# Patient Record
Sex: MALE | Race: BLACK OR AFRICAN AMERICAN | NOT HISPANIC OR LATINO | Employment: OTHER | URBAN - METROPOLITAN AREA
[De-identification: names, ages, dates, MRNs, and addresses within clinical notes are randomized per-mention and may not be internally consistent; named-entity substitution may affect disease eponyms.]

---

## 2017-01-11 ENCOUNTER — OFFICE VISIT (OUTPATIENT)
Dept: FAMILY MEDICINE | Facility: CLINIC | Age: 82
End: 2017-01-11
Payer: MEDICARE

## 2017-01-11 VITALS
WEIGHT: 224.88 LBS | TEMPERATURE: 97 F | DIASTOLIC BLOOD PRESSURE: 62 MMHG | HEART RATE: 59 BPM | SYSTOLIC BLOOD PRESSURE: 126 MMHG | HEIGHT: 71 IN | BODY MASS INDEX: 31.48 KG/M2

## 2017-01-11 DIAGNOSIS — E78.00 PURE HYPERCHOLESTEROLEMIA: ICD-10-CM

## 2017-01-11 DIAGNOSIS — F01.53 VASCULAR DEMENTIA WITH DEPRESSION: ICD-10-CM

## 2017-01-11 DIAGNOSIS — F32.9 REACTIVE DEPRESSION: ICD-10-CM

## 2017-01-11 DIAGNOSIS — D50.1 IRON DEFICIENCY ANEMIA DUE TO SIDEROPENIC DYSPHAGIA: ICD-10-CM

## 2017-01-11 DIAGNOSIS — N18.30 CKD (CHRONIC KIDNEY DISEASE) STAGE 3, GFR 30-59 ML/MIN: Primary | ICD-10-CM

## 2017-01-11 DIAGNOSIS — G40.909 SEIZURE DISORDER: ICD-10-CM

## 2017-01-11 DIAGNOSIS — E66.9 OBESITY, UNSPECIFIED OBESITY SEVERITY, UNSPECIFIED OBESITY TYPE: ICD-10-CM

## 2017-01-11 PROCEDURE — 99214 OFFICE O/P EST MOD 30 MIN: CPT | Mod: 25,S$GLB,, | Performed by: FAMILY MEDICINE

## 2017-01-11 PROCEDURE — 90662 IIV NO PRSV INCREASED AG IM: CPT | Mod: S$GLB,,, | Performed by: FAMILY MEDICINE

## 2017-01-11 PROCEDURE — 1126F AMNT PAIN NOTED NONE PRSNT: CPT | Mod: S$GLB,,, | Performed by: FAMILY MEDICINE

## 2017-01-11 PROCEDURE — 99999 PR PBB SHADOW E&M-EST. PATIENT-LVL III: CPT | Mod: PBBFAC,,, | Performed by: FAMILY MEDICINE

## 2017-01-11 PROCEDURE — 3078F DIAST BP <80 MM HG: CPT | Mod: S$GLB,,, | Performed by: FAMILY MEDICINE

## 2017-01-11 PROCEDURE — 1157F ADVNC CARE PLAN IN RCRD: CPT | Mod: S$GLB,,, | Performed by: FAMILY MEDICINE

## 2017-01-11 PROCEDURE — G0008 ADMIN INFLUENZA VIRUS VAC: HCPCS | Mod: S$GLB,,, | Performed by: FAMILY MEDICINE

## 2017-01-11 PROCEDURE — 3074F SYST BP LT 130 MM HG: CPT | Mod: S$GLB,,, | Performed by: FAMILY MEDICINE

## 2017-01-11 PROCEDURE — 1160F RVW MEDS BY RX/DR IN RCRD: CPT | Mod: S$GLB,,, | Performed by: FAMILY MEDICINE

## 2017-01-11 PROCEDURE — 1159F MED LIST DOCD IN RCRD: CPT | Mod: S$GLB,,, | Performed by: FAMILY MEDICINE

## 2017-01-11 NOTE — PROGRESS NOTES
Subjective:       Patient ID: Lavon Brandon is a 84 y.o. male.    Chief Complaint: Hypertension; High BMI; and Fatigue    Hypertension   This is a chronic problem. The problem is controlled. Associated symptoms include neck pain. Pertinent negatives include no anxiety, blurred vision, chest pain, headaches or malaise/fatigue. (Recent fall, 2 weeks ago. Neck sprain and rt shoulder sprain. Declined PT. Denies LOC.)   Fatigue   Associated symptoms include fatigue and neck pain. Pertinent negatives include no chest pain or headaches.     Review of Systems   Constitutional: Positive for fatigue. Negative for malaise/fatigue.   Eyes: Negative for blurred vision.   Cardiovascular: Negative for chest pain.   Musculoskeletal: Positive for neck pain.   Neurological: Negative for headaches.       Objective:      Physical Exam   Constitutional: He is oriented to person, place, and time. He appears well-developed and well-nourished. No distress.   HENT:   Head: Normocephalic and atraumatic.   Right Ear: External ear normal.   Left Ear: External ear normal.   Nose: Nose normal.   Mouth/Throat: Oropharynx is clear and moist. No oropharyngeal exudate.   Eyes: Conjunctivae and EOM are normal. Pupils are equal, round, and reactive to light. Right eye exhibits no discharge. Left eye exhibits no discharge. No scleral icterus.   Neck: Normal range of motion. Neck supple. No JVD present. No tracheal deviation present. No thyromegaly present.   Cardiovascular: Normal rate, normal heart sounds and intact distal pulses.    No murmur heard.  Pulmonary/Chest: Effort normal and breath sounds normal. No respiratory distress. He has no wheezes. He has no rales.   Abdominal: Soft. Bowel sounds are normal. He exhibits no distension and no mass. There is no tenderness. There is no rebound and no guarding.   Musculoskeletal: He exhibits no edema.        Right shoulder: He exhibits decreased range of motion and tenderness. He exhibits no pain, no  spasm, normal pulse and normal strength.        Cervical back: He exhibits decreased range of motion and tenderness. He exhibits no swelling and no edema.        Lumbar back: He exhibits decreased range of motion, tenderness and bony tenderness. He exhibits no swelling, no edema, no pain and no spasm.          Lymphadenopathy:     He has no cervical adenopathy.   Neurological: He is alert and oriented to person, place, and time. He has normal strength and normal reflexes. He displays no tremor. No cranial nerve deficit or sensory deficit. He displays a negative Romberg sign. Coordination normal. GCS eye subscore is 4. GCS verbal subscore is 5. GCS motor subscore is 6.   Skin: Skin is warm and dry. No rash noted. He is not diaphoretic. No erythema. No pallor.   Psychiatric: He has a normal mood and affect. His behavior is normal. Judgment and thought content normal.   Vitals reviewed.      Assessment:       1. CKD (chronic kidney disease) stage 3, GFR 47 ml/min    2. Pure hypercholesterolemia    3. Obesity, unspecified obesity severity, unspecified obesity type    4. Seizure disorder, post CVA 2011    5. Reactive depression    6. Iron deficiency anemia due to sideropenic dysphagia        Plan:       CKD (chronic kidney disease) stage 3, GFR 47 ml/min  -     Basic metabolic panel; Future; Expected date: 1/11/17  -     PHOSPHORUS; Future; Expected date: 1/11/17  -     PTH, intact; Future; Expected date: 1/11/17  -     CBC auto differential; Future; Expected date: 1/11/17  -     FERRITIN; Future; Expected date: 1/11/17    Pure hypercholesterolemia    Obesity, unspecified obesity severity, unspecified obesity type    Seizure disorder, post CVA 2011  -     Levetiracetam level; Future; Expected date: 1/11/17    Reactive depression    Iron deficiency anemia due to sideropenic dysphagia  -     FERRITIN; Future; Expected date: 1/11/17    Other orders  -     Influenza - High Dose (65+) (PF) (IM)      Patient readiness:  acceptance and barriers:readiness    During the course of the visit the patient was educated and counseled about the following:     Hypertension:   Dietary sodium restriction.  Regular aerobic exercise.  Obesity:   General weight loss/lifestyle modification strategies discussed (elicit support from others; identify saboteurs; non-food rewards, etc).    Goals: Hypertension: Reduce Blood Pressure and Obesity: Reduce calorie intake and BMI    Did patient meet goals/outcomes: Yes    The following self management tools provided: blood pressure log  excercise log    Patient Instructions (the written plan) was given to the patient/family.     Time spent with patient: 45 minutes

## 2017-01-11 NOTE — PATIENT INSTRUCTIONS
4 Steps for Eating Healthier  Changing the way you eat can improve your health. It can lower your cholesterol and blood pressure, and help you stay at a healthy weight. Your diet doesnt have to be bland and boring to be healthy. Just watch your calories and follow these steps:    1. Eat fewer unhealthy fats  · Choose more fish and lean meats instead of fatty cuts of meat.  · Skip butter and lard, and use less margarine.  · Pass on foods that have palm, coconut, or hydrogenated oils.  · Eat fewer high-fat dairy foods like cheese, ice cream, and whole milk.  · Get a heart-healthy cookbook and try some low-fat recipes.  2. Go light on salt  · Keep the saltshaker off the table.  · Limit high-salt ingredients, such as soy sauce, bouillon, and garlic salt.  · Instead of adding salt when cooking, season your food with herbs and flavorings. Try lemon, garlic, and onion.  · Limit convenience foods, such as boxed or canned foods and restaurant food.  · Read food labels and choose lower-sodium options.  3. Limit sugar  · Pause before you add sugars to pancakes, cereal, coffee, or tea. This includes white and brown table sugar, syrup, honey, and molasses. Cut your usual amount by half.  · Use non-sugar sweeteners. Stevia, aspartame, and sucralose can satisfy a sweet tooth without adding calories.  · Swap out sugar-filled soda and other drinks. Buy sugar-free or low-calorie beverages. Remember water is always the best choice.  · Read labels and choose foods with less added sugar. Keep in mind that dairy foods and foods with fruit will have some natural sugar.  · Cut the sugar in recipes by 1/3 to 1/2. Boost the flavor with extracts like almond, vanilla, or orange. Or add spices such as cinnamon or nutmeg.  4. Eat more fiber  · Eat fresh fruits and vegetables every day.  · Boost your diet with whole grains. Go for oats, whole-grain rice, and bran.  · Add beans and lentils to your meals.  · Drink more water to match your fiber  increase. This is to help prevent constipation.  © 8914-5395 [employer]. [address] [place] [address], [place] [address]. All rights reserved. This information is not intended as a substitute for professional medical care. Always follow your healthcare professional's instructions.        Chronic Kidney Disease (CKD)    The role of the kidneys is to remove waste products and excess water from the blood.  When the kidneys do not function normally and waste products begin to build up in the blood, this is called chronic kidney disease (CKD). CKD is defined as the presence of kidney damage or a decrease in kidney function lasting 3 months or more. CKD allows excess water, waste, and toxic substances to build up in the body. This can eventually become life-threatening, requiring dialysis or a kidney transplant to stay alive. This most severe form is called end stage renal disease or ESRD.  Diabetes is the leading causes of chronic renal failure. Other causes include high blood pressure, hardening of the arteries (atherosclerosis), lupus, inflammation of the blood vessels (vasculitis), prior viral and bacterial infections, and others. Certain over-the-counter pain medicines can cause renal failure when taken often over a long period of time. These include aspirin, ibuprofen, and related anti-inflammatory medicines called NSAIDs (nonsteroidal anti-inflammatory drugs).  Home care  The following guidelines will help you care for yourself at home:  1. If you have diabetes, talk to your doctor about the quality of your blood sugar control and any adjustments needed to your diet, lifestyle, or medicines.  2. If you have high blood pressure:  ¨ Take prescribed medicine to lower your blood pressure to the recommended goal of less than 130/80.  ¨ Take up a regular exercise program that you enjoy. Check with your doctor to be sure your planned exercise program is right for you.  ¨ Reduce your salt (sodium) intake. Your  doctor can tell you how much salt per day is safe for you.  3. If you are overweight, talk to your doctor about a weight loss plan.  4. If you smoke, you must quit. Smoking worsens kidney disease. Talk to your doctor about ways to help you quit.  For more information, visit the following links:  ¨ www.Cred[employer].gov/sites/default/files/pdf/clearing-the-air-accessible.pdf  ¨ www.smokefree.gov  ¨ www.cancer.org/healthy/stayawayfromtobacco/guidetoquittingsmoking/  5. Most patients with chronic renal failure need to follow a special diet.  Be sure you understand yours. In general, you will need to restrict protein, salt, potassium, and phosphorus. You also need to limit fluid intake. A calcium supplement may be prescribed to protect your bones from osteoporosis.  6. CKD is a risk factor for heart disease. Talk to your doctor about any other risk factors you might have and what you can do to lessen them.  7. Talk to your doctor about any medicines you are taking to determine if they need to be reduced or stopped.  8. Avoid the following over-the-counter medicines, or consult your doctor before using:  ¨ Aspirin and nonsteroidal anti-inflammatory drugs (NSAIDs) such as ibuprofen or naproxen (short-term use of acetaminophen for fever or pain is okay)  ¨ Laxatives and antacids containing magnesium or aluminum  ¨ Fleet or phospho soda enemas containing phosphorus  ¨ Certain stomach acid-blocking medicine such as cimetidine or ranitidine   ¨ Decongestants containing pseudoephedrine   ¨ Herbal supplements  Follow-up care  Follow up with your doctor or as advised by our staff. Contact one of the following for more information:  · American Association of Kidney Patients (754) 261-6694 www.aakp.org  · National Kidney Foundation (251) 436-1430 www.kidney.org  · American Kidney Fund (187) 747-4921 www.kidneyfund.org  · National Kidney Disease Education Program (676) 4WVDNXO www.nkdep.nih.gov  [NOTE: If an X-ray, EKG (cardiogram), or  other diagnostic test was taken, another specialist will review it. You will be notified of any new findings that may affect your care.]  When to seek medical care  Get prompt medical attention or contact your doctor if any of the following occur:  · Nausea or vomiting  · Fever greater than 100.4°F (38°C)  · Severe weakness, dizziness, fainting, drowsiness, or confusion  · Chest pain or shortness of breath  · Unexpected weight gain or swelling in the legs, ankles, or around the eyes  · Heart beating fast, slow, or irregularly  · Decrease or absent urine output  © 7341-0640 [employer]. [address] [place] [address], [place] [address]. All rights reserved. This information is not intended as a substitute for professional medical care. Always follow your healthcare professional's instructions.

## 2017-01-11 NOTE — MR AVS SNAPSHOT
[facility]  [address]  [place] [address]  Phone: [phone]  Fax: [phone]                  Lavon Brandon   2017 10:30 AM   Office Visit    Description:  Male : 1932   Provider:  Aristides Haynes MD   Department:  [facility]           Reason for Visit     Hypertension     High BMI     Fatigue           Diagnoses this Visit        Comments    CKD (chronic kidney disease) stage 3, GFR 30-59 ml/min    -  Primary     Pure hypercholesterolemia         Obesity, unspecified obesity severity, unspecified obesity type         Seizure disorder         Reactive depression         Iron deficiency anemia due to sideropenic dysphagia         Vascular dementia with depression                To Do List           Future Appointments        Provider Department Dept Phone    2017 10:00 AM [facility] [phone]    2017 1:30 PM Aristides Haynes MD [facility] [phone]      Goals (5 Years of Data)     None      Follow-Up and Disposition     Return in about 6 months (around 2017).      [facility]     [facility] Nurse Care Line - 24/7 Assistance  Registered nurses in the [facility] Center provide clinical advisement, health education, appointment booking, and other advisory services.  Call for this free service at [phone].             Medications           Message regarding Medications     Verify the changes and/or additions to your medication regime listed below are the same as discussed with your clinician today.  If any of these changes or additions are incorrect, please notify your healthcare provider.             Verify that the below list of medications is an accurate representation of the medications you are currently taking.  If none reported, the list may be blank. If incorrect, please contact your healthcare provider. Carry this list with you in case of emergency.           Current  "Medications     amlodipine (NORVASC) 5 MG tablet TAKE ONE TABLET BY MOUTH ONCE DAILY    benazepril (LOTENSIN) 40 MG tablet TAKE ONE TABLET BY MOUTH ONCE DAILY    citalopram (CELEXA) 20 MG tablet Take 1 tablet (20 mg total) by mouth once daily.    furosemide (LASIX) 40 MG tablet TAKE ONE TABLET BY MOUTH TWICE DAILY    levetiracetam (KEPPRA) 500 MG Tab TAKE ONE TABLET BY MOUTH TWICE DAILY           Clinical Reference Information           Vital Signs - Last Recorded  Most recent update: 1/11/2017 10:46 AM by Dee Pratt MA    BP Pulse Temp Ht Wt BMI    126/62 (BP Location: Right arm, Patient Position: Sitting, BP Method: Automatic) (!) 59 97.2 °F (36.2 °C) (Oral) 5' 11" (1.803 m) 102 kg (224 lb 13.9 oz) 31.36 kg/m2      Blood Pressure          Most Recent Value    BP  126/62      Allergies as of 1/11/2017     Ciprofloxacin (Bulk)      Immunizations Administered on Date of Encounter - 1/11/2017     Name Date Dose VIS Date Route    Influenza - High Dose 1/11/2017 0.5 mL 8/7/2015 Intramuscular      Orders Placed During Today's Visit      Normal Orders This Visit    Influenza - High Dose (65+) (PF) (IM)     Future Labs/Procedures Expected by Expires    Basic metabolic panel  1/11/2017 3/12/2018    CBC auto differential  1/11/2017 3/12/2018    FERRITIN  1/11/2017 3/12/2018    Levetiracetam level  1/11/2017 3/12/2018    PHOSPHORUS  1/11/2017 3/12/2018    PTH, intact  1/11/2017 3/12/2018      Instructions      4 Steps for Eating Healthier  Changing the way you eat can improve your health. It can lower your cholesterol and blood pressure, and help you stay at a healthy weight. Your diet doesnt have to be bland and boring to be healthy. Just watch your calories and follow these steps:    1. Eat fewer unhealthy fats  · Choose more fish and lean meats instead of fatty cuts of meat.  · Skip butter and lard, and use less margarine.  · Pass on foods that have palm, coconut, or hydrogenated oils.  · Eat fewer high-fat dairy " foods like cheese, ice cream, and whole milk.  · Get a heart-healthy cookbook and try some low-fat recipes.  2. Go light on salt  · Keep the saltshaker off the table.  · Limit high-salt ingredients, such as soy sauce, bouillon, and garlic salt.  · Instead of adding salt when cooking, season your food with herbs and flavorings. Try lemon, garlic, and onion.  · Limit convenience foods, such as boxed or canned foods and restaurant food.  · Read food labels and choose lower-sodium options.  3. Limit sugar  · Pause before you add sugars to pancakes, cereal, coffee, or tea. This includes white and brown table sugar, syrup, honey, and molasses. Cut your usual amount by half.  · Use non-sugar sweeteners. Stevia, aspartame, and sucralose can satisfy a sweet tooth without adding calories.  · Swap out sugar-filled soda and other drinks. Buy sugar-free or low-calorie beverages. Remember water is always the best choice.  · Read labels and choose foods with less added sugar. Keep in mind that dairy foods and foods with fruit will have some natural sugar.  · Cut the sugar in recipes by 1/3 to 1/2. Boost the flavor with extracts like almond, vanilla, or orange. Or add spices such as cinnamon or nutmeg.  4. Eat more fiber  · Eat fresh fruits and vegetables every day.  · Boost your diet with whole grains. Go for oats, whole-grain rice, and bran.  · Add beans and lentils to your meals.  · Drink more water to match your fiber increase. This is to help prevent constipation.  © 9780-8915 [employer]. [address] [place] [address], [place] [address]. All rights reserved. This information is not intended as a substitute for professional medical care. Always follow your healthcare professional's instructions.        Chronic Kidney Disease (CKD)    The role of the kidneys is to remove waste products and excess water from the blood.  When the kidneys do not function normally and waste products begin to build up in the blood, this is  called chronic kidney disease (CKD). CKD is defined as the presence of kidney damage or a decrease in kidney function lasting 3 months or more. CKD allows excess water, waste, and toxic substances to build up in the body. This can eventually become life-threatening, requiring dialysis or a kidney transplant to stay alive. This most severe form is called end stage renal disease or ESRD.  Diabetes is the leading causes of chronic renal failure. Other causes include high blood pressure, hardening of the arteries (atherosclerosis), lupus, inflammation of the blood vessels (vasculitis), prior viral and bacterial infections, and others. Certain over-the-counter pain medicines can cause renal failure when taken often over a long period of time. These include aspirin, ibuprofen, and related anti-inflammatory medicines called NSAIDs (nonsteroidal anti-inflammatory drugs).  Home care  The following guidelines will help you care for yourself at home:  1. If you have diabetes, talk to your doctor about the quality of your blood sugar control and any adjustments needed to your diet, lifestyle, or medicines.  2. If you have high blood pressure:  ¨ Take prescribed medicine to lower your blood pressure to the recommended goal of less than 130/80.  ¨ Take up a regular exercise program that you enjoy. Check with your doctor to be sure your planned exercise program is right for you.  ¨ Reduce your salt (sodium) intake. Your doctor can tell you how much salt per day is safe for you.  3. If you are overweight, talk to your doctor about a weight loss plan.  4. If you smoke, you must quit. Smoking worsens kidney disease. Talk to your doctor about ways to help you quit.  For more information, visit the following links:  ¨ www.smokefree.gov/sites/default/files/pdf/clearing-the-air-accessible.pdf  ¨ www.smokefree.gov  ¨ www.cancer.org/healthy/stayawayfromtobacco/guidetoquittingsmoking/  5. Most patients with chronic renal failure need to  follow a special diet.  Be sure you understand yours. In general, you will need to restrict protein, salt, potassium, and phosphorus. You also need to limit fluid intake. A calcium supplement may be prescribed to protect your bones from osteoporosis.  6. CKD is a risk factor for heart disease. Talk to your doctor about any other risk factors you might have and what you can do to lessen them.  7. Talk to your doctor about any medicines you are taking to determine if they need to be reduced or stopped.  8. Avoid the following over-the-counter medicines, or consult your doctor before using:  ¨ Aspirin and nonsteroidal anti-inflammatory drugs (NSAIDs) such as ibuprofen or naproxen (short-term use of acetaminophen for fever or pain is okay)  ¨ Laxatives and antacids containing magnesium or aluminum  ¨ Fleet or phospho soda enemas containing phosphorus  ¨ Certain stomach acid-blocking medicine such as cimetidine or ranitidine   ¨ Decongestants containing pseudoephedrine   ¨ Herbal supplements  Follow-up care  Follow up with your doctor or as advised by our staff. Contact one of the following for more information:  · American Association of Kidney Patients (242) 960-5670 www.aakp.org  · National Kidney Foundation (124) 217-9124 www.kidney.org  · American Kidney Fund (685) 203-3318 www.kidneyfund.org  · National Kidney Disease Education Program (589) 4KIDN www.nkdep.nih.gov  [NOTE: If an X-ray, EKG (cardiogram), or other diagnostic test was taken, another specialist will review it. You will be notified of any new findings that may affect your care.]  When to seek medical care  Get prompt medical attention or contact your doctor if any of the following occur:  · Nausea or vomiting  · Fever greater than 100.4°F (38°C)  · Severe weakness, dizziness, fainting, drowsiness, or confusion  · Chest pain or shortness of breath  · Unexpected weight gain or swelling in the legs, ankles, or around the eyes  · Heart beating fast, slow,  or irregularly  · Decrease or absent urine output  © 7457-3930 [employer]. [address] [place] [address], [place] [address]. All rights reserved. This information is not intended as a substitute for professional medical care. Always follow your healthcare professional's instructions.

## 2017-02-21 RX ORDER — FUROSEMIDE 40 MG/1
TABLET ORAL
Qty: 60 TABLET | Refills: 0 | Status: SHIPPED | OUTPATIENT
Start: 2017-02-21 | End: 2017-03-29 | Stop reason: SDUPTHER

## 2017-03-28 ENCOUNTER — DOCUMENTATION ONLY (OUTPATIENT)
Dept: FAMILY MEDICINE | Facility: CLINIC | Age: 82
End: 2017-03-28

## 2017-03-28 NOTE — PATIENT INSTRUCTIONS
Established High Blood Pressure    High blood pressure (hypertension) is a chronic disease. Often health care providers dont know what causes it. But it can be caused by certain health conditions and medicines.  If you have high blood pressure, you may not have any symptoms. If you do have symptoms, they may include headache, dizziness, changes in your vision, chest pain, and shortness of breath. But even without symptoms, high blood pressure thats not treated raises your risk for heart attack and stroke. High blood pressure is a serious health risk and shouldnt be ignored.  A blood pressure reading is made up of two numbers: a higher number over a lower number. The top number is the systolic pressure. The bottom number is the diastolic pressure. A normal blood pressure is less than 120 over less than 80.  High blood pressure is when either the top number is 140 or higher, or the bottom number is 90 or higher. This must be the result when taking your blood pressure a number of times. The blood pressures between normal and high are called prehypertension.  Home care  If you have high blood pressure, you should do what is listed below to lower your blood pressure. If you are taking medicines for high blood pressure, these methods may reduce or end your need for medicines in the future.  · Begin a weight-loss program if you are overweight.  · Cut back on how much salt you get in your diet. Heres how to do this:  ¨ Dont eat foods that have a lot of salt. These include olives, pickles, smoked meats, and salted potato chips.  ¨ Dont add salt to your food at the table.  ¨ Use only small amounts of salt when cooking.  · Begin an exercise program. Talk with your health care provider about the type of exercise program that would be best for you. It doesn't have to be hard. Even brisk walking for 20 minutes 3 times a week is a good form of exercise.  · Dont take medicines that have heart stimulants. This includes many  cold and sinus decongestant pills and sprays, as well as diet pills. Check the warnings about hypertension on the label. Stimulants such as amphetamine or cocaine could be lethal for someone with high blood pressure. Never take these.  · Limit how much caffeine you get in your diet. Switch to caffeine-free products.  · Stop smoking. If you are a long-time smoker, this can be hard. Enroll in a stop-smoking program to make it more likely that you will quit for good.  · Learn how to handle stress. This is an important part of any program to lower blood pressure. Learn about relaxation methods like meditation, yoga, or biofeedback.  · If your provider prescribed medicines, take them exactly as directed. Missing doses may cause your blood pressure get out of control.  · Consider buying an automatic blood pressure machine. You can get one of these at most pharmacies. Use this to watch your blood pressure at home. Give the results to your provider.  Follow-up care  You will need to make regular visits to your health care provider. This is to check your blood pressure and to make changes to your medicines. Make a follow-up appointment as directed.  When to seek medical advice  Call your health care provider right away if any of these occur:  · Chest pain or shortness of breath  · Severe headache  · Throbbing or rushing sound in the ears  · Nosebleed  · Sudden severe pain in your belly (abdomen)  · Extreme drowsiness, confusion, or fainting  · Dizziness or dizziness with a spinning sensation (vertigo)  · Weakness of an arm or leg or one side of the face  · You have problems speaking or seeing   Date Last Reviewed: 11/25/2014  © 9131-5155 [employer]. [address] [place] [address], [place] [address]. All rights reserved. This information is not intended as a substitute for professional medical care. Always follow your healthcare professional's instructions.          Walking for Fitness  Fitness walking has something  for everyone, even people who are already fit. Walking is one of the safest ways to condition your body aerobically. It can boost energy, help you lose weight, and reduce stress.    Physical benefits  · Walking strengthens your heart and lungs, and tones your muscles.  · When walking, your feet land with less impact than in other sports. This reduces chances of muscle, bone, and joint injury.  · Regular walking improves your cholesterol levels and lowers your risk of heart disease. And it helps you control your blood sugar if you have diabetes.  · Walking is a weight-bearing activity, which helps maintain bone density. This can help prevent osteoporosis.  Personal rewards  · Taking walks can help you relax and manage stress. And fitness walking may make you feel better about yourself.  · Walking can help you sleep better at night and make you less likely to be depressed.  · Regular walking may help maintain your memory as you get older.  · Walking is a great way to spend extra time with friends and family members. Be sure to invite your dog along!  Q&A about fitness walking  Q: Will walking keep me fit?  A: Yes. Regular walking at the right pace gives you all the benefits of other aerobic activities, such as jogging and swimming.  Q: Will walking help me lose weight and keep it off?  A: Yes. Per mile, walking can burn as many calories as jogging. Your health care provider can help work walking into your weight-loss plan.  Q: Is walking safe for my health?  A: Yes. Walking is safe if you have high blood pressure, diabetes, heart disease, or other conditions. Talk to your health care provider before you start.  Date Last Reviewed: 5/9/2015 © 2000-2016 [employer]. [address] [place] [id number]. All rights reserved. This information is not intended as a substitute for professional medical care. Always follow your healthcare professional's instructions.            Walking for Fitness  Fitness  walking has something for everyone, even people who are already fit. Walking is one of the safest ways to condition your body aerobically. It can boost energy, help you lose weight, and reduce stress.    Physical benefits  · Walking strengthens your heart and lungs, and tones your muscles.  · When walking, your feet land with less impact than in other sports. This reduces chances of muscle, bone, and joint injury.  · Regular walking improves your cholesterol levels and lowers your risk of heart disease. And it helps you control your blood sugar if you have diabetes.  · Walking is a weight-bearing activity, which helps maintain bone density. This can help prevent osteoporosis.  Personal rewards  · Taking walks can help you relax and manage stress. And fitness walking may make you feel better about yourself.  · Walking can help you sleep better at night and make you less likely to be depressed.  · Regular walking may help maintain your memory as you get older.  · Walking is a great way to spend extra time with friends and family members. Be sure to invite your dog along!  Q&A about fitness walking  Q: Will walking keep me fit?  A: Yes. Regular walking at the right pace gives you all the benefits of other aerobic activities, such as jogging and swimming.  Q: Will walking help me lose weight and keep it off?  A: Yes. Per mile, walking can burn as many calories as jogging. Your health care provider can help work walking into your weight-loss plan.  Q: Is walking safe for my health?  A: Yes. Walking is safe if you have high blood pressure, diabetes, heart disease, or other conditions. Talk to your health care provider before you start.  Date Last Reviewed: 5/9/2015  © 1068-4784 [employer]. [address] [place] [address]. All rights reserved. This information is not intended as a substitute for professional medical care. Always follow your healthcare professional's instructions.        Weight  Management: Getting Started  Healthy bodies come in all shapes and sizes. Not all bodies are made to be thin. For some people, a healthy weight is higher than the average weight listed on weight charts. Your healthcare provider can help you decide on a healthy weight for you.    Reasons to lose weight  Losing weight can help with some health problems, such as high blood pressure, heart disease, diabetes, sleep apnea, and arthritis. You may also feel more energy.  Set your long-term goal  Your goal doesn't even have to be a specific weight. You may decide on a fitness goal (such as being able to walk 10 miles a week), or a health goal (such as lowering your blood pressure). Choose a goal that is measurable and reasonable, so you know when you've reached it. A goal of reaching a BMI of less than 25 is not always reasonable (or possible).   Make an action plan  Habits dont change overnight. Setting your goals too high can leave you feeling discouraged if you cant reach them. Be realistic. Choose one or two small changes you can make now. Set an action plan for how you are going to make these changes. When you can stick to this plan, keep making a few more small changes. Taking small steps will help you stay on the path to success.  Track your progress  Write down your goals. Then, keep a daily record of your progress. Write down what you eat and how active you are. This record lets you look back on how much youve done. It may also help when youre feeling frustrated. Reward yourself for success. Even if you dont reach every goal, give yourself credit for what you do get done.  Get support  Encouragement from others can help make losing weight easier. Ask your family members and friends for support. They may even want to join you. Also look to your healthcare provider, registered dietitian, and  for help. Your local hospital can give you more information about nutrition, exercise, and weight  loss.  Date Last Reviewed: 1/31/2016  © 5675-1751 [employer]. [address] [place] [address], [place] [address]. All rights reserved. This information is not intended as a substitute for professional medical care. Always follow your healthcare professional's instructions.

## 2017-03-29 ENCOUNTER — HOSPITAL ENCOUNTER (OUTPATIENT)
Dept: RADIOLOGY | Facility: CLINIC | Age: 82
Discharge: HOME | End: 2017-03-29
Attending: FAMILY MEDICINE
Payer: MEDICARE

## 2017-03-29 ENCOUNTER — OFFICE VISIT (OUTPATIENT)
Dept: FAMILY MEDICINE | Facility: CLINIC | Age: 82
End: 2017-03-29
Payer: MEDICARE

## 2017-03-29 VITALS
WEIGHT: 226.44 LBS | HEART RATE: 59 BPM | OXYGEN SATURATION: 95 % | DIASTOLIC BLOOD PRESSURE: 62 MMHG | BODY MASS INDEX: 31.7 KG/M2 | RESPIRATION RATE: 14 BRPM | SYSTOLIC BLOOD PRESSURE: 132 MMHG | HEIGHT: 71 IN

## 2017-03-29 DIAGNOSIS — M54.42 BILATERAL LOW BACK PAIN WITH BILATERAL SCIATICA, UNSPECIFIED CHRONICITY: ICD-10-CM

## 2017-03-29 DIAGNOSIS — M54.41 BILATERAL LOW BACK PAIN WITH BILATERAL SCIATICA, UNSPECIFIED CHRONICITY: ICD-10-CM

## 2017-03-29 DIAGNOSIS — M54.42 BILATERAL LOW BACK PAIN WITH BILATERAL SCIATICA, UNSPECIFIED CHRONICITY: Primary | ICD-10-CM

## 2017-03-29 DIAGNOSIS — M54.41 BILATERAL LOW BACK PAIN WITH BILATERAL SCIATICA, UNSPECIFIED CHRONICITY: Primary | ICD-10-CM

## 2017-03-29 PROCEDURE — 72080 X-RAY EXAM THORACOLMB 2/> VW: CPT | Mod: 26,,, | Performed by: RADIOLOGY

## 2017-03-29 PROCEDURE — 1157F ADVNC CARE PLAN IN RCRD: CPT | Mod: S$GLB,,, | Performed by: PHYSICIAN ASSISTANT

## 2017-03-29 PROCEDURE — 1159F MED LIST DOCD IN RCRD: CPT | Mod: S$GLB,,, | Performed by: PHYSICIAN ASSISTANT

## 2017-03-29 PROCEDURE — 99213 OFFICE O/P EST LOW 20 MIN: CPT | Mod: S$GLB,,, | Performed by: PHYSICIAN ASSISTANT

## 2017-03-29 PROCEDURE — 99999 PR PBB SHADOW E&M-EST. PATIENT-LVL IV: CPT | Mod: PBBFAC,,, | Performed by: PHYSICIAN ASSISTANT

## 2017-03-29 PROCEDURE — 72110 X-RAY EXAM L-2 SPINE 4/>VWS: CPT | Mod: TC,PO

## 2017-03-29 PROCEDURE — 72110 X-RAY EXAM L-2 SPINE 4/>VWS: CPT | Mod: 26,,, | Performed by: RADIOLOGY

## 2017-03-29 PROCEDURE — 3078F DIAST BP <80 MM HG: CPT | Mod: S$GLB,,, | Performed by: PHYSICIAN ASSISTANT

## 2017-03-29 PROCEDURE — 3075F SYST BP GE 130 - 139MM HG: CPT | Mod: S$GLB,,, | Performed by: PHYSICIAN ASSISTANT

## 2017-03-29 PROCEDURE — 1125F AMNT PAIN NOTED PAIN PRSNT: CPT | Mod: S$GLB,,, | Performed by: PHYSICIAN ASSISTANT

## 2017-03-29 PROCEDURE — 1160F RVW MEDS BY RX/DR IN RCRD: CPT | Mod: S$GLB,,, | Performed by: PHYSICIAN ASSISTANT

## 2017-03-29 RX ORDER — FUROSEMIDE 40 MG/1
40 TABLET ORAL 2 TIMES DAILY
Qty: 180 TABLET | Refills: 1 | Status: SHIPPED | OUTPATIENT
Start: 2017-03-29 | End: 2018-04-04 | Stop reason: SDUPTHER

## 2017-03-29 RX ORDER — BENAZEPRIL HYDROCHLORIDE 40 MG/1
40 TABLET ORAL DAILY
Qty: 90 TABLET | Refills: 1 | Status: SHIPPED | OUTPATIENT
Start: 2017-03-29 | End: 2017-10-02 | Stop reason: SDUPTHER

## 2017-03-29 RX ORDER — CITALOPRAM 20 MG/1
20 TABLET, FILM COATED ORAL DAILY
Qty: 90 TABLET | Refills: 1 | Status: SHIPPED | OUTPATIENT
Start: 2017-03-29 | End: 2017-10-08 | Stop reason: SDUPTHER

## 2017-03-29 NOTE — MR AVS SNAPSHOT
[facility]  [address]  [place] [address]  Phone: [phone]  Fax: [phone]                  Lavon Brandon   3/29/2017 10:20 AM   Office Visit    Description:  Male : 1932   Provider:  Tania Tripp PA-C   Department:  [facility]           Reason for Visit     Patient fell and hurt back           Diagnoses this Visit        Comments    Bilateral low back pain with bilateral sciatica, unspecified chronicity    -  Primary            To Do List           Future Appointments        Provider Department Dept Phone    2017 10:00 AM RACQUEL BROWN SAT [facility] - Lab [phone]    2017 1:30 PM Aristides Haynes MD [facility] [phone]      Goals (5 Years of Data)     None       These Medications        Disp Refills Start End    citalopram (CELEXA) 20 MG tablet 90 tablet 1 3/29/2017     Take 1 tablet (20 mg total) by mouth once daily. - Oral    Pharmacy: [facility] [address] [place] [address]. Ph #: [phone]       benazepril (LOTENSIN) 40 MG tablet 90 tablet 1 3/29/2017     Take 1 tablet (40 mg total) by mouth once daily. - Oral    Pharmacy: [facility] [address] [place] [address]. Ph #: [phone]       furosemide (LASIX) 40 MG tablet 180 tablet 1 3/29/2017     Take 1 tablet (40 mg total) by mouth 2 (two) times daily. - Oral    Pharmacy: [facility] [address] [place] [address]. Ph #: [phone]         [facility]     [facility] Nurse Care Line -  Assistance  Registered nurses in the [facility] provide clinical advisement, health education, appointment booking, and other advisory services.  Call for this free service at [phone].             Medications           Message regarding Medications     Verify the changes and/or additions to your medication regime listed below are the same as discussed with your clinician today.  If  "any of these changes or additions are incorrect, please notify your healthcare provider.        CHANGE how you are taking these medications     Start Taking Instead of    benazepril (LOTENSIN) 40 MG tablet benazepril (LOTENSIN) 40 MG tablet    Dosage:  Take 1 tablet (40 mg total) by mouth once daily. Dosage:  TAKE ONE TABLET BY MOUTH ONCE DAILY    Reason for Change:  Reorder     furosemide (LASIX) 40 MG tablet furosemide (LASIX) 40 MG tablet    Dosage:  Take 1 tablet (40 mg total) by mouth 2 (two) times daily. Dosage:  TAKE ONE TABLET BY MOUTH TWICE DAILY    Reason for Change:  Reorder            Verify that the below list of medications is an accurate representation of the medications you are currently taking.  If none reported, the list may be blank. If incorrect, please contact your healthcare provider. Carry this list with you in case of emergency.           Current Medications     amlodipine (NORVASC) 5 MG tablet TAKE ONE TABLET BY MOUTH ONCE DAILY    benazepril (LOTENSIN) 40 MG tablet Take 1 tablet (40 mg total) by mouth once daily.    citalopram (CELEXA) 20 MG tablet Take 1 tablet (20 mg total) by mouth once daily.    furosemide (LASIX) 40 MG tablet Take 1 tablet (40 mg total) by mouth 2 (two) times daily.    levetiracetam (KEPPRA) 500 MG Tab TAKE ONE TABLET BY MOUTH TWICE DAILY           Clinical Reference Information           Your Vitals Were     BP Pulse Resp Height Weight SpO2    132/62 (BP Location: Right arm, Patient Position: Sitting, BP Method: Automatic) 59 14 5' 11" (1.803 m) 102.7 kg (226 lb 6.6 oz) 95%    BMI                31.58 kg/m2          Blood Pressure          Most Recent Value    BP  132/62      Allergies as of 3/29/2017     Ciprofloxacin (Bulk)      Immunizations Administered on Date of Encounter - 3/29/2017     None      Orders Placed During Today's Visit     Future Labs/Procedures Expected by Expires    X-Ray Lumbar Spine Complete 5 View  3/29/2017 3/29/2018    X-Ray Thoracolumbar " Spine AP Lateral  3/29/2017 3/29/2018      Instructions      Established High Blood Pressure    High blood pressure (hypertension) is a chronic disease. Often health care providers dont know what causes it. But it can be caused by certain health conditions and medicines.  If you have high blood pressure, you may not have any symptoms. If you do have symptoms, they may include headache, dizziness, changes in your vision, chest pain, and shortness of breath. But even without symptoms, high blood pressure thats not treated raises your risk for heart attack and stroke. High blood pressure is a serious health risk and shouldnt be ignored.  A blood pressure reading is made up of two numbers: a higher number over a lower number. The top number is the systolic pressure. The bottom number is the diastolic pressure. A normal blood pressure is less than 120 over less than 80.  High blood pressure is when either the top number is 140 or higher, or the bottom number is 90 or higher. This must be the result when taking your blood pressure a number of times. The blood pressures between normal and high are called prehypertension.  Home care  If you have high blood pressure, you should do what is listed below to lower your blood pressure. If you are taking medicines for high blood pressure, these methods may reduce or end your need for medicines in the future.  · Begin a weight-loss program if you are overweight.  · Cut back on how much salt you get in your diet. Heres how to do this:  ¨ Dont eat foods that have a lot of salt. These include olives, pickles, smoked meats, and salted potato chips.  ¨ Dont add salt to your food at the table.  ¨ Use only small amounts of salt when cooking.  · Begin an exercise program. Talk with your health care provider about the type of exercise program that would be best for you. It doesn't have to be hard. Even brisk walking for 20 minutes 3 times a week is a good form of exercise.  · Dont  take medicines that have heart stimulants. This includes many cold and sinus decongestant pills and sprays, as well as diet pills. Check the warnings about hypertension on the label. Stimulants such as amphetamine or cocaine could be lethal for someone with high blood pressure. Never take these.  · Limit how much caffeine you get in your diet. Switch to caffeine-free products.  · Stop smoking. If you are a long-time smoker, this can be hard. Enroll in a stop-smoking program to make it more likely that you will quit for good.  · Learn how to handle stress. This is an important part of any program to lower blood pressure. Learn about relaxation methods like meditation, yoga, or biofeedback.  · If your provider prescribed medicines, take them exactly as directed. Missing doses may cause your blood pressure get out of control.  · Consider buying an automatic blood pressure machine. You can get one of these at most pharmacies. Use this to watch your blood pressure at home. Give the results to your provider.  Follow-up care  You will need to make regular visits to your health care provider. This is to check your blood pressure and to make changes to your medicines. Make a follow-up appointment as directed.  When to seek medical advice  Call your health care provider right away if any of these occur:  · Chest pain or shortness of breath  · Severe headache  · Throbbing or rushing sound in the ears  · Nosebleed  · Sudden severe pain in your belly (abdomen)  · Extreme drowsiness, confusion, or fainting  · Dizziness or dizziness with a spinning sensation (vertigo)  · Weakness of an arm or leg or one side of the face  · You have problems speaking or seeing   Date Last Reviewed: 11/25/2014  © 1793-1698 [employer]. [address] [place] [address], [place] [address]. All rights reserved. This information is not intended as a substitute for professional medical care. Always follow your healthcare professional's  instructions.          Walking for Fitness  Fitness walking has something for everyone, even people who are already fit. Walking is one of the safest ways to condition your body aerobically. It can boost energy, help you lose weight, and reduce stress.    Physical benefits  · Walking strengthens your heart and lungs, and tones your muscles.  · When walking, your feet land with less impact than in other sports. This reduces chances of muscle, bone, and joint injury.  · Regular walking improves your cholesterol levels and lowers your risk of heart disease. And it helps you control your blood sugar if you have diabetes.  · Walking is a weight-bearing activity, which helps maintain bone density. This can help prevent osteoporosis.  Personal rewards  · Taking walks can help you relax and manage stress. And fitness walking may make you feel better about yourself.  · Walking can help you sleep better at night and make you less likely to be depressed.  · Regular walking may help maintain your memory as you get older.  · Walking is a great way to spend extra time with friends and family members. Be sure to invite your dog along!  Q&A about fitness walking  Q: Will walking keep me fit?  A: Yes. Regular walking at the right pace gives you all the benefits of other aerobic activities, such as jogging and swimming.  Q: Will walking help me lose weight and keep it off?  A: Yes. Per mile, walking can burn as many calories as jogging. Your health care provider can help work walking into your weight-loss plan.  Q: Is walking safe for my health?  A: Yes. Walking is safe if you have high blood pressure, diabetes, heart disease, or other conditions. Talk to your health care provider before you start.  Date Last Reviewed: 5/9/2015 © 2000-2016 [employer]. [address] [place] [address], [place] [address]. All rights reserved. This information is not intended as a substitute for professional medical care. Always follow your  healthcare professional's instructions.            Walking for Fitness  Fitness walking has something for everyone, even people who are already fit. Walking is one of the safest ways to condition your body aerobically. It can boost energy, help you lose weight, and reduce stress.    Physical benefits  · Walking strengthens your heart and lungs, and tones your muscles.  · When walking, your feet land with less impact than in other sports. This reduces chances of muscle, bone, and joint injury.  · Regular walking improves your cholesterol levels and lowers your risk of heart disease. And it helps you control your blood sugar if you have diabetes.  · Walking is a weight-bearing activity, which helps maintain bone density. This can help prevent osteoporosis.  Personal rewards  · Taking walks can help you relax and manage stress. And fitness walking may make you feel better about yourself.  · Walking can help you sleep better at night and make you less likely to be depressed.  · Regular walking may help maintain your memory as you get older.  · Walking is a great way to spend extra time with friends and family members. Be sure to invite your dog along!  Q&A about fitness walking  Q: Will walking keep me fit?  A: Yes. Regular walking at the right pace gives you all the benefits of other aerobic activities, such as jogging and swimming.  Q: Will walking help me lose weight and keep it off?  A: Yes. Per mile, walking can burn as many calories as jogging. Your health care provider can help work walking into your weight-loss plan.  Q: Is walking safe for my health?  A: Yes. Walking is safe if you have high blood pressure, diabetes, heart disease, or other conditions. Talk to your health care provider before you start.  Date Last Reviewed: 5/9/2015  © 5612-8998 [employer]. [address] [place] [address], [place] [address]. All rights reserved. This information is not intended as a substitute for professional medical  care. Always follow your healthcare professional's instructions.        Weight Management: Getting Started  Healthy bodies come in all shapes and sizes. Not all bodies are made to be thin. For some people, a healthy weight is higher than the average weight listed on weight charts. Your healthcare provider can help you decide on a healthy weight for you.    Reasons to lose weight  Losing weight can help with some health problems, such as high blood pressure, heart disease, diabetes, sleep apnea, and arthritis. You may also feel more energy.  Set your long-term goal  Your goal doesn't even have to be a specific weight. You may decide on a fitness goal (such as being able to walk 10 miles a week), or a health goal (such as lowering your blood pressure). Choose a goal that is measurable and reasonable, so you know when you've reached it. A goal of reaching a BMI of less than 25 is not always reasonable (or possible).   Make an action plan  Habits dont change overnight. Setting your goals too high can leave you feeling discouraged if you cant reach them. Be realistic. Choose one or two small changes you can make now. Set an action plan for how you are going to make these changes. When you can stick to this plan, keep making a few more small changes. Taking small steps will help you stay on the path to success.  Track your progress  Write down your goals. Then, keep a daily record of your progress. Write down what you eat and how active you are. This record lets you look back on how much youve done. It may also help when youre feeling frustrated. Reward yourself for success. Even if you dont reach every goal, give yourself credit for what you do get done.  Get support  Encouragement from others can help make losing weight easier. Ask your family members and friends for support. They may even want to join you. Also look to your healthcare provider, registered dietitian, and  for help. Your local  hospital can give you more information about nutrition, exercise, and weight loss.  Date Last Reviewed: 1/31/2016  © 5097-1502 [employer]. [address] [place] [address], [place] [address]. All rights reserved. This information is not intended as a substitute for professional medical care. Always follow your healthcare professional's instructions.                 Language Assistance Services     ATTENTION: Language assistance services are available, free of charge. Please call [phone].      ATENCIÓN: Si habla ellieañol, tiene a archer disposición servicios gratuitos de asistencia lingüística. Llame al [phone].     HANNAH Ý: N?u b?n nói Ti?ng Vi?t, có các d?ch v? h? tr? ngôn ng? mi?n phí dành cho b?n. G?i s? [phone].         [facility] complies with applicable Federal civil rights laws and does not discriminate on the basis of race, color, national origin, age, disability, or sex.

## 2017-03-29 NOTE — PROGRESS NOTES
Subjective:       Patient ID: Lavon Brandon is a 84 y.o. male.    Chief Complaint: Patient fell and hurt back    Back Pain   This is a new problem. The current episode started more than 1 month ago (chronic). The problem occurs daily. The problem has been gradually worsening since onset. The pain is present in the lumbar spine. The quality of the pain is described as aching. The pain radiates to the left foot, left thigh, right thigh and right foot. The pain is moderate. Exacerbated by: pain most severe after waking up from sleep. Pertinent negatives include no abdominal pain, bladder incontinence, chest pain, dysuria, fever, headaches, paresis, paresthesias, pelvic pain, perianal numbness, weakness or weight loss. He has tried nothing for the symptoms.     Review of Systems   Constitutional: Negative for activity change, appetite change, fever and weight loss.   Eyes: Negative for visual disturbance.   Respiratory: Negative for cough and shortness of breath.    Cardiovascular: Negative for chest pain.   Gastrointestinal: Negative for abdominal distention and abdominal pain.   Genitourinary: Negative for bladder incontinence, difficulty urinating, dysuria and pelvic pain.   Musculoskeletal: Positive for arthralgias, back pain and myalgias.   Neurological: Negative for weakness, headaches and paresthesias.   Hematological: Negative for adenopathy.   Psychiatric/Behavioral: The patient is not nervous/anxious.        Objective:      Physical Exam   Constitutional: He is oriented to person, place, and time.   Cardiovascular: Normal rate and regular rhythm.    Pulmonary/Chest: Effort normal. He has no wheezes.   Abdominal: Soft. Bowel sounds are normal. There is no tenderness.   Musculoskeletal: He exhibits tenderness. He exhibits no edema.   Lumbar spine TTP  Negative SLR bilaterally    Neurological: He is alert and oriented to person, place, and time.   Skin: No erythema.   Psychiatric: His behavior is normal.        Assessment:       1. Bilateral low back pain with bilateral sciatica, unspecified chronicity        Plan:     Lavon was seen today for patient fell and hurt back.    Diagnoses and all orders for this visit:    Bilateral low back pain with bilateral sciatica, unspecified chronicity  -     Cancel: X-Ray Lumbar Spine Complete 5 View; Future  -     X-Ray Thoracolumbar Spine AP Lateral; Future  -     X-Ray Lumbar Spine Complete 5 View; Future  Offered patient PT for back pain, Patient refuses at that time  Other orders  -     citalopram (CELEXA) 20 MG tablet; Take 1 tablet (20 mg total) by mouth once daily.  -     benazepril (LOTENSIN) 40 MG tablet; Take 1 tablet (40 mg total) by mouth once daily.  -     furosemide (LASIX) 40 MG tablet; Take 1 tablet (40 mg total) by mouth 2 (two) times daily.

## 2017-06-01 ENCOUNTER — LAB VISIT (OUTPATIENT)
Dept: LAB | Facility: HOSPITAL | Age: 82
End: 2017-06-01
Attending: INTERNAL MEDICINE
Payer: MEDICARE

## 2017-06-01 DIAGNOSIS — N18.30 CHRONIC KIDNEY DISEASE, STAGE III (MODERATE): ICD-10-CM

## 2017-06-01 DIAGNOSIS — M19.90 OSTEOARTHROSIS: ICD-10-CM

## 2017-06-01 DIAGNOSIS — G40.409: ICD-10-CM

## 2017-06-01 DIAGNOSIS — I10 ESSENTIAL HYPERTENSION, BENIGN: ICD-10-CM

## 2017-06-01 DIAGNOSIS — I10 ESSENTIAL HYPERTENSION, BENIGN: Primary | ICD-10-CM

## 2017-06-01 LAB
ALBUMIN SERPL BCP-MCNC: 3.7 G/DL
ALP SERPL-CCNC: 62 U/L
ALT SERPL W/O P-5'-P-CCNC: 9 U/L
ANION GAP SERPL CALC-SCNC: 8 MMOL/L
AST SERPL-CCNC: 17 U/L
BASOPHILS # BLD AUTO: 0.02 K/UL
BASOPHILS NFR BLD: 0.3 %
BILIRUB SERPL-MCNC: 0.6 MG/DL
BUN SERPL-MCNC: 31 MG/DL
CALCIUM SERPL-MCNC: 9.3 MG/DL
CHLORIDE SERPL-SCNC: 104 MMOL/L
CO2 SERPL-SCNC: 30 MMOL/L
CREAT SERPL-MCNC: 2.5 MG/DL
DIFFERENTIAL METHOD: ABNORMAL
EOSINOPHIL # BLD AUTO: 0.2 K/UL
EOSINOPHIL NFR BLD: 2.3 %
ERYTHROCYTE [DISTWIDTH] IN BLOOD BY AUTOMATED COUNT: 14.8 %
EST. GFR  (AFRICAN AMERICAN): 26.3 ML/MIN/1.73 M^2
EST. GFR  (NON AFRICAN AMERICAN): 22.7 ML/MIN/1.73 M^2
GLUCOSE SERPL-MCNC: 96 MG/DL
HCT VFR BLD AUTO: 42.4 %
HGB BLD-MCNC: 13.7 G/DL
LYMPHOCYTES # BLD AUTO: 2.9 K/UL
LYMPHOCYTES NFR BLD: 44.4 %
MCH RBC QN AUTO: 29.1 PG
MCHC RBC AUTO-ENTMCNC: 32.3 %
MCV RBC AUTO: 90 FL
MONOCYTES # BLD AUTO: 0.5 K/UL
MONOCYTES NFR BLD: 6.9 %
NEUTROPHILS # BLD AUTO: 3 K/UL
NEUTROPHILS NFR BLD: 45.9 %
PLATELET # BLD AUTO: 140 K/UL
PMV BLD AUTO: 11.7 FL
POTASSIUM SERPL-SCNC: 4.7 MMOL/L
PROT SERPL-MCNC: 7.1 G/DL
PTH-INTACT SERPL-MCNC: 217 PG/ML
RBC # BLD AUTO: 4.7 M/UL
SODIUM SERPL-SCNC: 142 MMOL/L
WBC # BLD AUTO: 6.62 K/UL

## 2017-06-01 PROCEDURE — 36415 COLL VENOUS BLD VENIPUNCTURE: CPT | Mod: PO

## 2017-06-01 PROCEDURE — 85025 COMPLETE CBC W/AUTO DIFF WBC: CPT

## 2017-06-01 PROCEDURE — 80053 COMPREHEN METABOLIC PANEL: CPT

## 2017-06-01 PROCEDURE — 83970 ASSAY OF PARATHORMONE: CPT

## 2017-08-03 RX ORDER — LEVETIRACETAM 500 MG/1
TABLET ORAL
Qty: 60 TABLET | Refills: 6 | Status: SHIPPED | OUTPATIENT
Start: 2017-08-03 | End: 2018-03-05 | Stop reason: SDUPTHER

## 2017-08-15 ENCOUNTER — LAB VISIT (OUTPATIENT)
Dept: LAB | Facility: HOSPITAL | Age: 82
End: 2017-08-15
Attending: FAMILY MEDICINE
Payer: MEDICARE

## 2017-08-15 DIAGNOSIS — N18.30 CKD (CHRONIC KIDNEY DISEASE) STAGE 3, GFR 30-59 ML/MIN: ICD-10-CM

## 2017-08-15 DIAGNOSIS — D50.1 IRON DEFICIENCY ANEMIA DUE TO SIDEROPENIC DYSPHAGIA: ICD-10-CM

## 2017-08-15 DIAGNOSIS — G40.909 SEIZURE DISORDER: ICD-10-CM

## 2017-08-15 LAB
ANION GAP SERPL CALC-SCNC: 6 MMOL/L
BASOPHILS # BLD AUTO: 0.01 K/UL
BASOPHILS NFR BLD: 0.2 %
BUN SERPL-MCNC: 32 MG/DL
CALCIUM SERPL-MCNC: 9 MG/DL
CHLORIDE SERPL-SCNC: 108 MMOL/L
CO2 SERPL-SCNC: 27 MMOL/L
CREAT SERPL-MCNC: 2.6 MG/DL
DIFFERENTIAL METHOD: ABNORMAL
EOSINOPHIL # BLD AUTO: 0.1 K/UL
EOSINOPHIL NFR BLD: 2.4 %
ERYTHROCYTE [DISTWIDTH] IN BLOOD BY AUTOMATED COUNT: 14.4 %
EST. GFR  (AFRICAN AMERICAN): 25.1 ML/MIN/1.73 M^2
EST. GFR  (NON AFRICAN AMERICAN): 21.7 ML/MIN/1.73 M^2
GLUCOSE SERPL-MCNC: 87 MG/DL
HCT VFR BLD AUTO: 38.4 %
HGB BLD-MCNC: 13 G/DL
LYMPHOCYTES # BLD AUTO: 1.9 K/UL
LYMPHOCYTES NFR BLD: 34.8 %
MCH RBC QN AUTO: 29.5 PG
MCHC RBC AUTO-ENTMCNC: 33.9 G/DL
MCV RBC AUTO: 87 FL
MONOCYTES # BLD AUTO: 0.4 K/UL
MONOCYTES NFR BLD: 7.6 %
NEUTROPHILS # BLD AUTO: 3 K/UL
NEUTROPHILS NFR BLD: 55 %
PHOSPHATE SERPL-MCNC: 2.8 MG/DL
PLATELET # BLD AUTO: 124 K/UL
PMV BLD AUTO: 11 FL
POTASSIUM SERPL-SCNC: 4.6 MMOL/L
RBC # BLD AUTO: 4.4 M/UL
SODIUM SERPL-SCNC: 141 MMOL/L
WBC # BLD AUTO: 5.43 K/UL

## 2017-08-15 PROCEDURE — 36415 COLL VENOUS BLD VENIPUNCTURE: CPT | Mod: PO

## 2017-08-15 PROCEDURE — 82728 ASSAY OF FERRITIN: CPT

## 2017-08-15 PROCEDURE — 83970 ASSAY OF PARATHORMONE: CPT

## 2017-08-15 PROCEDURE — 80048 BASIC METABOLIC PNL TOTAL CA: CPT

## 2017-08-15 PROCEDURE — 85025 COMPLETE CBC W/AUTO DIFF WBC: CPT

## 2017-08-15 PROCEDURE — 80177 DRUG SCRN QUAN LEVETIRACETAM: CPT

## 2017-08-15 PROCEDURE — 84100 ASSAY OF PHOSPHORUS: CPT

## 2017-08-16 LAB
FERRITIN SERPL-MCNC: 328 NG/ML
PTH-INTACT SERPL-MCNC: 148 PG/ML

## 2017-08-17 LAB — LEVETIRACETAM SERPL-MCNC: 32 UG/ML (ref 3–60)

## 2017-08-18 ENCOUNTER — DOCUMENTATION ONLY (OUTPATIENT)
Dept: FAMILY MEDICINE | Facility: CLINIC | Age: 82
End: 2017-08-18

## 2017-08-18 NOTE — PROGRESS NOTES
Pre-Visit Chart Review  For Appointment Scheduled on 08/21/2017    Health Maintenance Due   Topic Date Due    Zoster Vaccine  12/08/1992    Influenza Vaccine  08/01/2017

## 2017-08-21 ENCOUNTER — OFFICE VISIT (OUTPATIENT)
Dept: FAMILY MEDICINE | Facility: CLINIC | Age: 82
End: 2017-08-21
Payer: MEDICARE

## 2017-08-21 VITALS
BODY MASS INDEX: 30.86 KG/M2 | HEART RATE: 53 BPM | HEIGHT: 71 IN | SYSTOLIC BLOOD PRESSURE: 105 MMHG | WEIGHT: 220.44 LBS | DIASTOLIC BLOOD PRESSURE: 61 MMHG | TEMPERATURE: 98 F

## 2017-08-21 DIAGNOSIS — F32.9 REACTIVE DEPRESSION: ICD-10-CM

## 2017-08-21 DIAGNOSIS — G47.33 OSA (OBSTRUCTIVE SLEEP APNEA): ICD-10-CM

## 2017-08-21 DIAGNOSIS — N18.30 CKD (CHRONIC KIDNEY DISEASE), STAGE III: ICD-10-CM

## 2017-08-21 DIAGNOSIS — S06.9X0D TBI (TRAUMATIC BRAIN INJURY), WITHOUT LOC, SUBSEQUENT ENCOUNTER: Primary | ICD-10-CM

## 2017-08-21 PROCEDURE — 3074F SYST BP LT 130 MM HG: CPT | Mod: S$GLB,,, | Performed by: FAMILY MEDICINE

## 2017-08-21 PROCEDURE — 3078F DIAST BP <80 MM HG: CPT | Mod: S$GLB,,, | Performed by: FAMILY MEDICINE

## 2017-08-21 PROCEDURE — 1125F AMNT PAIN NOTED PAIN PRSNT: CPT | Mod: S$GLB,,, | Performed by: FAMILY MEDICINE

## 2017-08-21 PROCEDURE — 1159F MED LIST DOCD IN RCRD: CPT | Mod: S$GLB,,, | Performed by: FAMILY MEDICINE

## 2017-08-21 PROCEDURE — 99999 PR PBB SHADOW E&M-EST. PATIENT-LVL III: CPT | Mod: PBBFAC,,, | Performed by: FAMILY MEDICINE

## 2017-08-21 PROCEDURE — 3008F BODY MASS INDEX DOCD: CPT | Mod: S$GLB,,, | Performed by: FAMILY MEDICINE

## 2017-08-21 PROCEDURE — 99214 OFFICE O/P EST MOD 30 MIN: CPT | Mod: S$GLB,,, | Performed by: FAMILY MEDICINE

## 2017-08-21 RX ORDER — FLUTICASONE PROPIONATE 50 MCG
1 SPRAY, SUSPENSION (ML) NASAL DAILY
Qty: 1 BOTTLE | Refills: 3 | Status: SHIPPED | OUTPATIENT
Start: 2017-08-21 | End: 2018-06-12

## 2017-08-21 RX ORDER — AMLODIPINE BESYLATE 5 MG/1
5 TABLET ORAL DAILY
Qty: 90 TABLET | Refills: 3 | Status: SHIPPED | OUTPATIENT
Start: 2017-08-21 | End: 2018-09-26 | Stop reason: SDUPTHER

## 2017-08-21 NOTE — PATIENT INSTRUCTIONS
Established High Blood Pressure    High blood pressure (hypertension) is a chronic disease. Often health care providers dont know what causes it. But it can be caused by certain health conditions and medicines.  If you have high blood pressure, you may not have any symptoms. If you do have symptoms, they may include headache, dizziness, changes in your vision, chest pain, and shortness of breath. But even without symptoms, high blood pressure thats not treated raises your risk for heart attack and stroke. High blood pressure is a serious health risk and shouldnt be ignored.  A blood pressure reading is made up of two numbers: a higher number over a lower number. The top number is the systolic pressure. The bottom number is the diastolic pressure. A normal blood pressure is less than 120 over less than 80.  High blood pressure is when either the top number is 140 or higher, or the bottom number is 90 or higher. This must be the result when taking your blood pressure a number of times. The blood pressures between normal and high are called prehypertension.  Home care  If you have high blood pressure, you should do what is listed below to lower your blood pressure. If you are taking medicines for high blood pressure, these methods may reduce or end your need for medicines in the future.  · Begin a weight-loss program if you are overweight.  · Cut back on how much salt you get in your diet. Heres how to do this:  ¨ Dont eat foods that have a lot of salt. These include olives, pickles, smoked meats, and salted potato chips.  ¨ Dont add salt to your food at the table.  ¨ Use only small amounts of salt when cooking.  · Begin an exercise program. Talk with your health care provider about the type of exercise program that would be best for you. It doesn't have to be hard. Even brisk walking for 20 minutes 3 times a week is a good form of exercise.  · Dont take medicines that have heart stimulants. This includes many  cold and sinus decongestant pills and sprays, as well as diet pills. Check the warnings about hypertension on the label. Stimulants such as amphetamine or cocaine could be lethal for someone with high blood pressure. Never take these.  · Limit how much caffeine you get in your diet. Switch to caffeine-free products.  · Stop smoking. If you are a long-time smoker, this can be hard. Enroll in a stop-smoking program to make it more likely that you will quit for good.  · Learn how to handle stress. This is an important part of any program to lower blood pressure. Learn about relaxation methods like meditation, yoga, or biofeedback.  · If your provider prescribed medicines, take them exactly as directed. Missing doses may cause your blood pressure get out of control.  · Consider buying an automatic blood pressure machine. You can get one of these at most pharmacies. Use this to watch your blood pressure at home. Give the results to your provider.  Follow-up care  You will need to make regular visits to your health care provider. This is to check your blood pressure and to make changes to your medicines. Make a follow-up appointment as directed.  When to seek medical advice  Call your health care provider right away if any of these occur:  · Chest pain or shortness of breath  · Severe headache  · Throbbing or rushing sound in the ears  · Nosebleed  · Sudden severe pain in your belly (abdomen)  · Extreme drowsiness, confusion, or fainting  · Dizziness or dizziness with a spinning sensation (vertigo)  · Weakness of an arm or leg or one side of the face  · You have problems speaking or seeing   Date Last Reviewed: 11/25/2014  © 9808-6105 [employer]. [address] [place] [address], [place] [address]. All rights reserved. This information is not intended as a substitute for professional medical care. Always follow your healthcare professional's instructions.        A Sample Walking Program  Experts recommend walking  briskly on most days. Aim for a target of 30 minutes on most days, or 150 or more minutes a week. Walking programs can help you reach this goal by slowly increasing the frequency and the amount of  time you walk. Try this walking program:    First week  · Walk 3 times a week.  · Walk for 5 minutes each time.  Second week  · Walk 3 times a week.  · Walk for 10 minutes each time.  Third week  · Walk 3 times a week.  · Walk for 13 minutes each time.  Fourth week  · Walk 3 times a week.  · Walk for 15 minutes each time.  Fifth week  · Walk 4 times a week.  · Walk for 15 minutes each time.  Sixth week and beyond  Gradually increase your minutes of walking each time, and your number of times each week, until you reach 30 minutes, 5-7 days of the week.  Tips for getting the most from your walking program  · Walk briskly. If you can sing, speed up. If you cant talk easily, slow down.  · Choose good walking shoes with padded soles and good arch support.  · Dont use hand or ankle weights. They can cause injuries.  · Walk indoors if the weather is bad. Use a treadmill or walk inside a shopping mall  Before you start walking, check with your healthcare provider if you are new to exercise, over 40, overweight, or a smoker. Also check with your provider  if you have heart disease, high blood pressure, diabetes, arthritis, asthma, or any other health problem that concerns you. Your provider can help you get started and help you stay safe.   Date Last Reviewed: 8/13/2015  © 8157-5518 [employer]. [address] [place] [address], [place] [address]. All rights reserved. This information is not intended as a substitute for professional medical care. Always follow your healthcare professional's instructions.        Advance Medical Directive    An advance medical directive is a form that lets you plan ahead for the care youd want if you could no longer express your wishes. This statement outlines the medical treatment youd want  or names the person youd wish to make healthcare decisions for you. Be aware that laws vary from state to state, and it may be worthwhile to talk with an .  Writing down your wishes  · An advance directive is important whether youre young or old. Injury or illness can strike at any age.  · Decide what is important to you and the kind of treatment youd want, or not want to have.  · Some states allow only one kind of advance directive. Some let you do both a Durable Power of  for Health Care and a Living Will. Some states put both kinds on the same form.  A Durable Power of  for Health Care  · This form lets you name someone else to be your agent.  · This person can decide on treatment for you only when you cant speak for yourself.  · You do not need to be at the end of your life. He or she could speak for you if you were in a coma but were likely to recover.  A Living Will  · This form lets you list the care you want at the end of your life.  · A living will applies only if you wont live without medical treatment. It would apply if you had advanced cancer, a massive stroke, or other serious illness from which you will not recover.  · It takes effect only when you can no longer express your wishes yourself.  Date Last Reviewed: 2/13/2016 © 2000-2016 [employer]. [address] [place] [address]. All rights reserved. This information is not intended as a substitute for professional medical care. Always follow your healthcare professional's instructions.        Choosing an Agent    A durable power of  for health care is only as good as the person you name to be your agent. If this person knows your treatment wishes and is willing to carry them out, youll probably be well-represented. Be sure to tell your agent whats important to you.  Who to choose  Here are suggestions for choosing an agent:  · You can name a family member, close friend, , ,  or rabbi.  · You should name one person as your agent. Then name one or two alternates. You need a backup person in case your first choice cant be reached when needed.  · Talk to each person you are thinking of naming as your agent or alternate. Do this before you decide who should carry out your wishes.  Your agent should be...  · A competent adult, 18 years or older.  · Someone you trust and can talk to about the care you want and what is important to you.  · Someone who supports your treatment choices.  In many states, your agent cant be...  · Your healthcare provider.  · An employee of your healthcare provider or of a hospital, nursing home, or hospice program where you receive care.  Some states list other restrictions about who can be named as an agent for an advance directive.  Tip: It's a good idea to write down your wishes and give a copy to your agent.   Date Last Reviewed: 2/14/2016  © 1198-2808 [employer]. [address] [place] [address], [place] [address]. All rights reserved. This information is not intended as a substitute for professional medical care. Always follow your healthcare professional's instructions.        An Agents Role for Durable Power of     Its impossible to know which medical treatment choices you might face in the future. What if you aren't able to make these decisions for yourself? A durable power of  for health care lets you name an agent to carry out your wishes. This happens only if you cant express your wishes yourself.  An agents duty  Your agent respects your wishes in the following ways:   · Your agents duty is to see that your wishes are followed.  · If your wishes arent known, your agent should try to decide what you want.  · Your agents choices come before anyone elses wishes for you.  · A durable power of  for health care does not give your agent control over your money. Your agent also cant be made to pay your bills.  Find out  what your agent can do  Restrictions on what an agent can and cant do vary by state. Check your state laws. In most states your agent can:  · Choose or refuse life-sustaining and other medical treatment on your behalf.  · Consent to and then stop treatment if your condition doesnt improve.  · Access and release your medical records.  · Request an autopsy and donate your organs, unless youve stated otherwise on your advance directive.  Find out whether your state allows your agent to do the following:  · Refuse or withdraw life-enhancing care.  · Refuse or stop tube feeding or other life-sustaining care--even if you havent stated on your advance directive that you dont want these treatments.  · Order sterilization or .  Date Last Reviewed: 2016 [employer]. [address] [place] [address], [place] [address]. All rights reserved. This information is not intended as a substitute for professional medical care. Always follow your healthcare professional's instructions.        Life Support    If you understand how specific treatments may affect your quality of life, you can decide which ones youd choose or refuse. You may want to talk to your healthcare provider about the possible benefits and risks of treatments. The chance of good results from these therapies varies based on each individual clinical situation and can be very difficult to predict. Medical treatment, if your life is in danger, falls into three main categories.  Life supporting  · This care keeps your heart and lungs going when they can no longer work on their own.  · CPR restarts your heart and lungs if they stop working.  · A respirator (or ventilator) keeps you breathing. Air is pumped into your lungs through a tube thats put into your windpipe.  Life sustaining  · This care keeps you alive longer when you have an illness that cant be cured.  · Tube feeding or TPN (total parenteral nutrition) provides food and  fluids through a tube or IV. It is given if you cant chew or swallow on your own.  · Dialysis is a kidney machine that cleans your blood when your kidneys can no longer work on their own.  Life enhancing  · This care controls pain and discomfort, such as nausea or difficulty breathing. This type of care is not designed to prolong your life, but to enhance quality of life. Nothing is done to keep you alive longer.  · Hospice care is comfort care. It might provide food and fluids by mouth or help with bathing. Hospice care is given during the last stages of a terminal illness.  · Strong pain medicine can be given to help keep you comfortable.  Do Not Resuscitate  Would you want CPR if your heart stops while youre a patient in a hospital or nursing home? If not, talk to your healthcare provider about issuing a DNR (Do-Not-Resuscitate) order.  Be aware  DNRs and advance directives may not apply during anesthesia, in emergency rooms, or when emergency medical teams respond to a 911 call. Ask your healthcare provider how you can make sure your wishes will be followed. Also, a DNR will not prevent you from getting other kinds of needed medical care such as treatment for pain, or bleeding.   Date Last Reviewed: 2/26/2016  © 0437-1675 [employer]. [address] [place] [address], [place] [address]. All rights reserved. This information is not intended as a substitute for professional medical care. Always follow your healthcare professional's instructions.        Fall Prevention  Falls often occur due to slipping, tripping or losing your balance. Millions of people fall every year and injure themselves. Here are ways to reduce your risk of falling again.  · Think about your fall, was there anything that caused your fall that can be fixed, removed, or replaced?  · Make your home safe by keeping walkways clear of objects you may trip over.  · Use non-slip pads under rugs. Do not use area rugs or small throw rugs.  · Use  non-slip mats in bathtubs and showers.  · Install handrails and lights on staircases.  · Do not walk in poorly lit areas.  · Do not stand on chairs or wobbly ladders.  · Use caution when reaching overhead or looking upward. This position can cause a loss of balance.  · Be sure your shoes fit properly, have non-slip bottoms and are in good condition.   · Wear shoes both inside and out. Avoid going barefoot or wearing slippers.  · Be cautious when going up and down stairs, curbs, and when walking on uneven sidewalks.  · If your balance is poor, consider using a cane or walker.  · If your fall was related to alcohol use, stop or limit alcohol intake.   · If your fall was related to use of sleeping medicines, talk to your doctor about this. You may need to reduce your dosage at bedtime if you awaken during the night to go to the bathroom.    · To reduce the need for nighttime bathroom trips:  ¨ Avoid drinking fluids for several hours before going to bed  ¨ Empty your bladder before going to bed  ¨ Men can keep a urinal at the bedside  · Stay as active as you can. Balance, flexibility, strength, and endurance all come from exercise. They all play a role in preventing falls. Ask your healthcare provider which types of activity are right for you.  · Get your vision checked on a regular basis.  · If you have pets, know where they are before you stand up or walk so you don't trip over them.  · Use night lights.  Date Last Reviewed: 11/5/2015 © 2000-2016 [employer]. [address] [place] [address], [place] [address]. All rights reserved. This information is not intended as a substitute for professional medical care. Always follow your healthcare professional's instructions.        Preventing Falls: Make Your Health a Priority  Having a health problem can make you more likely to fall. Taking certain kinds of medicines may also increase your risk of falls. So, improving your health can help you avoid a fall. Work with  your healthcare provider to manage health problems and to review your medicines. If you have your health under control, your risk of falling is lessened.    How chronic conditions increase your fall risk  Health problems like diabetes, high or low blood pressure, and arthritis are called chronic health conditions. They can be managed, but they don't go away. Chronic health problems put you at greater risk of a fall. This is because they can affect many parts of your body. They may cause problems with movement, balance, or vision. And certain medicines you take for them may have side effects, such as dizziness or drowsiness. These side effects also increase your risk.  Work with your healthcare provider  Your healthcare provider can work with you to help prevent a fall. See your healthcare provider for a medical exam each year. Go more often if you have symptoms, such as leg numbness or dizziness, that could raise your risk of falling. If you have a history of falls, let your provider know. Bring a list of your medicines to review with your healthcare provider. Discuss your nutrition and exercise routine. And ask whether you need any tests to assess your risk of falling.    Review your medicines  Medicines (even ones you buy over the counter) can cause side effects that lead to a fall. Common medicines that cause these kinds of side effects are blood pressure, heart, or pain medicines, medicines for sleep, and antidepressants. Store pain medicine in a secure area, such as an upper cabinet in your kitchen or bathroom. Don't mix different pills in the same bottle. Always store and travel with medicines in their original containers with clear labels. This will reduce the chance of taking the wrong medicine or too much of a medicine. Taking too much of a medicine or taking the wrong medicine may cause side effects that can increase your risk of falling.  Also, the way your body reacts to medicines can change as you age.  So, certain medicines that were fine in the past may cause side effects now. Your healthcare provider (such as your doctor or pharmacist) can help review your medicines and make changes if needed.  Get your eyes and ears checked  Problems with vision or hearing can lead to falls:  · Get your eyes checked at least once a year. Take time to adjust to new glasses.  · Get your hearing checked at least every other year.  · Have your doctor check your inner ear for problems that may affect your balance.  Get the right nutrition  If you don't get enough to eat or drink, you can become dizzy and fall:  · Your sense of thirst decreases with age. Drink water throughout the day.  · Eat breakfast. Plan regular meals.  · Ask your healthcare provider whether you need supplements. These can help strengthen your bones and muscles to help prevent falls. They can also help prevent fractures if you do fall.  Stay as active as you can  Staying active is one of the best things you can do to prevent falls. Keep in mind that doing too little can be as risky as doing too much. That's because not being active can make you weaker and more likely to fall. Balance, flexibility, strength, and endurance all come from exercise. They all play a role in preventing falls. Ask your healthcare provider which types of activity are right for you.  When to call your healthcare provider  Be sure to call your healthcare provider if you fall. Also call if you have any of these signs or symptoms (someone else may need to point them out to you):  · Feeling lightheaded or dizzy more than once a day  · Losing your balance often or feeling unsteady on your feet  · Feeling numbness in your legs or feet, or noticing a change in the way you walk  · Having a steady decline in your memory or mental sharpness   Date Last Reviewed: 6/13/2015  © 3751-3600 [employer]. [address] [place] [address], [place] [address]. All rights reserved. This information is not  intended as a substitute for professional medical care. Always follow your healthcare professional's instructions.        Exercise: Adding Intensity  You have been exercising for 30 minutes most days of the week. Now you can move on to the next stage: increasing the intensity. This means doing your activity in one or more of these ways:  · Longer. Exercise for 30 minutes or more without a break.  · Faster. Hike, run, or skate fast enough to raise your heart rate moderately--as if you had walked fast to catch a bus.  · More often. Do your activity 4 to 6 times a week instead of 1 to 3 times.    Not just gym class  Be creative. You can reach your health and fitness goals in many ways. Try some of these activities:  · Team sports, like basketball or soccer  · Social or recreational activities, like hiking or dancing  · Individual exercise, like cycling, swimming, or skating  · Group fitness classes, like aerobic classes or weight training  Safety first  Whatever activity you choose, think about safety:  · Wear the right safety gear and shoes for your activity.  · Drink plenty of water during and after workouts.  · Wear light-colored clothing if youre out when its dark.  · Make time to warm up before you exercise and cool down after.  · Carry ID (identification) with you if youre out alone. And be sure someone knows where youre going.  · If youre on foot, travel against traffic (except on blind corners). If youre on a bike, go with traffic. Obey the rules of the road.   Tips for sticking with it  · Find a workout partner or sports club. If you know someone is expecting you, youll be less likely to skip your workout.  · Pack a workout bag with everything you need. Then its ready when you are.  · Choose a few different activities so youll stay interested. Make it fun!  What will help you to stick with it?  1.   2.   3.   Date Last Reviewed: 8/13/2015  © 5603-5972 [employer]. [address] [facility],  [employer] [place] [address]. All rights reserved. This information is not intended as a substitute for professional medical care. Always follow your healthcare professional's instructions.        Exercise for a Healthier Heart  You may wonder how you can improve the health of your heart. If youre thinking about exercise, youre on the right track. You dont need to become an athlete, but you do need a certain amount of brisk exercise to help strengthen your heart. If you have been diagnosed with a heart condition, your doctor may recommend exercise to help stabilize your condition. To help make exercise a habit, choose safe, fun activities.     Exercise with a friend. When activity is fun, you're more likely to stick with it.     Be sure to check with your healthcare provider before starting an exercise program.   Why exercise?  Exercising regularly offers many healthy rewards. It can help you do all of the following:  · Improve your blood cholesterol level to help prevent further heart trouble  · Lower your blood pressure to help prevent a stroke or heart attack  · Control diabetes, or reduce your risk of getting this disease  · Improve your heart and lung function  · Reach and maintain a healthy weight  · Make your muscles stronger and more limber so you can stay active  · Prevent falls and fractures by slowing the loss of bone mass (osteoporosis)  · Manage stress better  · Reduce your blood pressure  · Improve your sense of self and your body image  Exercise tips  Ease into your routine. Set small goals. Then build on them.  Exercise on most days. Aim for a total of 150 or more minutes of moderate to  vigorous intensity activity each week. Consider 40 minutes, 3 to 4 times a week. For best results, activity should last for 40 minutes on average. It is OK to work up to the 40 minute period over time. Examples of moderate-intensity activity is walking 1 mile in 15 minutes or 30 to 45 minutes of yard work.  Step up your  daily activity level. Along with your exercise program, try being more active throughout the day. Walk instead of drive. Do more household tasks or yard work.  Choose one or more activities you enjoy. Walking is one of the easiest things you can do. You can also try swimming, riding a bike, dancing, or taking an exercise class.  Stop exercising and call your doctor if you:  · Have chest pain or feel dizzy or lightheaded  · Feel burning, tightness, pressure, or heaviness in your chest, neck, shoulders, back, or arms  · Have unusual shortness of breath  · Have increased joint or muscle pain  · Have palpitations or an irregular heartbeat   Date Last Reviewed: 5/1/2016  © 2383-1422 [employer]. [address] [place] [address]. All rights reserved. This information is not intended as a substitute for professional medical care. Always follow your healthcare professional's instructions.        Exercises to Increase Agility: Side Steps  The following exercise helps to increase your ease and quickness of movement. It copies complex movements.  Note: Always warm up before and cool down after exercising. Stop any exercise that causes pain.  · Start with knees slightly bent and feet together.  · Step to the side with your left foot.  · Step with your right foot to meet your left foot.  · Step 3 times. Keep your steps short and comfortable.  · Repeat the sequence in the opposite direction.  · Continue for 2 to 3 minutes.     Date Last Reviewed: 8/16/2015  © 2072-4697 [employer]. [address] [place] [address]. All rights reserved. This information is not intended as a substitute for professional medical care. Always follow your healthcare professional's instructions.        Back Exercises: Pelvic Tilt    To start, lie on your back with your knees bent and feet flat on the floor. Dont press your neck or lower back to the floor. Breathe deeply. You should feel comfortable and  relaxed in this position:  · Tighten your stomach and buttocks, and press your lower back toward the floor. This should be a small, subtle movement. This should not increase your pain.  · Hold for 5 to 15 seconds. Release.  · Repeat 2 to 5 times.  Date Last Reviewed: 10/11/2015  © 4469-3058 [employer]. [address] [place] [address]. All rights reserved. This information is not intended as a substitute for professional medical care. Always follow your healthcare professional's instructions.        Back Exercises: Lower Back Stretch                        To start, sit in a chair with your feet flat on the floor. Shift your weight slightly forward. Relax, and keep your ears, shoulders, and hips aligned.  · Sit with your feet well apart.  · Bend forward and touch the floor with the backs of your hands. Relax and let your body drop.  · Hold for 20 seconds. Return to starting position.  · Repeat 2 times.   Date Last Reviewed: 8/16/2015  © 9991-5513 [employer]. [address] [place] [address]. All rights reserved. This information is not intended as a substitute for professional medical care. Always follow your healthcare professional's instructions.        Back Exercises: Partial Curl-Ups        To start, lie on your back with your knees bent and feet flat on the floor. Dont press your neck or lower back to the floor. Breathe deeply. You should feel comfortable and relaxed in this position:  · Cross your arms loosely.  · Tighten your abdomen and curl correction up, keeping your head in line with your shoulders.  · Hold for 5 seconds. Uncurl to lie down.  · Repeat 2 sets of 10.   Date Last Reviewed: 8/16/2015  © 9844-6676 [employer]. [address] [place] [address]. All rights reserved. This information is not intended as a substitute for professional medical care. Always follow your healthcare professional's instructions.        Back Exercises:  Pelvic Tilt    To start, lie on your back with your knees bent and feet flat on the floor. Dont press your neck or lower back to the floor. Breathe deeply. You should feel comfortable and relaxed in this position:  · Tighten your stomach and buttocks, and press your lower back toward the floor. This should be a small, subtle movement. This should not increase your pain.  · Hold for 5 to 15 seconds. Release.  · Repeat 2 to 5 times.  Date Last Reviewed: 10/11/2015  © 7537-1860 [employer]. [address] [place] [address]. All rights reserved. This information is not intended as a substitute for professional medical care. Always follow your healthcare professional's instructions.        Back Exercises: Seated Rotation  To start, sit in a chair with your feet flat on the floor. Shift your weight slightly forward to avoid rounding your back. Relax, and keep your ears, shoulders, and hips aligned:  · Fold your arms and elbows just below shoulder height.  · Turn from the waist with hips forward. Turn your head last. Do not push through the pain.  · Hold for a count of 10 to 30. Return to starting position.  · Repeat 3 to 5 times on one side. Then switch sides.    Date Last Reviewed: 10/11/2015  © 0127-2477 [employer]. [address] [place] [address]. All rights reserved. This information is not intended as a substitute for professional medical care. Always follow your healthcare professional's instructions.        Back Exercises: Side Stretch      To start, sit in a chair with your feet flat on the floor. Shift your weight slightly forward to avoid rounding your back. Relax. Keep your ears, shoulders, and hips aligned:  · Stretch your right arm overhead.  · Slowly bend to the left. Dont twist your torso. Stay within your pain limits.  · Hold for 20 seconds. Return to starting position.  · Repeat 2 to 5 times. Then, switch to the other side.  Date Last Reviewed:  10/13/2015  © 4435-7994 [employer]. [address] [place] [address], [place] [address]. All rights reserved. This information is not intended as a substitute for professional medical care. Always follow your healthcare professional's instructions.

## 2017-08-28 NOTE — PROGRESS NOTES
Subjective:       Patient ID: Lavon Brandon is a 84 y.o. male.    Chief Complaint: Hypertension; Back Pain; and Cough    Hypertension   This is a chronic problem. The current episode started more than 1 year ago. The problem is controlled. Associated symptoms include anxiety, malaise/fatigue and neck pain. Pertinent negatives include no orthopnea, palpitations, PND or shortness of breath. Risk factors for coronary artery disease include obesity, male gender and dyslipidemia.   Back Pain   Pertinent negatives include no fever.   Cough   Associated symptoms include postnasal drip, rhinorrhea and a sore throat. Pertinent negatives include no chills, ear pain, fever, shortness of breath or wheezing.     Review of Systems   Constitutional: Positive for fatigue and malaise/fatigue. Negative for chills and fever.   HENT: Positive for postnasal drip, rhinorrhea, sneezing and sore throat. Negative for ear discharge, ear pain and sinus pressure.    Respiratory: Positive for cough. Negative for shortness of breath and wheezing.    Cardiovascular: Negative for palpitations, orthopnea and PND.   Musculoskeletal: Positive for back pain and neck pain.   Neurological: Positive for dizziness.       Patient Active Problem List   Diagnosis    Hypertension    Seizure disorder, post CVA 2011    Depression    Hyperlipidemia    Bradyarrhythmia, on amlodipine    Dementia    Constipation - functional    Personal history of colonic polyps    TIA (transient ischemic attack), 2007, multiple    TBI (traumatic brain injury), [place] war    CVA (cerebral infarction), 2011    Obesity, BMI 32.5    Dizziness, off balance, since TIA, 2007    CKD (chronic kidney disease) stage 3, GFR 47 ml/min    MARLIN (obstructive sleep apnea), 2008, not on Rx    Abnormal EKG    OA (osteoarthritis)    Abdominal obesity    CKD (chronic kidney disease), stage III       Objective:      Physical Exam   Constitutional: He appears well-developed and  well-nourished.   HENT:   Right Ear: Tympanic membrane and ear canal normal.   Left Ear: Tympanic membrane and ear canal normal.   Nose: Mucosal edema present. Right sinus exhibits maxillary sinus tenderness. Right sinus exhibits no frontal sinus tenderness. Left sinus exhibits maxillary sinus tenderness. Left sinus exhibits no frontal sinus tenderness.   Mouth/Throat: Mucous membranes are normal. Posterior oropharyngeal erythema present. No oropharyngeal exudate, posterior oropharyngeal edema or tonsillar abscesses.   Cardiovascular: Normal rate, regular rhythm and normal heart sounds.    Pulmonary/Chest: Effort normal and breath sounds normal.   Musculoskeletal:        Lumbar back: He exhibits decreased range of motion, tenderness, bony tenderness, deformity and pain.   Skin: Skin is warm.   Psychiatric: He has a normal mood and affect.   Vitals reviewed.      Lab Results   Component Value Date    WBC 5.43 08/15/2017    HGB 13.0 (L) 08/15/2017    HCT 38.4 (L) 08/15/2017     (L) 08/15/2017    CHOL 160 09/07/2016    TRIG 63 09/07/2016    HDL 44 09/07/2016    ALT 9 (L) 06/01/2017    AST 17 06/01/2017     08/15/2017    K 4.6 08/15/2017     08/15/2017    CREATININE 2.6 (H) 08/15/2017    BUN 32 (H) 08/15/2017    CO2 27 08/15/2017    TSH 1.28 02/17/2011    PSA 1.5 01/07/2010    INR 0.9 01/10/2005    HGBA1C 5.8 12/06/2016     The ASCVD Risk score ([place] MADY Jr., et al., 2013) failed to calculate for the following reasons:    The 2013 ASCVD risk score is only valid for ages 40 to 79    Assessment:       1. TBI (traumatic brain injury), without LOC, subsequent encounter    2. CKD (chronic kidney disease), stage III    3. Reactive depression    4. MARLIN (obstructive sleep apnea), 2008, not on Rx        Plan:       TBI (traumatic brain injury), without LOC, subsequent encounter    CKD (chronic kidney disease), stage III  -     Comprehensive metabolic panel; Future; Expected date: 08/21/2017  -     [facility] auto  differential; Future; Expected date: 08/21/2017    Reactive depression    MARLIN (obstructive sleep apnea), 2008, not on Rx    Other orders  -     fluticasone (FLONASE) 50 mcg/actuation nasal spray; 1 spray by Each Nare route once daily.  Dispense: 1 Bottle; Refill: 3  -     amlodipine (NORVASC) 5 MG tablet; Take 1 tablet (5 mg total) by mouth once daily.  Dispense: 90 tablet; Refill: 3      Patient readiness: acceptance and barriers:readiness and poor sleep habits    During the course of the visit the patient was educated and counseled about the following:     Hypertension:   Dietary sodium restriction.  Obesity:   General weight loss/lifestyle modification strategies discussed (elicit support from others; identify saboteurs; non-food rewards, etc).    Goals: Hypertension: Reduce Blood Pressure and Obesity: Reduce calorie intake and BMI    Did patient meet goals/outcomes: Yes    The following self management tools provided: blood pressure log  excercise log    Patient Instructions (the written plan) was given to the patient/family.     Time spent with patient: 30 minutes          30-minute visit. 20 minutes spent counseling patient on diet, exercise, and weight loss.  This has been fully explained to the patient, who indicates understanding.

## 2017-10-02 RX ORDER — BENAZEPRIL HYDROCHLORIDE 40 MG/1
TABLET ORAL
Qty: 90 TABLET | Refills: 1 | Status: SHIPPED | OUTPATIENT
Start: 2017-10-02 | End: 2018-04-01 | Stop reason: SDUPTHER

## 2017-10-09 RX ORDER — CITALOPRAM 20 MG/1
TABLET, FILM COATED ORAL
Qty: 90 TABLET | Refills: 1 | Status: SHIPPED | OUTPATIENT
Start: 2017-10-09 | End: 2018-04-03 | Stop reason: SDUPTHER

## 2018-01-09 ENCOUNTER — OFFICE VISIT (OUTPATIENT)
Dept: FAMILY MEDICINE | Facility: CLINIC | Age: 83
End: 2018-01-09
Payer: MEDICARE

## 2018-01-09 VITALS
DIASTOLIC BLOOD PRESSURE: 67 MMHG | HEART RATE: 55 BPM | WEIGHT: 218.25 LBS | SYSTOLIC BLOOD PRESSURE: 140 MMHG | HEIGHT: 71 IN | BODY MASS INDEX: 30.56 KG/M2 | TEMPERATURE: 98 F

## 2018-01-09 DIAGNOSIS — N18.30 CKD (CHRONIC KIDNEY DISEASE), STAGE III: ICD-10-CM

## 2018-01-09 DIAGNOSIS — I10 ESSENTIAL HYPERTENSION: ICD-10-CM

## 2018-01-09 DIAGNOSIS — N18.30 CKD (CHRONIC KIDNEY DISEASE) STAGE 3, GFR 30-59 ML/MIN: Primary | ICD-10-CM

## 2018-01-09 DIAGNOSIS — N18.30 CKD (CHRONIC KIDNEY DISEASE) STAGE 3, GFR 30-59 ML/MIN: ICD-10-CM

## 2018-01-09 PROCEDURE — 99999 PR PBB SHADOW E&M-EST. PATIENT-LVL III: CPT | Mod: PBBFAC,,, | Performed by: FAMILY MEDICINE

## 2018-01-09 PROCEDURE — G0008 ADMIN INFLUENZA VIRUS VAC: HCPCS | Mod: S$GLB,,, | Performed by: FAMILY MEDICINE

## 2018-01-09 PROCEDURE — 90662 IIV NO PRSV INCREASED AG IM: CPT | Mod: S$GLB,,, | Performed by: FAMILY MEDICINE

## 2018-01-09 PROCEDURE — 99214 OFFICE O/P EST MOD 30 MIN: CPT | Mod: S$GLB,,, | Performed by: FAMILY MEDICINE

## 2018-01-09 NOTE — PATIENT INSTRUCTIONS
Diet for Chronic Kidney Disease  Following a special diet when you have kidney disease can help you stay as healthy as possible. Your healthcare provider or dietitian should make a special diet plan just for you.    Eating right  Here are some good eating rules to follow:  · Protein. Eating protein is important for your body. But too much protein can put a strain on your kidneys. Eating less protein may slow the progression of chronic kidney disease. Foods high in protein include meat, fish, eggs, cheese, and other dairy products. A registered dietitian can help you plan a diet that has the right amount of protein for you.  · Sodium. Having too much salt in your diet can make your body hold onto (retain) water. Ask your provider or dietitian how much sodium per day you are allowed. This will help you avoid fluid buildup in your body (fluid retention). It can also help control high blood pressure. Learn to read food labels to know how much sodium is in one serving. Foods high in salt include processed meats, canned and boxed foods, sauces, salted chips and snacks, pickled foods, frozen dinners, and restaurant and fast food.  · Fluids. If you have advanced kidney disease, you will need to limit the water and fluids you drink. If you dont, then too much water will build up in your body. The exact amount of fluid you can drink depends on how well your kidneys are working. Ask your provider how much water you can safely drink each day.  · Potassium. In advanced kidney disease, your potassium level can go dangerously high. This affects your heart. It can cause an irregular heartbeat (arrhythmia). Ask your provider or dietitian if you should limit potassium in your diet. Foods high in potassium include dairy products (milk, yogurt, cheese), dried beans, bananas, oranges, potatoes, tomatoes, spinach, cantaloupe, honeydew melon, dried fruits, and nuts.   · Calcium. Calcium is important to build strong bones. But foods  high in calcium are also high in phosphorus, which can take calcium from your bones. Limiting foods high in phosphorus will help keep calcium in your bones. Ask your provider how much calcium you should get each day.  · Phosphorus. In advanced kidney disease, your phosphorus level can go dangerously high. This affects many systems in the body and can damage your heart. Limit your intake of phosphorus-rich foods. These include dried beans and peas, nuts, peanut butter, cocoa, beer, cola drinks, and dairy products.  Date Last Reviewed: 8/1/2016 © 2000-2017 [employer]. [address] [place] [address], [place] [address]. All rights reserved. This information is not intended as a substitute for professional medical care. Always follow your healthcare professional's instructions.        Aerobic Exercise for a Healthy Heart  Exercise is a lot more than an energy booster and a stress reliever. It also strengthens your heart muscle, lowers your blood pressure and cholesterol, and burns calories. It can also improve your resting muscle tone, and your mood.     Remember, some activity is better than none.    Choose an aerobic activity  Choose an activity that makes your heart and lungs work harder than they do when you rest or walk normally. This aerobic exercise can improve the way your heart and other muscles use oxygen. Make it fun by exercising with a friend and choosing an activity you enjoy. Here are some ideas:  · Walking  · Swimming  · Bicycling  · Stair climbing  · Dancing  · Jogging  · Gardening  Exercise regularly  If you havent been exercising regularly,  get your doctors OK first. Then start slowly.  Here are some tips:  · Begin exercising 3 times a week for 5 to 10 minutes at a time.  · When you feel comfortable, add a few minutes each session.  · Slowly build up to exercising 3 to 4 times each week. Each session should last for 40 minutes, on average, and involve moderate- to vigorous-intensity physical  activity.  · If you have been given nitroglycerin, be sure to carry it when you exercise.  · If you get chest pain (angina) when youre exercising, stop what youre doing, take your nitroglycerin, and call your doctor.  Date Last Reviewed: 6/2/2016  © 1890-7535 [employer]. [address] [place] [address], [place] [address]. All rights reserved. This information is not intended as a substitute for professional medical care. Always follow your healthcare professional's instructions.        Taking Your Blood Pressure  Blood pressure is the force of blood against the artery wall as it moves from the heart through the blood vessels. You can take your own blood pressure reading using a digital monitor. Take your readings the same each time, using the same arm. Take readings as often as your healthcare provider instructs.  About blood pressure monitors  Blood pressure monitors are designed for certain ages and cases. You can find monitors for older adults, for pregnant women, and for children. Make sure the one you choose is the right one for your age and situation.  The American Heart Association recommends an automatic cuff monitor that fits on your upper arm (bicep). The cuff should fit your arm size. A cuff thats too large or too small will not give an accurate reading. Measure around your upper arm to find your size.  Monitors that attach to your finger or wrist are not as accurate as monitors for your upper arm.  Ask your healthcare provider for help in choosing a monitor. Bring your monitor to your next provider visit if you need help in using it the correct way.  The steps below are general instructions for using an automatic digital monitor.  Step 1. Relax    · Take your blood pressure at the same time every day, such as in the morning or evening, or at the time your healthcare provider recommends.  · Wait at least a half-hour after smoking, eating, or exercising. Don't drink coffee, tea, soda, or other  caffeinated beverages before checking your blood pressure.  · Sit comfortably at a table with both feet on the floor. Do not cross your legs or feet. Place the monitor near you.  · Rest for a few minutes before you begin.  Step 2. Wrap the cuff    · Place your arm on the table, palm up. Your arm should be at the level of your heart. Wrap the cuff around your upper arm, just above your elbow. Its best done on bare skin, not over clothing. Most cuffs will indicate where the brachial artery (the blood vessel in the middle of the arm at the inner side of the elbow) should line up with the cuff. Look in your monitor's instruction booklet for an illustration. You can also bring your cuff to your healthcare provider and have them show you how to correctly place the cuff.  Step 3. Inflate the cuff    · Push the button that starts the pump.  · The cuff will tighten, then loosen.  · The numbers will change. When they stop changing, your blood pressure reading will appear.  · Take 2 or 3 readings one minute apart.  Step 4. Write down the results of each reading    · Write down your blood pressure numbers for each reading. Note the date and time. Keep your results in one place, such as a notebook. Even if your monitor has a built-in memory, keep a hard copy of the readings.  · Remove the cuff from your arm. Turn off the machine.  · Bring your blood pressure records with your healthcare providers at each visit.  · If you start a new blood pressure medicine, note the day you started the new medicine. Also note the day if you change the dose of your medicine. This information goes on your blood pressure recording sheet. This will help your healthcare provider monitor how well the medicine changes are working.  · Ask your healthcare provider what numbers should prompt you to call him or her. Also ask what numbers should prompt you to get help right away.  Date Last Reviewed: 11/1/2016  © 1125-5752 [employer]. [address]  [place] [address]. All rights reserved. This information is not intended as a substitute for professional medical care. Always follow your healthcare professional's instructions.        Exercise for a Healthier Heart  You may wonder how you can improve the health of your heart. If youre thinking about exercise, youre on the right track. You dont need to become an athlete, but you do need a certain amount of brisk exercise to help strengthen your heart. If you have been diagnosed with a heart condition, your doctor may recommend exercise to help stabilize your condition. To help make exercise a habit, choose safe, fun activities.     Exercise with a friend. When activity is fun, you're more likely to stick with it.     Be sure to check with your healthcare provider before starting an exercise program.   Why exercise?  Exercising regularly offers many healthy rewards. It can help you do all of the following:  · Improve your blood cholesterol level to help prevent further heart trouble  · Lower your blood pressure to help prevent a stroke or heart attack  · Control diabetes, or reduce your risk of getting this disease  · Improve your heart and lung function  · Reach and maintain a healthy weight  · Make your muscles stronger and more limber so you can stay active  · Prevent falls and fractures by slowing the loss of bone mass (osteoporosis)  · Manage stress better  · Reduce your blood pressure  · Improve your sense of self and your body image  Exercise tips  Ease into your routine. Set small goals. Then build on them.  Exercise on most days. Aim for a total of 150 or more minutes of moderate to  vigorous intensity activity each week. Consider 40 minutes, 3 to 4 times a week. For best results, activity should last for 40 minutes on average. It is OK to work up to the 40 minute period over time. Examples of moderate-intensity activity is walking 1 mile in 15 minutes or 30 to 45 minutes of yard  work.  Step up your daily activity level. Along with your exercise program, try being more active throughout the day. Walk instead of drive. Do more household tasks or yard work.  Choose one or more activities you enjoy. Walking is one of the easiest things you can do. You can also try swimming, riding a bike, dancing, or taking an exercise class.  Stop exercising and call your doctor if you:  · Have chest pain or feel dizzy or lightheaded  · Feel burning, tightness, pressure, or heaviness in your chest, neck, shoulders, back, or arms  · Have unusual shortness of breath  · Have increased joint or muscle pain  · Have palpitations or an irregular heartbeat   Date Last Reviewed: 5/1/2016 © 2000-2017 [employer]. [address] [place] [address]. All rights reserved. This information is not intended as a substitute for professional medical care. Always follow your healthcare professional's instructions.        Exercise for a Healthier Heart  You may wonder how you can improve the health of your heart. If youre thinking about exercise, youre on the right track. You dont need to become an athlete, but you do need a certain amount of brisk exercise to help strengthen your heart. If you have been diagnosed with a heart condition, your doctor may recommend exercise to help stabilize your condition. To help make exercise a habit, choose safe, fun activities.     Exercise with a friend. When activity is fun, you're more likely to stick with it.     Be sure to check with your healthcare provider before starting an exercise program.   Why exercise?  Exercising regularly offers many healthy rewards. It can help you do all of the following:  · Improve your blood cholesterol level to help prevent further heart trouble  · Lower your blood pressure to help prevent a stroke or heart attack  · Control diabetes, or reduce your risk of getting this disease  · Improve your heart and lung function  · Reach and  maintain a healthy weight  · Make your muscles stronger and more limber so you can stay active  · Prevent falls and fractures by slowing the loss of bone mass (osteoporosis)  · Manage stress better  · Reduce your blood pressure  · Improve your sense of self and your body image  Exercise tips  Ease into your routine. Set small goals. Then build on them.  Exercise on most days. Aim for a total of 150 or more minutes of moderate to  vigorous intensity activity each week. Consider 40 minutes, 3 to 4 times a week. For best results, activity should last for 40 minutes on average. It is OK to work up to the 40 minute period over time. Examples of moderate-intensity activity is walking 1 mile in 15 minutes or 30 to 45 minutes of yard work.  Step up your daily activity level. Along with your exercise program, try being more active throughout the day. Walk instead of drive. Do more household tasks or yard work.  Choose one or more activities you enjoy. Walking is one of the easiest things you can do. You can also try swimming, riding a bike, dancing, or taking an exercise class.  Stop exercising and call your doctor if you:  · Have chest pain or feel dizzy or lightheaded  · Feel burning, tightness, pressure, or heaviness in your chest, neck, shoulders, back, or arms  · Have unusual shortness of breath  · Have increased joint or muscle pain  · Have palpitations or an irregular heartbeat   Date Last Reviewed: 5/1/2016  © 5009-1399 [employer]. [address] [place] [address], [place] [address]. All rights reserved. This information is not intended as a substitute for professional medical care. Always follow your healthcare professional's instructions.

## 2018-01-10 ENCOUNTER — LAB VISIT (OUTPATIENT)
Dept: LAB | Facility: HOSPITAL | Age: 83
End: 2018-01-10
Attending: FAMILY MEDICINE
Payer: MEDICARE

## 2018-01-10 DIAGNOSIS — N18.30 CKD (CHRONIC KIDNEY DISEASE) STAGE 3, GFR 30-59 ML/MIN: ICD-10-CM

## 2018-01-10 LAB
ALBUMIN SERPL BCP-MCNC: 3.4 G/DL
ALP SERPL-CCNC: 65 U/L
ALT SERPL W/O P-5'-P-CCNC: 8 U/L
ANION GAP SERPL CALC-SCNC: 9 MMOL/L
AST SERPL-CCNC: 36 U/L
BASOPHILS # BLD AUTO: 0.03 K/UL
BASOPHILS NFR BLD: 0.4 %
BILIRUB SERPL-MCNC: 0.6 MG/DL
BUN SERPL-MCNC: 30 MG/DL
CALCIUM SERPL-MCNC: 9.1 MG/DL
CHLORIDE SERPL-SCNC: 106 MMOL/L
CO2 SERPL-SCNC: 26 MMOL/L
CREAT SERPL-MCNC: 2.5 MG/DL
DIFFERENTIAL METHOD: ABNORMAL
EOSINOPHIL # BLD AUTO: 0.1 K/UL
EOSINOPHIL NFR BLD: 1.5 %
ERYTHROCYTE [DISTWIDTH] IN BLOOD BY AUTOMATED COUNT: 14.4 %
EST. GFR  (AFRICAN AMERICAN): 26.1 ML/MIN/1.73 M^2
EST. GFR  (NON AFRICAN AMERICAN): 22.6 ML/MIN/1.73 M^2
GLUCOSE SERPL-MCNC: 88 MG/DL
HCT VFR BLD AUTO: 39.3 %
HGB BLD-MCNC: 12.8 G/DL
IMM GRANULOCYTES # BLD AUTO: 0.02 K/UL
IMM GRANULOCYTES NFR BLD AUTO: 0.3 %
LYMPHOCYTES # BLD AUTO: 1.6 K/UL
LYMPHOCYTES NFR BLD: 21.7 %
MCH RBC QN AUTO: 29.2 PG
MCHC RBC AUTO-ENTMCNC: 32.6 G/DL
MCV RBC AUTO: 90 FL
MONOCYTES # BLD AUTO: 0.7 K/UL
MONOCYTES NFR BLD: 9.5 %
NEUTROPHILS # BLD AUTO: 5 K/UL
NEUTROPHILS NFR BLD: 66.6 %
NRBC BLD-RTO: 0 /100 WBC
PLATELET # BLD AUTO: 157 K/UL
PMV BLD AUTO: 12.1 FL
POTASSIUM SERPL-SCNC: 4.2 MMOL/L
PROT SERPL-MCNC: 7.1 G/DL
PTH-INTACT SERPL-MCNC: 143 PG/ML
RBC # BLD AUTO: 4.38 M/UL
SODIUM SERPL-SCNC: 141 MMOL/L
WBC # BLD AUTO: 7.51 K/UL

## 2018-01-10 PROCEDURE — 80053 COMPREHEN METABOLIC PANEL: CPT

## 2018-01-10 PROCEDURE — 36415 COLL VENOUS BLD VENIPUNCTURE: CPT | Mod: PO

## 2018-01-10 PROCEDURE — 85025 COMPLETE CBC W/AUTO DIFF WBC: CPT

## 2018-01-10 PROCEDURE — 83970 ASSAY OF PARATHORMONE: CPT

## 2018-01-10 NOTE — PROGRESS NOTES
Subjective:       Patient ID: Lavon Brandon is a 85 y.o. male.    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      Hypertension   This is a chronic problem. The current episode started more than 1 year ago. The problem is controlled. Pertinent negatives include no anxiety, malaise/fatigue, neck pain, orthopnea, palpitations, PND or shortness of breath. Risk factors for coronary artery disease include obesity, male gender and dyslipidemia.   Back Pain   Pertinent negatives include no fever.   Cough   Associated symptoms include postnasal drip, rhinorrhea and a sore throat. Pertinent negatives include no chills, ear pain, fever, shortness of breath or wheezing.     Review of Systems   Constitutional: Positive for fatigue. Negative for chills, fever and malaise/fatigue.   HENT: Positive for postnasal drip, rhinorrhea, sneezing and sore throat. Negative for ear discharge, ear pain and sinus pressure.    Respiratory: Positive for cough. Negative for shortness of breath and wheezing.    Cardiovascular: Negative for palpitations, orthopnea and PND.   Musculoskeletal: Positive for back pain. Negative for neck pain.   Neurological: Positive for dizziness.       Patient Active Problem List   Diagnosis    Hypertension    Seizure disorder, post CVA 2011    Depression    Hyperlipidemia    Bradyarrhythmia, on amlodipine    Dementia    Constipation - functional    Personal history of colonic polyps    TIA (transient ischemic attack), 2007, multiple    TBI (traumatic brain injury), [place]    CVA (cerebral infarction), 2011    Obesity, BMI 32.5    Dizziness, off balance, since TIA, 2007    CKD (chronic kidney disease) stage 3, GFR 47 ml/min    MARLIN (obstructive sleep apnea), 2008, not on Rx    Abnormal EKG    OA (osteoarthritis)    Abdominal obesity    CKD (chronic kidney disease), stage III       Objective:       Physical Exam   Constitutional: He appears well-developed.   HENT:   Right Ear: Tympanic membrane and ear canal normal.   Left Ear: Tympanic membrane and ear canal normal.   Nose: Mucosal edema present. Right sinus exhibits maxillary sinus tenderness. Right sinus exhibits no frontal sinus tenderness. Left sinus exhibits maxillary sinus tenderness. Left sinus exhibits no frontal sinus tenderness.   Mouth/Throat: Mucous membranes are normal. Posterior oropharyngeal erythema present. No oropharyngeal exudate, posterior oropharyngeal edema or tonsillar abscesses.   Cardiovascular: Normal rate, regular rhythm and normal heart sounds.    Pulmonary/Chest: Effort normal and breath sounds normal.   Musculoskeletal:        Lumbar back: He exhibits decreased range of motion, tenderness, bony tenderness, deformity and pain.   Skin: Skin is warm.   Psychiatric: He has a normal mood and affect.   Vitals reviewed.      Lab Results   Component Value Date    WBC 5.43 08/15/2017    HGB 13.0 (L) 08/15/2017    HCT 38.4 (L) 08/15/2017     (L) 08/15/2017    CHOL 160 09/07/2016    TRIG 63 09/07/2016    HDL 44 09/07/2016    ALT 9 (L) 06/01/2017    AST 17 06/01/2017     08/15/2017    K 4.6 08/15/2017     08/15/2017    CREATININE 2.6 (H) 08/15/2017    BUN 32 (H) 08/15/2017    CO2 27 08/15/2017    TSH 1.28 02/17/2011    PSA 1.5 01/07/2010    INR 0.9 01/10/2005    HGBA1C 5.8 12/06/2016     The ASCVD Risk score (Matheus MADY Jr., et al., 2013) failed to calculate for the following reasons:    The 2013 ASCVD risk score is only valid for ages 40 to 79    Assessment:       1. CKD (chronic kidney disease) stage 3, GFR 30-59 ml/min    2. CKD (chronic kidney disease) stage 3, GFR 47 ml/min    3. Essential hypertension    4. CKD (chronic kidney disease), stage III        Plan:       CKD (chronic kidney disease) stage 3, GFR 30-59 ml/min  -     PTH, intact; Future; Expected date: 01/09/2018  -     Comprehensive metabolic panel;  Future; Expected date: 01/09/2018  -     CBC auto differential; Future; Expected date: 01/09/2018    CKD (chronic kidney disease) stage 3, GFR 47 ml/min  -     PTH, intact; Future; Expected date: 01/09/2018  -     Comprehensive metabolic panel; Future; Expected date: 01/09/2018  -     CBC auto differential; Future; Expected date: 01/09/2018    Essential hypertension    CKD (chronic kidney disease), stage III    Other orders  -     Influenza - High Dose (65+) (PF) (IM)      Patient readiness: acceptance and barriers:readiness and poor sleep habits    During the course of the visit the patient was educated and counseled about the following:     Hypertension:   Dietary sodium restriction.  Obesity:   General weight loss/lifestyle modification strategies discussed (elicit support from others; identify saboteurs; non-food rewards, etc).    Goals: Hypertension: Reduce Blood Pressure and Obesity: Reduce calorie intake and BMI    Did patient meet goals/outcomes: Yes    The following self management tools provided: blood pressure log  excercise log    Patient Instructions (the written plan) was given to the patient/family.     Time spent with patient: 30 minutes          30-minute visit. 20 minutes spent counseling patient on diet, exercise, and weight loss.  This has been fully explained to the patient, who indicates understanding.

## 2018-03-06 RX ORDER — LEVETIRACETAM 500 MG/1
TABLET ORAL
Qty: 60 TABLET | Refills: 6 | Status: SHIPPED | OUTPATIENT
Start: 2018-03-06 | End: 2018-10-02 | Stop reason: SDUPTHER

## 2018-04-02 RX ORDER — BENAZEPRIL HYDROCHLORIDE 40 MG/1
TABLET ORAL
Qty: 90 TABLET | Refills: 1 | Status: SHIPPED | OUTPATIENT
Start: 2018-04-02 | End: 2018-06-12 | Stop reason: SDUPTHER

## 2018-04-03 RX ORDER — CITALOPRAM 20 MG/1
TABLET, FILM COATED ORAL
Qty: 90 TABLET | Refills: 1 | Status: SHIPPED | OUTPATIENT
Start: 2018-04-03 | End: 2018-10-02 | Stop reason: SDUPTHER

## 2018-04-05 RX ORDER — FUROSEMIDE 40 MG/1
TABLET ORAL
Qty: 180 TABLET | Refills: 1 | Status: SHIPPED | OUTPATIENT
Start: 2018-04-05 | End: 2019-03-24 | Stop reason: SDUPTHER

## 2018-05-31 ENCOUNTER — LAB VISIT (OUTPATIENT)
Dept: LAB | Facility: HOSPITAL | Age: 83
End: 2018-05-31
Attending: INTERNAL MEDICINE
Payer: MEDICARE

## 2018-05-31 DIAGNOSIS — N18.30 CHRONIC KIDNEY DISEASE, STAGE III (MODERATE): ICD-10-CM

## 2018-05-31 DIAGNOSIS — N25.81 SECONDARY HYPERPARATHYROIDISM OF RENAL ORIGIN: ICD-10-CM

## 2018-05-31 DIAGNOSIS — G40.409 CENTRENCEPHALIC EPILEPSY: ICD-10-CM

## 2018-05-31 DIAGNOSIS — N18.9 ANEMIA OF CHRONIC RENAL FAILURE: Primary | ICD-10-CM

## 2018-05-31 DIAGNOSIS — I10 ESSENTIAL HYPERTENSION, MALIGNANT: ICD-10-CM

## 2018-05-31 DIAGNOSIS — N18.4 CHRONIC KIDNEY DISEASE, STAGE IV (SEVERE): ICD-10-CM

## 2018-05-31 DIAGNOSIS — N18.9 ANEMIA OF CHRONIC RENAL FAILURE: ICD-10-CM

## 2018-05-31 DIAGNOSIS — M19.90 SENILE ARTHRITIS: ICD-10-CM

## 2018-05-31 DIAGNOSIS — D63.1 ANEMIA OF CHRONIC RENAL FAILURE: ICD-10-CM

## 2018-05-31 DIAGNOSIS — D63.1 ANEMIA OF CHRONIC RENAL FAILURE: Primary | ICD-10-CM

## 2018-05-31 LAB
25(OH)D3+25(OH)D2 SERPL-MCNC: 14 NG/ML
ALBUMIN SERPL BCP-MCNC: 3.7 G/DL
ANION GAP SERPL CALC-SCNC: 11 MMOL/L
BASOPHILS # BLD AUTO: 0.03 K/UL
BASOPHILS NFR BLD: 0.5 %
BUN SERPL-MCNC: 31 MG/DL
CALCIUM SERPL-MCNC: 9.4 MG/DL
CHLORIDE SERPL-SCNC: 105 MMOL/L
CO2 SERPL-SCNC: 25 MMOL/L
CREAT SERPL-MCNC: 2.7 MG/DL
DIFFERENTIAL METHOD: ABNORMAL
EOSINOPHIL # BLD AUTO: 0.2 K/UL
EOSINOPHIL NFR BLD: 2.7 %
ERYTHROCYTE [DISTWIDTH] IN BLOOD BY AUTOMATED COUNT: 14.6 %
EST. GFR  (AFRICAN AMERICAN): 23.8 ML/MIN/1.73 M^2
EST. GFR  (NON AFRICAN AMERICAN): 20.6 ML/MIN/1.73 M^2
GLUCOSE SERPL-MCNC: 106 MG/DL
HCT VFR BLD AUTO: 40.5 %
HGB BLD-MCNC: 12.9 G/DL
IMM GRANULOCYTES # BLD AUTO: 0.01 K/UL
IMM GRANULOCYTES NFR BLD AUTO: 0.2 %
LYMPHOCYTES # BLD AUTO: 2.2 K/UL
LYMPHOCYTES NFR BLD: 37.2 %
MCH RBC QN AUTO: 28.9 PG
MCHC RBC AUTO-ENTMCNC: 31.9 G/DL
MCV RBC AUTO: 91 FL
MONOCYTES # BLD AUTO: 0.5 K/UL
MONOCYTES NFR BLD: 8.6 %
NEUTROPHILS # BLD AUTO: 3.1 K/UL
NEUTROPHILS NFR BLD: 50.8 %
NRBC BLD-RTO: 0 /100 WBC
PHOSPHATE SERPL-MCNC: 3 MG/DL
PLATELET # BLD AUTO: 139 K/UL
PMV BLD AUTO: 11.6 FL
POTASSIUM SERPL-SCNC: 4.9 MMOL/L
PTH-INTACT SERPL-MCNC: 228 PG/ML
RBC # BLD AUTO: 4.47 M/UL
SODIUM SERPL-SCNC: 141 MMOL/L
WBC # BLD AUTO: 6.02 K/UL

## 2018-05-31 PROCEDURE — 36415 COLL VENOUS BLD VENIPUNCTURE: CPT | Mod: PO

## 2018-05-31 PROCEDURE — 85025 COMPLETE CBC W/AUTO DIFF WBC: CPT

## 2018-05-31 PROCEDURE — 83970 ASSAY OF PARATHORMONE: CPT

## 2018-05-31 PROCEDURE — 80069 RENAL FUNCTION PANEL: CPT

## 2018-05-31 PROCEDURE — 82306 VITAMIN D 25 HYDROXY: CPT

## 2018-06-12 ENCOUNTER — OFFICE VISIT (OUTPATIENT)
Dept: FAMILY MEDICINE | Facility: CLINIC | Age: 83
End: 2018-06-12
Payer: MEDICARE

## 2018-06-12 VITALS
BODY MASS INDEX: 30.56 KG/M2 | TEMPERATURE: 98 F | HEART RATE: 52 BPM | HEIGHT: 71 IN | SYSTOLIC BLOOD PRESSURE: 117 MMHG | DIASTOLIC BLOOD PRESSURE: 59 MMHG | WEIGHT: 218.25 LBS

## 2018-06-12 DIAGNOSIS — E66.01 CLASS 2 SEVERE OBESITY DUE TO EXCESS CALORIES WITH SERIOUS COMORBIDITY AND BODY MASS INDEX (BMI) OF 35.0 TO 35.9 IN ADULT: ICD-10-CM

## 2018-06-12 DIAGNOSIS — N18.30 CKD (CHRONIC KIDNEY DISEASE), STAGE III: ICD-10-CM

## 2018-06-12 DIAGNOSIS — Z23 NEED FOR ZOSTER VACCINE: ICD-10-CM

## 2018-06-12 DIAGNOSIS — I10 ESSENTIAL HYPERTENSION: Primary | ICD-10-CM

## 2018-06-12 DIAGNOSIS — N18.30 CKD (CHRONIC KIDNEY DISEASE) STAGE 3, GFR 30-59 ML/MIN: ICD-10-CM

## 2018-06-12 PROCEDURE — 3078F DIAST BP <80 MM HG: CPT | Mod: CPTII,S$GLB,, | Performed by: FAMILY MEDICINE

## 2018-06-12 PROCEDURE — 99214 OFFICE O/P EST MOD 30 MIN: CPT | Mod: S$GLB,,, | Performed by: FAMILY MEDICINE

## 2018-06-12 PROCEDURE — 99999 PR PBB SHADOW E&M-EST. PATIENT-LVL III: CPT | Mod: PBBFAC,,, | Performed by: FAMILY MEDICINE

## 2018-06-12 PROCEDURE — 3074F SYST BP LT 130 MM HG: CPT | Mod: CPTII,S$GLB,, | Performed by: FAMILY MEDICINE

## 2018-06-12 RX ORDER — ACETAMINOPHEN 500 MG
1 TABLET ORAL DAILY
COMMUNITY
End: 2023-06-23 | Stop reason: ALTCHOICE

## 2018-06-12 RX ORDER — BENAZEPRIL HYDROCHLORIDE 40 MG/1
20 TABLET ORAL 2 TIMES DAILY
Qty: 90 TABLET | Refills: 1
Start: 2018-06-12 | End: 2018-09-28

## 2018-06-12 NOTE — PROGRESS NOTES
Subjective:       Patient ID: Lavon Brandon is a 85 y.o. male.    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      Hypertension   This is a chronic problem. The current episode started more than 1 year ago. The problem is controlled. Pertinent negatives include no anxiety, headaches, malaise/fatigue, neck pain, orthopnea, palpitations, PND or shortness of breath. Risk factors for coronary artery disease include obesity, male gender and dyslipidemia. Past treatments include diuretics and ACE inhibitors. The current treatment provides moderate improvement. Compliance problems include diet.  Identifiable causes of hypertension include chronic renal disease.   Cough   Pertinent negatives include no chills, ear pain, fever, headaches, postnasal drip, rhinorrhea, sore throat, shortness of breath or wheezing.   Edema   This is a chronic problem. The problem occurs intermittently. The problem has been waxing and waning. Associated symptoms include coughing and fatigue. Pertinent negatives include no abdominal pain, anorexia, arthralgias, change in bowel habit, chills, fever, headaches, joint swelling, neck pain, sore throat, urinary symptoms, vertigo or visual change. The symptoms are aggravated by standing. The treatment provided mild relief.   Fatigue   This is a chronic problem. Associated symptoms include coughing and fatigue. Pertinent negatives include no abdominal pain, anorexia, arthralgias, change in bowel habit, chills, fever, headaches, joint swelling, neck pain, sore throat, urinary symptoms, vertigo or visual change.     Review of Systems   Constitutional: Positive for fatigue. Negative for chills, fever and malaise/fatigue.   HENT: Negative for ear discharge, ear pain, postnasal drip, rhinorrhea, sinus pressure, sneezing and sore throat.    Respiratory: Positive for cough. Negative for shortness of  breath and wheezing.    Cardiovascular: Negative for palpitations, orthopnea and PND.   Gastrointestinal: Negative for abdominal pain, anorexia and change in bowel habit.   Musculoskeletal: Positive for back pain. Negative for arthralgias, joint swelling and neck pain.   Neurological: Positive for dizziness. Negative for vertigo and headaches.   Psychiatric/Behavioral: Positive for confusion and decreased concentration.       Patient Active Problem List   Diagnosis    Hypertension    Seizure disorder, post CVA 2011    Depression    Hyperlipidemia    Bradyarrhythmia, on amlodipine    Dementia    Constipation - functional    Personal history of colonic polyps    TIA (transient ischemic attack), 2007, multiple    TBI (traumatic brain injury), [place] war    CVA (cerebral infarction), 2011    Obesity, BMI 32.5    Dizziness, off balance, since TIA, 2007    CKD (chronic kidney disease) stage 3, GFR 47 ml/min    MARLIN (obstructive sleep apnea), 2008, not on Rx    Abnormal EKG    OA (osteoarthritis)    Abdominal obesity    CKD (chronic kidney disease), stage III       Objective:      Physical Exam   Constitutional: He appears well-developed.   HENT:   Right Ear: Tympanic membrane and ear canal normal.   Left Ear: Tympanic membrane and ear canal normal.   Nose: Mucosal edema present. Right sinus exhibits maxillary sinus tenderness. Right sinus exhibits no frontal sinus tenderness. Left sinus exhibits maxillary sinus tenderness. Left sinus exhibits no frontal sinus tenderness.   Mouth/Throat: Mucous membranes are normal. Posterior oropharyngeal erythema present. No oropharyngeal exudate, posterior oropharyngeal edema or tonsillar abscesses.   Cardiovascular: Normal rate, regular rhythm and normal heart sounds.    Pulmonary/Chest: Effort normal and breath sounds normal.   Musculoskeletal:        Lumbar back: He exhibits decreased range of motion, tenderness, bony tenderness, deformity and pain.   Skin: Skin is  warm.   Psychiatric: He has a normal mood and affect.   Vitals reviewed.      Lab Results   Component Value Date    WBC 6.02 05/31/2018    HGB 12.9 (L) 05/31/2018    HCT 40.5 05/31/2018     (L) 05/31/2018    CHOL 160 09/07/2016    TRIG 63 09/07/2016    HDL 44 09/07/2016    ALT 8 (L) 01/10/2018    AST 36 01/10/2018     05/31/2018    K 4.9 05/31/2018     05/31/2018    CREATININE 2.7 (H) 05/31/2018    BUN 31 (H) 05/31/2018    CO2 25 05/31/2018    TSH 1.28 02/17/2011    PSA 1.5 01/07/2010    INR 0.9 01/10/2005    HGBA1C 5.8 12/06/2016     The ASCVD Risk score (Matheus EARL Jr., et al., 2013) failed to calculate for the following reasons:    The 2013 ASCVD risk score is only valid for ages 40 to 79    Assessment:       1. Essential hypertension    2. CKD (chronic kidney disease), stage III    3. CKD (chronic kidney disease) stage 3, GFR 47 ml/min    4. Class 2 severe obesity due to excess calories with serious comorbidity and body mass index (BMI) of 35.0 to 35.9 in adult    5. Need for zoster vaccine        Plan:       Essential hypertension    CKD (chronic kidney disease), stage III    CKD (chronic kidney disease) stage 3, GFR 47 ml/min    Class 2 severe obesity due to excess calories with serious comorbidity and body mass index (BMI) of 35.0 to 35.9 in adult    Need for zoster vaccine  -     varicella-zoster [id number], PF, (SHINGRIX, PF,) 50 mcg/0.5 mL injection; Inject 0.5 mLs into the muscle once.  Dispense: 0.5 mL; Refill: 1    Other orders  -     benazepril (LOTENSIN) 40 MG tablet; Take 0.5 tablets (20 mg total) by mouth 2 (two) times daily.  Dispense: 90 tablet; Refill: 1      Patient readiness: acceptance and barriers:readiness and poor sleep habits    During the course of the visit the patient was educated and counseled about the following:     Hypertension:   Dietary sodium restriction.  Obesity:   General weight loss/lifestyle modification strategies discussed (elicit support from others;  identify saboteurs; non-food rewards, etc).    Goals: Hypertension: Reduce Blood Pressure and Obesity: Reduce calorie intake and BMI    Did patient meet goals/outcomes: Yes    The following self management tools provided: blood pressure log  excercise log    Patient Instructions (the written plan) was given to the patient/family.     Time spent with patient: 30 minutes          30-minute visit. 20 minutes spent counseling patient on diet, exercise, and weight loss.  This has been fully explained to the patient, who indicates understanding.

## 2018-07-02 ENCOUNTER — TELEPHONE (OUTPATIENT)
Dept: FAMILY MEDICINE | Facility: CLINIC | Age: 83
End: 2018-07-02

## 2018-07-02 NOTE — TELEPHONE ENCOUNTER
----- Message from Johanna Cortez RT sent at 7/2/2018  2:57 PM CDT -----  Contact: Caryn,wife, [phone]  Caryn,wife, [phone], seeking medical advise for the pt, got stung by a wasp and would like an authentic antihistamine called in to pharmacy, thanks

## 2018-07-03 NOTE — TELEPHONE ENCOUNTER
Please see attached message from Dr. Haynes. Attempt x's 2 to contact patient/patient's wife (Caryn). Line busy on both attempts.

## 2018-09-26 RX ORDER — AMLODIPINE BESYLATE 5 MG/1
TABLET ORAL
Qty: 90 TABLET | Refills: 3 | Status: SHIPPED | OUTPATIENT
Start: 2018-09-26 | End: 2019-09-20 | Stop reason: SDUPTHER

## 2018-09-28 RX ORDER — BENAZEPRIL HYDROCHLORIDE 40 MG/1
TABLET ORAL
Qty: 90 TABLET | Refills: 1 | OUTPATIENT
Start: 2018-09-28

## 2018-09-28 RX ORDER — BENAZEPRIL HYDROCHLORIDE 10 MG/1
10 TABLET ORAL 2 TIMES DAILY
Qty: 90 TABLET | Refills: 1 | Status: SHIPPED | OUTPATIENT
Start: 2018-09-28 | End: 2019-02-04 | Stop reason: SDUPTHER

## 2018-10-02 RX ORDER — LEVETIRACETAM 500 MG/1
500 TABLET ORAL 2 TIMES DAILY
Qty: 60 TABLET | Refills: 6 | Status: SHIPPED | OUTPATIENT
Start: 2018-10-02 | End: 2018-11-12 | Stop reason: SDUPTHER

## 2018-10-02 RX ORDER — LEVETIRACETAM 500 MG/1
TABLET ORAL
Qty: 60 TABLET | Refills: 6 | OUTPATIENT
Start: 2018-10-02

## 2018-10-02 RX ORDER — CITALOPRAM 20 MG/1
TABLET, FILM COATED ORAL
Qty: 90 TABLET | Refills: 1 | Status: SHIPPED | OUTPATIENT
Start: 2018-10-02 | End: 2019-03-29

## 2018-10-02 RX ORDER — LEVETIRACETAM 500 MG/1
500 TABLET ORAL 2 TIMES DAILY
Qty: 60 TABLET | Refills: 6 | Status: SHIPPED | OUTPATIENT
Start: 2018-10-02 | End: 2020-09-23 | Stop reason: SDUPTHER

## 2018-10-02 RX ORDER — CITALOPRAM 20 MG/1
20 TABLET, FILM COATED ORAL DAILY
Qty: 90 TABLET | Refills: 1 | Status: SHIPPED | OUTPATIENT
Start: 2018-10-02 | End: 2018-10-02 | Stop reason: SDUPTHER

## 2018-10-02 NOTE — TELEPHONE ENCOUNTER
Patient requesting a refill of the following medications;  Citalopram--LR--4-3-18  Levetiracetam--LR--  LOV--6-12-18  FOV--None Noted

## 2018-10-02 NOTE — TELEPHONE ENCOUNTER
----- Message from Anuja Saravia sent at 10/2/2018  4:31 PM CDT -----   Pt wife georgina  Is calling  To  Speak to the nurse about  Pt med's citalopram 20  Mg   One  Daily // levetiracetam  500 mg  Twice  Melgoza // please call   Pt is out  Of  med's [phone]

## 2018-10-02 NOTE — TELEPHONE ENCOUNTER
----- Message from Clemencia Tavera sent at 10/2/2018  1:12 PM CDT -----  Contact: Patient's wife, Caryn  Type:  RX Refill Request    Who Called:  Patient's wife  Refill or New Rx:  Refill  RX Name and Strength:    levETIRAcetam (KEPPRA) 500 MG Tab  How is the patient currently taking it? (ex. 1XDay):    Is this a 30 day or 90 day RX:    Preferred Pharmacy with phone number:    [facility] [place], [place]  [address] [place] [address]  Phone: [phone] Fax: [phone]  Local or Mail Order:  Local  Ordering Provider:  Dallas Roberts Call Back Number:  [phone]  Additional Information:

## 2018-11-12 ENCOUNTER — TELEPHONE (OUTPATIENT)
Dept: FAMILY MEDICINE | Facility: CLINIC | Age: 83
End: 2018-11-12

## 2018-11-12 ENCOUNTER — OFFICE VISIT (OUTPATIENT)
Dept: FAMILY MEDICINE | Facility: CLINIC | Age: 83
End: 2018-11-12
Payer: MEDICARE

## 2018-11-12 VITALS
SYSTOLIC BLOOD PRESSURE: 129 MMHG | TEMPERATURE: 98 F | HEIGHT: 71 IN | DIASTOLIC BLOOD PRESSURE: 57 MMHG | HEART RATE: 51 BPM | WEIGHT: 219.38 LBS | BODY MASS INDEX: 30.71 KG/M2

## 2018-11-12 DIAGNOSIS — N25.81 SECONDARY HYPERPARATHYROIDISM OF RENAL ORIGIN: ICD-10-CM

## 2018-11-12 DIAGNOSIS — N18.4 CKD (CHRONIC KIDNEY DISEASE) STAGE 4, GFR 15-29 ML/MIN: ICD-10-CM

## 2018-11-12 DIAGNOSIS — I10 ESSENTIAL HYPERTENSION: Primary | ICD-10-CM

## 2018-11-12 DIAGNOSIS — S06.9X0D TRAUMATIC BRAIN INJURY, WITHOUT LOSS OF CONSCIOUSNESS, SUBSEQUENT ENCOUNTER: ICD-10-CM

## 2018-11-12 DIAGNOSIS — D69.6 THROMBOCYTOPENIA: ICD-10-CM

## 2018-11-12 DIAGNOSIS — E66.9 OBESITY (BMI 30-39.9): ICD-10-CM

## 2018-11-12 DIAGNOSIS — G40.909 SEIZURE DISORDER: ICD-10-CM

## 2018-11-12 DIAGNOSIS — M54.50 ACUTE MIDLINE LOW BACK PAIN WITHOUT SCIATICA: ICD-10-CM

## 2018-11-12 DIAGNOSIS — Z86.73 HISTORY OF CVA IN ADULTHOOD: ICD-10-CM

## 2018-11-12 PROCEDURE — 99214 OFFICE O/P EST MOD 30 MIN: CPT | Mod: 25,S$GLB,, | Performed by: NURSE PRACTITIONER

## 2018-11-12 PROCEDURE — G0008 ADMIN INFLUENZA VIRUS VAC: HCPCS | Mod: S$GLB,,, | Performed by: NURSE PRACTITIONER

## 2018-11-12 PROCEDURE — 90662 IIV NO PRSV INCREASED AG IM: CPT | Mod: S$GLB,,, | Performed by: NURSE PRACTITIONER

## 2018-11-12 PROCEDURE — 99999 PR PBB SHADOW E&M-EST. PATIENT-LVL IV: CPT | Mod: PBBFAC,,, | Performed by: NURSE PRACTITIONER

## 2018-11-12 PROCEDURE — 3074F SYST BP LT 130 MM HG: CPT | Mod: CPTII,S$GLB,, | Performed by: NURSE PRACTITIONER

## 2018-11-12 PROCEDURE — 1101F PT FALLS ASSESS-DOCD LE1/YR: CPT | Mod: CPTII,S$GLB,, | Performed by: NURSE PRACTITIONER

## 2018-11-12 PROCEDURE — 3078F DIAST BP <80 MM HG: CPT | Mod: CPTII,S$GLB,, | Performed by: NURSE PRACTITIONER

## 2018-11-12 NOTE — PROGRESS NOTES
Subjective:       Patient ID: Lavon Brandon is a 85 y.o. male.    Chief Complaint: Hypertension    Mr. Brandon presents to the clinic today for hypertension follow up.  His blood pressure is well controlled.  He does complain of midline low back pain for the past few days which occurs upon waking and resolves throughout the day.  He has not had loss of bowel or bladder function, no new leg weakness.  He does report left leg pain but this has been since he was injured in the [place] war.  He has CKD stage 4 and he is seeing Dr. Pereyra, Nephrology. His wife who is present at the visit states he gets lab done every 6 mos with Nephrology.  He is due for flu shot.      Review of Systems   Constitutional: Negative for chills and fever.   HENT: Negative for congestion, ear pain and sinus pressure.    Respiratory: Negative for cough, shortness of breath and wheezing.    Cardiovascular: Negative for chest pain, palpitations and leg swelling.   Gastrointestinal: Negative for abdominal pain, constipation and diarrhea.   Musculoskeletal: Positive for back pain. Negative for joint swelling.   Skin: Negative for rash and wound.   Neurological: Negative for weakness and numbness.       Objective:      Physical Exam   Constitutional: He is oriented to person, place, and time. He appears well-developed and well-nourished. No distress.   HENT:   Head: Normocephalic and atraumatic.   Right Ear: External ear normal.   Left Ear: External ear normal.   Mouth/Throat: Oropharynx is clear and moist. No oropharyngeal exudate.   Eyes: Pupils are equal, round, and reactive to light. Right eye exhibits no discharge. Left eye exhibits no discharge.   Neck: Neck supple. No thyromegaly present.   Cardiovascular: Normal rate and regular rhythm. Exam reveals no gallop and no friction rub.   No murmur heard.  Pulmonary/Chest: Effort normal and breath sounds normal. No respiratory distress. He has no wheezes. He has no rales.   Abdominal: Soft.  He exhibits no distension. There is no tenderness.   Musculoskeletal:        Lumbar back: He exhibits normal range of motion, no tenderness, no bony tenderness, no swelling and no pain.   Lymphadenopathy:     He has no cervical adenopathy.   Neurological: He is alert and oriented to person, place, and time. Coordination normal.   Reflex Scores:       Patellar reflexes are 2+ on the right side and 2+ on the left side.  Negative SLR bilaterally   Skin: Skin is warm and dry.   Psychiatric: He has a normal mood and affect. His behavior is normal. Thought content normal.   Vitals reviewed.          Current Outpatient Medications:     amLODIPine (NORVASC) 5 MG tablet, TAKE ONE TABLET BY MOUTH ONCE DAILY, Disp: 90 tablet, Rfl: 3    benazepril (LOTENSIN) 10 MG tablet, Take 1 tablet (10 mg total) by mouth 2 (two) times daily., Disp: 90 tablet, Rfl: 1    cholecalciferol, vitamin D3, (VITAMIN D3) 2,000 unit Cap, Take 1 capsule by mouth once daily., Disp: , Rfl:     citalopram (CELEXA) 20 MG tablet, TAKE 1 TABLET BY MOUTH ONCE DAILY, Disp: 90 tablet, Rfl: 1    furosemide (LASIX) 40 MG tablet, TAKE ONE TABLET BY MOUTH TWICE DAILY, Disp: 180 tablet, Rfl: 1    levETIRAcetam (KEPPRA) 500 MG Tab, Take 1 tablet (500 mg total) by mouth 2 (two) times daily., Disp: 60 tablet, Rfl: 6  Assessment:       1. Essential hypertension    2. Secondary hyperparathyroidism of renal origin    3. CKD (chronic kidney disease) stage 4, GFR 15-29 ml/min    4. Thrombocytopenia    5. Traumatic brain injury, without loss of consciousness, subsequent encounter    6. History of CVA in adulthood    7. Seizure disorder, post CVA 2011    8. Acute midline low back pain without sciatica    9. Obesity (BMI 30-39.9)        Plan:       Essential hypertension  Stable, continue current medication.    Secondary hyperparathyroidism of renal origin  Stable.     CKD (chronic kidney disease) stage 4, GFR 15-29 ml/min  Patient's wife will send labs from  nephrology.    Thrombocytopenia  Labs with Nephrology--wife will send over.    Traumatic brain injury, without loss of consciousness, subsequent encounter  Stable.    History of CVA in adulthood    Seizure disorder, post CVA 2011  Stable, continue current medication.    Acute midline low back pain without sciatica  Likely due to DDD.  Stretches, lumbar support, posture.   Tylenol OTC for pain.    Obesity   See below.    Other orders  -     Influenza - High Dose (65+) (PF) (IM)    Patient readiness: acceptance and barriers:none    During the course of the visit the patient was educated and counseled about the following:     Hypertension:   Medication: no change.    Goals: Hypertension: Reduce Blood Pressure and Obesity: Reduce calorie intake and BMI    Did patient meet goals/outcomes: Yes    The following self management tools provided: declined    Patient Instructions (the written plan) was given to the patient/family.     Time spent with patient: 30 minutes    Barriers to medications present (no )    Adverse reactions to current medications (no)    Over the counter medications reviewed (Yes)      RTC 6 mos with PCP.

## 2018-11-12 NOTE — PATIENT INSTRUCTIONS
Lumbar Stretch (Flexibility)    1. Lie on your back on the floor, with your knees bent and your feet flat on the floor. Dont press your neck or lower back to the floor.  2. Pull one knee up toward your chest. Clasp your hands under your thigh to help pull.  3. Hold for 30 to 60 seconds. Lower your leg back down to the floor.  4. Repeat 2 times, or as instructed.  5. Switch legs and repeat.  Date Last Reviewed: 3/10/2016  © 5252-8603 [employer]. [address] [place] [address]. All rights reserved. This information is not intended as a substitute for professional medical care. Always follow your healthcare professional's instructions.        Back Exercises: Side Stretch      To start, sit in a chair with your feet flat on the floor. Shift your weight slightly forward to avoid rounding your back. Relax. Keep your ears, shoulders, and hips aligned:  · Stretch your right arm overhead.  · Slowly bend to the left. Dont twist your torso. Stay within your pain limits.  · Hold for 20 seconds. Return to starting position.  · Repeat 2 to 5 times. Then, switch to the other side.  Date Last Reviewed: 10/13/2015  © 4031-9511 [employer]. [address] [place] [address]. All rights reserved. This information is not intended as a substitute for professional medical care. Always follow your healthcare professional's instructions.        Back Exercises: Lower Back Stretch    To start, sit in a chair with your feet flat on the floor. Shift your weight slightly forward. Relax, and keep your ears, shoulders, and hips aligned.  · Sit with your feet well apart.  · Bend forward and touch the floor with the backs of your hands. Relax and let your body drop.  · Hold for 20 seconds. Return to starting position.  · Repeat 2 times.   Date Last Reviewed: 8/16/2015  © 1687-1930 [employer]. [address] [place] [address]. All rights reserved. This information is not  intended as a substitute for professional medical care. Always follow your healthcare professional's instructions.        Back Care Tips    Caring for your back  These are things you can do to prevent a recurrence of acute back pain and to reduce symptoms from chronic back pain:  · Maintain a healthy weight. If you are overweight, losing weight will help most types of back pain.  · Exercise is an important part of recovery from most types of back pain. The muscles behind and in front of the spine support the back. This means strengthening both the back muscles and the abdominal muscles will provide better support for your spine.   · Swimming and brisk walking are good overall exercises to improve your fitness level.  · Practice safe lifting methods (below).  · Practice good posture when sitting, standing and walking. Avoid prolonged sitting. This puts more stress on the lower back than standing or walking.  · Wear quality shoes with sufficient arch support. Foot and ankle alignment can affect back symptoms. Women should avoid wearing high heels.  · Therapeutic massage can help relax the back muscles without stretching them.  · During the first 24 to 72 hours after an acute injury or flare-up of chronic back pain, apply an ice pack to the painful area for 20 minutes and then remove it for 20 minutes, over a period of 60 to 90 minutes, or several times a day. As a safety precaution, do not use a heating pad at bedtime. Sleeping on a heating pad can lead to skin burns or tissue damage.  · You can alternate ice and heat therapies.  Medications  Talk to your healthcare provider before using medicines, especially if you have other medical problems or are taking other medicines.  · You may use acetaminophen or ibuprofen to control pain, unless your healthcare provider prescribed other pain medicine. If you have chronic conditions like diabetes, liver or kidney disease, stomach ulcers, or gastrointestinal bleeding, or are  taking blood thinners, talk with your healthcare provider before taking any medicines.  · Be careful if you are given prescription pain medicines, narcotics, or medicine for muscle spasm. They can cause drowsiness, affect your coordination, reflexes, and judgment. Do not drive or operate heavy machinery while taking these types of medicines. Take prescription pain medicine only as prescribed by your healthcare provider.  Lumbar stretch  Here is a simple stretching exercise that will help relax muscle spasm and keep your back more limber. If exercise makes your back pain worse, dont do it.  · Lie on your back with your knees bent and both feet on the ground.  · Slowly raise your left knee to your chest as you flatten your lower back against the floor. Hold for 5 seconds.  · Relax and repeat the exercise with your right knee.  · Do 10 of these exercises for each leg.  Safe lifting method  · Dont bend over at the waist to lift an object off the floor.  Instead, bend your knees and hips in a squat.   · Keep your back and head upright  · Hold the object close to your body, directly in front of you.  · Straighten your legs to lift the object.   · Lower the object to the floor in the reverse fashion.  · If you must slide something across the floor, push it.  Posture tips  Sitting  Sit in chairs with straight backs or low-back support. Keep your knees lower than your hips, with your feet flat on the floor.  When driving, sit up straight. Adjust the seat forward so you are not leaning toward the steering wheel.  A small pillow or rolled towel behind your lower back may help if you are driving long distances.   Standing  When standing for long periods, shift most of your weight to one leg at a time. Alternate legs every few minutes.   Sleeping  The best way to sleep is on your side with your knees bent. Put a low pillow under your head to support your neck in a neutral spine position. Avoid thick pillows that bend your neck  to one side. Put a pillow between your legs to further relax your lower back. If you sleep on your back, put pillows under your knees to support your legs in a slightly flexed position. Use a firm mattress. If your mattress sags, replace it, or use a 1/2-inch plywood board under the mattress to add support.  Follow-up care  Follow up with your healthcare provider, or as advised.  If X-rays, a CT scan or an MRI scan were taken, they will be reviewed by a radiologist. You will be notified of any new findings that may affect your care.  Call 911  Seek emergency medical care if any of the following occur:  · Trouble breathing  · Confusion  · Very drowsy  · Fainting or loss of consciousness  · Rapid or very slow heart rate  · Loss of  bowel or bladder control  When to seek medical care  Call your healthcare provider if any of the following occur:  · Pain becomes worse or spreads to your arms or legs  · Weakness or numbness in one or both arms or legs  · Numbness in the groin area  Date Last Reviewed: 6/1/2016  © 1376-7835 [employer]. [address] [place] [id number]. All rights reserved. This information is not intended as a substitute for professional medical care. Always follow your healthcare professional's instructions.

## 2018-11-12 NOTE — LETTER
November 12, 2018                 [facility]  [address]  [facility] [address]  Phone: [phone]  Fax: [phone] November 12, 2018     Patient: Lavon Brandon    YOB: 1932   Date of Visit: 11/12/2018       To Whom It May Concern:    We are seeing Lavon Brandon in the clinic today at [facility].  Aristides Haynes MD is their PCP.  She/He has an outstanding lab/procedure at this time when reviewing their chart.  To help with our Health Maintenance records will you please supply the following:      []  Mammogram                                                []  Colonoscopy   []  Pap Smear                                                   [x]  Lab Results   []  Dexa scans                                                   []  Eye Exam   []  Foot Exam                                                     [] Other___________   []  Outside Immunizations                                               Please Fax to [facility] at [phone]    Thank you for your help, Nancy MÉNDEZ MA       Sincerely      Nancy Guerrero MA  [facility]  [address] [facility] [address]  Phone [phone]  Fax [phone]

## 2018-11-29 ENCOUNTER — TELEPHONE (OUTPATIENT)
Dept: FAMILY MEDICINE | Facility: CLINIC | Age: 83
End: 2018-11-29

## 2018-11-29 NOTE — TELEPHONE ENCOUNTER
Reviewed labs from Nephrology.  However these are from six months ago so he is due to repeat them.  Does he want me to order the labs or does he have an upcoming follow up with Nephrology?

## 2018-11-30 NOTE — TELEPHONE ENCOUNTER
Patient wife called back states patient had labs done at [facility] on yesterday order by Dr Chase

## 2018-12-04 NOTE — TELEPHONE ENCOUNTER
The labs that were received via fax were from May 2018.  Please see if we can get the labs done more recently.

## 2018-12-05 NOTE — TELEPHONE ENCOUNTER
Spoke to jailyn at Dr Chase office states patient was seen on yesterday and labs completed on 11/29 jailyn is faxing labs

## 2019-02-04 RX ORDER — BENAZEPRIL HYDROCHLORIDE 10 MG/1
TABLET ORAL
Qty: 180 TABLET | Refills: 0 | Status: SHIPPED | OUTPATIENT
Start: 2019-02-04 | End: 2019-05-10 | Stop reason: SDUPTHER

## 2019-03-25 RX ORDER — FUROSEMIDE 40 MG/1
TABLET ORAL
Qty: 180 TABLET | Refills: 1 | Status: SHIPPED | OUTPATIENT
Start: 2019-03-25 | End: 2020-03-18

## 2019-03-29 RX ORDER — CITALOPRAM 20 MG/1
TABLET, FILM COATED ORAL
Qty: 90 TABLET | Refills: 1 | Status: SHIPPED | OUTPATIENT
Start: 2019-03-29 | End: 2019-09-20 | Stop reason: SDUPTHER

## 2019-04-22 ENCOUNTER — PATIENT OUTREACH (OUTPATIENT)
Dept: ADMINISTRATIVE | Facility: HOSPITAL | Age: 84
End: 2019-04-22

## 2019-05-06 ENCOUNTER — OFFICE VISIT (OUTPATIENT)
Dept: FAMILY MEDICINE | Facility: CLINIC | Age: 84
End: 2019-05-06
Payer: MEDICARE

## 2019-05-06 VITALS
TEMPERATURE: 98 F | SYSTOLIC BLOOD PRESSURE: 123 MMHG | HEIGHT: 71 IN | BODY MASS INDEX: 30.86 KG/M2 | WEIGHT: 220.44 LBS | DIASTOLIC BLOOD PRESSURE: 63 MMHG | HEART RATE: 47 BPM

## 2019-05-06 DIAGNOSIS — S06.9X9S TRAUMATIC BRAIN INJURY WITH LOSS OF CONSCIOUSNESS, SEQUELA: ICD-10-CM

## 2019-05-06 DIAGNOSIS — N18.4 ANEMIA OF CHRONIC RENAL FAILURE, STAGE 4 (SEVERE): Chronic | ICD-10-CM

## 2019-05-06 DIAGNOSIS — G40.409 CENTRENCEPHALIC EPILEPSY: Chronic | ICD-10-CM

## 2019-05-06 DIAGNOSIS — E66.01 CLASS 2 SEVERE OBESITY DUE TO EXCESS CALORIES WITH SERIOUS COMORBIDITY AND BODY MASS INDEX (BMI) OF 35.0 TO 35.9 IN ADULT: ICD-10-CM

## 2019-05-06 DIAGNOSIS — N18.4 CKD (CHRONIC KIDNEY DISEASE) STAGE 4, GFR 15-29 ML/MIN: ICD-10-CM

## 2019-05-06 DIAGNOSIS — D69.6 THROMBOCYTOPENIA: ICD-10-CM

## 2019-05-06 DIAGNOSIS — G40.909 SEIZURE DISORDER: ICD-10-CM

## 2019-05-06 DIAGNOSIS — D63.1 ANEMIA OF CHRONIC RENAL FAILURE, STAGE 4 (SEVERE): Chronic | ICD-10-CM

## 2019-05-06 DIAGNOSIS — M19.90 CHRONIC ARTHRITIS: Chronic | ICD-10-CM

## 2019-05-06 DIAGNOSIS — N25.81 SECONDARY HYPERPARATHYROIDISM OF RENAL ORIGIN: ICD-10-CM

## 2019-05-06 PROCEDURE — 99214 OFFICE O/P EST MOD 30 MIN: CPT | Mod: S$GLB,,, | Performed by: FAMILY MEDICINE

## 2019-05-06 PROCEDURE — 1101F PT FALLS ASSESS-DOCD LE1/YR: CPT | Mod: CPTII,S$GLB,, | Performed by: FAMILY MEDICINE

## 2019-05-06 PROCEDURE — 99999 PR PBB SHADOW E&M-EST. PATIENT-LVL III: CPT | Mod: PBBFAC,,, | Performed by: FAMILY MEDICINE

## 2019-05-06 PROCEDURE — 1101F PR PT FALLS ASSESS DOC 0-1 FALLS W/OUT INJ PAST YR: ICD-10-PCS | Mod: CPTII,S$GLB,, | Performed by: FAMILY MEDICINE

## 2019-05-06 PROCEDURE — 99999 PR PBB SHADOW E&M-EST. PATIENT-LVL III: ICD-10-PCS | Mod: PBBFAC,,, | Performed by: FAMILY MEDICINE

## 2019-05-06 PROCEDURE — 99214 PR OFFICE/OUTPT VISIT, EST, LEVL IV, 30-39 MIN: ICD-10-PCS | Mod: S$GLB,,, | Performed by: FAMILY MEDICINE

## 2019-05-06 NOTE — PATIENT INSTRUCTIONS
Low-Salt Diet  This diet removes foods that are high in salt. It also limits the amount of salt you use when cooking. It is most often used for people with high blood pressure, edema (fluid retention), and kidney, liver, or heart disease.  Table salt contains the mineral sodium. Your body needs sodium to work normally. But too much sodium can make your health problems worse. Your healthcare provider is recommending a low-salt (also called low-sodium) diet for you. Your total daily allowance of salt is 1,500 to 2,300 milligrams (mg). It is less than 1 teaspoon of table salt. This means you can have only about 500 to 700 mg of sodium at each meal. People with certain health problems should limit salt intake to the lower end of the recommended range.    When you cook, dont add much salt. If you can cook without using salt, even better. Dont add salt to your food at the table.  When shopping, read food labels. Salt is often called sodium on the label. Choose foods that are salt-free, low salt, or very low salt. Note that foods with reduced salt may not lower your salt intake enough.    Beans, potatoes, and pasta  Ok: Dry beans, split peas, lentils, potatoes, rice, macaroni, pasta, spaghetti without added salt  Avoid: Potato chips, tortilla chips, and similar products  Breads and cereals  Ok: Low-sodium breads, rolls, cereals, and cakes; low-salt crackers, matzo crackers  Avoid: Salted crackers, pretzels, popcorn, Maori toast, pancakes, muffins  Dairy  Ok: Milk, chocolate milk, hot chocolate mix, low-salt cheeses, and yogurt  Avoid: Processed cheese and cheese spreads; Roquefort, Camembert, and cottage cheese; buttermilk, instant breakfast drink  Desserts  Ok: Ice cream, frozen yogurt, juice bars, gelatin, cookies and pies, sugar, honey, jelly, hard candy  Avoid: Most pies, cakes and cookies prepared or processed with salt; instant pudding  Drinks  Ok: Tea, coffee, fizzy (carbonated) drinks, juices  Avoid: Flavored  coffees, electrolyte replacement drinks, sports drinks  Meats  Ok: All fresh meat, fish, poultry, low-salt tuna, eggs, egg substitute  Avoid: Smoked, pickled, brine-cured, or salted meats and fish. This includes [place], chipped beef, corned beef, hot dogs, deli meats, ham, kosher meats, salt pork, sausage, canned tuna, salted codfish, smoked salmon, herring, sardines, or anchovies.  Seasonings and spices  Ok: Most seasonings are okay. Good substitutes for salt include: fresh herb blends, hot sauce, lemon, garlic, harvey, vinegar, dry mustard, parsley, cilantro, horseradish, tomato paste, regular margarine, mayonnaise, unsalted butter, cream cheese, vegetable oil, cream, low-salt salad dressing and gravy.  Avoid: Regular ketchup, relishes, pickles, soy sauce, teriyaki sauce, Worcestershire sauce, BBQ sauce, tartar sauce, meat tenderizer, chili sauce, regular gravy, regular salad dressing, salted butter  Soups  Ok: Low-salt soups and broths made with allowed foods  Avoid: Bouillon cubes, soups with smoked or salted meats, regular soup and broth  Vegetables  Ok: Most vegetables are okay; also low-salt tomato and vegetable juices  Avoid: Sauerkraut and other brine-soaked vegetables; pickles and other pickled vegetables; tomato juice, olives  Date Last Reviewed: 8/1/2016 © 2000-2017 [employer]. [address] [place] [id number]. All rights reserved. This information is not intended as a substitute for professional medical care. Always follow your healthcare professional's instructions.        4 Steps for Eating Healthier  Changing the way you eat can improve your health. It can lower your cholesterol and blood pressure, and help you stay at a healthy weight. Your diet doesnt have to be bland and boring to be healthy. Just watch your calories and follow these steps:    1. Eat fewer unhealthy fats  · Choose more fish and lean meats instead of fatty cuts of meat.  · Skip butter and lard, and use less  margarine.  · Pass on foods that have palm, coconut, or hydrogenated oils.  · Eat fewer high-fat dairy foods like cheese, ice cream, and whole milk.  · Get a heart-healthy cookbook and try some low-fat recipes.  2. Go light on salt  · Keep the saltshaker off the table.  · Limit high-salt ingredients, such as soy sauce, bouillon, and garlic salt.  · Instead of adding salt when cooking, season your food with herbs and flavorings. Try lemon, garlic, and onion.  · Limit convenience foods, such as boxed or canned foods and restaurant food.  · Read food labels and choose lower-sodium options.  3. Limit sugar  · Pause before you add sugars to pancakes, cereal, coffee, or tea. This includes white and brown table sugar, syrup, honey, and molasses. Cut your usual amount by half.  · Use non-sugar sweeteners. Stevia, aspartame, and sucralose can satisfy a sweet tooth without adding calories.  · Swap out sugar-filled soda and other drinks. Buy sugar-free or low-calorie beverages. Remember water is always the best choice.  · Read labels and choose foods with less added sugar. Keep in mind that dairy foods and foods with fruit will have some natural sugar.  · Cut the sugar in recipes by 1/3 to 1/2. Boost the flavor with extracts like almond, vanilla, or orange. Or add spices such as cinnamon or nutmeg.  4. Eat more fiber  · Eat fresh fruits and vegetables every day.  · Boost your diet with whole grains. Go for oats, whole-grain rice, and bran.  · Add beans and lentils to your meals.  · Drink more water to match your fiber increase. This is to help prevent constipation.  Date Last Reviewed: 5/11/2015  © 7290-3795 [employer]. [address] [place] [address], [place] [address]. All rights reserved. This information is not intended as a substitute for professional medical care. Always follow your healthcare professional's instructions.        General Neck and Back Pain    Both neck and back pain are usually caused by injury to  the muscles or ligaments of the spine. Sometimes the disks that separate each bone of the spine may cause pain by pressing on a nearby nerve. Back and neck pain may appear after a sudden twisting or bending force (such as in a car accident), or sometimes after a simple awkward movement. In either case, muscle spasm is often present and adds to the pain.  Acute neck and back pain usually gets better in 1 to 2 weeks. Pain related to disk disease, arthritis in the spinal joints or spinal stenosis (narrowing of the spinal canal) can become chronic and last for months or years.  Back and neck pain are common problems. Most people feel better in 1 or 2 weeks, and most of the rest in 1 to 2 months. Most people can remain active.  People experience and describe pain differently.  · Pain can be sharp, stabbing, shooting, aching, cramping, or burning  · Movement, standing, bending, lifting, sitting, or walking may worsen the pain  · Pain can be localized to one spot or area, or it can be more generalized  · Pain can spread or radiate upwards, downwards, to the front, or go down your arms  · Muscle spasm may occur.  Most of the time mechanical problems with the muscles or spine cause the pain. it is usually caused by an injury, whether known or not, to the muscles or ligaments. While illnesses can cause back pain, it is usually not caused by a serious illness. Pain is usually related to physical activity, whether sports, exercise, work, or normal activity. Sometimes it can occur without an identifiable cause. This can happen simply by stretching or moving wrong, without noting pain at the time. Other causes include:  · Overexertion, lifting, pushing, pulling incorrectly or too aggressively.  · Sudden twisting, bending or stretching from an accident (car or fall), or accidental movement.  · Poor posture  · Poor conditioning, lack of regular exercise  · Spinal disc disease or arthritis  · Stress  · Pregnancy, or illness like  appendicitis, bladder or kidney infection, pelvic infections   Home care  · For neck pain: Use a comfortable pillow that supports the head and keeps the spine in a neutral position. The position of the head should not be tilted forward or backward.  · When in bed, try to find a position of comfort. A firm mattress is best. Try lying flat on your back with pillows under your knees. You can also try lying on your side with your knees bent up towards your chest and a pillow between your knees.  · At first, do not try to stretch out the sore spots. If there is a strain, it is not like the good soreness you get after exercising without an injury. In this case, stretching may make it worse.  · Avoid prolonged sitting, long car rides or travel. This puts more stress on the lower back than standing or walking.  · During the first 24 to 72 hours after an injury, apply an ice pack to the painful area for 20 minutes and then remove it for 20 minutes over a period of 60 to 90 minutes or several times a day.   · You can alternate ice and heat therapies. Talk with your healthcare provider about the best treatment for your back or neck pain. As a safety precaution, do not use a heating pad at bedtime. Sleeping with a heating pad can lead to skin burns or tissue damage.  · Therapeutic massage can help relax the back and neck muscles without stretching them.  · Be aware of safe lifting methods and do not lift anything over 15 pounds until all the pain is gone.  Medications  Talk to your healthcare provider before using medicine, especially if you have other medical problems or are taking other medicines.  · You may use over-the-counter medicine to control pain, unless another pain medicine was prescribed. If you have chronic conditions like diabetes, liver or kidney disease, stomach ulcers,  gastrointestinal bleeding, or are taking blood thinner medicines.  · Be careful if you are given pain medicines, narcotics, or medicine for  muscle spasm. They can cause drowsiness, and can affect your coordination, reflexes, and judgment. Do not drive or operate heavy machinery.  Follow-up care  Follow up with your healthcare provider, or as advised. Physical therapy or further tests may be needed.  If X-rays were taken, you will be notified of any new findings that may affect your care.  Call 911  Seek emergency medical care if any of the following occur:  · Trouble breathing  · Confusion  · Very drowsy or trouble awakening  · Fainting or loss of consciousness  · Rapid or very slow heart rate  · Loss of bowel or bladder control  When to seek medical advice  Call your healthcare provider right away if any of these occur:  · Pain becomes worse or spreads into your arms or legs  · Weakness, numbness or pain in one or both arms or legs  · Numbness in the groin area  · Difficulty walking  · Fever of 100.4ºF (38ºC) or higher, or as directed by your healthcare provider  Date Last Reviewed: 7/1/2016  © 8548-0966 [employer]. [address] [place] [address]. All rights reserved. This information is not intended as a substitute for professional medical care. Always follow your healthcare professional's instructions.        Exercises to Strengthen Your Lower Back  Strong lower back and abdominal muscles work together to support your spine. The exercises below will help strengthen the lower back. It is important that you begin exercising slowly and increase levels gradually.  Always begin any exercise program with stretching. If you feel pain while doing any of these exercises, stop and talk to your doctor about a more specific exercise program that better suits your condition.   Low back stretch  The point of stretching is to make you more flexible and increase your range of motion. Stretch only as much as you are able. Stretch slowly. Do not push your stretch to the limit. If at any point you feel pain while stretching, this is your  (temporary) limit.  · Lie on your back with your knees bent and both feet on the ground.  · Slowly raise your left knee to your chest as you flatten your lower back against the floor. Hold for 5 seconds.  · Relax and repeat the exercise with your right knee.  · Do 10 of these exercises for each leg.  · Repeat hugging both knees to your chest at the same time.  Building lower back strength  Start your exercise routine with 10 to 30 minutes a day, 1 to 3 times a day.  Initial exercises  Lying on your back:  1. Ankle pumps: Move your foot up and down, towards your head, and then away. Repeat 10 times with each foot.  2. Heel slides: Slowly bend your knee, drawing the heel of your foot towards you. Then slide your heel/foot from you, straightening your knee. Do not lift your foot off the floor (this is not a leg lift).  3. Abdominal contraction: Bend your knees and put your hands on your stomach. Tighten your stomach muscles. Hold for 5 seconds, then relax. Repeat 10 times.  4. Straight leg raise: Bend one leg at the knee and keep the other leg straight. Tighten your stomach muscles. Slowly lift your straight leg 6 to 12 inches off the floor and hold for up to 5 seconds. Repeat 10 times on each side.  Standin. Wall squats: Stand with your back against the wall. Move your feet about 12 inches away from the wall. Tighten your stomach muscles, and slowly bend your knees until they are at about a 45 degree angle. Do not go down too far. Hold about 5 seconds. Then slowly return to your starting position. Repeat 10 times.  2. Heel raises: Stand facing the wall. Slowly raise the heels of your feet up and down, while keeping your toes on the floor. If you have trouble balancing, you can touch the wall with your hands. Repeat 10 times.  More advanced exercises  When you feel comfortable enough, try these exercises.  1. Kneeling lumbar extension: Begin on your hands and knees. At the same time, raise and straighten your  right arm and left leg until they are parallel to the ground. Hold for 2 seconds and come back slowly to a starting position. Repeat with left arm and right leg, alternating 10 times.  2. Prone lumbar extension: Lie face down, arms extended overhead, palms on the floor. At the same time, raise your right arm and left leg as high as comfortably possible. Hold for 10 seconds and slowly return to start. Repeat with left arm and right leg, alternating 10 times. Gradually build up to 20 times. (Advanced: Repeat this exercise raising both arms and both legs a few inches off the floor at the same time. Hold for 5 seconds and release.)  3. Pelvic tilt: Lie on the floor on your back with your knees bent at 90 degrees. Your feet should be flat on the floor. Inhale, exhale, then slowly contract your abdominal muscles bringing your navel toward your spine. Let your pelvis rock back until your lower back is flat on the floor. Hold for 10 seconds while breathing smoothly.  4. Abdominal crunch: Perform a pelvic tilt (above) flattening your lower back against the floor. Holding the tension in your abdominal muscles, take another breath and raise your shoulder blades off the ground (this is not a full sit-up). Keep your head in line with your body (dont bend your neck forward). Hold for 2 seconds, then slowly lower.  Date Last Reviewed: 6/1/2016 © 2000-2017 [email or web address]. [address] [place] [address]. All rights reserved. This information is not intended as a substitute for professional medical care. Always follow your healthcare professional's instructions.        Back Exercises: Hip Rotator Stretch    To start, lie on your back with your knees bent and feet flat on the floor. Dont press your neck or lower back to the floor. Breathe deeply. You should feel comfortable and relaxed in this position.  · Rest your right ankle on your left knee.  · Place a towel behind your left thigh, and use it to pull the  knee toward your chest. Feel the stretch in your buttocks.  · Hold for 30 to 60 seconds. Release.  · Repeat 2 times.  · Switch legs.   · If there is any pain other than stretch in the knee or buttock, stop and contact your healthcare provider.  For your safety, check with your healthcare provider before starting an exercise program.   Date Last Reviewed: 8/16/2015  © 5979-6044 [employer]. [address] [place] [address]. All rights reserved. This information is not intended as a substitute for professional medical care. Always follow your healthcare professional's instructions.        Back Exercises: Knee Lift         To start, lie on your back with your knees bent and feet flat on the floor. Dont press your neck or lower back to the floor. Breathe deeply. You should feel comfortable and relaxed in this position:  · Start by tightening your abdominal muscles.  · Lift one bent knee off the floor 2 to 4 inches.  · Hold for 10 seconds. Return to start position.  · Repeat 3 times.  · Switch legs.  Date Last Reviewed: 8/16/2015 © 2000-2017 [employer]. [address] [place] [address]. All rights reserved. This information is not intended as a substitute for professional medical care. Always follow your healthcare professional's instructions.        Back Exercises: Seated Rotation    To start, sit in a chair with your feet flat on the floor. Shift your weight slightly forward to avoid rounding your back. Relax, and keep your ears, shoulders, and hips aligned:  · Fold your arms and elbows just below shoulder height.  · Turn from the waist with hips forward. Turn your head last. Do not push through the pain.  · Hold for a count of 10 to 30. Return to starting position.  · Repeat 3 to 5 times on one side. Then switch sides.  Date Last Reviewed: 10/11/2015  © 6459-0977 [employer]. [address] [place] [address]. All rights reserved. This information  is not intended as a substitute for professional medical care. Always follow your healthcare professional's instructions.        Back Exercises: Pelvic Tilt    To start, lie on your back with your knees bent and feet flat on the floor. Dont press your neck or lower back to the floor. Breathe deeply. You should feel comfortable and relaxed in this position:  · Tighten your stomach and buttocks, and press your lower back toward the floor. This should be a small, subtle movement. This should not increase your pain.  · Hold for 5 to 15 seconds. Release.  · Repeat 2 to 5 times.  Date Last Reviewed: 10/11/2015  © 0232-7637 [employer]. [address] [place] [address]. All rights reserved. This information is not intended as a substitute for professional medical care. Always follow your healthcare professional's instructions.        Back Exercises: Side Stretch      To start, sit in a chair with your feet flat on the floor. Shift your weight slightly forward to avoid rounding your back. Relax. Keep your ears, shoulders, and hips aligned:  · Stretch your right arm overhead.  · Slowly bend to the left. Dont twist your torso. Stay within your pain limits.  · Hold for 20 seconds. Return to starting position.  · Repeat 2 to 5 times. Then, switch to the other side.  Date Last Reviewed: 10/13/2015  © 8047-0682 [employer]. [address] [place] [address]. All rights reserved. This information is not intended as a substitute for professional medical care. Always follow your healthcare professional's instructions.        Lower Body Exercises: Quad Stretch    This exercise stretches  your lower body to help your back. As you work out, dont greenberg or strain. Stand arms length from a wall. Place one hand on it.  · With your other hand, grasp your ankle on the same side. Pull the heel toward your buttocks. Dont arch your back.  · Hold for 30 to 60 seconds. Repeat 2 times. Switch  legs.  For your safety, check with your healthcare provider before starting an exercise program.   Date Last Reviewed: 8/16/2015  © 7579-6394 [employer]. [address] [place] [address], [place] [address]. All rights reserved. This information is not intended as a substitute for professional medical care. Always follow your healthcare professional's instructions.

## 2019-05-07 NOTE — PROGRESS NOTES
Subjective:       Patient ID: Lavon Brandon is a 86 y.o. male.    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    Hypertension   This is a chronic problem. The current episode started more than 1 year ago. The problem has been waxing and waning since onset. The problem is controlled. Pertinent negatives include no anxiety, malaise/fatigue, neck pain, orthopnea, palpitations, PND or shortness of breath. There are no associated agents to hypertension. Risk factors for coronary artery disease include obesity, male gender and dyslipidemia. Past treatments include ACE inhibitors, calcium channel blockers and diuretics. The current treatment provides mild improvement. Compliance problems include diet and exercise.  Hypertensive end-organ damage includes CAD/MI. Identifiable causes of hypertension include chronic renal disease and sleep apnea.   Back Pain   Pertinent negatives include no fever.   Cough   Associated symptoms include postnasal drip and rhinorrhea. Pertinent negatives include no chills, ear pain, fever, sore throat, shortness of breath or wheezing.     Review of Systems   Constitutional: Negative for chills, fatigue, fever and malaise/fatigue.   HENT: Positive for postnasal drip, rhinorrhea and sneezing. Negative for ear discharge, ear pain, sinus pressure and sore throat.    Respiratory: Negative for cough, shortness of breath and wheezing.    Cardiovascular: Negative for palpitations, orthopnea and PND.   Musculoskeletal: Positive for arthralgias. Negative for back pain and neck pain.   Neurological: Positive for dizziness.       Patient Active Problem List   Diagnosis    Hypertension    Seizure disorder, post CVA 2011    Depression    Hyperlipidemia    Bradyarrhythmia, on amlodipine    Dementia    Constipation - functional    Personal history of colonic polyps    TIA (transient  ischemic attack), 2007, multiple    TBI (traumatic brain injury), [place]    History of CVA in adulthood    Class 2 severe obesity due to excess calories with serious comorbidity and body mass index (BMI) of 35.0 to 35.9 in adult    Dizziness, off balance, since TIA, 2007    CKD (chronic kidney disease) stage 3, GFR 47 ml/min    MARLIN (obstructive sleep apnea), 2008, not on Rx    Abnormal EKG    OA (osteoarthritis)    Abdominal obesity    CKD (chronic kidney disease), stage III    Secondary hyperparathyroidism of renal origin    Acute midline low back pain without sciatica    Thrombocytopenia    Chronic renal disease, stage IV    Anemia of chronic renal failure, stage 4 (severe)    Centrencephalic epilepsy    Chronic arthritis       Objective:      Physical Exam   Constitutional: He appears well-developed.   HENT:   Right Ear: Tympanic membrane and ear canal normal.   Left Ear: Tympanic membrane and ear canal normal.   Nose: Mucosal edema present. Right sinus exhibits maxillary sinus tenderness. Right sinus exhibits no frontal sinus tenderness. Left sinus exhibits maxillary sinus tenderness. Left sinus exhibits no frontal sinus tenderness.   Mouth/Throat: Mucous membranes are normal. Posterior oropharyngeal erythema present. No oropharyngeal exudate, posterior oropharyngeal edema or tonsillar abscesses.   Cardiovascular: Normal rate, regular rhythm and normal heart sounds.   Pulses:       Dorsalis pedis pulses are 3+ on the right side, and 3+ on the left side.        Posterior tibial pulses are 3+ on the right side, and 3+ on the left side.   Pulmonary/Chest: Effort normal and breath sounds normal.   Musculoskeletal:        Lumbar back: He exhibits decreased range of motion, tenderness, bony tenderness, deformity and pain.        Right foot: There is normal range of motion and no deformity.   Feet:   Right Foot:   Protective Sensation: 6 sites tested. 6 sites sensed.   Skin Integrity: Negative for  ulcer.   Left Foot:   Protective Sensation: 6 sites tested. 6 sites sensed.   Skin Integrity: Negative for ulcer or blister.   Neurological: He displays abnormal reflex.   Skin: Skin is warm.   Psychiatric: Judgment and thought content normal. His speech is rapid and/or pressured. Cognition and memory are impaired. He exhibits a depressed mood.   Vitals reviewed.      Lab Results   Component Value Date    WBC 6.02 05/31/2018    HGB 12.9 (L) 05/31/2018    HCT 40.5 05/31/2018     (L) 05/31/2018    CHOL 160 09/07/2016    TRIG 63 09/07/2016    HDL 44 09/07/2016    ALT 8 (L) 01/10/2018    AST 36 01/10/2018     05/31/2018    K 4.9 05/31/2018     05/31/2018    CREATININE 2.7 (H) 05/31/2018    BUN 31 (H) 05/31/2018    CO2 25 05/31/2018    TSH 1.28 02/17/2011    PSA 1.5 01/07/2010    INR 0.9 01/10/2005    HGBA1C 5.8 12/06/2016     The ASCVD Risk score ([place] MADY Jr., et al., 2013) failed to calculate for the following reasons:    The 2013 ASCVD risk score is only valid for ages 40 to 79    Assessment:       1. Class 2 severe obesity due to excess calories with serious comorbidity and body mass index (BMI) of 35.0 to 35.9 in adult    2. Secondary hyperparathyroidism of renal origin    3. Thrombocytopenia    4. Seizure disorder    5. CKD (chronic kidney disease) stage 4, GFR 15-29 ml/min    6. Anemia of chronic renal failure, stage 4 (severe)    7. Centrencephalic epilepsy    8. Chronic arthritis    9. Traumatic brain injury with loss of consciousness, sequela        Plan:       Class 2 severe obesity due to excess calories with serious comorbidity and body mass index (BMI) of 35.0 to 35.9 in adult    Secondary hyperparathyroidism of renal origin  -     Lipid panel; Future; Expected date: 05/06/2019  -     PHOSPHORUS; Future; Expected date: 05/06/2019  -     PTH, intact; Future; Expected date: 05/06/2019  -     Comprehensive metabolic panel; Future; Expected date: 05/06/2019  -     CBC auto differential;  Future; Expected date: 05/06/2019  -     Vitamin D; Future; Expected date: 05/06/2019  -     Magnesium; Future; Expected date: 05/06/2019    Thrombocytopenia  -     Lipid panel; Future; Expected date: 05/06/2019  -     PHOSPHORUS; Future; Expected date: 05/06/2019  -     PTH, intact; Future; Expected date: 05/06/2019  -     Comprehensive metabolic panel; Future; Expected date: 05/06/2019  -     CBC auto differential; Future; Expected date: 05/06/2019  -     Vitamin D; Future; Expected date: 05/06/2019  -     Magnesium; Future; Expected date: 05/06/2019    Seizure disorder    CKD (chronic kidney disease) stage 4, GFR 15-29 ml/min  -     Lipid panel; Future; Expected date: 05/06/2019  -     PHOSPHORUS; Future; Expected date: 05/06/2019  -     PTH, intact; Future; Expected date: 05/06/2019  -     Comprehensive metabolic panel; Future; Expected date: 05/06/2019  -     CBC auto differential; Future; Expected date: 05/06/2019  -     Vitamin D; Future; Expected date: 05/06/2019  -     Magnesium; Future; Expected date: 05/06/2019    Anemia of chronic renal failure, stage 4 (severe)  -     Lipid panel; Future; Expected date: 05/06/2019  -     PHOSPHORUS; Future; Expected date: 05/06/2019  -     PTH, intact; Future; Expected date: 05/06/2019  -     Comprehensive metabolic panel; Future; Expected date: 05/06/2019  -     CBC auto differential; Future; Expected date: 05/06/2019  -     Vitamin D; Future; Expected date: 05/06/2019  -     Magnesium; Future; Expected date: 05/06/2019    Centrencephalic epilepsy    Chronic arthritis    Traumatic brain injury with loss of consciousness, sequela      Patient readiness: acceptance and barriers:readiness and poor sleep habits    During the course of the visit the patient was educated and counseled about the following:     Hypertension:   Dietary sodium restriction.  Obesity:   General weight loss/lifestyle modification strategies discussed (elicit support from others; identify saboteurs;  non-food rewards, etc).    Goals: Hypertension: Reduce Blood Pressure and Obesity: Reduce calorie intake and BMI    Did patient meet goals/outcomes: Yes    The following self management tools provided: blood pressure log  excercise log    Patient Instructions (the written plan) was given to the patient/family.     Time spent with patient: 30 minutes          30-minute visit. 20 minutes spent counseling patient on diet, exercise, and weight loss.  This has been fully explained to the patient, who indicates understanding.

## 2019-05-10 RX ORDER — BENAZEPRIL HYDROCHLORIDE 10 MG/1
TABLET ORAL
Qty: 180 TABLET | Refills: 4 | Status: SHIPPED | OUTPATIENT
Start: 2019-05-10 | End: 2020-08-24

## 2019-09-05 LAB
25(OH)D3 SERPL-MCNC: 18 NG/ML (ref 30–100)
ALBUMIN SERPL-MCNC: 3.8 G/DL (ref 3.6–5.1)
ALBUMIN/GLOB SERPL: 1.5 (CALC) (ref 1–2.5)
ALP SERPL-CCNC: 54 U/L (ref 40–115)
ALT SERPL-CCNC: 7 U/L (ref 9–46)
AST SERPL-CCNC: 16 U/L (ref 10–35)
BASOPHILS # BLD AUTO: 29 CELLS/UL (ref 0–200)
BASOPHILS NFR BLD AUTO: 0.5 %
BILIRUB SERPL-MCNC: 0.5 MG/DL (ref 0.2–1.2)
BUN SERPL-MCNC: 30 MG/DL (ref 7–25)
BUN/CREAT SERPL: 12 (CALC) (ref 6–22)
CALCIUM SERPL-MCNC: 9.2 MG/DL (ref 8.6–10.3)
CHLORIDE SERPL-SCNC: 104 MMOL/L (ref 98–110)
CHOLEST SERPL-MCNC: 176 MG/DL
CHOLEST/HDLC SERPL: 3.1 (CALC)
CO2 SERPL-SCNC: 31 MMOL/L (ref 20–32)
CREAT SERPL-MCNC: 2.6 MG/DL (ref 0.7–1.11)
EOSINOPHIL # BLD AUTO: 171 CELLS/UL (ref 15–500)
EOSINOPHIL NFR BLD AUTO: 3 %
ERYTHROCYTE [DISTWIDTH] IN BLOOD BY AUTOMATED COUNT: 14.4 % (ref 11–15)
GFRSERPLBLD MDRD-ARVRAT: 21 ML/MIN/1.73M2
GLOBULIN SER CALC-MCNC: 2.5 G/DL (CALC) (ref 1.9–3.7)
GLUCOSE SERPL-MCNC: 90 MG/DL (ref 65–99)
HCT VFR BLD AUTO: 37.5 % (ref 38.5–50)
HDLC SERPL-MCNC: 56 MG/DL
HGB BLD-MCNC: 12.5 G/DL (ref 13.2–17.1)
LDLC SERPL CALC-MCNC: 107 MG/DL (CALC)
LYMPHOCYTES # BLD AUTO: 2314 CELLS/UL (ref 850–3900)
LYMPHOCYTES NFR BLD AUTO: 40.6 %
MAGNESIUM SERPL-MCNC: 2.4 MG/DL (ref 1.5–2.5)
MCH RBC QN AUTO: 29.1 PG (ref 27–33)
MCHC RBC AUTO-ENTMCNC: 33.3 G/DL (ref 32–36)
MCV RBC AUTO: 87.4 FL (ref 80–100)
MONOCYTES # BLD AUTO: 502 CELLS/UL (ref 200–950)
MONOCYTES NFR BLD AUTO: 8.8 %
NEUTROPHILS # BLD AUTO: 2685 CELLS/UL (ref 1500–7800)
NEUTROPHILS NFR BLD AUTO: 47.1 %
NONHDLC SERPL-MCNC: 120 MG/DL (CALC)
PHOSPHATE SERPL-MCNC: 3.3 MG/DL (ref 2.1–4.3)
PLATELET # BLD AUTO: 139 THOUSAND/UL (ref 140–400)
PMV BLD REES-ECKER: 11.1 FL (ref 7.5–12.5)
POTASSIUM SERPL-SCNC: 4.6 MMOL/L (ref 3.5–5.3)
PROT SERPL-MCNC: 6.3 G/DL (ref 6.1–8.1)
PTH-INTACT SERPL-MCNC: 163 PG/ML (ref 14–64)
RBC # BLD AUTO: 4.29 MILLION/UL (ref 4.2–5.8)
SODIUM SERPL-SCNC: 142 MMOL/L (ref 135–146)
TRIGL SERPL-MCNC: 51 MG/DL
WBC # BLD AUTO: 5.7 THOUSAND/UL (ref 3.8–10.8)

## 2019-09-06 ENCOUNTER — OFFICE VISIT (OUTPATIENT)
Dept: FAMILY MEDICINE | Facility: CLINIC | Age: 84
End: 2019-09-06
Payer: MEDICARE

## 2019-09-06 VITALS
OXYGEN SATURATION: 95 % | BODY MASS INDEX: 30.53 KG/M2 | TEMPERATURE: 98 F | RESPIRATION RATE: 16 BRPM | WEIGHT: 218.06 LBS | DIASTOLIC BLOOD PRESSURE: 68 MMHG | SYSTOLIC BLOOD PRESSURE: 132 MMHG | HEART RATE: 60 BPM | HEIGHT: 71 IN

## 2019-09-06 DIAGNOSIS — E66.9 OBESITY (BMI 30.0-34.9): ICD-10-CM

## 2019-09-06 DIAGNOSIS — G40.909 SEIZURE DISORDER: ICD-10-CM

## 2019-09-06 DIAGNOSIS — I10 ESSENTIAL HYPERTENSION: Primary | ICD-10-CM

## 2019-09-06 DIAGNOSIS — N18.4 CKD (CHRONIC KIDNEY DISEASE) STAGE 4, GFR 15-29 ML/MIN: ICD-10-CM

## 2019-09-06 PROCEDURE — 99999 PR PBB SHADOW E&M-EST. PATIENT-LVL IV: CPT | Mod: PBBFAC,,, | Performed by: NURSE PRACTITIONER

## 2019-09-06 PROCEDURE — 99999 PR PBB SHADOW E&M-EST. PATIENT-LVL IV: ICD-10-PCS | Mod: PBBFAC,,, | Performed by: NURSE PRACTITIONER

## 2019-09-06 PROCEDURE — 99214 OFFICE O/P EST MOD 30 MIN: CPT | Mod: S$GLB,,, | Performed by: NURSE PRACTITIONER

## 2019-09-06 PROCEDURE — 99214 PR OFFICE/OUTPT VISIT, EST, LEVL IV, 30-39 MIN: ICD-10-PCS | Mod: S$GLB,,, | Performed by: NURSE PRACTITIONER

## 2019-09-06 PROCEDURE — 1101F PT FALLS ASSESS-DOCD LE1/YR: CPT | Mod: CPTII,S$GLB,, | Performed by: NURSE PRACTITIONER

## 2019-09-06 PROCEDURE — 1101F PR PT FALLS ASSESS DOC 0-1 FALLS W/OUT INJ PAST YR: ICD-10-PCS | Mod: CPTII,S$GLB,, | Performed by: NURSE PRACTITIONER

## 2019-09-06 NOTE — PROGRESS NOTES
Subjective:       Patient ID: Lavon Brandon is a 86 y.o. male.    Chief Complaint: Follow-up (4 month HTN)    Hypertension   This is a chronic problem. The current episode started more than 1 year ago. The problem is controlled. Associated symptoms include peripheral edema. Pertinent negatives include no blurred vision, chest pain, headaches, palpitations or shortness of breath. There are no associated agents to hypertension. Risk factors for coronary artery disease include male gender and obesity.       Past Medical History:   Diagnosis Date    Bradyarrhythmia     CVA (cerebral infarction) 2006    Dementia     Depression     Hyperlipidemia     Hypertension     renal htn    Pulmonary embolism 2002    Seizure disorder        Review of patient's allergies indicates:   Allergen Reactions    Ciprofloxacin (bulk) Swelling         Current Outpatient Medications:     amLODIPine (NORVASC) 5 MG tablet, TAKE ONE TABLET BY MOUTH ONCE DAILY, Disp: 90 tablet, Rfl: 3    benazepril (LOTENSIN) 10 MG tablet, TAKE 1 TABLET BY MOUTH TWICE DAILY, Disp: 180 tablet, Rfl: 4    cholecalciferol, vitamin D3, (VITAMIN D3) 2,000 unit Cap, Take 1 capsule by mouth once daily., Disp: , Rfl:     citalopram (CELEXA) 20 MG tablet, TAKE 1 TABLET BY MOUTH ONCE DAILY, Disp: 90 tablet, Rfl: 1    furosemide (LASIX) 40 MG tablet, TAKE 1 TABLET BY MOUTH TWICE DAILY, Disp: 180 tablet, Rfl: 1    levETIRAcetam (KEPPRA) 500 MG Tab, Take 1 tablet (500 mg total) by mouth 2 (two) times daily., Disp: 60 tablet, Rfl: 6    Review of Systems   Constitutional: Negative for unexpected weight change.   HENT: Negative for trouble swallowing.    Eyes: Negative for blurred vision and visual disturbance.   Respiratory: Negative for shortness of breath.    Cardiovascular: Negative for chest pain, palpitations and leg swelling.   Gastrointestinal: Negative for blood in stool.   Genitourinary: Negative for hematuria.   Skin: Negative for rash.  "  Allergic/Immunologic: Negative for immunocompromised state.   Neurological: Negative for headaches.   Hematological: Does not bruise/bleed easily.   Psychiatric/Behavioral: Negative for agitation. The patient is not nervous/anxious.        Objective:      /68 (BP Location: Right arm, Patient Position: Sitting, BP Method: Small (Manual))   Pulse 60   Temp 98.3 °F (36.8 °C) (Oral)   Resp 16   Ht 5' 11" (1.803 m)   Wt 98.9 kg (218 lb 0.6 oz)   SpO2 95%   BMI 30.41 kg/m²   Physical Exam   Constitutional: He is oriented to person, place, and time. He appears well-developed and well-nourished.   Eyes: Pupils are equal, round, and reactive to light. Conjunctivae and EOM are normal.   Neck: Normal range of motion. Neck supple.   Cardiovascular: Normal rate, regular rhythm, normal heart sounds and intact distal pulses.   +3 edema in feet   Pulmonary/Chest: Effort normal and breath sounds normal.   Abdominal: Soft. Bowel sounds are normal.   Musculoskeletal: Normal range of motion.   Neurological: He is alert and oriented to person, place, and time.   Skin: Skin is warm and dry.   Psychiatric: He has a normal mood and affect. His behavior is normal. Judgment and thought content normal.       Assessment:       1. Essential hypertension    2. CKD (chronic kidney disease) stage 4, GFR 15-29 ml/min    3. Seizure disorder    4. Obesity (BMI 30.0-34.9)        Plan:       Essential hypertension  Controlled, continue medication  Low sodium diet  BP Readings from Last 3 Encounters:   09/06/19 132/68   05/06/19 123/63   11/12/18 (!) 129/57     CKD (chronic kidney disease) stage 4, GFR 15-29 ml/min  Stable, followed by Nephrology-    Seizure disorder  Stable, continue medication    Obesity (BMI 30.0-34.9)  Counseled patient on his ideal body weight, health consequences of being obese and current recommendations including weekly exercise and a heart healthy diet.  Current BMI is:Estimated body mass index is 30.41 " "kg/m² as calculated from the following:    Height as of this encounter: 5' 11" (1.803 m).    Weight as of this encounter: 98.9 kg (218 lb 0.6 oz)..  Patient is aware that ideal BMI < 25 or Weight in (lb) to have BMI = 25: 178.9.            Patient readiness: acceptance and barriers:none    During the course of the visit the patient was educated and counseled about the following:     Hypertension:   Dietary sodium restriction.  Regular aerobic exercise.  Obesity:   General weight loss/lifestyle modification strategies discussed (elicit support from others; identify saboteurs; non-food rewards, etc).  Regular aerobic exercise program discussed.    Goals: Hypertension: Reduce Blood Pressure and Obesity: Reduce calorie intake and BMI    Did patient meet goals/outcomes: No    The following self management tools provided: declined    Patient Instructions (the written plan) was given to the patient/family.     Time spent with patient: 30 minutes    Barriers to medications present (no )    Adverse reactions to current medications (no)    Over the counter medications reviewed (Yes)        "

## 2019-09-06 NOTE — PATIENT INSTRUCTIONS
Controlling High Blood Pressure  High blood pressure (hypertension) is often called the silent killer. This is because many people who have it dont know it. High blood pressure is defined as 140/90 mm Hg or higher. Know your blood pressure and remember to check it regularly. Doing so can save your life. Here are some things you can do to help control your blood pressure.    Choose heart-healthy foods  · Select low-salt, low-fat foods. Limit sodium intake to 2,400 mg per day or the amount suggested by your healthcare provider.  · Limit canned, dried, cured, packaged, and fast foods. These can contain a lot of salt.  · Eat 8 to 10 servings of fruits and vegetables every day.  · Choose lean meats, fish, or chicken.  · Eat whole-grain pasta, brown rice, and beans.  · Eat 2 to 3 servings of low-fat or fat-free dairy products.  · Ask your doctor about the DASH eating plan. This plan helps reduce blood pressure.  · When you go to a restaurant, ask that your meal be prepared with no added salt.  Maintain a healthy weight  · Ask your healthcare provider how many calories to eat a day. Then stick to that number.  · Ask your healthcare provider what weight range is healthiest for you. If you are overweight, a weight loss of only 3% to 5% of your body weight can help lower blood pressure. Generally, a good weight loss goal is to lose 10% of your body weight in a year.  · Limit snacks and sweets.  · Get regular exercise.  Get up and get active  · Choose activities you enjoy. Find ones you can do with friends or family. This includes bicycling, dancing, walking, and jogging.  · Park farther away from building entrances.  · Use stairs instead of the elevator.  · When you can, walk or bike instead of driving.  · [place] leaves, garden, or do household repairs.  · Be active at a moderate to vigorous level of physical activity for at least 40 minutes for a minimum of 3 to 4 days a week.   Manage stress  · Make time to relax and enjoy  life. Find time to laugh.  · Communicate your concerns with your loved ones and your healthcare provider.  · Visit with family and friends, and keep up with hobbies.  Limit alcohol and quit smoking  · Men should have no more than 2 drinks per day.  · Women should have no more than 1 drink per day.  · Talk with your healthcare provider about quitting smoking. Smoking significantly increases your risk for heart disease and stroke. Ask your healthcare provider about community smoking cessation programs and other options.  Medicines  If lifestyle changes arent enough, your healthcare provider may prescribe high blood pressure medicine. Take all medicines as prescribed. If you have any questions about your medicines, ask your healthcare provider before stopping or changing them.   Date Last Reviewed: 4/27/2016 © 2000-2017 [employer]. [address] [place] [address], [place] [address]. All rights reserved. This information is not intended as a substitute for professional medical care. Always follow your healthcare professional's instructions.        What is High Blood Pressure?  High blood pressure (also called hypertension) is known as the silent killer. This is because most of the time it doesnt cause symptoms. In fact, many people dont know they have it until other problems develop. In most cases, high blood pressure cant be cured. Its a disease that often requires lifelong treatment. The good news is that it can be managed.  Understanding blood pressure  The circulatory system is made up of the heart and blood vessels that carry blood through the body. Your heart is the pump for this system. With each heartbeat (contraction), the heart sends blood out through large blood vessels called arteries. Blood pressure is a measure of how hard the moving blood pushes against the walls of the arteries.  High blood pressure can harm your health  In a healthy blood vessel, the blood moves smoothly through the vessel  "and puts normal pressure on the vessel walls.       High blood pressure occurs when blood pushes too hard against artery walls. This causes damage to the artery walls and then the formation of scar tissue as it heals. This makes the arteries stiff and weak. Plaque sticks to the scarred tissue narrowing and hardening the arteries. High blood pressure also causes your heart to work harder to get blood out to the body. High blood pressure raises your risk of heart attack, also known as acute myocardial infarction, or AMI, and stroke. It can also lead to kidney disease, and blindness.  Measuring blood pressure  An example of a blood pressure measurement is 120/70 (120 over 70). The top number is the pressure of blood against the artery walls during a heartbeat (systolic). The bottom number is the pressure of blood against artery walls between heartbeats (diastolic). Talk with your healthcare provider to find out what your blood pressure goals should be.   Controlling blood pressure  If your blood pressure is too high, work with your doctor on a plan for lowering it. Below are steps you can take that will help lower your blood pressure.  · Choose heart-healthy foods. Eating healthier meals helps you control your blood pressure. Ask your doctor about the DASH eating plan. This plan helps reduce blood pressure by limiting the amount of sodium (salt) you have in your diet. DASH also encourages eating plenty of fruits and vegetables, low-fat or non-fat dairy, whole-grains, and foods high in fiber, and low in fat.  · Reduce sodium. Reducing sodium in your diet reduces fluid retention. Fluid retention caused by too much salt increases blood volume and blood pressure. The American Heart Associations (AHA) "ideal" sodium intake recommendation is 1,500 milligrams per day.  However, since American's eat so much salt, the AHA says a positive change can occur by cutting back to even 2,400 milligrams of sodium a day.  · Maintain a " healthy weight. Being overweight makes you more likely to have high blood pressure. Losing excess weight helps lower blood pressure.  · Exercise regularly. Daily exercise helps your heart and blood vessels work better and stay healthier. It can help lower your blood pressure.  · Stop smoking. Smoking increases blood pressure and damages blood vessels.  · Limit alcohol. Drinking too much alcohol can raise blood pressure. Men should have no more than 2 drinks a day. Women should have no more than 1. (A drink is equal to 1 beer, or a small glass of wine, or a shot of liquor.)  · Control stress. Stress makes your heart work harder and beat faster. Controlling stress helps you control your blood pressure.  Facts about high blood pressure  · Feeling OK does not mean that blood pressure is under control. Likewise, feeling bad doesnt mean its out of control. The only way to know for sure is to check your pressure regularly.  · Medicine is only one part of controlling high blood pressure. You also need to manage your weight, get regular exercise, and adjust your eating habits.  · High blood pressure is usually a lifelong problem. But it can be controlled with healthy lifestyle changes and medicine.  · Hypertension is not the same as stress. Although stress may be a factor in high blood pressure, its only one part of the story.  · Blood pressure medicines need to be taken every day. Stopping suddenly may cause a dangerous increase in pressure.   Date Last Reviewed: 4/27/2016  © 8443-0021 [employer]. [address] [place] [address], [place] [address]. All rights reserved. This information is not intended as a substitute for professional medical care. Always follow your healthcare professional's instructions.

## 2019-09-10 NOTE — PROGRESS NOTES
I note some minor lab abnormalities that have been stable over time, these are of doubtful clinical significance. The current medical regimen is effective;  continue present plan and medications.

## 2019-09-20 RX ORDER — AMLODIPINE BESYLATE 5 MG/1
TABLET ORAL
Qty: 90 TABLET | Refills: 3 | Status: SHIPPED | OUTPATIENT
Start: 2019-09-20 | End: 2020-03-19

## 2019-09-20 RX ORDER — CITALOPRAM 20 MG/1
TABLET, FILM COATED ORAL
Qty: 90 TABLET | Refills: 1 | Status: SHIPPED | OUTPATIENT
Start: 2019-09-20 | End: 2020-03-24 | Stop reason: SDUPTHER

## 2019-10-25 RX ORDER — LEVETIRACETAM 500 MG/1
TABLET ORAL
Qty: 60 TABLET | Refills: 6 | Status: SHIPPED | OUTPATIENT
Start: 2019-10-25 | End: 2020-05-23

## 2020-03-18 RX ORDER — FUROSEMIDE 40 MG/1
TABLET ORAL
Refills: 0 | OUTPATIENT
Start: 2020-03-18

## 2020-03-18 RX ORDER — FUROSEMIDE 40 MG/1
TABLET ORAL
Qty: 30 TABLET | Refills: 3 | Status: SHIPPED | OUTPATIENT
Start: 2020-03-18 | End: 2020-05-07

## 2020-03-19 RX ORDER — AMLODIPINE BESYLATE 5 MG/1
TABLET ORAL
Qty: 90 TABLET | Refills: 1 | Status: SHIPPED | OUTPATIENT
Start: 2020-03-19 | End: 2020-09-06

## 2020-03-24 RX ORDER — CITALOPRAM 20 MG/1
20 TABLET, FILM COATED ORAL DAILY
Qty: 90 TABLET | Refills: 3 | Status: SHIPPED | OUTPATIENT
Start: 2020-03-24 | End: 2021-01-03 | Stop reason: SDUPTHER

## 2020-05-07 ENCOUNTER — OFFICE VISIT (OUTPATIENT)
Dept: FAMILY MEDICINE | Facility: CLINIC | Age: 85
End: 2020-05-07
Payer: MEDICARE

## 2020-05-07 VITALS
SYSTOLIC BLOOD PRESSURE: 110 MMHG | HEIGHT: 69 IN | DIASTOLIC BLOOD PRESSURE: 60 MMHG | OXYGEN SATURATION: 97 % | WEIGHT: 213.88 LBS | HEART RATE: 63 BPM | BODY MASS INDEX: 31.68 KG/M2 | TEMPERATURE: 99 F

## 2020-05-07 DIAGNOSIS — S06.9X9S TRAUMATIC BRAIN INJURY WITH LOSS OF CONSCIOUSNESS, SEQUELA: ICD-10-CM

## 2020-05-07 DIAGNOSIS — E66.01 CLASS 2 SEVERE OBESITY DUE TO EXCESS CALORIES WITH SERIOUS COMORBIDITY AND BODY MASS INDEX (BMI) OF 35.0 TO 35.9 IN ADULT: ICD-10-CM

## 2020-05-07 DIAGNOSIS — M54.50 ACUTE MIDLINE LOW BACK PAIN WITHOUT SCIATICA: Primary | ICD-10-CM

## 2020-05-07 DIAGNOSIS — Z51.89 ENCOUNTER FOR PATIENT COMPLIANCE MONITORING IN DRUG TREATMENT PROGRAM: ICD-10-CM

## 2020-05-07 DIAGNOSIS — D69.6 THROMBOCYTOPENIA: ICD-10-CM

## 2020-05-07 DIAGNOSIS — G40.909 SEIZURE DISORDER: ICD-10-CM

## 2020-05-07 DIAGNOSIS — N18.4 CKD (CHRONIC KIDNEY DISEASE) STAGE 4, GFR 15-29 ML/MIN: ICD-10-CM

## 2020-05-07 DIAGNOSIS — F03.91 DEMENTIA WITH BEHAVIORAL DISTURBANCE, UNSPECIFIED DEMENTIA TYPE: ICD-10-CM

## 2020-05-07 DIAGNOSIS — N25.81 SECONDARY HYPERPARATHYROIDISM OF RENAL ORIGIN: ICD-10-CM

## 2020-05-07 PROCEDURE — 1126F PR PAIN SEVERITY QUANTIFIED, NO PAIN PRESENT: ICD-10-PCS | Mod: S$GLB,,, | Performed by: FAMILY MEDICINE

## 2020-05-07 PROCEDURE — 1126F AMNT PAIN NOTED NONE PRSNT: CPT | Mod: S$GLB,,, | Performed by: FAMILY MEDICINE

## 2020-05-07 PROCEDURE — 99999 PR PBB SHADOW E&M-EST. PATIENT-LVL III: ICD-10-PCS | Mod: PBBFAC,,, | Performed by: FAMILY MEDICINE

## 2020-05-07 PROCEDURE — 99214 OFFICE O/P EST MOD 30 MIN: CPT | Mod: S$GLB,,, | Performed by: FAMILY MEDICINE

## 2020-05-07 PROCEDURE — 1159F PR MEDICATION LIST DOCUMENTED IN MEDICAL RECORD: ICD-10-PCS | Mod: S$GLB,,, | Performed by: FAMILY MEDICINE

## 2020-05-07 PROCEDURE — 1101F PT FALLS ASSESS-DOCD LE1/YR: CPT | Mod: CPTII,S$GLB,, | Performed by: FAMILY MEDICINE

## 2020-05-07 PROCEDURE — 99999 PR PBB SHADOW E&M-EST. PATIENT-LVL III: CPT | Mod: PBBFAC,,, | Performed by: FAMILY MEDICINE

## 2020-05-07 PROCEDURE — 1159F MED LIST DOCD IN RCRD: CPT | Mod: S$GLB,,, | Performed by: FAMILY MEDICINE

## 2020-05-07 PROCEDURE — 99214 PR OFFICE/OUTPT VISIT, EST, LEVL IV, 30-39 MIN: ICD-10-PCS | Mod: S$GLB,,, | Performed by: FAMILY MEDICINE

## 2020-05-07 PROCEDURE — 1101F PR PT FALLS ASSESS DOC 0-1 FALLS W/OUT INJ PAST YR: ICD-10-PCS | Mod: CPTII,S$GLB,, | Performed by: FAMILY MEDICINE

## 2020-05-07 RX ORDER — FUROSEMIDE 40 MG/1
40 TABLET ORAL DAILY
Qty: 30 TABLET | Refills: 3 | Status: SHIPPED | OUTPATIENT
Start: 2020-05-07 | End: 2020-08-12 | Stop reason: SDUPTHER

## 2020-05-07 NOTE — PATIENT INSTRUCTIONS

## 2020-05-07 NOTE — PROGRESS NOTES
Subjective:       Patient ID: Lavon Brandon is a 87 y.o. male.    Chief Complaint: Hypertension    Hypertension   This is a chronic problem. The current episode started more than 1 year ago. The problem has been resolved since onset. The problem is controlled. Associated symptoms include anxiety, malaise/fatigue, neck pain, peripheral edema and shortness of breath. Pertinent negatives include no blurred vision, chest pain, headaches or palpitations. There are no associated agents to hypertension. Risk factors for coronary artery disease include sedentary lifestyle, male gender, obesity, stress and dyslipidemia. Past treatments include ACE inhibitors and diuretics. The current treatment provides significant improvement. Compliance problems include exercise and diet.  Hypertensive end-organ damage includes CAD/MI. Identifiable causes of hypertension include chronic renal disease and sleep apnea.     Review of Systems   Constitutional: Positive for malaise/fatigue. Negative for fatigue and unexpected weight change.   Eyes: Negative for blurred vision.   Respiratory: Positive for shortness of breath. Negative for chest tightness.    Cardiovascular: Negative for chest pain, palpitations and leg swelling.   Gastrointestinal: Negative for abdominal pain.   Musculoskeletal: Positive for neck pain. Negative for arthralgias.   Neurological: Negative for dizziness, syncope, light-headedness and headaches.       Patient Active Problem List   Diagnosis    Hypertension    Seizure disorder, post CVA 2011    Depression    Hyperlipidemia    Bradyarrhythmia, on amlodipine    Dementia    Constipation - functional    Personal history of colonic polyps    TBI (traumatic brain injury), [place] war    History of CVA in adulthood    Class 2 severe obesity due to excess calories with serious comorbidity and body mass index (BMI) of 35.0 to 35.9 in adult    Dizziness, off balance, since TIA, 2007    CKD (chronic kidney  disease) stage 3, GFR 47 ml/min    MARLIN (obstructive sleep apnea), 2008, not on Rx    Abnormal EKG    OA (osteoarthritis)    Abdominal obesity    CKD (chronic kidney disease), stage III    Secondary hyperparathyroidism of renal origin    Acute midline low back pain without sciatica    Thrombocytopenia    Chronic renal disease, stage IV    Anemia of chronic renal failure, stage 4 (severe)    Centrencephalic epilepsy    Chronic arthritis       Objective:      Physical Exam    Lab Results   Component Value Date    WBC 5.7 09/04/2019    HGB 12.5 (L) 09/04/2019    HCT 37.5 (L) 09/04/2019     (L) 09/04/2019    CHOL 176 09/04/2019    TRIG 51 09/04/2019    HDL 56 09/04/2019    ALT 7 (L) 09/04/2019    AST 16 09/04/2019     09/04/2019    K 4.6 09/04/2019     09/04/2019    CREATININE 2.60 (H) 09/04/2019    BUN 30 (H) 09/04/2019    CO2 31 09/04/2019    TSH 1.28 02/17/2011    PSA 1.5 01/07/2010    INR 0.9 01/10/2005    HGBA1C 5.8 12/06/2016     The ASCVD Risk score ([place] MADY Jr., et al., 2013) failed to calculate for the following reasons:    The 2013 ASCVD risk score is only valid for ages 40 to 79    Assessment:       1. Acute midline low back pain without sciatica    2. Class 2 severe obesity due to excess calories with serious comorbidity and body mass index (BMI) of 35.0 to 35.9 in adult    3. Secondary hyperparathyroidism of renal origin    4. Thrombocytopenia    5. Seizure disorder    6. CKD (chronic kidney disease) stage 4, GFR 15-29 ml/min    7. Traumatic brain injury with loss of consciousness, sequela    8. Encounter for patient compliance monitoring in drug treatment program    9. Dementia with behavioral disturbance, unspecified dementia type         Plan:       Acute midline low back pain without sciatica    Class 2 severe obesity due to excess calories with serious comorbidity and body mass index (BMI) of 35.0 to 35.9 in adult    Secondary hyperparathyroidism of renal  origin    Thrombocytopenia    Seizure disorder    CKD (chronic kidney disease) stage 4, GFR 15-29 ml/min  -     Cancel: Magnesium; Future; Expected date: 05/07/2020  -     Cancel: Iron and TIBC; Future; Expected date: 05/07/2020  -     Cancel: Calcium, Ionized; Future; Expected date: 05/07/2020  -     Cancel: Folate; Future; Expected date: 05/07/2020  -     Cancel: CBC auto differential; Future; Expected date: 05/07/2020  -     CBC auto differential; Future; Expected date: 05/07/2020  -     Folate; Future; Expected date: 05/07/2020  -     Calcium, Ionized; Future; Expected date: 05/07/2020  -     Iron and TIBC; Future; Expected date: 05/07/2020  -     Magnesium; Future; Expected date: 05/07/2020    Traumatic brain injury with loss of consciousness, sequela    Encounter for patient compliance monitoring in drug treatment program  -     Cancel: Levetiracetam level; Future; Expected date: 05/07/2020  -     Levetiracetam level; Future; Expected date: 05/07/2020    Dementia with behavioral disturbance, unspecified dementia type   -     Cancel: Folate; Future; Expected date: 05/07/2020  -     Folate; Future; Expected date: 05/07/2020    Other orders  -     furosemide (LASIX) 40 MG tablet; Take 1 tablet (40 mg total) by mouth once daily.  Dispense: 30 tablet; Refill: 3    The patient is asked to make an attempt to improve diet and exercise patterns to aid in medical management of this problem.  The current medical regimen is effective;  continue present plan and medications.  Stable and controlled. Continue current treatment plan as previously prescribed with your PCP.       Patient readiness: acceptance and barriers:readiness, social stressors, occupational issues and poor sleep habits    During the course of the visit the patient was educated and counseled about the following:     Discussed health maintenance guidelines appropriate for age.

## 2020-05-23 RX ORDER — LEVETIRACETAM 500 MG/1
TABLET ORAL
Qty: 60 TABLET | Refills: 3 | Status: SHIPPED | OUTPATIENT
Start: 2020-05-23 | End: 2020-09-23

## 2020-08-12 NOTE — TELEPHONE ENCOUNTER
Received fax from [facility] requesting refill of Furosemide.  Please advise.     LR--5-7-2020  LOV--5-7-2020   FOV-- 11-9-2020

## 2020-08-13 RX ORDER — FUROSEMIDE 40 MG/1
40 TABLET ORAL DAILY
Qty: 30 TABLET | Refills: 11 | Status: SHIPPED | OUTPATIENT
Start: 2020-08-13 | End: 2020-09-10 | Stop reason: SDUPTHER

## 2020-08-24 RX ORDER — BENAZEPRIL HYDROCHLORIDE 10 MG/1
TABLET ORAL
Qty: 180 TABLET | Refills: 1 | Status: SHIPPED | OUTPATIENT
Start: 2020-08-24 | End: 2021-10-03 | Stop reason: SDUPTHER

## 2020-09-10 ENCOUNTER — TELEPHONE (OUTPATIENT)
Dept: FAMILY MEDICINE | Facility: CLINIC | Age: 85
End: 2020-09-10

## 2020-09-10 RX ORDER — FUROSEMIDE 40 MG/1
40 TABLET ORAL DAILY
Qty: 30 TABLET | Refills: 11 | Status: SHIPPED | OUTPATIENT
Start: 2020-09-10 | End: 2021-06-01 | Stop reason: SDUPTHER

## 2020-09-11 NOTE — TELEPHONE ENCOUNTER
----- Message from Flakito Herr sent at 9/10/2020  1:05 PM CDT -----  Type:  RX Refill Request    Who Called:  Patient  Refill or New Rx:  Refill  RX Name and Strength:  furosemide (LASIX) 40 MG tablet  How is the patient currently taking it? (ex. 1XDay):  As ordered  Is this a 30 day or 90 day RX:  90  Preferred Pharmacy with phone number:    [id number] - [place], [place] - [address] [address]  Phone: [phone] Fax: [phone]  Local or Mail Order:  Local  Ordering Provider:  Dr. Dallas Roberts Call Back Number:  [phone]  Additional Information:  NA

## 2020-09-23 DIAGNOSIS — G40.909 SEIZURE DISORDER: Primary | ICD-10-CM

## 2020-09-23 RX ORDER — LEVETIRACETAM 500 MG/1
500 TABLET ORAL 2 TIMES DAILY
Qty: 60 TABLET | Refills: 6 | Status: SHIPPED | OUTPATIENT
Start: 2020-09-23 | End: 2021-04-05 | Stop reason: SDUPTHER

## 2020-09-23 NOTE — TELEPHONE ENCOUNTER
----- Message from Ricarda Carrizales sent at 9/23/2020  1:28 PM CDT -----  Type: Needs Medical Advice  Who Called: Patient's wife  [facility]  Best Call Back Number: [phone] (home)   Additional Information: The wife is calling for the Rx      levETIRAcetam (KEPPRA) 500 MG Tab to be sent to the pharmacy today please

## 2020-09-24 LAB
BASOPHILS # BLD AUTO: 30 CELLS/UL (ref 0–200)
BASOPHILS NFR BLD AUTO: 0.5 %
CA-I SERPL-MCNC: 4.7 MG/DL (ref 4.8–5.6)
EOSINOPHIL # BLD AUTO: 168 CELLS/UL (ref 15–500)
EOSINOPHIL NFR BLD AUTO: 2.8 %
ERYTHROCYTE [DISTWIDTH] IN BLOOD BY AUTOMATED COUNT: 13.7 % (ref 11–15)
FOLATE SERPL-MCNC: 11.8 NG/ML
HCT VFR BLD AUTO: 36.3 % (ref 38.5–50)
HGB BLD-MCNC: 12.3 G/DL (ref 13.2–17.1)
IRON SATN MFR SERPL: 46 % (CALC) (ref 20–48)
IRON SERPL-MCNC: 102 MCG/DL (ref 50–180)
LEVETIRACETAM SERPL-MCNC: 28.7 MCG/ML
LYMPHOCYTES # BLD AUTO: 2178 CELLS/UL (ref 850–3900)
LYMPHOCYTES NFR BLD AUTO: 36.3 %
MAGNESIUM SERPL-MCNC: 2.3 MG/DL (ref 1.5–2.5)
MCH RBC QN AUTO: 29.1 PG (ref 27–33)
MCHC RBC AUTO-ENTMCNC: 33.9 G/DL (ref 32–36)
MCV RBC AUTO: 86 FL (ref 80–100)
MONOCYTES # BLD AUTO: 510 CELLS/UL (ref 200–950)
MONOCYTES NFR BLD AUTO: 8.5 %
NEUTROPHILS # BLD AUTO: 3114 CELLS/UL (ref 1500–7800)
NEUTROPHILS NFR BLD AUTO: 51.9 %
PLATELET # BLD AUTO: 145 THOUSAND/UL (ref 140–400)
PMV BLD REES-ECKER: 11.2 FL (ref 7.5–12.5)
RBC # BLD AUTO: 4.22 MILLION/UL (ref 4.2–5.8)
TIBC SERPL-MCNC: 220 MCG/DL (CALC) (ref 250–425)
WBC # BLD AUTO: 6 THOUSAND/UL (ref 3.8–10.8)

## 2020-09-28 ENCOUNTER — TELEPHONE (OUTPATIENT)
Dept: FAMILY MEDICINE | Facility: CLINIC | Age: 85
End: 2020-09-28

## 2020-09-28 NOTE — TELEPHONE ENCOUNTER
Spoke to patient spouse regarding Lasix. Rx was filled @ [facility] & not [facility]. Patient wants medication @[facility] & will  there. No further questions nor concerns

## 2020-09-28 NOTE — TELEPHONE ENCOUNTER
----- Message from Radha Melton sent at 9/28/2020 12:26 PM CDT -----  Contact: wife, Caryn Brandon  Type: Needs Medical Advice  Who Called:  wifeCaryn  Symptoms (please be specific):    How long has patient had these symptoms:    Pharmacy name and phone #:    [id number] - [place], [place] - [address] [address]  Phone: [phone] Fax: [phone]  Best Call Back Number: [phone] (home)   Additional Information: Patient's wife states the prescription was canceled at [facility], which is where he fills his prescription. Caryn is asking if the doctor wanted patient to stop taking the medication. Please call patient. Thanks!

## 2021-03-01 RX ORDER — AMLODIPINE BESYLATE 5 MG/1
TABLET ORAL
Qty: 90 TABLET | Refills: 3 | Status: SHIPPED | OUTPATIENT
Start: 2021-03-01 | End: 2021-06-01 | Stop reason: SDUPTHER

## 2021-04-05 DIAGNOSIS — G40.909 SEIZURE DISORDER: ICD-10-CM

## 2021-04-05 RX ORDER — LEVETIRACETAM 500 MG/1
500 TABLET ORAL 2 TIMES DAILY
Qty: 60 TABLET | Refills: 3 | Status: SHIPPED | OUTPATIENT
Start: 2021-04-05 | End: 2021-06-01 | Stop reason: SDUPTHER

## 2021-06-01 DIAGNOSIS — G40.909 SEIZURE DISORDER: ICD-10-CM

## 2021-06-01 RX ORDER — FUROSEMIDE 40 MG/1
40 TABLET ORAL DAILY
Qty: 90 TABLET | Refills: 0 | Status: SHIPPED | OUTPATIENT
Start: 2021-06-01 | End: 2021-07-12 | Stop reason: SDUPTHER

## 2021-06-01 RX ORDER — CITALOPRAM 20 MG/1
20 TABLET, FILM COATED ORAL DAILY
Qty: 90 TABLET | Refills: 0 | Status: SHIPPED | OUTPATIENT
Start: 2021-06-01 | End: 2021-09-28 | Stop reason: SDUPTHER

## 2021-06-01 RX ORDER — AMLODIPINE BESYLATE 5 MG/1
5 TABLET ORAL DAILY
Qty: 90 TABLET | Refills: 0 | Status: SHIPPED | OUTPATIENT
Start: 2021-06-01 | End: 2021-10-01

## 2021-06-01 RX ORDER — LEVETIRACETAM 500 MG/1
500 TABLET ORAL 2 TIMES DAILY
Qty: 180 TABLET | Refills: 0 | Status: SHIPPED | OUTPATIENT
Start: 2021-06-01 | End: 2021-07-19 | Stop reason: SDUPTHER

## 2021-07-12 RX ORDER — FUROSEMIDE 40 MG/1
40 TABLET ORAL DAILY
Qty: 90 TABLET | Refills: 0 | Status: SHIPPED | OUTPATIENT
Start: 2021-07-12 | End: 2021-07-14 | Stop reason: SDUPTHER

## 2021-07-16 RX ORDER — FUROSEMIDE 40 MG/1
40 TABLET ORAL DAILY
Qty: 90 TABLET | Refills: 0 | Status: SHIPPED | OUTPATIENT
Start: 2021-07-16 | End: 2021-10-27 | Stop reason: SDUPTHER

## 2021-07-19 DIAGNOSIS — G40.909 SEIZURE DISORDER: ICD-10-CM

## 2021-07-20 RX ORDER — LEVETIRACETAM 500 MG/1
500 TABLET ORAL 2 TIMES DAILY
Qty: 180 TABLET | Refills: 0 | Status: SHIPPED | OUTPATIENT
Start: 2021-07-20 | End: 2021-10-29 | Stop reason: SDUPTHER

## 2021-09-28 RX ORDER — CITALOPRAM 20 MG/1
20 TABLET, FILM COATED ORAL DAILY
Qty: 90 TABLET | Refills: 0 | OUTPATIENT
Start: 2021-09-28

## 2021-09-29 RX ORDER — CITALOPRAM 20 MG/1
20 TABLET, FILM COATED ORAL DAILY
Qty: 90 TABLET | Refills: 0 | Status: SHIPPED | OUTPATIENT
Start: 2021-09-29 | End: 2022-04-01

## 2021-10-03 RX ORDER — BENAZEPRIL HYDROCHLORIDE 10 MG/1
TABLET ORAL
Qty: 180 TABLET | Refills: 0 | Status: SHIPPED | OUTPATIENT
Start: 2021-10-03 | End: 2021-10-08 | Stop reason: SDUPTHER

## 2021-10-06 RX ORDER — AMLODIPINE BESYLATE 5 MG/1
5 TABLET ORAL DAILY
Qty: 7 TABLET | Refills: 0 | Status: SHIPPED | OUTPATIENT
Start: 2021-10-06 | End: 2021-10-29 | Stop reason: SDUPTHER

## 2021-10-08 RX ORDER — BENAZEPRIL HYDROCHLORIDE 10 MG/1
10 TABLET ORAL 2 TIMES DAILY
Qty: 180 TABLET | Refills: 0 | Status: SHIPPED | OUTPATIENT
Start: 2021-10-08 | End: 2022-08-02 | Stop reason: SDUPTHER

## 2021-10-29 ENCOUNTER — OFFICE VISIT (OUTPATIENT)
Dept: FAMILY MEDICINE | Facility: CLINIC | Age: 86
End: 2021-10-29
Payer: MEDICARE

## 2021-10-29 VITALS
HEIGHT: 69 IN | WEIGHT: 196.44 LBS | SYSTOLIC BLOOD PRESSURE: 130 MMHG | DIASTOLIC BLOOD PRESSURE: 58 MMHG | BODY MASS INDEX: 29.09 KG/M2 | OXYGEN SATURATION: 95 % | TEMPERATURE: 98 F | HEART RATE: 57 BPM

## 2021-10-29 DIAGNOSIS — S06.9X9S TRAUMATIC BRAIN INJURY WITH LOSS OF CONSCIOUSNESS, SEQUELA: ICD-10-CM

## 2021-10-29 DIAGNOSIS — R63.4 WEIGHT LOSS: ICD-10-CM

## 2021-10-29 DIAGNOSIS — F03.91 DEMENTIA WITH BEHAVIORAL DISTURBANCE, UNSPECIFIED DEMENTIA TYPE: ICD-10-CM

## 2021-10-29 DIAGNOSIS — N18.4 CKD (CHRONIC KIDNEY DISEASE) STAGE 4, GFR 15-29 ML/MIN: ICD-10-CM

## 2021-10-29 DIAGNOSIS — R00.1 BRADYCARDIA: ICD-10-CM

## 2021-10-29 DIAGNOSIS — I10 HYPERTENSION, UNSPECIFIED TYPE: Primary | ICD-10-CM

## 2021-10-29 DIAGNOSIS — G40.909 SEIZURE DISORDER: ICD-10-CM

## 2021-10-29 DIAGNOSIS — D69.6 THROMBOCYTOPENIA: ICD-10-CM

## 2021-10-29 PROBLEM — M54.50 ACUTE MIDLINE LOW BACK PAIN WITHOUT SCIATICA: Status: RESOLVED | Noted: 2018-11-12 | Resolved: 2021-10-29

## 2021-10-29 PROCEDURE — 1126F PR PAIN SEVERITY QUANTIFIED, NO PAIN PRESENT: ICD-10-PCS | Mod: CPTII,S$GLB,, | Performed by: PHYSICIAN ASSISTANT

## 2021-10-29 PROCEDURE — 1101F PT FALLS ASSESS-DOCD LE1/YR: CPT | Mod: CPTII,S$GLB,, | Performed by: PHYSICIAN ASSISTANT

## 2021-10-29 PROCEDURE — 3288F FALL RISK ASSESSMENT DOCD: CPT | Mod: CPTII,S$GLB,, | Performed by: PHYSICIAN ASSISTANT

## 2021-10-29 PROCEDURE — G0008 FLU VACCINE - QUADRIVALENT - HIGH DOSE (65+) PRESERVATIVE FREE IM: ICD-10-PCS | Mod: S$GLB,,, | Performed by: PHYSICIAN ASSISTANT

## 2021-10-29 PROCEDURE — 90662 IIV NO PRSV INCREASED AG IM: CPT | Mod: S$GLB,,, | Performed by: PHYSICIAN ASSISTANT

## 2021-10-29 PROCEDURE — 99999 PR PBB SHADOW E&M-EST. PATIENT-LVL III: ICD-10-PCS | Mod: PBBFAC,,, | Performed by: PHYSICIAN ASSISTANT

## 2021-10-29 PROCEDURE — 3288F PR FALLS RISK ASSESSMENT DOCUMENTED: ICD-10-PCS | Mod: CPTII,S$GLB,, | Performed by: PHYSICIAN ASSISTANT

## 2021-10-29 PROCEDURE — 1159F MED LIST DOCD IN RCRD: CPT | Mod: CPTII,S$GLB,, | Performed by: PHYSICIAN ASSISTANT

## 2021-10-29 PROCEDURE — 99999 PR PBB SHADOW E&M-EST. PATIENT-LVL III: CPT | Mod: PBBFAC,,, | Performed by: PHYSICIAN ASSISTANT

## 2021-10-29 PROCEDURE — 1159F PR MEDICATION LIST DOCUMENTED IN MEDICAL RECORD: ICD-10-PCS | Mod: CPTII,S$GLB,, | Performed by: PHYSICIAN ASSISTANT

## 2021-10-29 PROCEDURE — 99214 PR OFFICE/OUTPT VISIT, EST, LEVL IV, 30-39 MIN: ICD-10-PCS | Mod: 25,S$GLB,, | Performed by: PHYSICIAN ASSISTANT

## 2021-10-29 PROCEDURE — 90662 FLU VACCINE - QUADRIVALENT - HIGH DOSE (65+) PRESERVATIVE FREE IM: ICD-10-PCS | Mod: S$GLB,,, | Performed by: PHYSICIAN ASSISTANT

## 2021-10-29 PROCEDURE — 1126F AMNT PAIN NOTED NONE PRSNT: CPT | Mod: CPTII,S$GLB,, | Performed by: PHYSICIAN ASSISTANT

## 2021-10-29 PROCEDURE — 1101F PR PT FALLS ASSESS DOC 0-1 FALLS W/OUT INJ PAST YR: ICD-10-PCS | Mod: CPTII,S$GLB,, | Performed by: PHYSICIAN ASSISTANT

## 2021-10-29 PROCEDURE — 99214 OFFICE O/P EST MOD 30 MIN: CPT | Mod: 25,S$GLB,, | Performed by: PHYSICIAN ASSISTANT

## 2021-10-29 PROCEDURE — G0008 ADMIN INFLUENZA VIRUS VAC: HCPCS | Mod: S$GLB,,, | Performed by: PHYSICIAN ASSISTANT

## 2021-10-29 RX ORDER — LEVETIRACETAM 500 MG/1
500 TABLET ORAL 2 TIMES DAILY
Qty: 180 TABLET | Refills: 3 | Status: SHIPPED | OUTPATIENT
Start: 2021-10-29 | End: 2022-02-22 | Stop reason: SDUPTHER

## 2021-10-29 RX ORDER — AMLODIPINE BESYLATE 5 MG/1
5 TABLET ORAL DAILY
Qty: 90 TABLET | Refills: 3 | Status: ON HOLD | OUTPATIENT
Start: 2021-10-29 | End: 2023-04-13 | Stop reason: SDUPTHER

## 2021-11-02 ENCOUNTER — LAB VISIT (OUTPATIENT)
Dept: LAB | Facility: HOSPITAL | Age: 86
End: 2021-11-02
Attending: PHYSICIAN ASSISTANT
Payer: MEDICARE

## 2021-11-02 DIAGNOSIS — I10 HYPERTENSION, UNSPECIFIED TYPE: ICD-10-CM

## 2021-11-02 DIAGNOSIS — R63.4 WEIGHT LOSS: ICD-10-CM

## 2021-11-02 LAB
ALBUMIN SERPL BCP-MCNC: 3.3 G/DL (ref 3.5–5.2)
ALP SERPL-CCNC: 44 U/L (ref 55–135)
ALT SERPL W/O P-5'-P-CCNC: 9 U/L (ref 10–44)
ANION GAP SERPL CALC-SCNC: 11 MMOL/L (ref 8–16)
AST SERPL-CCNC: 16 U/L (ref 10–40)
BASOPHILS # BLD AUTO: 0.03 K/UL (ref 0–0.2)
BASOPHILS NFR BLD: 0.5 % (ref 0–1.9)
BILIRUB SERPL-MCNC: 0.5 MG/DL (ref 0.1–1)
BUN SERPL-MCNC: 35 MG/DL (ref 8–23)
CALCIUM SERPL-MCNC: 9.1 MG/DL (ref 8.7–10.5)
CHLORIDE SERPL-SCNC: 104 MMOL/L (ref 95–110)
CHOLEST SERPL-MCNC: 185 MG/DL (ref 120–199)
CHOLEST/HDLC SERPL: 3.5 {RATIO} (ref 2–5)
CO2 SERPL-SCNC: 26 MMOL/L (ref 23–29)
CREAT SERPL-MCNC: 2.8 MG/DL (ref 0.5–1.4)
DIFFERENTIAL METHOD: ABNORMAL
EOSINOPHIL # BLD AUTO: 0.2 K/UL (ref 0–0.5)
EOSINOPHIL NFR BLD: 3.6 % (ref 0–8)
ERYTHROCYTE [DISTWIDTH] IN BLOOD BY AUTOMATED COUNT: 14.6 % (ref 11.5–14.5)
EST. GFR  (AFRICAN AMERICAN): 22.3 ML/MIN/1.73 M^2
EST. GFR  (NON AFRICAN AMERICAN): 19.3 ML/MIN/1.73 M^2
GLUCOSE SERPL-MCNC: 90 MG/DL (ref 70–110)
HCT VFR BLD AUTO: 37.2 % (ref 40–54)
HDLC SERPL-MCNC: 53 MG/DL (ref 40–75)
HDLC SERPL: 28.6 % (ref 20–50)
HGB BLD-MCNC: 11.7 G/DL (ref 14–18)
IMM GRANULOCYTES # BLD AUTO: 0.01 K/UL (ref 0–0.04)
IMM GRANULOCYTES NFR BLD AUTO: 0.2 % (ref 0–0.5)
LDLC SERPL CALC-MCNC: 119.4 MG/DL (ref 63–159)
LYMPHOCYTES # BLD AUTO: 2.6 K/UL (ref 1–4.8)
LYMPHOCYTES NFR BLD: 46.1 % (ref 18–48)
MCH RBC QN AUTO: 28.5 PG (ref 27–31)
MCHC RBC AUTO-ENTMCNC: 31.5 G/DL (ref 32–36)
MCV RBC AUTO: 91 FL (ref 82–98)
MONOCYTES # BLD AUTO: 0.5 K/UL (ref 0.3–1)
MONOCYTES NFR BLD: 9.3 % (ref 4–15)
NEUTROPHILS # BLD AUTO: 2.3 K/UL (ref 1.8–7.7)
NEUTROPHILS NFR BLD: 40.3 % (ref 38–73)
NONHDLC SERPL-MCNC: 132 MG/DL
NRBC BLD-RTO: 0 /100 WBC
PLATELET # BLD AUTO: 139 K/UL (ref 150–450)
PMV BLD AUTO: 11.4 FL (ref 9.2–12.9)
POTASSIUM SERPL-SCNC: 3.9 MMOL/L (ref 3.5–5.1)
PROT SERPL-MCNC: 6.5 G/DL (ref 6–8.4)
RBC # BLD AUTO: 4.1 M/UL (ref 4.6–6.2)
SODIUM SERPL-SCNC: 141 MMOL/L (ref 136–145)
T4 FREE SERPL-MCNC: 1.11 NG/DL (ref 0.71–1.51)
TRIGL SERPL-MCNC: 63 MG/DL (ref 30–150)
TSH SERPL DL<=0.005 MIU/L-ACNC: 1.68 UIU/ML (ref 0.4–4)
WBC # BLD AUTO: 5.62 K/UL (ref 3.9–12.7)

## 2021-11-02 PROCEDURE — 84439 ASSAY OF FREE THYROXINE: CPT | Performed by: PHYSICIAN ASSISTANT

## 2021-11-02 PROCEDURE — 36415 COLL VENOUS BLD VENIPUNCTURE: CPT | Mod: PO | Performed by: PHYSICIAN ASSISTANT

## 2021-11-02 PROCEDURE — 85025 COMPLETE CBC W/AUTO DIFF WBC: CPT | Performed by: PHYSICIAN ASSISTANT

## 2021-11-02 PROCEDURE — 80061 LIPID PANEL: CPT | Performed by: PHYSICIAN ASSISTANT

## 2021-11-02 PROCEDURE — 80053 COMPREHEN METABOLIC PANEL: CPT | Performed by: PHYSICIAN ASSISTANT

## 2021-11-02 PROCEDURE — 84443 ASSAY THYROID STIM HORMONE: CPT | Performed by: PHYSICIAN ASSISTANT

## 2021-11-09 ENCOUNTER — CLINICAL SUPPORT (OUTPATIENT)
Dept: CARDIOLOGY | Facility: HOSPITAL | Age: 86
End: 2021-11-09
Attending: PHYSICIAN ASSISTANT
Payer: MEDICARE

## 2021-11-09 DIAGNOSIS — R00.1 BRADYCARDIA: ICD-10-CM

## 2021-11-09 PROCEDURE — 93227 HOLTER MONITOR - 24 HOUR (CUPID ONLY): ICD-10-PCS | Mod: ,,, | Performed by: INTERNAL MEDICINE

## 2021-11-09 PROCEDURE — 93227 XTRNL ECG REC<48 HR R&I: CPT | Mod: ,,, | Performed by: INTERNAL MEDICINE

## 2021-11-09 PROCEDURE — 93225 XTRNL ECG REC<48 HRS REC: CPT

## 2021-11-10 DIAGNOSIS — R00.1 BRADYCARDIA: Primary | ICD-10-CM

## 2021-11-10 LAB
OHS CV EVENT MONITOR DAY: 0
OHS CV HOLTER LENGTH DECIMAL HOURS: 22.5
OHS CV HOLTER LENGTH HOURS: 22
OHS CV HOLTER LENGTH MINUTES: 30
OHS CV HOLTER SINUS AVERAGE HR: 60
OHS CV HOLTER SINUS MAX HR: 93
OHS CV HOLTER SINUS MIN HR: 42

## 2021-11-11 ENCOUNTER — TELEPHONE (OUTPATIENT)
Dept: FAMILY MEDICINE | Facility: CLINIC | Age: 86
End: 2021-11-11
Payer: MEDICARE

## 2021-12-14 ENCOUNTER — OFFICE VISIT (OUTPATIENT)
Dept: CARDIOLOGY | Facility: CLINIC | Age: 86
End: 2021-12-14
Payer: MEDICARE

## 2021-12-14 VITALS
RESPIRATION RATE: 16 BRPM | OXYGEN SATURATION: 90 % | BODY MASS INDEX: 29.33 KG/M2 | HEART RATE: 63 BPM | SYSTOLIC BLOOD PRESSURE: 118 MMHG | DIASTOLIC BLOOD PRESSURE: 78 MMHG | HEIGHT: 69 IN | WEIGHT: 198 LBS

## 2021-12-14 DIAGNOSIS — R94.31 ABNORMAL EKG: ICD-10-CM

## 2021-12-14 DIAGNOSIS — R00.1 BRADYCARDIA: ICD-10-CM

## 2021-12-14 DIAGNOSIS — S06.9X0D TRAUMATIC BRAIN INJURY, WITHOUT LOSS OF CONSCIOUSNESS, SUBSEQUENT ENCOUNTER: ICD-10-CM

## 2021-12-14 DIAGNOSIS — N18.32 STAGE 3B CHRONIC KIDNEY DISEASE: ICD-10-CM

## 2021-12-14 DIAGNOSIS — I26.99 PULMONARY EMBOLISM, UNSPECIFIED CHRONICITY, UNSPECIFIED PULMONARY EMBOLISM TYPE, UNSPECIFIED WHETHER ACUTE COR PULMONALE PRESENT: ICD-10-CM

## 2021-12-14 DIAGNOSIS — E78.00 PURE HYPERCHOLESTEROLEMIA: ICD-10-CM

## 2021-12-14 DIAGNOSIS — G40.909 SEIZURE DISORDER: ICD-10-CM

## 2021-12-14 DIAGNOSIS — D69.6 THROMBOCYTOPENIA: ICD-10-CM

## 2021-12-14 DIAGNOSIS — G47.33 OSA (OBSTRUCTIVE SLEEP APNEA): Primary | ICD-10-CM

## 2021-12-14 DIAGNOSIS — I10 HYPERTENSION, UNSPECIFIED TYPE: ICD-10-CM

## 2021-12-14 DIAGNOSIS — Z86.73 HISTORY OF CVA IN ADULTHOOD: ICD-10-CM

## 2021-12-14 PROCEDURE — 99204 OFFICE O/P NEW MOD 45 MIN: CPT | Mod: S$GLB,,, | Performed by: GENERAL PRACTICE

## 2021-12-14 PROCEDURE — 93000 EKG 12-LEAD: ICD-10-PCS | Mod: S$GLB,,, | Performed by: GENERAL PRACTICE

## 2021-12-14 PROCEDURE — 99204 PR OFFICE/OUTPT VISIT, NEW, LEVL IV, 45-59 MIN: ICD-10-PCS | Mod: S$GLB,,, | Performed by: GENERAL PRACTICE

## 2021-12-14 PROCEDURE — 93000 ELECTROCARDIOGRAM COMPLETE: CPT | Mod: S$GLB,,, | Performed by: GENERAL PRACTICE

## 2022-01-06 ENCOUNTER — CLINICAL SUPPORT (OUTPATIENT)
Dept: CARDIOLOGY | Facility: HOSPITAL | Age: 87
End: 2022-01-06
Attending: GENERAL PRACTICE
Payer: MEDICARE

## 2022-01-06 VITALS — WEIGHT: 198 LBS | HEIGHT: 69 IN | BODY MASS INDEX: 29.33 KG/M2

## 2022-01-06 DIAGNOSIS — R94.31 ABNORMAL EKG: ICD-10-CM

## 2022-01-06 DIAGNOSIS — R00.1 BRADYCARDIA: ICD-10-CM

## 2022-01-06 DIAGNOSIS — I26.99 PULMONARY EMBOLISM, UNSPECIFIED CHRONICITY, UNSPECIFIED PULMONARY EMBOLISM TYPE, UNSPECIFIED WHETHER ACUTE COR PULMONALE PRESENT: ICD-10-CM

## 2022-01-06 LAB
AORTIC ROOT ANNULUS: 3.51 CM
AORTIC VALVE CUSP SEPERATION: 2.23 CM
AV INDEX (PROSTH): 0.78
AV MEAN GRADIENT: 2 MMHG
AV PEAK GRADIENT: 4 MMHG
AV VALVE AREA: 4.14 CM2
AV VELOCITY RATIO: 78.94
BSA FOR ECHO PROCEDURE: 2.09 M2
CV ECHO LV RWT: 0.44 CM
DOP CALC AO PEAK VEL: 0.96 M/S
DOP CALC AO VTI: 18.95 CM
DOP CALC LVOT AREA: 5.3 CM2
DOP CALC LVOT DIAMETER: 2.6 CM
DOP CALC LVOT PEAK VEL: 75.78 M/S
DOP CALC LVOT STROKE VOLUME: 78.54 CM3
DOP CALCLVOT PEAK VEL VTI: 14.8 CM
E WAVE DECELERATION TIME: 401.96 MSEC
E/A RATIO: 0.63
E/E' RATIO: 6.25 M/S
ECHO LV POSTERIOR WALL: 1.13 CM (ref 0.6–1.1)
EJECTION FRACTION: 65 %
FRACTIONAL SHORTENING: 30 % (ref 28–44)
INTERVENTRICULAR SEPTUM: 1.14 CM (ref 0.6–1.1)
IVRT: 91.17 MSEC
LEFT ATRIUM SIZE: 3.85 CM
LEFT INTERNAL DIMENSION IN SYSTOLE: 3.59 CM (ref 2.1–4)
LEFT VENTRICLE MASS INDEX: 109 G/M2
LEFT VENTRICULAR INTERNAL DIMENSION IN DIASTOLE: 5.12 CM (ref 3.5–6)
LEFT VENTRICULAR MASS: 224.71 G
LV LATERAL E/E' RATIO: 5 M/S
LV SEPTAL E/E' RATIO: 8.33 M/S
MV PEAK A VEL: 0.79 M/S
MV PEAK E VEL: 0.5 M/S
PISA TR MAX VEL: 2.64 M/S
PV PEAK VELOCITY: 107.56 CM/S
RA PRESSURE: 3 MMHG
RIGHT VENTRICULAR END-DIASTOLIC DIMENSION: 238 CM
TDI LATERAL: 0.1 M/S
TDI SEPTAL: 0.06 M/S
TDI: 0.08 M/S
TR MAX PG: 28 MMHG
TV REST PULMONARY ARTERY PRESSURE: 31 MMHG

## 2022-01-06 PROCEDURE — 93306 ECHO (CUPID ONLY): ICD-10-PCS | Mod: 26,,, | Performed by: INTERNAL MEDICINE

## 2022-01-06 PROCEDURE — 93306 TTE W/DOPPLER COMPLETE: CPT

## 2022-01-06 PROCEDURE — 93306 TTE W/DOPPLER COMPLETE: CPT | Mod: 26,,, | Performed by: INTERNAL MEDICINE

## 2022-01-19 ENCOUNTER — OFFICE VISIT (OUTPATIENT)
Dept: CARDIOLOGY | Facility: CLINIC | Age: 87
End: 2022-01-19
Payer: MEDICARE

## 2022-01-19 VITALS
OXYGEN SATURATION: 95 % | WEIGHT: 198 LBS | BODY MASS INDEX: 29.33 KG/M2 | HEART RATE: 66 BPM | SYSTOLIC BLOOD PRESSURE: 140 MMHG | RESPIRATION RATE: 16 BRPM | HEIGHT: 69 IN | DIASTOLIC BLOOD PRESSURE: 70 MMHG

## 2022-01-19 DIAGNOSIS — N25.81 SECONDARY HYPERPARATHYROIDISM OF RENAL ORIGIN: ICD-10-CM

## 2022-01-19 DIAGNOSIS — D69.6 THROMBOCYTOPENIA: ICD-10-CM

## 2022-01-19 DIAGNOSIS — S06.9X0D TRAUMATIC BRAIN INJURY, WITHOUT LOSS OF CONSCIOUSNESS, SUBSEQUENT ENCOUNTER: ICD-10-CM

## 2022-01-19 DIAGNOSIS — I10 PRIMARY HYPERTENSION: Primary | ICD-10-CM

## 2022-01-19 DIAGNOSIS — I26.99 PULMONARY EMBOLISM, UNSPECIFIED CHRONICITY, UNSPECIFIED PULMONARY EMBOLISM TYPE, UNSPECIFIED WHETHER ACUTE COR PULMONALE PRESENT: ICD-10-CM

## 2022-01-19 DIAGNOSIS — R00.1 BRADYCARDIA: ICD-10-CM

## 2022-01-19 DIAGNOSIS — N18.4 CHRONIC KIDNEY DISEASE, STAGE 4, SEVERELY DECREASED GFR: ICD-10-CM

## 2022-01-19 DIAGNOSIS — R94.31 ABNORMAL EKG: ICD-10-CM

## 2022-01-19 DIAGNOSIS — G47.33 OSA (OBSTRUCTIVE SLEEP APNEA): ICD-10-CM

## 2022-01-19 DIAGNOSIS — G40.909 SEIZURE DISORDER: ICD-10-CM

## 2022-01-19 DIAGNOSIS — Z86.73 HISTORY OF CVA IN ADULTHOOD: ICD-10-CM

## 2022-01-19 PROCEDURE — 1159F PR MEDICATION LIST DOCUMENTED IN MEDICAL RECORD: ICD-10-PCS | Mod: CPTII,S$GLB,, | Performed by: GENERAL PRACTICE

## 2022-01-19 PROCEDURE — 1126F PR PAIN SEVERITY QUANTIFIED, NO PAIN PRESENT: ICD-10-PCS | Mod: CPTII,S$GLB,, | Performed by: GENERAL PRACTICE

## 2022-01-19 PROCEDURE — 1126F AMNT PAIN NOTED NONE PRSNT: CPT | Mod: CPTII,S$GLB,, | Performed by: GENERAL PRACTICE

## 2022-01-19 PROCEDURE — 99215 PR OFFICE/OUTPT VISIT, EST, LEVL V, 40-54 MIN: ICD-10-PCS | Mod: S$GLB,,, | Performed by: GENERAL PRACTICE

## 2022-01-19 PROCEDURE — 3288F PR FALLS RISK ASSESSMENT DOCUMENTED: ICD-10-PCS | Mod: CPTII,S$GLB,, | Performed by: GENERAL PRACTICE

## 2022-01-19 PROCEDURE — 1159F MED LIST DOCD IN RCRD: CPT | Mod: CPTII,S$GLB,, | Performed by: GENERAL PRACTICE

## 2022-01-19 PROCEDURE — 1101F PR PT FALLS ASSESS DOC 0-1 FALLS W/OUT INJ PAST YR: ICD-10-PCS | Mod: CPTII,S$GLB,, | Performed by: GENERAL PRACTICE

## 2022-01-19 PROCEDURE — 99215 OFFICE O/P EST HI 40 MIN: CPT | Mod: S$GLB,,, | Performed by: GENERAL PRACTICE

## 2022-01-19 PROCEDURE — 1101F PT FALLS ASSESS-DOCD LE1/YR: CPT | Mod: CPTII,S$GLB,, | Performed by: GENERAL PRACTICE

## 2022-01-19 PROCEDURE — 3288F FALL RISK ASSESSMENT DOCD: CPT | Mod: CPTII,S$GLB,, | Performed by: GENERAL PRACTICE

## 2022-01-19 RX ORDER — NAPROXEN SODIUM 220 MG/1
81 TABLET, FILM COATED ORAL DAILY
Status: DISCONTINUED | OUTPATIENT
Start: 2022-01-19 | End: 2022-01-19

## 2022-01-19 RX ORDER — NAPROXEN SODIUM 220 MG/1
81 TABLET, FILM COATED ORAL DAILY
Qty: 100 TABLET | Refills: 1 | Status: ON HOLD | OUTPATIENT
Start: 2022-01-19 | End: 2023-04-13

## 2022-01-19 RX ORDER — ROSUVASTATIN CALCIUM 10 MG/1
10 TABLET, COATED ORAL DAILY
Qty: 90 TABLET | Refills: 3 | Status: SHIPPED | OUTPATIENT
Start: 2022-01-19 | End: 2023-01-24

## 2022-01-19 NOTE — PROGRESS NOTES
Subjective:    Patient ID:  Lavon Brandon is a 89 y.o. male who presents for follow-up of No chief complaint on file.      HPI:  12/14/21      Lavon Brandon is a 89 y.o. male who presents for evaluation of       Chief Complaint   Patient presents with    Hypertension    Hyperlipidemia    Bradycardia         HPI:  The patient is referred for of bradycardia he was noticed to have a baseline bradycardia and Holter monitor was placed which is recorded below.  Patient does note that he goes to bed about 10:00 a.m. at night and gets up [id number]- 12:00 p.m. the next morning.  He does not have any shortness of breath, chest pain, dizziness, falling. He Does walk with a walker because of a war injury to his toes on the left foot, and presumed nerve damage.  He has been very active until recently cutting grass working outside.           Accession #: [id number]     Lavon Brandon  Holter monitor - 24 hour  Order# [id number]  Reading physician: Clint Ruiz MD Ordering physician: [facility]ine Study date: 11/9/21         Reason for Exam  Priority: Routine  Dx: Bradycardia [R00.1 (ICD-10-CM)]      Conclusion           · Predominant Rhythm Sinus rhythm with heart rates varying between 42 and 93 BPM with an average of 60 BPM. Maximum heart rate recorded at: 12:32 CST. Minimum heart rate recorded at 08:44 CST.  · Ventricular Arrhythmias There were occasional PVCs totalling 359 and averaging 15.96 per hour. There were 7 couplets. There were 1 triplets.  · Supraventricular Arrhythmias There were very rare PACs totalling 44 and averaging 1.96 per hour.  · No diary events reported.  · The diary was properly completed. There were rare hookup related artifacts. Overall, the study was of good quality. The tape was adequate (0 days , 22 hours, 30 minutes).           On questioning the patient on the day of the Holter monitor he would of been sleeping during the episode of heart rate of 42.     1/19/22  Lavon has a  history of CVA, dizziness bradycardia in the past..  He has not seen a cardiologist for some time he was referred for hypertension bradycardia and hyperlipidemia.  He echo report is below shows normal function with mild concentric hypertrophy , mild-to-moderate mitral regurgitation, no significant change since 2011.  11/02/21 1203 HDL 53 -- Final result   11/02/21 1203 CHOL 185 -- Final result   11/02/21 1203 TRIG 63 -- Final result   11/02/21 1203 LDLCALC 119.4 -- Final result   11/02/21 1203 CHOLHDL 28.6 -- Final result   11/02/21 1203 NONHDLCHOL 132 -- Final result   11/02/21 1203 TOTALCHOLEST 3.5 -- Final result     His cholesterol is above goal.  IS NOT TAKING A STATIN  His blood pressure is sometimes as high as 170/100.  His wife does self check blood pressure at home and gave the list to Dr. Haynes.  It does go up and down.  His GFR is 22. He has an appointment with nephrologist.      Review of patient's allergies indicates:   Allergen Reactions    Ciprofloxacin (bulk) Swelling       Past Medical History:   Diagnosis Date    Bradyarrhythmia     CVA (cerebral infarction) 2006    Dementia     Depression     Hyperlipidemia     Hypertension     renal htn    Pulmonary embolism 2002    Seizure disorder      Past Surgical History:   Procedure Laterality Date    left foot  with crushed injry       Social History     Tobacco Use    Smoking status: Never Smoker    Smokeless tobacco: Never Used   Substance Use Topics    Alcohol use: No    Drug use: No     Family History   Problem Relation Age of Onset    Diabetes Mother     Cancer Neg Hx         Review of Systems:   Constitution: Negative for diaphoresis and fever.   HEENT: Negative for nosebleeds.    Cardiovascular: Negative for chest pain       No dyspnea on exertion       No leg swelling        No palpitations  Respiratory: Negative for shortness of breath and wheezing.    Hematologic/Lymphatic: Negative for bleeding problem. Does not bruise/bleed  easily.   Skin: Negative for color change and rash.   Musculoskeletal: Negative for falls and myalgias.   Gastrointestinal: Negative for hematemesis and hematochezia.   Genitourinary: Negative for hematuria.   Neurological: Negative for dizziness and light-headedness.   Psychiatric/Behavioral: Negative for altered mental status and memory loss.          Objective:        There were no vitals filed for this visit.    Lab Results   Component Value Date    WBC 5.62 11/02/2021    HGB 11.7 (L) 11/02/2021    HCT 37.2 (L) 11/02/2021     (L) 11/02/2021    CHOL 185 11/02/2021    TRIG 63 11/02/2021    HDL 53 11/02/2021    ALT 9 (L) 11/02/2021    AST 16 11/02/2021     11/02/2021    K 3.9 11/02/2021     11/02/2021    CREATININE 2.8 (H) 11/02/2021    BUN 35 (H) 11/02/2021    CO2 26 11/02/2021    TSH 1.675 11/02/2021    PSA 1.5 01/07/2010    INR 0.9 01/10/2005    HGBA1C 5.8 12/06/2016        ECHOCARDIOGRAM RESULTS  Results for orders placed in visit on 01/06/22    Echo Saline Bubble? Yes    Interpretation Summary  · There is no evidence of intracardiac shunting.  · The left ventricle is normal in size with mild concentric hypertrophy and normal systolic function.  · The estimated ejection fraction is 65%.  · Normal left ventricular diastolic function.  · Mild-to-moderate mitral regurgitation.  · Normal central venous pressure (3 mmHg).  · The estimated PA systolic pressure is 31 mmHg.        CURRENT/PREVIOUS VISIT EKG  Results for orders placed or performed in visit on 12/14/21   IN OFFICE EKG 12-LEAD (to [place])    Collection Time: 12/14/21  2:06 PM    Narrative    Test Reason : G47.33,R94.31,I26.99,R00.1,    Vent. Rate : 051 BPM     Atrial Rate : 051 BPM     P-R Int : 202 ms          QRS Dur : 118 ms      QT Int : 454 ms       P-R-T Axes : 102 -29 019 degrees     QTc Int : 418 ms    Sinus bradycardia  Nonspecific intraventricular conduction delay  Borderline Abnormal ECG  When compared with ECG of 15-SEP-2015  10:59,  No significant change was found  Confirmed by Daisy OWEN, Augusto FIGUEROA ([id number]) on 1/11/2022 8:41:17 PM    Referred By: LUKAS SHEN           Confirmed By:Augusto Villa MD     No valid procedures specified.   No results found for this or any previous visit.      Physical Exam:  CONSTITUTIONAL: No fever, no chills  HEENT: Normocephalic, atraumatic,pupils reactive to light                 NECK:  No JVD no carotid bruit  CVS: S1S2+, RRR, no murmurs,   LUNGS: Clear  ABDOMEN: Soft, NT, BS+  EXTREMITIES: No cyanosis, edema  : No santo catheter  NEURO: AAO X 3  PSY: Normal affect      Medication List with Changes/Refills   Current Medications    AMLODIPINE (NORVASC) 5 MG TABLET    Take 1 tablet (5 mg total) by mouth once daily.    BENAZEPRIL (LOTENSIN) 10 MG TABLET    Take 1 tablet (10 mg total) by mouth 2 (two) times daily.    CHOLECALCIFEROL, VITAMIN D3, (VITAMIN D3) 50 MCG (2,000 UNIT) CAP    Take 1 capsule by mouth once daily.    CITALOPRAM (CELEXA) 20 MG TABLET    Take 1 tablet (20 mg total) by mouth once daily.    FUROSEMIDE (LASIX) 40 MG TABLET    TAKE 1 TABLET EVERY DAY    LEVETIRACETAM (KEPPRA) 500 MG TAB    Take 1 tablet (500 mg total) by mouth 2 (two) times daily.             Assessment:       1. Primary hypertension    2. Abnormal EKG    3. Pulmonary embolism, unspecified chronicity, unspecified pulmonary embolism type, unspecified whether acute cor pulmonale present    4. Chronic kidney disease, stage 4, severely decreased GFR    5. Bradycardia    6. History of CVA in adulthood    7. MARLIN (obstructive sleep apnea), 2008, not on Rx    8. Thrombocytopenia    9. Seizure disorder, post CVA 2011    10. Traumatic brain injury, without loss of consciousness, subsequent encounter    11. Secondary hyperparathyroidism of renal origin         Plan:     Will add Crestor for his elevated lipids and history of stroke.  This also has some renal protection.  He is to follow-up with Nephrology.  Recommend keeping a  home blood pressure diary and will not adjust his blood pressure medicines at the current time of last visit his pressure was 118/70.  Will add a baby aspirin.  If he tolerates it because of history of bruising.  His platelets are acceptable but slightly decreased at 139.    No follow-ups on file.    The patients questions were answered, they verbalized understanding, and agreed with the treatment plan.     HAWA CARUSO MD  [facility]

## 2022-02-22 DIAGNOSIS — G40.909 SEIZURE DISORDER: ICD-10-CM

## 2022-02-22 RX ORDER — LEVETIRACETAM 500 MG/1
500 TABLET ORAL 2 TIMES DAILY
Qty: 180 TABLET | Refills: 3 | Status: ON HOLD | OUTPATIENT
Start: 2022-02-22 | End: 2023-04-13 | Stop reason: SDUPTHER

## 2022-02-22 NOTE — TELEPHONE ENCOUNTER
Pt req refill  Was advised to follow up at last appt in Oct 21 in 2-3 months which would be January. Scheduled follow up with Jm CHIN 2/28/22.

## 2022-02-22 NOTE — TELEPHONE ENCOUNTER
----- Message from Kenyon Gardner sent at 2/22/2022 10:57 AM CST -----  Type:  RX Refill Request    Who Called: Pt's Wife Caryn  Refill or New Rx:Refill  RX Name and Strength:levETIRAcetam (KEPPRA) 500 MG Tab  How is the patient currently taking it? (ex. 1XDay):2 x day  Is this a 30 day or 90 day RX: 90 day  Preferred Pharmacy with phone number:[facility] [phone]  Local or Mail Order:Local  Ordering Provider:[place]  Would the patient rather a call back or a response via [facility]? Call back  Best Call Back Number:[phone]  Additional Information: Pt stated he has 2 tabs left

## 2022-02-28 ENCOUNTER — OFFICE VISIT (OUTPATIENT)
Dept: FAMILY MEDICINE | Facility: CLINIC | Age: 87
End: 2022-02-28
Payer: MEDICARE

## 2022-02-28 VITALS
SYSTOLIC BLOOD PRESSURE: 118 MMHG | RESPIRATION RATE: 18 BRPM | DIASTOLIC BLOOD PRESSURE: 58 MMHG | HEIGHT: 69 IN | TEMPERATURE: 98 F | OXYGEN SATURATION: 98 % | WEIGHT: 198.19 LBS | HEART RATE: 67 BPM | BODY MASS INDEX: 29.36 KG/M2

## 2022-02-28 DIAGNOSIS — I10 PRIMARY HYPERTENSION: Primary | ICD-10-CM

## 2022-02-28 DIAGNOSIS — E78.00 PURE HYPERCHOLESTEROLEMIA: ICD-10-CM

## 2022-02-28 PROCEDURE — 1159F PR MEDICATION LIST DOCUMENTED IN MEDICAL RECORD: ICD-10-PCS | Mod: CPTII,S$GLB,, | Performed by: STUDENT IN AN ORGANIZED HEALTH CARE EDUCATION/TRAINING PROGRAM

## 2022-02-28 PROCEDURE — 99213 OFFICE O/P EST LOW 20 MIN: CPT | Mod: S$GLB,,, | Performed by: STUDENT IN AN ORGANIZED HEALTH CARE EDUCATION/TRAINING PROGRAM

## 2022-02-28 PROCEDURE — 1101F PR PT FALLS ASSESS DOC 0-1 FALLS W/OUT INJ PAST YR: ICD-10-PCS | Mod: CPTII,S$GLB,, | Performed by: STUDENT IN AN ORGANIZED HEALTH CARE EDUCATION/TRAINING PROGRAM

## 2022-02-28 PROCEDURE — 3288F PR FALLS RISK ASSESSMENT DOCUMENTED: ICD-10-PCS | Mod: CPTII,S$GLB,, | Performed by: STUDENT IN AN ORGANIZED HEALTH CARE EDUCATION/TRAINING PROGRAM

## 2022-02-28 PROCEDURE — 3288F FALL RISK ASSESSMENT DOCD: CPT | Mod: CPTII,S$GLB,, | Performed by: STUDENT IN AN ORGANIZED HEALTH CARE EDUCATION/TRAINING PROGRAM

## 2022-02-28 PROCEDURE — 99999 PR PBB SHADOW E&M-EST. PATIENT-LVL IV: ICD-10-PCS | Mod: PBBFAC,,, | Performed by: STUDENT IN AN ORGANIZED HEALTH CARE EDUCATION/TRAINING PROGRAM

## 2022-02-28 PROCEDURE — 1101F PT FALLS ASSESS-DOCD LE1/YR: CPT | Mod: CPTII,S$GLB,, | Performed by: STUDENT IN AN ORGANIZED HEALTH CARE EDUCATION/TRAINING PROGRAM

## 2022-02-28 PROCEDURE — 99213 PR OFFICE/OUTPT VISIT, EST, LEVL III, 20-29 MIN: ICD-10-PCS | Mod: S$GLB,,, | Performed by: STUDENT IN AN ORGANIZED HEALTH CARE EDUCATION/TRAINING PROGRAM

## 2022-02-28 PROCEDURE — 99999 PR PBB SHADOW E&M-EST. PATIENT-LVL IV: CPT | Mod: PBBFAC,,, | Performed by: STUDENT IN AN ORGANIZED HEALTH CARE EDUCATION/TRAINING PROGRAM

## 2022-02-28 PROCEDURE — 1159F MED LIST DOCD IN RCRD: CPT | Mod: CPTII,S$GLB,, | Performed by: STUDENT IN AN ORGANIZED HEALTH CARE EDUCATION/TRAINING PROGRAM

## 2022-02-28 PROCEDURE — 1160F PR REVIEW ALL MEDS BY PRESCRIBER/CLIN PHARMACIST DOCUMENTED: ICD-10-PCS | Mod: CPTII,S$GLB,, | Performed by: STUDENT IN AN ORGANIZED HEALTH CARE EDUCATION/TRAINING PROGRAM

## 2022-02-28 PROCEDURE — 1160F RVW MEDS BY RX/DR IN RCRD: CPT | Mod: CPTII,S$GLB,, | Performed by: STUDENT IN AN ORGANIZED HEALTH CARE EDUCATION/TRAINING PROGRAM

## 2022-02-28 PROCEDURE — 1126F PR PAIN SEVERITY QUANTIFIED, NO PAIN PRESENT: ICD-10-PCS | Mod: CPTII,S$GLB,, | Performed by: STUDENT IN AN ORGANIZED HEALTH CARE EDUCATION/TRAINING PROGRAM

## 2022-02-28 PROCEDURE — 1126F AMNT PAIN NOTED NONE PRSNT: CPT | Mod: CPTII,S$GLB,, | Performed by: STUDENT IN AN ORGANIZED HEALTH CARE EDUCATION/TRAINING PROGRAM

## 2022-02-28 RX ORDER — CALCITRIOL 0.25 UG/1
0.25 CAPSULE ORAL DAILY
COMMUNITY
Start: 2021-10-02 | End: 2023-06-23 | Stop reason: SDUPTHER

## 2022-02-28 NOTE — PROGRESS NOTES
Subjective:       Patient ID: Lavon Brandon is a 89 y.o. male.    Chief Complaint: Follow-up    HPI:  89-YO M 3 mo f/u for hypertension and hyperlipidemia. He saw Dr. Villa (Cardiologist) on 1-19-22 and he started the patient on Crestor. Patient states he is taking his meds as prescribed and is not having any complications. He had an echocardiogram done 1-6-22 and wanted to go over his results.     He is keeping a BP log and notes that blood pressures have been well managed with meds.   He notes swelling in his L lower leg from time to time which started before he was on amlodipine. He relates the swelling due to his injury when he was serving in the [place]. Denies any pain or erythema.     Past Medical History:   Diagnosis Date    Bradyarrhythmia     CVA (cerebral infarction) 2006    Dementia     Depression     Hyperlipidemia     Hypertension     renal htn    Pulmonary embolism 2002    Seizure disorder      Past Surgical History:   Procedure Laterality Date    left foot  with crushed injry       Review of patient's allergies indicates:   Allergen Reactions    Ciprofloxacin (bulk) Swelling       He reports that he has never smoked. He has never used smokeless tobacco. He reports that he does not drink alcohol and does not use drugs.    His family history includes Diabetes in his mother.  Medications listed in plan.    Review of Systems   Constitutional: Negative for activity change, appetite change and fever.   HENT: Negative for ear discharge, ear pain, rhinorrhea, sinus pressure and sinus pain.    Respiratory: Negative for cough and shortness of breath.    Cardiovascular: Negative for chest pain and palpitations.   Gastrointestinal: Negative for abdominal pain, diarrhea, nausea and vomiting.   Skin: Negative for rash.   Neurological: Negative for dizziness, light-headedness and headaches.         Objective:      BP (!) 118/58 (BP Location: Right arm, Patient Position: Sitting, BP Method: Large  "(Manual))   Pulse 67   Temp 98 °F (36.7 °C) (Oral)   Resp 18   Ht 5' 9" (1.753 m)   Wt 89.9 kg (198 lb 3.1 oz)   SpO2 98%   BMI 29.27 kg/m²     Wt Readings from Last 3 Encounters:   02/28/22 89.9 kg (198 lb 3.1 oz)   01/19/22 89.8 kg (198 lb)   01/06/22 89.8 kg (197 lb 15.6 oz)     BP Readings from Last 3 Encounters:   02/28/22 (!) 118/58   01/19/22 (!) 140/70   12/14/21 118/78     Physical Exam  Vitals and nursing note reviewed.   Constitutional:       General: He is not in acute distress.  HENT:      Head: Normocephalic.      Right Ear: External ear normal.      Left Ear: External ear normal.   Eyes:      Extraocular Movements: Extraocular movements intact.      Conjunctiva/sclera: Conjunctivae normal.   Cardiovascular:      Rate and Rhythm: Normal rate and regular rhythm.      Heart sounds: Normal heart sounds, S1 normal and S2 normal. No murmur heard.    No friction rub. No gallop.   Pulmonary:      Effort: Pulmonary effort is normal.      Breath sounds: Normal breath sounds.   Abdominal:      General: Abdomen is flat.      Palpations: Abdomen is soft.      Tenderness: There is no abdominal tenderness. There is no guarding.   Musculoskeletal:      Right lower leg: No edema.      Left lower leg: No edema.   Neurological:      General: No focal deficit present.      Mental Status: He is alert and oriented to person, place, and time.   Psychiatric:         Attention and Perception: Attention normal.         Mood and Affect: Mood normal.         Behavior: Behavior normal.           Assessment:     1. Primary hypertension    2. Pure hypercholesterolemia          Plan:     1. Primary hypertension  - He is stable. Will continue to monitor condition and current medications.    2. Pure hypercholesterolemia  - He is stable. Will continue to monitor condition and current medications.    Discussed echocardiogram results with patient and he voiced understanding.   Appt with Dr. Haynes 6-9-22.    Medication List with " Changes/Refills   Current Medications    AMLODIPINE (NORVASC) 5 MG TABLET    Take 1 tablet (5 mg total) by mouth once daily.    ASPIRIN 81 MG CHEW    Take 1 tablet (81 mg total) by mouth once daily.    BENAZEPRIL (LOTENSIN) 10 MG TABLET    Take 1 tablet (10 mg total) by mouth 2 (two) times daily.    CALCITRIOL (ROCALTROL) 0.25 MCG CAP    Take 0.25 mcg by mouth once daily.    CHOLECALCIFEROL, VITAMIN D3, (VITAMIN D3) 50 MCG (2,000 UNIT) CAP    Take 1 capsule by mouth once daily.    CITALOPRAM (CELEXA) 20 MG TABLET    Take 1 tablet (20 mg total) by mouth once daily.    FUROSEMIDE (LASIX) 40 MG TABLET    TAKE 1 TABLET EVERY DAY    LEVETIRACETAM (KEPPRA) 500 MG TAB    Take 1 tablet (500 mg total) by mouth 2 (two) times daily.    ROSUVASTATIN (CRESTOR) 10 MG TABLET    Take 1 tablet (10 mg total) by mouth once daily.     Modified Medications    No medications on file     Medication Monitoring    I discussed imaging findings, diagnosis, possibilities, treatment options, medications, risks, and benefits. He had many questions regarding the options and long-term effects. All questions were answered. He expressed understanding after counseling regarding the diagnosis and recommendations. He was capable and demonstrated competence with understanding of these options. Shared decision making was performed resulting in him choosing the current treatment plan. Patient handout was given with instructions and recommendations. Advised the patient that if they become pregnant to alert us immediately to assess for medication changes.     I also discussed the importance of close follow up to discuss labs, change or modify his medications if needed, monitor side effects, and further evaluation of medical problems.       Skylar Oneal PA-C  Family Medicine Physician Assistant       02/28/2022     If you are due for any health screening(s) below please notify me so we can arrange them to be ordered and scheduled to maintain your health.      Health Maintenance Due   Topic Date Due    COVID-19 Vaccine (1) Never done    Shingles Vaccine (1 of 2) Never done    TETANUS VACCINE  01/14/2018       I spent a total of 25 minutes on the day of the visit.This includes face to face time and non-face to face time preparing to see the patient (eg, review of tests), obtaining and/or reviewing separately obtained history, documenting clinical information in the electronic or other health record, independently interpreting results and communicating results to the patient/family/caregiver, or care coordinator.    We have addressed [3] Low: 2 or more self-limited or minor problems / 1 stable chronic illness / 1 acute, uncomplicated illness or injury  The complexity of the data reviewed and analyzed for this visit was [3] Limited (Reviewed prior external note, ordered unique testing or reviewed the results of each unique test)   The risk of complications and/or morbidity or mortality are [3] Low risk   The level of Medical Decision Making for this visit is [3] Low

## 2022-03-28 NOTE — TELEPHONE ENCOUNTER
No new care gaps identified.  Powered by [employer] by [employer]. Reference number: [id number].   3/28/2022 10:53:06 AM CDT

## 2022-03-30 NOTE — TELEPHONE ENCOUNTER
This Rx Request does not qualify for assessment with the OR   Please review protocol details and the Care Due Message for extra clinical information    Reasons Rx Request may be deferred:  Pt due for OV with PCP    Note composed:10:10 AM 03/30/2022

## 2022-04-01 RX ORDER — CITALOPRAM 20 MG/1
TABLET, FILM COATED ORAL
Qty: 90 TABLET | Refills: 11 | Status: SHIPPED | OUTPATIENT
Start: 2022-04-01 | End: 2023-05-29 | Stop reason: SDUPTHER

## 2022-04-07 ENCOUNTER — TELEPHONE (OUTPATIENT)
Dept: FAMILY MEDICINE | Facility: CLINIC | Age: 87
End: 2022-04-07
Payer: MEDICARE

## 2022-04-07 DIAGNOSIS — N18.4 CKD (CHRONIC KIDNEY DISEASE) STAGE 4, GFR 15-29 ML/MIN: Primary | ICD-10-CM

## 2022-04-07 DIAGNOSIS — R60.0 EDEMA OF EXTREMITIES: ICD-10-CM

## 2022-04-07 NOTE — TELEPHONE ENCOUNTER
----- Message from Komal Wheat sent at 4/7/2022  1:22 PM CDT -----  Contact: Caryn [phone]  Type:  RX Refill Request    Who Called: Caryn  Refill or New Rx: Refill  RX Name and Strength: furosemide (LASIX) 40 MG tablet  How is the patient currently taking it? (ex. 1XDay): TAKE 1 TABLET EVERY DAY  Is this a 30 day or 90 day RX:   Preferred Pharmacy with phone number: [facility] [address], [phone]  Local or Mail Order: Local  Ordering Provider: Dr. aHynes  Would the patient rather a call back or a response via [facility]? Phone call  Best Call Back Number: [phone]    Additional Information:

## 2022-04-08 RX ORDER — POTASSIUM CHLORIDE 20 MEQ/1
TABLET, EXTENDED RELEASE ORAL
Qty: 60 TABLET | Refills: 11 | Status: SHIPPED | OUTPATIENT
Start: 2022-04-08 | End: 2022-04-19

## 2022-04-08 RX ORDER — FUROSEMIDE 40 MG/1
TABLET ORAL
Qty: 90 TABLET | Refills: 3 | Status: SHIPPED | OUTPATIENT
Start: 2022-04-08 | End: 2022-04-19

## 2022-04-08 NOTE — TELEPHONE ENCOUNTER
Patient was taken off the Lasix due to kidney disease.  if he has edema; use 40 mg twice a day x3 days, then 20 mg daily.  The Lasix was to be taking with potassium.  Return to clinic in a week for a blood test, an appointment to evaluate.  If no edema do not use Lasix, needed potassium

## 2022-04-19 ENCOUNTER — LAB VISIT (OUTPATIENT)
Dept: LAB | Facility: HOSPITAL | Age: 87
End: 2022-04-19
Attending: FAMILY MEDICINE
Payer: MEDICARE

## 2022-04-19 ENCOUNTER — OFFICE VISIT (OUTPATIENT)
Dept: FAMILY MEDICINE | Facility: CLINIC | Age: 87
End: 2022-04-19
Payer: MEDICARE

## 2022-04-19 VITALS
DIASTOLIC BLOOD PRESSURE: 60 MMHG | OXYGEN SATURATION: 96 % | TEMPERATURE: 98 F | BODY MASS INDEX: 31.28 KG/M2 | HEIGHT: 69 IN | WEIGHT: 211.19 LBS | RESPIRATION RATE: 17 BRPM | SYSTOLIC BLOOD PRESSURE: 136 MMHG | HEART RATE: 53 BPM

## 2022-04-19 DIAGNOSIS — G40.909 SEIZURE DISORDER: ICD-10-CM

## 2022-04-19 DIAGNOSIS — E78.2 MIXED HYPERLIPIDEMIA: Primary | ICD-10-CM

## 2022-04-19 DIAGNOSIS — I10 PRIMARY HYPERTENSION: ICD-10-CM

## 2022-04-19 DIAGNOSIS — N18.4 CKD (CHRONIC KIDNEY DISEASE) STAGE 4, GFR 15-29 ML/MIN: ICD-10-CM

## 2022-04-19 DIAGNOSIS — Z00.00 PERIODIC HEALTH ASSESSMENT, GENERAL SCREENING, ADULT: ICD-10-CM

## 2022-04-19 DIAGNOSIS — Z28.39 IMMUNIZATION DEFICIENCY: ICD-10-CM

## 2022-04-19 DIAGNOSIS — N18.4 ANEMIA OF CHRONIC RENAL FAILURE, STAGE 4 (SEVERE): Chronic | ICD-10-CM

## 2022-04-19 DIAGNOSIS — N18.4 CHRONIC RENAL DISEASE, STAGE IV: ICD-10-CM

## 2022-04-19 DIAGNOSIS — D63.1 ANEMIA OF CHRONIC RENAL FAILURE, STAGE 4 (SEVERE): Chronic | ICD-10-CM

## 2022-04-19 DIAGNOSIS — Z86.73 HISTORY OF CVA IN ADULTHOOD: ICD-10-CM

## 2022-04-19 LAB
ANION GAP SERPL CALC-SCNC: 7 MMOL/L (ref 8–16)
BNP SERPL-MCNC: 218 PG/ML (ref 0–99)
BUN SERPL-MCNC: 29 MG/DL (ref 8–23)
CALCIUM SERPL-MCNC: 9.1 MG/DL (ref 8.7–10.5)
CHLORIDE SERPL-SCNC: 110 MMOL/L (ref 95–110)
CO2 SERPL-SCNC: 24 MMOL/L (ref 23–29)
CREAT SERPL-MCNC: 2.4 MG/DL (ref 0.5–1.4)
EST. GFR  (AFRICAN AMERICAN): 26.6 ML/MIN/1.73 M^2
EST. GFR  (NON AFRICAN AMERICAN): 23.1 ML/MIN/1.73 M^2
GLUCOSE SERPL-MCNC: 75 MG/DL (ref 70–110)
MAGNESIUM SERPL-MCNC: 2.2 MG/DL (ref 1.6–2.6)
POTASSIUM SERPL-SCNC: 4.7 MMOL/L (ref 3.5–5.1)
SODIUM SERPL-SCNC: 141 MMOL/L (ref 136–145)

## 2022-04-19 PROCEDURE — 3288F FALL RISK ASSESSMENT DOCD: CPT | Mod: CPTII,S$GLB,, | Performed by: PHYSICIAN ASSISTANT

## 2022-04-19 PROCEDURE — 99214 PR OFFICE/OUTPT VISIT, EST, LEVL IV, 30-39 MIN: ICD-10-PCS | Mod: S$GLB,,, | Performed by: PHYSICIAN ASSISTANT

## 2022-04-19 PROCEDURE — 99999 PR PBB SHADOW E&M-EST. PATIENT-LVL IV: CPT | Mod: PBBFAC,,, | Performed by: PHYSICIAN ASSISTANT

## 2022-04-19 PROCEDURE — 99999 PR PBB SHADOW E&M-EST. PATIENT-LVL IV: ICD-10-PCS | Mod: PBBFAC,,, | Performed by: PHYSICIAN ASSISTANT

## 2022-04-19 PROCEDURE — 99214 OFFICE O/P EST MOD 30 MIN: CPT | Mod: S$GLB,,, | Performed by: PHYSICIAN ASSISTANT

## 2022-04-19 PROCEDURE — G0009 PNEUMOCOCCAL POLYSACCHARIDE VACCINE 23-VALENT =>2YO SQ IM: ICD-10-PCS | Mod: S$GLB,,, | Performed by: PHYSICIAN ASSISTANT

## 2022-04-19 PROCEDURE — 1101F PR PT FALLS ASSESS DOC 0-1 FALLS W/OUT INJ PAST YR: ICD-10-PCS | Mod: CPTII,S$GLB,, | Performed by: PHYSICIAN ASSISTANT

## 2022-04-19 PROCEDURE — 36415 COLL VENOUS BLD VENIPUNCTURE: CPT | Mod: PO | Performed by: FAMILY MEDICINE

## 2022-04-19 PROCEDURE — 1159F MED LIST DOCD IN RCRD: CPT | Mod: CPTII,S$GLB,, | Performed by: PHYSICIAN ASSISTANT

## 2022-04-19 PROCEDURE — 80048 BASIC METABOLIC PNL TOTAL CA: CPT | Performed by: FAMILY MEDICINE

## 2022-04-19 PROCEDURE — 90732 PNEUMOCOCCAL POLYSACCHARIDE VACCINE 23-VALENT =>2YO SQ IM: ICD-10-PCS | Mod: S$GLB,,, | Performed by: PHYSICIAN ASSISTANT

## 2022-04-19 PROCEDURE — 1126F AMNT PAIN NOTED NONE PRSNT: CPT | Mod: CPTII,S$GLB,, | Performed by: PHYSICIAN ASSISTANT

## 2022-04-19 PROCEDURE — 1159F PR MEDICATION LIST DOCUMENTED IN MEDICAL RECORD: ICD-10-PCS | Mod: CPTII,S$GLB,, | Performed by: PHYSICIAN ASSISTANT

## 2022-04-19 PROCEDURE — 83880 ASSAY OF NATRIURETIC PEPTIDE: CPT | Performed by: FAMILY MEDICINE

## 2022-04-19 PROCEDURE — 1126F PR PAIN SEVERITY QUANTIFIED, NO PAIN PRESENT: ICD-10-PCS | Mod: CPTII,S$GLB,, | Performed by: PHYSICIAN ASSISTANT

## 2022-04-19 PROCEDURE — 1101F PT FALLS ASSESS-DOCD LE1/YR: CPT | Mod: CPTII,S$GLB,, | Performed by: PHYSICIAN ASSISTANT

## 2022-04-19 PROCEDURE — 1160F PR REVIEW ALL MEDS BY PRESCRIBER/CLIN PHARMACIST DOCUMENTED: ICD-10-PCS | Mod: CPTII,S$GLB,, | Performed by: PHYSICIAN ASSISTANT

## 2022-04-19 PROCEDURE — G0009 ADMIN PNEUMOCOCCAL VACCINE: HCPCS | Mod: S$GLB,,, | Performed by: PHYSICIAN ASSISTANT

## 2022-04-19 PROCEDURE — 90732 PPSV23 VACC 2 YRS+ SUBQ/IM: CPT | Mod: S$GLB,,, | Performed by: PHYSICIAN ASSISTANT

## 2022-04-19 PROCEDURE — 1160F RVW MEDS BY RX/DR IN RCRD: CPT | Mod: CPTII,S$GLB,, | Performed by: PHYSICIAN ASSISTANT

## 2022-04-19 PROCEDURE — 83735 ASSAY OF MAGNESIUM: CPT | Performed by: FAMILY MEDICINE

## 2022-04-19 PROCEDURE — 3288F PR FALLS RISK ASSESSMENT DOCUMENTED: ICD-10-PCS | Mod: CPTII,S$GLB,, | Performed by: PHYSICIAN ASSISTANT

## 2022-04-19 NOTE — PROGRESS NOTES
Subjective:       Patient ID: Lavon Brandon is a 89 y.o. male.    Chief Complaint: Follow-up    HPI   Feels well  Pt. Sees Nephrology  Review of Systems   Constitutional: Negative.  Negative for activity change, appetite change, chills, diaphoresis, fatigue, fever and unexpected weight change.   HENT: Negative.    Eyes: Negative.    Respiratory: Negative.  Negative for cough and shortness of breath.    Cardiovascular: Negative.  Negative for chest pain and leg swelling.   Gastrointestinal: Negative.    Endocrine: Negative.    Genitourinary: Negative.    Musculoskeletal: Negative.    Integumentary:  Negative for rash. Negative.   Neurological: Negative.          Objective:      Physical Exam  Vitals reviewed.   Constitutional:       General: He is not in acute distress.     Appearance: Normal appearance. He is obese. He is not ill-appearing, toxic-appearing or diaphoretic.   HENT:      Head: Normocephalic and atraumatic.      Right Ear: Tympanic membrane, ear canal and external ear normal. There is no impacted cerumen.      Left Ear: Tympanic membrane, ear canal and external ear normal. There is no impacted cerumen.      Nose: Nose normal.      Mouth/Throat:      Mouth: Mucous membranes are moist.      Pharynx: Oropharynx is clear. No oropharyngeal exudate or posterior oropharyngeal erythema.   Eyes:      General: No scleral icterus.     Conjunctiva/sclera: Conjunctivae normal.   Neck:      Vascular: No carotid bruit.   Cardiovascular:      Rate and Rhythm: Normal rate and regular rhythm.      Pulses: Normal pulses.      Heart sounds: Normal heart sounds. No murmur heard.    No friction rub. No gallop.   Pulmonary:      Effort: Pulmonary effort is normal. No respiratory distress.      Breath sounds: Normal breath sounds. No stridor. No wheezing, rhonchi or rales.   Abdominal:      General: Abdomen is flat. Bowel sounds are normal. There is no distension.      Palpations: Abdomen is soft. There is no mass.       Tenderness: There is no abdominal tenderness. There is no guarding or rebound.      Hernia: No hernia is present.   Musculoskeletal:         General: No swelling.      Cervical back: Normal range of motion and neck supple. No rigidity or tenderness.      Right lower leg: No edema.      Left lower leg: No edema.   Lymphadenopathy:      Cervical: No cervical adenopathy.   Skin:     General: Skin is warm and dry.      Findings: No rash.   Neurological:      General: No focal deficit present.      Mental Status: He is alert and oriented to person, place, and time.         Assessment:       Problem List Items Addressed This Visit     Anemia of chronic renal failure, stage 4 (severe) (Chronic)    Hypertension    Seizure disorder, post CVA 2011    Hyperlipidemia - Primary    History of CVA in adulthood    Chronic renal disease, stage IV      Other Visit Diagnoses     Periodic health assessment, general screening, adult        Immunization deficiency        Relevant Orders    (In Office Administered) Pneumococcal Polysaccharide Vaccine (23 Valent) (SQ/IM) (Completed)          Plan:       Lavon was seen today for follow-up.    Diagnoses and all orders for this visit:    Mixed hyperlipidemia    Primary hypertension    Seizure disorder, post CVA 2011    History of CVA in adulthood    Chronic renal disease, stage IV    Anemia of chronic renal failure, stage 4 (severe)    Periodic health assessment, general screening, adult    Immunization deficiency  -     (In Office Administered) Pneumococcal Polysaccharide Vaccine (23 Valent) (SQ/IM)    discussed immunization needs

## 2022-07-26 ENCOUNTER — TELEPHONE (OUTPATIENT)
Dept: FAMILY MEDICINE | Facility: CLINIC | Age: 87
End: 2022-07-26
Payer: MEDICARE

## 2022-07-26 NOTE — TELEPHONE ENCOUNTER
----- Message from Teja Bill sent at 7/25/2022  5:20 PM CDT -----  Regarding: Sooner Appt  Contact: [phone]  Sooner Apoointment Request    Caller is requesting a sooner appointment.  Caller declined first available appointment listed below.      Name of Caller: Caryn(Wife)    When is the first available appointment? 12/15/2022    Symptoms: Fatigue     Would the patient rather a call back or a response via [facility]? Call Back    Best Call Back Number:[phone]

## 2022-08-02 ENCOUNTER — OFFICE VISIT (OUTPATIENT)
Dept: FAMILY MEDICINE | Facility: CLINIC | Age: 87
End: 2022-08-02
Payer: MEDICARE

## 2022-08-02 VITALS
HEIGHT: 69 IN | DIASTOLIC BLOOD PRESSURE: 58 MMHG | WEIGHT: 194.69 LBS | BODY MASS INDEX: 28.84 KG/M2 | HEART RATE: 57 BPM | SYSTOLIC BLOOD PRESSURE: 110 MMHG | TEMPERATURE: 99 F | OXYGEN SATURATION: 95 %

## 2022-08-02 DIAGNOSIS — G40.909 SEIZURE DISORDER: ICD-10-CM

## 2022-08-02 DIAGNOSIS — I10 PRIMARY HYPERTENSION: Primary | ICD-10-CM

## 2022-08-02 DIAGNOSIS — Z86.73 HISTORY OF CVA IN ADULTHOOD: ICD-10-CM

## 2022-08-02 DIAGNOSIS — E78.2 MIXED HYPERLIPIDEMIA: ICD-10-CM

## 2022-08-02 DIAGNOSIS — N18.4 CHRONIC RENAL DISEASE, STAGE IV: ICD-10-CM

## 2022-08-02 PROCEDURE — 1159F MED LIST DOCD IN RCRD: CPT | Mod: CPTII,S$GLB,, | Performed by: NURSE PRACTITIONER

## 2022-08-02 PROCEDURE — 99999 PR PBB SHADOW E&M-EST. PATIENT-LVL IV: ICD-10-PCS | Mod: PBBFAC,,, | Performed by: NURSE PRACTITIONER

## 2022-08-02 PROCEDURE — 1160F PR REVIEW ALL MEDS BY PRESCRIBER/CLIN PHARMACIST DOCUMENTED: ICD-10-PCS | Mod: CPTII,S$GLB,, | Performed by: NURSE PRACTITIONER

## 2022-08-02 PROCEDURE — 1159F PR MEDICATION LIST DOCUMENTED IN MEDICAL RECORD: ICD-10-PCS | Mod: CPTII,S$GLB,, | Performed by: NURSE PRACTITIONER

## 2022-08-02 PROCEDURE — 99999 PR PBB SHADOW E&M-EST. PATIENT-LVL IV: CPT | Mod: PBBFAC,,, | Performed by: NURSE PRACTITIONER

## 2022-08-02 PROCEDURE — 99213 OFFICE O/P EST LOW 20 MIN: CPT | Mod: S$GLB,,, | Performed by: NURSE PRACTITIONER

## 2022-08-02 PROCEDURE — 1126F AMNT PAIN NOTED NONE PRSNT: CPT | Mod: CPTII,S$GLB,, | Performed by: NURSE PRACTITIONER

## 2022-08-02 PROCEDURE — 99213 PR OFFICE/OUTPT VISIT, EST, LEVL III, 20-29 MIN: ICD-10-PCS | Mod: S$GLB,,, | Performed by: NURSE PRACTITIONER

## 2022-08-02 PROCEDURE — 1126F PR PAIN SEVERITY QUANTIFIED, NO PAIN PRESENT: ICD-10-PCS | Mod: CPTII,S$GLB,, | Performed by: NURSE PRACTITIONER

## 2022-08-02 PROCEDURE — 1100F PTFALLS ASSESS-DOCD GE2>/YR: CPT | Mod: CPTII,S$GLB,, | Performed by: NURSE PRACTITIONER

## 2022-08-02 PROCEDURE — 1100F PR PT FALLS ASSESS DOC 2+ FALLS/FALL W/INJURY/YR: ICD-10-PCS | Mod: CPTII,S$GLB,, | Performed by: NURSE PRACTITIONER

## 2022-08-02 PROCEDURE — 1160F RVW MEDS BY RX/DR IN RCRD: CPT | Mod: CPTII,S$GLB,, | Performed by: NURSE PRACTITIONER

## 2022-08-02 PROCEDURE — 3288F FALL RISK ASSESSMENT DOCD: CPT | Mod: CPTII,S$GLB,, | Performed by: NURSE PRACTITIONER

## 2022-08-02 PROCEDURE — 3288F PR FALLS RISK ASSESSMENT DOCUMENTED: ICD-10-PCS | Mod: CPTII,S$GLB,, | Performed by: NURSE PRACTITIONER

## 2022-08-02 RX ORDER — BENAZEPRIL HYDROCHLORIDE 10 MG/1
10 TABLET ORAL 2 TIMES DAILY
Qty: 180 TABLET | Refills: 1 | Status: ON HOLD | OUTPATIENT
Start: 2022-08-02 | End: 2022-10-17 | Stop reason: SDUPTHER

## 2022-08-02 NOTE — PROGRESS NOTES
Subjective:       Patient ID: Lavon Brandon is a 89 y.o. male.    Chief Complaint: Follow-up    HPI   88 y/o male patient with medical problems listed below presents for follow up. Patient last seen in the clinic was in 4/2022. Patient was accompanied by wife. Patient walks with walker. States he has been adherent with medication regimen. States he is doing good. Mrs. Brandon states patient fell 2 months ago while he went out to get the mail without walker. Did not hurt himself so did not seek emergent care.   Patient states takes vitamin b 12 over the counter  Patient follows cardio and nephro    Patient Active Problem List   Diagnosis    Hypertension    Seizure disorder, post CVA 2011    Depression    Hyperlipidemia    Bradyarrhythmia, on amlodipine    Dementia with behavioral disturbance, unspecified dementia type    Constipation - functional    Personal history of colonic polyps    TBI (traumatic brain injury), [place] war    History of CVA in adulthood    Dizziness, off balance, since TIA, 2007    MARLIN (obstructive sleep apnea), 2008, not on Rx    Abnormal EKG    OA (osteoarthritis)    CKD (chronic kidney disease), stage III    Secondary hyperparathyroidism of renal origin    Thrombocytopenia    Chronic renal disease, stage IV    Anemia of chronic renal failure, stage 4 (severe)    Centrencephalic epilepsy    Chronic arthritis      Review of patient's allergies indicates:   Allergen Reactions    Ciprofloxacin (bulk) Swelling     Past Surgical History:   Procedure Laterality Date    left foot  with crushed injry          Current Outpatient Medications:     amLODIPine (NORVASC) 5 MG tablet, Take 1 tablet (5 mg total) by mouth once daily., Disp: 90 tablet, Rfl: 3    aspirin 81 MG Chew, Take 1 tablet (81 mg total) by mouth once daily., Disp: 100 tablet, Rfl: 1    citalopram (CELEXA) 20 MG tablet, Take 1 tablet by mouth once daily, Disp: 90 tablet, Rfl: 11    levETIRAcetam (KEPPRA) 500  "MG Tab, Take 1 tablet (500 mg total) by mouth 2 (two) times daily., Disp: 180 tablet, Rfl: 3    benazepriL (LOTENSIN) 10 MG tablet, Take 1 tablet (10 mg total) by mouth 2 (two) times daily., Disp: 180 tablet, Rfl: 1    calcitRIOL (ROCALTROL) 0.25 MCG Cap, Take 0.25 mcg by mouth once daily., Disp: , Rfl:     cholecalciferol, vitamin D3, (VITAMIN D3) 50 mcg (2,000 unit) Cap, Take 1 capsule by mouth once daily., Disp: , Rfl:     rosuvastatin (CRESTOR) 10 MG tablet, Take 1 tablet (10 mg total) by mouth once daily. (Patient not taking: Reported on 8/2/2022), Disp: 90 tablet, Rfl: 3    Lab Results   Component Value Date    WBC 5.62 11/02/2021    HGB 11.7 (L) 11/02/2021    HCT 37.2 (L) 11/02/2021     (L) 11/02/2021    CHOL 185 11/02/2021    TRIG 63 11/02/2021    HDL 53 11/02/2021    ALT 9 (L) 11/02/2021    AST 16 11/02/2021     04/19/2022    K 4.7 04/19/2022     04/19/2022    CREATININE 2.4 (H) 04/19/2022    BUN 29 (H) 04/19/2022    CO2 24 04/19/2022    TSH 1.675 11/02/2021    PSA 1.5 01/07/2010    INR 0.9 01/10/2005    HGBA1C 5.8 12/06/2016     Above labs reviewed    Review of Systems   Constitutional: Negative for chills and fever.   Respiratory: Negative for chest tightness and shortness of breath.    Cardiovascular: Negative for chest pain and palpitations.   Gastrointestinal: Negative for abdominal pain.   Neurological: Negative for dizziness and headaches.       Objective:   BP (!) 110/58 (BP Location: Right arm, Patient Position: Sitting, BP Method: Medium (Manual))   Pulse (!) 57   Temp 98.5 °F (36.9 °C) (Oral)   Ht 5' 9" (1.753 m)   Wt 88.3 kg (194 lb 10.7 oz)   SpO2 95%   BMI 28.75 kg/m²       Physical Exam  Vitals reviewed.   Constitutional:       General: He is not in acute distress.     Appearance: Normal appearance.   HENT:      Mouth/Throat:      Mouth: Mucous membranes are moist.   Cardiovascular:      Rate and Rhythm: Normal rate and regular rhythm.      Pulses: Normal pulses. "      Heart sounds: Normal heart sounds.   Pulmonary:      Effort: Pulmonary effort is normal.      Breath sounds: Normal breath sounds.   Chest:      Chest wall: No deformity, swelling or edema.   Abdominal:      General: Abdomen is flat. Bowel sounds are normal.      Palpations: Abdomen is soft.   Musculoskeletal:      Cervical back: Normal range of motion.   Skin:     General: Skin is warm and dry.   Neurological:      General: No focal deficit present.      Mental Status: He is alert.   Psychiatric:         Mood and Affect: Mood normal.         Behavior: Behavior normal.         Assessment:       1. Primary hypertension    2. Mixed hyperlipidemia    3. Chronic renal disease, stage IV    4. History of CVA in adulthood    5. Seizure disorder, post CVA 2011        Plan:       1. Primary hypertension  - Controlled and continue with current regimen  - benazepriL (LOTENSIN) 10 MG tablet; Take 1 tablet (10 mg total) by mouth 2 (two) times daily.  Dispense: 180 tablet; Refill: 1  - Comprehensive Metabolic Panel; Future  - CBC Auto Differential; Future    2. Mixed hyperlipidemia  - Lipid Panel; Future    3. Chronic renal disease, stage IV  - Continue to avoid nephrotoxic agent including NSAIDs  - Continue to follow with nephro    4. History of CVA in adulthood  - Continue with current bp medication    5. Seizure disorder, post CVA 2011  - continue with keppra    Fall precautions discussed    Patient with be reevaluated in 6 months or sooner brian Yusuf NP

## 2022-10-12 ENCOUNTER — TELEPHONE (OUTPATIENT)
Dept: FAMILY MEDICINE | Facility: CLINIC | Age: 87
End: 2022-10-12
Payer: MEDICARE

## 2022-10-12 NOTE — TELEPHONE ENCOUNTER
----- Message from Tammy Nova sent at 10/12/2022  2:11 PM CDT -----  Contact: pt  Type:  Sooner Appointment Request  Caller is requesting a sooner appointment.  Caller declined first available appointment listed below.  Caller will not accept being placed on the waitlist and is requesting a message be sent to doctor.  Name of Caller:  pt  When is the first available appointment?  N/a  Symptoms:  falling  Best Call Back Number:  [phone] or [phone]  Additional Information: pt is having constant falls, please call pt to advise.

## 2022-10-13 ENCOUNTER — TELEPHONE (OUTPATIENT)
Dept: FAMILY MEDICINE | Facility: CLINIC | Age: 87
End: 2022-10-13
Payer: MEDICARE

## 2022-10-13 NOTE — TELEPHONE ENCOUNTER
Spoke to patient daughter via phone. States she is scheduling appointment on other line for tomorrow with np.

## 2022-10-13 NOTE — TELEPHONE ENCOUNTER
----- Message from Corrine Cade sent at 10/13/2022  4:12 PM CDT -----  Contact: self  Type: Sooner Appointment Request        Caller is requesting a sooner appointment. Caller declined first available appointment listed below. Caller will not accept being placed on the waitlist and is requesting a message be sent to doctor.        Name of Caller: patient   When is the first available appointment? 10/14/2022  Symptoms: Pt is falling and losing balance a lot/ follow up/ pt is not eating or wants to get out of bed/ and not using his walker  Best Call Back Number: [phone]  Additional Information: Pt is falling and losing balance a lot/ follow up/ pt is not eating or wants to get out of bed/ and not using his walker/ medications needd to be checked/ has had seizures in the past. Pt states he really needs to be seen

## 2022-10-14 ENCOUNTER — HOSPITAL ENCOUNTER (OUTPATIENT)
Facility: HOSPITAL | Age: 87
Discharge: HOME HEALTH | End: 2022-10-17
Attending: EMERGENCY MEDICINE | Admitting: STUDENT IN AN ORGANIZED HEALTH CARE EDUCATION/TRAINING PROGRAM
Payer: MEDICARE

## 2022-10-14 DIAGNOSIS — R29.6 FREQUENT FALLS: Primary | ICD-10-CM

## 2022-10-14 DIAGNOSIS — I10 PRIMARY HYPERTENSION: ICD-10-CM

## 2022-10-14 DIAGNOSIS — R74.01 ELEVATED AST (SGOT): ICD-10-CM

## 2022-10-14 DIAGNOSIS — R07.9 CHEST PAIN: ICD-10-CM

## 2022-10-14 DIAGNOSIS — R53.1 GENERALIZED WEAKNESS: ICD-10-CM

## 2022-10-14 DIAGNOSIS — R53.81 DEBILITY: ICD-10-CM

## 2022-10-14 DIAGNOSIS — N18.4 STAGE 4 CHRONIC KIDNEY DISEASE: ICD-10-CM

## 2022-10-14 DIAGNOSIS — F43.21 GRIEF REACTION: ICD-10-CM

## 2022-10-14 DIAGNOSIS — U07.1 COVID-19: ICD-10-CM

## 2022-10-14 PROBLEM — D69.6 THROMBOCYTOPENIA: Chronic | Status: ACTIVE | Noted: 2019-05-06

## 2022-10-14 PROBLEM — N18.30 CKD (CHRONIC KIDNEY DISEASE), STAGE III: Status: RESOLVED | Noted: 2017-08-21 | Resolved: 2022-10-14

## 2022-10-14 LAB
ALBUMIN SERPL BCP-MCNC: 2.9 G/DL (ref 3.5–5.2)
ALP SERPL-CCNC: 39 U/L (ref 55–135)
ALT SERPL W/O P-5'-P-CCNC: 29 U/L (ref 10–44)
ANION GAP SERPL CALC-SCNC: 8 MMOL/L (ref 8–16)
AST SERPL-CCNC: 100 U/L (ref 10–40)
BACTERIA #/AREA URNS HPF: ABNORMAL /HPF
BASOPHILS # BLD AUTO: 0.02 K/UL (ref 0–0.2)
BASOPHILS NFR BLD: 0.5 % (ref 0–1.9)
BILIRUB SERPL-MCNC: 0.4 MG/DL (ref 0.1–1)
BILIRUB UR QL STRIP: NEGATIVE
BUN SERPL-MCNC: 28 MG/DL (ref 8–23)
CALCIUM SERPL-MCNC: 8.6 MG/DL (ref 8.7–10.5)
CHLORIDE SERPL-SCNC: 106 MMOL/L (ref 95–110)
CLARITY UR: CLEAR
CO2 SERPL-SCNC: 23 MMOL/L (ref 23–29)
COLOR UR: YELLOW
CREAT SERPL-MCNC: 2.4 MG/DL (ref 0.5–1.4)
DIFFERENTIAL METHOD: ABNORMAL
EOSINOPHIL # BLD AUTO: 0 K/UL (ref 0–0.5)
EOSINOPHIL NFR BLD: 0.2 % (ref 0–8)
ERYTHROCYTE [DISTWIDTH] IN BLOOD BY AUTOMATED COUNT: 15.1 % (ref 11.5–14.5)
EST. GFR  (NO RACE VARIABLE): 25 ML/MIN/1.73 M^2
ETHANOL SERPL-MCNC: <10 MG/DL
GLUCOSE SERPL-MCNC: 80 MG/DL (ref 70–110)
GLUCOSE UR QL STRIP: NEGATIVE
GRAN CASTS #/AREA URNS LPF: 4 /LPF
HCT VFR BLD AUTO: 36.6 % (ref 40–54)
HGB BLD-MCNC: 11.8 G/DL (ref 14–18)
HGB UR QL STRIP: ABNORMAL
HYALINE CASTS #/AREA URNS LPF: 0 /LPF
IMM GRANULOCYTES # BLD AUTO: 0.01 K/UL (ref 0–0.04)
IMM GRANULOCYTES NFR BLD AUTO: 0.2 % (ref 0–0.5)
KETONES UR QL STRIP: ABNORMAL
LACTATE SERPL-SCNC: 1.4 MMOL/L (ref 0.5–2.2)
LEUKOCYTE ESTERASE UR QL STRIP: NEGATIVE
LYMPHOCYTES # BLD AUTO: 1.4 K/UL (ref 1–4.8)
LYMPHOCYTES NFR BLD: 33.4 % (ref 18–48)
MCH RBC QN AUTO: 28.9 PG (ref 27–31)
MCHC RBC AUTO-ENTMCNC: 32.2 G/DL (ref 32–36)
MCV RBC AUTO: 90 FL (ref 82–98)
MICROSCOPIC COMMENT: ABNORMAL
MONOCYTES # BLD AUTO: 0.7 K/UL (ref 0.3–1)
MONOCYTES NFR BLD: 15.9 % (ref 4–15)
NEUTROPHILS # BLD AUTO: 2 K/UL (ref 1.8–7.7)
NEUTROPHILS NFR BLD: 49.8 % (ref 38–73)
NITRITE UR QL STRIP: NEGATIVE
NRBC BLD-RTO: 0 /100 WBC
PH UR STRIP: 6 [PH] (ref 5–8)
PLATELET # BLD AUTO: 84 K/UL (ref 150–450)
PMV BLD AUTO: 12.3 FL (ref 9.2–12.9)
POTASSIUM SERPL-SCNC: 4.6 MMOL/L (ref 3.5–5.1)
PROT SERPL-MCNC: 5.4 G/DL (ref 6–8.4)
PROT UR QL STRIP: ABNORMAL
RBC # BLD AUTO: 4.09 M/UL (ref 4.6–6.2)
RBC #/AREA URNS HPF: 3 /HPF (ref 0–4)
SARS-COV-2 RDRP RESP QL NAA+PROBE: POSITIVE
SODIUM SERPL-SCNC: 137 MMOL/L (ref 136–145)
SP GR UR STRIP: 1.02 (ref 1–1.03)
TSH SERPL DL<=0.005 MIU/L-ACNC: 1.75 UIU/ML (ref 0.4–4)
URN SPEC COLLECT METH UR: ABNORMAL
UROBILINOGEN UR STRIP-ACNC: 1 EU/DL
VIT B12 SERPL-MCNC: 539 PG/ML (ref 210–950)
WBC # BLD AUTO: 4.1 K/UL (ref 3.9–12.7)
WBC #/AREA URNS HPF: 1 /HPF (ref 0–5)

## 2022-10-14 PROCEDURE — 96361 HYDRATE IV INFUSION ADD-ON: CPT

## 2022-10-14 PROCEDURE — G0378 HOSPITAL OBSERVATION PER HR: HCPCS

## 2022-10-14 PROCEDURE — 82607 VITAMIN B-12: CPT | Performed by: STUDENT IN AN ORGANIZED HEALTH CARE EDUCATION/TRAINING PROGRAM

## 2022-10-14 PROCEDURE — 63600175 PHARM REV CODE 636 W HCPCS: Performed by: STUDENT IN AN ORGANIZED HEALTH CARE EDUCATION/TRAINING PROGRAM

## 2022-10-14 PROCEDURE — 96360 HYDRATION IV INFUSION INIT: CPT

## 2022-10-14 PROCEDURE — 93010 EKG 12-LEAD: ICD-10-PCS | Mod: ,,, | Performed by: SPECIALIST

## 2022-10-14 PROCEDURE — 83605 ASSAY OF LACTIC ACID: CPT | Performed by: PHYSICIAN ASSISTANT

## 2022-10-14 PROCEDURE — 25000003 PHARM REV CODE 250: Performed by: STUDENT IN AN ORGANIZED HEALTH CARE EDUCATION/TRAINING PROGRAM

## 2022-10-14 PROCEDURE — 81000 URINALYSIS NONAUTO W/SCOPE: CPT | Performed by: PHYSICIAN ASSISTANT

## 2022-10-14 PROCEDURE — 36415 COLL VENOUS BLD VENIPUNCTURE: CPT | Performed by: PHYSICIAN ASSISTANT

## 2022-10-14 PROCEDURE — 99285 EMERGENCY DEPT VISIT HI MDM: CPT | Mod: 25

## 2022-10-14 PROCEDURE — 93005 ELECTROCARDIOGRAM TRACING: CPT

## 2022-10-14 PROCEDURE — 84425 ASSAY OF VITAMIN B-1: CPT | Performed by: STUDENT IN AN ORGANIZED HEALTH CARE EDUCATION/TRAINING PROGRAM

## 2022-10-14 PROCEDURE — 84207 ASSAY OF VITAMIN B-6: CPT | Performed by: STUDENT IN AN ORGANIZED HEALTH CARE EDUCATION/TRAINING PROGRAM

## 2022-10-14 PROCEDURE — 84443 ASSAY THYROID STIM HORMONE: CPT | Performed by: STUDENT IN AN ORGANIZED HEALTH CARE EDUCATION/TRAINING PROGRAM

## 2022-10-14 PROCEDURE — 80177 DRUG SCRN QUAN LEVETIRACETAM: CPT | Performed by: PHYSICIAN ASSISTANT

## 2022-10-14 PROCEDURE — 93010 ELECTROCARDIOGRAM REPORT: CPT | Mod: ,,, | Performed by: SPECIALIST

## 2022-10-14 PROCEDURE — 85025 COMPLETE CBC W/AUTO DIFF WBC: CPT | Performed by: PHYSICIAN ASSISTANT

## 2022-10-14 PROCEDURE — 36415 COLL VENOUS BLD VENIPUNCTURE: CPT | Performed by: STUDENT IN AN ORGANIZED HEALTH CARE EDUCATION/TRAINING PROGRAM

## 2022-10-14 PROCEDURE — 25000003 PHARM REV CODE 250: Performed by: PHYSICIAN ASSISTANT

## 2022-10-14 PROCEDURE — 80053 COMPREHEN METABOLIC PANEL: CPT | Performed by: PHYSICIAN ASSISTANT

## 2022-10-14 PROCEDURE — 82077 ASSAY SPEC XCP UR&BREATH IA: CPT | Performed by: PHYSICIAN ASSISTANT

## 2022-10-14 PROCEDURE — U0002 COVID-19 LAB TEST NON-CDC: HCPCS | Performed by: STUDENT IN AN ORGANIZED HEALTH CARE EDUCATION/TRAINING PROGRAM

## 2022-10-14 RX ORDER — CHOLECALCIFEROL (VITAMIN D3) 25 MCG
2000 TABLET ORAL DAILY
Status: DISCONTINUED | OUTPATIENT
Start: 2022-10-15 | End: 2022-10-17 | Stop reason: HOSPADM

## 2022-10-14 RX ORDER — SODIUM,POTASSIUM PHOSPHATES 280-250MG
2 POWDER IN PACKET (EA) ORAL
Status: DISCONTINUED | OUTPATIENT
Start: 2022-10-14 | End: 2022-10-17 | Stop reason: HOSPADM

## 2022-10-14 RX ORDER — NALOXONE HCL 0.4 MG/ML
0.02 VIAL (ML) INJECTION
Status: DISCONTINUED | OUTPATIENT
Start: 2022-10-14 | End: 2022-10-17 | Stop reason: HOSPADM

## 2022-10-14 RX ORDER — SODIUM CHLORIDE, SODIUM LACTATE, POTASSIUM CHLORIDE, CALCIUM CHLORIDE 600; 310; 30; 20 MG/100ML; MG/100ML; MG/100ML; MG/100ML
INJECTION, SOLUTION INTRAVENOUS CONTINUOUS
Status: DISCONTINUED | OUTPATIENT
Start: 2022-10-14 | End: 2022-10-17 | Stop reason: HOSPADM

## 2022-10-14 RX ORDER — CITALOPRAM 10 MG/1
20 TABLET ORAL DAILY
Status: DISCONTINUED | OUTPATIENT
Start: 2022-10-15 | End: 2022-10-17 | Stop reason: HOSPADM

## 2022-10-14 RX ORDER — HYDROCODONE BITARTRATE AND ACETAMINOPHEN 10; 325 MG/1; MG/1
1 TABLET ORAL EVERY 6 HOURS PRN
Status: DISCONTINUED | OUTPATIENT
Start: 2022-10-14 | End: 2022-10-17 | Stop reason: HOSPADM

## 2022-10-14 RX ORDER — LEVETIRACETAM 500 MG/1
500 TABLET ORAL 2 TIMES DAILY
Status: DISCONTINUED | OUTPATIENT
Start: 2022-10-14 | End: 2022-10-17 | Stop reason: HOSPADM

## 2022-10-14 RX ORDER — SODIUM CHLORIDE 0.9 % (FLUSH) 0.9 %
10 SYRINGE (ML) INJECTION EVERY 12 HOURS PRN
Status: DISCONTINUED | OUTPATIENT
Start: 2022-10-14 | End: 2022-10-17 | Stop reason: HOSPADM

## 2022-10-14 RX ORDER — ATORVASTATIN CALCIUM 40 MG/1
40 TABLET, FILM COATED ORAL NIGHTLY
Status: DISCONTINUED | OUTPATIENT
Start: 2022-10-14 | End: 2022-10-17 | Stop reason: HOSPADM

## 2022-10-14 RX ORDER — IBUPROFEN 200 MG
16 TABLET ORAL
Status: DISCONTINUED | OUTPATIENT
Start: 2022-10-14 | End: 2022-10-17 | Stop reason: HOSPADM

## 2022-10-14 RX ORDER — AMLODIPINE BESYLATE 5 MG/1
5 TABLET ORAL
Status: COMPLETED | OUTPATIENT
Start: 2022-10-14 | End: 2022-10-14

## 2022-10-14 RX ORDER — ACETAMINOPHEN 325 MG/1
650 TABLET ORAL EVERY 6 HOURS PRN
Status: DISCONTINUED | OUTPATIENT
Start: 2022-10-14 | End: 2022-10-17 | Stop reason: HOSPADM

## 2022-10-14 RX ORDER — GLUCAGON 1 MG
1 KIT INJECTION
Status: DISCONTINUED | OUTPATIENT
Start: 2022-10-14 | End: 2022-10-17 | Stop reason: HOSPADM

## 2022-10-14 RX ORDER — LANOLIN ALCOHOL/MO/W.PET/CERES
800 CREAM (GRAM) TOPICAL
Status: DISCONTINUED | OUTPATIENT
Start: 2022-10-14 | End: 2022-10-17 | Stop reason: HOSPADM

## 2022-10-14 RX ORDER — TALC
6 POWDER (GRAM) TOPICAL NIGHTLY PRN
Status: DISCONTINUED | OUTPATIENT
Start: 2022-10-14 | End: 2022-10-17 | Stop reason: HOSPADM

## 2022-10-14 RX ORDER — CALCITRIOL 0.25 UG/1
0.25 CAPSULE ORAL DAILY
Status: DISCONTINUED | OUTPATIENT
Start: 2022-10-15 | End: 2022-10-17 | Stop reason: HOSPADM

## 2022-10-14 RX ORDER — MAG HYDROX/ALUMINUM HYD/SIMETH 200-200-20
30 SUSPENSION, ORAL (FINAL DOSE FORM) ORAL 4 TIMES DAILY PRN
Status: DISCONTINUED | OUTPATIENT
Start: 2022-10-14 | End: 2022-10-17 | Stop reason: HOSPADM

## 2022-10-14 RX ORDER — ONDANSETRON 8 MG/1
8 TABLET, ORALLY DISINTEGRATING ORAL EVERY 8 HOURS PRN
Status: DISCONTINUED | OUTPATIENT
Start: 2022-10-14 | End: 2022-10-17 | Stop reason: HOSPADM

## 2022-10-14 RX ORDER — IBUPROFEN 200 MG
24 TABLET ORAL
Status: DISCONTINUED | OUTPATIENT
Start: 2022-10-14 | End: 2022-10-17 | Stop reason: HOSPADM

## 2022-10-14 RX ORDER — LISINOPRIL 10 MG/1
10 TABLET ORAL DAILY
Status: DISCONTINUED | OUTPATIENT
Start: 2022-10-15 | End: 2022-10-17

## 2022-10-14 RX ORDER — AMLODIPINE BESYLATE 5 MG/1
5 TABLET ORAL DAILY
Status: DISCONTINUED | OUTPATIENT
Start: 2022-10-15 | End: 2022-10-17 | Stop reason: HOSPADM

## 2022-10-14 RX ORDER — HYDROCODONE BITARTRATE AND ACETAMINOPHEN 5; 325 MG/1; MG/1
1 TABLET ORAL EVERY 6 HOURS PRN
Status: DISCONTINUED | OUTPATIENT
Start: 2022-10-14 | End: 2022-10-17 | Stop reason: HOSPADM

## 2022-10-14 RX ADMIN — ATORVASTATIN CALCIUM 40 MG: 40 TABLET, FILM COATED ORAL at 09:10

## 2022-10-14 RX ADMIN — LEVETIRACETAM 500 MG: 500 TABLET, FILM COATED ORAL at 09:10

## 2022-10-14 RX ADMIN — SODIUM CHLORIDE, SODIUM LACTATE, POTASSIUM CHLORIDE, AND CALCIUM CHLORIDE: .6; .31; .03; .02 INJECTION, SOLUTION INTRAVENOUS at 04:10

## 2022-10-14 RX ADMIN — LEVETIRACETAM 500 MG: 500 TABLET, FILM COATED ORAL at 11:10

## 2022-10-14 RX ADMIN — SODIUM CHLORIDE 1000 ML: 0.9 INJECTION, SOLUTION INTRAVENOUS at 10:10

## 2022-10-14 RX ADMIN — AMLODIPINE BESYLATE 5 MG: 5 TABLET ORAL at 01:10

## 2022-10-14 NOTE — ASSESSMENT & PLAN NOTE
Found on screening  Unclear if partially responsible for his acute decline but likely so  Precautions  No respiratory symptoms or disease on CXR  Avoiding lovenox due to thrombocytopenia  Continue to monitor

## 2022-10-14 NOTE — ASSESSMENT & PLAN NOTE
Chronic, stable  Seizure precautions  F/u keppra level  Continue keppra   R2 GYN Progress Note    Patient seen and examined at bedside, no acute overnight events. No acute complaints, pain well controlled.  Patient is ambulating, passing flatus, voiding spontaneously, and tolerating clear diet. Denies CP, SOB, N/V, fevers, and chills.    Vital Signs Last 24 Hours  T(C): 36.8 (07-13-17 @ 05:30), Max: 37 (07-12-17 @ 21:55)  HR: 67 (07-13-17 @ 05:30) (62 - 74)  BP: 140/82 (07-13-17 @ 05:30) (137/87 - 141/82)  RR: 18 (07-13-17 @ 05:30) (18 - 18)  SpO2: 100% (07-13-17 @ 05:30) (99% - 100%)    I&O's Summary    12 Jul 2017 07:01  -  13 Jul 2017 07:00  --------------------------------------------------------  IN: 560 mL / OUT: 1900 mL / NET: -1340 mL        Physical Exam:  General: NAD  CV: NR, RR, S1, S2, no M/R/G  Lungs: CTA-B  Abdomen: Soft, non-tender, non-distended, +BS  Incision: R puncture site, C/D/I  Ext: No pain or swelling, pedal pulses 2+ bilaterally    Labs:      MEDICATIONS  (STANDING):  HYDROmorphone PCA (1 mG/mL) 30 milliLiter(s) PCA Continuous PCA Continuous  sodium chloride 0.9%. 1000 milliLiter(s) (40 mL/Hr) IV Continuous <Continuous>  ciprofloxacin   IVPB 400 milliGRAM(s) IV Intermittent every 12 hours    MEDICATIONS  (PRN):  HYDROmorphone PCA (1 mG/mL) Rescue Clinician Bolus 1 milliGRAM(s) IV Push every 15 minutes PRN for Pain Scale GREATER THAN 6  naloxone Injectable 0.1 milliGRAM(s) IV Push every 3 minutes PRN For ANY of the following changes in patient status:  A. RR LESS THAN 10 breaths per minute, B. Oxygen saturation LESS THAN 90%, C. Sedation score of 6  ondansetron Injectable 4 milliGRAM(s) IV Push every 6 hours PRN Nausea  diphenhydrAMINE   Injectable 25 milliGRAM(s) IV Push every 4 hours PRN Pruritus  metoclopramide Injectable 10 milliGRAM(s) IV Push every 4 hours PRN Nausea and/or Vomiting

## 2022-10-14 NOTE — ASSESSMENT & PLAN NOTE
Acute on chronic  Multifactorial with recent death of spouse and new covid diagnosis with underlying dementia and physical deconditioning  Consult to PT/OT/CM

## 2022-10-14 NOTE — SUBJECTIVE & OBJECTIVE
Past Medical History:   Diagnosis Date    Bradyarrhythmia     CVA (cerebral infarction) 2006    Dementia     Depression     Hyperlipidemia     Hypertension     renal htn    Pulmonary embolism 2002    Seizure disorder        Past Surgical History:   Procedure Laterality Date    left foot  with crushed injry         Review of patient's allergies indicates:   Allergen Reactions    Ciprofloxacin (bulk) Swelling       No current facility-administered medications on file prior to encounter.     Current Outpatient Medications on File Prior to Encounter   Medication Sig    amLODIPine (NORVASC) 5 MG tablet Take 1 tablet (5 mg total) by mouth once daily.    aspirin 81 MG Chew Take 1 tablet (81 mg total) by mouth once daily.    benazepriL (LOTENSIN) 10 MG tablet Take 1 tablet (10 mg total) by mouth 2 (two) times daily.    calcitRIOL (ROCALTROL) 0.25 MCG Cap Take 0.25 mcg by mouth once daily.    cholecalciferol, vitamin D3, (VITAMIN D3) 50 mcg (2,000 unit) Cap Take 1 capsule by mouth once daily.    citalopram (CELEXA) 20 MG tablet Take 1 tablet by mouth once daily    levETIRAcetam (KEPPRA) 500 MG Tab Take 1 tablet (500 mg total) by mouth 2 (two) times daily.    rosuvastatin (CRESTOR) 10 MG tablet Take 1 tablet (10 mg total) by mouth once daily. (Patient not taking: Reported on 8/2/2022)     Family History       Problem Relation (Age of Onset)    Diabetes Mother          Tobacco Use    Smoking status: Never    Smokeless tobacco: Never   Substance and Sexual Activity    Alcohol use: No    Drug use: No    Sexual activity: Not on file     Review of Systems   Unable to perform ROS: Dementia   Respiratory:  Negative for shortness of breath.    Cardiovascular:  Negative for chest pain.   Gastrointestinal:  Negative for abdominal pain.   Musculoskeletal:  Positive for back pain.   Objective:     Vital Signs (Most Recent):  Temp: 98.4 °F (36.9 °C) (10/14/22 0907)  Pulse: (!) 52 (10/14/22 1330)  Resp: 18 (10/14/22 1332)  BP: (!) 175/116  (10/14/22 1332)  SpO2: 98 % (10/14/22 1332)   Vital Signs (24h Range):  Temp:  [98.4 °F (36.9 °C)] 98.4 °F (36.9 °C)  Pulse:  [52-57] 52  Resp:  [17-18] 18  SpO2:  [97 %-98 %] 98 %  BP: (162-187)/() 175/116     Weight: 88.5 kg (195 lb)  Body mass index is 28.8 kg/m².    Physical Exam  Vitals reviewed.   Constitutional:       General: He is not in acute distress.     Appearance: Normal appearance. He is not ill-appearing.   HENT:      Head: Normocephalic and atraumatic.      Right Ear: External ear normal.      Left Ear: External ear normal.      Nose: Nose normal.      Mouth/Throat:      Mouth: Mucous membranes are moist.      Pharynx: Oropharynx is clear.   Eyes:      General:         Right eye: No discharge.         Left eye: No discharge.      Conjunctiva/sclera: Conjunctivae normal.   Cardiovascular:      Rate and Rhythm: Normal rate and regular rhythm.      Pulses: Normal pulses.      Heart sounds: Normal heart sounds. No murmur heard.  Pulmonary:      Effort: Pulmonary effort is normal. No respiratory distress.      Breath sounds: Normal breath sounds. No wheezing, rhonchi or rales.   Abdominal:      General: Abdomen is flat. Bowel sounds are normal. There is no distension.      Palpations: Abdomen is soft.      Tenderness: There is no abdominal tenderness.   Musculoskeletal:         General: No swelling or signs of injury.      Cervical back: Neck supple. No rigidity.   Skin:     General: Skin is warm and dry.      Findings: No rash.   Neurological:      Mental Status: He is alert. He is disoriented.      Cranial Nerves: No cranial nerve deficit.      Comments: AAOx2 - name and location  Daughter states near baseline but more drowsy   Psychiatric:         Mood and Affect: Affect normal.         Behavior: Behavior is slowed. Behavior is cooperative.         Cognition and Memory: Cognition is impaired.           Significant Labs: All pertinent labs within the past 24 hours have been  reviewed.    Significant Imaging: I have reviewed all pertinent imaging results/findings within the past 24 hours.

## 2022-10-14 NOTE — HPI
89M with PMH dementia, HTN, HLD, bradycardia, CKD4, epilepsy, depression, and thrombocytopenia presents to ER due to 1 week of decreased PO intake, increased confusion, and increased generalized weakness. He has had increased frequents falls as well. Complaining of pain to lower back that is mild to moderate without radiation. No other associated symptoms. No treatments tried. History somewhat limited as he keeps referring to when he was in the . Daughter Gabriela at bedside helps with history. Daughter states patient barely eating now and last 2 days he has not gotten out of bed to walk. He usually is supposed to ambulate with walker but doesn't always use it. Daughter attributes this decline to the recent sudden death of the patient's spouse 1 month ago. Incidentally found to be COVID+ on screen. No significant trauma related to falls on ER evaluation and imaging.

## 2022-10-14 NOTE — ED PROVIDER NOTES
Encounter Date: 10/14/2022    SCRIBE #1 NOTE: I,  Yan, am scribing for, and in the presence of,  Arin Nelson PA-C. the EKG reading.     History     Chief Complaint   Patient presents with    Generalized Weakness     Worsening over the past week with falls -- per family pt has had decreased oral intake since recent death of wife     Is an 89-year-old male who presents for multiple complaints; PMH dementia, depression, HLD, HTN, seizure disorder, history CVA, bradyarrhythmia.  Family reports that over the past week, patient has had a significant decline in performing ADLs.  He has had frequent falls, is not eating or drinking much, is not taking his medicine.  Wife passed last month, and they feel this is likely related.  Mildly increased confusion compared to baseline.  Patient denies any pain at this time.  Last fell yesterday.   No fevers. VS and BG stable with EMS.  The patients available PMH, PSH, Social History, medications, allergies, and triage vital signs were reviewed just prior to their medical evaluation.  A ten point review of systems was completed and is negative except as documented above.  Patient denies any other acute medical complaint.          Review of patient's allergies indicates:   Allergen Reactions    Ciprofloxacin (bulk) Swelling     Past Medical History:   Diagnosis Date    Bradyarrhythmia     CVA (cerebral infarction) 2006    Dementia     Depression     Hyperlipidemia     Hypertension     renal htn    Pulmonary embolism 2002    Seizure disorder      Past Surgical History:   Procedure Laterality Date    left foot  with crushed injry       Family History   Problem Relation Age of Onset    Diabetes Mother     Cancer Neg Hx      Social History     Tobacco Use    Smoking status: Never    Smokeless tobacco: Never   Substance Use Topics    Alcohol use: No    Drug use: No     Review of Systems   Unable to perform ROS: Dementia   Constitutional:  Negative for chills and fever.    Respiratory:  Negative for shortness of breath.    Cardiovascular:  Negative for chest pain.   Gastrointestinal:  Negative for abdominal pain.     Physical Exam     Initial Vitals [10/14/22 0907]   BP Pulse Resp Temp SpO2   (!) 162/67 (!) 54 17 98.4 °F (36.9 °C) 98 %      MAP       --         Physical Exam    Nursing note and vitals reviewed.  Constitutional: He appears well-developed and well-nourished. He is not diaphoretic. No distress.   Nontoxic-appearing   HENT:   Head: Normocephalic.   Right Ear: External ear normal.   Left Ear: External ear normal.   Mouth/Throat: Oropharynx is clear and moist.   Bruising to nasal bridge, nontender, no swelling   Eyes: Conjunctivae and EOM are normal. Pupils are equal, round, and reactive to light. No scleral icterus.   Neck:   Spinous processes non tender throughout   Cardiovascular:  Regular rhythm and intact distal pulses.           Mildly bradycardic   Pulmonary/Chest: Breath sounds normal. No respiratory distress. He has no wheezes. He has no rhonchi. He has no rales. He exhibits no tenderness.   Abdominal: Abdomen is soft. Bowel sounds are normal. There is no abdominal tenderness. There is no rebound and no guarding.   Musculoskeletal:         General: No edema. Normal range of motion.     Neurological: He is alert. He has normal strength. No cranial nerve deficit or sensory deficit.   Oriented to person, place, not time or situation  No focal strength deficits   Skin: Skin is warm and dry. Capillary refill takes less than 2 seconds.   Bruises in different stages of healing, bilateral lower extremities  Abrasions left hand   Psychiatric: He has a normal mood and affect.       ED Course   Procedures  Labs Reviewed   CBC W/ AUTO DIFFERENTIAL - Abnormal; Notable for the following components:       Result Value    RBC 4.09 (*)     Hemoglobin 11.8 (*)     Hematocrit 36.6 (*)     RDW 15.1 (*)     Platelets 84 (*)     Mono % 15.9 (*)     All other components within normal  limits   COMPREHENSIVE METABOLIC PANEL - Abnormal; Notable for the following components:    BUN 28 (*)     Creatinine 2.4 (*)     Calcium 8.6 (*)     Total Protein 5.4 (*)     Albumin 2.9 (*)     Alkaline Phosphatase 39 (*)      (*)     eGFR 25 (*)     All other components within normal limits   URINALYSIS, REFLEX TO URINE CULTURE - Abnormal; Notable for the following components:    Protein, UA 1+ (*)     Ketones, UA 1+ (*)     Occult Blood UA 3+ (*)     All other components within normal limits    Narrative:     Specimen Source->Urine   URINALYSIS MICROSCOPIC - Abnormal; Notable for the following components:    Granular Casts, UA 4 (*)     All other components within normal limits    Narrative:     Specimen Source->Urine   LACTIC ACID, PLASMA   ALCOHOL,MEDICAL (ETHANOL)   LEVETIRACETAM  (KEPPRA) LEVEL   SARS-COV-2 RDRP GENE     EKG Readings: (Independently Interpreted)   Initial Reading: No STEMI.   Sinus bradycardia at a rate of 53 bpm with left axis deviation and HI interval of 180 ms.   ECG Results              EKG 12-lead (In process)  Result time 10/14/22 11:42:16      In process by Interface, Lab In [facility] (10/14/22 11:42:16)                   Narrative:    Test Reason : R53.1,    Vent. Rate : 053 BPM     Atrial Rate : 053 BPM     P-R Int : 180 ms          QRS Dur : 108 ms      QT Int : 464 ms       P-R-T Axes : 053 -39 036 degrees     QTc Int : 435 ms    Sinus bradycardia  Left axis deviation  Abnormal ECG  When compared with ECG of 14-DEC-2021 14:06,  No significant change was found    Referred By: [email or web address]   SELF           Confirmed By:                                   Imaging Results              X-Ray Chest AP Portable (Final result)  Result time 10/14/22 10:37:15      Final result by Ashley Casey MD (10/14/22 10:37:15)                   Impression:      No acute cardiopulmonary disease      Electronically signed by: Ashley Casey MD  Date:    10/14/2022  Time:    10:37                Narrative:    EXAMINATION:  XR CHEST AP PORTABLE    CLINICAL HISTORY:  Weakness    TECHNIQUE:  Single frontal view of the chest was performed.    COMPARISON:  12/06/2016    FINDINGS:  Stable cardiomediastinal silhouette.  Lungs are clear of infiltrate.  There is no pleural effusion.                                       CT Cervical Spine Without Contrast (Final result)  Result time 10/14/22 11:43:40      Final result by Con Mccarthy MD (10/14/22 11:43:40)                   Impression:      No acute cervical spine injury.  Advanced multilevel degenerative change.    Enlarged, multinodular thyroid gland.  Consider thyroid ultrasound on outpatient basis.      Electronically signed by: Con Mccarthy MD  Date:    10/14/2022  Time:    11:43               Narrative:    EXAMINATION:  CT CERVICAL SPINE WITHOUT CONTRAST    CLINICAL HISTORY:  Neck trauma (Age >= 65y);    TECHNIQUE:  Low dose axial images, sagittal and coronal reformations were performed though the cervical spine.  Contrast was not administered.    COMPARISON:  None    FINDINGS:  There is no fracture or traumatic malalignment.  Advanced multilevel facet arthropathy is present there is moderate loss of disc height at C5-6 and C6-7 accompanied by marginal vertebral spurring.  There is no prevertebral soft tissue swelling.  The thyroid gland appears enlarged and multinodular.                                       CT Maxillofacial Without Contrast (Final result)  Result time 10/14/22 10:35:46      Final result by Ashley Casey MD (10/14/22 10:35:46)                   Impression:      Dental caries, absent teeth, periodontal resorption.  Small calcific flecks seen anterior to lower midline teeth and the maxilla age indeterminate and suggest small fracture fragments of teeth and example..  Indistinctness of the anterior margin the maxilla which could be due to small fracture fragments or osseous destruction..  Opacification of much of the paranasal  sinuses on the right.  Very slight opacification the inferior most portions of left mastoids.      Electronically signed by: Ashley Casey MD  Date:    10/14/2022  Time:    10:35               Narrative:    EXAMINATION:  CT MAXILLOFACIAL WITHOUT CONTRAST    CLINICAL HISTORY:  Facial trauma, blunt;    TECHNIQUE:  Low dose axial images, sagittal and coronal reformations were obtained through the face.  Contrast was not administered.    COMPARISON:  None    FINDINGS:  The mastoids appear well pneumatized on the right due.  Very slight opacification the inferior most portion of the left mastoids.  Complete opacification of the right side of the frontal sinus.  Opacification of the majority of the right side of the ethmoid sinus and right maxillary sinus.  Dental caries and absent teeth.  Large amount of periodontal resorption around upper right posterior molar.  Small fragment anterior to the lower midline teeth.  Indistinctness of the anterior cortex of the maxilla midline and this could represent small fracture fragments of teeth and maxilla or the indistinctness of the maxilla could be due periodontal disease.    No other fracture suggested.                                       CT Head Without Contrast (Final result)  Result time 10/14/22 10:27:08      Final result by Ashley Casey MD (10/14/22 10:27:08)                   Impression:      Mild involutional changes and findings suggesting microvascular ischemic change and old lacunar infarcts with no acute intracranial findings    Opacification of paranasal sinuses as described.      Electronically signed by: Ashley Casey MD  Date:    10/14/2022  Time:    10:27               Narrative:    EXAMINATION:  CT HEAD WITHOUT CONTRAST    CLINICAL HISTORY:  Head trauma, minor (Age >= 65y);    TECHNIQUE:  Low dose axial images were obtained through the head.  Coronal and sagittal reformations were also performed. Contrast was not  administered.    COMPARISON:  10/23/2014    FINDINGS:  Old lacunar infarct in the cedric.  Old left basal ganglia lacunar infarcts and old lacunar infarct in the head of the caudate nucleus on the left.  Decreased density in white matter suggesting microvascular ischemic change.  Mild dilatation of ventricles, sulci, fissures not appearing out of proportion for the patient's age.  No acute intracranial hemorrhage.  No intracranial mass effect.  No acute major vascular territory infarct.  Note is made that MRI is typically more sensitive than CT particular for detection of early or small nonhemorrhagic infarcts.  The calvarium appears intact.  Mastoids appear well pneumatized.  Near complete opacification the right maxillary sinus and right ethmoid sinus and complete opacification of right frontal sinus.  Periodontal resorption around upper right molar.    resorption around upper right molar.  Opacification the visualized paranasal sinuses is new compared to the prior exam.  Region the periodontal resorption was not included on the prior exam.  The lacunar infarct in the cedric is more apparent today consistent with evolution of an infarct.  The lacunar infarct in the head of the caudate nucleus is not definitely seen on the prior exam but appears old today.                                       Medications   sodium chloride 0.9% bolus 1,000 mL (1,000 mLs Intravenous New Bag 10/14/22 1045)   levETIRAcetam tablet 500 mg (500 mg Oral Given 10/14/22 1121)   amLODIPine tablet 5 mg (has no administration in time range)     Medical Decision Making:   History:   I obtained history from: someone other than patient.  Old Medical Records: I decided to obtain old medical records.  Old Records Summarized: records from clinic visits and records from previous admission(s).  Initial Assessment:   Acute decline ADLs, gen weakness. Wife passed last month. +frequent falls.  See above for trauma exam. LTAB, VSS, afebrile  Differential  "Diagnosis:   Dehydration, MATA, grief reaction, MDE, UTI, ICH, +/- seizures  Physical exam and history taking lower clinical suspicion for acute abdomen, acute chest, PRES, urosepsis  Independently Interpreted Test(s):   I have ordered and independently interpreted X-rays - see prior notes.  I have ordered and independently interpreted EKG Reading(s) - see prior notes  Clinical Tests:   Lab Tests: Ordered and Reviewed  Radiological Study: Ordered and Reviewed  Medical Tests: Ordered and Reviewed        Scribe Attestation:   Scribe #1: I performed the above scribed service and the documentation accurately describes the services I performed. I attest to the accuracy of the note.      ED Course as of 10/14/22 1231   Fri Oct 14, 2022   0947 Pulse(!): 54  Baseline [MF]   1011 Platelets(!): 84  New, previously mild thrombocytopenia 139k [MF]   1011 Hemoglobin(!): 11.8  baseline [MF]   1022 Ketones, UA(!): 1+ [MF]   1023 Occult Blood UA(!): 3+  Noninfectious UA [MF]   1058 Lactate, Jamie: 1.4 [MF]   1059 Creatinine(!): 2.4  Baseline  [MF]   1059 AST(!): 100  new [MF]   1100 CT Head Without Contrast  No acute findings- "microvascular ischemic change and old lacunar infarcts' of the cedric [MF]   1100 CT Maxillofacial Without Contrast  "Indistinctness of the anterior margin the maxilla which could be due to small fracture fragments or osseous destruction"  No ttp to maxilla [MF]   1100 X-Ray Chest AP Portable  No acute findings  [MF]   1201 CT Cervical Spine Without Contrast  No acute findings "Enlarged, multinodular thyroid gland.  Consider thyroid ultrasound on outpatient basis" []   1215 Discussed with family- given increase fall risk/gen weakness inability to ambulate independently, recommended observation for PT/OT. Should be able to return home with family and HH/PT [MF]   1221 Discussed with HM, in agreement. Admitted to their service []      ED Course User Index  [MF] Arin Nelson PA-C          Family agreed to " plan of care and voiced understanding.    Arin Nelson PA-C  I discussed the following case, diagnosis and plan of care with attending physician.         Clinical Impression:   Final diagnoses:  [R53.1] Generalized weakness  [F43.21] Grief reaction  [N18.4] Stage 4 chronic kidney disease  [R74.01] Elevated AST (SGOT)  [R29.6] Frequent falls (Primary)  [R53.81] Debility        ED Disposition Condition    Observation Stable                Arin Nelson PA-C  10/14/22 1233

## 2022-10-14 NOTE — ED NOTES
Pt alert, hx of dementia. No respiratory distress noted, vitals stable, IVF infusing. Pt family at the bedside updated with care.

## 2022-10-14 NOTE — H&P
[facility]  History & Physical    Patient Name: Lavon Brandon  MRN: [id number]  Patient Class: OP- Observation  Admission Date: 10/14/2022  Attending Physician: Ezequiel Sales MD  Primary Care Provider: Aristides Haynes MD         Patient information was obtained from patient, relative(s), past medical records and ER records.     Subjective:     Principal Problem:Debility    Chief Complaint:   Chief Complaint   Patient presents with    Generalized Weakness     Worsening over the past week with falls -- per family pt has had decreased oral intake since recent death of wife        HPI: 89M with PMH dementia, HTN, HLD, bradycardia, CKD4, epilepsy, depression, and thrombocytopenia presents to ER due to 1 week of decreased PO intake, increased confusion, and increased generalized weakness. He has had increased frequents falls as well. Complaining of pain to lower back that is mild to moderate without radiation. No other associated symptoms. No treatments tried. History somewhat limited as he keeps referring to when he was in the . Daughter Gabriela at bedside helps with history. Daughter states patient barely eating now and last 2 days he has not gotten out of bed to walk. He usually is supposed to ambulate with walker but doesn't always use it. Daughter attributes this decline to the recent sudden death of the patient's spouse 1 month ago. Incidentally found to be COVID+ on screen. No significant trauma related to falls on ER evaluation and imaging.       Past Medical History:   Diagnosis Date    Bradyarrhythmia     CVA (cerebral infarction) 2006    Dementia     Depression     Hyperlipidemia     Hypertension     renal htn    Pulmonary embolism 2002    Seizure disorder        Past Surgical History:   Procedure Laterality Date    left foot  with crushed injry         Review of patient's allergies indicates:   Allergen Reactions    Ciprofloxacin (bulk) Swelling       No current  facility-administered medications on file prior to encounter.     Current Outpatient Medications on File Prior to Encounter   Medication Sig    amLODIPine (NORVASC) 5 MG tablet Take 1 tablet (5 mg total) by mouth once daily.    aspirin 81 MG Chew Take 1 tablet (81 mg total) by mouth once daily.    benazepriL (LOTENSIN) 10 MG tablet Take 1 tablet (10 mg total) by mouth 2 (two) times daily.    calcitRIOL (ROCALTROL) 0.25 MCG Cap Take 0.25 mcg by mouth once daily.    cholecalciferol, vitamin D3, (VITAMIN D3) 50 mcg (2,000 unit) Cap Take 1 capsule by mouth once daily.    citalopram (CELEXA) 20 MG tablet Take 1 tablet by mouth once daily    levETIRAcetam (KEPPRA) 500 MG Tab Take 1 tablet (500 mg total) by mouth 2 (two) times daily.    rosuvastatin (CRESTOR) 10 MG tablet Take 1 tablet (10 mg total) by mouth once daily. (Patient not taking: Reported on 8/2/2022)     Family History       Problem Relation (Age of Onset)    Diabetes Mother          Tobacco Use    Smoking status: Never    Smokeless tobacco: Never   Substance and Sexual Activity    Alcohol use: No    Drug use: No    Sexual activity: Not on file     Review of Systems   Unable to perform ROS: Dementia   Respiratory:  Negative for shortness of breath.    Cardiovascular:  Negative for chest pain.   Gastrointestinal:  Negative for abdominal pain.   Musculoskeletal:  Positive for back pain.   Objective:     Vital Signs (Most Recent):  Temp: 98.4 °F (36.9 °C) (10/14/22 0907)  Pulse: (!) 52 (10/14/22 1330)  Resp: 18 (10/14/22 1332)  BP: (!) 175/116 (10/14/22 1332)  SpO2: 98 % (10/14/22 1332)   Vital Signs (24h Range):  Temp:  [98.4 °F (36.9 °C)] 98.4 °F (36.9 °C)  Pulse:  [52-57] 52  Resp:  [17-18] 18  SpO2:  [97 %-98 %] 98 %  BP: (162-187)/() 175/116     Weight: 88.5 kg (195 lb)  Body mass index is 28.8 kg/m².    Physical Exam  Vitals reviewed.   Constitutional:       General: He is not in acute distress.     Appearance: Normal appearance. He is not  ill-appearing.   HENT:      Head: Normocephalic and atraumatic.      Right Ear: External ear normal.      Left Ear: External ear normal.      Nose: Nose normal.      Mouth/Throat:      Mouth: Mucous membranes are moist.      Pharynx: Oropharynx is clear.   Eyes:      General:         Right eye: No discharge.         Left eye: No discharge.      Conjunctiva/sclera: Conjunctivae normal.   Cardiovascular:      Rate and Rhythm: Normal rate and regular rhythm.      Pulses: Normal pulses.      Heart sounds: Normal heart sounds. No murmur heard.  Pulmonary:      Effort: Pulmonary effort is normal. No respiratory distress.      Breath sounds: Normal breath sounds. No wheezing, rhonchi or rales.   Abdominal:      General: Abdomen is flat. Bowel sounds are normal. There is no distension.      Palpations: Abdomen is soft.      Tenderness: There is no abdominal tenderness.   Musculoskeletal:         General: No swelling or signs of injury.      Cervical back: Neck supple. No rigidity.   Skin:     General: Skin is warm and dry.      Findings: No rash.   Neurological:      Mental Status: He is alert. He is disoriented.      Cranial Nerves: No cranial nerve deficit.      Comments: AAOx2 - name and location  Daughter states near baseline but more drowsy   Psychiatric:         Mood and Affect: Affect normal.         Behavior: Behavior is slowed. Behavior is cooperative.         Cognition and Memory: Cognition is impaired.           Significant Labs: All pertinent labs within the past 24 hours have been reviewed.    Significant Imaging: I have reviewed all pertinent imaging results/findings within the past 24 hours.    Assessment/Plan:     * Debility  Acute on chronic  Multifactorial with recent death of spouse and new covid diagnosis with underlying dementia and physical deconditioning  Consult to PT/OT/CM    Frequent falls  Fall precaution  See above    COVID-19  Found on screening  Unclear if partially responsible for his acute  decline but likely so  Precautions  No respiratory symptoms or disease on CXR  Avoiding lovenox due to thrombocytopenia  Continue to monitor    Anemia of chronic renal failure, stage 4 (severe)  Chronic, stable at baseline  Monitor on am labs  Transfuse if needed    Chronic renal disease, stage IV  Chronic, stable  Renally dose meds and avoid nephrotoxins  Monitor on am labs    Thrombocytopenia  Slightly below baseline  No signs of bleeding  Hold asa81  Monitor on am labs    Dementia with behavioral disturbance  Worse than baseline per daughter  Delirium precautions    Bradyarrhythmia, on amlodipine  Chronic, stable  Continue to monitor on vitals    Hyperlipidemia  Chronic  Continue statin    Depression  Chronic, stable  No clear depressive symptoms but grief reaction can present like this  Continue home citalopram    Seizure disorder, post CVA 2011  Chronic, stable  Seizure precautions  F/u keppra level  Continue keppra    Hypertension  Chronic, uncontrolled  Continue home amlodipine and acei  Adjust regimen as necessary      VTE Risk Mitigation (From admission, onward)    None      SCDs to be placed       Ezequiel Sales MD  Department of Hospital Medicine   [facility]

## 2022-10-14 NOTE — ASSESSMENT & PLAN NOTE
Chronic, stable  No clear depressive symptoms but grief reaction can present like this  Continue home citalopram

## 2022-10-15 LAB
ALBUMIN SERPL BCP-MCNC: 2.6 G/DL (ref 3.5–5.2)
ALP SERPL-CCNC: 36 U/L (ref 55–135)
ALT SERPL W/O P-5'-P-CCNC: 27 U/L (ref 10–44)
ANION GAP SERPL CALC-SCNC: 8 MMOL/L (ref 8–16)
AST SERPL-CCNC: 90 U/L (ref 10–40)
BASOPHILS # BLD AUTO: 0.01 K/UL (ref 0–0.2)
BASOPHILS NFR BLD: 0.3 % (ref 0–1.9)
BILIRUB SERPL-MCNC: 0.3 MG/DL (ref 0.1–1)
BUN SERPL-MCNC: 25 MG/DL (ref 8–23)
CALCIUM SERPL-MCNC: 8.2 MG/DL (ref 8.7–10.5)
CHLORIDE SERPL-SCNC: 108 MMOL/L (ref 95–110)
CO2 SERPL-SCNC: 22 MMOL/L (ref 23–29)
CREAT SERPL-MCNC: 2.1 MG/DL (ref 0.5–1.4)
DIFFERENTIAL METHOD: ABNORMAL
EOSINOPHIL # BLD AUTO: 0 K/UL (ref 0–0.5)
EOSINOPHIL NFR BLD: 0.6 % (ref 0–8)
ERYTHROCYTE [DISTWIDTH] IN BLOOD BY AUTOMATED COUNT: 14.9 % (ref 11.5–14.5)
EST. GFR  (NO RACE VARIABLE): 30 ML/MIN/1.73 M^2
GLUCOSE SERPL-MCNC: 80 MG/DL (ref 70–110)
HCT VFR BLD AUTO: 35.7 % (ref 40–54)
HGB BLD-MCNC: 12.1 G/DL (ref 14–18)
IMM GRANULOCYTES # BLD AUTO: 0.01 K/UL (ref 0–0.04)
IMM GRANULOCYTES NFR BLD AUTO: 0.3 % (ref 0–0.5)
LYMPHOCYTES # BLD AUTO: 1.4 K/UL (ref 1–4.8)
LYMPHOCYTES NFR BLD: 45.4 % (ref 18–48)
MCH RBC QN AUTO: 29.4 PG (ref 27–31)
MCHC RBC AUTO-ENTMCNC: 33.9 G/DL (ref 32–36)
MCV RBC AUTO: 87 FL (ref 82–98)
MONOCYTES # BLD AUTO: 0.4 K/UL (ref 0.3–1)
MONOCYTES NFR BLD: 12.1 % (ref 4–15)
NEUTROPHILS # BLD AUTO: 1.3 K/UL (ref 1.8–7.7)
NEUTROPHILS NFR BLD: 41.3 % (ref 38–73)
NRBC BLD-RTO: 0 /100 WBC
PLATELET # BLD AUTO: 47 K/UL (ref 150–450)
PLATELET BLD QL SMEAR: ABNORMAL
PMV BLD AUTO: 11.2 FL (ref 9.2–12.9)
POTASSIUM SERPL-SCNC: 4.8 MMOL/L (ref 3.5–5.1)
PROT SERPL-MCNC: 5.3 G/DL (ref 6–8.4)
RBC # BLD AUTO: 4.11 M/UL (ref 4.6–6.2)
SODIUM SERPL-SCNC: 138 MMOL/L (ref 136–145)
WBC # BLD AUTO: 3.13 K/UL (ref 3.9–12.7)

## 2022-10-15 PROCEDURE — 97110 THERAPEUTIC EXERCISES: CPT

## 2022-10-15 PROCEDURE — 97116 GAIT TRAINING THERAPY: CPT

## 2022-10-15 PROCEDURE — 85025 COMPLETE CBC W/AUTO DIFF WBC: CPT | Performed by: STUDENT IN AN ORGANIZED HEALTH CARE EDUCATION/TRAINING PROGRAM

## 2022-10-15 PROCEDURE — 97535 SELF CARE MNGMENT TRAINING: CPT

## 2022-10-15 PROCEDURE — 96361 HYDRATE IV INFUSION ADD-ON: CPT

## 2022-10-15 PROCEDURE — 36415 COLL VENOUS BLD VENIPUNCTURE: CPT | Performed by: STUDENT IN AN ORGANIZED HEALTH CARE EDUCATION/TRAINING PROGRAM

## 2022-10-15 PROCEDURE — 63600175 PHARM REV CODE 636 W HCPCS: Performed by: STUDENT IN AN ORGANIZED HEALTH CARE EDUCATION/TRAINING PROGRAM

## 2022-10-15 PROCEDURE — G0378 HOSPITAL OBSERVATION PER HR: HCPCS

## 2022-10-15 PROCEDURE — 80053 COMPREHEN METABOLIC PANEL: CPT | Performed by: STUDENT IN AN ORGANIZED HEALTH CARE EDUCATION/TRAINING PROGRAM

## 2022-10-15 PROCEDURE — 97161 PT EVAL LOW COMPLEX 20 MIN: CPT

## 2022-10-15 PROCEDURE — 25000003 PHARM REV CODE 250: Performed by: HOSPITALIST

## 2022-10-15 PROCEDURE — 97166 OT EVAL MOD COMPLEX 45 MIN: CPT

## 2022-10-15 PROCEDURE — 25000003 PHARM REV CODE 250: Performed by: STUDENT IN AN ORGANIZED HEALTH CARE EDUCATION/TRAINING PROGRAM

## 2022-10-15 RX ORDER — GUAIFENESIN 600 MG/1
600 TABLET, EXTENDED RELEASE ORAL 2 TIMES DAILY
Status: DISCONTINUED | OUTPATIENT
Start: 2022-10-15 | End: 2022-10-17 | Stop reason: HOSPADM

## 2022-10-15 RX ADMIN — Medication 2000 UNITS: at 08:10

## 2022-10-15 RX ADMIN — LISINOPRIL 10 MG: 10 TABLET ORAL at 08:10

## 2022-10-15 RX ADMIN — GUAIFENESIN 600 MG: 600 TABLET, EXTENDED RELEASE ORAL at 11:10

## 2022-10-15 RX ADMIN — CITALOPRAM HYDROBROMIDE 20 MG: 10 TABLET ORAL at 08:10

## 2022-10-15 RX ADMIN — GUAIFENESIN 600 MG: 600 TABLET, EXTENDED RELEASE ORAL at 08:10

## 2022-10-15 RX ADMIN — CALCITRIOL CAPSULES 0.25 MCG 0.25 MCG: 0.25 CAPSULE ORAL at 08:10

## 2022-10-15 RX ADMIN — AMLODIPINE BESYLATE 5 MG: 5 TABLET ORAL at 08:10

## 2022-10-15 RX ADMIN — SODIUM CHLORIDE, SODIUM LACTATE, POTASSIUM CHLORIDE, AND CALCIUM CHLORIDE: .6; .31; .03; .02 INJECTION, SOLUTION INTRAVENOUS at 08:10

## 2022-10-15 RX ADMIN — LEVETIRACETAM 500 MG: 500 TABLET, FILM COATED ORAL at 08:10

## 2022-10-15 RX ADMIN — ATORVASTATIN CALCIUM 40 MG: 40 TABLET, FILM COATED ORAL at 08:10

## 2022-10-15 RX ADMIN — Medication 6 MG: at 08:10

## 2022-10-15 RX ADMIN — SODIUM CHLORIDE, SODIUM LACTATE, POTASSIUM CHLORIDE, AND CALCIUM CHLORIDE: .6; .31; .03; .02 INJECTION, SOLUTION INTRAVENOUS at 05:10

## 2022-10-15 NOTE — PLAN OF CARE
Goals to be met by: 10/29/22     Patient will increase functional independence with mobility by performin. Supine to sit with MInimal Assistance  2. Sit to stand transfer with Minimal Assistance  3. Bed to chair transfer with Minimal Assistance using Rolling Walker  4. Gait  x 40 feet with Minimal Assistance using Rolling Walker.   5. Lower extremity exercise program x 15 reps, with assistance as needed

## 2022-10-15 NOTE — PT/OT/SLP EVAL
Occupational Therapy   Evaluation    Name: Lavon Brandon  MRN: [id number]  Admitting Diagnosis:  Debility  Recent Surgery: * No surgery found *      Recommendations:     Discharge Recommendations: home health OT, home with home health  Discharge Equipment Recommendations:  3-in-1 commode  Barriers to discharge:  None    Assessment:     Lavon Brandon is a 89 y.o. male with a medical diagnosis of Debility.  He presents with decreased activity endurance especially with standing and ambulating several steps in room requiring Min A using RW. Patient demonstrated good static sitting balance. Performance deficits affecting function: weakness, impaired endurance, impaired self care skills, impaired functional mobility, gait instability, impaired balance, decreased lower extremity function, impaired cognition, decreased safety awareness.      Rehab Prognosis: Fair; patient would benefit from acute skilled OT services to address these deficits and reach maximum level of function.       Plan:     Patient to be seen 3 x/week to address the above listed problems via self-care/home management, therapeutic activities, therapeutic exercises  Plan of Care Expires: 11/05/22  Plan of Care Reviewed with: patient    Subjective     Chief Complaint: none  Patient/Family Comments/goals: none    Occupational Profile:  Living Environment: Patient lives alone (recently ) in a [facility].   Previous level of function: Patient was Mod I with ADLs. Patient ambulated mostly without AD.   Equipment Used at Home:  walker, rolling, rollator  Assistance upon Discharge: Patient will be living with daughter post discharge.     Pain/Comfort:  Pain Rating 1: 0/10  Pain Rating Post-Intervention 1: 0/10    Patients cultural, spiritual, Buddhist conflicts given the current situation:      Objective:     Communicated with: nurse Guidry prior to session.  Patient found HOB elevated with bed alarm, peripheral IV, telemetry (avasys in room) upon OT entry  to room.    General Precautions: Standard, fall, airborne, droplet, contact   Orthopedic Precautions:N/A   Braces: N/A  Respiratory Status: Room air    Occupational Performance:    Bed Mobility:    Patient completed Scooting/Bridging with minimum assistance  Patient completed Supine to Sit with minimum assistance  Patient completed Sit to Supine with minimum assistance    Functional Mobility/Transfers:  Patient completed Sit <> Stand Transfer with Min to Mod A  with  rolling walker   Functional Mobility: Noted mild unsteadiness when ambulated ~6 steps from bed requiring Min A and RW for balance.     Activities of Daily Living:  Grooming: stand by assistance with all grooming tasks while seated EOB  Upper Body Dressing: minimum assistance   Lower Body Dressing: minimum assistance to don/doff socks while seated EOB   Toileting: moderate assistance     Cognitive/Visual Perceptual:  Cognitive/Psychosocial Skills:     -       Oriented to: Person and Place   -       Follows Commands/attention:Follows two-step commands  -       Communication: clear/fluent; slightly Iroquois  -       Safety awareness/insight to disability: impaired   -       Mood/Affect/Coping skills/emotional control: Appropriate to situation and Cooperative  Visual/Perceptual:      -Intact     Physical Exam:  Postural examination/scapula alignment:    -       Rounded shoulders  -       Forward head  Upper Extremity Range of Motion:     -       Right Upper Extremity: WFL  -       Left Upper Extremity: WFL  Upper Extremity Strength:    -       Right Upper Extremity: WFL  -       Left Upper Extremity: WFL   Strength:    -       Right Upper Extremity: WFL  -       Left Upper Extremity: WFL  Fine Motor Coordination:    -       Intact  Gross motor coordination:   WFL in BUE    AMPAC 6 Click ADL:  AMPAC Total Score: 15    Treatment & Education:  OT ed pt on OT role & POC as well as discharge recommendations.  OT ed patient on safety with walker use for functional  mobility with cues for hand placement & sequencing.       Patient left HOB elevated with all lines intact, call button in reach, bed alarm on, and nurse present    GOALS:   Multidisciplinary Problems       Occupational Therapy Goals          Problem: Occupational Therapy    Goal Priority Disciplines Outcome Interventions   Occupational Therapy Goal     OT, PT/OT Ongoing, Progressing    Description: Goals to be met by: 11/5/2022     Patient will increase functional independence with ADLs by performing:    LE Dressing with Supervision.  Grooming while seated at sink with Supervision.  Toileting from toilet with Supervision for hygiene and clothing management.   Supine to sit with Supervision.  Toilet transfer to toilet with Supervision.                         History:     Past Medical History:   Diagnosis Date    Bradyarrhythmia     CVA (cerebral infarction) 2006    Dementia     Depression     Hyperlipidemia     Hypertension     renal htn    Pulmonary embolism 2002    Seizure disorder          Past Surgical History:   Procedure Laterality Date    left foot  with crushed injry         Time Tracking:     OT Date of Treatment: 10/15/22  OT Start Time: 1128  OT Stop Time: 1148  OT Total Time (min): 20 min    Billable Minutes:Evaluation 10  Self Care/Home Management 10    10/15/2022

## 2022-10-15 NOTE — PT/OT/SLP EVAL
Physical Therapy Evaluation    Patient Name:  Lavon Brandon   MRN:  [id number]    Recommendations:     Discharge Recommendations:  home health PT, home with home health   Discharge Equipment Recommendations: none   Barriers to discharge: None    Assessment:     Lavon Brandon is a 89 y.o. male admitted with a medical diagnosis of Debility.  He presents with the following impairments/functional limitations:  weakness, impaired endurance, impaired self care skills, impaired functional mobility, gait instability, impaired balance, impaired cognition, decreased lower extremity function, decreased safety awareness. Patient agreeable to strength/mobility training.    Rehab Prognosis: Fair; patient would benefit from acute skilled PT services to address these deficits and reach maximum level of function.    Recent Surgery: * No surgery found *      Plan:     During this hospitalization, patient to be seen 6 x/week to address the identified rehab impairments via gait training, therapeutic activities, therapeutic exercises and progress toward the following goals:    Plan of Care Expires:  10/29/22    Subjective     Chief Complaint: generalized weakness  Patient/Family Comments/goals: improve overall strength/mobility  Pain/Comfort:  Pain Rating 1: 0/10    Patients cultural, spiritual, Evangelical conflicts given the current situation:      Living Environment:  Lives with daughter in 1-story home (no steps).  Prior to admission, patients level of function was assist needed.  Equipment used at home: walker, rolling.  DME owned (not currently used): rolling walker.  Upon discharge, patient will have assistance from family.    Objective:     Communicated with nurse prior to session.  Patient found supine with bed alarm, peripheral IV, telemetry  upon PT entry to room.    General Precautions: Standard, fall, airborne   Orthopedic Precautions:N/A   Braces: N/A  Respiratory Status: Room air    Exams:  RLE ROM: WFL  RLE Strength:  Deficits: grossly 3+/5  LLE ROM: WFL  LLE Strength: Deficits: grossly 3+/5    Functional Mobility:  Bed Mobility:     Supine to Sit: moderate assistance  Sit to Supine: moderate assistance  Transfers:     Sit to Stand:  moderate assistance with rolling walker  Gait: 20 feet with rolling-walker and minimal assist x 2    Therapeutic Activities and Exercises:   X 10 each seated bilateral lower extremity therapeutic exercise = heel slides, hip abduction/ adduction, ankle pumps, marching    AM-PAC 6 CLICK MOBILITY  Total Score:11     Patient left supine with all lines intact, call button in reach, and family present.    GOALS:   Multidisciplinary Problems       Physical Therapy Goals          Problem: Physical Therapy    Goal Priority Disciplines Outcome Goal Variances Interventions   Physical Therapy Goal     PT, PT/OT      Description: Goals to be met by: 10/29/22     Patient will increase functional independence with mobility by performin. Supine to sit with MInimal Assistance  2. Sit to stand transfer with Minimal Assistance  3. Bed to chair transfer with Minimal Assistance using Rolling Walker  4. Gait  x 40 feet with Minimal Assistance using Rolling Walker.   5. Lower extremity exercise program x 15 reps, with assistance as needed                         History:     Past Medical History:   Diagnosis Date    Bradyarrhythmia     CVA (cerebral infarction) 2006    Dementia     Depression     Hyperlipidemia     Hypertension     renal htn    Pulmonary embolism 2002    Seizure disorder        Past Surgical History:   Procedure Laterality Date    left foot  with crushed injry         Time Tracking:     PT Received On: 10/15/22  PT Start Time: 35     PT Stop Time: 1015  PT Total Time (min): 40 min     Billable Minutes: Evaluation 15, Gait Training 15, and Therapeutic Exercise 10      10/15/2022

## 2022-10-15 NOTE — NURSING
Nurses Note -- 4 Eyes      10/14/2022   7:19 PM      Skin assessed during: Admit      [] No Pressure Injuries Present    []Prevention Measures Documented      [x] Yes- Altered Skin Integrity Present or Discovered   [x] LDA Added if Not in Epic (Describe Wound)   [x] New Altered Skin Integrity was Present on Admit and Documented in LDA   [] Wound Image Taken    Wound Care Consulted? No    Attending Nurse:  Chao Javier RN     Second RN/Staff Member:  VICTORIANO García LPN

## 2022-10-15 NOTE — PLAN OF CARE
Problem: Occupational Therapy  Goal: Occupational Therapy Goal  Description: Goals to be met by: 11/5/2022     Patient will increase functional independence with ADLs by performing:    LE Dressing with Supervision.  Grooming while seated at sink with Supervision.  Toileting from toilet with Supervision for hygiene and clothing management.   Supine to sit with Supervision.  Toilet transfer to toilet with Supervision.    Outcome: Ongoing, Progressing

## 2022-10-15 NOTE — PLAN OF CARE
POC/Meds reviewed, pt verbalized understanding. Vitals stable.  Afebrile.   Tele In place-0237.  ivf infusing. Repositions self. Hourly/Q2hr rounding performed, safety maintained. Bed in lowest position, wheels locked, SR up x2, call light in easy reach. No  complaints at this time. Will continue to monitor.

## 2022-10-15 NOTE — PLAN OF CARE
[facility]  Initial Discharge Assessment       Primary Care Provider: Aristides Haynes MD    Admission Diagnosis: Debility [R53.81]  Grief reaction [F43.21]  Generalized weakness [R53.1]  Frequent falls [R29.6]  Elevated AST (SGOT) [R74.01]  Stage 4 chronic kidney disease [N18.4]    Admission Date: 10/14/2022  Expected Discharge Date:     Discharge Barriers Identified: None    Payor: HU[employer] MANAGED MEDICARE / Plan: HU[employer]NA MEDICARE PPO / Product Type: Medicare Advantage /     Extended Emergency Contact Information  Primary Emergency Contact: LeyvaHeidi Seguraa  Work Phone: [phone]  Mobile Phone: [phone]  Relation: Daughter  Preferred language: English   needed? No  Secondary Emergency Contact: RomaNilda  Mobile Phone: [phone]  Relation: Daughter  Preferred language: English   needed? No    Discharge Plan A: Home Health  Discharge Plan B: Home with family      [facility] [address] [place] [address] & [address] [address]  Phone: [phone] Fax: [phone]    [facility]  for [address] [address]  Phone: [phone] Fax: [phone]    [facility] - [facility] [address]  [address] [facility] [address] [address]  Phone: [phone] Fax: [phone]    Patient in isolation room with diagnosis of COVID 19 positive.  SW  spoke with patient's daughter Gabriela Hammond [phone] via telephone to complete discharge planning assessment.  Daughter verified all demographic information on facesheet is correct.  Daughter verified PCP is Dr. Haynes.  Daughter verified primary health insurance is [employer].  Daughter states patient is a Militar .  SW gave daughter name and phone numbers to [facility] system to contact for patient's benefits and eligibility.  Patient with NO  home health but has listed DME.  Daughter request wheelchair and bedside commode.  Patient with POA (daughter Gabriela) and Living Will.  Patient not on dialysis or medication coumadin.  Patient with no 30 day admission.  Patient with no financial issues at this time.  Patient family will provide transportation upon discharge from facility.  Patient independent with ADLs, live with daughter Gabriela, daughter drives.      Initial Assessment (most recent)       Adult Discharge Assessment - 10/15/22 8120          Discharge Assessment    Assessment Type Discharge Planning Assessment     Confirmed/corrected address, phone number and insurance Yes     Confirmed Demographics Correct on Facesheet     Source of Information family     Does patient/caregiver understand observation status Yes     Communicated JOANNE with patient/caregiver Yes     Lives With child(idalmis), adult     Facility Arrived From: home     Do you expect to return to your current living situation? Yes     Do you have help at home or someone to help you manage your care at home? Yes     Who are your caregiver(s) and their phone number(s)? daughter     Prior to hospitilization cognitive status: Unable to Assess     Current cognitive status: Unable to Assess     Walking or Climbing Stairs Difficulty ambulation difficulty, requires equipment;stair climbing difficulty, requires equipment     Dressing/Bathing Difficulty bathing difficulty, requires equipment     Equipment Currently Used at Home walker, rolling     Readmission within 30 days? No     Patient currently being followed by outpatient case management? No     Do you currently have service(s) that help you manage your care at home? No     Do you take prescription medications? Yes     Do you have prescription coverage? Yes     Do you have any problems affording any of your prescribed medications? No     Is the patient taking medications as prescribed? yes     Who is going to help you get home at discharge? daughter      How do you get to doctors appointments? family or friend will provide     Are you on dialysis? No     Do you take coumadin? No     Discharge Plan A Home Health     Discharge Plan B Home with family     DME Needed Upon Discharge  wheelchair;bedside commode     Discharge Plan discussed with: Adult children     Discharge Barriers Identified None        Physical Activity    On average, how many days per week do you engage in moderate to strenuous exercise (like a brisk walk)? Patient refused     On average, how many minutes do you engage in exercise at this level? Patient refused        Financial Resource Strain    How hard is it for you to pay for the very basics like food, housing, medical care, and heating? Patient refused        Housing Stability    In the last 12 months, was there a time when you were not able to pay the mortgage or rent on time? Patient refused     In the last 12 months, was there a time when you did not have a steady place to sleep or slept in a shelter (including now)? Patient refused        Transportation Needs    In the past 12 months, has lack of transportation kept you from medical appointments or from getting medications? Patient refused     In the past 12 months, has lack of transportation kept you from meetings, work, or from getting things needed for daily living? Patient refused        Food Insecurity    Within the past 12 months, you worried that your food would run out before you got the money to buy more. Patient refused     Within the past 12 months, the food you bought just didn't last and you didn't have money to get more. Patient refused        Stress    Do you feel stress - tense, restless, nervous, or anxious, or unable to sleep at night because your mind is troubled all the time - these days? Patient refused        Social Connections    In a typical week, how many times do you talk on the phone with family, friends, or neighbors? Patient refused     How often do you get  together with friends or relatives? Patient refused     How often do you attend Orthodoxy or Episcopalian services? Patient refused     Do you belong to any clubs or organizations such as Orthodoxy groups, unions, fraternal or athletic groups, or school groups? Patient refused     How often do you attend meetings of the clubs or organizations you belong to? Patient refused     Are you , , , , never , or living with a partner? Patient refused        Alcohol Use    Q1: How often do you have a drink containing alcohol? Patient refused     Q2: How many drinks containing alcohol do you have on a typical day when you are drinking? Patient refused     Q3: How often do you have six or more drinks on one occasion? Patient refused

## 2022-10-15 NOTE — PLAN OF CARE
10/15/22 1530   BULLOCK Message   Medicare Outpatient and Observation Notification regarding financial responsibility Explained to patient/caregiver;Other (comments)  (des Leyva verbal consent [phone])   Date BULLOCK was signed 10/15/22   Time BULLOCK was signed 1562

## 2022-10-16 LAB
ALBUMIN SERPL BCP-MCNC: 2.8 G/DL (ref 3.5–5.2)
ALP SERPL-CCNC: 39 U/L (ref 55–135)
ALT SERPL W/O P-5'-P-CCNC: 27 U/L (ref 10–44)
ANION GAP SERPL CALC-SCNC: 9 MMOL/L (ref 8–16)
AST SERPL-CCNC: 74 U/L (ref 10–40)
BASOPHILS # BLD AUTO: 0.01 K/UL (ref 0–0.2)
BASOPHILS NFR BLD: 0.2 % (ref 0–1.9)
BILIRUB SERPL-MCNC: 0.4 MG/DL (ref 0.1–1)
BUN SERPL-MCNC: 28 MG/DL (ref 8–23)
CALCIUM SERPL-MCNC: 8.5 MG/DL (ref 8.7–10.5)
CHLORIDE SERPL-SCNC: 109 MMOL/L (ref 95–110)
CO2 SERPL-SCNC: 20 MMOL/L (ref 23–29)
CREAT SERPL-MCNC: 2.1 MG/DL (ref 0.5–1.4)
DIFFERENTIAL METHOD: ABNORMAL
EOSINOPHIL # BLD AUTO: 0 K/UL (ref 0–0.5)
EOSINOPHIL NFR BLD: 0.6 % (ref 0–8)
ERYTHROCYTE [DISTWIDTH] IN BLOOD BY AUTOMATED COUNT: 14.7 % (ref 11.5–14.5)
EST. GFR  (NO RACE VARIABLE): 30 ML/MIN/1.73 M^2
GLUCOSE SERPL-MCNC: 91 MG/DL (ref 70–110)
HCT VFR BLD AUTO: 39.1 % (ref 40–54)
HGB BLD-MCNC: 12.4 G/DL (ref 14–18)
IMM GRANULOCYTES # BLD AUTO: 0.01 K/UL (ref 0–0.04)
IMM GRANULOCYTES NFR BLD AUTO: 0.2 % (ref 0–0.5)
LYMPHOCYTES # BLD AUTO: 2.9 K/UL (ref 1–4.8)
LYMPHOCYTES NFR BLD: 57.6 % (ref 18–48)
MCH RBC QN AUTO: 28.8 PG (ref 27–31)
MCHC RBC AUTO-ENTMCNC: 31.7 G/DL (ref 32–36)
MCV RBC AUTO: 91 FL (ref 82–98)
MONOCYTES # BLD AUTO: 0.6 K/UL (ref 0.3–1)
MONOCYTES NFR BLD: 11.5 % (ref 4–15)
NEUTROPHILS # BLD AUTO: 1.5 K/UL (ref 1.8–7.7)
NEUTROPHILS NFR BLD: 29.9 % (ref 38–73)
NRBC BLD-RTO: 0 /100 WBC
PLATELET # BLD AUTO: 90 K/UL (ref 150–450)
PLATELET BLD QL SMEAR: ABNORMAL
PMV BLD AUTO: 12.4 FL (ref 9.2–12.9)
POTASSIUM SERPL-SCNC: 4.8 MMOL/L (ref 3.5–5.1)
PROT SERPL-MCNC: 5.2 G/DL (ref 6–8.4)
RBC # BLD AUTO: 4.31 M/UL (ref 4.6–6.2)
SODIUM SERPL-SCNC: 138 MMOL/L (ref 136–145)
WBC # BLD AUTO: 5.05 K/UL (ref 3.9–12.7)

## 2022-10-16 PROCEDURE — 25000003 PHARM REV CODE 250: Performed by: HOSPITALIST

## 2022-10-16 PROCEDURE — 96361 HYDRATE IV INFUSION ADD-ON: CPT

## 2022-10-16 PROCEDURE — 25000003 PHARM REV CODE 250: Performed by: STUDENT IN AN ORGANIZED HEALTH CARE EDUCATION/TRAINING PROGRAM

## 2022-10-16 PROCEDURE — 80053 COMPREHEN METABOLIC PANEL: CPT | Performed by: STUDENT IN AN ORGANIZED HEALTH CARE EDUCATION/TRAINING PROGRAM

## 2022-10-16 PROCEDURE — G0378 HOSPITAL OBSERVATION PER HR: HCPCS

## 2022-10-16 PROCEDURE — 85025 COMPLETE CBC W/AUTO DIFF WBC: CPT | Performed by: STUDENT IN AN ORGANIZED HEALTH CARE EDUCATION/TRAINING PROGRAM

## 2022-10-16 PROCEDURE — 36415 COLL VENOUS BLD VENIPUNCTURE: CPT | Performed by: STUDENT IN AN ORGANIZED HEALTH CARE EDUCATION/TRAINING PROGRAM

## 2022-10-16 PROCEDURE — 63600175 PHARM REV CODE 636 W HCPCS: Performed by: STUDENT IN AN ORGANIZED HEALTH CARE EDUCATION/TRAINING PROGRAM

## 2022-10-16 RX ORDER — LISINOPRIL 10 MG/1
20 TABLET ORAL ONCE
Status: COMPLETED | OUTPATIENT
Start: 2022-10-16 | End: 2022-10-16

## 2022-10-16 RX ADMIN — Medication 2000 UNITS: at 10:10

## 2022-10-16 RX ADMIN — Medication 6 MG: at 08:10

## 2022-10-16 RX ADMIN — SODIUM CHLORIDE, SODIUM LACTATE, POTASSIUM CHLORIDE, AND CALCIUM CHLORIDE: .6; .31; .03; .02 INJECTION, SOLUTION INTRAVENOUS at 09:10

## 2022-10-16 RX ADMIN — LISINOPRIL 10 MG: 10 TABLET ORAL at 10:10

## 2022-10-16 RX ADMIN — LISINOPRIL 20 MG: 10 TABLET ORAL at 01:10

## 2022-10-16 RX ADMIN — SODIUM CHLORIDE, SODIUM LACTATE, POTASSIUM CHLORIDE, AND CALCIUM CHLORIDE: .6; .31; .03; .02 INJECTION, SOLUTION INTRAVENOUS at 10:10

## 2022-10-16 RX ADMIN — GUAIFENESIN 600 MG: 600 TABLET, EXTENDED RELEASE ORAL at 08:10

## 2022-10-16 RX ADMIN — GUAIFENESIN 600 MG: 600 TABLET, EXTENDED RELEASE ORAL at 10:10

## 2022-10-16 RX ADMIN — AMLODIPINE BESYLATE 5 MG: 5 TABLET ORAL at 10:10

## 2022-10-16 RX ADMIN — CALCITRIOL CAPSULES 0.25 MCG 0.25 MCG: 0.25 CAPSULE ORAL at 10:10

## 2022-10-16 RX ADMIN — ATORVASTATIN CALCIUM 40 MG: 40 TABLET, FILM COATED ORAL at 08:10

## 2022-10-16 RX ADMIN — LEVETIRACETAM 500 MG: 500 TABLET, FILM COATED ORAL at 08:10

## 2022-10-16 RX ADMIN — LEVETIRACETAM 500 MG: 500 TABLET, FILM COATED ORAL at 10:10

## 2022-10-16 RX ADMIN — CITALOPRAM HYDROBROMIDE 20 MG: 10 TABLET ORAL at 10:10

## 2022-10-16 NOTE — ASSESSMENT & PLAN NOTE
Acute on chronic  Multifactorial with recent death of spouse and new covid diagnosis with underlying dementia and physical deconditioning  Consulting PT/OT.   to assist with placing patient in appropriate facility.  Will check vitamin levels.

## 2022-10-16 NOTE — ASSESSMENT & PLAN NOTE
Chronic, stable  Renally dose meds and avoid nephrotoxins  Monitor on am labs.    Lab Results   Component Value Date    CREATININE 2.1 (H) 10/15/2022

## 2022-10-16 NOTE — ASSESSMENT & PLAN NOTE
Chronic, uncontrolled  Continue home amlodipine and ACE inhibitor.  Monitor pressures.  Adjust regimen as necessary

## 2022-10-16 NOTE — ASSESSMENT & PLAN NOTE
Worse than baseline per daughter  Delirium precautions.  Avoid anti-cholinergics and anti-psychotics.

## 2022-10-16 NOTE — SUBJECTIVE & OBJECTIVE
Interval History:  no new issues.  Participated with PT and OT.    Review of Systems   Unable to perform ROS: Dementia   Objective:     Vital Signs (Most Recent):  Temp: 97.9 °F (36.6 °C) (10/15/22 1629)  Pulse: (!) 46 (10/15/22 1629)  Resp: 17 (10/15/22 1629)  BP: (!) 177/77 (10/15/22 1629)  SpO2: 98 % (10/15/22 1629)   Vital Signs (24h Range):  Temp:  [96.9 °F (36.1 °C)-98.8 °F (37.1 °C)] 97.9 °F (36.6 °C)  Pulse:  [46-57] 46  Resp:  [16-20] 17  SpO2:  [96 %-98 %] 98 %  BP: (161-178)/(68-77) 177/77     Weight: 84.8 kg (186 lb 15.2 oz)  Body mass index is 27.61 kg/m².    Intake/Output Summary (Last 24 hours) at 10/15/2022 1946  Last data filed at 10/15/2022 1844  Gross per 24 hour   Intake 2534.9 ml   Output --   Net 2534.9 ml      Physical Exam  Vitals reviewed.   Constitutional:       General: He is not in acute distress.     Appearance: He is not diaphoretic.   HENT:      Mouth/Throat:      Mouth: Mucous membranes are moist.   Eyes:      General: No scleral icterus.        Right eye: No discharge.         Left eye: No discharge.   Neck:      Vascular: No JVD.   Cardiovascular:      Rate and Rhythm: Normal rate and regular rhythm.   Pulmonary:      Effort: Pulmonary effort is normal.      Breath sounds: Normal breath sounds.   Abdominal:      General: Bowel sounds are normal. There is no distension.      Palpations: Abdomen is soft.      Tenderness: There is no abdominal tenderness.   Skin:     General: Skin is warm.      Findings: No rash.   Neurological:      Mental Status: He is alert. He is disoriented and confused.       Significant Labs: All pertinent labs within the past 24 hours have been reviewed.    Significant Imaging: I have reviewed all pertinent imaging results/findings within the past 24 hours.

## 2022-10-16 NOTE — PLAN OF CARE
Patient progressing towards discharge.    Patient had no acute events noted. Patient remains confused to place and time but is able to be reoriented.  Patient incontinent of bladder and wearing a brief.  Patient able to reposition self.  Bed alarm and Avasys camera in place.  Fall precautions in place.  Discussed POC with patient and family with verbalization of understanding.    Will continue to monitor.

## 2022-10-16 NOTE — ASSESSMENT & PLAN NOTE
Acute on chronic  Multifactorial with recent death of spouse and new covid diagnosis with underlying dementia and physical deconditioning  Consulting PT/OT. - HH - will stay at the daughter's house   Will check vitamin levels.

## 2022-10-16 NOTE — ASSESSMENT & PLAN NOTE
Chronic, uncontrolled  Continue home amlodipine and ACE inhibitor - increase lisinopril   Monitor pressures.  Adjust regimen as necessary

## 2022-10-16 NOTE — PLAN OF CARE
PCP follow up scheduled and added to AVS       10/16/22 8609   Post-Acute Status   Hospital Resources/Appts/Education Provided Appointments scheduled by Navigator/Coordinator

## 2022-10-16 NOTE — ASSESSMENT & PLAN NOTE
Chronic, stable  Renally dose meds and avoid nephrotoxins  Monitor on am labs.    Lab Results   Component Value Date    CREATININE 2.1 (H) 10/16/2022

## 2022-10-16 NOTE — PROGRESS NOTES
[facility] Medicine  Progress Note    Patient Name: Lavon Brandon  MRN: [id number]  Patient Class: OP- Observation   Admission Date: 10/14/2022  Length of Stay: 0 days  Attending Physician: Sara Turner MD  Primary Care Provider: Aristides Haynes MD        Subjective:     Principal Problem:Debility        HPI:  89M with PMH dementia, HTN, HLD, bradycardia, CKD4, epilepsy, depression, and thrombocytopenia presents to ER due to 1 week of decreased PO intake, increased confusion, and increased generalized weakness. He has had increased frequents falls as well. Complaining of pain to lower back that is mild to moderate without radiation. No other associated symptoms. No treatments tried. History somewhat limited as he keeps referring to when he was in the . Daughter Gabriela at bedside helps with history. Daughter states patient barely eating now and last 2 days he has not gotten out of bed to walk. He usually is supposed to ambulate with walker but doesn't always use it. Daughter attributes this decline to the recent sudden death of the patient's spouse 1 month ago. Incidentally found to be COVID+ on screen. No significant trauma related to falls on ER evaluation and imaging.       Overview/Hospital Course:  Pt was admitted with debility.  PT and OT evaluated him and recommend HH services.  He was put in isolation for the COVID-19.  There was no need for anti-viral treatment or steroid. Mental status improved per daughter at the bedside. He has a slight cough without fever and not requiring O2. CXR showed no acute findings. He did not receive covid vaccinations. Blood pressure elevated and increased lisinopril with PRN dosing of hydralazine PRN.       Interval History: daughter at bedside feeding him pieces of melon, he has no new complaints     Review of Systems   Constitutional:  Positive for activity change and appetite change.   HENT: Negative.     Respiratory: Negative.      Cardiovascular: Negative.    Gastrointestinal: Negative.    Genitourinary: Negative.    Musculoskeletal:  Positive for gait problem.   Neurological:  Positive for weakness.   Psychiatric/Behavioral:  Positive for dysphoric mood.    Objective:     Vital Signs (Most Recent):  Temp: 96.9 °F (36.1 °C) (10/16/22 1642)  Pulse: (!) 48 (10/16/22 1642)  Resp: 18 (10/16/22 1642)  BP: (!) 180/77 (10/16/22 1642)  SpO2: 96 % (10/16/22 1642)   Vital Signs (24h Range):  Temp:  [92.9 °F (33.8 °C)-98.3 °F (36.8 °C)] 96.9 °F (36.1 °C)  Pulse:  [42-49] 48  Resp:  [16-20] 18  SpO2:  [92 %-99 %] 96 %  BP: (162-193)/(71-86) 180/77     Weight: 84.8 kg (186 lb 15.2 oz)  Body mass index is 27.61 kg/m².    Intake/Output Summary (Last 24 hours) at 10/16/2022 1738  Last data filed at 10/16/2022 1729  Gross per 24 hour   Intake 3970.54 ml   Output --   Net 3970.54 ml      Physical Exam  Constitutional:       General: He is not in acute distress.  HENT:      Head: Normocephalic and atraumatic.   Eyes:      Extraocular Movements: Extraocular movements intact.   Cardiovascular:      Rate and Rhythm: Regular rhythm. Bradycardia present.      Pulses: Normal pulses.   Pulmonary:      Breath sounds: No wheezing or rhonchi.   Abdominal:      General: Abdomen is flat.      Palpations: Abdomen is soft.      Tenderness: There is no abdominal tenderness.   Musculoskeletal:         General: No deformity. Normal range of motion.      Cervical back: Normal range of motion and neck supple.   Skin:     General: Skin is warm and dry.      Capillary Refill: Capillary refill takes 2 to 3 seconds.      Findings: Bruising present.   Neurological:      Mental Status: He is alert and oriented to person, place, and time. Mental status is at baseline.   Psychiatric:      Comments: Flat affect       Significant Labs: All pertinent labs within the past 24 hours have been reviewed.  A1C: No results for input(s): HGBA1C in the last 4320 hours.  CBC:   Recent Labs   Lab  10/15/22  0545 10/16/22  0503   WBC 3.13* 5.05   HGB 12.1* 12.4*   HCT 35.7* 39.1*   PLT 47* 90*     CMP:   Recent Labs   Lab 10/15/22  0545 10/16/22  0503    138   K 4.8 4.8    109   CO2 22* 20*   GLU 80 91   BUN 25* 28*   CREATININE 2.1* 2.1*   CALCIUM 8.2* 8.5*   PROT 5.3* 5.2*   ALBUMIN 2.6* 2.8*   BILITOT 0.3 0.4   ALKPHOS 36* 39*   AST 90* 74*   ALT 27 27   ANIONGAP 8 9     Cardiac Markers: No results for input(s): CKMB, MYOGLOBIN, BNP, TROPISTAT in the last 48 hours.  Lipid Panel: No results for input(s): CHOL, HDL, LDLCALC, TRIG, CHOLHDL in the last 48 hours.  Magnesium: No results for input(s): MG in the last 48 hours.  POCT Glucose: No results for input(s): POCTGLUCOSE in the last 48 hours.  Troponin: No results for input(s): TROPONINI, TROPONINIHS in the last 48 hours.  TSH:   Recent Labs   Lab 10/14/22  0955   TSH 1.752     Urine Studies: No results for input(s): COLORU, APPEARANCEUA, PHUR, SPECGRAV, PROTEINUA, GLUCUA, KETONESU, BILIRUBINUA, OCCULTUA, NITRITE, UROBILINOGEN, LEUKOCYTESUR, RBCUA, WBCUA, BACTERIA, SQUAMEPITHEL, HYALINECASTS in the last 48 hours.    Invalid input(s): MARILOU    Significant Imaging: I have reviewed all pertinent imaging results/findings within the past 24 hours.      Assessment/Plan:      * Debility  Acute on chronic  Multifactorial with recent death of spouse and new covid diagnosis with underlying dementia and physical deconditioning  Consulting PT/OT. - HH - will stay at the daughter's house   Will check vitamin levels.    COVID-19  Found on screening  Unclear if partially responsible for his acute decline but likely so  Keep in isolation.  No respiratory symptoms or disease on CXR  Avoiding lovenox due to thrombocytopenia  Continue to monitor    Frequent falls  Fall precaution  See above.  Consulting with PT and OT. - recommend      Anemia of chronic renal failure, stage 4 (severe)  Chronic, stable at baseline  Monitor on am labs  Transfuse if  needed    Lab Results   Component Value Date    HGB 12.1 (L) 10/15/2022         Chronic renal disease, stage IV  Chronic, stable  Renally dose meds and avoid nephrotoxins  Monitor on am labs.    Lab Results   Component Value Date    CREATININE 2.1 (H) 10/16/2022         Thrombocytopenia  Slightly worse today.  Will keep monitoring.  Avoid heparin products.    Lab Results   Component Value Date    PLT 47 (L) 10/15/2022         Dementia with behavioral disturbance  Worse than baseline per daughter  Delirium precautions.  Avoid anti-cholinergics and anti-psychotics.    Hyperlipidemia  Chronic  Continue statin    Depression  Chronic, stable  No clear depressive symptoms but grief reaction can present like this  Continue home citalopram    Seizure disorder, post CVA 2011  Chronic, stable  Seizure precautions  F/u keppra level  Continue keppra    Hypertension  Chronic, uncontrolled  Continue home amlodipine and ACE inhibitor - increase lisinopril   Monitor pressures.  Adjust regimen as necessary      VTE Risk Mitigation (From admission, onward)         Ordered     IP VTE HIGH RISK PATIENT  Once         10/14/22 1604     Place sequential compression device  Until discontinued         10/14/22 1604                Discharge Planning   JOANNE:      Code Status: Full Code   Is the patient medically ready for discharge?:     Reason for patient still in hospital (select all that apply): Patient trending condition  Discharge Plan A: Home Health                  Sara Turner MD  Department of Hospital Medicine   [facility]

## 2022-10-16 NOTE — ASSESSMENT & PLAN NOTE
Found on screening  Unclear if partially responsible for his acute decline but likely so  Keep in isolation.  No respiratory symptoms or disease on CXR  Avoiding lovenox due to thrombocytopenia  Continue to monitor

## 2022-10-16 NOTE — HOSPITAL COURSE
Pt was admitted with debility.  PT and OT evaluated him and recommend HH services.  He was put in isolation for the COVID-19.  There was no need for anti-viral treatment or steroid. Mental status improved per daughter at the bedside. He has a slight cough without fever and not requiring O2. CXR showed no acute findings. He did not receive covid vaccinations. Blood pressure elevated and increased lisinopril with PRN dosing of hydralazine PRN.     For discharge: PT/OT recommends HH services which will be set up at his daughter's house.  He seemed to eating more at mealtime so hopefully he may be turning the corner with covid infection.  Recommend family use standard precautions x 10 days. Daughter still has concerns that patient is not at his baseline. Whether he is just in a grieving condition from loss of spouse last month or compounded with covid-19, he is well for dc home. I did encourage that she f/u with his PCP and express on-going concerns of depression. Medication may need to be adjusted for 6 months and re-assess need for continuation.

## 2022-10-16 NOTE — ASSESSMENT & PLAN NOTE
Chronic, stable at baseline  Monitor on am labs  Transfuse if needed    Lab Results   Component Value Date    HGB 12.1 (L) 10/15/2022

## 2022-10-16 NOTE — ASSESSMENT & PLAN NOTE
Slightly worse today.  Will keep monitoring.  Avoid heparin products.    Lab Results   Component Value Date    PLT 47 (L) 10/15/2022

## 2022-10-16 NOTE — PROGRESS NOTES
[facility] Medicine  Progress Note    Patient Name: Lavon Brandon  MRN: [id number]  Patient Class: OP- Observation   Admission Date: 10/14/2022  Length of Stay: 0 days  Attending Physician: Blaise Nova MD  Primary Care Provider: Aristides Haynes MD        Subjective:     Principal Problem:Debility        HPI:  89M with PMH dementia, HTN, HLD, bradycardia, CKD4, epilepsy, depression, and thrombocytopenia presents to ER due to 1 week of decreased PO intake, increased confusion, and increased generalized weakness. He has had increased frequents falls as well. Complaining of pain to lower back that is mild to moderate without radiation. No other associated symptoms. No treatments tried. History somewhat limited as he keeps referring to when he was in the . Daughter Gabriela at bedside helps with history. Daughter states patient barely eating now and last 2 days he has not gotten out of bed to walk. He usually is supposed to ambulate with walker but doesn't always use it. Daughter attributes this decline to the recent sudden death of the patient's spouse 1 month ago. Incidentally found to be COVID+ on screen. No significant trauma related to falls on ER evaluation and imaging.       Overview/Hospital Course:  Pt was admitted with debility.  PT and OT evaluated him and treated him.  He was put in isolation for the COVID-19.  There was no need for anti-viral treatment or steroid.      Interval History:  no new issues.  Participated with PT and OT.    Review of Systems   Unable to perform ROS: Dementia   Objective:     Vital Signs (Most Recent):  Temp: 97.9 °F (36.6 °C) (10/15/22 1629)  Pulse: (!) 46 (10/15/22 1629)  Resp: 17 (10/15/22 1629)  BP: (!) 177/77 (10/15/22 1629)  SpO2: 98 % (10/15/22 1629)   Vital Signs (24h Range):  Temp:  [96.9 °F (36.1 °C)-98.8 °F (37.1 °C)] 97.9 °F (36.6 °C)  Pulse:  [46-57] 46  Resp:  [16-20] 17  SpO2:  [96 %-98 %] 98 %  BP: (161-178)/(68-77) 177/77      Weight: 84.8 kg (186 lb 15.2 oz)  Body mass index is 27.61 kg/m².    Intake/Output Summary (Last 24 hours) at 10/15/2022 1946  Last data filed at 10/15/2022 1844  Gross per 24 hour   Intake 2534.9 ml   Output --   Net 2534.9 ml      Physical Exam  Vitals reviewed.   Constitutional:       General: He is not in acute distress.     Appearance: He is not diaphoretic.   HENT:      Mouth/Throat:      Mouth: Mucous membranes are moist.   Eyes:      General: No scleral icterus.        Right eye: No discharge.         Left eye: No discharge.   Neck:      Vascular: No JVD.   Cardiovascular:      Rate and Rhythm: Normal rate and regular rhythm.   Pulmonary:      Effort: Pulmonary effort is normal.      Breath sounds: Normal breath sounds.   Abdominal:      General: Bowel sounds are normal. There is no distension.      Palpations: Abdomen is soft.      Tenderness: There is no abdominal tenderness.   Skin:     General: Skin is warm.      Findings: No rash.   Neurological:      Mental Status: He is alert. He is disoriented and confused.       Significant Labs: All pertinent labs within the past 24 hours have been reviewed.    Significant Imaging: I have reviewed all pertinent imaging results/findings within the past 24 hours.      Assessment/Plan:      * Debility  Acute on chronic  Multifactorial with recent death of spouse and new covid diagnosis with underlying dementia and physical deconditioning  Consulting PT/OT.   to assist with placing patient in appropriate facility.  Will check vitamin levels.    COVID-19  Found on screening  Unclear if partially responsible for his acute decline but likely so  Keep in isolation.  No respiratory symptoms or disease on CXR  Avoiding lovenox due to thrombocytopenia  Continue to monitor    Frequent falls  Fall precaution  See above.  Consulting with PT and OT.    Anemia of chronic renal failure, stage 4 (severe)  Chronic, stable at baseline  Monitor on am labs  Transfuse if  needed    Lab Results   Component Value Date    HGB 12.1 (L) 10/15/2022         Chronic renal disease, stage IV  Chronic, stable  Renally dose meds and avoid nephrotoxins  Monitor on am labs.    Lab Results   Component Value Date    CREATININE 2.1 (H) 10/15/2022         Thrombocytopenia  Slightly worse today.  Will keep monitoring.  Avoid heparin products.    Lab Results   Component Value Date    PLT 47 (L) 10/15/2022         Dementia with behavioral disturbance  Worse than baseline per daughter  Delirium precautions.  Avoid anti-cholinergics and anti-psychotics.    Hyperlipidemia  Chronic  Continue statin    Depression  Chronic, stable  No clear depressive symptoms but grief reaction can present like this  Continue home citalopram    Seizure disorder, post CVA 2011  Chronic, stable  Seizure precautions  F/u keppra level  Continue keppra    Hypertension  Chronic, uncontrolled  Continue home amlodipine and ACE inhibitor.  Monitor pressures.  Adjust regimen as necessary      VTE Risk Mitigation (From admission, onward)         Ordered     IP VTE HIGH RISK PATIENT  Once         10/14/22 1604     Place sequential compression device  Until discontinued         10/14/22 1604                Discharge Planning   JOANNE:      Code Status: Full Code   Is the patient medically ready for discharge?:     Reason for patient still in hospital (select all that apply): Patient trending condition, Laboratory test, PT / OT recommendations and Pending disposition  Discharge Plan A: Home Health                  Blaise Nova MD  Department of Hospital Medicine   [facility]

## 2022-10-16 NOTE — SUBJECTIVE & OBJECTIVE
Interval History: daughter at bedside feeding him pieces of melon, he has no new complaints     Review of Systems   Constitutional:  Positive for activity change and appetite change.   HENT: Negative.     Respiratory: Negative.     Cardiovascular: Negative.    Gastrointestinal: Negative.    Genitourinary: Negative.    Musculoskeletal:  Positive for gait problem.   Neurological:  Positive for weakness.   Psychiatric/Behavioral:  Positive for dysphoric mood.    Objective:     Vital Signs (Most Recent):  Temp: 96.9 °F (36.1 °C) (10/16/22 1642)  Pulse: (!) 48 (10/16/22 1642)  Resp: 18 (10/16/22 1642)  BP: (!) 180/77 (10/16/22 1642)  SpO2: 96 % (10/16/22 1642)   Vital Signs (24h Range):  Temp:  [92.9 °F (33.8 °C)-98.3 °F (36.8 °C)] 96.9 °F (36.1 °C)  Pulse:  [42-49] 48  Resp:  [16-20] 18  SpO2:  [92 %-99 %] 96 %  BP: (162-193)/(71-86) 180/77     Weight: 84.8 kg (186 lb 15.2 oz)  Body mass index is 27.61 kg/m².    Intake/Output Summary (Last 24 hours) at 10/16/2022 1738  Last data filed at 10/16/2022 1729  Gross per 24 hour   Intake 3970.54 ml   Output --   Net 3970.54 ml      Physical Exam  Constitutional:       General: He is not in acute distress.  HENT:      Head: Normocephalic and atraumatic.   Eyes:      Extraocular Movements: Extraocular movements intact.   Cardiovascular:      Rate and Rhythm: Regular rhythm. Bradycardia present.      Pulses: Normal pulses.   Pulmonary:      Breath sounds: No wheezing or rhonchi.   Abdominal:      General: Abdomen is flat.      Palpations: Abdomen is soft.      Tenderness: There is no abdominal tenderness.   Musculoskeletal:         General: No deformity. Normal range of motion.      Cervical back: Normal range of motion and neck supple.   Skin:     General: Skin is warm and dry.      Capillary Refill: Capillary refill takes 2 to 3 seconds.      Findings: Bruising present.   Neurological:      Mental Status: He is alert and oriented to person, place, and time. Mental status is at  baseline.   Psychiatric:      Comments: Flat affect       Significant Labs: All pertinent labs within the past 24 hours have been reviewed.  A1C: No results for input(s): HGBA1C in the last 4320 hours.  CBC:   Recent Labs   Lab 10/15/22  0545 10/16/22  0503   WBC 3.13* 5.05   HGB 12.1* 12.4*   HCT 35.7* 39.1*   PLT 47* 90*     CMP:   Recent Labs   Lab 10/15/22  0545 10/16/22  0503    138   K 4.8 4.8    109   CO2 22* 20*   GLU 80 91   BUN 25* 28*   CREATININE 2.1* 2.1*   CALCIUM 8.2* 8.5*   PROT 5.3* 5.2*   ALBUMIN 2.6* 2.8*   BILITOT 0.3 0.4   ALKPHOS 36* 39*   AST 90* 74*   ALT 27 27   ANIONGAP 8 9     Cardiac Markers: No results for input(s): CKMB, MYOGLOBIN, BNP, TROPISTAT in the last 48 hours.  Lipid Panel: No results for input(s): CHOL, HDL, LDLCALC, TRIG, CHOLHDL in the last 48 hours.  Magnesium: No results for input(s): MG in the last 48 hours.  POCT Glucose: No results for input(s): POCTGLUCOSE in the last 48 hours.  Troponin: No results for input(s): TROPONINI, TROPONINIHS in the last 48 hours.  TSH:   Recent Labs   Lab 10/14/22  0955   TSH 1.752     Urine Studies: No results for input(s): COLORU, APPEARANCEUA, PHUR, SPECGRAV, PROTEINUA, GLUCUA, KETONESU, BILIRUBINUA, OCCULTUA, NITRITE, UROBILINOGEN, LEUKOCYTESUR, RBCUA, WBCUA, BACTERIA, SQUAMEPITHEL, HYALINECASTS in the last 48 hours.    Invalid input(s): WRIGHTSUR    Significant Imaging: I have reviewed all pertinent imaging results/findings within the past 24 hours.

## 2022-10-17 VITALS
HEIGHT: 69 IN | BODY MASS INDEX: 27.69 KG/M2 | WEIGHT: 186.94 LBS | OXYGEN SATURATION: 98 % | DIASTOLIC BLOOD PRESSURE: 75 MMHG | HEART RATE: 53 BPM | RESPIRATION RATE: 18 BRPM | SYSTOLIC BLOOD PRESSURE: 165 MMHG | TEMPERATURE: 97 F

## 2022-10-17 DIAGNOSIS — U07.1 COVID-19 VIRUS DETECTED: ICD-10-CM

## 2022-10-17 PROBLEM — E44.0 MODERATE MALNUTRITION: Status: ACTIVE | Noted: 2022-10-17

## 2022-10-17 LAB
ALBUMIN SERPL BCP-MCNC: 2.8 G/DL (ref 3.5–5.2)
ALP SERPL-CCNC: 43 U/L (ref 55–135)
ALT SERPL W/O P-5'-P-CCNC: 24 U/L (ref 10–44)
ANION GAP SERPL CALC-SCNC: 6 MMOL/L (ref 8–16)
AST SERPL-CCNC: 53 U/L (ref 10–40)
BASOPHILS # BLD AUTO: 0.01 K/UL (ref 0–0.2)
BASOPHILS NFR BLD: 0.2 % (ref 0–1.9)
BILIRUB SERPL-MCNC: 0.5 MG/DL (ref 0.1–1)
BUN SERPL-MCNC: 22 MG/DL (ref 8–23)
CALCIUM SERPL-MCNC: 8.9 MG/DL (ref 8.7–10.5)
CHLORIDE SERPL-SCNC: 108 MMOL/L (ref 95–110)
CO2 SERPL-SCNC: 25 MMOL/L (ref 23–29)
CREAT SERPL-MCNC: 1.9 MG/DL (ref 0.5–1.4)
DIFFERENTIAL METHOD: ABNORMAL
EOSINOPHIL # BLD AUTO: 0 K/UL (ref 0–0.5)
EOSINOPHIL NFR BLD: 0.4 % (ref 0–8)
ERYTHROCYTE [DISTWIDTH] IN BLOOD BY AUTOMATED COUNT: 14.6 % (ref 11.5–14.5)
EST. GFR  (NO RACE VARIABLE): 33 ML/MIN/1.73 M^2
GLUCOSE SERPL-MCNC: 98 MG/DL (ref 70–110)
HCT VFR BLD AUTO: 37.4 % (ref 40–54)
HGB BLD-MCNC: 12.4 G/DL (ref 14–18)
IMM GRANULOCYTES # BLD AUTO: 0.01 K/UL (ref 0–0.04)
IMM GRANULOCYTES NFR BLD AUTO: 0.2 % (ref 0–0.5)
LYMPHOCYTES # BLD AUTO: 2.2 K/UL (ref 1–4.8)
LYMPHOCYTES NFR BLD: 44.8 % (ref 18–48)
MCH RBC QN AUTO: 29.2 PG (ref 27–31)
MCHC RBC AUTO-ENTMCNC: 33.2 G/DL (ref 32–36)
MCV RBC AUTO: 88 FL (ref 82–98)
MONOCYTES # BLD AUTO: 0.4 K/UL (ref 0.3–1)
MONOCYTES NFR BLD: 9 % (ref 4–15)
NEUTROPHILS # BLD AUTO: 2.2 K/UL (ref 1.8–7.7)
NEUTROPHILS NFR BLD: 45.4 % (ref 38–73)
NRBC BLD-RTO: 0 /100 WBC
PLATELET # BLD AUTO: 85 K/UL (ref 150–450)
PMV BLD AUTO: 12.2 FL (ref 9.2–12.9)
POTASSIUM SERPL-SCNC: 4.6 MMOL/L (ref 3.5–5.1)
PROT SERPL-MCNC: 5.4 G/DL (ref 6–8.4)
RBC # BLD AUTO: 4.25 M/UL (ref 4.6–6.2)
SODIUM SERPL-SCNC: 139 MMOL/L (ref 136–145)
WBC # BLD AUTO: 4.8 K/UL (ref 3.9–12.7)

## 2022-10-17 PROCEDURE — 97116 GAIT TRAINING THERAPY: CPT | Mod: CQ

## 2022-10-17 PROCEDURE — 96361 HYDRATE IV INFUSION ADD-ON: CPT

## 2022-10-17 PROCEDURE — 25000003 PHARM REV CODE 250: Performed by: HOSPITALIST

## 2022-10-17 PROCEDURE — 36415 COLL VENOUS BLD VENIPUNCTURE: CPT | Performed by: STUDENT IN AN ORGANIZED HEALTH CARE EDUCATION/TRAINING PROGRAM

## 2022-10-17 PROCEDURE — 85025 COMPLETE CBC W/AUTO DIFF WBC: CPT | Performed by: STUDENT IN AN ORGANIZED HEALTH CARE EDUCATION/TRAINING PROGRAM

## 2022-10-17 PROCEDURE — G0378 HOSPITAL OBSERVATION PER HR: HCPCS

## 2022-10-17 PROCEDURE — 25000003 PHARM REV CODE 250: Performed by: STUDENT IN AN ORGANIZED HEALTH CARE EDUCATION/TRAINING PROGRAM

## 2022-10-17 PROCEDURE — 80053 COMPREHEN METABOLIC PANEL: CPT | Performed by: STUDENT IN AN ORGANIZED HEALTH CARE EDUCATION/TRAINING PROGRAM

## 2022-10-17 RX ORDER — GUAIFENESIN 600 MG/1
600 TABLET, EXTENDED RELEASE ORAL 2 TIMES DAILY
Qty: 20 TABLET | Refills: 0 | Status: SHIPPED | OUTPATIENT
Start: 2022-10-17 | End: 2022-10-27

## 2022-10-17 RX ORDER — LISINOPRIL 40 MG/1
40 TABLET ORAL DAILY
Status: DISCONTINUED | OUTPATIENT
Start: 2022-10-17 | End: 2022-10-17 | Stop reason: HOSPADM

## 2022-10-17 RX ORDER — BENAZEPRIL HYDROCHLORIDE 20 MG/1
20 TABLET ORAL 2 TIMES DAILY
Qty: 60 TABLET | Refills: 0 | Status: ON HOLD | OUTPATIENT
Start: 2022-10-17 | End: 2023-04-13 | Stop reason: HOSPADM

## 2022-10-17 RX ADMIN — Medication 2000 UNITS: at 08:10

## 2022-10-17 RX ADMIN — LISINOPRIL 40 MG: 40 TABLET ORAL at 08:10

## 2022-10-17 RX ADMIN — GUAIFENESIN 600 MG: 600 TABLET, EXTENDED RELEASE ORAL at 08:10

## 2022-10-17 RX ADMIN — CALCITRIOL CAPSULES 0.25 MCG 0.25 MCG: 0.25 CAPSULE ORAL at 08:10

## 2022-10-17 RX ADMIN — LEVETIRACETAM 500 MG: 500 TABLET, FILM COATED ORAL at 08:10

## 2022-10-17 RX ADMIN — AMLODIPINE BESYLATE 5 MG: 5 TABLET ORAL at 08:10

## 2022-10-17 RX ADMIN — CITALOPRAM HYDROBROMIDE 20 MG: 10 TABLET ORAL at 08:10

## 2022-10-17 NOTE — PLAN OF CARE
Recommendations   1) Continue cardiac diet   2) weigh weekly   3) Add Suplena daily   4) weigh weekly   5) Assist with feeding if needed    Goals: 1) PO intakes > 50% of meals and supplements at f/u  Nutrition Goal Status: new  Communication of RD Recs: reviewed with RN (POC, sticky note)

## 2022-10-17 NOTE — NURSING
Telemetry monitor removed and returned to monitor tech.  Discharge instructions reviewed with patient/daughter including discharge medications, follow-up appointments, diet, and activity restrictions.  Daughter requests to speak with provider, Dr. Turner notified. BSC and wheelchair delivered to bedside for home use. No further needs expressed at this time.

## 2022-10-17 NOTE — PLAN OF CARE
Problem: Physical Therapy  Goal: Physical Therapy Goal  Description: Goals to be met by: 10/29/22     Patient will increase functional independence with mobility by performin. Supine to sit with MInimal Assistance  2. Sit to stand transfer with Minimal Assistance  3. Bed to chair transfer with Minimal Assistance using Rolling Walker  4. Gait  x 40 feet with Minimal Assistance using Rolling Walker.   5. Lower extremity exercise program x 15 reps, with assistance as needed    Outcome: Ongoing, Progressing   Therapeutic activity to improve functional mobility : bed mobility , transfers, gait with rw and assistance for safety.

## 2022-10-17 NOTE — PT/OT/SLP PROGRESS
Physical Therapy Treatment    Patient Name:  Lavon Brandon   MRN:  [id number]    Recommendations:     Discharge Recommendations:  home health PT, home with home health   Discharge Equipment Recommendations: none   Barriers to discharge: None    Assessment:     Lavon Brandon is a 89 y.o. male admitted with a medical diagnosis of Debility.  He presents with the following impairments/functional limitations:  weakness, impaired endurance, impaired self care skills, impaired functional mobility, gait instability, impaired balance, decreased lower extremity function, decreased ROM . Awake,alert, supine in bed just finished lunch. Agreed to participate in therapy but reports feeling very weak. Required A x 2 to stand from seated EOB with rw. Extra time to gain standing balance , leans posterior. Ambulated 20' with rw and Min A , returned to sit EOB, sit > supine with Min A. Mobility limited by fatigue , requires A for safety with mobility .     Rehab Prognosis: Fair; patient would benefit from acute skilled PT services to address these deficits and reach maximum level of function.    Recent Surgery: * No surgery found *      Plan:     During this hospitalization, patient to be seen 6 x/week to address the identified rehab impairments via gait training, therapeutic activities and progress toward the following goals:    Plan of Care Expires:  10/29/22    Subjective     Chief Complaint: weakness  Patient/Family Comments/goals: home with step daughter  Pain/Comfort:  Pain Rating 1: 0/10      Objective:     Communicated with nurse Luz prior to session.  Patient found supine with bed alarm, peripheral IV, telemetry ([employer] at bedside) upon PT entry to room.     General Precautions: Standard, airborne, contact, droplet, fall   Orthopedic Precautions:N/A   Braces: N/A  Respiratory Status: Room air     Functional Mobility:  Bed Mobility:     Rolling Left:  contact guard assistance  Rolling Right: contact guard  assistance  Supine to Sit: minimum assistance  Sit to Supine: minimum assistance  Transfers:     Sit to Stand:  maximal assistance and of 2 persons with rolling walker  Gait: 20' with rw and Min A ( once balanced in standing )      AM-PAC 6 CLICK MOBILITY          Therapeutic Activities and Exercises:   Transferred EOB with Min A.   Sit > stand with A x 2.    Ambulated 20' with rw and Min A.   Sit > supine with Min A.     Patient left supine with all lines intact, call button in reach, bed alarm on, nurse Natty notified, and [employer] present..    GOALS:   Multidisciplinary Problems       Physical Therapy Goals          Problem: Physical Therapy    Goal Priority Disciplines Outcome Goal Variances Interventions   Physical Therapy Goal     PT, PT/OT      Description: Goals to be met by: 10/29/22     Patient will increase functional independence with mobility by performin. Supine to sit with MInimal Assistance  2. Sit to stand transfer with Minimal Assistance  3. Bed to chair transfer with Minimal Assistance using Rolling Walker  4. Gait  x 40 feet with Minimal Assistance using Rolling Walker.   5. Lower extremity exercise program x 15 reps, with assistance as needed                         Time Tracking:     PT Received On: 10/17/22  PT Start Time: 1335     PT Stop Time: 1350  PT Total Time (min): 15 min     Billable Minutes: Gait Training 15min    Treatment Type: Treatment  PT/PTA: PTA     PTA Visit Number: 1     10/17/2022

## 2022-10-17 NOTE — PLAN OF CARE
Pt accepted with [facility]        10/17/22 1031   Post-Acute Status   Post-Acute Authorization Home Health   Home Health Status Set-up Complete/Auth obtained

## 2022-10-17 NOTE — PLAN OF CARE
Referral sent to [facility] for  services         10/17/22 7401   Post-Acute Status   Post-Acute Authorization Home Health   Home Health Status Referrals Sent

## 2022-10-17 NOTE — CONSULTS
[facility]  Adult Nutrition  Consult Note    SUMMARY     Recommendations   1) Continue cardiac diet   2) weigh weekly   3) Add Suplena daily   4) weigh weekly   5) Assist with feeding if needed    Goals: 1) PO intakes > 50% of meals and supplements at f/u  Nutrition Goal Status: new  Communication of RD Recs: reviewed with RN (POC, sticky note)    Assessment and Plan    Moderate malnutrition  Contributing Nutrition Diagnosis  Moderate acute illness related malnutrition    Related to (etiology):   Decreased appetite    Signs and Symptoms (as evidenced by):   1) PO intakes < 75% of needs x 7 days ( < 50% x 3-4 )  2) mild wasting as charted below    Interventions:  Fat/sodium modified diet and nutrition supplement therapy    Nutrition Diagnosis Status:   new         Malnutrition Assessment     Skin (Micronutrient):  (Reji = 16, knee abraisions)   Micronutrient Evaluation: no deficiencies   Energy Intake (Malnutrition): less than or equal to 75% for greater than or equal to 7 days  Orbital Region (Subcutaneous Fat Loss): mild depletion  Upper Arm Region (Subcutaneous Fat Loss): mild depletion  Thoracic and Lumbar Region: mild depletion   Muslim Region (Muscle Loss): mild depletion  Clavicle Bone Region (Muscle Loss): mild depletion  Clavicle and Acromion Bone Region (Muscle Loss): mild depletion  Dorsal Hand (Muscle Loss): mild depletion  Edema (Fluid Accumulation): 0-->no edema present   Subcutaneous Fat Loss (Final Summary): mild protein-calorie malnutrition  Muscle Loss Evaluation (Final Summary): mild protein-calorie malnutrition         Reason for Assessment    Reason For Assessment: consult  Diagnosis:  (debility)  Relevant Medical History: dementia, HTN, HLD, CKD4, epilepsy, CVA  Interdisciplinary Rounds: did not attend    General Information Comments: 89 y/o male admitted with debility and COVID-19. Pt with poor appetite PTA x 1 week, especially 2 days PTA. This is improving and he ate  "% of meals x 2 days, may need feeding assistance per notes, but he denies need @ visit. Plan for d/c home today. NFPE 10/17/22 mild wasting seen. Insignificant wt loss per chart review.    Nutrition Discharge Planning: To be determined- cardiac diet, texture per SLP    Nutrition Risk Screen    Nutrition Risk Screen: no indicators present    Nutrition/Diet History    Patient Reported Diet/Restrictions/Preferences: heart healthy  Spiritual, Cultural Beliefs, Holiness Practices, Values that Affect Care: no  Food Allergies: NKFA  Factors Affecting Nutritional Intake: decreased appetite    Anthropometrics    Temp: 96.3 °F (35.7 °C)  Height Method: Measured (10/29/21)  Height: 5' 9"  Height (inches): 69 in  Weight Method: Bed Scale  Weight: 84.8 kg (186 lb 15.2 oz)  Weight (lb): 186.95 lb  Ideal Body Weight (IBW), Male: 160 lb  % Ideal Body Weight, Male (lb): 116.84 %  BMI (Calculated): 27.6  BMI Grade: 25 - 29.9 - overweight  Weight Loss: unintentional  Usual Body Weight (UBW), k.1 kg (10/26/21)  % Usual Body Weight: 95.37  % Weight Change From Usual Weight: -4.83 %       Lab/Procedures/Meds    Pertinent Labs Reviewed: reviewed  BMP  Lab Results   Component Value Date     10/17/2022    K 4.6 10/17/2022     10/17/2022    CO2 25 10/17/2022    BUN 22 10/17/2022    CREATININE 1.9 (H) 10/17/2022    CALCIUM 8.9 10/17/2022    ANIONGAP 6 (L) 10/17/2022    ESTGFRAFRICA 26.6 (A) 2022    EGFRNONAA 23.1 (A) 2022     Lab Results   Component Value Date    ALBUMIN 2.8 (L) 10/17/2022       Pertinent Medications Reviewed: reviewed  Pertinent Medications Comments: vitamin D, statin, zofran, KNaPhos, lactated ringers @ 75 ml/hr    Estimated/Assessed Needs    Weight Used For Calorie Calculations: 84.8 kg (186 lb 15.2 oz)  Energy Calorie Requirements (kcal): 23-25 kcal/kg ( CKD) = 1399-7416 kcal  Energy Need Method: Kcal/kg  Protein Requirements: 0.8- 1 g protein/kg ( CKD) = 67-84 g  Weight Used For " Protein Calculations: 84.8 kg (186 lb 15.2 oz)  Fluid Requirements (mL): 7145-5117 ml or per MD  Estimated Fluid Requirement Method: RDA Method  CHO Requirement: N/A      Nutrition Prescription Ordered    Current Diet Order: cardiac    Evaluation of Received Nutrient/Fluid Intake    Energy Calories Required: not meeting needs  Protein Required: not meeting needs  Fluid Required: meeting needs  Total Fluid Intake (mL/kg): IVF @ 75 ml/hr  Tolerance: tolerating     Intake/Output Summary (Last 24 hours) at 10/17/2022 1123  Last data filed at 10/17/2022 0454  Gross per 24 hour   Intake 1909.22 ml   Output 500 ml   Net 1409.22 ml       % Intake of Estimated Energy Needs: %  % Meal Intake: %    Nutrition Risk    Level of Risk/Frequency of Follow-up: moderate 2 x weekly      Monitor and Evaluation    Food and Nutrient Intake: energy intake, food and beverage intake  Food and Nutrient Adminstration: diet order  Physical Activity and Function: nutrition-related ADLs and IADLs  Anthropometric Measurements: weight  Biochemical Data, Medical Tests and Procedures: electrolyte and renal panel, glucose/endocrine profile  Nutrition-Focused Physical Findings: overall appearance       Nutrition Follow-Up    RD Follow-up?: Yes

## 2022-10-17 NOTE — PLAN OF CARE
Ok to pull wheelchair and bedside commode per Gallo Flowers.       10/17/22 1112   Post-Acute Status   Post-Acute Authorization HME   Home Health Status Set-up Complete/Auth obtained

## 2022-10-17 NOTE — PLAN OF CARE
Wheelchair and bedside commode delivered to pt's bedside.   Pt's daughter, Gabriela Hammond, updated on plan to DC home with HH. She verbalized understanding.     Pt referred to outpatient CM.    Pt clear for DC from CM standpoint       10/17/22 2936   Final Note   Assessment Type Final Discharge Note   Anticipated Discharge Disposition Home-Health   Post-Acute Status   Post-Acute Authorization [facility]

## 2022-10-17 NOTE — ASSESSMENT & PLAN NOTE
Contributing Nutrition Diagnosis  Moderate acute illness related malnutrition    Related to (etiology):   Decreased appetite    Signs and Symptoms (as evidenced by):   1) PO intakes < 75% of needs x 7 days ( < 50% x 3-4 )  2) mild wasting as charted below    Interventions:  Fat/sodium modified diet and nutrition supplement therapy    Nutrition Diagnosis Status:   new

## 2022-10-18 ENCOUNTER — TELEPHONE (OUTPATIENT)
Dept: MEDSURG UNIT | Facility: HOSPITAL | Age: 87
End: 2022-10-18
Payer: MEDICARE

## 2022-10-18 NOTE — DISCHARGE SUMMARY
[facility] Medicine  Discharge Summary      Patient Name: Lavon Brandon  MRN: [id number]  Patient Class: OP- Observation  Admission Date: 10/14/2022  Hospital Length of Stay: 0 days  Discharge Date and Time: 10/17/2022   Attending Physician: No att. providers found   Discharging Provider: Sara Turner MD  Primary Care Provider: Aristides Haynes MD      HPI:   89M with PMH dementia, HTN, HLD, bradycardia, CKD4, epilepsy, depression, and thrombocytopenia presents to ER due to 1 week of decreased PO intake, increased confusion, and increased generalized weakness. He has had increased frequents falls as well. Complaining of pain to lower back that is mild to moderate without radiation. No other associated symptoms. No treatments tried. History somewhat limited as he keeps referring to when he was in the . Daughter Gabriela at bedside helps with history. Daughter states patient barely eating now and last 2 days he has not gotten out of bed to walk. He usually is supposed to ambulate with walker but doesn't always use it. Daughter attributes this decline to the recent sudden death of the patient's spouse 1 month ago. Incidentally found to be COVID+ on screen. No significant trauma related to falls on ER evaluation and imaging.       * No surgery found *      Hospital Course:   Pt was admitted with debility.  PT and OT evaluated him and recommend HH services.  He was put in isolation for the COVID-19.  There was no need for anti-viral treatment or steroid. Mental status improved per daughter at the bedside. He has a slight cough without fever and not requiring O2. CXR showed no acute findings. He did not receive covid vaccinations. Blood pressure elevated and increased lisinopril with PRN dosing of hydralazine PRN.     For discharge: PT/OT recommends HH services which will be set up at his daughter's house.  He seemed to eating more at mealtime so hopefully he may be turning the corner  with covid infection.  Recommend family use standard precautions x 10 days. Daughter still has concerns that patient is not at his baseline. Whether he is just in a grieving condition from loss of spouse last month or compounded with covid-19, he is well for dc home. I did encourage that she f/u with his PCP and express on-going concerns of depression. Medication may need to be adjusted for 6 months and re-assess need for continuation.        Goals of Care Treatment Preferences:  Code Status: Full Code      Consults:   Consults (From admission, onward)        Status Ordering Provider     Inpatient consult to Registered Dietitian/Nutritionist  Once        Provider:  (Not yet assigned)    Completed JASMIN SUNSHINE     Inpatient consult to Social Work/Case Management  Once        Provider:  (Not yet assigned)    Completed CITLALY DUARTE          Thrombocytopenia    Will keep monitoring.  Avoid heparin products.    Lab Results   Component Value Date    PLT 85 (L) 10/17/2022     Platelets stable and will be monitored by PCP while on aspirin     Dementia with behavioral disturbance  Worse than baseline per daughter  Delirium precautions.  Avoid anti-cholinergics and anti-psychotics.    Depression  Chronic, stable  No clear depressive symptoms but grief reaction can present like this  Continue home citalopram    Hypertension  Chronic, uncontrolled  Continue home amlodipine and ACE inhibitor - increase lisinopril   Monitor pressures.  Adjust regimen as necessary      Final Active Diagnoses:    Diagnosis Date Noted POA    PRINCIPAL PROBLEM:  Debility [R53.81] 10/14/2022 Yes    Moderate malnutrition [E44.0] 10/17/2022 Yes    Frequent falls [R29.6] 10/14/2022 Not Applicable    COVID-19 [U07.1] 10/14/2022 Yes    Thrombocytopenia [D69.6] 05/06/2019 Yes     Chronic    Chronic renal disease, stage IV [N18.4] 05/06/2019 Yes     Chronic    Anemia of chronic renal failure, stage 4 (severe) [N18.4, D63.1] 05/06/2019 Yes      "Chronic    Hypertension [I10]  Yes     Chronic    Seizure disorder, post CVA 2011 [G40.909]  Yes     Chronic    Depression [F32.A]  Yes     Chronic    Hyperlipidemia [E78.5]  Yes     Chronic    Dementia with behavioral disturbance [F03.918]  Yes     Chronic      Problems Resolved During this Admission:       Discharged Condition: good    Disposition: Home-Health Care [facility]    Follow Up:   Follow-up Information     Aristides Haynes MD Follow up.    Specialty: Family Medicine  Why: 10/24 @ 10:20 am  will see NP this visit  Contact information:  [address] [place] [address]  [phone]             [facility] Follow up.    Specialties: Home Health Services, Home Therapy Services, Home Living Aide Services  Contact information:  [address], [address] [address] [phone]                     Patient Instructions:      WHEELCHAIR FOR HOME USE     Order Specific Question Answer Comments   Hours in W/C per day: 10    Type of Wheelchair: Standard    Size(Width): 18"(STD adult)    Leg Support: STD footrests    Lap Belt: Velcro    Accessories: None    Cushion: Basic    Height: 5' 9" (1.753 m)    Weight: 84.8 kg (186 lb 15.2 oz)    Does patient have medical equipment at home? walker, rolling    Length of need (1-99 months): 99    Please check all that apply: Caregiver is capable and willing to operate wheelchair safely.      COMMODE FOR HOME USE     Order Specific Question Answer Comments   Type: Standard    Height: 5' 9" (1.753 m)    Weight: 84.8 kg (186 lb 15.2 oz)    Does patient have medical equipment at home? walker, rolling    Length of need (1-99 months): 99      Ambulatory referral/consult to Home Health   Referral Priority: Routine Referral Type: Home Health   Referral Reason: Specialty Services Required   Requested Specialty: Home Health Services   Number of Visits Requested: 1     Ambulatory referral/consult to Outpatient Case Management   Referral Priority: " Routine Referral Type: Consultation   Referral Reason: Specialty Services Required   Number of Visits Requested: 1     Diet renal     Diet Cardiac     Notify your health care provider if you experience any of the following:  temperature >100.4     Notify your health care provider if you experience any of the following:  difficulty breathing or increased cough     Notify your health care provider if you experience any of the following:  increased confusion or weakness     Activity as tolerated       Significant Diagnostic Studies: Labs:   CMP   Recent Labs   Lab 10/17/22  0554      K 4.6      CO2 25   GLU 98   BUN 22   CREATININE 1.9*   CALCIUM 8.9   PROT 5.4*   ALBUMIN 2.8*   BILITOT 0.5   ALKPHOS 43*   AST 53*   ALT 24   ANIONGAP 6*    and CBC   Recent Labs   Lab 10/17/22  0554   WBC 4.80   HGB 12.4*   HCT 37.4*   PLT 85*       Pending Diagnostic Studies:     Procedure Component Value Units Date/Time    Levetiracetam Level [[id number]] Collected: 10/14/22 1055    Order Status: Sent Lab Status: In process Updated: 10/14/22 1059    Specimen: Blood     Vitamin B1 [[id number]] Collected: 10/14/22 1706    Order Status: Sent Lab Status: In process Updated: 10/14/22 1717    Specimen: Blood     Vitamin B6 [[id number]] Collected: 10/14/22 1706    Order Status: Sent Lab Status: In process Updated: 10/14/22 1717    Specimen: Blood          Medications:  Reconciled Home Medications:      Medication List      START taking these medications    guaiFENesin 600 mg 12 hr tablet  Commonly known as: MUCINEX  Take 1 tablet (600 mg total) by mouth 2 (two) times daily. for 10 days        CHANGE how you take these medications    benazepriL 20 MG tablet  Commonly known as: LOTENSIN  Take 1 tablet (20 mg total) by mouth 2 (two) times daily.  What changed:   · medication strength  · how much to take        CONTINUE taking these medications    amLODIPine 5 MG tablet  Commonly known as: NORVASC  Take 1 tablet (5 mg total) by mouth  once daily.     aspirin 81 MG Chew  Take 1 tablet (81 mg total) by mouth once daily.     calcitRIOL 0.25 MCG Cap  Commonly known as: ROCALTROL  Take 0.25 mcg by mouth once daily.     cholecalciferol (vitamin D3) 50 mcg (2,000 unit) Cap capsule  Commonly known as: VITAMIN D3  Take 1 capsule by mouth once daily.     citalopram 20 MG tablet  Commonly known as: CeleXA  Take 1 tablet by mouth once daily     levETIRAcetam 500 MG Tab  Commonly known as: KEPPRA  Take 1 tablet (500 mg total) by mouth 2 (two) times daily.     rosuvastatin 10 MG tablet  Commonly known as: CRESTOR  Take 1 tablet (10 mg total) by mouth once daily.            Indwelling Lines/Drains at time of discharge:   Lines/Drains/Airways     None                 Time spent on the discharge of patient: 35 minutes         Sara Turner MD  Department of Hospital Medicine  [facility]

## 2022-10-18 NOTE — ASSESSMENT & PLAN NOTE
Will keep monitoring.  Avoid heparin products.    Lab Results   Component Value Date    PLT 85 (L) 10/17/2022     Platelets stable and will be monitored by PCP while on aspirin

## 2022-10-19 LAB — LEVETIRACETAM SERPL-MCNC: <1 UG/ML (ref 3–60)

## 2022-10-20 LAB
PYRIDOXAL SERPL-MCNC: 5 UG/L (ref 5–50)
VIT B1 BLD-MCNC: 53 UG/L (ref 38–122)

## 2022-10-24 ENCOUNTER — OFFICE VISIT (OUTPATIENT)
Dept: FAMILY MEDICINE | Facility: CLINIC | Age: 87
End: 2022-10-24
Payer: MEDICARE

## 2022-10-24 VITALS
HEIGHT: 69 IN | WEIGHT: 169.75 LBS | DIASTOLIC BLOOD PRESSURE: 58 MMHG | HEART RATE: 50 BPM | OXYGEN SATURATION: 98 % | SYSTOLIC BLOOD PRESSURE: 124 MMHG | TEMPERATURE: 98 F | BODY MASS INDEX: 25.14 KG/M2

## 2022-10-24 DIAGNOSIS — N18.4 CHRONIC RENAL DISEASE, STAGE IV: Chronic | ICD-10-CM

## 2022-10-24 DIAGNOSIS — Z86.73 HISTORY OF CVA IN ADULTHOOD: ICD-10-CM

## 2022-10-24 DIAGNOSIS — I10 PRIMARY HYPERTENSION: Primary | ICD-10-CM

## 2022-10-24 DIAGNOSIS — F03.918 DEMENTIA WITH BEHAVIORAL DISTURBANCE: Chronic | ICD-10-CM

## 2022-10-24 DIAGNOSIS — R05.9 COUGH, UNSPECIFIED TYPE: ICD-10-CM

## 2022-10-24 PROCEDURE — 99214 PR OFFICE/OUTPT VISIT, EST, LEVL IV, 30-39 MIN: ICD-10-PCS | Mod: S$GLB,,, | Performed by: NURSE PRACTITIONER

## 2022-10-24 PROCEDURE — 1157F ADVNC CARE PLAN IN RCRD: CPT | Mod: CPTII,S$GLB,, | Performed by: NURSE PRACTITIONER

## 2022-10-24 PROCEDURE — 1157F PR ADVANCE CARE PLAN OR EQUIV PRESENT IN MEDICAL RECORD: ICD-10-PCS | Mod: CPTII,S$GLB,, | Performed by: NURSE PRACTITIONER

## 2022-10-24 PROCEDURE — 99999 PR PBB SHADOW E&M-EST. PATIENT-LVL III: CPT | Mod: PBBFAC,,, | Performed by: NURSE PRACTITIONER

## 2022-10-24 PROCEDURE — 1159F MED LIST DOCD IN RCRD: CPT | Mod: CPTII,S$GLB,, | Performed by: NURSE PRACTITIONER

## 2022-10-24 PROCEDURE — 99214 OFFICE O/P EST MOD 30 MIN: CPT | Mod: S$GLB,,, | Performed by: NURSE PRACTITIONER

## 2022-10-24 PROCEDURE — 1159F PR MEDICATION LIST DOCUMENTED IN MEDICAL RECORD: ICD-10-PCS | Mod: CPTII,S$GLB,, | Performed by: NURSE PRACTITIONER

## 2022-10-24 PROCEDURE — 1126F PR PAIN SEVERITY QUANTIFIED, NO PAIN PRESENT: ICD-10-PCS | Mod: CPTII,S$GLB,, | Performed by: NURSE PRACTITIONER

## 2022-10-24 PROCEDURE — 1126F AMNT PAIN NOTED NONE PRSNT: CPT | Mod: CPTII,S$GLB,, | Performed by: NURSE PRACTITIONER

## 2022-10-24 PROCEDURE — 99999 PR PBB SHADOW E&M-EST. PATIENT-LVL III: ICD-10-PCS | Mod: PBBFAC,,, | Performed by: NURSE PRACTITIONER

## 2022-10-24 NOTE — PROGRESS NOTES
This dictation has been generated using Modal Fluency Dictation some phonetic errors may occur. Please contact author for clarification if needed.     Problem List Items Addressed This Visit       Hypertension - Primary (Chronic)    Overview     renal htn         Relevant Orders    Comprehensive Metabolic Panel    Dementia with behavioral disturbance (Chronic)    Chronic renal disease, stage IV (Chronic)    Relevant Orders    Comprehensive Metabolic Panel    History of CVA in adulthood     Other Visit Diagnoses       Cough, unspecified type                Orders Placed This Encounter    Comprehensive Metabolic Panel     Hypertension stable and controlled continue current therapy.  Update CMP.  Daughter Gabriela states they have an outside lab and will provide that result for us.    Dementia.  May have some sundowning.  Has had some restless sleep.  Discuss risk of sleep mid use an elderly and dementia patients.    CKD 3 need update of renal function may provide us without side lab work drawn today.    History of stroke  Cough recent positive COVID.  No shortness of breath.    No follow-ups on file.    ________________________________________________________________  ________________________________________________________________      Chief Complaint   Patient presents with    Hospital Follow Up     History of present illness  This 89 y.o. presents today for complaint of hospital follow-up.  No episode of care open.  Patient daughter with him today her name is Gabriela.  She is giving part of the history due to his dementia.  He has a little bit of cough which seems to be worse at night.  She has given him some Mucinex.  He is ambulating with his walker.  His appetite is so-so per report but better than when he was admitted.  He does have home health with [facility].  Chronic kidney disease follows with Dr. Chase.   He is being reassessed for home health.  He had home health prior to admit and his needs had changed.   Discussed we are glad to sign any orders that they need for hospital bed or such equipment.  Continue to use walker and home.      Past Medical History:   Diagnosis Date    Bradyarrhythmia     CVA (cerebral infarction) 2006    Dementia     Depression     Hyperlipidemia     Hypertension     renal htn    Pulmonary embolism 2002    Seizure disorder        Past Surgical History:   Procedure Laterality Date    left foot  with crushed injry         Family History   Problem Relation Age of Onset    Diabetes Mother     Cancer Neg Hx        Social History     Socioeconomic History    Marital status:    Tobacco Use    Smoking status: Never    Smokeless tobacco: Never   Substance and Sexual Activity    Alcohol use: No    Drug use: No     Social Determinants of Health     Financial Resource Strain: Unknown    Difficulty of Paying Living Expenses: Patient refused   Food Insecurity: Unknown    Worried About Running Out of Food in the Last Year: Patient refused    Ran Out of Food in the Last Year: Patient refused   Transportation Needs: Unknown    Lack of Transportation (Medical): Patient refused    Lack of Transportation (Non-Medical): Patient refused   Physical Activity: Unknown    Days of Exercise per Week: Patient refused    Minutes of Exercise per Session: Patient refused   Stress: Unknown    Feeling of Stress : Patient refused   Social Connections: Unknown    Frequency of Communication with Friends and Family: Patient refused    Frequency of Social Gatherings with Friends and Family: Patient refused    Attends Sabianist Services: Patient refused    Active Member of Clubs or Organizations: Patient refused    Attends Club or Organization Meetings: Patient refused    Marital Status: Patient refused   Housing Stability: Unknown    Unable to Pay for Housing in the Last Year: Patient refused    Unstable Housing in the Last Year: Patient refused       Current Outpatient Medications   Medication Sig Dispense Refill     amLODIPine (NORVASC) 5 MG tablet Take 1 tablet (5 mg total) by mouth once daily. 90 tablet 3    aspirin 81 MG Chew Take 1 tablet (81 mg total) by mouth once daily. 100 tablet 1    benazepriL (LOTENSIN) 20 MG tablet Take 1 tablet (20 mg total) by mouth 2 (two) times daily. 60 tablet 0    calcitRIOL (ROCALTROL) 0.25 MCG Cap Take 0.25 mcg by mouth once daily.      cholecalciferol, vitamin D3, (VITAMIN D3) 50 mcg (2,000 unit) Cap Take 1 capsule by mouth once daily.      citalopram (CELEXA) 20 MG tablet Take 1 tablet by mouth once daily 90 tablet 11    guaiFENesin (MUCINEX) 600 mg 12 hr tablet Take 1 tablet (600 mg total) by mouth 2 (two) times daily. for 10 days 20 tablet 0    levETIRAcetam (KEPPRA) 500 MG Tab Take 1 tablet (500 mg total) by mouth 2 (two) times daily. 180 tablet 3    rosuvastatin (CRESTOR) 10 MG tablet Take 1 tablet (10 mg total) by mouth once daily. 90 tablet 3     No current facility-administered medications for this visit.       Review of patient's allergies indicates:   Allergen Reactions    Ciprofloxacin (bulk) Swelling       Physical examination  Vitals Reviewed\  Vitals:    10/24/22 1017   BP: (!) 124/58   Pulse: (!) 50   Temp: 97.7 °F (36.5 °C)     Body mass index is 25.07 kg/m².     Weight: 77 kg (169 lb 12.1 oz)    Gen. Well-dressed well-nourished   Skin warm dry and intact.  No rashes noted.  Neck is supple without adenopathy  Chest.  Respirations are even unlabored.  Lungs are clear to auscultation.  Cardiac regular rate and rhythm.  No chest wall adenopathy noted.  Neuro. Awake alert oriented x4.  Normal judgment and cognition noted.  Extremities no clubbing cyanosis or edema noted.     Call or return to clinic prn if these symptoms worsen or fail to improve as anticipated.

## 2022-10-25 DIAGNOSIS — I69.319 CVA, OLD, COGNITIVE DEFICITS: Primary | ICD-10-CM

## 2022-10-26 ENCOUNTER — EXTERNAL HOME HEALTH (OUTPATIENT)
Dept: HOME HEALTH SERVICES | Facility: HOSPITAL | Age: 87
End: 2022-10-26
Payer: MEDICARE

## 2022-11-15 ENCOUNTER — TELEPHONE (OUTPATIENT)
Dept: FAMILY MEDICINE | Facility: CLINIC | Age: 87
End: 2022-11-15
Payer: MEDICARE

## 2022-11-15 NOTE — TELEPHONE ENCOUNTER
----- Message from Autumn Sharp sent at 11/15/2022  1:56 PM CST -----  Contact: Grisel  Type:  Patient Call          Who Called: [facility] -  Grisel         Does the patient know what this is regarding?: requesting a call back for extension on OT home services ; please advise           Would the patient rather a call back or a response via [facility]? Call           Best Call Back Number: [phone]            Additional Information:

## 2022-11-17 ENCOUNTER — TELEPHONE (OUTPATIENT)
Dept: FAMILY MEDICINE | Facility: CLINIC | Age: 87
End: 2022-11-17
Payer: MEDICARE

## 2022-11-17 NOTE — TELEPHONE ENCOUNTER
----- Message from Corrine Cade sent at 11/17/2022  4:05 PM CST -----  Contact: self  Type: Needs Medical Advice  Who Called: Ada Roberts Call Back Number: [phone] (ada)  Additional Information: PT Office  states that the pt heart rate is 48 and the pt has been sitting most of the day the PT states  she will start exercising with him most of the day and will continue to check it. Thanks

## 2022-11-22 ENCOUNTER — TELEPHONE (OUTPATIENT)
Dept: FAMILY MEDICINE | Facility: CLINIC | Age: 87
End: 2022-11-22
Payer: MEDICARE

## 2022-11-22 NOTE — TELEPHONE ENCOUNTER
----- Message from Eden Cooper sent at 11/22/2022  3:02 PM CST -----  Regarding: pt call back  Contact: pt  Name of Who is Calling: MANSOOR SEGAL [[phone]] Ada (Homehealth Nurse)      What is the request in detail: Home-health nurse called to inform staff pt's heart rate is at 48. Please advise!        Can the clinic reply by [facility]:        What Number to [facility] Back [facility]: [phone] Ada (Homehealth Nurse)

## 2022-11-22 NOTE — TELEPHONE ENCOUNTER
Spoke to home health nurse via phone. Provider advised of pulse. Patient denies dizziness or lightheadedness. Home health nurse states it is usually in the 50s. Currently it is 47

## 2022-11-23 ENCOUNTER — DOCUMENT SCAN (OUTPATIENT)
Dept: HOME HEALTH SERVICES | Facility: HOSPITAL | Age: 87
End: 2022-11-23
Payer: MEDICARE

## 2023-02-01 ENCOUNTER — TELEPHONE (OUTPATIENT)
Dept: FAMILY MEDICINE | Facility: CLINIC | Age: 88
End: 2023-02-01
Payer: OTHER GOVERNMENT

## 2023-02-01 DIAGNOSIS — E78.2 MIXED HYPERLIPIDEMIA: ICD-10-CM

## 2023-02-01 DIAGNOSIS — I10 PRIMARY HYPERTENSION: Primary | ICD-10-CM

## 2023-02-01 DIAGNOSIS — N18.4 CHRONIC RENAL DISEASE, STAGE IV: ICD-10-CM

## 2023-02-01 NOTE — TELEPHONE ENCOUNTER
Patient has appt scheduled on 2/14/2023 @ 8:20. Wants to know if he needs labs before the appt or should he fast the morning of.  Please advise.

## 2023-02-14 ENCOUNTER — OFFICE VISIT (OUTPATIENT)
Dept: FAMILY MEDICINE | Facility: CLINIC | Age: 88
End: 2023-02-14
Payer: MEDICARE

## 2023-02-14 ENCOUNTER — LAB VISIT (OUTPATIENT)
Dept: LAB | Facility: HOSPITAL | Age: 88
End: 2023-02-14
Attending: NURSE PRACTITIONER
Payer: MEDICARE

## 2023-02-14 VITALS
DIASTOLIC BLOOD PRESSURE: 60 MMHG | OXYGEN SATURATION: 97 % | HEIGHT: 69 IN | TEMPERATURE: 98 F | BODY MASS INDEX: 26.29 KG/M2 | HEART RATE: 52 BPM | SYSTOLIC BLOOD PRESSURE: 136 MMHG | WEIGHT: 177.5 LBS

## 2023-02-14 DIAGNOSIS — I10 PRIMARY HYPERTENSION: Primary | Chronic | ICD-10-CM

## 2023-02-14 DIAGNOSIS — N18.4 CHRONIC RENAL DISEASE, STAGE IV: Chronic | ICD-10-CM

## 2023-02-14 DIAGNOSIS — R53.81 DEBILITY: ICD-10-CM

## 2023-02-14 DIAGNOSIS — K59.04 FUNCTIONAL CONSTIPATION: ICD-10-CM

## 2023-02-14 DIAGNOSIS — D69.6 THROMBOCYTOPENIA: ICD-10-CM

## 2023-02-14 DIAGNOSIS — I69.319 CVA, OLD, COGNITIVE DEFICITS: ICD-10-CM

## 2023-02-14 DIAGNOSIS — I10 PRIMARY HYPERTENSION: Chronic | ICD-10-CM

## 2023-02-14 LAB
ALBUMIN SERPL BCP-MCNC: 3.4 G/DL (ref 3.5–5.2)
ALP SERPL-CCNC: 53 U/L (ref 55–135)
ALT SERPL W/O P-5'-P-CCNC: 5 U/L (ref 10–44)
ANION GAP SERPL CALC-SCNC: 9 MMOL/L (ref 8–16)
AST SERPL-CCNC: 14 U/L (ref 10–40)
BASOPHILS # BLD AUTO: 0.04 K/UL (ref 0–0.2)
BASOPHILS NFR BLD: 0.6 % (ref 0–1.9)
BILIRUB SERPL-MCNC: 0.4 MG/DL (ref 0.1–1)
BUN SERPL-MCNC: 31 MG/DL (ref 8–23)
CALCIUM SERPL-MCNC: 9.1 MG/DL (ref 8.7–10.5)
CHLORIDE SERPL-SCNC: 108 MMOL/L (ref 95–110)
CO2 SERPL-SCNC: 22 MMOL/L (ref 23–29)
CREAT SERPL-MCNC: 2.3 MG/DL (ref 0.5–1.4)
DIFFERENTIAL METHOD: ABNORMAL
EOSINOPHIL # BLD AUTO: 0.2 K/UL (ref 0–0.5)
EOSINOPHIL NFR BLD: 2.8 % (ref 0–8)
ERYTHROCYTE [DISTWIDTH] IN BLOOD BY AUTOMATED COUNT: 14.5 % (ref 11.5–14.5)
EST. GFR  (NO RACE VARIABLE): 26.3 ML/MIN/1.73 M^2
GLUCOSE SERPL-MCNC: 87 MG/DL (ref 70–110)
HCT VFR BLD AUTO: 37.4 % (ref 40–54)
HGB BLD-MCNC: 11.5 G/DL (ref 14–18)
IMM GRANULOCYTES # BLD AUTO: 0.01 K/UL (ref 0–0.04)
IMM GRANULOCYTES NFR BLD AUTO: 0.2 % (ref 0–0.5)
LYMPHOCYTES # BLD AUTO: 2.8 K/UL (ref 1–4.8)
LYMPHOCYTES NFR BLD: 43.3 % (ref 18–48)
MCH RBC QN AUTO: 28.8 PG (ref 27–31)
MCHC RBC AUTO-ENTMCNC: 30.7 G/DL (ref 32–36)
MCV RBC AUTO: 94 FL (ref 82–98)
MONOCYTES # BLD AUTO: 0.7 K/UL (ref 0.3–1)
MONOCYTES NFR BLD: 10 % (ref 4–15)
NEUTROPHILS # BLD AUTO: 2.8 K/UL (ref 1.8–7.7)
NEUTROPHILS NFR BLD: 43.1 % (ref 38–73)
NRBC BLD-RTO: 0 /100 WBC
PLATELET # BLD AUTO: 124 K/UL (ref 150–450)
PMV BLD AUTO: 11.9 FL (ref 9.2–12.9)
POTASSIUM SERPL-SCNC: 4.8 MMOL/L (ref 3.5–5.1)
PROT SERPL-MCNC: 6.6 G/DL (ref 6–8.4)
RBC # BLD AUTO: 4 M/UL (ref 4.6–6.2)
SODIUM SERPL-SCNC: 139 MMOL/L (ref 136–145)
WBC # BLD AUTO: 6.47 K/UL (ref 3.9–12.7)

## 2023-02-14 PROCEDURE — 1157F PR ADVANCE CARE PLAN OR EQUIV PRESENT IN MEDICAL RECORD: ICD-10-PCS | Mod: CPTII,S$GLB,, | Performed by: NURSE PRACTITIONER

## 2023-02-14 PROCEDURE — 99214 PR OFFICE/OUTPT VISIT, EST, LEVL IV, 30-39 MIN: ICD-10-PCS | Mod: S$GLB,,, | Performed by: NURSE PRACTITIONER

## 2023-02-14 PROCEDURE — 1159F MED LIST DOCD IN RCRD: CPT | Mod: CPTII,S$GLB,, | Performed by: NURSE PRACTITIONER

## 2023-02-14 PROCEDURE — 3288F FALL RISK ASSESSMENT DOCD: CPT | Mod: CPTII,S$GLB,, | Performed by: NURSE PRACTITIONER

## 2023-02-14 PROCEDURE — 1126F PR PAIN SEVERITY QUANTIFIED, NO PAIN PRESENT: ICD-10-PCS | Mod: CPTII,S$GLB,, | Performed by: NURSE PRACTITIONER

## 2023-02-14 PROCEDURE — 3288F PR FALLS RISK ASSESSMENT DOCUMENTED: ICD-10-PCS | Mod: CPTII,S$GLB,, | Performed by: NURSE PRACTITIONER

## 2023-02-14 PROCEDURE — 99214 OFFICE O/P EST MOD 30 MIN: CPT | Mod: S$GLB,,, | Performed by: NURSE PRACTITIONER

## 2023-02-14 PROCEDURE — 1157F ADVNC CARE PLAN IN RCRD: CPT | Mod: CPTII,S$GLB,, | Performed by: NURSE PRACTITIONER

## 2023-02-14 PROCEDURE — 80053 COMPREHEN METABOLIC PANEL: CPT | Performed by: NURSE PRACTITIONER

## 2023-02-14 PROCEDURE — 1101F PR PT FALLS ASSESS DOC 0-1 FALLS W/OUT INJ PAST YR: ICD-10-PCS | Mod: CPTII,S$GLB,, | Performed by: NURSE PRACTITIONER

## 2023-02-14 PROCEDURE — 1101F PT FALLS ASSESS-DOCD LE1/YR: CPT | Mod: CPTII,S$GLB,, | Performed by: NURSE PRACTITIONER

## 2023-02-14 PROCEDURE — 99999 PR PBB SHADOW E&M-EST. PATIENT-LVL III: ICD-10-PCS | Mod: PBBFAC,,, | Performed by: NURSE PRACTITIONER

## 2023-02-14 PROCEDURE — 99999 PR PBB SHADOW E&M-EST. PATIENT-LVL III: CPT | Mod: PBBFAC,,, | Performed by: NURSE PRACTITIONER

## 2023-02-14 PROCEDURE — 1159F PR MEDICATION LIST DOCUMENTED IN MEDICAL RECORD: ICD-10-PCS | Mod: CPTII,S$GLB,, | Performed by: NURSE PRACTITIONER

## 2023-02-14 PROCEDURE — 85025 COMPLETE CBC W/AUTO DIFF WBC: CPT | Performed by: NURSE PRACTITIONER

## 2023-02-14 PROCEDURE — 36415 COLL VENOUS BLD VENIPUNCTURE: CPT | Mod: PO | Performed by: NURSE PRACTITIONER

## 2023-02-14 PROCEDURE — 1126F AMNT PAIN NOTED NONE PRSNT: CPT | Mod: CPTII,S$GLB,, | Performed by: NURSE PRACTITIONER

## 2023-02-14 RX ORDER — ROSUVASTATIN CALCIUM 10 MG/1
10 TABLET, COATED ORAL DAILY
Qty: 90 TABLET | Refills: 1 | Status: SHIPPED | OUTPATIENT
Start: 2023-02-14 | End: 2023-05-01 | Stop reason: SDUPTHER

## 2023-02-14 RX ORDER — DOCUSATE SODIUM 100 MG/1
100 CAPSULE, LIQUID FILLED ORAL DAILY
Qty: 90 CAPSULE | Refills: 3 | Status: SHIPPED | OUTPATIENT
Start: 2023-02-14

## 2023-02-14 NOTE — PATIENT INSTRUCTIONS
Jeffery Doll,     If you are due for any health screening(s) below please notify me so we can arrange them to be ordered and scheduled to maintain your health. Most healthy patients complete it. Don't lose out on improving your health.     All of your core healthy metrics are met.

## 2023-02-14 NOTE — PROGRESS NOTES
This dictation has been generated using Modal Fluency Dictation some phonetic errors may occur. Please contact author for clarification if needed.     Problem List Items Addressed This Visit       Hypertension - Primary (Chronic)    Overview     renal htn         Relevant Orders    Comprehensive Metabolic Panel    Thrombocytopenia (Chronic)    Relevant Orders    CBC Auto Differential    Chronic renal disease, stage IV (Chronic)    Relevant Orders    Comprehensive Metabolic Panel    Constipation - functional    Overview     Dx updated per 2019 IMO Load            Orders Placed This Encounter    CBC Auto Differential    Comprehensive Metabolic Panel    docusate sodium (COLACE) 100 MG capsule    rosuvastatin (CRESTOR) 10 MG tablet     Hypertension stable and controlled continue current therapy.      Dementia.  May have some sundowning.  Has had some restless sleep.  Discuss risk of sleep med use and the elderly and dementia patients.    CKD 3     History of stroke      Follow up in about 6 months (around 8/14/2023).    ________________________________________________________________  ________________________________________________________________      Chief Complaint   Patient presents with    Follow-up     History of present illness  This 89 y.o. presents today for complaint of hospital follow-up.  No episode of care open.  Patient daughter with him today her name is Gabriela.  She is giving part of the history due to his dementia.  He has a little bit of cough which seems to be worse at night.  She has given him some Mucinex.  He is ambulating with his walker.  His appetite is so-so per report but better than when he was admitted.  He does have home health with [facility].  Chronic kidney disease follows with Dr. Chase.   He is being reassessed for home health.  He had home health prior to admit and his needs had changed.  Discussed we are glad to sign any orders that they need for hospital bed or such equipment.   Continue to use walker and home.  LOV> Update CMP.  Daughter Gabriela states they have an outside lab and will provide that result for us.  renal function may provide us without side lab work drawn today.    Past Medical History:   Diagnosis Date    Bradyarrhythmia     CVA (cerebral infarction) 2006    Dementia     Depression     Hyperlipidemia     Hypertension     renal htn    Pulmonary embolism 2002    Seizure disorder        Past Surgical History:   Procedure Laterality Date    left foot  with crushed injry         Family History   Problem Relation Age of Onset    Diabetes Mother     Cancer Neg Hx        Social History     Socioeconomic History    Marital status:    Tobacco Use    Smoking status: Never    Smokeless tobacco: Never   Substance and Sexual Activity    Alcohol use: No    Drug use: No     Social Determinants of Health     Financial Resource Strain: Unknown    Difficulty of Paying Living Expenses: Patient refused   Food Insecurity: Unknown    Worried About Running Out of Food in the Last Year: Patient refused    Ran Out of Food in the Last Year: Patient refused   Transportation Needs: Unknown    Lack of Transportation (Medical): Patient refused    Lack of Transportation (Non-Medical): Patient refused   Physical Activity: Unknown    Days of Exercise per Week: Patient refused    Minutes of Exercise per Session: Patient refused   Stress: Unknown    Feeling of Stress : Patient refused   Social Connections: Unknown    Frequency of Communication with Friends and Family: Patient refused    Frequency of Social Gatherings with Friends and Family: Patient refused    Attends [employer] Services: Patient refused    Active Member of Clubs or Organizations: Patient refused    Attends Club or Organization Meetings: Patient refused    Marital Status: Patient refused   Housing Stability: Unknown    Unable to Pay for Housing in the Last Year: Patient refused    Unstable Housing in the Last Year: Patient refused        Current Outpatient Medications   Medication Sig Dispense Refill    amLODIPine (NORVASC) 5 MG tablet Take 1 tablet (5 mg total) by mouth once daily. 90 tablet 3    benazepriL (LOTENSIN) 20 MG tablet Take 1 tablet (20 mg total) by mouth 2 (two) times daily. 60 tablet 0    calcitRIOL (ROCALTROL) 0.25 MCG Cap Take 0.25 mcg by mouth once daily.      cholecalciferol, vitamin D3, (VITAMIN D3) 50 mcg (2,000 unit) Cap Take 1 capsule by mouth once daily.      levETIRAcetam (KEPPRA) 500 MG Tab Take 1 tablet (500 mg total) by mouth 2 (two) times daily. 180 tablet 3    aspirin 81 MG Chew Take 1 tablet (81 mg total) by mouth once daily. 100 tablet 1    citalopram (CELEXA) 20 MG tablet Take 1 tablet by mouth once daily 90 tablet 11    docusate sodium (COLACE) 100 MG capsule Take 1 capsule (100 mg total) by mouth once daily. 90 capsule 3    rosuvastatin (CRESTOR) 10 MG tablet Take 1 tablet (10 mg total) by mouth once daily. 90 tablet 1     No current facility-administered medications for this visit.       Review of patient's allergies indicates:   Allergen Reactions    Ciprofloxacin (bulk) Swelling       Physical examination  Vitals Reviewed\  Vitals:    02/14/23 0829   BP: 136/60   Pulse: (!) 52   Temp: 97.8 °F (36.6 °C)     Body mass index is 26.21 kg/m².     Weight: 80.5 kg (177 lb 7.5 oz)    Gen. Well-dressed well-nourished   Skin warm dry and intact.  No rashes noted.  Neck is supple without adenopathy  Chest.  Respirations are even unlabored.  Lungs are clear to auscultation.  Cardiac regular rate and rhythm.  No chest wall adenopathy noted.  Neuro. Awake alert oriented x4.  Normal judgment and cognition noted.  Extremities no clubbing cyanosis or edema noted.     Call or return to clinic prn if these symptoms worsen or fail to improve as anticipated.

## 2023-02-22 PROCEDURE — G0180 MD CERTIFICATION HHA PATIENT: HCPCS | Mod: ,,, | Performed by: NURSE PRACTITIONER

## 2023-02-22 PROCEDURE — G0180 PR HOME HEALTH MD CERTIFICATION: ICD-10-PCS | Mod: ,,, | Performed by: NURSE PRACTITIONER

## 2023-03-20 ENCOUNTER — EXTERNAL HOME HEALTH (OUTPATIENT)
Dept: HOME HEALTH SERVICES | Facility: HOSPITAL | Age: 88
End: 2023-03-20
Payer: MEDICARE

## 2023-04-11 ENCOUNTER — HOSPITAL ENCOUNTER (OUTPATIENT)
Facility: HOSPITAL | Age: 88
Discharge: HOME HEALTH | End: 2023-04-13
Attending: EMERGENCY MEDICINE | Admitting: INTERNAL MEDICINE
Payer: MEDICARE

## 2023-04-11 DIAGNOSIS — R56.9 SEIZURE: ICD-10-CM

## 2023-04-11 DIAGNOSIS — F05 POSTICTAL CONFUSION: Primary | ICD-10-CM

## 2023-04-11 DIAGNOSIS — Z86.73 HISTORY OF CVA IN ADULTHOOD: ICD-10-CM

## 2023-04-11 DIAGNOSIS — I10 HYPERTENSION, UNSPECIFIED TYPE: ICD-10-CM

## 2023-04-11 DIAGNOSIS — G40.909 SEIZURE DISORDER: ICD-10-CM

## 2023-04-11 DIAGNOSIS — R00.1 BRADYCARDIA: ICD-10-CM

## 2023-04-11 DIAGNOSIS — F03.918 DEMENTIA WITH BEHAVIORAL DISTURBANCE: Chronic | ICD-10-CM

## 2023-04-11 DIAGNOSIS — I10 PRIMARY HYPERTENSION: Chronic | ICD-10-CM

## 2023-04-11 LAB
ALBUMIN SERPL BCP-MCNC: 3.4 G/DL (ref 3.5–5.2)
ALP SERPL-CCNC: 39 U/L (ref 55–135)
ALT SERPL W/O P-5'-P-CCNC: 8 U/L (ref 10–44)
AMPHET+METHAMPHET UR QL: NEGATIVE
ANION GAP SERPL CALC-SCNC: 8 MMOL/L (ref 8–16)
APTT PPP: 22.6 SEC (ref 21–32)
AST SERPL-CCNC: 21 U/L (ref 10–40)
BACTERIA #/AREA URNS HPF: NEGATIVE /HPF
BARBITURATES UR QL SCN>200 NG/ML: NEGATIVE
BASOPHILS # BLD AUTO: 0.03 K/UL (ref 0–0.2)
BASOPHILS NFR BLD: 0.4 % (ref 0–1.9)
BENZODIAZ UR QL SCN>200 NG/ML: NEGATIVE
BILIRUB SERPL-MCNC: 0.6 MG/DL (ref 0.1–1)
BILIRUB UR QL STRIP: NEGATIVE
BUN SERPL-MCNC: 32 MG/DL (ref 8–23)
BZE UR QL SCN: NEGATIVE
CALCIUM SERPL-MCNC: 9.1 MG/DL (ref 8.7–10.5)
CANNABINOIDS UR QL SCN: NEGATIVE
CHLORIDE SERPL-SCNC: 109 MMOL/L (ref 95–110)
CLARITY UR: CLEAR
CO2 SERPL-SCNC: 22 MMOL/L (ref 23–29)
COLOR UR: YELLOW
CREAT SERPL-MCNC: 2.1 MG/DL (ref 0.5–1.4)
CREAT UR-MCNC: 70 MG/DL (ref 23–375)
DIFFERENTIAL METHOD: ABNORMAL
EOSINOPHIL # BLD AUTO: 0 K/UL (ref 0–0.5)
EOSINOPHIL NFR BLD: 0.3 % (ref 0–8)
ERYTHROCYTE [DISTWIDTH] IN BLOOD BY AUTOMATED COUNT: 14.6 % (ref 11.5–14.5)
EST. GFR  (NO RACE VARIABLE): 29.4 ML/MIN/1.73 M^2
GLUCOSE SERPL-MCNC: 144 MG/DL (ref 70–110)
GLUCOSE UR QL STRIP: NEGATIVE
HCT VFR BLD AUTO: 33.7 % (ref 40–54)
HGB BLD-MCNC: 10.8 G/DL (ref 14–18)
HGB UR QL STRIP: ABNORMAL
HYALINE CASTS #/AREA URNS LPF: 1 /LPF
IMM GRANULOCYTES # BLD AUTO: 0.03 K/UL (ref 0–0.04)
IMM GRANULOCYTES NFR BLD AUTO: 0.4 % (ref 0–0.5)
INR PPP: 1 (ref 0.8–1.2)
KETONES UR QL STRIP: ABNORMAL
LEUKOCYTE ESTERASE UR QL STRIP: NEGATIVE
LYMPHOCYTES # BLD AUTO: 1.1 K/UL (ref 1–4.8)
LYMPHOCYTES NFR BLD: 14.3 % (ref 18–48)
MAGNESIUM SERPL-MCNC: 1.9 MG/DL (ref 1.6–2.6)
MCH RBC QN AUTO: 29.3 PG (ref 27–31)
MCHC RBC AUTO-ENTMCNC: 32 G/DL (ref 32–36)
MCV RBC AUTO: 91 FL (ref 82–98)
MICROSCOPIC COMMENT: NORMAL
MONOCYTES # BLD AUTO: 0.5 K/UL (ref 0.3–1)
MONOCYTES NFR BLD: 6.1 % (ref 4–15)
NEUTROPHILS # BLD AUTO: 6 K/UL (ref 1.8–7.7)
NEUTROPHILS NFR BLD: 78.5 % (ref 38–73)
NITRITE UR QL STRIP: NEGATIVE
NRBC BLD-RTO: 0 /100 WBC
OPIATES UR QL SCN: NEGATIVE
PCP UR QL SCN>25 NG/ML: NEGATIVE
PH UR STRIP: 7 [PH] (ref 5–8)
PLATELET # BLD AUTO: 113 K/UL (ref 150–450)
PMV BLD AUTO: 11.2 FL (ref 9.2–12.9)
POTASSIUM SERPL-SCNC: 4.6 MMOL/L (ref 3.5–5.1)
PROT SERPL-MCNC: 6.2 G/DL (ref 6–8.4)
PROT UR QL STRIP: ABNORMAL
PROTHROMBIN TIME: 10.6 SEC (ref 9–12.5)
RBC # BLD AUTO: 3.69 M/UL (ref 4.6–6.2)
RBC #/AREA URNS HPF: 1 /HPF (ref 0–4)
SODIUM SERPL-SCNC: 139 MMOL/L (ref 136–145)
SP GR UR STRIP: 1.01 (ref 1–1.03)
SQUAMOUS #/AREA URNS HPF: 0 /HPF
TOXICOLOGY INFORMATION: NORMAL
TROPONIN I SERPL HS-MCNC: 31.2 PG/ML (ref 0–14.9)
TSH SERPL DL<=0.005 MIU/L-ACNC: 1.47 UIU/ML (ref 0.34–5.6)
URN SPEC COLLECT METH UR: ABNORMAL
UROBILINOGEN UR STRIP-ACNC: NEGATIVE EU/DL
WBC # BLD AUTO: 7.6 K/UL (ref 3.9–12.7)
WBC #/AREA URNS HPF: 0 /HPF (ref 0–5)

## 2023-04-11 PROCEDURE — 81001 URINALYSIS AUTO W/SCOPE: CPT | Performed by: EMERGENCY MEDICINE

## 2023-04-11 PROCEDURE — 96365 THER/PROPH/DIAG IV INF INIT: CPT

## 2023-04-11 PROCEDURE — 96367 TX/PROPH/DG ADDL SEQ IV INF: CPT

## 2023-04-11 PROCEDURE — 80053 COMPREHEN METABOLIC PANEL: CPT | Performed by: EMERGENCY MEDICINE

## 2023-04-11 PROCEDURE — 96366 THER/PROPH/DIAG IV INF ADDON: CPT

## 2023-04-11 PROCEDURE — 96372 THER/PROPH/DIAG INJ SC/IM: CPT | Mod: 59 | Performed by: INTERNAL MEDICINE

## 2023-04-11 PROCEDURE — 84443 ASSAY THYROID STIM HORMONE: CPT | Performed by: EMERGENCY MEDICINE

## 2023-04-11 PROCEDURE — 94761 N-INVAS EAR/PLS OXIMETRY MLT: CPT

## 2023-04-11 PROCEDURE — 85610 PROTHROMBIN TIME: CPT | Performed by: EMERGENCY MEDICINE

## 2023-04-11 PROCEDURE — 80177 DRUG SCRN QUAN LEVETIRACETAM: CPT | Performed by: EMERGENCY MEDICINE

## 2023-04-11 PROCEDURE — 84484 ASSAY OF TROPONIN QUANT: CPT | Performed by: EMERGENCY MEDICINE

## 2023-04-11 PROCEDURE — 63600175 PHARM REV CODE 636 W HCPCS: Performed by: EMERGENCY MEDICINE

## 2023-04-11 PROCEDURE — 93005 ELECTROCARDIOGRAM TRACING: CPT | Performed by: INTERNAL MEDICINE

## 2023-04-11 PROCEDURE — 99285 EMERGENCY DEPT VISIT HI MDM: CPT | Mod: 25

## 2023-04-11 PROCEDURE — G0378 HOSPITAL OBSERVATION PER HR: HCPCS

## 2023-04-11 PROCEDURE — 94640 AIRWAY INHALATION TREATMENT: CPT

## 2023-04-11 PROCEDURE — 93010 ELECTROCARDIOGRAM REPORT: CPT | Mod: ,,, | Performed by: INTERNAL MEDICINE

## 2023-04-11 PROCEDURE — 25000242 PHARM REV CODE 250 ALT 637 W/ HCPCS: Performed by: INTERNAL MEDICINE

## 2023-04-11 PROCEDURE — 85730 THROMBOPLASTIN TIME PARTIAL: CPT | Performed by: EMERGENCY MEDICINE

## 2023-04-11 PROCEDURE — 63600175 PHARM REV CODE 636 W HCPCS: Performed by: INTERNAL MEDICINE

## 2023-04-11 PROCEDURE — 36415 COLL VENOUS BLD VENIPUNCTURE: CPT | Performed by: EMERGENCY MEDICINE

## 2023-04-11 PROCEDURE — 83735 ASSAY OF MAGNESIUM: CPT | Performed by: EMERGENCY MEDICINE

## 2023-04-11 PROCEDURE — 80307 DRUG TEST PRSMV CHEM ANLYZR: CPT | Performed by: EMERGENCY MEDICINE

## 2023-04-11 PROCEDURE — 85025 COMPLETE CBC W/AUTO DIFF WBC: CPT | Performed by: EMERGENCY MEDICINE

## 2023-04-11 PROCEDURE — 93010 EKG 12-LEAD: ICD-10-PCS | Mod: ,,, | Performed by: INTERNAL MEDICINE

## 2023-04-11 RX ORDER — ASPIRIN 81 MG/1
81 TABLET ORAL DAILY
Status: DISCONTINUED | OUTPATIENT
Start: 2023-04-12 | End: 2023-04-11

## 2023-04-11 RX ORDER — LEVETIRACETAM 10 MG/ML
1000 INJECTION INTRAVASCULAR
Status: ACTIVE | OUTPATIENT
Start: 2023-04-11 | End: 2023-04-12

## 2023-04-11 RX ORDER — NAPROXEN SODIUM 220 MG/1
81 TABLET, FILM COATED ORAL DAILY
Status: DISCONTINUED | OUTPATIENT
Start: 2023-04-12 | End: 2023-04-13 | Stop reason: HOSPADM

## 2023-04-11 RX ORDER — SODIUM CHLORIDE 0.9 % (FLUSH) 0.9 %
10 SYRINGE (ML) INJECTION
Status: DISCONTINUED | OUTPATIENT
Start: 2023-04-11 | End: 2023-04-13 | Stop reason: HOSPADM

## 2023-04-11 RX ORDER — ATORVASTATIN CALCIUM 40 MG/1
40 TABLET, FILM COATED ORAL DAILY
Status: DISCONTINUED | OUTPATIENT
Start: 2023-04-12 | End: 2023-04-11

## 2023-04-11 RX ORDER — CALCITRIOL 0.25 UG/1
0.25 CAPSULE ORAL DAILY
Status: DISCONTINUED | OUTPATIENT
Start: 2023-04-12 | End: 2023-04-13 | Stop reason: HOSPADM

## 2023-04-11 RX ORDER — ALBUTEROL SULFATE 0.83 MG/ML
2.5 SOLUTION RESPIRATORY (INHALATION) EVERY 8 HOURS
Status: DISCONTINUED | OUTPATIENT
Start: 2023-04-12 | End: 2023-04-12

## 2023-04-11 RX ORDER — LEVETIRACETAM 10 MG/ML
1000 INJECTION INTRAVASCULAR
Status: COMPLETED | OUTPATIENT
Start: 2023-04-11 | End: 2023-04-11

## 2023-04-11 RX ORDER — DOCUSATE SODIUM 100 MG/1
100 CAPSULE, LIQUID FILLED ORAL DAILY
Status: DISCONTINUED | OUTPATIENT
Start: 2023-04-12 | End: 2023-04-13 | Stop reason: HOSPADM

## 2023-04-11 RX ORDER — ALBUTEROL SULFATE 90 UG/1
2 AEROSOL, METERED RESPIRATORY (INHALATION) EVERY 8 HOURS
Status: DISCONTINUED | OUTPATIENT
Start: 2023-04-12 | End: 2023-04-11

## 2023-04-11 RX ORDER — ENOXAPARIN SODIUM 100 MG/ML
30 INJECTION SUBCUTANEOUS EVERY 24 HOURS
Status: DISCONTINUED | OUTPATIENT
Start: 2023-04-11 | End: 2023-04-13 | Stop reason: HOSPADM

## 2023-04-11 RX ORDER — ATORVASTATIN CALCIUM 40 MG/1
40 TABLET, FILM COATED ORAL DAILY
Status: DISCONTINUED | OUTPATIENT
Start: 2023-04-12 | End: 2023-04-13 | Stop reason: HOSPADM

## 2023-04-11 RX ORDER — ACETAMINOPHEN 500 MG
2000 TABLET ORAL DAILY
Status: DISCONTINUED | OUTPATIENT
Start: 2023-04-12 | End: 2023-04-13 | Stop reason: HOSPADM

## 2023-04-11 RX ORDER — CITALOPRAM 20 MG/1
20 TABLET, FILM COATED ORAL DAILY
Status: DISCONTINUED | OUTPATIENT
Start: 2023-04-12 | End: 2023-04-13 | Stop reason: HOSPADM

## 2023-04-11 RX ADMIN — PIPERACILLIN SODIUM AND TAZOBACTAM SODIUM 3.38 G: 3; .375 INJECTION, POWDER, LYOPHILIZED, FOR SOLUTION INTRAVENOUS at 08:04

## 2023-04-11 RX ADMIN — ALBUTEROL SULFATE 2.5 MG: 2.5 SOLUTION RESPIRATORY (INHALATION) at 11:04

## 2023-04-11 RX ADMIN — LEVETIRACETAM 1000 MG: 10 INJECTION INTRAVENOUS at 02:04

## 2023-04-11 RX ADMIN — ENOXAPARIN SODIUM 30 MG: 30 INJECTION SUBCUTANEOUS at 06:04

## 2023-04-11 NOTE — NURSING
Updated Dr. Govea on elevated BP.    Continue to allow for permissive HTN and per order notify MD for SBP >220 or < 100.

## 2023-04-11 NOTE — H&P
[facility] - Emergency Dept  Hospital Medicine  History & Physical    Patient Name: Lavon Brandon  MRN: [id number]  Patient Class: OP- Observation  Admission Date: 4/11/2023  Attending Physician: Kev Govea MD  Primary Care Provider: Aristides Haynes MD         Patient information was obtained from patient, relative(s), past medical records and ER records.     Subjective:     Principal Problem:Seizure    Chief Complaint:   Chief Complaint   Patient presents with    Seizures     Found postictal by family member in bed. Family states that pt is currently taking seizure medication twice a day. Possibly bit tongue.        HPI: ED Note  Patient with a remote seizure history.  No known seizure for at least 10 years.  Patient lives with his daughter due to dementia.  Patient has been having trouble sleeping last 2 nights.  Patient went to bed late last night.  Daughter went to go check on him this morning and he was using the bathroom and seemed okay.  She went back and checked on him again and he was unresponsive in bed.  Positive blood from mouth.  Patient was nonverbal.  Patient transported here with paramedics.  There was no observed seizure-like activity.    4/11/2023  Mr Brandon is unable to provide any information. In 2011 per his daughter, the patient had seizures due to a series of mini strokes      Past Medical History:   Diagnosis Date    Bradyarrhythmia     CVA (cerebral infarction) 2006    Dementia     Depression     Hyperlipidemia     Hypertension     renal htn    Pulmonary embolism 2002    Seizure disorder        Past Surgical History:   Procedure Laterality Date    left foot  with crushed injry         Review of patient's allergies indicates:   Allergen Reactions    Ciprofloxacin (bulk) Swelling       No current facility-administered medications on file prior to encounter.     Current Outpatient Medications on File Prior to Encounter   Medication Sig    amLODIPine (NORVASC) 5  MG tablet Take 1 tablet (5 mg total) by mouth once daily.    calcitRIOL (ROCALTROL) 0.25 MCG Cap Take 0.25 mcg by mouth once daily.    cholecalciferol, vitamin D3, (VITAMIN D3) 50 mcg (2,000 unit) Cap Take 1 capsule by mouth once daily.    citalopram (CELEXA) 20 MG tablet Take 1 tablet by mouth once daily (Patient taking differently: Take 20 mg by mouth once daily.)    levETIRAcetam (KEPPRA) 500 MG Tab Take 1 tablet (500 mg total) by mouth 2 (two) times daily.    rosuvastatin (CRESTOR) 10 MG tablet Take 1 tablet (10 mg total) by mouth once daily.    aspirin 81 MG Chew Take 1 tablet (81 mg total) by mouth once daily.    benazepriL (LOTENSIN) 20 MG tablet Take 1 tablet (20 mg total) by mouth 2 (two) times daily.    docusate sodium (COLACE) 100 MG capsule Take 1 capsule (100 mg total) by mouth once daily.     Family History       Problem Relation (Age of Onset)    Diabetes Mother          Tobacco Use    Smoking status: Never    Smokeless tobacco: Never   Substance and Sexual Activity    Alcohol use: No    Drug use: No    Sexual activity: Not on file     Review of Systems   Unable to perform ROS: Mental status change   Objective:     Vital Signs (Most Recent):  Temp: 98 °F (36.7 °C) (04/11/23 1309)  Pulse: 71 (04/11/23 1531)  Resp: 19 (04/11/23 1510)  BP: (!) 201/88 (04/11/23 1531)  SpO2: 95 % (04/11/23 1519)   Vital Signs (24h Range):  Temp:  [98 °F (36.7 °C)] 98 °F (36.7 °C)  Pulse:  [71-89] 71  Resp:  [16-19] 19  SpO2:  [95 %-96 %] 95 %  BP: (179-211)/(80-90) 201/88     Weight: 81.6 kg (180 lb)  Body mass index is 26.58 kg/m².    Physical Exam  Vitals and nursing note reviewed.   Constitutional:       General: He is not in acute distress.     Appearance: He is ill-appearing. He is not diaphoretic.   HENT:      Head: Normocephalic and atraumatic.      Nose: Nose normal.      Mouth/Throat:      Mouth: Mucous membranes are moist.   Eyes:      Extraocular Movements: Extraocular movements intact.      Pupils:  Pupils are equal, round, and reactive to light.   Cardiovascular:      Rate and Rhythm: Normal rate and regular rhythm.   Pulmonary:      Effort: Pulmonary effort is normal.      Breath sounds: Normal breath sounds.   Abdominal:      General: There is distension.      Tenderness: There is no abdominal tenderness. There is no guarding or rebound.   Musculoskeletal:      Cervical back: Normal range of motion and neck supple.      Comments: Unable to cooperate with the exam   Skin:     General: Skin is warm.   Neurological:      Comments: Unable to cooperate with the exam   Psychiatric:      Comments: Unable to cooperate with the exam         CRANIAL NERVES     CN III, IV, VI   Pupils are equal, round, and reactive to light.     Significant Labs: All pertinent labs within the past 24 hours have been reviewed.  Recent Lab Results         04/11/23  1428   04/11/23  1341        Benzodiazepines Negative         Phencyclidine Negative         Albumin   3.4       Alkaline Phosphatase   39       ALT   8       Amphetamine Screen, Ur Negative         Anion Gap   8       Appearance, UA Clear         aPTT   22.6  Comment: aPTT therapeutic range = 39-69 seconds       AST   21       Bacteria, UA Negative         Barbiturate Screen, Ur Negative         Baso #   0.03       Basophil %   0.4       Bilirubin (UA) Negative         BILIRUBIN TOTAL   0.6  Comment: For infants and newborns, interpretation of results should be based  on gestational age, weight and in agreement with clinical  observations.    Premature Infant recommended reference ranges:  Up to 24 hours.............<8.0 mg/dL  Up to 48 hours............<12.0 mg/dL  3-5 days..................<15.0 mg/dL  6-29 days.................<15.0 mg/dL         BUN   32       Calcium   9.1       Chloride   109       CO2   22       Cocaine (Metab.) Negative         Color, UA Yellow         Creatinine   2.1       Creatinine, Urine 70.0  Comment: The random urine reference ranges provided  were established   for 24 hour urine collections.  No reference ranges exist for  random urine specimens.  Correlate clinically.           Differential Method   Automated       eGFR   29.4       Eos #   0.0       Eosinophil %   0.3       Glucose   144       Glucose, UA Negative         Gran # (ANC)   6.0       Gran %   78.5       Hematocrit   33.7       Hemoglobin   10.8       Hyaline Casts, UA 1         Immature Grans (Abs)   0.03  Comment: Mild elevation in immature granulocytes is non specific and   can be seen in a variety of conditions including stress response,   acute inflammation, trauma and pregnancy. Correlation with other   laboratory and clinical findings is essential.         Immature Granulocytes   0.4       INR   1.0  Comment: Coumadin Therapy:  2.0 - 3.0 for INR for all indicators except mechanical heart valves  and antiphospholipid syndromes which should use 2.5 - 3.5.         Ketones, UA 1+         Leukocytes, UA Negative         Lymph #   1.1       Lymph %   14.3       Magnesium   1.9       MCH   29.3       MCHC   32.0       MCV   91       Microscopic Comment SEE COMMENT  Comment: Other formed elements not mentioned in the report are not   present in the microscopic examination.            Mono #   0.5       Mono %   6.1       MPV   11.2       NITRITE UA Negative         nRBC   0       Occult Blood UA 1+         Opiate Scrn, Ur Negative         pH, UA 7.0         Platelets   113       Potassium   4.6       PROTEIN TOTAL   6.2       Protein, UA 1+  Comment: Recommend a 24 hour urine protein or a urine   protein/creatinine ratio if globulin induced proteinuria is  clinically suspected.           Protime   10.6       RBC   3.69       RBC, UA 1         RDW   14.6       Sodium   139       Specific [place], UA 1.015         Specimen UA Urine, Clean Catch         Squam Epithel, UA 0         Marijuana (THC) Metabolite Negative         Toxicology Information SEE COMMENT  Comment: This screen includes the  following classes of drugs at the   listed cut-off:    Benzodiazepines                  200 ng/ml  Cocaine metabolite               300 ng/ml  Opiates                          300 ng/ml  Barbiturates                     200 ng/ml  Amphetamines                    1000 ng/ml  Marijuana metabs (THC)            50 ng/ml  Phencyclidine (PCP)               25 ng/ml    High concentrations of Methylenedioxymethamphetamine (MDMA aka  Ectasy) and other structurally similar compounds may cross-   react with the Amphetamine/Methamphetamine screening   immunoassay giving a false positive result.    Note: This exception list includes only more common   interferants in toxicology screen testing.  Because of many   cross-reactantspositive results on toxicology drug screens   should be confirmed whenever results do not correlate with   clinical presentation.    This report is intended for use in clinical monitoring and  management of patients. It is not intended for use in   employment related drug testing.    Because of any cross-reactants, positive results on toxicology  drug screens should be confirmed whenever results do not  correlate with clinical presentation.    Presumptive positive results are unconfirmed and may be used   only for medical purposes.           Troponin I High Sensitivity   31.2  Comment: Troponin results differ between methods. Do not use   results between Troponin methods interchangeably.    Alkaline Phospatase levels above 400 U/L may   cause false positive results.    Access hsTnI should not be used for patients taking   Asfotase ebony (Strensiq).         TSH   1.470       UROBILINOGEN UA Negative         WBC, UA 0         WBC   7.60               Significant Imaging: I have reviewed all pertinent imaging results/findings within the past 24 hours.    Assessment/Plan:     * Seizure  Apparent prolonged post ictal state in a pt with dementia  Per his daughter the patient prior to today dressed himself vand  "communicated  Seizures previously associated with "small strokes"  Observe ICU  Neurology consulted    MARLIN (obstructive sleep apnea), 2008, not on Rx  CPAP as indicated        VTE Risk Mitigation (From admission, onward)    None             On 04/11/2023, patient should be placed in hospital observation services under my care.        Kev Govea MD  Department of Hospital Medicine  [facility] - Emergency Dept  "

## 2023-04-11 NOTE — ASSESSMENT & PLAN NOTE
"Apparent prolonged post ictal state in a pt with dementia  Per his daughter the patient prior to today dressed himself vand communicated  Seizures previously associated with "small strokes"  Observe ICU  Neurology consulted  "

## 2023-04-11 NOTE — ED NOTES
"MD Govea at bedside and elevated BP was seen. MD stated "we are going to do permissive hypertension so if it goes over 220 let me know".  "

## 2023-04-11 NOTE — SUBJECTIVE & OBJECTIVE
Past Medical History:   Diagnosis Date    Bradyarrhythmia     CVA (cerebral infarction) 2006    Dementia     Depression     Hyperlipidemia     Hypertension     renal htn    Pulmonary embolism 2002    Seizure disorder        Past Surgical History:   Procedure Laterality Date    left foot  with crushed injry         Review of patient's allergies indicates:   Allergen Reactions    Ciprofloxacin (bulk) Swelling       No current facility-administered medications on file prior to encounter.     Current Outpatient Medications on File Prior to Encounter   Medication Sig    amLODIPine (NORVASC) 5 MG tablet Take 1 tablet (5 mg total) by mouth once daily.    calcitRIOL (ROCALTROL) 0.25 MCG Cap Take 0.25 mcg by mouth once daily.    cholecalciferol, vitamin D3, (VITAMIN D3) 50 mcg (2,000 unit) Cap Take 1 capsule by mouth once daily.    citalopram (CELEXA) 20 MG tablet Take 1 tablet by mouth once daily (Patient taking differently: Take 20 mg by mouth once daily.)    levETIRAcetam (KEPPRA) 500 MG Tab Take 1 tablet (500 mg total) by mouth 2 (two) times daily.    rosuvastatin (CRESTOR) 10 MG tablet Take 1 tablet (10 mg total) by mouth once daily.    aspirin 81 MG Chew Take 1 tablet (81 mg total) by mouth once daily.    benazepriL (LOTENSIN) 20 MG tablet Take 1 tablet (20 mg total) by mouth 2 (two) times daily.    docusate sodium (COLACE) 100 MG capsule Take 1 capsule (100 mg total) by mouth once daily.     Family History       Problem Relation (Age of Onset)    Diabetes Mother          Tobacco Use    Smoking status: Never    Smokeless tobacco: Never   Substance and Sexual Activity    Alcohol use: No    Drug use: No    Sexual activity: Not on file     Review of Systems   Unable to perform ROS: Mental status change   Objective:     Vital Signs (Most Recent):  Temp: 98 °F (36.7 °C) (04/11/23 1309)  Pulse: 71 (04/11/23 1531)  Resp: 19 (04/11/23 1510)  BP: (!) 201/88 (04/11/23 1531)  SpO2: 95 % (04/11/23 1519)   Vital Signs (24h  Range):  Temp:  [98 °F (36.7 °C)] 98 °F (36.7 °C)  Pulse:  [71-89] 71  Resp:  [16-19] 19  SpO2:  [95 %-96 %] 95 %  BP: (179-211)/(80-90) 201/88     Weight: 81.6 kg (180 lb)  Body mass index is 26.58 kg/m².    Physical Exam  Vitals and nursing note reviewed.   Constitutional:       General: He is not in acute distress.     Appearance: He is ill-appearing. He is not diaphoretic.   HENT:      Head: Normocephalic and atraumatic.      Nose: Nose normal.      Mouth/Throat:      Mouth: Mucous membranes are moist.   Eyes:      Extraocular Movements: Extraocular movements intact.      Pupils: Pupils are equal, round, and reactive to light.   Cardiovascular:      Rate and Rhythm: Normal rate and regular rhythm.   Pulmonary:      Effort: Pulmonary effort is normal.      Breath sounds: Normal breath sounds.   Abdominal:      General: There is distension.      Tenderness: There is no abdominal tenderness. There is no guarding or rebound.   Musculoskeletal:      Cervical back: Normal range of motion and neck supple.      Comments: Unable to cooperate with the exam   Skin:     General: Skin is warm.   Neurological:      Comments: Unable to cooperate with the exam   Psychiatric:      Comments: Unable to cooperate with the exam         CRANIAL NERVES     CN III, IV, VI   Pupils are equal, round, and reactive to light.     Significant Labs: All pertinent labs within the past 24 hours have been reviewed.  Recent Lab Results         04/11/23  1428   04/11/23  1341        Benzodiazepines Negative         Phencyclidine Negative         Albumin   3.4       Alkaline Phosphatase   39       ALT   8       Amphetamine Screen, Ur Negative         Anion Gap   8       Appearance, UA Clear         aPTT   22.6  Comment: aPTT therapeutic range = 39-69 seconds       AST   21       Bacteria, UA Negative         Barbiturate Screen, Ur Negative         Baso #   0.03       Basophil %   0.4       Bilirubin (UA) Negative         BILIRUBIN TOTAL    0.6  Comment: For infants and newborns, interpretation of results should be based  on gestational age, weight and in agreement with clinical  observations.    Premature Infant recommended reference ranges:  Up to 24 hours.............<8.0 mg/dL  Up to 48 hours............<12.0 mg/dL  3-5 days..................<15.0 mg/dL  6-29 days.................<15.0 mg/dL         BUN   32       Calcium   9.1       Chloride   109       CO2   22       Cocaine (Metab.) Negative         Color, UA Yellow         Creatinine   2.1       Creatinine, Urine 70.0  Comment: The random urine reference ranges provided were established   for 24 hour urine collections.  No reference ranges exist for  random urine specimens.  Correlate clinically.           Differential Method   Automated       eGFR   29.4       Eos #   0.0       Eosinophil %   0.3       Glucose   144       Glucose, UA Negative         Gran # (ANC)   6.0       Gran %   78.5       Hematocrit   33.7       Hemoglobin   10.8       Hyaline Casts, UA 1         Immature Grans (Abs)   0.03  Comment: Mild elevation in immature granulocytes is non specific and   can be seen in a variety of conditions including stress response,   acute inflammation, trauma and pregnancy. Correlation with other   laboratory and clinical findings is essential.         Immature Granulocytes   0.4       INR   1.0  Comment: Coumadin Therapy:  2.0 - 3.0 for INR for all indicators except mechanical heart valves  and antiphospholipid syndromes which should use 2.5 - 3.5.         Ketones, UA 1+         Leukocytes, UA Negative         Lymph #   1.1       Lymph %   14.3       Magnesium   1.9       MCH   29.3       MCHC   32.0       MCV   91       Microscopic Comment SEE COMMENT  Comment: Other formed elements not mentioned in the report are not   present in the microscopic examination.            Mono #   0.5       Mono %   6.1       MPV   11.2       NITRITE UA Negative         nRBC   0       Occult Blood UA 1+          Opiate Scrn, Ur Negative         pH, UA 7.0         Platelets   113       Potassium   4.6       PROTEIN TOTAL   6.2       Protein, UA 1+  Comment: Recommend a 24 hour urine protein or a urine   protein/creatinine ratio if globulin induced proteinuria is  clinically suspected.           Protime   10.6       RBC   3.69       RBC, UA 1         RDW   14.6       Sodium   139       Specific [place], UA 1.015         Specimen UA Urine, Clean Catch         Squam Epithel, UA 0         Marijuana (THC) Metabolite Negative         Toxicology Information SEE COMMENT  Comment: This screen includes the following classes of drugs at the   listed cut-off:    Benzodiazepines                  200 ng/ml  Cocaine metabolite               300 ng/ml  Opiates                          300 ng/ml  Barbiturates                     200 ng/ml  Amphetamines                    1000 ng/ml  Marijuana metabs (THC)            50 ng/ml  Phencyclidine (PCP)               25 ng/ml    High concentrations of Methylenedioxymethamphetamine (MDMA aka  Ectasy) and other structurally similar compounds may cross-   react with the Amphetamine/Methamphetamine screening   immunoassay giving a false positive result.    Note: This exception list includes only more common   interferants in toxicology screen testing.  Because of many   cross-reactantspositive results on toxicology drug screens   should be confirmed whenever results do not correlate with   clinical presentation.    This report is intended for use in clinical monitoring and  management of patients. It is not intended for use in   employment related drug testing.    Because of any cross-reactants, positive results on toxicology  drug screens should be confirmed whenever results do not  correlate with clinical presentation.    Presumptive positive results are unconfirmed and may be used   only for medical purposes.           Troponin I High Sensitivity   31.2  Comment: Troponin results differ between  methods. Do not use   results between Troponin methods interchangeably.    Alkaline Phospatase levels above 400 U/L may   cause false positive results.    Access hsTnI should not be used for patients taking   Asfotase ebony (Strensiq).         TSH   1.470       UROBILINOGEN UA Negative         WBC, UA 0         WBC   7.60               Significant Imaging: I have reviewed all pertinent imaging results/findings within the past 24 hours.

## 2023-04-11 NOTE — HPI
ED Note  Patient with a remote seizure history.  No known seizure for at least 10 years.  Patient lives with his daughter due to dementia.  Patient has been having trouble sleeping last 2 nights.  Patient went to bed late last night.  Daughter went to go check on him this morning and he was using the bathroom and seemed okay.  She went back and checked on him again and he was unresponsive in bed.  Positive blood from mouth.  Patient was nonverbal.  Patient transported here with paramedics.  There was no observed seizure-like activity.    4/11/2023  Mr Brandon is unable to provide any information. In 2011 per his daughter, the patient had seizures due to a series of mini strokes

## 2023-04-11 NOTE — ED PROVIDER NOTES
Encounter Date: 4/11/2023       History     Chief Complaint   Patient presents with    Seizures     Found postictal by family member in bed. Family states that pt is currently taking seizure medication twice a day. Possibly bit tongue.     Patient with a remote seizure history.  No known seizure for at least 10 years.  Patient lives with his daughter due to dementia.  Patient has been having trouble sleeping last 2 nights.  Patient went to bed late last night.  Daughter went to go check on him this morning and he was using the bathroom and seemed okay.  She went back and checked on him again and he was unresponsive in bed.  Positive blood from mouth.  Patient was nonverbal.  Patient transported here with paramedics.  There was no observed seizure-like activity.    Review of patient's allergies indicates:   Allergen Reactions    Ciprofloxacin (bulk) Swelling     Past Medical History:   Diagnosis Date    Bradyarrhythmia     CVA (cerebral infarction) 2006    Dementia     Depression     Hyperlipidemia     Hypertension     renal htn    Pulmonary embolism 2002    Seizure disorder      Past Surgical History:   Procedure Laterality Date    left foot  with crushed injry       Family History   Problem Relation Age of Onset    Diabetes Mother     Cancer Neg Hx      Social History     Tobacco Use    Smoking status: Never    Smokeless tobacco: Never   Substance Use Topics    Alcohol use: No    Drug use: No     Review of Systems   Unable to perform ROS: Mental status change     Physical Exam     Initial Vitals [04/11/23 1309]   BP Pulse Resp Temp SpO2   (!) 179/80 89 18 98 °F (36.7 °C) 95 %      MAP       --         Physical Exam    Nursing note and vitals reviewed.  Constitutional: He is not diaphoretic. No distress.   HENT:   No scalp hematoma or evidence of trauma.  There is some blood to left-sided mouth.  Difficult to perform complete oral exam.  There seems to be abrasion to left-sided tongue.  No active bleeding.   Eyes:  Conjunctivae and EOM are normal.   Neck:   No step-off or deformity   Cardiovascular:  Regular rhythm.           Pulmonary/Chest: Breath sounds normal.   Abdominal: Abdomen is soft. There is no abdominal tenderness.   Musculoskeletal:         General: Normal range of motion.     Neurological: He is alert.   Patient is nonverbal.  Patient is moving his extremities equally.  Does not follow commands.  Face is symmetric   Skin: No rash noted.   Psychiatric:   Patient is nonverbal       ED Course   Procedures  Labs Reviewed   TROPONIN I HIGH SENSITIVITY - Abnormal; Notable for the following components:       Result Value    Troponin I High Sensitivity 31.2 (*)     All other components within normal limits   URINALYSIS, REFLEX TO URINE CULTURE - Abnormal; Notable for the following components:    Protein, UA 1+ (*)     Ketones, UA 1+ (*)     Occult Blood UA 1+ (*)     All other components within normal limits    Narrative:     Specimen Source->Urine   COMPREHENSIVE METABOLIC PANEL - Abnormal; Notable for the following components:    CO2 22 (*)     Glucose 144 (*)     BUN 32 (*)     Creatinine 2.1 (*)     Albumin 3.4 (*)     Alkaline Phosphatase 39 (*)     ALT 8 (*)     eGFR 29.4 (*)     All other components within normal limits   CBC W/ AUTO DIFFERENTIAL - Abnormal; Notable for the following components:    RBC 3.69 (*)     Hemoglobin 10.8 (*)     Hematocrit 33.7 (*)     RDW 14.6 (*)     Platelets 113 (*)     Gran % 78.5 (*)     Lymph % 14.3 (*)     All other components within normal limits   PROTIME-INR   APTT   TSH   MAGNESIUM   URINALYSIS MICROSCOPIC    Narrative:     Specimen Source->Urine   LEVETIRACETAM  (KEPPRA) LEVEL   DRUG SCREEN PANEL, URINE EMERGENCY   LEVETIRACETAM  (KEPPRA) LEVEL   POCT GLUCOSE MONITORING CONTINUOUS        ECG Results              EKG 12-lead (In process)  Result time 04/11/23 14:03:41      In process by Interface, Lab In [facility] (04/11/23 14:03:41)                   Narrative:    Test Reason  : R56.9,    Vent. Rate : 082 BPM     Atrial Rate : 082 BPM     P-R Int : 236 ms          QRS Dur : 100 ms      QT Int : 388 ms       P-R-T Axes : 068 023 079 degrees     QTc Int : 453 ms    Sinus rhythm with 1st degree A-V block  Otherwise normal ECG  When compared with ECG of 14-OCT-2022 09:24,  DE interval has increased  Vent. rate has increased BY  29 BPM  Nonspecific T wave abnormality now evident in Lateral leads    Referred By: [email or web address]   SELF           Confirmed By:                                   Imaging Results              CT Cervical Spine Without Contrast (Final result)  Result time 04/11/23 14:32:13      Final result by Az Sethi MD (04/11/23 14:32:13)                   Narrative:    CT CERVICAL SPINE    CMS MANDATED QUALITY DATA - CT RADIATION - 436    HISTORY: Trauma.    FINDINGS: Thin axial imaging was performed without contrast, with sagittal and coronal reformatted images reviewed. All CT exams at this facility use dose modulation, iterative reconstruction, and or weight based dosing when appropriate to reduce radiation dose to as low as reasonably achievable.    Comparison is made to a prior study from October 2022.    There is no evidence of acute cervical fracture. There is mild C5 retrolisthesis, chronic and unchanged. Alignment at all of the levels is normal. Prevertebral soft tissues are normal. The odontoid is intact.    Changes of multilevel cervical degenerative disc and facet disease are noted. Uncinate process and facet hypertrophy combine to result in multilevel foraminal stenosis, most pronounced on the left at C4-5 where severe foraminal narrowing is noted.    IMPRESSION:      1. Chronic findings as above. No evidence of acute cervical fracture.    Electronically signed by:  Az Sethi MD  4/11/2023 2:32 PM CDT Workstation: [id number]                                     CT Head Without Contrast (Final result)  Result time 04/11/23 14:24:25      Final result by  Az Sethi MD (04/11/23 14:24:25)                   Narrative:    CT HEAD WITHOUT CONTRAST    CMS MANDATED QUALITY DATA - CT RADIATION  436    All CT scans at this facility utilize dose modulation, iterative reconstruction, and/or weight based dosing when appropriate to reduce radiation dose to as low as reasonably achievable    Clinical data: Seizure. Comparison to October 2022.    FINDINGS: Noninfusion images were obtained from the skull base to the vertex. There is no intracranial mass, hemorrhage, or midline shift. Ventricles and sulci are mildly prominent. There are no pathologic extra-axial fluid collections.    There is no evidence of cortical ischemic change. Changes of mild chronic microvascular white matter disease are noted. Small chronic lacunar infarct is noted at the caudate nucleus on the left, with chronic lacunar infarct in the cedric centrally. Cerebellum is normal in appearance. The calvarium is intact. There is chronic opacification of the right frontal sinus compatible with chronic sinusitis. There is near complete opacification of the right maxillary sinus with air-fluid level noted. This is also similar in appearance to the prior study. Right-sided anterior ethmoid opacification is also unchanged.    IMPRESSION:    1. Atrophy and chronic small vessel ischemic changes.  2. Right frontal, maxillary, and ethmoid sinusitis.    Electronically signed by:  Az Sethi MD  4/11/2023 2:24 PM CDT Workstation: [id number]                                     X-Ray Chest AP Portable (Final result)  Result time 04/11/23 13:47:46      Final result by Can Callahan MD (04/11/23 13:47:46)                   Narrative:    Reason: Seizure Seizure    FINDINGS:  Portable chest at 1323 compared with 10/14/2022 shows normal cardiomediastinal silhouette.    Calcified right midlung granuloma unchanged. Lungs are otherwise clear. Lung volumes remain low. Pulmonary vasculature is normal. No acute osseous  abnormality.    IMPRESSION:  Unremarkable underinflated chest.    Electronically signed by:  Can Callahan MD  4/11/2023 1:47 PM CDT Workstation: [id number]                                     Medications   levETIRAcetam in NaCl (iso-os) IVPB 1,000 mg (1,000 mg Intravenous New Bag 4/11/23 1414)     Medical Decision Making:   History:   I obtained history from: someone other than patient.       <> Summary of History: Patient's daughter Gabriela gives most of history.  Old Medical Records: I decided to obtain old medical records.  Old Records Summarized: records from clinic visits and records from previous admission(s).       <> Summary of Records: Patient is on Keppra.  Differential Diagnosis:   Seizure, CVA, infectious etiology, metabolic encephalopathy  Independently Interpreted Test(s):   I have ordered and independently interpreted X-rays - see summary below.       <> Summary of X-Ray Reading(s): Normal sinus rhythm  I have ordered and independently interpreted EKG Reading(s) - see summary below       <> Summary of EKG Reading(s): No acute cardiopulmonary process  Clinical Tests:   Lab Tests: Reviewed  The following lab test(s) were unremarkable: CBC, CMP, Urinalysis and Troponin  Radiological Study: Reviewed  Medical Tests: Reviewed  ED Management:  Patient history and physical exam most consistent with status post seizure.  Difficult to obtain complete neurologic exam.  There does not appear to be a focal weakness now.  Patient is responding after several hours of observation in the emergency department.  Patient can say his name.  Reexamined and remains neurologically intact.  Given prolonged postictal state hospitalist consulted for admission.  Dr. Govea agrees for admission.  Keppra IV given upon arrival.  Keppra level pending.  Neurology consulted for further recommendations into arrange EEG.                        Clinical Impression:   Final diagnoses:  [R56.9] Seizure  [F05] Postictal confusion (Primary)         ED Disposition Condition    Admit Stable                Edis Moreau MD  04/11/23 [id number]

## 2023-04-12 PROBLEM — R00.1 BRADYCARDIA: Status: ACTIVE | Noted: 2023-04-12

## 2023-04-12 LAB
ALBUMIN SERPL BCP-MCNC: 3 G/DL (ref 3.5–5.2)
ALP SERPL-CCNC: 37 U/L (ref 55–135)
ALT SERPL W/O P-5'-P-CCNC: 11 U/L (ref 10–44)
ANION GAP SERPL CALC-SCNC: 6 MMOL/L (ref 8–16)
APTT PPP: 27.9 SEC (ref 21–32)
AST SERPL-CCNC: 18 U/L (ref 10–40)
BASOPHILS # BLD AUTO: 0.02 K/UL (ref 0–0.2)
BASOPHILS NFR BLD: 0.3 % (ref 0–1.9)
BILIRUB SERPL-MCNC: 1 MG/DL (ref 0.1–1)
BUN SERPL-MCNC: 31 MG/DL (ref 8–23)
CALCIUM SERPL-MCNC: 8.8 MG/DL (ref 8.7–10.5)
CHLORIDE SERPL-SCNC: 108 MMOL/L (ref 95–110)
CHOLEST SERPL-MCNC: 95 MG/DL (ref 120–199)
CHOLEST/HDLC SERPL: 2 {RATIO} (ref 2–5)
CK MB SERPL-MCNC: 2.5 NG/ML (ref 0.1–6.5)
CO2 SERPL-SCNC: 26 MMOL/L (ref 23–29)
CREAT SERPL-MCNC: 2.2 MG/DL (ref 0.5–1.4)
DIFFERENTIAL METHOD: ABNORMAL
EOSINOPHIL # BLD AUTO: 0.1 K/UL (ref 0–0.5)
EOSINOPHIL NFR BLD: 0.7 % (ref 0–8)
ERYTHROCYTE [DISTWIDTH] IN BLOOD BY AUTOMATED COUNT: 14.5 % (ref 11.5–14.5)
EST. GFR  (NO RACE VARIABLE): 27.8 ML/MIN/1.73 M^2
ESTIMATED AVG GLUCOSE: 111 MG/DL (ref 68–131)
GLUCOSE SERPL-MCNC: 87 MG/DL (ref 70–110)
HBA1C MFR BLD: 5.5 % (ref 4.5–6.2)
HCT VFR BLD AUTO: 32.5 % (ref 40–54)
HDLC SERPL-MCNC: 48 MG/DL (ref 40–75)
HDLC SERPL: 50.5 % (ref 20–50)
HGB BLD-MCNC: 10.4 G/DL (ref 14–18)
IMM GRANULOCYTES # BLD AUTO: 0.02 K/UL (ref 0–0.04)
IMM GRANULOCYTES NFR BLD AUTO: 0.3 % (ref 0–0.5)
INR PPP: 1 (ref 0.8–1.2)
LDLC SERPL CALC-MCNC: 40.2 MG/DL (ref 63–159)
LYMPHOCYTES # BLD AUTO: 2.2 K/UL (ref 1–4.8)
LYMPHOCYTES NFR BLD: 30.9 % (ref 18–48)
MAGNESIUM SERPL-MCNC: 2 MG/DL (ref 1.6–2.6)
MCH RBC QN AUTO: 28.9 PG (ref 27–31)
MCHC RBC AUTO-ENTMCNC: 32 G/DL (ref 32–36)
MCV RBC AUTO: 90 FL (ref 82–98)
MONOCYTES # BLD AUTO: 0.6 K/UL (ref 0.3–1)
MONOCYTES NFR BLD: 9.1 % (ref 4–15)
NEUTROPHILS # BLD AUTO: 4.1 K/UL (ref 1.8–7.7)
NEUTROPHILS NFR BLD: 58.7 % (ref 38–73)
NONHDLC SERPL-MCNC: 47 MG/DL
NRBC BLD-RTO: 0 /100 WBC
PHOSPHATE SERPL-MCNC: 3 MG/DL (ref 2.7–4.5)
PLATELET # BLD AUTO: 105 K/UL (ref 150–450)
PMV BLD AUTO: 11.4 FL (ref 9.2–12.9)
POTASSIUM SERPL-SCNC: 4.9 MMOL/L (ref 3.5–5.1)
PROT SERPL-MCNC: 5.6 G/DL (ref 6–8.4)
PROTHROMBIN TIME: 10.9 SEC (ref 9–12.5)
RBC # BLD AUTO: 3.6 M/UL (ref 4.6–6.2)
SODIUM SERPL-SCNC: 140 MMOL/L (ref 136–145)
TRIGL SERPL-MCNC: 34 MG/DL (ref 30–150)
TROPONIN I SERPL HS-MCNC: 56.2 PG/ML (ref 0–14.9)
TROPONIN I SERPL HS-MCNC: 72.5 PG/ML (ref 0–14.9)
WBC # BLD AUTO: 7.03 K/UL (ref 3.9–12.7)

## 2023-04-12 PROCEDURE — 92610 EVALUATE SWALLOWING FUNCTION: CPT

## 2023-04-12 PROCEDURE — 97535 SELF CARE MNGMENT TRAINING: CPT

## 2023-04-12 PROCEDURE — 85610 PROTHROMBIN TIME: CPT | Performed by: INTERNAL MEDICINE

## 2023-04-12 PROCEDURE — 80053 COMPREHEN METABOLIC PANEL: CPT | Performed by: INTERNAL MEDICINE

## 2023-04-12 PROCEDURE — 99223 PR INITIAL HOSPITAL CARE,LEVL III: ICD-10-PCS | Mod: ,,, | Performed by: GENERAL PRACTICE

## 2023-04-12 PROCEDURE — 25000003 PHARM REV CODE 250: Performed by: STUDENT IN AN ORGANIZED HEALTH CARE EDUCATION/TRAINING PROGRAM

## 2023-04-12 PROCEDURE — 93010 EKG 12-LEAD: ICD-10-PCS | Mod: ,,, | Performed by: INTERNAL MEDICINE

## 2023-04-12 PROCEDURE — 97116 GAIT TRAINING THERAPY: CPT

## 2023-04-12 PROCEDURE — 93005 ELECTROCARDIOGRAM TRACING: CPT | Performed by: INTERNAL MEDICINE

## 2023-04-12 PROCEDURE — 97165 OT EVAL LOW COMPLEX 30 MIN: CPT

## 2023-04-12 PROCEDURE — 84484 ASSAY OF TROPONIN QUANT: CPT | Performed by: INTERNAL MEDICINE

## 2023-04-12 PROCEDURE — 85730 THROMBOPLASTIN TIME PARTIAL: CPT | Performed by: INTERNAL MEDICINE

## 2023-04-12 PROCEDURE — 95819 EEG AWAKE AND ASLEEP: CPT

## 2023-04-12 PROCEDURE — 85025 COMPLETE CBC W/AUTO DIFF WBC: CPT | Performed by: INTERNAL MEDICINE

## 2023-04-12 PROCEDURE — 25000003 PHARM REV CODE 250: Performed by: NURSE PRACTITIONER

## 2023-04-12 PROCEDURE — G0378 HOSPITAL OBSERVATION PER HR: HCPCS

## 2023-04-12 PROCEDURE — 63600175 PHARM REV CODE 636 W HCPCS: Performed by: INTERNAL MEDICINE

## 2023-04-12 PROCEDURE — 25000003 PHARM REV CODE 250: Performed by: INTERNAL MEDICINE

## 2023-04-12 PROCEDURE — 94761 N-INVAS EAR/PLS OXIMETRY MLT: CPT

## 2023-04-12 PROCEDURE — 25000003 PHARM REV CODE 250

## 2023-04-12 PROCEDURE — 36415 COLL VENOUS BLD VENIPUNCTURE: CPT | Performed by: INTERNAL MEDICINE

## 2023-04-12 PROCEDURE — 96372 THER/PROPH/DIAG INJ SC/IM: CPT | Performed by: INTERNAL MEDICINE

## 2023-04-12 PROCEDURE — 96376 TX/PRO/DX INJ SAME DRUG ADON: CPT

## 2023-04-12 PROCEDURE — 84100 ASSAY OF PHOSPHORUS: CPT | Performed by: INTERNAL MEDICINE

## 2023-04-12 PROCEDURE — 82553 CREATINE MB FRACTION: CPT | Performed by: INTERNAL MEDICINE

## 2023-04-12 PROCEDURE — 83036 HEMOGLOBIN GLYCOSYLATED A1C: CPT | Performed by: INTERNAL MEDICINE

## 2023-04-12 PROCEDURE — 93010 ELECTROCARDIOGRAM REPORT: CPT | Mod: ,,, | Performed by: INTERNAL MEDICINE

## 2023-04-12 PROCEDURE — 84484 ASSAY OF TROPONIN QUANT: CPT | Mod: 91 | Performed by: INTERNAL MEDICINE

## 2023-04-12 PROCEDURE — 96366 THER/PROPH/DIAG IV INF ADDON: CPT

## 2023-04-12 PROCEDURE — 83735 ASSAY OF MAGNESIUM: CPT | Performed by: INTERNAL MEDICINE

## 2023-04-12 PROCEDURE — 63600175 PHARM REV CODE 636 W HCPCS: Performed by: STUDENT IN AN ORGANIZED HEALTH CARE EDUCATION/TRAINING PROGRAM

## 2023-04-12 PROCEDURE — 97162 PT EVAL MOD COMPLEX 30 MIN: CPT

## 2023-04-12 PROCEDURE — 80061 LIPID PANEL: CPT | Performed by: INTERNAL MEDICINE

## 2023-04-12 PROCEDURE — 99223 1ST HOSP IP/OBS HIGH 75: CPT | Mod: ,,, | Performed by: GENERAL PRACTICE

## 2023-04-12 RX ORDER — HYDRALAZINE HYDROCHLORIDE 25 MG/1
25 TABLET, FILM COATED ORAL EVERY 8 HOURS
Status: DISCONTINUED | OUTPATIENT
Start: 2023-04-12 | End: 2023-04-13

## 2023-04-12 RX ORDER — MUPIROCIN 20 MG/G
OINTMENT TOPICAL 2 TIMES DAILY
Status: DISCONTINUED | OUTPATIENT
Start: 2023-04-12 | End: 2023-04-13 | Stop reason: HOSPADM

## 2023-04-12 RX ORDER — CHLORHEXIDINE GLUCONATE ORAL RINSE 1.2 MG/ML
15 SOLUTION DENTAL 2 TIMES DAILY
Status: DISCONTINUED | OUTPATIENT
Start: 2023-04-12 | End: 2023-04-13 | Stop reason: HOSPADM

## 2023-04-12 RX ORDER — HYDRALAZINE HYDROCHLORIDE 10 MG/1
10 TABLET, FILM COATED ORAL EVERY 8 HOURS
Status: DISCONTINUED | OUTPATIENT
Start: 2023-04-12 | End: 2023-04-12

## 2023-04-12 RX ADMIN — LEVETIRACETAM 750 MG: 250 TABLET, FILM COATED ORAL at 12:04

## 2023-04-12 RX ADMIN — MUPIROCIN 1 G: 20 OINTMENT TOPICAL at 09:04

## 2023-04-12 RX ADMIN — ASPIRIN 81 MG 81 MG: 81 TABLET ORAL at 09:04

## 2023-04-12 RX ADMIN — ATORVASTATIN CALCIUM 40 MG: 40 TABLET, FILM COATED ORAL at 09:04

## 2023-04-12 RX ADMIN — CALCITRIOL CAPSULES 0.25 MCG 0.25 MCG: 0.25 CAPSULE ORAL at 09:04

## 2023-04-12 RX ADMIN — Medication 2000 UNITS: at 09:04

## 2023-04-12 RX ADMIN — HYDRALAZINE HYDROCHLORIDE 25 MG: 25 TABLET, FILM COATED ORAL at 09:04

## 2023-04-12 RX ADMIN — PIPERACILLIN SODIUM AND TAZOBACTAM SODIUM 3.38 G: 3; .375 INJECTION, POWDER, LYOPHILIZED, FOR SOLUTION INTRAVENOUS at 12:04

## 2023-04-12 RX ADMIN — PIPERACILLIN SODIUM AND TAZOBACTAM SODIUM 3.38 G: 3; .375 INJECTION, POWDER, LYOPHILIZED, FOR SOLUTION INTRAVENOUS at 08:04

## 2023-04-12 RX ADMIN — DOCUSATE SODIUM 100 MG: 100 CAPSULE, LIQUID FILLED ORAL at 09:04

## 2023-04-12 RX ADMIN — HYDRALAZINE HYDROCHLORIDE 10 MG: 10 TABLET, FILM COATED ORAL at 02:04

## 2023-04-12 RX ADMIN — CHLORHEXIDINE GLUCONATE 15 ML: 1.2 RINSE ORAL at 09:04

## 2023-04-12 RX ADMIN — SODIUM CHLORIDE 500 ML: 0.9 INJECTION, SOLUTION INTRAVENOUS at 02:04

## 2023-04-12 RX ADMIN — PIPERACILLIN SODIUM AND TAZOBACTAM SODIUM 3.38 G: 3; .375 INJECTION, POWDER, LYOPHILIZED, FOR SOLUTION INTRAVENOUS at 04:04

## 2023-04-12 RX ADMIN — LEVETIRACETAM 750 MG: 250 TABLET, FILM COATED ORAL at 09:04

## 2023-04-12 RX ADMIN — CITALOPRAM HYDROBROMIDE 20 MG: 20 TABLET ORAL at 09:04

## 2023-04-12 RX ADMIN — ENOXAPARIN SODIUM 30 MG: 30 INJECTION SUBCUTANEOUS at 06:04

## 2023-04-12 NOTE — PLAN OF CARE
Drowsy, easily arouses and follows commands with all exts, equal strength. Oriented to person and BD only. BP elevated, permissive HTN. Respirations unlabored. Abdomen soft. Incontinent of urine. Condom cath placed. Safety maintained.

## 2023-04-12 NOTE — PROGRESS NOTES
"[facility]  Adult Nutrition   Progress Note (Initial Assessment)     SUMMARY     Recommendations  1.) Continue textured modified diet per SLP recommendations.   2.) Will add Suplena with meals to encourage intake.   3.) Suggest MVI.   4.) Continue calcitriol and cholecalciferol.   5.)  to aid in food preferences.    Goals:   1.) Pt to consume/tolerate >75% of meals and ONS.   2.) Renal function to stabilize.  Nutrition Goal Status: new    Dietitian Rounds Brief  Pt presented to the ED vis EMS after he was found unresponsive in bed with blood in his mouth and was non-verbal, so she called 911 and patient was brought in for evaluation. S/p abnormal EEG today. Pt laying in bed with no family to bedside. SLP with swallow eval today; IDDSI level 6 approved. Pt has not consumed any meals at this time. Pt reports good appetite prior to admit. No GI distress. LBM 4/11. Skin: no sacral wound noted. Wt reviewed with stable wt >2 years. UBW 84.8kg. NFPE completed with no s/s of wasting. No malnutriton identified at this time.    Diet order:   Current Diet Order: dyshagia soft, level 6     Evaluation of Received Nutrient/Fluid Intake  Energy Calories Required: not meeting needs  Protein Required: not meeting needs  Fluid Required: meeting needs  Tolerance: tolerating     % Intake of Estimated Energy Needs: 0 - 25 %  % Meal Intake: 0 - 25 %      Intake/Output Summary (Last 24 hours) at 4/12/2023 1723  Last data filed at 4/12/2023 0510  Gross per 24 hour   Intake 150 ml   Output 700 ml   Net -550 ml        Anthropometrics  Temp: 98.4 °F (36.9 °C)  Height Method: Stated  Height: 5' 9" (175.3 cm)  Height (inches): 69 in  Weight Method: Bed Scale  Weight: 82.6 kg (182 lb 1.6 oz)  Weight (lb): 182.1 lb  Ideal Body Weight (IBW), Male: 160 lb  % Ideal Body Weight, Male (lb): 113.81 %  BMI (Calculated): 26.9  BMI Grade: 25 - 29.9 - overweight       Estimated/Assessed Needs  Weight Used For Calorie Calculations: " 82.6 kg (182 lb 1.6 oz)  Energy Calorie Requirements (kcal): 2065-2478  Energy Need Method: Kcal/kg (25-30)  Protein Requirements: 83-99 (1.0-1.2)  Weight Used For Protein Calculations: 82.6 kg (182 lb 1.6 oz)  Fluid Requirements (mL): 2065-2478     RDA Method (mL): 2065       Reason for Assessment  Reason For Assessment: identified at risk by screening criteria, other (see comments) (ICU admit + wound)  Diagnosis: seizures  Relevant Medical History: CVA, Dementia, HTN, Seizure disorder, PE, Dyslipidemia, and Bradyrhythmia    Nutrition/Diet History  Food Allergies: NKFA  Factors Affecting Nutritional Intake: other (see comments) (lethargy)    Nutrition Risk Screen  Nutrition Risk Screen: no indicators present       Weight History:  Wt Readings from Last 5 Encounters:   04/11/23 82.6 kg (182 lb 1.6 oz)   02/14/23 80.5 kg (177 lb 7.5 oz)   10/24/22 77 kg (169 lb 12.1 oz)   10/17/22 84.8 kg (186 lb 15.2 oz)   08/02/22 88.3 kg (194 lb 10.7 oz)        Lab/Procedures/Meds: Pertinent Labs/Meds Reviewed    Medications:Pertinent Medications Reviewed  Scheduled Meds:   aspirin  81 mg Oral Daily    atorvastatin  40 mg Oral Daily    calcitRIOL  0.25 mcg Oral Daily    chlorhexidine  15 mL Mouth/Throat BID    cholecalciferol (vitamin D3)  2,000 Units Oral Daily    citalopram  20 mg Oral Daily    docusate sodium  100 mg Oral Daily    enoxaparin  30 mg Subcutaneous Daily    hydrALAZINE  25 mg Oral Q8H    levETIRAcetam  750 mg Oral BID    mupirocin   Nasal BID    piperacillin-tazobactam (ZOSYN) IVPB  3.375 g Intravenous Q8H     Continuous Infusions:  PRN Meds:.sodium chloride 0.9%    Labs: Pertinent Labs Reviewed  Clinical Chemistry:  Recent Labs   Lab 04/12/23  0347      K 4.9      CO2 26   GLU 87   BUN 31*   CREATININE 2.2*   CALCIUM 8.8   PROT 5.6*   ALBUMIN 3.0*   BILITOT 1.0   ALKPHOS 37*   AST 18   ALT 11   ANIONGAP 6*   MG 2.0   PHOS 3.0     CBC:   Recent Labs   Lab 04/12/23  0347   WBC 7.03   RBC 3.60*   HGB  10.4*   HCT 32.5*   *   MCV 90   MCH 28.9   MCHC 32.0     Lipid Panel:  Recent Labs   Lab 04/12/23  0347   CHOL 95*   HDL 48   LDLCALC 40.2*   TRIG 34   CHOLHDL 50.5*     Cardiac Profile:  Recent Labs   Lab 04/12/23  0347   CPKMB 2.5     Diabetes:  Recent Labs   Lab 04/12/23  0347   HGBA1C 5.5     Thyroid & Parathyroid:  Recent Labs   Lab 04/11/23  1341   TSH 1.470       Monitor and Evaluation  Food and Nutrient Intake: energy intake, food and beverage intake  Food and Nutrient Adminstration: diet order  Knowledge/Beliefs/Attitudes: food and nutrition knowledge/skill  Physical Activity and Function: nutrition-related ADLs and IADLs  Anthropometric Measurements: weight, weight change  Biochemical Data, Medical Tests and Procedures: electrolyte and renal panel, gastrointestinal profile, glucose/endocrine profile, other (specify) (renal profile)  Nutrition-Focused Physical Findings: overall appearance     Nutrition Risk  Level of Risk/Frequency of Follow-up: moderate     Nutrition Follow-Up  RD Follow-up?: Yes      Aline Barrow RD 04/12/2023 5:23 PM

## 2023-04-12 NOTE — PLAN OF CARE
Problem: Skin Injury Risk Increased  Goal: Skin Health and Integrity  Intervention: Promote and Optimize Oral Intake  Flowsheets (Taken 4/12/2023 1724)  Oral Nutrition Promotion:   calorie-dense foods provided   calorie-dense liquids provided   other (see comments)     Problem: Oral Intake Inadequate  Goal: Improved Oral Intake  Outcome: Ongoing, Progressing  Intervention: Promote and Optimize Oral Intake  Flowsheets (Taken 4/12/2023 1724)  Oral Nutrition Promotion:   calorie-dense foods provided   calorie-dense liquids provided   other (see comments)     Recommendations  1.) Continue textured modified diet per SLP recommendations.   2.) Will add Suplena with meals to encourage intake.   3.) Suggest MVI.   4.) Continue calcitriol and cholecalciferol.   5.)  to aid in food preferences.     Goals:   1.) Pt to consume/tolerate >75% of meals and ONS.   2.) Renal function to stabilize.  Nutrition Goal Status: new

## 2023-04-12 NOTE — CONSULTS
Former[facility], [facility]  Neurology Consult    Patient Name: Lavon Brandon  MRN: [id number]  : 1932  TODAY'S DATE: 2023  ADMIT DATE: 2023 12:56 PM                                          CONSULTED PROVIDER: Tenisha Tineo MD, Neurologist. On-call Phone: [phone]  CONSULT REQUESTED BY: Sandy Sosa MD     Chief Complaint   Patient presents with    Seizures     Found postictal by family member in bed. Family states that pt is currently taking seizure medication twice a day. Possibly bit tongue.       HPI per EMR:  Patient with a remote seizure history.  No known seizure for at least 10 years.  Patient lives with his daughter due to dementia.  Patient has been having trouble sleeping last 2 nights.  Patient went to bed late last night.  Daughter went to go check on him this morning and he was using the bathroom and seemed okay.  She went back and checked on him again and he was unresponsive in bed.  Positive blood from mouth.  Patient was nonverbal.  Patient transported here with paramedics.  There was no observed seizure-like activity.    Neurology Consult:  Patient was seen and examined by me today. He is a very pleasant 89 yo man with history of seizures, bradyarrhythmia, stroke and dementia who presented to the ED vis EMS after he was found unresponsive in bed with blood in his mouth and was non-verbal, so she called 911 and patient was brought in for evaluation. He was still post-ictal upon his arrival to the ED, so he was admitted for monitoring and treatment. Of note, he had been taking Keppra 500 mg BID for his seizures. Patient today is back to his baseline, and has no complaints. He denies any focal weakness, numbness, dizziness, headache or SOB. Does report some back pain.    Review of Systems:    A comprehensive ROS was performed and all systems were negative except as noted above in HPI.    Past Medical History:  has a past medical history of Bradyarrhythmia, CVA (cerebral  infarction) (2006), Dementia, Depression, Hyperlipidemia, Hypertension, Pulmonary embolism (2002), and Seizure disorder.    Past Surgical History:  has a past surgical history that includes left foot  with crushed injry.    Family Medical History: family history includes Diabetes in his mother.    Social History:  reports that he has never smoked. He has never used smokeless tobacco. He reports that he does not drink alcohol and does not use drugs.    PCP: Aristides Haynes MD    Allergies:   Review of patient's allergies indicates:   Allergen Reactions    Ciprofloxacin (bulk) Swelling       Medications:   No current facility-administered medications on file prior to encounter.     Current Outpatient Medications on File Prior to Encounter   Medication Sig Dispense Refill    amLODIPine (NORVASC) 5 MG tablet Take 1 tablet (5 mg total) by mouth once daily. 90 tablet 3    calcitRIOL (ROCALTROL) 0.25 MCG Cap Take 0.25 mcg by mouth once daily.      cholecalciferol, vitamin D3, (VITAMIN D3) 50 mcg (2,000 unit) Cap Take 1 capsule by mouth once daily.      citalopram (CELEXA) 20 MG tablet Take 1 tablet by mouth once daily (Patient taking differently: Take 20 mg by mouth once daily.) 90 tablet 11    levETIRAcetam (KEPPRA) 500 MG Tab Take 1 tablet (500 mg total) by mouth 2 (two) times daily. 180 tablet 3    rosuvastatin (CRESTOR) 10 MG tablet Take 1 tablet (10 mg total) by mouth once daily. 90 tablet 1    aspirin 81 MG Chew Take 1 tablet (81 mg total) by mouth once daily. 100 tablet 1    benazepriL (LOTENSIN) 20 MG tablet Take 1 tablet (20 mg total) by mouth 2 (two) times daily. 60 tablet 0    docusate sodium (COLACE) 100 MG capsule Take 1 capsule (100 mg total) by mouth once daily. 90 capsule 3     Scheduled Meds:   aspirin  81 mg Oral Daily    atorvastatin  40 mg Oral Daily    calcitRIOL  0.25 mcg Oral Daily    chlorhexidine  15 mL Mouth/Throat BID    cholecalciferol (vitamin D3)  2,000 Units Oral Daily    citalopram  20 mg Oral  Daily    docusate sodium  100 mg Oral Daily    enoxaparin  30 mg Subcutaneous Daily    hydrALAZINE  10 mg Oral Q8H    levETIRAcetam  750 mg Oral BID    mupirocin   Nasal BID    piperacillin-tazobactam (ZOSYN) IVPB  3.375 g Intravenous Q8H    sodium chloride 0.9%  500 mL Intravenous Once     Continuous Infusions:  PRN Meds:.sodium chloride 0.9%      Physical Exam  Current Vitals:  Vitals:    04/12/23 0807   BP:    Pulse: (!) 40   Resp: 16   Temp:        Physical Exam:  General: AO x2  HEENT: PERRL, EOMI  CV: bradycardic  Lungs: no respiratory distress  Abdomen: soft, non tender    Neurological Exam    MENTAL STATUS EXAM:  Patient is awake, alert, oriented to self and place, not time or situation. His speech is fluent with intact naming, comprehension and repetition.    CRANIAL NERVE EXAM:  II/III: fundoscopic exam deferred, PERRL; visual fields full to threat  III/IV/VI: EOMI   V: no deficits appreciated to light touch  VII: no facial asymmetry noted  VIII: hard of hearing at baseline  IX/X: palate @ ML and raises symmetrically  XI: shoulder shrug 5/5 bilaterally  XII: tongue to midline w/out asymmetry    MOTOR EXAM:  Bulk and Tone: normal throughout  Strength is 5/5 proximally and distally in upper and lower extremities without deficits.  No pronator drift in bilateral UE. No tremor either at rest or with intention.    REFLEXES:  2+ in bilateral upper and lower extremities, no Corbin's, no clonus. Downgoing plantars.    SENSORY EXAM  Light touch intact throughout in upper and lower extremities without deficits.    COORDINATION/CEREBELLAR EXAM:  FTN: no dysmetria or other signs of appendicular ataxia    GAIT:  Deferred for safety.    Laboratory Data & Studies    Recent Labs   Lab 04/11/23  1341 04/12/23  0347   WBC 7.60 7.03   HGB 10.8* 10.4*   * 105*   MCV 91 90       Recent Labs   Lab 04/11/23  1341 04/12/23  0347    140   K 4.6 4.9    108   CO2 22* 26   BUN 32* 31*   * 87   CALCIUM 9.1  8.8   MG 1.9 2.0   PHOS  --  3.0       Recent Labs   Lab 04/11/23  1341 04/12/23  0347   PROT 6.2 5.6*   ALBUMIN 3.4* 3.0*   BILITOT 0.6 1.0   AST 21 18   ALT 8* 11   ALKPHOS 39* 37*       Recent Labs   Lab 04/11/23  1341 04/12/23  0347   INR 1.0 1.0   APTT 22.6 27.9       Recent Labs   Lab 04/11/23  1341 04/12/23  0347   HGBA1C  --  5.5   CHOL  --  95*   TRIG  --  34   LDLCALC  --  40.2*   HDL  --  48   TSH 1.470  --          Microbiology:  Microbiology Results (last 7 days)       ** No results found for the last 168 hours. **              Imaging:  CT Head Without Contrast    Result Date: 4/11/2023  CT HEAD WITHOUT CONTRAST CMS MANDATED QUALITY DATA - CT RADIATION  436 All CT scans at this facility utilize dose modulation, iterative reconstruction, and/or weight based dosing when appropriate to reduce radiation dose to as low as reasonably achievable Clinical data: Seizure. Comparison to October 2022. FINDINGS: Noninfusion images were obtained from the skull base to the vertex. There is no intracranial mass, hemorrhage, or midline shift. Ventricles and sulci are mildly prominent. There are no pathologic extra-axial fluid collections. There is no evidence of cortical ischemic change. Changes of mild chronic microvascular white matter disease are noted. Small chronic lacunar infarct is noted at the caudate nucleus on the left, with chronic lacunar infarct in the cedric centrally. Cerebellum is normal in appearance. The calvarium is intact. There is chronic opacification of the right frontal sinus compatible with chronic sinusitis. There is near complete opacification of the right maxillary sinus with air-fluid level noted. This is also similar in appearance to the prior study. Right-sided anterior ethmoid opacification is also unchanged. IMPRESSION: 1. Atrophy and chronic small vessel ischemic changes. 2. Right frontal, maxillary, and ethmoid sinusitis. Electronically signed by:  Az Sethi MD  4/11/2023 2:24  PM CDT Workstation: [id number]    CT Cervical Spine Without Contrast    Result Date: 4/11/2023  CT CERVICAL SPINE CMS MANDATED QUALITY DATA - CT RADIATION - 436 HISTORY: Trauma. FINDINGS: Thin axial imaging was performed without contrast, with sagittal and coronal reformatted images reviewed. All CT exams at this facility use dose modulation, iterative reconstruction, and or weight based dosing when appropriate to reduce radiation dose to as low as reasonably achievable. Comparison is made to a prior study from October 2022. There is no evidence of acute cervical fracture. There is mild C5 retrolisthesis, chronic and unchanged. Alignment at all of the levels is normal. Prevertebral soft tissues are normal. The odontoid is intact. Changes of multilevel cervical degenerative disc and facet disease are noted. Uncinate process and facet hypertrophy combine to result in multilevel foraminal stenosis, most pronounced on the left at C4-5 where severe foraminal narrowing is noted. IMPRESSION: 1. Chronic findings as above. No evidence of acute cervical fracture. Electronically signed by:  Az Sethi MD  4/11/2023 2:32 PM CDT Workstation: [id number]    X-Ray Chest AP Portable    Result Date: 4/11/2023  Reason: Seizure Seizure FINDINGS: Portable chest at 1323 compared with 10/14/2022 shows normal cardiomediastinal silhouette. Calcified right midlung granuloma unchanged. Lungs are otherwise clear. Lung volumes remain low. Pulmonary vasculature is normal. No acute osseous abnormality. IMPRESSION: Unremarkable underinflated chest. Electronically signed by:  Can Callahan MD  4/11/2023 1:47 PM CDT Workstation: [id number]        Assessment and Plan:    Breakthrough seizure vs syncopal event  Bradycardic  Ms. Brandon is a very pleasant 89 yo man with history of seizures, bradyarrhythmia, stroke and dementia who presented to the ED vis EMS after he was found by daughter unresponsive in bed with blood in his mouth and was  non-verbal, so she called 911 and patient was brought in for evaluation. He was still post-ictal upon his arrival to the ED, so he was admitted for monitoring and treatment. His neurological examination is non focal and he is back to his baseline.  - Admitted to hospital medicine with q4 hour neuro checks, on telemetry, continuous pulse oximetry  - Seizure precautions while inpatient  - Increase Keppra to 750 mg BID for prevention of seizures  - Ordered keppra levels in serum   - Diet after eval per SLP  - Ordered EEG routine which showed no epileptiform abnormalities  - Ordered MRI brain without contrast to assess for intracranial abnormalities  - Secondary stroke prevention with ASA 81 mg daily, atorvastatin 40 mg daily  - Avoid seizure threshold lowering medications such as:  Bupropion, diphenhydramine, tramadol, certain antibiotics  - patient will need follow-up as outpatient with Neurology for long-term EEG monitoring to better characterize epileptiform abnormalities  - Maintain Euthermia with Tylenol prn temp > 37.2 degrees C.  - Assessment for rehab with PT/OT/SLP evaluation and treatment.  - workup and treatment of metabolic and infectious abnormalities as per primary team  - Recommend cardiology consultation for bradycardia and syncope  - Will follow along     Seizure education:  No driving for now, no swimming alone, no climbing high areas, no operation of heavy machinery or working with high risk electricity equipment.  Continue to take AEDs as prescribed. If any major side effects from neurological medications go to the ED including mood changes and severe depression.  Patient and/or next of kin informed.  Medication side effects discussed with the patient and/or caregiver.       DVT prophylaxis with chemo/SCD prophylaxis  Extensively discussed lifestyle modifications as prophylactic measures for stroke prevention including smoking cessation, adequate blood pressure management, healthy diet and regular  exercise.    [phone] within 3 days from discharge.     Stroke education was provided including stroke risk factors modification and any acute neurological changes including weakness, confusion, visual changes to come straight to the ER.     All questions were answered.                              Thank you kindly for including us in the care of this patient. Please do not hesitate to contact us with any questions.       Critical Care:  62 minutes of critical care time has been spent evaluating with the patient. Time includes chart review not limited to diagnostic imaging, labs, and vitals, patient assessment, discussion with family and nursing, current order evaluations, and new order entries.      Tenisha Tineo MD  Neurology

## 2023-04-12 NOTE — PROGRESS NOTES
[facility] Medicine    Progress Note    Patient Name: Lavon Brandon  MRN: [id number]  Patient Class: OP- Observation   Admission Date: 4/11/2023 12:56 PM  Length of Stay: 0  Attending Physician: Sandy Sosa MD  Primary Care Provider: Aristides Haynes MD  Face-to-Face encounter date: 04/12/2023  Code status:  Chief Complaint: Seizures (Found postictal by family member in bed. Family states that pt is currently taking seizure medication twice a day. Possibly bit tongue.)        Subjective:    HPI:Patient with a remote seizure history.  No known seizure for at least 10 years.  Patient lives with his daughter due to dementia.  Patient has been having trouble sleeping last 2 nights.  Patient went to bed late last night.  Daughter went to go check on him this morning and he was using the bathroom and seemed okay.  She went back and checked on him again and he was unresponsive in bed.  Positive blood from mouth.  Patient was nonverbal.  Patient transported here with paramedics.  There was no observed seizure-like activity.     4/11/2023  Mr Brandon is unable to provide any information. In 2011 per his daughter, the patient had seizures due to a series of mini strokes    Interval History:   4/12: Patient is doing well and is AO x3.  Overnight he had multiple episodes of bradycardia and patient states that he has been told in the past that he is had bradycardia and has passed out due to with before.  Cardiology consulted.  EEG and neurology consult also placed No concerns/issues overnight reported by the patient or the nursing staff.    Review of Systems All other Review of Systems were found to be negative expect for that mentioned already in HPI.     Objective:     Vitals:    04/12/23 1145 04/12/23 1200 04/12/23 1215 04/12/23 1230   BP:  (!) 168/72     Pulse: (!) 38 (!) 39 (!) 37 (!) 43   Resp: 17 17 15 (!) 21   Temp:       TempSrc:       SpO2: 100% 100% 100% 100%   Weight:       Height:             Vitals reviewed.  Constitutional: No distress.   HENT: NC  Head: Atraumatic.   Cardiovascular: Normal rate, regular rhythm and normal heart sounds.   Pulmonary/Chest: Effort normal. No wheezes.   Abdominal: Soft. Bowel sounds are normal. No distension and no mass. No tenderness  Neurological: Alert.   Skin: Skin is warm and dry.   Psych: Appropriate mood and affect    Following labs were Reviewed   CBC:  Recent Labs   Lab 04/12/23  0347   WBC 7.03   HGB 10.4*   HCT 32.5*   *     CMP:  Recent Labs   Lab 04/12/23  0347   CALCIUM 8.8   ALBUMIN 3.0*   PROT 5.6*      K 4.9   CO2 26      BUN 31*   CREATININE 2.2*   ALKPHOS 37*   ALT 11   AST 18   BILITOT 1.0       Micro Results  Microbiology Results (last 7 days)       ** No results found for the last 168 hours. **             Radiology Reports  CT Head Without Contrast    Result Date: 4/11/2023  CT HEAD WITHOUT CONTRAST CMS MANDATED QUALITY DATA - CT RADIATION  436 All CT scans at this facility utilize dose modulation, iterative reconstruction, and/or weight based dosing when appropriate to reduce radiation dose to as low as reasonably achievable Clinical data: Seizure. Comparison to October 2022. FINDINGS: Noninfusion images were obtained from the skull base to the vertex. There is no intracranial mass, hemorrhage, or midline shift. Ventricles and sulci are mildly prominent. There are no pathologic extra-axial fluid collections. There is no evidence of cortical ischemic change. Changes of mild chronic microvascular white matter disease are noted. Small chronic lacunar infarct is noted at the caudate nucleus on the left, with chronic lacunar infarct in the cedric centrally. Cerebellum is normal in appearance. The calvarium is intact. There is chronic opacification of the right frontal sinus compatible with chronic sinusitis. There is near complete opacification of the right maxillary sinus with air-fluid level noted. This is also similar in  appearance to the prior study. Right-sided anterior ethmoid opacification is also unchanged. IMPRESSION: 1. Atrophy and chronic small vessel ischemic changes. 2. Right frontal, maxillary, and ethmoid sinusitis. Electronically signed by:  Az Sethi MD  4/11/2023 2:24 PM CDT Workstation: [id number]    CT Cervical Spine Without Contrast    Result Date: 4/11/2023  CT CERVICAL SPINE CMS MANDATED QUALITY DATA - CT RADIATION - 436 HISTORY: Trauma. FINDINGS: Thin axial imaging was performed without contrast, with sagittal and coronal reformatted images reviewed. All CT exams at this facility use dose modulation, iterative reconstruction, and or weight based dosing when appropriate to reduce radiation dose to as low as reasonably achievable. Comparison is made to a prior study from October 2022. There is no evidence of acute cervical fracture. There is mild C5 retrolisthesis, chronic and unchanged. Alignment at all of the levels is normal. Prevertebral soft tissues are normal. The odontoid is intact. Changes of multilevel cervical degenerative disc and facet disease are noted. Uncinate process and facet hypertrophy combine to result in multilevel foraminal stenosis, most pronounced on the left at C4-5 where severe foraminal narrowing is noted. IMPRESSION: 1. Chronic findings as above. No evidence of acute cervical fracture. Electronically signed by:  Az Sethi MD  4/11/2023 2:32 PM CDT Workstation: [id number]    X-Ray Chest AP Portable    Result Date: 4/11/2023  Reason: Seizure Seizure FINDINGS: Portable chest at 1323 compared with 10/14/2022 shows normal cardiomediastinal silhouette. Calcified right midlung granuloma unchanged. Lungs are otherwise clear. Lung volumes remain low. Pulmonary vasculature is normal. No acute osseous abnormality. IMPRESSION: Unremarkable underinflated chest. Electronically signed by:  Can Callahan MD  4/11/2023 1:47 PM CDT Workstation: [id number]       Meds  Scheduled Meds:    aspirin  81 mg Oral Daily    atorvastatin  40 mg Oral Daily    calcitRIOL  0.25 mcg Oral Daily    chlorhexidine  15 mL Mouth/Throat BID    cholecalciferol (vitamin D3)  2,000 Units Oral Daily    citalopram  20 mg Oral Daily    docusate sodium  100 mg Oral Daily    enoxaparin  30 mg Subcutaneous Daily    hydrALAZINE  10 mg Oral Q8H    levETIRAcetam  750 mg Oral BID    mupirocin   Nasal BID    piperacillin-tazobactam (ZOSYN) IVPB  3.375 g Intravenous Q8H    sodium chloride 0.9%  500 mL Intravenous Once     Continuous Infusions:  PRN Meds:.sodium chloride 0.9%.    Active PT: Yes  Active OT: Yes  Active SLP: Yes  Assessment & Plan:     Active Hospital Problems    Diagnosis    *Seizure  EEG ordered  Continue Keppra  Neurology consulted  PT/OT/SLP      Bradycardia  Cardiology consulted  Hold all AV krystyna medications      Hypertension  P.r.n. hydralazine           Discharge Planning:   Is the patient medically ready for discharge?: no    Reason for patient still in hospital (select all that apply): Patient trending condition and Treatment    Above encounter included review of the medical records, interviewing and examining the patient face-to-face, discussion with family and other health care providers, ordering and interpreting lab/test results and formulating a plan of care.     Medical Decision Making:      [_] Low Complexity  [_] Moderate Complexity  [x] High Complexity      Sandy Sosa MD  Department of Hospital Medicine   [facility]

## 2023-04-12 NOTE — NURSING
0807  Spoke to Dr. Villa.  Informed of new consult for bradycardia/elevated troponin  States to let Raven Rhoades NP know about consult.   [id number] Notified ANITA Rhoades NP of consult.

## 2023-04-12 NOTE — PLAN OF CARE
More alert today, watching TV. Oriented to person, BD, and sometimes place. Respirations unlabored. Passed swallow study. Diet ordered. Condom cath in use.   EEG and MRI done. Safety maintained.

## 2023-04-12 NOTE — PT/OT/SLP EVAL
OUTPATIENT CARDIOLOGY FOLLOW-UP    HPI:    Name: Robert Abbasi    Age: 76 y.o. Primary Care Physician: Elvis Amaro MD    Date of Service: 7/7/2020    Chief Complaint:   Chief Complaint   Patient presents with    Hypertension     over due OV,former RV pt    Hyperlipidemia        History of present illness : 70-year-old obese male who comes today for one-year follow-up visit. He was seen by Dr. Octavio Alexandre on 5/31/2019. He has history of hypertension, hyperlipidemia, obstructive sleep apnea intolerant to CPAP, degenerative joint disease, hiatal hernia, BPH, normal coronaries by cardiac catheterization in  2012 and panic attacks/anxiety. Patient has not been active due to COVID-19 pandemic. However he still able to mow his lawn. He feels dyspnea on exertion and fatigue. He denies chest discomfort. He feels occasional dizziness but no loss of consciousness. He denies orthopnea, or lower extremity edema. EKG done today revealed sinus bradycardia at 55 bpm with first-degree AV block with VA interval 264 ms and cannot exclude IMI of unknown age. There was probable LVH. EKG done today revealed. Review of Systems:   Review of Systems   Constitutional: Positive for fatigue. Negative for chills and fever. HENT: Negative for nosebleeds. Respiratory: Positive for shortness of breath. Negative for cough and wheezing. Gastrointestinal: Negative for abdominal pain, blood in stool, constipation, diarrhea, nausea and vomiting. GERD. Genitourinary: Negative for dysuria and hematuria. Musculoskeletal: Negative for back pain, joint swelling and myalgias. Aching. Neurological: Negative for syncope and light-headedness. Psychiatric/Behavioral: The patient is nervous/anxious.            Past Medical History:  Past Medical History:   Diagnosis Date    Abnormal stress test     Arthritis     Blood transfusion     BPH (benign prostatic hypertrophy)     Family history of Speech Language Pathology Evaluation  Bedside Swallow    Patient Name:  Lavon Brandon   MRN:  [id number]  Admitting Diagnosis: Seizure    Recommendations:                 General Recommendations:  Dysphagia therapy and Cognitive-linguistic evaluation  Diet recommendations:  Soft & Bite Sized Diet - IDDSI Level 6, Thin liquids - IDDSI Level 0   Aspiration Precautions:  Cue to swallow as needed, 1 bite/sip at a time, Alternating bites/sips, Assistance with meals, Feed only when awake/alert, and HOB to 90 degrees   General Precautions: Standard, aspiration  Communication strategies:  none    History:     Past Medical History:   Diagnosis Date    Bradyarrhythmia     CVA (cerebral infarction) 2006    Dementia     Depression     Hyperlipidemia     Hypertension     renal htn    Pulmonary embolism 2002    Seizure disorder        Past Surgical History:   Procedure Laterality Date    left foot  with crushed injry         Social History: Patient lives with his daughter at home.    Prior Intubation HX:  none this admit    Modified Barium Swallow: none found in epic or reported by pt      Chest X-Rays:   Imaging Results              CT Cervical Spine Without Contrast (Final result)  Result time 04/11/23 14:32:13      Final result by Az Sethi MD (04/11/23 14:32:13)                   Narrative:    CT CERVICAL SPINE    CMS MANDATED QUALITY DATA - CT RADIATION - 436    HISTORY: Trauma.    FINDINGS: Thin axial imaging was performed without contrast, with sagittal and coronal reformatted images reviewed. All CT exams at this facility use dose modulation, iterative reconstruction, and or weight based dosing when appropriate to reduce radiation dose to as low as reasonably achievable.    Comparison is made to a prior study from October 2022.    There is no evidence of acute cervical fracture. There is mild C5 retrolisthesis, chronic and unchanged. Alignment at all of the levels is normal. Prevertebral soft tissues are normal.  The odontoid is intact.    Changes of multilevel cervical degenerative disc and facet disease are noted. Uncinate process and facet hypertrophy combine to result in multilevel foraminal stenosis, most pronounced on the left at C4-5 where severe foraminal narrowing is noted.    IMPRESSION:      1. Chronic findings as above. No evidence of acute cervical fracture.    Electronically signed by:  Az Sethi MD  4/11/2023 2:32 PM CDT Workstation: [id number]                                     CT Head Without Contrast (Final result)  Result time 04/11/23 14:24:25      Final result by Az Sethi MD (04/11/23 14:24:25)                   Narrative:    CT HEAD WITHOUT CONTRAST    CMS MANDATED QUALITY DATA - CT RADIATION  436    All CT scans at this facility utilize dose modulation, iterative reconstruction, and/or weight based dosing when appropriate to reduce radiation dose to as low as reasonably achievable    Clinical data: Seizure. Comparison to October 2022.    FINDINGS: Noninfusion images were obtained from the skull base to the vertex. There is no intracranial mass, hemorrhage, or midline shift. Ventricles and sulci are mildly prominent. There are no pathologic extra-axial fluid collections.    There is no evidence of cortical ischemic change. Changes of mild chronic microvascular white matter disease are noted. Small chronic lacunar infarct is noted at the caudate nucleus on the left, with chronic lacunar infarct in the cedric centrally. Cerebellum is normal in appearance. The calvarium is intact. There is chronic opacification of the right frontal sinus compatible with chronic sinusitis. There is near complete opacification of the right maxillary sinus with air-fluid level noted. This is also similar in appearance to the prior study. Right-sided anterior ethmoid opacification is also unchanged.    IMPRESSION:    1. Atrophy and chronic small vessel ischemic changes.  2. Right frontal, maxillary, and  ethmoid sinusitis.    Electronically signed by:  Az Sethi MD  4/11/2023 2:24 PM CDT Workstation: [id number]                                     X-Ray Chest AP Portable (Final result)  Result time 04/11/23 13:47:46      Final result by Can Callahan MD (04/11/23 13:47:46)                   Narrative:    Reason: Seizure Seizure    FINDINGS:  Portable chest at 1323 compared with 10/14/2022 shows normal cardiomediastinal silhouette.    Calcified right midlung granuloma unchanged. Lungs are otherwise clear. Lung volumes remain low. Pulmonary vasculature is normal. No acute osseous abnormality.    IMPRESSION:  Unremarkable underinflated chest.    Electronically signed by:  Can Callahan MD  4/11/2023 1:47 PM CDT Workstation: [id number]                                    Prior diet: regular, thin at home; NPO since admit.    Occupation/hobbies/homemaking: none stated.    Subjective     Pt asleep in bed upon entering; nursing at bedside. Pt awake easily and was agreeable to ST eval.  Patient goals: none stated     Pain/Comfort:       Respiratory Status: Room air    Objective:   Pt oriented to person and place only. He appropriately responded to questions re living situation and diet at home. He was able to follow multi-step directives w/ intermittent min cuing. Pt able to express wants/needs and understanding of recs from SLP.    Oral Musculature Evaluation  Oral Musculature: WFL  Dentition: present and adequate  Secretion Management: adequate  Mucosal Quality: adequate, other (see comments) (sore on tongue from seizure)  Mandibular Strength and Mobility: WFL  Oral Labial Strength and Mobility: impaired pursing, impaired coordination, functional seal, functional retraction  Lingual Strength and Mobility: functional protrusion, functional anterior elevation, functional strength, functional lateral movement  Velar Elevation: WFL  Buccal Strength and Mobility: impaired  Volitional Cough: adequate  Volitional Swallow:  organization: Not on file     Attends meetings of clubs or organizations: Not on file     Relationship status: Not on file    Intimate partner violence     Fear of current or ex partner: Not on file     Emotionally abused: Not on file     Physically abused: Not on file     Forced sexual activity: Not on file   Other Topics Concern    Not on file   Social History Narrative    Not on file       Allergies: Allergies   Allergen Reactions    Morphine Anxiety       Current Medications:  Current Outpatient Medications   Medication Sig Dispense Refill    methocarbamol (ROBAXIN) 750 MG tablet TAKE 1 TABLET BY MOUTH 3 TIMES A DAY AS NEEDED FOR MUSCLE SPASMS  0    atorvastatin (LIPITOR) 40 MG tablet Take 40 mg by mouth daily      aspirin 81 MG tablet Take 81 mg by mouth daily      DULoxetine (CYMBALTA) 60 MG capsule Take 90 mg by mouth daily       multivitamin (THERAGRAN) per tablet Take 1 tablet by mouth daily.  Docusate Calcium (STOOL SOFTENER PO) Take by mouth as needed       doxepin (SINEQUAN) 10 MG capsule Take 10 mg by mouth daily.  gabapentin (NEURONTIN) 300 MG capsule Take 300 mg by mouth 2 times daily.  lisinopril (PRINIVIL;ZESTRIL) 10 MG tablet Take 10 mg by mouth daily.  clonazePAM (KLONOPIN) 1 MG tablet Take 1 mg by mouth 2 times daily as needed.  NIFEdipine (ADALAT CC) 30 MG CR tablet Take 30 mg by mouth daily.  acetaminophen (TYLENOL) 500 MG tablet Take 500 mg by mouth every 6 hours as needed. No current facility-administered medications for this visit. Physical Exam:  Ht 6' (1.829 m)   BMI 32.98 kg/m²   Wt Readings from Last 3 Encounters:   05/31/19 243 lb 3.2 oz (110.3 kg)   05/24/18 247 lb (112 kg)   04/21/17 254 lb (115.2 kg)       Physical Exam  Constitutional:       General: He is not in acute distress. Appearance: He is well-developed. He is obese. HENT:      Head: Normocephalic and atraumatic. Neck:      Musculoskeletal: Neck supple. Vascular: No carotid bruit or JVD. Cardiovascular:      Rate and Rhythm: Regular rhythm. Bradycardia present. Heart sounds: No murmur. No friction rub. No gallop. Pulmonary:      Breath sounds: Normal breath sounds. No wheezing or rales. Chest:      Chest wall: No tenderness. Abdominal:      General: Bowel sounds are normal. There is no distension. Palpations: Abdomen is soft. There is no mass. Tenderness: There is no abdominal tenderness. Comments: No abdominal bruit. Musculoskeletal:      Right lower leg: No edema. Left lower leg: No edema. Skin:     General: Skin is warm and dry. Neurological:      Mental Status: He is alert and oriented to person, place, and time. Laboratory Tests:  Lab Results   Component Value Date    CREATININE 0.9 07/23/2014    BUN 9 07/23/2014     07/23/2014    K 4.0 07/23/2014     07/23/2014    CO2 25 07/23/2014     No results found for: MG  Lab Results   Component Value Date    WBC 10.5 07/23/2014    HGB 13.6 07/23/2014    HCT 41.0 07/23/2014    MCV 98.8 07/23/2014     07/23/2014       Lab Results   Component Value Date    INR 1.0 07/18/2014    INR 1.1 08/02/2012    PROTIME 10.3 07/18/2014    PROTIME 10.9 08/02/2012         ASSESSMENT / PLAN:  -Sinus bradycardia with first-degree AV block:  Currently asymptomatic. It could be due to underlying CAD, conduction system abnormality or obstructive sleep apnea. -Dyspnea on exertion: Probably multifactorial including pulmonary hypertension due to sleep apnea, probable diastolic dysfunction due to hypertension, obesity/deconditioning and cannot exclude underlying CAD. -Hypertension: Controlled. -Hyperlipidemia: On Lipitor.  -Obstructive sleep apnea. -Hiatal hernia.  -Arthritis. -Obesity.  -Anxiety. Will arrange for the patient to have an echocardiogram to assess for valvular heart disease, assess ejection fraction and RV systolic pressure.   Will arrange for the palpated; adequate laryngeal elevation/excursion  Voice Prior to PO Intake: clear    Bedside Swallow Eval:   Consistencies Assessed:  Thin liquids water via tsp, cup edge, and straw  Puree tsp bites pudding  Mixed consistencies tsp bites pudding mixed w/ cracker   Solids dry cracker bites      Oral Phase:   Oral holding w/ liquids and cracker; verbally cued to swallow and liquid wash   Prolonged mastication  Oral residue    Pharyngeal Phase:   no overt clinical signs/symptoms of aspiration  no overt clinical signs/symptoms of pharyngeal dysphagia    Compensatory Strategies  Liquid wash    Treatment: Pt educated re purpose of BSE, role of SLP, results of BSE, diet recs, and swallowing precautions. Pt expressed understanding of recs.      Assessment:     Lavon Brandon is a 89 y.o. male with an SLP diagnosis of mild oral Dysphagia.  He presents with prolonged mastication, inadequate oral clearance w/ complex textures, and intermittent oral holding. Rec IDDSI 6- soft and bite sized diet w/ thin liquids and swallowing precautions listed above. ST to f/u re goals below.    Goals:   Multidisciplinary Problems       SLP Goals          Problem: SLP    Goal Priority Disciplines Outcome   SLP Goal     SLP    Description: 1. Pt will tolerate LRD (IDDSI 7) w/ adequate oral clearance and w/o overt s/s aspiration during >95% of PO intake  2. On-going cognitive evaluation                       Plan:     Patient to be seen:  2 x/week   Plan of Care expires:     Plan of Care reviewed with:  patient, other (see comments) (nursing, MD)   SLP Follow-Up:  Yes       Discharge recommendations:      Barriers to Discharge:  None    Time Tracking:     SLP Treatment Date:   04/12/23  Speech Start Time:  1215  Speech Stop Time:  1233     Speech Total Time (min):  18 min    Billable Minutes: Eval Swallow and Oral Function 18 min    04/12/2023          patient to have a Lexiscan to rule out significant myocardial ischemia. We will arrange for the patient to have an abdominal aortic ultrasound to rule out abdominal aortic aneurysm due to positive family history and hypertension. Patient was advised to lose weight. We will follow-up at the office in 6 months. The patient's current medication list, allergies, problem list and results of all previously ordered testing were reviewed at today's visit. {  Eleanor Reddy MD , [address] [facility].   [facility]) Cardiology

## 2023-04-12 NOTE — PROGRESS NOTES
Automatic Inhaler to Nebulizer Interchange    albuterol (Ventolin, ProAir, or Proventil)  mcg given multiple times per day changed to albuterol 2.5 mg q 8h  per [facility] Automatic Therapeutic Substitutions Protocol.    Please contact pharmacy at extension [phone] with any questions.     Thank you,   TOBI LACEY

## 2023-04-12 NOTE — PT/OT/SLP EVAL
Occupational Therapy   Evaluation    Name: Lavon Brandon  MRN: [id number]  Admitting Diagnosis: Seizure  Recent Surgery: * No surgery found *      Recommendations:     Discharge Recommendations: home health OT  Discharge Equipment Recommendations:  none  Barriers to discharge:  None    Assessment:     Lavon Brandon is a 89 y.o. male with a medical diagnosis of Seizure.  Pt agreeable to OT evaluation this AM. Performance deficits affecting function: weakness, impaired endurance, impaired self care skills, impaired functional mobility, gait instability, impaired balance, decreased safety awareness, impaired cardiopulmonary response to activity.  Pt's HR 39 bpm upon entry and markie to mid 50s with EOB activity.    Rehab Prognosis: Fair; patient would benefit from acute skilled OT services to address these deficits and reach maximum level of function.       Plan:     Patient to be seen 3 x/week to address the above listed problems via self-care/home management, therapeutic exercises, therapeutic activities  Plan of Care Expires: 05/12/23  Plan of Care Reviewed with: patient    Subjective     Chief Complaint: none stated  Patient/Family Comments/goals: none stated    Occupational Profile:  Living Environment: Pt lives with daughter in a 1 story home with 1 step to enter. Pt has a walk-in shower with a shower chair.  Previous level of function: Mod I with ADLs and mobility; Daughter completes IADLs  Roles and Routines: father  Equipment Used at Home: bedside commode, walker, rolling, wheelchair, shower chair, hospital bed  Assistance upon Discharge: yes, from daughter    Pain/Comfort:  Pain Rating 1: 0/10    Patients cultural, spiritual, Amish conflicts given the current situation:      Objective:     Communicated with: nursing prior to session.  Patient found HOB elevated with bed alarm, peripheral IV, telemetry, pulse ox (continuous), blood pressure cuff upon OT entry to room.    General Precautions: Standard,  fall  Orthopedic Precautions: N/A  Braces: N/A  Respiratory Status: Room air    Occupational Performance:    Bed Mobility:    Patient completed Supine to Sit with stand by assistance, contact guard assistance, and with side rail  Patient completed Sit to Supine with stand by assistance    Activities of Daily Living:  Grooming: stand by assistance seated EOB to brush teeth and to wash face  Lower Body Dressing: stand by assistance seated EOB to don/doff socks  Toileting: hansa      Cognitive/Visual Perceptual:  Cognitive/Psychosocial Skills:     -       Follows Commands/attention:Follows one-step commands  -       Communication: clear/fluent  -       Safety awareness/insight to disability: impaired   -       Mood/Affect/Coping skills/emotional control: Appropriate to situation, Cooperative, and Pleasant    Physical Exam:  Balance:    -       SBA seated balance  Upper Extremity Range of Motion:     -       Right Upper Extremity: WFL  -       Left Upper Extremity: WFL  Upper Extremity Strength:    -       Right Upper Extremity: WFL  -       Left Upper Extremity: WFL   Strength:    -       Right Upper Extremity: fair   -       Left Upper Extremity: fair   Fine Motor Coordination:    -       Intact  Gross motor coordination:   WFL    AMPAC 6 Click ADL:  AMPAC Total Score: 19    Treatment & Education:  Pt educated on role of OT/POC, importance of OOB/EOB activity, use of call bell, and safety during ADLs, transfers, and functional mobility.    Patient left HOB elevated with all lines intact, call button in reach, and bed alarm on    GOALS:   Multidisciplinary Problems       Occupational Therapy Goals          Problem: Occupational Therapy    Goal Priority Disciplines Outcome Interventions   Occupational Therapy Goal     OT, PT/OT     Description: Goals to be met by: 5/12/23     Patient will increase functional independence with ADLs by performing:    UE Dressing with Supervision.  LE Dressing with  Supervision.  Grooming while standing at sink with Supervision.  Toileting from toilet with Supervision for hygiene and clothing management.   Toilet transfer to toilet with Supervision.                         History:     Past Medical History:   Diagnosis Date    Bradyarrhythmia     CVA (cerebral infarction) 2006    Dementia     Depression     Hyperlipidemia     Hypertension     renal htn    Pulmonary embolism 2002    Seizure disorder          Past Surgical History:   Procedure Laterality Date    left foot  with crushed injry         Time Tracking:     OT Date of Treatment: 04/12/23  OT Start Time: 0840  OT Stop Time: 0853  OT Total Time (min): 13 min    Billable Minutes:Evaluation 5  Self Care/Home Management 8    4/12/2023

## 2023-04-12 NOTE — PROCEDURES
EEG REPORT    NAME: Lavon Brandon  : 1932  MRN: [id number]    DATE of EE2023    CLINICAL INDICATION: This is a 89 y.o. male with history of seizure being evaluated for breakthrough seizure vs syncope.    MEDICATIONS:  Scheduled Meds:   aspirin  81 mg Oral Daily    atorvastatin  40 mg Oral Daily    calcitRIOL  0.25 mcg Oral Daily    chlorhexidine  15 mL Mouth/Throat BID    cholecalciferol (vitamin D3)  2,000 Units Oral Daily    citalopram  20 mg Oral Daily    docusate sodium  100 mg Oral Daily    enoxaparin  30 mg Subcutaneous Daily    hydrALAZINE  10 mg Oral Q8H    levETIRAcetam  750 mg Oral BID    mupirocin   Nasal BID    piperacillin-tazobactam (ZOSYN) IVPB  3.375 g Intravenous Q8H    sodium chloride 0.9%  500 mL Intravenous Once     Continuous Infusions:  PRN Meds:.sodium chloride 0.9%      EEG DESCRIPTION:  A 16-channel EEG was performed with electrode placement in 10/20 International System. Longitudinal bipolar and referential montages were utilized in analysis. Total duration of this study was 25 minutes.    This is a technically difficult study with moderate muscle and motion artifacts.    The background consists of a mixture of theta and delta rhythms, with no posterior dominant rhythm seen. Stage II sleep structures are not seen.    Photic stimulation is performed with no abnormal reactivity noted. Hyperventilation is not performed.     No epileptiform discharges or seizures are captured.    Impression:  This is an abnormal EEG due to diffuse slowing of the background activity consistent with a moderate encephalopathy. No epileptiform discharges or seizures are captured.  Findings of this study indicate a generalized encephalopathic process that is nonspecific in type. Toxic, metabolic, or structural abnormalities may be considered.  Further clinical correlation is advised.      Tenisha Tineo MD  Neurology

## 2023-04-12 NOTE — PT/OT/SLP EVAL
Physical Therapy Evaluation    Patient Name:  Lavon Brandon   MRN:  [id number]    Recommendations:     Discharge Recommendations: home health PT   Discharge Equipment Recommendations: none   Barriers to discharge: None    Assessment:     Lavon Brandon is a 89 y.o. male admitted with a medical diagnosis of Seizure.  He presents with the following impairments/functional limitations: weakness, impaired endurance, impaired functional mobility, gait instability, impaired balance, impaired cognition, impaired cardiopulmonary response to activity .    Pt presented in supine in NAD. He readily consented to PT and t/f'ed supine to sit with Min A and sit to stand with CGA and ambulated in the room 20 ft RW and CGA. Pt returned to bed  with CGA.    Rehab Prognosis: Good; patient would benefit from acute skilled PT services to address these deficits and reach maximum level of function.    Recent Surgery: * No surgery found *      Plan:     During this hospitalization, patient to be seen 5 x/week to address the identified rehab impairments via gait training, therapeutic activities, therapeutic exercises and progress toward the following goals:    Plan of Care Expires:  05/12/23    Subjective     Chief Complaint: none reported  Patient/Family Comments/goals: Return home with his daughter  Pain/Comfort:  Pain Rating 1: 0/10    Patients cultural, spiritual, Zoroastrian conflicts given the current situation:      Pt lives in a [facility]  w/o .  Prior to admission, patients level of function was ambulatory with RW .   Equipment used at home: bedside commode, hospital bed, wheelchair, walker, rolling.  DME owned (not currently used): none.  Upon discharge, patient will have assistance from his daughter.    Objective:     Communicated with nurse prior to session.  Patient found supine with bed alarm, peripheral IV, telemetry  upon PT entry to room.    General Precautions: Standard,    Orthopedic Precautions:    Braces:    Respiratory  Status: Room air    Exams:  Cognitive Exam:  Patient is oriented to Person, Place, and Situation  RLE ROM: WFL  RLE Strength: WFL  LLE ROM: WFL  LLE Strength: WFL    Functional Mobility:  Bed Mobility:     Supine to Sit: minimum assistance  Sit to Supine: stand by assistance  Transfers:     Sit to Stand:  contact guard assistance with rolling walker  Gait: 20 ft RW and CGA  Balance: unsupported sitting balance, CGA/Thompson for standing balance      AM-PAC 6 CLICK MOBILITY  Total Score:18       Treatment & Education:  Pt was educated on the role of PT  for assessment, treatment with emphasis on safety and increased mobility and D/C  recommendations.     Patient left supine with all lines intact, call button in reach, and bed alarm on.    GOALS:   Multidisciplinary Problems       Physical Therapy Goals          Problem: Physical Therapy    Goal Priority Disciplines Outcome Goal Variances Interventions   Physical Therapy Goal     PT, PT/OT      Description: Goals to be met by: 2023    Patient will increase functional independence with mobility by performin. Supine to sit with Stand-by Assistance  2. Sit to stand transfer with Stand-by Assistance  3. Gait  x 100  feet with Contact Guard Assistance using Rolling Walker.                          History:     Past Medical History:   Diagnosis Date    Bradyarrhythmia     CVA (cerebral infarction) 2006    Dementia     Depression     Hyperlipidemia     Hypertension     renal htn    Pulmonary embolism 2002    Seizure disorder        Past Surgical History:   Procedure Laterality Date    left foot  with crushed injry         Time Tracking:     PT Received On: 23  PT Start Time: 1010     PT Stop Time: 1028  PT Total Time (min): 18 min     Billable Minutes: Evaluation 10 minutes  and Gait Training 8 minutes      2023

## 2023-04-12 NOTE — PLAN OF CARE
[facility]  Initial Discharge Assessment    Assessment completed via phone with patient's daughter Gabriela, [phone], who is patient's POA and primary caregiver.  As listed below, PCP, address, payor source, and DME verified. Preferred pharmacy updated.  Advanced Directives/POA verified as most accurate and up to date.  Per daughter, patient was recently discharged from [facility], but she requests more medical management at home.  [facility] program explained and she requests this service, referral placed.  As listed below, patient is not on dialysis and does not take Coumadin.  As listed below, patient's discharge plan is home with family and there are no further discharge planning needs expressed by daughter or identified at this time.       Primary Care Provider: Aristides Haynes MD    Admission Diagnosis: Postictal confusion [F05]    Admission Date: 4/11/2023  Expected Discharge Date: 4/13/2023    Discharge Barriers Identified: None    Payor: [employer] MANAGED MEDICARE / Plan: [employer] MEDICARE PPO / Product Type: Medicare Advantage /     Extended Emergency Contact Information  Primary Emergency Contact: Gordon HammondGabriela  Work Phone: [phone]  Mobile Phone: [phone]  Relation: Daughter  Preferred language: English   needed? No  Secondary Emergency Contact: Nilda Brandon  Mobile Phone: [phone]  Relation: Daughter  Preferred language: English   needed? No    Discharge Plan A: Home with family  Discharge Plan B: Home with family      [facility] [address] [place] [address] AT [facility]  [address] [place] [id number]  Phone: [phone] Fax: [phone]    [facility] - [place], [place] - [address] [facility]  [address] [facility] [address]  Phone: [phone] Fax: [phone]      Initial Assessment (most recent)       Adult Discharge Assessment - 04/12/23 1045           Discharge Assessment    Assessment Type Discharge Planning Assessment     Confirmed/corrected address, phone number and insurance Yes     Confirmed Demographics Correct on Facesheet     Source of Information family     If unable to respond/provide information was family/caregiver contacted? Yes     Contact Name/Number Gabriela Merino (Daughter)   [phone] (Mobile)     Does patient/caregiver understand observation status Yes     Communicated JOANNE with patient/caregiver Yes     People in Home child(idalmis), adult     Facility Arrived From: Home     Do you expect to return to your current living situation? Yes     Do you have help at home or someone to help you manage your care at home? Yes     Who are your caregiver(s) and their phone number(s)? Gabriela Merino (Daughter)   [phone] (Mobile)     Prior to hospitilization cognitive status: Unable to Assess     Current cognitive status: Unable to Assess     Walking or Climbing Stairs ambulation difficulty, requires equipment     Dressing/Bathing bathing difficulty, requires equipment     Equipment Currently Used at Home bedside commode;hospital bed;walker, rolling;wheelchair;rollator     Readmission within 30 days? No     Patient currently being followed by outpatient case management? No     Do you currently have service(s) that help you manage your care at home? No     Do you take prescription medications? Yes     Do you have prescription coverage? Yes     Coverage [employer]     Do you have any problems affording any of your prescribed medications? No     Is the patient taking medications as prescribed? yes     Who is going to help you get home at discharge? Gabriela Merino (Daughter)   [phone] (Mobile)     How do you get to doctors appointments? family or friend will provide     Are you on dialysis? No     Do you take coumadin? No     Discharge Plan A Home with family     Discharge Plan B Home with family     DME Needed Upon Discharge   none     Discharge Plan discussed with: Caregiver     Name(s) and Number(s) Gordon HammondGabriela (Daughter)   [phone] (Mobile)     Discharge Barriers Identified None

## 2023-04-12 NOTE — CONSULTS
[facility]  Department of Cardiology  Consult Note      PATIENT NAME: Lavon Brandon  MRN: [id number]  TODAY'S DATE: 04/12/2023  ADMIT DATE: 4/11/2023                          CONSULT REQUESTED BY: Sandy Sosa MD    SUBJECTIVE     PRINCIPAL PROBLEM: Seizure      REASON FOR CONSULT:  Bradycardia  Elevated Troponin      HPI:      Mr. Brandon is a 89 year old male patient with a PMH significant for CVA, Dementia, HTN, Seizure disorder, PE, Dyslipidemia, and Bradyrhythmia. He presented to the ER after a seizure and after a post ictal state he has been bradycardic and we have been consulted for the same. He was on no med HR lowering medications at home. His HR during my evaluation is 38 asymptomatic currently while lying in the bed. High sensitivity troponin only 72.5. Patient denies CP or SOB. Currently resting comfortably with no family at bedside.        Review of patient's allergies indicates:   Allergen Reactions    Ciprofloxacin (bulk) Swelling       Past Medical History:   Diagnosis Date    Bradyarrhythmia     CVA (cerebral infarction) 2006    Dementia     Depression     Hyperlipidemia     Hypertension     renal htn    Pulmonary embolism 2002    Seizure disorder      Past Surgical History:   Procedure Laterality Date    left foot  with crushed injry       Social History     Tobacco Use    Smoking status: Never    Smokeless tobacco: Never   Substance Use Topics    Alcohol use: No    Drug use: No        REVIEW OF SYSTEMS    Per HPI  OBJECTIVE     VITAL SIGNS (Most Recent)  Temp: 98.2 °F (36.8 °C) (04/12/23 0715)  Pulse: (!) 40 (04/12/23 0807)  Resp: 16 (04/12/23 0807)  BP: (!) 191/76 (04/12/23 0800)  SpO2: 99 % (04/12/23 0807)    VENTILATION STATUS  Resp: 16 (04/12/23 0807)  SpO2: 99 % (04/12/23 0807)           I & O (Last 24H):  Intake/Output Summary (Last 24 hours) at 4/12/2023 1036  Last data filed at 4/12/2023 0510  Gross per 24 hour   Intake 250 ml   Output 700 ml   Net -450 ml        WEIGHTS  Wt Readings from Last 3 Encounters:   04/11/23 1814 82.6 kg (182 lb 1.6 oz)   04/11/23 1309 81.6 kg (180 lb)   02/14/23 0829 80.5 kg (177 lb 7.5 oz)   10/24/22 1017 77 kg (169 lb 12.1 oz)       PHYSICAL EXAM  GENERAL: Elderly male in no acute distress resting in bed.   NECK: No JVD. No bruit..   THYROID: Thyroid not enlarged. No nodules present..   CARDIAC: Bradycardic, systolic murmur noted, MR Grade 2/6  CHEST ANATOMY: normal.   LUNGS: Clear to auscultation. No wheezing or rhonchi..   ABDOMEN: Soft no masses or organomegaly.  No abdomen pulsations or bruits.  Normal bowel sounds. No pulsations and no masses felt, No guarding or rebound.   URINARY: santo catheter   EXTREMITIES: No cyanosis, clubbing or edema noted at this time., no calf tenderness bilaterally.   PERIPHERAL VASCULAR SYSTEM: Good palpable distal pulses.   CENTRAL NERVOUS SYSTEM: No focal motor or sensory deficits noted.   SKIN: Skin without lesions, moist, well perfused.       HOME MEDICATIONS:  No current facility-administered medications on file prior to encounter.     Current Outpatient Medications on File Prior to Encounter   Medication Sig Dispense Refill    amLODIPine (NORVASC) 5 MG tablet Take 1 tablet (5 mg total) by mouth once daily. 90 tablet 3    calcitRIOL (ROCALTROL) 0.25 MCG Cap Take 0.25 mcg by mouth once daily.      cholecalciferol, vitamin D3, (VITAMIN D3) 50 mcg (2,000 unit) Cap Take 1 capsule by mouth once daily.      citalopram (CELEXA) 20 MG tablet Take 1 tablet by mouth once daily (Patient taking differently: Take 20 mg by mouth once daily.) 90 tablet 11    levETIRAcetam (KEPPRA) 500 MG Tab Take 1 tablet (500 mg total) by mouth 2 (two) times daily. 180 tablet 3    rosuvastatin (CRESTOR) 10 MG tablet Take 1 tablet (10 mg total) by mouth once daily. 90 tablet 1    aspirin 81 MG Chew Take 1 tablet (81 mg total) by mouth once daily. 100 tablet 1    benazepriL (LOTENSIN) 20 MG tablet Take 1 tablet (20 mg total) by  mouth 2 (two) times daily. 60 tablet 0    docusate sodium (COLACE) 100 MG capsule Take 1 capsule (100 mg total) by mouth once daily. 90 capsule 3       SCHEDULED MEDS:   aspirin  81 mg Oral Daily    atorvastatin  40 mg Oral Daily    calcitRIOL  0.25 mcg Oral Daily    chlorhexidine  15 mL Mouth/Throat BID    cholecalciferol (vitamin D3)  2,000 Units Oral Daily    citalopram  20 mg Oral Daily    docusate sodium  100 mg Oral Daily    enoxaparin  30 mg Subcutaneous Daily    mupirocin   Nasal BID    piperacillin-tazobactam (ZOSYN) IVPB  3.375 g Intravenous Q8H       CONTINUOUS INFUSIONS:    PRN MEDS:sodium chloride 0.9%    LABS AND DIAGNOSTICS     CBC LAST 3 DAYS  Recent Labs   Lab 04/11/23  1341 04/12/23  0347   WBC 7.60 7.03   RBC 3.69* 3.60*   HGB 10.8* 10.4*   HCT 33.7* 32.5*   MCV 91 90   MCH 29.3 28.9   MCHC 32.0 32.0   RDW 14.6* 14.5   * 105*   MPV 11.2 11.4   GRAN 78.5*  6.0 58.7  4.1   LYMPH 14.3*  1.1 30.9  2.2   MONO 6.1  0.5 9.1  0.6   BASO 0.03 0.02   NRBC 0 0       COAGULATION LAST 3 DAYS  Recent Labs   Lab 04/11/23  1341 04/12/23  0347   INR 1.0 1.0   APTT 22.6 27.9       CHEMISTRY LAST 3 DAYS  Recent Labs   Lab 04/11/23  1341 04/12/23  0347    140   K 4.6 4.9    108   CO2 22* 26   ANIONGAP 8 6*   BUN 32* 31*   CREATININE 2.1* 2.2*   * 87   CALCIUM 9.1 8.8   MG 1.9 2.0   ALBUMIN 3.4* 3.0*   PROT 6.2 5.6*   ALKPHOS 39* 37*   ALT 8* 11   AST 21 18   BILITOT 0.6 1.0       CARDIAC PROFILE LAST 3 DAYS  Recent Labs   Lab 04/11/23  1341 04/12/23  0347 04/12/23  0722   CPKMB  --  2.5  --    TROPONINIHS 31.2* 72.5* 56.2*       ENDOCRINE LAST 3 DAYS  Recent Labs   Lab 04/11/23  1341   TSH 1.470       LAST ARTERIAL BLOOD GAS  ABG  No results for input(s): PH, PO2, PCO2, HCO3, BE in the last 168 hours.    LAST 7 DAYS MICROBIOLOGY   Microbiology Results (last 7 days)       ** No results found for the last 168 hours. **            MOST RECENT IMAGING  CT Cervical Spine Without  Contrast  CT CERVICAL SPINE    CMS MANDATED QUALITY DATA - CT RADIATION - 436    HISTORY: Trauma.    FINDINGS: Thin axial imaging was performed without contrast, with sagittal and coronal reformatted images reviewed. All CT exams at this facility use dose modulation, iterative reconstruction, and or weight based dosing when appropriate to reduce radiation dose to as low as reasonably achievable.    Comparison is made to a prior study from October 2022.    There is no evidence of acute cervical fracture. There is mild C5 retrolisthesis, chronic and unchanged. Alignment at all of the levels is normal. Prevertebral soft tissues are normal. The odontoid is intact.    Changes of multilevel cervical degenerative disc and facet disease are noted. Uncinate process and facet hypertrophy combine to result in multilevel foraminal stenosis, most pronounced on the left at C4-5 where severe foraminal narrowing is noted.    IMPRESSION:    1. Chronic findings as above. No evidence of acute cervical fracture.    Electronically signed by:  Az Sethi MD  4/11/2023 2:32 PM CDT Workstation: [id number]  CT Head Without Contrast  CT HEAD WITHOUT CONTRAST    CMS MANDATED QUALITY DATA - CT RADIATION  436    All CT scans at this facility utilize dose modulation, iterative reconstruction, and/or weight based dosing when appropriate to reduce radiation dose to as low as reasonably achievable    Clinical data: Seizure. Comparison to October 2022.    FINDINGS: Noninfusion images were obtained from the skull base to the vertex. There is no intracranial mass, hemorrhage, or midline shift. Ventricles and sulci are mildly prominent. There are no pathologic extra-axial fluid collections.    There is no evidence of cortical ischemic change. Changes of mild chronic microvascular white matter disease are noted. Small chronic lacunar infarct is noted at the caudate nucleus on the left, with chronic lacunar infarct in the cedric centrally.  Cerebellum is normal in appearance. The calvarium is intact. There is chronic opacification of the right frontal sinus compatible with chronic sinusitis. There is near complete opacification of the right maxillary sinus with air-fluid level noted. This is also similar in appearance to the prior study. Right-sided anterior ethmoid opacification is also unchanged.    IMPRESSION:    1. Atrophy and chronic small vessel ischemic changes.  2. Right frontal, maxillary, and ethmoid sinusitis.    Electronically signed by:  zA Sethi MD  4/11/2023 2:24 PM CDT Workstation: [id number]  X-Ray Chest AP Portable  Reason: Seizure Seizure    FINDINGS:  Portable chest at 1323 compared with 10/14/2022 shows normal cardiomediastinal silhouette.    Calcified right midlung granuloma unchanged. Lungs are otherwise clear. Lung volumes remain low. Pulmonary vasculature is normal. No acute osseous abnormality.    IMPRESSION:  Unremarkable underinflated chest.    Electronically signed by:  Can Callahan MD  4/11/2023 1:47 PM CDT Workstation: [id number]      ECHOCARDIOGRAM RESULTS (last 5)  Results for orders placed in visit on 01/06/22    Echo Saline Bubble? Yes    Interpretation Summary  · There is no evidence of intracardiac shunting.  · The left ventricle is normal in size with mild concentric hypertrophy and normal systolic function.  · The estimated ejection fraction is 65%.  · Normal left ventricular diastolic function.  · Mild-to-moderate mitral regurgitation.  · Normal central venous pressure (3 mmHg).  · The estimated PA systolic pressure is 31 mmHg.      CURRENT/PREVIOUS VISIT EKG  Results for orders placed or performed during the hospital encounter of 04/11/23   EKG 12-lead    Collection Time: 04/12/23  4:17 AM    Narrative    Test Reason : R00.1,    Vent. Rate : 041 BPM     Atrial Rate : 041 BPM     P-R Int : 216 ms          QRS Dur : 092 ms      QT Int : 492 ms       P-R-T Axes : 033 -25 004 degrees     QTc Int : 405  ms    Marked sinus bradycardia with 1st degree A-V block  Minimal voltage criteria for LVH, may be normal variant  Abnormal ECG  When compared with ECG of 11-APR-2023 13:48,  Vent. rate has decreased BY  41 BPM  The axis Shifted left  Nonspecific T wave abnormality no longer evident in Lateral leads  QT has shortened    Referred By: WILDER   SELF           Confirmed By:            ASSESSMENT/PLAN:     Active Hospital Problems    Diagnosis    *Seizure    Centrencephalic epilepsy    TBI (traumatic brain injury), [place]    MARLIN (obstructive sleep apnea), 2008, not on Rx    Hypertension     renal htn        Dementia with behavioral disturbance       ASSESSMENT & PLAN:     Seizure  H/O TBI  MARLIN  HTN  Dementia  Bradycardia- Sinus Bradycardia with first degree AV Block-currently asymptomatic while lying in bed      RECOMMENDATIONS:      Continue to monitor on telemetry-BP stable  Give Hydralazine for BP  Walk around assess symptoms when ok with neurology  Please do not give any HR lowering drugs  PRN Atropine if needed  Will follow      Aline Rhoades NP  Department of Cardiology  Date of Service: 04/12/2023      HR 40S BP STABLE  BRADYCARDIA COULD BE POST ICTAL.   RECOMMEND LOOP RECORDER/ EVENT MONITER

## 2023-04-12 NOTE — NURSING
Notified Dr. Welch of elevated troponin of 72.5. New orders for cardiology consult for 7 A.M. Will continue to monitor.

## 2023-04-12 NOTE — PLAN OF CARE
ARA discussed via phone with patient's daughter/POA Gabriela [phone] and with her permission, signed on her behalf with witness.        04/12/23 1109   BULLOCK Message   Medicare Outpatient and Observation Notification regarding financial responsibility Given to patient/caregiver;Explained to patient/caregiver;Other (comments)   Date BULLOCK was signed 04/12/23   Time BULLOCK was signed [id number]

## 2023-04-13 ENCOUNTER — TELEPHONE (OUTPATIENT)
Dept: CARDIOLOGY | Facility: HOSPITAL | Age: 88
End: 2023-04-13
Payer: OTHER GOVERNMENT

## 2023-04-13 ENCOUNTER — TELEPHONE (OUTPATIENT)
Dept: CARDIOLOGY | Facility: CLINIC | Age: 88
End: 2023-04-13

## 2023-04-13 ENCOUNTER — HOSPITAL ENCOUNTER (OUTPATIENT)
Facility: HOSPITAL | Age: 88
Discharge: HOME HEALTH | End: 2023-04-14
Attending: EMERGENCY MEDICINE | Admitting: HOSPITALIST
Payer: MEDICARE

## 2023-04-13 VITALS
RESPIRATION RATE: 37 BRPM | WEIGHT: 182.13 LBS | DIASTOLIC BLOOD PRESSURE: 87 MMHG | HEART RATE: 121 BPM | HEIGHT: 69 IN | TEMPERATURE: 99 F | SYSTOLIC BLOOD PRESSURE: 133 MMHG | BODY MASS INDEX: 26.97 KG/M2 | OXYGEN SATURATION: 96 %

## 2023-04-13 DIAGNOSIS — R41.82 ALTERED MENTAL STATUS, UNSPECIFIED ALTERED MENTAL STATUS TYPE: Primary | ICD-10-CM

## 2023-04-13 DIAGNOSIS — D64.9 ANEMIA, UNSPECIFIED TYPE: ICD-10-CM

## 2023-04-13 DIAGNOSIS — N18.9 CHRONIC KIDNEY DISEASE, UNSPECIFIED CKD STAGE: ICD-10-CM

## 2023-04-13 DIAGNOSIS — R00.1 BRADYCARDIA: Primary | ICD-10-CM

## 2023-04-13 DIAGNOSIS — R44.1 VISUAL HALLUCINATIONS: ICD-10-CM

## 2023-04-13 DIAGNOSIS — Z00.8 MEDICAL CLEARANCE FOR PSYCHIATRIC ADMISSION: ICD-10-CM

## 2023-04-13 LAB
ALBUMIN SERPL BCP-MCNC: 3.6 G/DL (ref 3.5–5.2)
ALP SERPL-CCNC: 47 U/L (ref 55–135)
ALT SERPL W/O P-5'-P-CCNC: 10 U/L (ref 10–44)
AMPHET+METHAMPHET UR QL: NEGATIVE
ANION GAP SERPL CALC-SCNC: 13 MMOL/L (ref 8–16)
ANION GAP SERPL CALC-SCNC: 2 MMOL/L (ref 8–16)
ANION GAP SERPL CALC-SCNC: 5 MMOL/L (ref 8–16)
APAP SERPL-MCNC: <10 UG/ML (ref 10–20)
AST SERPL-CCNC: 30 U/L (ref 10–40)
BARBITURATES UR QL SCN>200 NG/ML: NEGATIVE
BASOPHILS # BLD AUTO: 0.03 K/UL (ref 0–0.2)
BASOPHILS NFR BLD: 0.3 % (ref 0–1.9)
BENZODIAZ UR QL SCN>200 NG/ML: NEGATIVE
BILIRUB SERPL-MCNC: 0.7 MG/DL (ref 0.1–1)
BILIRUB UR QL STRIP: NEGATIVE
BUN SERPL-MCNC: 30 MG/DL (ref 8–23)
BUN SERPL-MCNC: 30 MG/DL (ref 8–23)
BUN SERPL-MCNC: 32 MG/DL (ref 8–23)
BZE UR QL SCN: NEGATIVE
CALCIUM SERPL-MCNC: 9 MG/DL (ref 8.7–10.5)
CALCIUM SERPL-MCNC: 9.1 MG/DL (ref 8.7–10.5)
CALCIUM SERPL-MCNC: 9.2 MG/DL (ref 8.7–10.5)
CANNABINOIDS UR QL SCN: NEGATIVE
CHLORIDE SERPL-SCNC: 105 MMOL/L (ref 95–110)
CHLORIDE SERPL-SCNC: 107 MMOL/L (ref 95–110)
CHLORIDE SERPL-SCNC: 108 MMOL/L (ref 95–110)
CLARITY UR: CLEAR
CO2 SERPL-SCNC: 23 MMOL/L (ref 23–29)
CO2 SERPL-SCNC: 26 MMOL/L (ref 23–29)
CO2 SERPL-SCNC: 26 MMOL/L (ref 23–29)
COLOR UR: YELLOW
CREAT SERPL-MCNC: 2.2 MG/DL (ref 0.5–1.4)
CREAT SERPL-MCNC: 2.4 MG/DL (ref 0.5–1.4)
CREAT SERPL-MCNC: 2.4 MG/DL (ref 0.5–1.4)
CREAT UR-MCNC: 54 MG/DL (ref 23–375)
DIFFERENTIAL METHOD: ABNORMAL
EOSINOPHIL # BLD AUTO: 0.1 K/UL (ref 0–0.5)
EOSINOPHIL NFR BLD: 0.5 % (ref 0–8)
ERYTHROCYTE [DISTWIDTH] IN BLOOD BY AUTOMATED COUNT: 14.6 % (ref 11.5–14.5)
ERYTHROCYTE [DISTWIDTH] IN BLOOD BY AUTOMATED COUNT: 14.9 % (ref 11.5–14.5)
EST. GFR  (NO RACE VARIABLE): 25 ML/MIN/1.73 M^2
EST. GFR  (NO RACE VARIABLE): 25 ML/MIN/1.73 M^2
EST. GFR  (NO RACE VARIABLE): 27.8 ML/MIN/1.73 M^2
ETHANOL SERPL-MCNC: <5 MG/DL
GLUCOSE SERPL-MCNC: 120 MG/DL (ref 70–110)
GLUCOSE SERPL-MCNC: 121 MG/DL (ref 70–110)
GLUCOSE SERPL-MCNC: 88 MG/DL (ref 70–110)
GLUCOSE UR QL STRIP: NEGATIVE
HCT VFR BLD AUTO: 34.9 % (ref 40–54)
HCT VFR BLD AUTO: 37.4 % (ref 40–54)
HGB BLD-MCNC: 11.1 G/DL (ref 14–18)
HGB BLD-MCNC: 12.1 G/DL (ref 14–18)
HGB UR QL STRIP: NEGATIVE
IMM GRANULOCYTES # BLD AUTO: 0.02 K/UL (ref 0–0.04)
IMM GRANULOCYTES NFR BLD AUTO: 0.2 % (ref 0–0.5)
KETONES UR QL STRIP: NEGATIVE
LEUKOCYTE ESTERASE UR QL STRIP: NEGATIVE
LYMPHOCYTES # BLD AUTO: 1.9 K/UL (ref 1–4.8)
LYMPHOCYTES NFR BLD: 18.9 % (ref 18–48)
MAGNESIUM SERPL-MCNC: 2 MG/DL (ref 1.6–2.6)
MAGNESIUM SERPL-MCNC: 2.1 MG/DL (ref 1.6–2.6)
MCH RBC QN AUTO: 28.8 PG (ref 27–31)
MCH RBC QN AUTO: 28.9 PG (ref 27–31)
MCHC RBC AUTO-ENTMCNC: 31.8 G/DL (ref 32–36)
MCHC RBC AUTO-ENTMCNC: 32.4 G/DL (ref 32–36)
MCV RBC AUTO: 90 FL (ref 82–98)
MCV RBC AUTO: 90 FL (ref 82–98)
MONOCYTES # BLD AUTO: 0.9 K/UL (ref 0.3–1)
MONOCYTES NFR BLD: 9.2 % (ref 4–15)
NEUTROPHILS # BLD AUTO: 7 K/UL (ref 1.8–7.7)
NEUTROPHILS NFR BLD: 70.9 % (ref 38–73)
NITRITE UR QL STRIP: NEGATIVE
NRBC BLD-RTO: 0 /100 WBC
OPIATES UR QL SCN: NEGATIVE
PCP UR QL SCN>25 NG/ML: NEGATIVE
PH UR STRIP: 6 [PH] (ref 5–8)
PLATELET # BLD AUTO: 105 K/UL (ref 150–450)
PLATELET # BLD AUTO: 131 K/UL (ref 150–450)
PMV BLD AUTO: 11.5 FL (ref 9.2–12.9)
PMV BLD AUTO: 12 FL (ref 9.2–12.9)
POTASSIUM SERPL-SCNC: 4.3 MMOL/L (ref 3.5–5.1)
POTASSIUM SERPL-SCNC: 4.4 MMOL/L (ref 3.5–5.1)
POTASSIUM SERPL-SCNC: 6.1 MMOL/L (ref 3.5–5.1)
PROT SERPL-MCNC: 7.1 G/DL (ref 6–8.4)
PROT UR QL STRIP: NEGATIVE
RBC # BLD AUTO: 3.86 M/UL (ref 4.6–6.2)
RBC # BLD AUTO: 4.18 M/UL (ref 4.6–6.2)
SALICYLATES SERPL-MCNC: <4 MG/DL (ref 15–30)
SODIUM SERPL-SCNC: 135 MMOL/L (ref 136–145)
SODIUM SERPL-SCNC: 139 MMOL/L (ref 136–145)
SODIUM SERPL-SCNC: 141 MMOL/L (ref 136–145)
SP GR UR STRIP: 1.01 (ref 1–1.03)
TOXICOLOGY INFORMATION: NORMAL
TSH SERPL DL<=0.005 MIU/L-ACNC: 2.54 UIU/ML (ref 0.34–5.6)
URN SPEC COLLECT METH UR: NORMAL
UROBILINOGEN UR STRIP-ACNC: NEGATIVE EU/DL
WBC # BLD AUTO: 6.41 K/UL (ref 3.9–12.7)
WBC # BLD AUTO: 9.91 K/UL (ref 3.9–12.7)

## 2023-04-13 PROCEDURE — 25000003 PHARM REV CODE 250: Performed by: NURSE PRACTITIONER

## 2023-04-13 PROCEDURE — G0378 HOSPITAL OBSERVATION PER HR: HCPCS

## 2023-04-13 PROCEDURE — 83735 ASSAY OF MAGNESIUM: CPT | Performed by: STUDENT IN AN ORGANIZED HEALTH CARE EDUCATION/TRAINING PROGRAM

## 2023-04-13 PROCEDURE — 80307 DRUG TEST PRSMV CHEM ANLYZR: CPT | Performed by: EMERGENCY MEDICINE

## 2023-04-13 PROCEDURE — 25000003 PHARM REV CODE 250: Performed by: INTERNAL MEDICINE

## 2023-04-13 PROCEDURE — 83735 ASSAY OF MAGNESIUM: CPT | Mod: 91 | Performed by: EMERGENCY MEDICINE

## 2023-04-13 PROCEDURE — 93010 ELECTROCARDIOGRAM REPORT: CPT | Mod: ,,, | Performed by: INTERNAL MEDICINE

## 2023-04-13 PROCEDURE — 25000003 PHARM REV CODE 250

## 2023-04-13 PROCEDURE — 36415 COLL VENOUS BLD VENIPUNCTURE: CPT | Performed by: STUDENT IN AN ORGANIZED HEALTH CARE EDUCATION/TRAINING PROGRAM

## 2023-04-13 PROCEDURE — 85027 COMPLETE CBC AUTOMATED: CPT | Performed by: STUDENT IN AN ORGANIZED HEALTH CARE EDUCATION/TRAINING PROGRAM

## 2023-04-13 PROCEDURE — 25000003 PHARM REV CODE 250: Performed by: STUDENT IN AN ORGANIZED HEALTH CARE EDUCATION/TRAINING PROGRAM

## 2023-04-13 PROCEDURE — 80048 BASIC METABOLIC PNL TOTAL CA: CPT | Mod: 91 | Performed by: EMERGENCY MEDICINE

## 2023-04-13 PROCEDURE — 80053 COMPREHEN METABOLIC PANEL: CPT | Performed by: EMERGENCY MEDICINE

## 2023-04-13 PROCEDURE — 96366 THER/PROPH/DIAG IV INF ADDON: CPT

## 2023-04-13 PROCEDURE — 80179 DRUG ASSAY SALICYLATE: CPT | Performed by: EMERGENCY MEDICINE

## 2023-04-13 PROCEDURE — 63600175 PHARM REV CODE 636 W HCPCS: Performed by: STUDENT IN AN ORGANIZED HEALTH CARE EDUCATION/TRAINING PROGRAM

## 2023-04-13 PROCEDURE — 99285 EMERGENCY DEPT VISIT HI MDM: CPT | Mod: 25,CS

## 2023-04-13 PROCEDURE — 80048 BASIC METABOLIC PNL TOTAL CA: CPT | Performed by: STUDENT IN AN ORGANIZED HEALTH CARE EDUCATION/TRAINING PROGRAM

## 2023-04-13 PROCEDURE — 81003 URINALYSIS AUTO W/O SCOPE: CPT | Performed by: EMERGENCY MEDICINE

## 2023-04-13 PROCEDURE — 93005 ELECTROCARDIOGRAM TRACING: CPT | Performed by: INTERNAL MEDICINE

## 2023-04-13 PROCEDURE — 80143 DRUG ASSAY ACETAMINOPHEN: CPT | Performed by: EMERGENCY MEDICINE

## 2023-04-13 PROCEDURE — 87040 BLOOD CULTURE FOR BACTERIA: CPT | Mod: 59 | Performed by: EMERGENCY MEDICINE

## 2023-04-13 PROCEDURE — 85025 COMPLETE CBC W/AUTO DIFF WBC: CPT | Performed by: EMERGENCY MEDICINE

## 2023-04-13 PROCEDURE — 83880 ASSAY OF NATRIURETIC PEPTIDE: CPT | Performed by: EMERGENCY MEDICINE

## 2023-04-13 PROCEDURE — 82550 ASSAY OF CK (CPK): CPT | Performed by: EMERGENCY MEDICINE

## 2023-04-13 PROCEDURE — 82077 ASSAY SPEC XCP UR&BREATH IA: CPT | Performed by: EMERGENCY MEDICINE

## 2023-04-13 PROCEDURE — 84443 ASSAY THYROID STIM HORMONE: CPT | Performed by: EMERGENCY MEDICINE

## 2023-04-13 PROCEDURE — 93010 EKG 12-LEAD: ICD-10-PCS | Mod: ,,, | Performed by: INTERNAL MEDICINE

## 2023-04-13 PROCEDURE — 97116 GAIT TRAINING THERAPY: CPT

## 2023-04-13 RX ORDER — FLUTICASONE PROPIONATE 50 MCG
2 SPRAY, SUSPENSION (ML) NASAL DAILY
Qty: 18.2 ML | Refills: 0 | Status: SHIPPED | OUTPATIENT
Start: 2023-04-13

## 2023-04-13 RX ORDER — LEVETIRACETAM 750 MG/1
750 TABLET ORAL 2 TIMES DAILY
Qty: 180 TABLET | Refills: 3 | Status: ON HOLD | OUTPATIENT
Start: 2023-04-13 | End: 2024-02-23

## 2023-04-13 RX ORDER — FLUTICASONE PROPIONATE 50 MCG
2 SPRAY, SUSPENSION (ML) NASAL DAILY
Status: DISCONTINUED | OUTPATIENT
Start: 2023-04-13 | End: 2023-04-13 | Stop reason: HOSPADM

## 2023-04-13 RX ORDER — CETIRIZINE HYDROCHLORIDE 10 MG/1
10 TABLET ORAL DAILY
Status: DISCONTINUED | OUTPATIENT
Start: 2023-04-13 | End: 2023-04-13

## 2023-04-13 RX ORDER — NAPROXEN SODIUM 220 MG/1
81 TABLET, FILM COATED ORAL DAILY
Qty: 90 TABLET | Refills: 0 | Status: SHIPPED | OUTPATIENT
Start: 2023-04-13 | End: 2024-08-01

## 2023-04-13 RX ORDER — AMLODIPINE BESYLATE 5 MG/1
10 TABLET ORAL DAILY
Status: DISCONTINUED | OUTPATIENT
Start: 2023-04-13 | End: 2023-04-13 | Stop reason: HOSPADM

## 2023-04-13 RX ORDER — AMLODIPINE BESYLATE 10 MG/1
5 TABLET ORAL DAILY
Qty: 90 TABLET | Refills: 0 | Status: SHIPPED | OUTPATIENT
Start: 2023-04-13 | End: 2023-06-23

## 2023-04-13 RX ORDER — CETIRIZINE HYDROCHLORIDE 5 MG/1
5 TABLET ORAL DAILY
Qty: 20 TABLET | Refills: 0 | Status: ON HOLD | OUTPATIENT
Start: 2023-04-14 | End: 2023-12-04 | Stop reason: HOSPADM

## 2023-04-13 RX ORDER — CETIRIZINE HYDROCHLORIDE 5 MG/1
5 TABLET ORAL DAILY
Status: DISCONTINUED | OUTPATIENT
Start: 2023-04-13 | End: 2023-04-13 | Stop reason: HOSPADM

## 2023-04-13 RX ORDER — HYDRALAZINE HYDROCHLORIDE 50 MG/1
50 TABLET, FILM COATED ORAL EVERY 8 HOURS
Qty: 270 TABLET | Refills: 0 | Status: SHIPPED | OUTPATIENT
Start: 2023-04-13 | End: 2023-06-23

## 2023-04-13 RX ORDER — HYDRALAZINE HYDROCHLORIDE 25 MG/1
50 TABLET, FILM COATED ORAL EVERY 8 HOURS
Status: DISCONTINUED | OUTPATIENT
Start: 2023-04-13 | End: 2023-04-13 | Stop reason: HOSPADM

## 2023-04-13 RX ORDER — HYDRALAZINE HYDROCHLORIDE 25 MG/1
25 TABLET, FILM COATED ORAL ONCE
Status: COMPLETED | OUTPATIENT
Start: 2023-04-13 | End: 2023-04-13

## 2023-04-13 RX ADMIN — CHLORHEXIDINE GLUCONATE 15 ML: 1.2 RINSE ORAL at 08:04

## 2023-04-13 RX ADMIN — HYDRALAZINE HYDROCHLORIDE 25 MG: 25 TABLET, FILM COATED ORAL at 08:04

## 2023-04-13 RX ADMIN — DOCUSATE SODIUM 100 MG: 100 CAPSULE, LIQUID FILLED ORAL at 08:04

## 2023-04-13 RX ADMIN — CALCITRIOL CAPSULES 0.25 MCG 0.25 MCG: 0.25 CAPSULE ORAL at 08:04

## 2023-04-13 RX ADMIN — ASPIRIN 81 MG 81 MG: 81 TABLET ORAL at 08:04

## 2023-04-13 RX ADMIN — ATORVASTATIN CALCIUM 40 MG: 40 TABLET, FILM COATED ORAL at 08:04

## 2023-04-13 RX ADMIN — HYDRALAZINE HYDROCHLORIDE 50 MG: 25 TABLET, FILM COATED ORAL at 01:04

## 2023-04-13 RX ADMIN — Medication 2000 UNITS: at 08:04

## 2023-04-13 RX ADMIN — AMLODIPINE BESYLATE 10 MG: 5 TABLET ORAL at 08:04

## 2023-04-13 RX ADMIN — PIPERACILLIN SODIUM AND TAZOBACTAM SODIUM 3.38 G: 3; .375 INJECTION, POWDER, LYOPHILIZED, FOR SOLUTION INTRAVENOUS at 03:04

## 2023-04-13 RX ADMIN — LEVETIRACETAM 750 MG: 250 TABLET, FILM COATED ORAL at 08:04

## 2023-04-13 RX ADMIN — HYDRALAZINE HYDROCHLORIDE 25 MG: 25 TABLET, FILM COATED ORAL at 05:04

## 2023-04-13 RX ADMIN — MUPIROCIN 1 G: 20 OINTMENT TOPICAL at 08:04

## 2023-04-13 RX ADMIN — CITALOPRAM HYDROBROMIDE 20 MG: 20 TABLET ORAL at 08:04

## 2023-04-13 RX ADMIN — CETIRIZINE HYDROCHLORIDE 5 MG: 5 TABLET ORAL at 08:04

## 2023-04-13 NOTE — PT/OT/SLP PROGRESS
Physical Therapy Treatment    Patient Name:  Lavon Brandon   MRN:  [id number]    Recommendations:     Discharge Recommendations: home health PT  Discharge Equipment Recommendations: none  Barriers to discharge: None    Assessment:     Lavon Brandon is a 89 y.o. male admitted with a medical diagnosis of Seizure.  He presents with the following impairments/functional limitations: weakness, impaired endurance, impaired self care skills, impaired functional mobility, gait instability, impaired balance, impaired cardiopulmonary response to activity .    Rehab Prognosis: Good; patient would benefit from acute skilled PT services to address these deficits and reach maximum level of function.    Recent Surgery: * No surgery found *      Plan:     During this hospitalization, patient to be seen 5 x/week to address the identified rehab impairments via gait training, therapeutic activities, therapeutic exercises and progress toward the following goals:    Plan of Care Expires:  05/12/23    Subjective     Chief Complaint: gown was too revealing . Additional gown was placed on pt  Patient/Family Comments/goals: Home with his daughter  Pain/Comfort:         Objective:     Communicated with nurse prior to session.  Patient found up in chair with telemetry, blood pressure cuff upon PT entry to room.     General Precautions: Standard,    Orthopedic Precautions:    Braces:    Respiratory Status: Room air     Functional Mobility:  Transfers:     Sit to Stand:  minimum assistance with rolling walker  Gait: 70 ft x2 RW and CGA       AM-PAC 6 CLICK MOBILITY          Treatment & Education:  Educated  on  use of call light,  decreased pace for ambulation in the amado    Patient left up in chair with all lines intact, call button in reach, and sitter present..    GOALS:   Multidisciplinary Problems       Physical Therapy Goals          Problem: Physical Therapy    Goal Priority Disciplines Outcome Goal Variances Interventions   Physical  Therapy Goal     PT, PT/OT Ongoing, Progressing     Description: Goals to be met by: 2023    Patient will increase functional independence with mobility by performin. Supine to sit with Stand-by Assistance  2. Sit to stand transfer with Stand-by Assistance  3. Gait  x 100  feet with Contact Guard Assistance using Rolling Walker.                          Time Tracking:     PT Received On: 23  PT Start Time: 0900     PT Stop Time: 912  PT Total Time (min): 12 min     Billable Minutes: Gait Training 12 minutes    Treatment Type: Treatment  PT/PTA: PT           2023

## 2023-04-13 NOTE — PLAN OF CARE
Problem: Physical Therapy  Goal: Physical Therapy Goal  Description: Goals to be met by: 2023    Patient will increase functional independence with mobility by performin. Supine to sit with Stand-by Assistance  2. Sit to stand transfer with Stand-by Assistance  3. Gait  x 100  feet with Contact Guard Assistance using Rolling Walker.     Outcome: Ongoing, Progressing

## 2023-04-13 NOTE — DISCHARGE INSTRUCTIONS
Your medications have been adjusted as below:  1:  Your home medication of Keppra has been increased to 750 mg twice daily  2:  Due to uncontrolled blood pressures, your amlodipine has been increased to 10 mg daily and hydralazine 50 mg taken 3 times daily has also been added.  3.  Due to your kidney function we have discontinued your benazepril and you are to follow-up with the nephrologist on discharge  4:  The cardiologist has recommended a heart monitor for you and you are to follow up with them in the office to have it placed

## 2023-04-13 NOTE — PT/OT/SLP PROGRESS
Occupational Therapy      Patient Name:  Lavon Brandon   MRN:  [id number]    Patient not seen today secondary to Patient unwilling to participate.    4/13/2023

## 2023-04-13 NOTE — PLAN OF CARE
Per [facility] response, patient is accepted by [facility] and care will be resumed Saturday 4/15/23.        04/13/23 1319   Post-Acute Status   Post-Acute Authorization Home [facility]   Home Health Status Set-up Complete/Auth obtained

## 2023-04-13 NOTE — PLAN OF CARE
Home health arranged with [facility], care to resume 4/15/23.  Referral for [facility] program placed.     Discharge orders and chart reviewed with no further post-acute discharge needs identified at this time.  At this time, patient is cleared for discharge from Case Management standpoint.        04/13/23 1320   Final Note   Assessment Type Final Discharge Note   Anticipated Discharge Disposition Home-Health   Hospital Resources/Appts/Education Provided Post-Acute resouces added to AVS   Post-Acute Status   Home Health Status Set-up Complete/Auth obtained   Discharge Delays None known at this time

## 2023-04-13 NOTE — TELEPHONE ENCOUNTER
Spoke w/Pt's daughter.  Informed her I can't schedule a holter monitor at [facility].  Pt is going to call back to schedule

## 2023-04-13 NOTE — TELEPHONE ENCOUNTER
----- Message from Donald Newton sent at 4/13/2023  4:04 PM CDT -----  Regarding: schedule cupid hookup  Contact: yuri at [phone]  Type:  Sooner Appointment Request    Caller is requesting a sooner appointment.      Name of Caller:  wife // yuri    When is the first available appointment?  Dept book    Symptoms:  Cardiac Monitor - 3-15 Day Adult ([facility] Only)    Best Call Back Number:  [phone]    Additional Information:  have been trying to schedule all day. Initial message was incorrectly sent to Demetrius for scheduling. Please call pt to get scheduled.

## 2023-04-13 NOTE — DISCHARGE SUMMARY
[facility] Medicine  Discharge Summary      Patient Name: Lavon Brandon  MRN: [id number]  MIMI: [id number]  Patient Class: OP- Observation  Admission Date: 4/11/2023  Hospital Length of Stay: 0 days  Discharge Date and Time:  04/13/2023 11:27 AM  Attending Physician: Sandy Sosa MD   Discharging Provider: Sandy Sosa MD  Primary Care Provider: Aristides Haynes MD    Primary Care Team: Networked reference to record PCT     HPI:   ED Note  Patient with a remote seizure history.  No known seizure for at least 10 years.  Patient lives with his daughter due to dementia.  Patient has been having trouble sleeping last 2 nights.  Patient went to bed late last night.  Daughter went to go check on him this morning and he was using the bathroom and seemed okay.  She went back and checked on him again and he was unresponsive in bed.  Positive blood from mouth.  Patient was nonverbal.  Patient transported here with paramedics.  There was no observed seizure-like activity.    4/11/2023  Mr Brandon is unable to provide any information. In 2011 per his daughter, the patient had seizures due to a series of mini strokes      * No surgery found *      Hospital Course:    4/12: Patient is doing well and is AO x3.  Overnight he had multiple episodes of bradycardia and patient states that he has been told in the past that he is had bradycardia and has passed out due to with before.  Cardiology consulted.  EEG and neurology consult also placed No concerns/issues overnight reported by the patient or the nursing staff    4/13:  Patient's Keppra was adjusted to 750 mg b.i.d. and patient was seizure free throughout his hospital stay.  Cardiology evaluated patient and stated that bradycardia may be secondary to postictal phase however recommended hence Holter monitor.  Order was placed in and patient is to follow up with them outpatient for placement.  Blood pressure is uncontrolled, blood pressure  medications were adjusted and patient was subsequently discharged home on home health.    Physical examination on discharge:  Constitutional: No distress.   HENT: NC  Head: Atraumatic.   Cardiovascular: Normal rate, regular rhythm and normal heart sounds.   Pulmonary/Chest: Effort normal. No wheezes.   Abdominal: Soft. Bowel sounds are normal. No distension and no mass. No tenderness  Neurological: Alert.   Skin: Skin is warm and dry.   Psych: Appropriate mood and affect    I have seen the patient on the day of discharge and reviewed the discharge instructions as outlined.      Goals of Care Treatment Preferences:  Code Status: Full Code      Consults:   Consults (From admission, onward)        Status Ordering Provider     Inpatient consult to Cardiology  Once        Provider:  Augusto Villa MD    Acknowledged DEISI PALMER     IP consult to case management/social work  Once        Provider:  (Not yet assigned)    Completed DEISI DAMICO     Inpatient consult to Neurology  Once        Provider:  Tenisha Miner MD    Completed DEISI DAMICO          No new Assessment & Plan notes have been filed under this hospital service since the last note was generated.  Service: Hospital Medicine    Final Active Diagnoses:    Diagnosis Date Noted POA    PRINCIPAL PROBLEM:  Seizure [R56.9]  Yes    Bradycardia [R00.1] 04/12/2023 Yes    Hypertension [I10]  Yes     Chronic      Problems Resolved During this Admission:       Discharged Condition: good    Disposition:     Follow Up:   Follow-up Information     Augusto Villa MD Follow up in 1 week(s).    Specialties: Cardiovascular Disease, Cardiology  Contact information:  [address] [facility] [facility]  [address]  [place] [address]  [phone]             Tenisha Tineo MD Follow up in 2 week(s).    Specialty: Neurology  Contact information:  [address]  [place] [address]  [phone]             Aristides Haynes MD Follow up in 1 week(s).    Specialty: Family  Medicine  Contact information:  [place] [address]  [phone]                       Patient Instructions:      Ambulatory referral/consult to [facility]   Standing Status: Future   Referral Priority: Routine Referral Type: Consultation   Referral Reason: Specialty Services Required   Number of Visits Requested: 1     Ambulatory referral/consult to Home Health   Standing Status: Future   Referral Priority: Routine Referral Type: Home Health   Referral Reason: Specialty Services Required   Requested Specialty: Home Health Services   Number of Visits Requested: 1     Activity as tolerated       Significant Diagnostic Studies: Labs:   BMP:   Recent Labs   Lab 04/11/23  1341 04/12/23  0347 04/13/23  0404   * 87 88    140 139   K 4.6 4.9 4.4    108 108   CO2 22* 26 26   BUN 32* 31* 30*   CREATININE 2.1* 2.2* 2.2*   CALCIUM 9.1 8.8 9.1   MG 1.9 2.0 2.1   , CMP   Recent Labs   Lab 04/11/23  1341 04/12/23  0347 04/13/23  0404    140 139   K 4.6 4.9 4.4    108 108   CO2 22* 26 26   * 87 88   BUN 32* 31* 30*   CREATININE 2.1* 2.2* 2.2*   CALCIUM 9.1 8.8 9.1   PROT 6.2 5.6*  --    ALBUMIN 3.4* 3.0*  --    BILITOT 0.6 1.0  --    ALKPHOS 39* 37*  --    AST 21 18  --    ALT 8* 11  --    ANIONGAP 8 6* 5*   , CBC   Recent Labs   Lab 04/11/23  1341 04/12/23  0347 04/13/23  0405   WBC 7.60 7.03 6.41   HGB 10.8* 10.4* 11.1*   HCT 33.7* 32.5* 34.9*   * 105* 105*   , Troponin No results for input(s): TROPONINI in the last 168 hours. and All labs within the past 24 hours have been reviewed  Microbiology: Blood Culture No results found for: LABBLOO  Radiology: X-Ray: CXR: X-Ray Chest 1 View (CXR): No results found for this visit on 04/11/23. and X-Ray Chest PA and Lateral (CXR): No results found for this visit on 04/11/23.    Pending Diagnostic Studies:     Procedure Component Value Units Date/Time    EKG 12-lead [[id number]] Collected: 04/12/23 0417    Order  Status: Sent Lab Status: In process Updated: 04/12/23 0518    Narrative:      Test Reason : R00.1,    Vent. Rate : 041 BPM     Atrial Rate : 041 BPM     P-R Int : 216 ms          QRS Dur : 092 ms      QT Int : 492 ms       P-R-T Axes : 033 -25 004 degrees     QTc Int : 405 ms    Marked sinus bradycardia with 1st degree A-V block  Minimal voltage criteria for LVH, may be normal variant  Abnormal ECG  When compared with ECG of 11-APR-2023 13:48,  Vent. rate has decreased BY  41 BPM  The axis Shifted left  Nonspecific T wave abnormality no longer evident in Lateral leads  QT has shortened    Referred By: AAAREFLES   SELF           Confirmed By:          Medications:  Reconciled Home Medications:      Medication List      START taking these medications    cetirizine 5 MG tablet  Commonly known as: ZYRTEC  Take 1 tablet (5 mg total) by mouth once daily. for 20 days  Start taking on: April 14, 2023     fluticasone propionate 50 mcg/actuation nasal spray  Commonly known as: FLONASE  2 sprays (100 mcg total) by Each Nostril route once daily.     hydrALAZINE 50 MG tablet  Commonly known as: APRESOLINE  Take 1 tablet (50 mg total) by mouth every 8 (eight) hours.        CHANGE how you take these medications    amLODIPine 10 MG tablet  Commonly known as: NORVASC  Take 0.5 tablets (5 mg total) by mouth once daily.  What changed: medication strength     citalopram 20 MG tablet  Commonly known as: CeleXA  Take 1 tablet by mouth once daily  What changed: when to take this     levETIRAcetam 750 MG Tab  Commonly known as: KEPPRA  Take 1 tablet (750 mg total) by mouth 2 (two) times daily.  What changed:   · medication strength  · how much to take        CONTINUE taking these medications    aspirin 81 MG Chew  Take 1 tablet (81 mg total) by mouth once daily.     calcitRIOL 0.25 MCG Cap  Commonly known as: ROCALTROL  Take 0.25 mcg by mouth once daily.     cholecalciferol (vitamin D3) 50 mcg (2,000 unit) Cap capsule  Commonly known as:  VITAMIN D3  Take 1 capsule by mouth once daily.     docusate sodium 100 MG capsule  Commonly known as: COLACE  Take 1 capsule (100 mg total) by mouth once daily.     rosuvastatin 10 MG tablet  Commonly known as: CRESTOR  Take 1 tablet (10 mg total) by mouth once daily.        STOP taking these medications    benazepriL 20 MG tablet  Commonly known as: LOTENSIN            Indwelling Lines/Drains at time of discharge:   Lines/Drains/Airways     Drain  Duration           Male External Urinary Catheter 04/11/23 1630 Medium 1 day                Time spent on the discharge of patient: 35 minutes         Sandy Sosa MD  Department of Hospital Medicine  [facility]

## 2023-04-13 NOTE — PLAN OF CARE
Therapy recommendation for home health noted and referral with updated orders sent via [facility] to [facility], with whom patient was active with nursing only per sister Gabriela.        04/13/23 0819   Post-Acute Status   Post-Acute Authorization Home Health   Home Health Status Referrals Sent   Discharge Plan   Discharge Plan A Home Health

## 2023-04-13 NOTE — TELEPHONE ENCOUNTER
----- Message from Rey Olivarez MA sent at 4/13/2023  3:10 PM CDT -----  Regarding: RE: Holter monitor  Contact: Patient  The only available appointments are the listed appointments. I have no special slots or openings. The only solution may be, to schedule it at [facility]. I am unable to do that because I am not a . Sorry for the inconvenience.  ----- Message -----  From: Jessy Duran MA  Sent: 4/13/2023   2:34 PM CDT  To: Rey Olivarez MA  Subject: FW: Holter monitor                                     ----- Message -----  From: Doni Patterson  Sent: 4/13/2023   2:11 PM CDT  To: [facility] Clinical Staff  Subject: Holter monitor                                   Type:  Sooner Apoointment Request    Caller is requesting a sooner appointment.  Caller declined first available appointment listed below.  Caller will not accept being placed on the waitlist and is requesting a message be sent to doctor.  Name of Caller:Patient  When is the first available appointment?unable to schedule  Symptoms:need Holter monitor  Would the patient rather a call back or a response via [facility]? call  Best Call Back Number:[phone]  Additional Information: Please call patient to schedule.

## 2023-04-14 VITALS
HEART RATE: 69 BPM | DIASTOLIC BLOOD PRESSURE: 56 MMHG | BODY MASS INDEX: 25.47 KG/M2 | TEMPERATURE: 98 F | RESPIRATION RATE: 16 BRPM | HEIGHT: 70 IN | SYSTOLIC BLOOD PRESSURE: 116 MMHG | WEIGHT: 177.94 LBS | OXYGEN SATURATION: 95 %

## 2023-04-14 PROBLEM — R44.1 VISUAL HALLUCINATIONS: Status: ACTIVE | Noted: 2023-04-14

## 2023-04-14 LAB
ALBUMIN SERPL BCP-MCNC: 3.4 G/DL (ref 3.5–5.2)
ALP SERPL-CCNC: 37 U/L (ref 55–135)
ALT SERPL W/O P-5'-P-CCNC: 11 U/L (ref 10–44)
ANION GAP SERPL CALC-SCNC: 7 MMOL/L (ref 8–16)
AST SERPL-CCNC: 25 U/L (ref 10–40)
BILIRUB SERPL-MCNC: 0.7 MG/DL (ref 0.1–1)
BNP SERPL-MCNC: 88 PG/ML (ref 0–99)
BUN SERPL-MCNC: 28 MG/DL (ref 8–23)
CALCIUM SERPL-MCNC: 9.2 MG/DL (ref 8.7–10.5)
CHLORIDE SERPL-SCNC: 111 MMOL/L (ref 95–110)
CK SERPL-CCNC: 292 U/L (ref 20–200)
CO2 SERPL-SCNC: 24 MMOL/L (ref 23–29)
CREAT SERPL-MCNC: 2.2 MG/DL (ref 0.5–1.4)
ERYTHROCYTE [DISTWIDTH] IN BLOOD BY AUTOMATED COUNT: 14.7 % (ref 11.5–14.5)
EST. GFR  (NO RACE VARIABLE): 27.8 ML/MIN/1.73 M^2
GLUCOSE SERPL-MCNC: 121 MG/DL (ref 70–110)
HCT VFR BLD AUTO: 35.7 % (ref 40–54)
HGB BLD-MCNC: 11.8 G/DL (ref 14–18)
LEVETIRACETAM SERPL-MCNC: 13.4 UG/ML (ref 10–40)
MAGNESIUM SERPL-MCNC: 1.9 MG/DL (ref 1.6–2.6)
MCH RBC QN AUTO: 29.1 PG (ref 27–31)
MCHC RBC AUTO-ENTMCNC: 33.1 G/DL (ref 32–36)
MCV RBC AUTO: 88 FL (ref 82–98)
PLATELET # BLD AUTO: 118 K/UL (ref 150–450)
PMV BLD AUTO: 11.1 FL (ref 9.2–12.9)
POTASSIUM SERPL-SCNC: 4.2 MMOL/L (ref 3.5–5.1)
PROT SERPL-MCNC: 6.3 G/DL (ref 6–8.4)
RBC # BLD AUTO: 4.05 M/UL (ref 4.6–6.2)
SARS-COV-2 RDRP RESP QL NAA+PROBE: NEGATIVE
SODIUM SERPL-SCNC: 142 MMOL/L (ref 136–145)
VIT B12 SERPL-MCNC: 298 PG/ML (ref 210–950)
WBC # BLD AUTO: 7.44 K/UL (ref 3.9–12.7)

## 2023-04-14 PROCEDURE — G0378 HOSPITAL OBSERVATION PER HR: HCPCS

## 2023-04-14 PROCEDURE — 82607 VITAMIN B-12: CPT | Performed by: NURSE PRACTITIONER

## 2023-04-14 PROCEDURE — G0378 HOSPITAL OBSERVATION PER HR: HCPCS | Mod: CS

## 2023-04-14 PROCEDURE — 83735 ASSAY OF MAGNESIUM: CPT | Performed by: NURSE PRACTITIONER

## 2023-04-14 PROCEDURE — 36415 COLL VENOUS BLD VENIPUNCTURE: CPT | Performed by: EMERGENCY MEDICINE

## 2023-04-14 PROCEDURE — 80053 COMPREHEN METABOLIC PANEL: CPT | Performed by: NURSE PRACTITIONER

## 2023-04-14 PROCEDURE — 25000242 PHARM REV CODE 250 ALT 637 W/ HCPCS: Performed by: HOSPITALIST

## 2023-04-14 PROCEDURE — U0002 COVID-19 LAB TEST NON-CDC: HCPCS | Performed by: EMERGENCY MEDICINE

## 2023-04-14 PROCEDURE — 97162 PT EVAL MOD COMPLEX 30 MIN: CPT

## 2023-04-14 PROCEDURE — 25000003 PHARM REV CODE 250: Performed by: HOSPITALIST

## 2023-04-14 PROCEDURE — 36415 COLL VENOUS BLD VENIPUNCTURE: CPT | Performed by: NURSE PRACTITIONER

## 2023-04-14 PROCEDURE — 97530 THERAPEUTIC ACTIVITIES: CPT

## 2023-04-14 PROCEDURE — 85027 COMPLETE CBC AUTOMATED: CPT | Performed by: NURSE PRACTITIONER

## 2023-04-14 RX ORDER — FLUTICASONE PROPIONATE 50 MCG
2 SPRAY, SUSPENSION (ML) NASAL DAILY
Status: DISCONTINUED | OUTPATIENT
Start: 2023-04-14 | End: 2023-04-14 | Stop reason: HOSPADM

## 2023-04-14 RX ORDER — NAPROXEN SODIUM 220 MG/1
81 TABLET, FILM COATED ORAL DAILY
Status: DISCONTINUED | OUTPATIENT
Start: 2023-04-14 | End: 2023-04-14 | Stop reason: HOSPADM

## 2023-04-14 RX ORDER — AMOXICILLIN 250 MG
1 CAPSULE ORAL DAILY PRN
Status: DISCONTINUED | OUTPATIENT
Start: 2023-04-14 | End: 2023-04-14 | Stop reason: HOSPADM

## 2023-04-14 RX ORDER — CALCITRIOL 0.25 UG/1
0.25 CAPSULE ORAL DAILY
Status: DISCONTINUED | OUTPATIENT
Start: 2023-04-14 | End: 2023-04-14 | Stop reason: HOSPADM

## 2023-04-14 RX ORDER — HYDRALAZINE HYDROCHLORIDE 25 MG/1
50 TABLET, FILM COATED ORAL EVERY 8 HOURS
Status: DISCONTINUED | OUTPATIENT
Start: 2023-04-14 | End: 2023-04-14 | Stop reason: HOSPADM

## 2023-04-14 RX ORDER — QUETIAPINE FUMARATE 25 MG/1
25 TABLET, FILM COATED ORAL NIGHTLY
Status: DISCONTINUED | OUTPATIENT
Start: 2023-04-14 | End: 2023-04-14 | Stop reason: HOSPADM

## 2023-04-14 RX ORDER — ONDANSETRON 2 MG/ML
4 INJECTION INTRAMUSCULAR; INTRAVENOUS EVERY 8 HOURS PRN
Status: DISCONTINUED | OUTPATIENT
Start: 2023-04-14 | End: 2023-04-14 | Stop reason: HOSPADM

## 2023-04-14 RX ORDER — CETIRIZINE HYDROCHLORIDE 5 MG/1
5 TABLET ORAL DAILY
Status: DISCONTINUED | OUTPATIENT
Start: 2023-04-14 | End: 2023-04-14 | Stop reason: HOSPADM

## 2023-04-14 RX ORDER — AMLODIPINE BESYLATE 5 MG/1
5 TABLET ORAL DAILY
Status: DISCONTINUED | OUTPATIENT
Start: 2023-04-14 | End: 2023-04-14 | Stop reason: HOSPADM

## 2023-04-14 RX ORDER — ACETAMINOPHEN 325 MG/1
650 TABLET ORAL EVERY 8 HOURS PRN
Status: DISCONTINUED | OUTPATIENT
Start: 2023-04-14 | End: 2023-04-14 | Stop reason: HOSPADM

## 2023-04-14 RX ORDER — ACETAMINOPHEN 500 MG
2000 TABLET ORAL DAILY
Status: DISCONTINUED | OUTPATIENT
Start: 2023-04-14 | End: 2023-04-14 | Stop reason: HOSPADM

## 2023-04-14 RX ORDER — TALC
6 POWDER (GRAM) TOPICAL NIGHTLY PRN
Status: DISCONTINUED | OUTPATIENT
Start: 2023-04-14 | End: 2023-04-14 | Stop reason: HOSPADM

## 2023-04-14 RX ORDER — CITALOPRAM 20 MG/1
20 TABLET, FILM COATED ORAL DAILY
Status: DISCONTINUED | OUTPATIENT
Start: 2023-04-14 | End: 2023-04-14 | Stop reason: HOSPADM

## 2023-04-14 RX ORDER — DOCUSATE SODIUM 100 MG/1
100 CAPSULE, LIQUID FILLED ORAL DAILY
Status: DISCONTINUED | OUTPATIENT
Start: 2023-04-14 | End: 2023-04-14 | Stop reason: HOSPADM

## 2023-04-14 RX ORDER — QUETIAPINE FUMARATE 25 MG/1
25 TABLET, FILM COATED ORAL NIGHTLY
Qty: 30 TABLET | Refills: 1 | Status: SHIPPED | OUTPATIENT
Start: 2023-04-14 | End: 2023-06-23

## 2023-04-14 RX ORDER — ATORVASTATIN CALCIUM 10 MG/1
10 TABLET, FILM COATED ORAL DAILY
Status: DISCONTINUED | OUTPATIENT
Start: 2023-04-14 | End: 2023-04-14 | Stop reason: HOSPADM

## 2023-04-14 RX ORDER — HYDRALAZINE HYDROCHLORIDE 20 MG/ML
10 INJECTION INTRAMUSCULAR; INTRAVENOUS EVERY 6 HOURS PRN
Status: DISCONTINUED | OUTPATIENT
Start: 2023-04-14 | End: 2023-04-14 | Stop reason: HOSPADM

## 2023-04-14 RX ORDER — SODIUM CHLORIDE 0.9 % (FLUSH) 0.9 %
10 SYRINGE (ML) INJECTION
Status: DISCONTINUED | OUTPATIENT
Start: 2023-04-14 | End: 2023-04-14 | Stop reason: HOSPADM

## 2023-04-14 RX ADMIN — ATORVASTATIN CALCIUM 10 MG: 10 TABLET, FILM COATED ORAL at 09:04

## 2023-04-14 RX ADMIN — CETIRIZINE HYDROCHLORIDE 5 MG: 5 TABLET ORAL at 09:04

## 2023-04-14 RX ADMIN — FLUTICASONE PROPIONATE 100 MCG: 50 SPRAY, METERED NASAL at 09:04

## 2023-04-14 RX ADMIN — HYDRALAZINE HYDROCHLORIDE 50 MG: 25 TABLET, FILM COATED ORAL at 09:04

## 2023-04-14 RX ADMIN — CALCITRIOL CAPSULES 0.25 MCG 0.25 MCG: 0.25 CAPSULE ORAL at 09:04

## 2023-04-14 RX ADMIN — CITALOPRAM HYDROBROMIDE 20 MG: 20 TABLET ORAL at 09:04

## 2023-04-14 RX ADMIN — Medication 2000 UNITS: at 09:04

## 2023-04-14 RX ADMIN — ASPIRIN 81 MG 81 MG: 81 TABLET ORAL at 09:04

## 2023-04-14 RX ADMIN — QUETIAPINE 25 MG: 25 TABLET ORAL at 01:04

## 2023-04-14 RX ADMIN — DOCUSATE SODIUM 100 MG: 100 CAPSULE, LIQUID FILLED ORAL at 09:04

## 2023-04-14 RX ADMIN — AMLODIPINE BESYLATE 5 MG: 5 TABLET ORAL at 01:04

## 2023-04-14 RX ADMIN — LEVETIRACETAM 750 MG: 250 TABLET, FILM COATED ORAL at 09:04

## 2023-04-14 RX ADMIN — HYDRALAZINE HYDROCHLORIDE 50 MG: 25 TABLET, FILM COATED ORAL at 01:04

## 2023-04-14 NOTE — DISCHARGE INSTRUCTIONS
Rest and drink adequate fluids. Take medications as prescribed. Follow up as directed. Return to ED for any worsening symptoms or concerns.     Discharge Instructions, [facility] Medicine    Thank you for choosing [facility] for your medical care.     You were admitted to the hospital with Visual hallucinations.     Please note your discharge instructions, including diet/activity restrictions, follow-up appointments, and medication changes.  If you have any questions about your medical issues, prescriptions, or any other questions, please feel free to contact the [facility] Medicine Dept at [phone] and we will help.    If you are previously with Home health, outpatient PT/OT or under a therapy program, you are cleared to return to those programs.    Please direct all long term medication refills and follow up to your primary care provider, Aristides Haynes MD. Thank you again for letting us take care of your health care needs.    Please note the following discharge instructions per your discharging physician-  Aline Cesar NP

## 2023-04-14 NOTE — PLAN OF CARE
04/14/23 1140   BULLOCK Message   Medicare Outpatient and Observation Notification regarding financial responsibility Given to patient/caregiver;Explained to patient/caregiver;Signed/date by patient/caregiver   Date BULLOCK was signed 04/14/23   Time BULLOCK was signed 1140     BULLOCK explained to pt's daughter over the phone. All questions answered and Gabriela verbalized understanding. Consent given for telephone signature

## 2023-04-14 NOTE — PLAN OF CARE
[facility]  Initial Discharge Assessment       Primary Care Provider: Aristides Haynes MD    Admission Diagnosis: Altered mental status, unspecified altered mental status type [R41.82]    Admission Date: 4/13/2023  Expected Discharge Date: 4/14/2023    Discharge Barriers Identified: None    Payor: HU[employer] MANAGED MEDICARE / Plan: HU[employer] MEDICARE PPO / Product Type: Medicare Advantage /     Extended Emergency Contact Information  Primary Emergency Contact: Gabriela Merino  Work Phone: [phone]  Mobile Phone: [phone]  Relation: Daughter  Preferred language: English   needed? No  Secondary Emergency Contact: Nilda Brandon  Mobile Phone: [phone]  Relation: Daughter  Preferred language: English   needed? No    Discharge Plan A: Home Health  Discharge Plan B: Home with family      [id number] [address] AT [facility]  [address] [place] [address]  Phone: [phone] Fax: [phone]    [facility]  for [place], [place] - [address] [place] [address]  Phone: [phone] Fax: [phone]      DC assessment completed over the phone with pt's daughter, Gabriela. Pt not able to verify any information on facesheet. Gabriela verified information on facesheet. Reports being pt's POA. Pt lives at listed address with her. PCP and pharm verified. Pt is active with [facility] and wants to continue upon DC. Verified insurance on file. Reports pt was just discharged from this facility yesterday- came back to ED for new visual hallucinations. DC plan is home with continued HH. High risk referrals placed.     Initial Assessment (most recent)       Adult Discharge Assessment - 04/14/23 1257          Discharge Assessment    Assessment Type Discharge Planning Assessment     Confirmed/corrected address, phone number and insurance Yes     Confirmed Demographics Correct on  Facesheet     Source of Information family     If unable to respond/provide information was family/caregiver contacted? Yes     Contact Name/Number daughterCheko Ochoa [phone]     Does patient/caregiver understand observation status Yes     Communicated JOANNE with patient/caregiver Yes     Reason For Admission hallucinations     People in Home child(idalmis), adult     Facility Arrived From: ER     Do you expect to return to your current living situation? Yes     Do you have help at home or someone to help you manage your care at home? Yes     Prior to hospitilization cognitive status: Alert/Oriented     Current cognitive status: Alert/Oriented     Walking or Climbing Stairs ambulation difficulty, requires equipment     Dressing/Bathing bathing difficulty, requires equipment     Equipment Currently Used at Home bedside commode;hospital bed;walker, rolling;wheelchair;rollator     Readmission within 30 days? No     Patient currently being followed by outpatient case management? No     Do you currently have service(s) that help you manage your care at home? Yes     Name and Contact number of agency [facility]     Is the pt/caregiver preference to resume services with current agency Yes     Do you take prescription medications? Yes     Do you have prescription coverage? Yes     Do you have any problems affording any of your prescribed medications? No     Is the patient taking medications as prescribed? yes     Who is going to help you get home at discharge? Gabriela     How do you get to doctors appointments? family or friend will provide     Are you on dialysis? No     Do you take coumadin? No     Discharge Plan A Home Health     Discharge Plan B Home with family     DME Needed Upon Discharge  none     Discharge Plan discussed with: Adult children;POA     Discharge Barriers Identified None

## 2023-04-14 NOTE — PT/OT/SLP EVAL
Physical Therapy Evaluation    Patient Name:  Lavon Brandon   MRN:  [id number]    Recommendations:     Discharge Recommendations: home health PT   Discharge Equipment Recommendations: none   Barriers to discharge:  Requires 24/7 supervision for optimal care    Assessment:     Lavon Brandon is a 89 y.o. male admitted with a medical diagnosis of Visual hallucinations.     He presents with the following impairments/functional limitations: weakness, impaired endurance, impaired self care skills, impaired functional mobility, gait instability, impaired balance, impaired cognition, decreased safety awareness, impaired cardiopulmonary response to activity .     Pt  presented in supine asleep but was easily aroused. He appeared with confused  facial expression, slow speech and decreased  orientation.. He required Mod A for supine to sit and Min A for balance as he leaned to the R. This was in contrast to PT session on yesterday  when pt was  more engaging in conversation and ambulated in the amado  70 ft x2 RW and Min A .  Pt required Min A x2 for t/f to stand and pt had difficulty with motor planning for taking steps with RW.  CT of head reveals no acute intracranial abnormality but small infarction of left pontine and chronic small vessel ischemic disease. Pt does have demential possibly contributed to pt's altered behavior.      Rehab Prognosis: Good and Fair; patient would benefit from acute skilled PT services to address these deficits and reach maximum level of function.    Recent Surgery: * No surgery found *      Plan:     During this hospitalization, patient to be seen   to address the identified rehab impairments via gait training, therapeutic activities, therapeutic exercises and progress toward the following goals:    Plan of Care Expires:   5/14/2023    Subjective     Chief Complaint: Wanted to sleep  Patient/Family Comments/goals: Home with his daughter   Pain/Comfort:  Pain Rating 1: 0/10    Patients  cultural, spiritual, Sabianist conflicts given the current situation:      Living Environment:  Pt lives at home with his daughter.  Prior to admission, patients level of function was ambulatory with RW.  Equipment used at home: bedside commode, walker, rolling, wheelchair, hospital bed.  DME owned (not currently used): none.  Upon discharge, patient will have assistance from his daughter.    Objective:     Communicated with nursr prior to session.  Patient found supine with telemetry, blood pressure cuff, bed alarm  upon PT entry to room.    General Precautions: Standard, fall  Orthopedic Precautions:    Braces:    Respiratory Status: Room air    Exams:  Cognitive Exam:  Patient is oriented to Person  RLE ROM: WFL  RLE Strength: WFL  LLE ROM: WFL  LLE Strength: WFL    Functional Mobility:  Bed Mobility:     Supine to Sit: moderate assistance  Sit to Supine: stand by assistance  Transfers:     Sit to Stand:  minimum assistance and of 2 persons with rolling walker  Gait: took 5 steps with RW and Min A x2  Balance:  Min A for sitting balance  and standing balance      AM-PAC 6 CLICK MOBILITY  Total Score:        Treatment & Education:  Pt was educated on the role of PT  for assessment, treatment with emphasis on safety and increased mobility and D/C  recommendations.     Patient left supine with all lines intact, call button in reach, bed alarm on, and sitter present.    GOALS:   Multidisciplinary Problems       Physical Therapy Goals          Problem: Physical Therapy    Goal Priority Disciplines Outcome Goal Variances Interventions   Physical Therapy Goal     PT, PT/OT      Description: Goals to be met by: 2023     Patient will increase functional independence with mobility by performin. Supine to sit with Contact Guard Assistance  2. Sit to stand transfer with Contact Guard Assistance  3. Gait  x 50  feet with Minimal Assistance using Rolling Walker.                          History:     Past Medical  History:   Diagnosis Date    Bradyarrhythmia     CVA (cerebral infarction) 2006    Dementia     Depression     Hyperlipidemia     Hypertension     renal htn    Pulmonary embolism 2002    Seizure disorder        Past Surgical History:   Procedure Laterality Date    left foot  with crushed injry         Time Tracking:     PT Received On: 04/14/23  PT Start Time: 0910     PT Stop Time: 0930  PT Total Time (min): 20 min     Billable Minutes: Evaluation 10 minutes  and Therapeutic Activity 10 minutes      04/14/2023

## 2023-04-14 NOTE — SUBJECTIVE & OBJECTIVE
Past Medical History:   Diagnosis Date    Bradyarrhythmia     CVA (cerebral infarction) 2006    Dementia     Depression     Hyperlipidemia     Hypertension     renal htn    Pulmonary embolism 2002    Seizure disorder        Past Surgical History:   Procedure Laterality Date    left foot  with crushed injry         Review of patient's allergies indicates:   Allergen Reactions    Ciprofloxacin (bulk) Swelling       Current Facility-Administered Medications on File Prior to Encounter   Medication    [COMPLETED] hydrALAZINE tablet 25 mg    [DISCONTINUED] amLODIPine tablet 10 mg    [DISCONTINUED] aspirin chewable tablet 81 mg    [DISCONTINUED] atorvastatin tablet 40 mg    [DISCONTINUED] calcitRIOL capsule 0.25 mcg    [DISCONTINUED] cetirizine tablet 10 mg    [DISCONTINUED] cetirizine tablet 5 mg    [DISCONTINUED] chlorhexidine 0.12 % solution 15 mL    [DISCONTINUED] cholecalciferol (vitamin D3) 50 mcg (2,000 unit) capsule 2,000 Units    [DISCONTINUED] citalopram tablet 20 mg    [DISCONTINUED] docusate sodium capsule 100 mg    [DISCONTINUED] enoxaparin injection 30 mg    [DISCONTINUED] fluticasone propionate 50 mcg/actuation nasal spray 100 mcg    [DISCONTINUED] hydrALAZINE tablet 25 mg    [DISCONTINUED] hydrALAZINE tablet 50 mg    [DISCONTINUED] levETIRAcetam tablet 750 mg    [DISCONTINUED] mupirocin 2 % ointment    [DISCONTINUED] piperacillin-tazobactam (ZOSYN) 3.375 g in dextrose 5 % (D5W) 100 mL    [DISCONTINUED] sodium chloride 0.9% flush 10 mL     Current Outpatient Medications on File Prior to Encounter   Medication Sig    levETIRAcetam (KEPPRA) 750 MG Tab Take 1 tablet (750 mg total) by mouth 2 (two) times daily.    methyl salicylate-menthol 15-10% 15-10 % Crea APPLY MODERATE AMOUNT TOPICALLY TWICE A DAY FOR PAIN    amLODIPine (NORVASC) 10 MG tablet Take 0.5 tablets (5 mg total) by mouth once daily.    aspirin 81 MG Chew Take 1 tablet (81 mg total) by mouth once daily.    calcitRIOL (ROCALTROL) 0.25 MCG Cap Take  0.25 mcg by mouth once daily.    cetirizine (ZYRTEC) 5 MG tablet Take 1 tablet (5 mg total) by mouth once daily. for 20 days    cholecalciferol, vitamin D3, (VITAMIN D3) 50 mcg (2,000 unit) Cap Take 1 capsule by mouth once daily.    citalopram (CELEXA) 20 MG tablet Take 1 tablet by mouth once daily (Patient taking differently: Take 20 mg by mouth once daily.)    docusate sodium (COLACE) 100 MG capsule Take 1 capsule (100 mg total) by mouth once daily.    fluticasone propionate (FLONASE) 50 mcg/actuation nasal spray 2 sprays (100 mcg total) by Each Nostril route once daily.    hydrALAZINE (APRESOLINE) 50 MG tablet Take 1 tablet (50 mg total) by mouth every 8 (eight) hours.    rosuvastatin (CRESTOR) 10 MG tablet Take 1 tablet (10 mg total) by mouth once daily.     Family History       Problem Relation (Age of Onset)    Diabetes Mother          Tobacco Use    Smoking status: Never    Smokeless tobacco: Never   Substance and Sexual Activity    Alcohol use: No    Drug use: No    Sexual activity: Not on file     Review of Systems Complete ROS otherwise negative other than stated in HPI.   Objective:     Vital Signs (Most Recent):  Temp: 98.1 °F (36.7 °C) (04/14/23 0124)  Pulse: 82 (04/14/23 0124)  Resp: 17 (04/14/23 0124)  BP: (!) 180/74 (04/14/23 0124)  SpO2: 95 % (04/14/23 0124)   Vital Signs (24h Range):  Temp:  [98 °F (36.7 °C)-99 °F (37.2 °C)] 98.1 °F (36.7 °C)  Pulse:  [] 82  Resp:  [17-37] 17  SpO2:  [95 %-100 %] 95 %  BP: (133-211)/() 180/74     Weight: 80.7 kg (177 lb 14.6 oz)  Body mass index is 25.53 kg/m².    Physical Exam  GENERAL:  Alert, oriented to person, place, situation, not oriented to year.  No distress.  Nontoxic  HEENT:  EOMI. Conjunctivae intact. Posterior pharynx clear  NECK:  Supple   LUNGS:  No respiratory distress. Clear to auscultation bilaterally with good air movement  CARDIAC:  RRR without murmur, rub or gallop  ABDOMEN:  Positive bowel sounds. Nontender and nondistended.    EXTREMITIES:  Peripheral pulses are 2+. Hands and feet are warm. Good capillary refill in fingers (< 2 seconds). No clubbing, cyanosis or edema  SKIN:  Skin color, texture and turgor normal. No rashes, ulcerations or nodules noted  NEUROLOGIC:  Moves all extremities, sensation intact, cranial nerves intact        Significant Labs: All pertinent labs within the past 24 hours have been reviewed.  A1C:   Recent Labs   Lab 04/12/23 0347   HGBA1C 5.5     BMP:   Recent Labs   Lab 04/13/23 2102 04/13/23 2229   * 121*   * 141   K 6.1* 4.3    105   CO2 26 23   BUN 30* 32*   CREATININE 2.4* 2.4*   CALCIUM 9.2 9.0   MG 2.0  --      CBC:   Recent Labs   Lab 04/12/23 0347 04/13/23 0405 04/13/23 2102   WBC 7.03 6.41 9.91   HGB 10.4* 11.1* 12.1*   HCT 32.5* 34.9* 37.4*   * 105* 131*     CMP:   Recent Labs   Lab 04/12/23 0347 04/13/23 0404 04/13/23 2102 04/13/23 2229    139 135* 141   K 4.9 4.4 6.1* 4.3    108 107 105   CO2 26 26 26 23   GLU 87 88 120* 121*   BUN 31* 30* 30* 32*   CREATININE 2.2* 2.2* 2.4* 2.4*   CALCIUM 8.8 9.1 9.2 9.0   PROT 5.6*  --  7.1  --    ALBUMIN 3.0*  --  3.6  --    BILITOT 1.0  --  0.7  --    ALKPHOS 37*  --  47*  --    AST 18  --  30  --    ALT 11  --  10  --    ANIONGAP 6* 5* 2* 13     Coagulation:   Recent Labs   Lab 04/12/23 0347   INR 1.0   APTT 27.9     Lactic Acid: No results for input(s): LACTATE in the last 48 hours.  Prealbumin: No results for input(s): PREALBUMIN in the last 48 hours.  Troponin:   Recent Labs   Lab 04/12/23 0347 04/12/23 0722   TROPONINIHS 72.5* 56.2*     TSH:   Recent Labs   Lab 04/13/23  2102   TSH 2.540       Significant Imaging: I have reviewed all pertinent imaging results/findings within the past 24 hours.  CT: I have reviewed all pertinent results/findings within the past 24 hours and my personal findings are:  CT head with old infarct, chronic changes, no intracranial hemorrhage

## 2023-04-14 NOTE — ED PROVIDER NOTES
Encounter Date: 4/13/2023       History     Chief Complaint   Patient presents with    Altered Mental Status     Confusion, hallucinations   Symptoms began today     Emergent jazmyne a 89 yo male brought in for acute onset of altered mental status, visual hallucinations, and restlessness that began this afternoon after he was discharged home around lunchtime today from this hospital..    He has history of seizures, no seizure in 10 years until 4/11 when he was brought into the ER due to altered mental status was found to be in a postictal state.  He was discharged home today with diagnoses of seizures with the increase in Keppra to 750 mg b.i.d., uncontrolled hypertension with a new medication of hydralazine 50 mg once daily, and change of Norvasc to 5 mg, he was also told to take Flonase and antihistamines due to congestion.  During his stay he was also found to have bradycardia and was scheduled to have a Holter monitor placed but none was available and he is to follow up with Dr. Villa as an outpatient, he also has history of dementia patient also has PE diagnosed in 2002, CVA 2006, hyperlipidemia, and dementia  Patient's 2 daughters accompany him to the ER they report when he was discharged home they brought him home.  He was in his normal state of health now complaints.  They reported that he seemed very mildly off balance when he was using his walker when he would 1st stand but then was able to stabilize himself and ambulate.  They reported after dinner around 5:36 p.m. the patient began to have acute visual hallucinations including multiple things such as believing that there was a puddle of water and there for your and that it was raining in the Foyer in his daughter was splashing in it, he reported being able to see the bones of his daughters left leg, he kept repeatedly feeling like his walker was being moved.  And he was restless repeatedly changing positions in the living room.  He also currently believes  that his daughter is mowing the grass with the Hospital in his other daughter is repeatedly walking around the facility.  They have not reported any localizing weakness speech changes aphasia facial droop patient denied any headache dizziness lightheadedness he complained of no chest pain or shortness of breath.    Review of patient's allergies indicates:   Allergen Reactions    Ciprofloxacin (bulk) Swelling     Past Medical History:   Diagnosis Date    Bradyarrhythmia     CVA (cerebral infarction) 2006    Dementia     Depression     Hyperlipidemia     Hypertension     renal htn    Pulmonary embolism 2002    Seizure disorder      Past Surgical History:   Procedure Laterality Date    left foot  with crushed injry       Family History   Problem Relation Age of Onset    Diabetes Mother     Cancer Neg Hx      Social History     Tobacco Use    Smoking status: Never    Smokeless tobacco: Never   Substance Use Topics    Alcohol use: No    Drug use: No     Review of Systems   Constitutional:  Positive for activity change. Negative for appetite change, chills, diaphoresis, fatigue and fever.   HENT:  Negative for congestion, postnasal drip and rhinorrhea.    Respiratory:  Negative for cough, chest tightness, shortness of breath and wheezing.    Cardiovascular:  Negative for chest pain and palpitations.   Gastrointestinal:  Negative for abdominal pain, constipation, diarrhea, nausea and vomiting.   Genitourinary:  Negative for dysuria, frequency and urgency.   Musculoskeletal:  Negative for myalgias, neck pain and neck stiffness.   Skin:  Negative for wound.   Neurological:  Negative for dizziness, weakness, light-headedness, numbness and headaches.   Psychiatric/Behavioral:  Positive for confusion, hallucinations and sleep disturbance. Negative for agitation. The patient is nervous/anxious and is hyperactive.    All other systems reviewed and are negative.    Physical Exam     Initial Vitals [04/13/23 1930]   BP Pulse Resp  Temp SpO2   (!) 169/75 100 20 98 °F (36.7 °C) 96 %      MAP       --         Physical Exam    Nursing note and vitals reviewed.  Constitutional: He appears well-developed and well-nourished. He is not diaphoretic. No distress.   HENT:   Head: Normocephalic and atraumatic.   Right Ear: External ear normal.   Left Ear: External ear normal.   Nose: Nose normal.   Mouth/Throat: Oropharynx is clear and moist.   Eyes: Conjunctivae and EOM are normal. Pupils are equal, round, and reactive to light.   Neck: Neck supple. No tracheal deviation present.   Normal range of motion.  Cardiovascular:  Normal rate, regular rhythm, normal heart sounds and intact distal pulses.     Exam reveals no gallop and no friction rub.       No murmur heard.  Bp 169/75  Hr 100   Pulmonary/Chest: Breath sounds normal. No stridor. No respiratory distress. He has no wheezes. He has no rhonchi. He has no rales. He exhibits no tenderness.   Abdominal: Abdomen is soft. Bowel sounds are normal. He exhibits no distension and no mass. There is no abdominal tenderness. There is no rebound and no guarding.   Musculoskeletal:         General: No edema. Normal range of motion.      Cervical back: Normal range of motion and neck supple.     Neurological: He is alert. He has normal strength. No cranial nerve deficit or sensory deficit.    GCS: 14- confused,  CN 2-12 grossly intact. No facial asymmetry. Normal speech without slurring.  5/5 strength in bilateral lower extremities including hip flexion,  dorsal and plantar flexion, EHL and FLH, and bilateral upper extremities including biceps, triceps, wrist flexion and extension, and RUM nerves.  Normal sensation to light touch in all 4 extremities. No pronator drift.       Skin: Skin is warm and dry. No rash noted. No erythema. No pallor.   Psychiatric: He has a normal mood and affect. His behavior is normal. Judgment and thought content normal.       ED Course   Procedures  Labs Reviewed   CBC W/ AUTO  DIFFERENTIAL - Abnormal; Notable for the following components:       Result Value    RBC 4.18 (*)     Hemoglobin 12.1 (*)     Hematocrit 37.4 (*)     RDW 14.9 (*)     Platelets 131 (*)     All other components within normal limits   COMPREHENSIVE METABOLIC PANEL - Abnormal; Notable for the following components:    Sodium 135 (*)     Potassium 6.1 (*)     Glucose 120 (*)     BUN 30 (*)     Creatinine 2.4 (*)     Alkaline Phosphatase 47 (*)     Anion Gap 2 (*)     eGFR 25.0 (*)     All other components within normal limits    Narrative:      Potassium critical result(s) repeated. Called and verbal readback   obtained from Royal Johnson Rn/ER by AS2 04/13/2023 22:02   SALICYLATE LEVEL - Abnormal; Notable for the following components:    Salicylate Lvl <4.0 (*)     All other components within normal limits   BASIC METABOLIC PANEL - Abnormal; Notable for the following components:    Glucose 121 (*)     BUN 32 (*)     Creatinine 2.4 (*)     eGFR 25.0 (*)     All other components within normal limits   CULTURE, BLOOD   CULTURE, BLOOD   TSH   URINALYSIS, REFLEX TO URINE CULTURE    Narrative:     Specimen Source->Urine   DRUG SCREEN PANEL, URINE EMERGENCY    Narrative:     Specimen Source->Urine   ALCOHOL,MEDICAL (ETHANOL)   ACETAMINOPHEN LEVEL   MAGNESIUM   B-TYPE NATRIURETIC PEPTIDE   CK   SARS-COV-2 RNA AMPLIFICATION, QUAL     EKG Readings: (Independently Interpreted)   Initial Reading: No STEMI. Rhythm: Normal Sinus Rhythm. Heart Rate: 87. Ectopy: No Ectopy. Conduction: Normal.     Imaging Results              CT Head Without Contrast (Final result)  Result time 04/13/23 22:34:10      Final result by Jennifer Burgess MD (04/13/23 22:34:10)                   Narrative:    EXAM:  CT Head Without Intravenous Contrast    CLINICAL HISTORY:  Mental status change, unknown cause    TECHNIQUE:  Axial computed tomography images of the head/brain without intravenous contrast.  Sagittal and coronal reformatted images were created and  reviewed.  This CT exam was performed using one or more of the following dose reduction techniques:  automated exposure control, adjustment of the mA and/or kV according to patient size, and/or use of iterative reconstruction technique.    COMPARISON:  April 11, 2023. October 14, 2022.    FINDINGS:  Brain:  Chronic small infarction of the left pontine. Patchy white matter low-attenuation compatible with chronic small vessel ischemic disease.  No intracranial hemorrhage, extra-axial fluid collection or edema.  Ventricles:  No hydrocephalus.  Bones/joints:  Intact calvarium.  Soft tissues: Normal.  Sinuses:  Chronic opacification right maxillary sinus, right anterior ethmoid air cells and right frontal sinus.  Mastoid air cells:  Clear.    IMPRESSION:  1.  No acute intracranial abnormality.  2.  Chronic small infarction of the left pontine.  3.  Chronic small vessel ischemic disease.  4.  Chronic opacification right maxillary sinus, right anterior ethmoid air cells and right frontal sinus.    Electronically signed by:  Jennifer Christensen MD  4/13/2023 10:34 PM CDT Workstation: [id number]                                     Medications - No data to display  Medical Decision Making:   History:   I obtained history from: someone other than patient.       <> Summary of History: 2 daughters   Old Medical Records: I decided to obtain old medical records.  Old Records Summarized: records from previous admission(s).       <> Summary of Records: Pt admitted from 4/11-4/13 for seizure, post ictal state confusion, AMS bradycardia  Independently Interpreted Test(s):   I have ordered and independently interpreted EKG Reading(s) - see prior notes  Clinical Tests:   Lab Tests: Ordered and Reviewed  The following lab test(s) were unremarkable: Urinalysis       <> Summary of Lab: Bun 24  Cr 2.4  H/H 12.1/37.4  Plt 131,000  Mag 2  Tox neg   Tsh 2.54  Etoh Tylenol salicylates negative  Radiological Study: Ordered and Reviewed  Medical  Tests: Ordered and Reviewed  ED Management:  Emergent jazmyne capone 89 yo male brought in for acute onset of altered mental status, visual hallucinations, and restlessness that began this afternoon after he was discharged home around lunchtime today from this hospital..    He has history of seizures, no seizure in 10 years until 4/11 when he was brought into the ER due to altered mental status was found to be in a postictal state.  He was discharged home today with diagnoses of seizures with the increase in Keppra to 750 mg b.i.d., uncontrolled hypertension with a new medication of hydralazine 50 mg once daily, and change of Norvasc to 5 mg, he was also told to take Flonase and antihistamines due to congestion.  During his stay he was also found to have bradycardia and was scheduled to have a Holter monitor placed but none was available and he is to follow up with Dr. Villa as an outpatient, he also has history of dementia patient also has PE diagnosed in 2002, CVA 2006, hyperlipidemia, and dementia  Patient's 2 daughters accompany him to the ER they report when he was discharged home they brought him home.  He was in his normal state of health now complaints.  They reported that he seemed very mildly off balance when he was using his walker when he would 1st stand but then was able to stabilize himself and ambulate.  They reported after dinner around 5:36 p.m. the patient began to have acute visual hallucinations including multiple things such as believing that there was a puddle of water and there for your and that it was raining in the Foyer in his daughter was splashing in it, he reported being able to see the bones of his daughters left leg, he kept repeatedly feeling like his walker was being moved.  And he was restless repeatedly changing positions in the living room.  He also currently believes that his daughter is mowing the grass with the Hospital in his other daughter is repeatedly walking around the  facility.  They have not reported any localizing weakness speech changes aphasia facial droop patient denied any headache dizziness lightheadedness he complained of no chest pain or shortness of breath.  On physical exam blood pressure elevated 169/75 pulse 100.  Daughters report that they did not start any new medications on the patient.  Keppra was just an increased dose.  They reported no trauma there is no visible signs of trauma to the patient.  He is confused actively hallucinating with visual hallucinations.  He is redirectable.  Focal neurologic exam is normal normal cardiac and pulmonary exam soft nontender abdomen  MDM    Patient presents for emergent evaluation of acute altered mental status, restlessness, visual hallucinations that poses a possible threat to life and/or bodily function.    Differential diagnosis includes but is not limited to stroke, ACS, electrolyte abnormality, side effects of medication, hypertensive emergency, dysrhythmia.  In the ED patient found to have acute altered mental status with restlessness and visual hallucinations, CKD and chronic anemia.   I ordered labs and personally reviewed them.  Labs significant for see above.      I ordered EKG and personally reviewed it.  EKG significant for see above.    I ordered CT scan and personally reviewed it and reviewed the radiologist interpretation.  CT head without contrast revealed No acute intracranial abnormality.   2.  Chronic small infarction of the left pontine.   3.  Chronic small vessel ischemic disease.   4.  Chronic opacification right maxillary sinus, right anterior ethmoid air cells and right frontal sinus.       Admission MDM  I discussed the patient presentation labs, ekg, X-rays, CT findings with the Hospitalist   Patient was managed in the ED with no medications here  The response to treatment was fair patient is still having hallucinations.    Patient required emergent consultation to hospitalist for  admission.  Josselyn Martínez M.D.     Other:   I have discussed this case with another health care provider.                        Clinical Impression:   Final diagnoses:  [Z00.8] Medical clearance for psychiatric admission  [N18.9] Chronic kidney disease, unspecified CKD stage  [D64.9] Anemia, unspecified type  [R44.1] Visual hallucinations  [R41.82] Altered mental status, unspecified altered mental status type (Primary)        ED Disposition Condition    Admit Stable                Josselyn Martínez MD  04/13/23 [id number]

## 2023-04-14 NOTE — HPI
89-year-old man with dementia, seizures, hypertension, hyperlipidemia, history of CVA, presenting with visual hallucinations.  Patient was discharged about 12 hours ago after he was admitted for seizures and postictal state.  He was treated with Keppra and his Keppra dose was increased by Neurology.  He was also bradycardic during his stay and was scheduled to follow up with Cardiology as an outpatient.  He was back to normal at home until around 5:00 p.m. when he suddenly began to have visual hallucinations.  He was also reportedly more agitated per his daughter.  He was disoriented to the people around him and thought they were doing different activities.  He also fell asleep and was sleep talking the same hallucinations.  He was brought to the ED where CT head and full medical workup was unremarkable.  He has not been bradycardic since presentation and has had no seizure-like activity.  He denies chest pain or palpitations, headache, abdominal pain, nausea, vomiting, decreased appetite, weakness.

## 2023-04-14 NOTE — ASSESSMENT & PLAN NOTE
Workup unremarkable.  Unlikely side effect of Keppra as he had been on this medication for years.  With history of dementia and recent hospitalization maybe delirium.  We will start Seroquel nightly and consult Psychiatry for evaluation, provide sitter at bedside

## 2023-04-14 NOTE — H&P
[facility]  History & Physical    Patient Name: Lavon Brandon  MRN: [id number]  Patient Class: OP- Observation  Admission Date: 4/13/2023  Attending Physician: Diane Rey MD  Primary Care Provider: Aristides Haynes MD       Patient seen at 1:15 a.m. on 04/14/2023  Patient information was obtained from patient, relative(s) and ER records.     Subjective:     Principal Problem:Visual hallucinations    Chief Complaint:   Chief Complaint   Patient presents with    Altered Mental Status     Confusion, hallucinations   Symptoms began today         HPI: 89-year-old man with dementia, seizures, hypertension, hyperlipidemia, history of CVA, presenting with visual hallucinations.  Patient was discharged about 12 hours ago after he was admitted for seizures and postictal state.  He was treated with Keppra and his Keppra dose was increased by Neurology.  He was also bradycardic during his stay and was scheduled to follow up with Cardiology as an outpatient.  He was back to normal at home until around 5:00 p.m. when he suddenly began to have visual hallucinations.  He was also reportedly more agitated per his daughter.  He was disoriented to the people around him and thought they were doing different activities.  He also fell asleep and was sleep talking the same hallucinations.  He was brought to the ED where CT head and full medical workup was unremarkable.  He has not been bradycardic since presentation and has had no seizure-like activity.  He denies chest pain or palpitations, headache, abdominal pain, nausea, vomiting, decreased appetite, weakness.      Past Medical History:   Diagnosis Date    Bradyarrhythmia     CVA (cerebral infarction) 2006    Dementia     Depression     Hyperlipidemia     Hypertension     renal htn    Pulmonary embolism 2002    Seizure disorder        Past Surgical History:   Procedure Laterality Date    left foot  with crushed injry         Review of  patient's allergies indicates:   Allergen Reactions    Ciprofloxacin (bulk) Swelling       Current Facility-Administered Medications on File Prior to Encounter   Medication    [COMPLETED] hydrALAZINE tablet 25 mg    [DISCONTINUED] amLODIPine tablet 10 mg    [DISCONTINUED] aspirin chewable tablet 81 mg    [DISCONTINUED] atorvastatin tablet 40 mg    [DISCONTINUED] calcitRIOL capsule 0.25 mcg    [DISCONTINUED] cetirizine tablet 10 mg    [DISCONTINUED] cetirizine tablet 5 mg    [DISCONTINUED] chlorhexidine 0.12 % solution 15 mL    [DISCONTINUED] cholecalciferol (vitamin D3) 50 mcg (2,000 unit) capsule 2,000 Units    [DISCONTINUED] citalopram tablet 20 mg    [DISCONTINUED] docusate sodium capsule 100 mg    [DISCONTINUED] enoxaparin injection 30 mg    [DISCONTINUED] fluticasone propionate 50 mcg/actuation nasal spray 100 mcg    [DISCONTINUED] hydrALAZINE tablet 25 mg    [DISCONTINUED] hydrALAZINE tablet 50 mg    [DISCONTINUED] levETIRAcetam tablet 750 mg    [DISCONTINUED] mupirocin 2 % ointment    [DISCONTINUED] piperacillin-tazobactam (ZOSYN) 3.375 g in dextrose 5 % (D5W) 100 mL    [DISCONTINUED] sodium chloride 0.9% flush 10 mL     Current Outpatient Medications on File Prior to Encounter   Medication Sig    levETIRAcetam (KEPPRA) 750 MG Tab Take 1 tablet (750 mg total) by mouth 2 (two) times daily.    methyl salicylate-menthol 15-10% 15-10 % Crea APPLY MODERATE AMOUNT TOPICALLY TWICE A DAY FOR PAIN    amLODIPine (NORVASC) 10 MG tablet Take 0.5 tablets (5 mg total) by mouth once daily.    aspirin 81 MG Chew Take 1 tablet (81 mg total) by mouth once daily.    calcitRIOL (ROCALTROL) 0.25 MCG Cap Take 0.25 mcg by mouth once daily.    cetirizine (ZYRTEC) 5 MG tablet Take 1 tablet (5 mg total) by mouth once daily. for 20 days    cholecalciferol, vitamin D3, (VITAMIN D3) 50 mcg (2,000 unit) Cap Take 1 capsule by mouth once daily.    citalopram (CELEXA) 20 MG tablet Take 1 tablet by mouth once daily  (Patient taking differently: Take 20 mg by mouth once daily.)    docusate sodium (COLACE) 100 MG capsule Take 1 capsule (100 mg total) by mouth once daily.    fluticasone propionate (FLONASE) 50 mcg/actuation nasal spray 2 sprays (100 mcg total) by Each Nostril route once daily.    hydrALAZINE (APRESOLINE) 50 MG tablet Take 1 tablet (50 mg total) by mouth every 8 (eight) hours.    rosuvastatin (CRESTOR) 10 MG tablet Take 1 tablet (10 mg total) by mouth once daily.     Family History       Problem Relation (Age of Onset)    Diabetes Mother          Tobacco Use    Smoking status: Never    Smokeless tobacco: Never   Substance and Sexual Activity    Alcohol use: No    Drug use: No    Sexual activity: Not on file     Review of Systems Complete ROS otherwise negative other than stated in HPI.   Objective:     Vital Signs (Most Recent):  Temp: 98.1 °F (36.7 °C) (04/14/23 0124)  Pulse: 82 (04/14/23 0124)  Resp: 17 (04/14/23 0124)  BP: (!) 180/74 (04/14/23 0124)  SpO2: 95 % (04/14/23 0124)   Vital Signs (24h Range):  Temp:  [98 °F (36.7 °C)-99 °F (37.2 °C)] 98.1 °F (36.7 °C)  Pulse:  [] 82  Resp:  [17-37] 17  SpO2:  [95 %-100 %] 95 %  BP: (133-211)/() 180/74     Weight: 80.7 kg (177 lb 14.6 oz)  Body mass index is 25.53 kg/m².    Physical Exam  GENERAL:  Alert, oriented to person, place, situation, not oriented to year.  No distress.  Nontoxic  HEENT:  EOMI. Conjunctivae intact. Posterior pharynx clear  NECK:  Supple   LUNGS:  No respiratory distress. Clear to auscultation bilaterally with good air movement  CARDIAC:  RRR without murmur, rub or gallop  ABDOMEN:  Positive bowel sounds. Nontender and nondistended.   EXTREMITIES:  Peripheral pulses are 2+. Hands and feet are warm. Good capillary refill in fingers (< 2 seconds). No clubbing, cyanosis or edema  SKIN:  Skin color, texture and turgor normal. No rashes, ulcerations or nodules noted  NEUROLOGIC:  Moves all extremities, sensation intact, cranial  nerves intact        Significant Labs: All pertinent labs within the past 24 hours have been reviewed.  A1C:   Recent Labs   Lab 04/12/23 0347   HGBA1C 5.5     BMP:   Recent Labs   Lab 04/13/23 2102 04/13/23 2229   * 121*   * 141   K 6.1* 4.3    105   CO2 26 23   BUN 30* 32*   CREATININE 2.4* 2.4*   CALCIUM 9.2 9.0   MG 2.0  --      CBC:   Recent Labs   Lab 04/12/23 0347 04/13/23 0405 04/13/23 2102   WBC 7.03 6.41 9.91   HGB 10.4* 11.1* 12.1*   HCT 32.5* 34.9* 37.4*   * 105* 131*     CMP:   Recent Labs   Lab 04/12/23 0347 04/13/23 0404 04/13/23 2102 04/13/23 2229    139 135* 141   K 4.9 4.4 6.1* 4.3    108 107 105   CO2 26 26 26 23   GLU 87 88 120* 121*   BUN 31* 30* 30* 32*   CREATININE 2.2* 2.2* 2.4* 2.4*   CALCIUM 8.8 9.1 9.2 9.0   PROT 5.6*  --  7.1  --    ALBUMIN 3.0*  --  3.6  --    BILITOT 1.0  --  0.7  --    ALKPHOS 37*  --  47*  --    AST 18  --  30  --    ALT 11  --  10  --    ANIONGAP 6* 5* 2* 13     Coagulation:   Recent Labs   Lab 04/12/23 0347   INR 1.0   APTT 27.9     Lactic Acid: No results for input(s): LACTATE in the last 48 hours.  Prealbumin: No results for input(s): PREALBUMIN in the last 48 hours.  Troponin:   Recent Labs   Lab 04/12/23 0347 04/12/23 0722   TROPONINIHS 72.5* 56.2*     TSH:   Recent Labs   Lab 04/13/23 2102   TSH 2.540       Significant Imaging: I have reviewed all pertinent imaging results/findings within the past 24 hours.  CT: I have reviewed all pertinent results/findings within the past 24 hours and my personal findings are:  CT head with old infarct, chronic changes, no intracranial hemorrhage    Assessment/Plan:     * Visual hallucinations  Workup unremarkable.  Unlikely side effect of Keppra as he had been on this medication for years.  With history of dementia and recent hospitalization maybe delirium.  We will start Seroquel nightly and consult Psychiatry for evaluation, provide sitter at bedside      Dementia with  behavioral disturbance        Depression  Home SSRI        Seizure  Continue Keppra 750 b.i.d.      Hypertension  Elevated.  Resume home medications and monitor.  IV hydralazine if needed        VTE Risk Mitigation (From admission, onward)         Ordered     IP VTE HIGH RISK PATIENT  Once         04/14/23 0137     Place sequential compression device  Until discontinued         04/14/23 0137                         Diane Rey MD  Department of Hospital Medicine  [facility]

## 2023-04-14 NOTE — HOSPITAL COURSE
Pt was monitored closely during his hospital stay. He remained pleasantly confused and at his baseline. I d/w family that pts delirium is likely secondary to hospitalization. Labs trended and remained stable. UA negative. Pt received dose of seroquel evening of admission and tolerated well. Pt to cont seroquel at home to help with symptom mangament. Pt denied acute complaints and remaine stable. Daughter verbalized understanding of discharge instructions and return precautions.     PE:  AAO x 2 (name and ), pleasantly confused at baseline  Heart- RRR, no edema  Lungs - CTA bilat, resp even unlabored  Abd - soft, nontender, normoactive BS  Ext - moving all equally, no focal deficits

## 2023-04-14 NOTE — PLAN OF CARE
Verified with [facility] that no new orders needed since obs admission. Pt will be seen tomorrow 4/15       04/14/23 1218   Post-Acute Status   Post-Acute Authorization Home Health   Home Health Status Set-up Complete/Auth obtained

## 2023-04-14 NOTE — PLAN OF CARE
Pt clear for DC from case management standpoint. Discharging to home with [facility]        04/14/23 1301   Final Note   Assessment Type Final Discharge Note   Anticipated Discharge Disposition Home-Health   Hospital Resources/Appts/Education Provided Appointments scheduled and added to AVS

## 2023-04-14 NOTE — NURSING
Patient D/C via MD order. Telemetry and PIV removed. Discharge instructions reviewed with patient. Patient transported to vehicle via W/C.

## 2023-04-14 NOTE — PLAN OF CARE
Problem: Adult Inpatient Plan of Care  Goal: Plan of Care Review  Outcome: Ongoing, Progressing  Goal: Patient-Specific Goal (Individualized)  Outcome: Ongoing, Progressing  Goal: Absence of Hospital-Acquired Illness or Injury  Outcome: Ongoing, Progressing  Goal: Optimal Comfort and Wellbeing  Outcome: Ongoing, Progressing  Goal: Readiness for Transition of Care  Outcome: Ongoing, Progressing     Safety sitter at bedside.    Problem: Fall Injury Risk  Goal: Absence of Fall and Fall-Related Injury  Outcome: Ongoing, Progressing     Problem: Skin Injury Risk Increased  Goal: Skin Health and Integrity  Outcome: Ongoing, Progressing

## 2023-04-14 NOTE — DISCHARGE SUMMARY
Critical access hospital Medicine  Discharge Summary      Patient Name: Lavon Brandon  MRN: [id number]  MIMI: [id number]  Patient Class: OP- Observation  Admission Date: 4/13/2023  Hospital Length of Stay: 0 days  Discharge Date and Time: 4/14/2023  2:55 PM  Attending Physician: No att. providers found   Discharging Provider: Aline Cesar NP  Primary Care Provider: Aristides Haynes MD    Primary Care Team: Networked reference to record PCT     HPI:   89-year-old man with dementia, seizures, hypertension, hyperlipidemia, history of CVA, presenting with visual hallucinations.  Patient was discharged about 12 hours ago after he was admitted for seizures and postictal state.  He was treated with Keppra and his Keppra dose was increased by Neurology.  He was also bradycardic during his stay and was scheduled to follow up with Cardiology as an outpatient.  He was back to normal at home until around 5:00 p.m. when he suddenly began to have visual hallucinations.  He was also reportedly more agitated per his daughter.  He was disoriented to the people around him and thought they were doing different activities.  He also fell asleep and was sleep talking the same hallucinations.  He was brought to the ED where CT head and full medical workup was unremarkable.  He has not been bradycardic since presentation and has had no seizure-like activity.  He denies chest pain or palpitations, headache, abdominal pain, nausea, vomiting, decreased appetite, weakness.      * No surgery found *      Hospital Course:   Pt was monitored closely during his hospital stay. He remained pleasantly confused and at his baseline. I d/w family that pts delirium is likely secondary to hospitalization. Labs trended and remained stable. UA negative. Pt received dose of seroquel evening of admission and tolerated well. Pt to cont seroquel at home to help with symptom mangament. Pt denied acute complaints and remaine stable. Daughter  verbalized understanding of discharge instructions and return precautions.     PE:  AAO x 2 (name and ), pleasantly confused at baseline  Heart- RRR, no edema  Lungs - CTA bilat, resp even unlabored  Abd - soft, nontender, normoactive BS  Ext - moving all equally, no focal deficits       Goals of Care Treatment Preferences:  Code Status: Full Code      Consults:   Consults (From admission, onward)        Status Ordering Provider     Inpatient consult to Psychiatry  Once        Provider:  Tita Adams MD    Acknowledged TIBURCIO SALAZAR          No new Assessment & Plan notes have been filed under this hospital service since the last note was generated.  Service: Hospital Medicine    Final Active Diagnoses:    Diagnosis Date Noted POA    PRINCIPAL PROBLEM:  Visual hallucinations [R44.1] 2023 Unknown    Hypertension [I10]  Yes     Chronic    Depression [F32.A]  Yes     Chronic    Dementia with behavioral disturbance [F03.918]  Yes     Chronic    Seizure [R56.9]  Yes      Problems Resolved During this Admission:       Discharged Condition: good    Disposition: Home-Health Care [facility]    Follow Up:   Follow-up Information     Aristides Haynes MD Follow up on 2023.    Specialty: Family Medicine  Why: at 11:30 AM for hospital follow up  Contact information:  [address] [facility] [address]  [phone]             [facility] Follow up.    Specialties: Home Health Services, Home Therapy Services, Home Living Aide Services  Contact information:  [address] [id number] [phone]                     Patient Instructions:      Ambulatory referral/consult to [facility]   Standing Status: Future   Referral Priority: Routine Referral Type: Consultation   Referral Reason: Specialty Services Required   Number of Visits Requested: 1     Ambulatory referral/consult to Outpatient Case Management   Referral Priority: Routine Referral Type:  Consultation   Referral Reason: Specialty Services Required   Number of Visits Requested: 1     Diet Cardiac     Notify your health care provider if you experience any of the following:  temperature >100.4     Notify your health care provider if you experience any of the following:  persistent nausea and vomiting or diarrhea     Notify your health care provider if you experience any of the following:  severe uncontrolled pain     Notify your health care provider if you experience any of the following:  difficulty breathing or increased cough     Notify your health care provider if you experience any of the following:  persistent dizziness, light-headedness, or visual disturbances     Notify your health care provider if you experience any of the following:  increased confusion or weakness     Activity as tolerated       Significant Diagnostic Studies: Labs:   CMP   Recent Labs   Lab 04/13/23 2102 04/13/23 2229 04/14/23  0954   * 141 142   K 6.1* 4.3 4.2    105 111*   CO2 26 23 24   * 121* 121*   BUN 30* 32* 28*   CREATININE 2.4* 2.4* 2.2*   CALCIUM 9.2 9.0 9.2   PROT 7.1  --  6.3   ALBUMIN 3.6  --  3.4*   BILITOT 0.7  --  0.7   ALKPHOS 47*  --  37*   AST 30  --  25   ALT 10  --  11   ANIONGAP 2* 13 7*   , CBC   Recent Labs   Lab 04/13/23  0405 04/13/23 2102 04/14/23  0954   WBC 6.41 9.91 7.44   HGB 11.1* 12.1* 11.8*   HCT 34.9* 37.4* 35.7*   * 131* 118*    and All labs within the past 24 hours have been reviewed    Pending Diagnostic Studies:     None         Medications:  Reconciled Home Medications:      Medication List      START taking these medications    QUEtiapine 25 MG Tab  Commonly known as: SEROQUEL  Take 1 tablet (25 mg total) by mouth every evening.        CHANGE how you take these medications    citalopram 20 MG tablet  Commonly known as: CeleXA  Take 1 tablet by mouth once daily  What changed: when to take this        CONTINUE taking these medications    amLODIPine 10 MG  tablet  Commonly known as: NORVASC  Take 0.5 tablets (5 mg total) by mouth once daily.     aspirin 81 MG Chew  Take 1 tablet (81 mg total) by mouth once daily.     calcitRIOL 0.25 MCG Cap  Commonly known as: ROCALTROL  Take 0.25 mcg by mouth once daily.     cetirizine 5 MG tablet  Commonly known as: ZYRTEC  Take 1 tablet (5 mg total) by mouth once daily. for 20 days     cholecalciferol (vitamin D3) 50 mcg (2,000 unit) Cap capsule  Commonly known as: VITAMIN D3  Take 1 capsule by mouth once daily.     docusate sodium 100 MG capsule  Commonly known as: COLACE  Take 1 capsule (100 mg total) by mouth once daily.     fluticasone propionate 50 mcg/actuation nasal spray  Commonly known as: FLONASE  2 sprays (100 mcg total) by Each Nostril route once daily.     hydrALAZINE 50 MG tablet  Commonly known as: APRESOLINE  Take 1 tablet (50 mg total) by mouth every 8 (eight) hours.     levETIRAcetam 750 MG Tab  Commonly known as: KEPPRA  Take 1 tablet (750 mg total) by mouth 2 (two) times daily.     methyl salicylate-menthol 15-10% 15-10 % Crea  APPLY MODERATE AMOUNT TOPICALLY TWICE A DAY FOR PAIN     rosuvastatin 10 MG tablet  Commonly known as: CRESTOR  Take 1 tablet (10 mg total) by mouth once daily.            Indwelling Lines/Drains at time of discharge:   Lines/Drains/Airways     None                 Time spent on the discharge of patient: 46 minutes         Aline Cesar NP  Department of Hospital Medicine  [facility]

## 2023-04-17 ENCOUNTER — OFFICE VISIT (OUTPATIENT)
Dept: FAMILY MEDICINE | Facility: CLINIC | Age: 88
End: 2023-04-17
Payer: MEDICARE

## 2023-04-17 VITALS
TEMPERATURE: 99 F | WEIGHT: 176.56 LBS | DIASTOLIC BLOOD PRESSURE: 46 MMHG | SYSTOLIC BLOOD PRESSURE: 136 MMHG | HEART RATE: 87 BPM | BODY MASS INDEX: 25.34 KG/M2 | OXYGEN SATURATION: 96 %

## 2023-04-17 DIAGNOSIS — R53.81 DEBILITY: ICD-10-CM

## 2023-04-17 DIAGNOSIS — N25.81 SECONDARY HYPERPARATHYROIDISM OF RENAL ORIGIN: ICD-10-CM

## 2023-04-17 DIAGNOSIS — E44.0 MODERATE MALNUTRITION: ICD-10-CM

## 2023-04-17 DIAGNOSIS — E11.9 TYPE 2 DIABETES MELLITUS WITHOUT COMPLICATION, WITHOUT LONG-TERM CURRENT USE OF INSULIN: ICD-10-CM

## 2023-04-17 DIAGNOSIS — E66.3 OVERWEIGHT (BMI 25.0-29.9): ICD-10-CM

## 2023-04-17 DIAGNOSIS — N18.4 CHRONIC RENAL DISEASE, STAGE IV: ICD-10-CM

## 2023-04-17 DIAGNOSIS — I10 HYPERTENSION, UNSPECIFIED TYPE: ICD-10-CM

## 2023-04-17 DIAGNOSIS — R19.7 DIARRHEA, UNSPECIFIED TYPE: ICD-10-CM

## 2023-04-17 DIAGNOSIS — R00.1 BRADYCARDIA: ICD-10-CM

## 2023-04-17 DIAGNOSIS — G40.909 SEIZURE DISORDER: Primary | ICD-10-CM

## 2023-04-17 DIAGNOSIS — I10 PRIMARY HYPERTENSION: ICD-10-CM

## 2023-04-17 DIAGNOSIS — I69.319 CVA, OLD, COGNITIVE DEFICITS: ICD-10-CM

## 2023-04-17 PROCEDURE — 99999 PR PBB SHADOW E&M-EST. PATIENT-LVL III: CPT | Mod: PBBFAC,,, | Performed by: NURSE PRACTITIONER

## 2023-04-17 PROCEDURE — 99214 PR OFFICE/OUTPT VISIT, EST, LEVL IV, 30-39 MIN: ICD-10-PCS | Mod: S$GLB,,, | Performed by: NURSE PRACTITIONER

## 2023-04-17 PROCEDURE — 1160F PR REVIEW ALL MEDS BY PRESCRIBER/CLIN PHARMACIST DOCUMENTED: ICD-10-PCS | Mod: CPTII,S$GLB,, | Performed by: NURSE PRACTITIONER

## 2023-04-17 PROCEDURE — 1159F MED LIST DOCD IN RCRD: CPT | Mod: CPTII,S$GLB,, | Performed by: NURSE PRACTITIONER

## 2023-04-17 PROCEDURE — 99999 PR PBB SHADOW E&M-EST. PATIENT-LVL III: ICD-10-PCS | Mod: PBBFAC,,, | Performed by: NURSE PRACTITIONER

## 2023-04-17 PROCEDURE — 99214 OFFICE O/P EST MOD 30 MIN: CPT | Mod: S$GLB,,, | Performed by: NURSE PRACTITIONER

## 2023-04-17 PROCEDURE — 1157F PR ADVANCE CARE PLAN OR EQUIV PRESENT IN MEDICAL RECORD: ICD-10-PCS | Mod: CPTII,S$GLB,, | Performed by: NURSE PRACTITIONER

## 2023-04-17 PROCEDURE — 1126F PR PAIN SEVERITY QUANTIFIED, NO PAIN PRESENT: ICD-10-PCS | Mod: CPTII,S$GLB,, | Performed by: NURSE PRACTITIONER

## 2023-04-17 PROCEDURE — 1126F AMNT PAIN NOTED NONE PRSNT: CPT | Mod: CPTII,S$GLB,, | Performed by: NURSE PRACTITIONER

## 2023-04-17 PROCEDURE — 1157F ADVNC CARE PLAN IN RCRD: CPT | Mod: CPTII,S$GLB,, | Performed by: NURSE PRACTITIONER

## 2023-04-17 PROCEDURE — 1159F PR MEDICATION LIST DOCUMENTED IN MEDICAL RECORD: ICD-10-PCS | Mod: CPTII,S$GLB,, | Performed by: NURSE PRACTITIONER

## 2023-04-17 PROCEDURE — 1160F RVW MEDS BY RX/DR IN RCRD: CPT | Mod: CPTII,S$GLB,, | Performed by: NURSE PRACTITIONER

## 2023-04-17 NOTE — PROGRESS NOTES
Subjective:       Patient ID: Lavon Brandon is a 89 y.o. male.    Chief Complaint: Hospital Follow Up    HPI    Admission Date: 4/13/2023  Hospital Length of Stay: 1 days  Discharge Date and Time: 4/14/2023       Patient presents today with daughter Gabriela, who is the primary caregiver, for hospital follow up. He is new to me. Last visit with PCP- 5/7/2020. Last office visit here 2/14/23-follow up. dementia, seizures, hypertension, hyperlipidemia, history of CVA. Patient was admitted for visual hallucinations-CT head and full medical workup was unremarkable. Labs trended and remained stable. UA negative. Pt received dose of seroquel evening of admission and tolerated well. Pt to cont seroquel at home to help with symptom mangament. Labs reviewed 4/14/23.-stable, CKD, potassium returned to normal, stable chronic anemia. Patient is with the [facility]      4/11/23 admitted for seizures and postictal state.  He was treated with Keppra and his Keppra dose was increased by Neurology.  He was also bradycardic during his stay and was scheduled to follow up with Cardiology as an outpatient      Chronic kidney disease follows with Dr. Chase    Diarrhea x 2 times daily since discharge Friday  Past Medical History:   Diagnosis Date    Bradyarrhythmia     CVA (cerebral infarction) 2006    Dementia     Depression     Hyperlipidemia     Hypertension     renal htn    Pulmonary embolism 2002    Seizure disorder        Review of patient's allergies indicates:   Allergen Reactions    Ciprofloxacin (bulk) Swelling         Current Outpatient Medications:     amLODIPine (NORVASC) 10 MG tablet, Take 0.5 tablets (5 mg total) by mouth once daily., Disp: 90 tablet, Rfl: 0    aspirin 81 MG Chew, Take 1 tablet (81 mg total) by mouth once daily., Disp: 90 tablet, Rfl: 0    cetirizine (ZYRTEC) 5 MG tablet, Take 1 tablet (5 mg total) by mouth once daily. for 20 days, Disp: 20 tablet, Rfl: 0    cholecalciferol, vitamin D3,  (VITAMIN D3) 50 mcg (2,000 unit) Cap, Take 1 capsule by mouth once daily., Disp: , Rfl:     citalopram (CELEXA) 20 MG tablet, Take 1 tablet by mouth once daily (Patient taking differently: Take 20 mg by mouth once daily.), Disp: 90 tablet, Rfl: 11    docusate sodium (COLACE) 100 MG capsule, Take 1 capsule (100 mg total) by mouth once daily., Disp: 90 capsule, Rfl: 3    fluticasone propionate (FLONASE) 50 mcg/actuation nasal spray, 2 sprays (100 mcg total) by Each Nostril route once daily., Disp: 18.2 mL, Rfl: 0    hydrALAZINE (APRESOLINE) 50 MG tablet, Take 1 tablet (50 mg total) by mouth every 8 (eight) hours., Disp: 270 tablet, Rfl: 0    levETIRAcetam (KEPPRA) 750 MG Tab, Take 1 tablet (750 mg total) by mouth 2 (two) times daily., Disp: 180 tablet, Rfl: 3    methyl salicylate-menthol 15-10% 15-10 % Crea, APPLY MODERATE AMOUNT TOPICALLY TWICE A DAY FOR PAIN, Disp: , Rfl:     QUEtiapine (SEROQUEL) 25 MG Tab, Take 1 tablet (25 mg total) by mouth every evening., Disp: 30 tablet, Rfl: 1    rosuvastatin (CRESTOR) 10 MG tablet, Take 1 tablet (10 mg total) by mouth once daily., Disp: 90 tablet, Rfl: 1    calcitRIOL (ROCALTROL) 0.25 MCG Cap, Take 0.25 mcg by mouth once daily., Disp: , Rfl:     Review of Systems   Constitutional:  Negative for unexpected weight change.   HENT:  Negative for trouble swallowing.    Eyes:  Negative for visual disturbance.   Respiratory:  Negative for shortness of breath.    Cardiovascular:  Negative for chest pain, palpitations and leg swelling.   Gastrointestinal:  Negative for blood in stool.   Genitourinary:  Negative for hematuria.   Skin:  Negative for rash.   Allergic/Immunologic: Negative for immunocompromised state.   Neurological:  Negative for headaches.   Hematological:  Does not bruise/bleed easily.   Psychiatric/Behavioral:  Negative for agitation. The patient is not nervous/anxious.      Objective:      BP (!) 136/46 (BP Location: Left arm, Patient Position: Sitting, BP Method:  Medium (Manual))   Pulse 87   Temp 98.7 °F (37.1 °C) (Oral)   Wt 80.1 kg (176 lb 9.4 oz)   SpO2 96%   BMI 25.34 kg/m²   Physical Exam  Constitutional:       Appearance: He is well-developed.   Eyes:      Conjunctiva/sclera: Conjunctivae normal.      Pupils: Pupils are equal, round, and reactive to light.   Cardiovascular:      Rate and Rhythm: Normal rate and regular rhythm.      Heart sounds: Normal heart sounds.   Pulmonary:      Effort: Pulmonary effort is normal.   Musculoskeletal:         General: Normal range of motion.   Neurological:      Mental Status: He is alert and oriented to person, place, and time.   Psychiatric:         Behavior: Behavior normal.         Thought Content: Thought content normal.         Judgment: Judgment normal.       Assessment:       1. Seizure disorder    2. Debility    3. CVA, old, cognitive deficits    4. Diarrhea, unspecified type    5. Hypertension, unspecified type    6. Bradycardia    7. Chronic renal disease, stage IV    8. Primary hypertension    9. Type 2 diabetes mellitus without complication, without long-term current use of insulin    10. Moderate malnutrition    11. Secondary hyperparathyroidism of renal origin    12. Overweight (BMI 25.0-29.9)        Plan:       Seizure disorder  -     Levetiracetam level; Future; Expected date: 04/17/2023  Continue keppra dosage  Debility  Stable, has home health  CVA, old, cognitive deficits  Stable, on Asprin  Diarrhea, unspecified type  -     H. pylori antigen, stool; Future; Expected date: 04/17/2023  -     Pancreatic elastase, fecal; Future; Expected date: 04/17/2023  -     Fecal fat, qualitative; Future; Expected date: 04/17/2023  -     Occult blood x 1, stool; Future; Expected date: 04/17/2023  -     WBC, Stool; Future; Expected date: 04/17/2023  -     Adenovirus Molecular Detection, PCR, Non-Blood Stool; Future; Expected date: 04/17/2023  -     Calprotectin, Stool; Future; Expected date: 04/17/2023  -     Giardia /  Cryptosporidum, EIA; Future; Expected date: 04/17/2023  -     Stool Exam-Ova,Cysts,Parasites; Future; Expected date: 04/17/2023  -     Clostridium difficile EIA; Future; Expected date: 04/17/2023  -     Stool culture; Future; Expected date: 04/17/2023    Primary hypertension  -     COMPREHENSIVE METABOLIC PANEL; Future; Expected date: 04/17/2023  -     CBC W/ AUTO DIFFERENTIAL; Future; Expected date: 04/17/2023  -     Lipid Panel; Future; Expected date: 04/17/2023  Stable, continue management  BP Readings from Last 3 Encounters:   04/17/23 (!) 136/46   04/14/23 (!) 116/56   04/13/23 133/87      Bradycardia  -     Cardiac Monitor - 3-15 Day Adult (Cupid Only); Future    Chronic renal disease, stage IV  Stable and chronic.  Will continue to monitor q3-6 months and control chronic conditions as optimally as possible to preserve function.       Type 2 diabetes mellitus without complication, without long-term current use of insulin  Stable, continue management  Follow the ADA diet  Hemoglobin A1C   Date Value Ref Range Status   04/12/2023 5.5 4.5 - 6.2 % Final     Comment:     According to ADA guidelines, hemoglobin A1C <7.0% represents  optimal control in non-pregnant diabetic patients.  Different  metrics may apply to specific populations.   Standards of Medical Care in Diabetes - 2016.    For the purpose of screening for the presence of diabetes:  <5.7%     Consistent with the absence of diabetes  5.7-6.4%  Consistent with increasing risk for diabetes   (prediabetes)  >or=6.5%  Consistent with diabetes    Currently no consensus exists for use of hemoglobin A1C  for diagnosis of diabetes for children.     12/06/2016 5.8 4.5 - 6.2 % Final     Comment:     According to ADA guidelines, hemoglobin A1C <7.0% represents  optimal control in non-pregnant diabetic patients.  Different  metrics may apply to specific populations.   Standards of Medical Care in Diabetes - 2016.  For the purpose of screening for the presence of  "diabetes:  <5.7%     Consistent with the absence of diabetes  5.7-6.4%  Consistent with increasing risk for diabetes   (prediabetes)  >or=6.5%  Consistent with diabetes  Currently no consensus exists for use of hemoglobin A1C  for diagnosis of diabetes for children.     09/07/2016 6.4 (H) 4.5 - 6.2 % Final     Comment:     According to ADA guidelines, hemoglobin A1C <7.0% represents  optimal control in non-pregnant diabetic patients.  Different  metrics may apply to specific populations.   Standards of Medical Care in Diabetes - 2016.  For the purpose of screening for the presence of diabetes:  <5.7%     Consistent with the absence of diabetes  5.7-6.4%  Consistent with increasing risk for diabetes   (prediabetes)  >or=6.5%  Consistent with diabetes  Currently no consensus exists for use of hemoglobin A1C  for diagnosis of diabetes for children.        Secondary hyperparathyroidism of renal origin  Stable, continue management  Overweight (BMI 25.0-29.9)  Counseled patient on his ideal body weight, health consequences of being obese and current recommendations including weekly exercise and a heart healthy diet.  Current BMI is:Estimated body mass index is 25.34 kg/m² as calculated from the following:    Height as of 4/14/23: 5' 10" (1.778 m).    Weight as of this encounter: 80.1 kg (176 lb 9.4 oz)..  Patient is aware that ideal BMI < 25 or  .           Patient readiness: acceptance and barriers:none    During the course of the visit the patient was educated and counseled about the following:     Diabetes:  Discussed general issues about diabetes pathophysiology and management.  Addressed ADA diet.  Suggested low cholesterol diet.  Encouraged aerobic exercise.  Hypertension:   Dietary sodium restriction.  Regular aerobic exercise.    Goals: Diabetes: Maintain Hemoglobin A1C below 7 and Hypertension: Reduce Blood Pressure    Did patient meet goals/outcomes: Yes    The following self management tools provided: " declined    Patient Instructions (the written plan) was given to the patient/family.     Time spent with patient: 15 minutes    Barriers to medications present (no )    Adverse reactions to current medications (no)    Over the counter medications reviewed (Yes)

## 2023-04-18 LAB
BACTERIA BLD CULT: NORMAL
BACTERIA BLD CULT: NORMAL

## 2023-04-20 ENCOUNTER — TELEPHONE (OUTPATIENT)
Dept: FAMILY MEDICINE | Facility: CLINIC | Age: 88
End: 2023-04-20
Payer: OTHER GOVERNMENT

## 2023-04-20 NOTE — TELEPHONE ENCOUNTER
Spoke with Gabriela patient daughter.  Requesting stool specimen container.   Message sent to Ms Patton staff

## 2023-04-20 NOTE — TELEPHONE ENCOUNTER
----- Message from Melissa Griffith sent at 4/20/2023  9:25 AM CDT -----  Name of Who is Calling: Gabriela , daughter        What is the request in detail: stated that the pt was told to have a stool sample sent to lab but she does n ot have anything to place the stool in to send back to lab , please assist with this matter        Can the clinic reply by [facility]: no        What Number to Call Back if not in [facility]:[phone]

## 2023-04-26 ENCOUNTER — LAB VISIT (OUTPATIENT)
Dept: LAB | Facility: HOSPITAL | Age: 88
End: 2023-04-26
Attending: NURSE PRACTITIONER
Payer: OTHER GOVERNMENT

## 2023-04-26 DIAGNOSIS — R19.7 DIARRHEA, UNSPECIFIED TYPE: ICD-10-CM

## 2023-04-26 PROBLEM — E11.9 TYPE 2 DIABETES MELLITUS WITHOUT COMPLICATION, WITHOUT LONG-TERM CURRENT USE OF INSULIN: Status: ACTIVE | Noted: 2023-04-26

## 2023-04-26 PROCEDURE — 89055 LEUKOCYTE ASSESSMENT FECAL: CPT | Performed by: NURSE PRACTITIONER

## 2023-04-26 PROCEDURE — 87798 DETECT AGENT NOS DNA AMP: CPT | Performed by: NURSE PRACTITIONER

## 2023-04-26 PROCEDURE — 87177 OVA AND PARASITES SMEARS: CPT | Performed by: NURSE PRACTITIONER

## 2023-04-26 PROCEDURE — 87209 SMEAR COMPLEX STAIN: CPT | Performed by: NURSE PRACTITIONER

## 2023-04-26 PROCEDURE — 82705 FATS/LIPIDS FECES QUAL: CPT | Performed by: NURSE PRACTITIONER

## 2023-04-26 PROCEDURE — 82653 EL-1 FECAL QUANTITATIVE: CPT | Performed by: NURSE PRACTITIONER

## 2023-04-26 PROCEDURE — 87427 SHIGA-LIKE TOXIN AG IA: CPT | Mod: 59 | Performed by: NURSE PRACTITIONER

## 2023-04-26 PROCEDURE — 87449 NOS EACH ORGANISM AG IA: CPT | Performed by: NURSE PRACTITIONER

## 2023-04-26 PROCEDURE — 82272 OCCULT BLD FECES 1-3 TESTS: CPT | Performed by: NURSE PRACTITIONER

## 2023-04-26 PROCEDURE — 87338 HPYLORI STOOL AG IA: CPT | Performed by: NURSE PRACTITIONER

## 2023-04-26 PROCEDURE — 87046 STOOL CULTR AEROBIC BACT EA: CPT | Performed by: NURSE PRACTITIONER

## 2023-04-26 PROCEDURE — 87329 GIARDIA AG IA: CPT | Performed by: NURSE PRACTITIONER

## 2023-04-26 PROCEDURE — 87449 NOS EACH ORGANISM AG IA: CPT | Mod: 91 | Performed by: NURSE PRACTITIONER

## 2023-04-26 PROCEDURE — 83993 ASSAY FOR CALPROTECTIN FECAL: CPT | Performed by: NURSE PRACTITIONER

## 2023-04-26 PROCEDURE — 87045 FECES CULTURE AEROBIC BACT: CPT | Performed by: NURSE PRACTITIONER

## 2023-04-27 LAB
C DIFF GDH STL QL: NEGATIVE
C DIFF TOX A+B STL QL IA: NEGATIVE
OB PNL STL: NEGATIVE
WBC #/AREA STL HPF: NORMAL /[HPF]

## 2023-04-28 LAB
CRYPTOSP AG STL QL IA: NEGATIVE
E COLI SXT1 STL QL IA: NEGATIVE
E COLI SXT2 STL QL IA: NEGATIVE
FAT STL QL: NORMAL
G LAMBLIA AG STL QL IA: NEGATIVE
HADV DNA SERPL QL NAA+PROBE: NEGATIVE
NEUTRAL FAT STL QL: NORMAL
SPECIMEN SOURCE: NORMAL

## 2023-04-29 LAB — BACTERIA STL CULT: NORMAL

## 2023-05-01 LAB — ELASTASE 1, FECAL: 364 MCG/G

## 2023-05-01 NOTE — TELEPHONE ENCOUNTER
----- Message from Angel Portillo sent at 5/1/2023  7:34 AM CDT -----  Type:  RX Refill Request    Who Called:  Daughter/ Gabriela Brandon  Refill or New Rx:  Refill  RX Name and Strength:  rosuvastatin (CRESTOR) 10 MG tablet      How is the patient currently taking it? (ex. 1XDay):  1XDay  Is this a 30 day or 90 day RX:  90    Preferred Pharmacy with phone number:    [id number] [address] & [address] [address]  Phone: [phone] Fax: [phone]          Local or Mail Order:  Local  Ordering Provider:  Dallas Roberts Call Back Number:  [phone]  Additional Information:

## 2023-05-01 NOTE — TELEPHONE ENCOUNTER
Care Due:                  Date            Visit Type   Department     Provider  --------------------------------------------------------------------------------    Last Visit: None Found      None         None Found                              EP -                              PRIMARY      [facility]  Next Visit: 08-      CARE ([facility])   MEDICINE       Aristides Haynes                                                            Last  Test          Frequency    Reason                     Performed    Due Date  --------------------------------------------------------------------------------    Office Visit  12 months..  citalopram...............  Not Found    Overdue    [employer] Embedded Care Due Messages. Reference number: [id number].   5/01/2023 9:30:33 AM CDT

## 2023-05-01 NOTE — TELEPHONE ENCOUNTER
Refill Routing Note   Medication(s) are not appropriate for processing by [facility] for the following reason(s):      Patient not seen by PCP within 15 months  Patient seen in ED/Hospital since LOV with PCP  No active prescription written by PCP    [facility] action(s):  Defer          Medication reconciliation completed: No     Appointments  past 12m or future 3m with PCP    Date Provider   Last Visit   5/7/2020 Aristides Haynes MD   Next Visit   8/22/2023 Aristides Haynes MD   ED visits in past 90 days: 0        Note composed:3:18 PM 05/01/2023

## 2023-05-02 LAB — CALPROTECTIN STL-MCNT: 255.4 MCG/G

## 2023-05-03 LAB
H PYLORI AG STL QL IA: NOT DETECTED
SPECIMEN SOURCE: NORMAL

## 2023-05-03 RX ORDER — ROSUVASTATIN CALCIUM 10 MG/1
10 TABLET, COATED ORAL DAILY
Qty: 90 TABLET | Refills: 1 | Status: SHIPPED | OUTPATIENT
Start: 2023-05-03 | End: 2023-06-23 | Stop reason: SDUPTHER

## 2023-05-09 ENCOUNTER — TELEPHONE (OUTPATIENT)
Dept: FAMILY MEDICINE | Facility: CLINIC | Age: 88
End: 2023-05-09
Payer: OTHER GOVERNMENT

## 2023-05-09 NOTE — TELEPHONE ENCOUNTER
This is something that needs to be addressed by the patient PCP-who has not seen since 5/7/2020-has has seen different DESHAWN's over the years-please schedule a visit with Patient's PCP. Letter is ready for .

## 2023-05-09 NOTE — LETTER
May 9, 2023      [facility]  [address] [address]  Phone: [phone]  Fax: [phone]       Patient: Lavon Brandon   YOB: 1932  Date of Visit: 04/17/2023    To Whom It May Concern:    Tracy Brandon  was at [facility] on 04/17/2023. The patient has several chronic medical conditions which makes it difficult for him to perform his activities of daily living. Mr.Cecil Brandon can not live alone. Patient needs 24 hours assistance. If you have any questions or concerns, or if I can be of further assistance, please do not hesitate to contact me.    Sincerely,    Jillian Patton NP

## 2023-05-09 NOTE — TELEPHONE ENCOUNTER
----- Message from Nancy Dejesus sent at 5/9/2023  9:54 AM CDT -----  Contact: Elton Gabriela  Type:  Needs Medical Advice    Who Called: Gabriela Hammond, Pt Daughter  Symptoms (please be specific): Pt need a letter from PCP ofc stating he need assistance.       Would the patient rather a call back or a response via [facility]? call  Best Call Back Number: [phone], Gabriela Hammond     Additional Information: Pt daughter Gabriela states that she need a letter stating her father can't live alone so that she can get the assistance he need....    Please call to advise... Thank you...

## 2023-05-10 ENCOUNTER — PATIENT MESSAGE (OUTPATIENT)
Dept: FAMILY MEDICINE | Facility: CLINIC | Age: 88
End: 2023-05-10
Payer: OTHER GOVERNMENT

## 2023-05-10 DIAGNOSIS — G40.909 SEIZURE DISORDER: Primary | ICD-10-CM

## 2023-05-10 DIAGNOSIS — F03.918 DEMENTIA WITH BEHAVIORAL DISTURBANCE: ICD-10-CM

## 2023-05-11 LAB — O+P STL MICRO: NORMAL

## 2023-05-12 ENCOUNTER — TELEPHONE (OUTPATIENT)
Dept: FAMILY MEDICINE | Facility: CLINIC | Age: 88
End: 2023-05-12
Payer: OTHER GOVERNMENT

## 2023-05-12 NOTE — TELEPHONE ENCOUNTER
----- Message from Gretchen Bueno sent at 5/12/2023 12:02 PM CDT -----  Regarding: advice  Contact: Ruby  Type: Needs Medical Advice  Who Called:  Ruby with [facility]   Symptoms (please be specific):    How long has patient had these symptoms:    Pharmacy name and phone #:    Best Call Back Number: [phone]  Additional Information: Ruby stated that she would like to see if the doctor approve of twice a week for two weeks effective 5/15. Please contact to advise. Thanks!

## 2023-05-15 ENCOUNTER — TELEPHONE (OUTPATIENT)
Dept: CARDIOLOGY | Facility: CLINIC | Age: 88
End: 2023-05-15
Payer: OTHER GOVERNMENT

## 2023-05-28 ENCOUNTER — PATIENT MESSAGE (OUTPATIENT)
Dept: FAMILY MEDICINE | Facility: CLINIC | Age: 88
End: 2023-05-28
Payer: OTHER GOVERNMENT

## 2023-05-29 NOTE — TELEPHONE ENCOUNTER
No care due was identified.  [facility] Embedded Care Due Messages. Reference number: [id number].   5/29/2023 8:52:40 AM CDT

## 2023-05-30 RX ORDER — CITALOPRAM 20 MG/1
20 TABLET, FILM COATED ORAL DAILY
Qty: 90 TABLET | Refills: 11 | Status: SHIPPED | OUTPATIENT
Start: 2023-05-30

## 2023-06-16 ENCOUNTER — TELEPHONE (OUTPATIENT)
Dept: NEUROLOGY | Facility: CLINIC | Age: 88
End: 2023-06-16
Payer: OTHER GOVERNMENT

## 2023-06-16 NOTE — TELEPHONE ENCOUNTER
Called patient about appointment with Dr. GARNICA from a referral to neurology for seizures. Patient wanted to cancel appointment because they live on the [facility] and finding transportation here would be too difficult.

## 2023-06-16 NOTE — TELEPHONE ENCOUNTER
----- Message from Nandini Gardner sent at 6/16/2023  3:15 PM CDT -----  Good Afternoon,     The [facility] would like to refer the following patient to the Neurology  department. The patients diagnosis is Other Seizures . I have scanned the patients referral and records into . Patient is scheduled on 8/16. However his daughter called and stated patient just had another seizure she would like to know if he can be seen sooner.    Thank you,    Nandini Real

## 2023-06-23 ENCOUNTER — LAB VISIT (OUTPATIENT)
Dept: LAB | Facility: HOSPITAL | Age: 88
End: 2023-06-23
Attending: FAMILY MEDICINE
Payer: OTHER GOVERNMENT

## 2023-06-23 ENCOUNTER — OFFICE VISIT (OUTPATIENT)
Dept: FAMILY MEDICINE | Facility: CLINIC | Age: 88
End: 2023-06-23
Payer: OTHER GOVERNMENT

## 2023-06-23 VITALS
OXYGEN SATURATION: 96 % | SYSTOLIC BLOOD PRESSURE: 122 MMHG | HEART RATE: 62 BPM | HEIGHT: 70 IN | WEIGHT: 182.13 LBS | TEMPERATURE: 98 F | BODY MASS INDEX: 26.07 KG/M2 | DIASTOLIC BLOOD PRESSURE: 42 MMHG

## 2023-06-23 DIAGNOSIS — E11.49 DM (DIABETES MELLITUS), TYPE 2 WITH NEUROLOGICAL COMPLICATIONS: ICD-10-CM

## 2023-06-23 DIAGNOSIS — N18.4 ANEMIA OF CHRONIC RENAL FAILURE, STAGE 4 (SEVERE): Chronic | ICD-10-CM

## 2023-06-23 DIAGNOSIS — D63.1 ANEMIA OF CHRONIC RENAL FAILURE, STAGE 4 (SEVERE): Chronic | ICD-10-CM

## 2023-06-23 DIAGNOSIS — R56.9 SEIZURE: ICD-10-CM

## 2023-06-23 DIAGNOSIS — E66.3 OVERWEIGHT (BMI 25.0-29.9): ICD-10-CM

## 2023-06-23 DIAGNOSIS — I10 PRIMARY HYPERTENSION: Chronic | ICD-10-CM

## 2023-06-23 DIAGNOSIS — I10 HYPERTENSION, UNSPECIFIED TYPE: ICD-10-CM

## 2023-06-23 DIAGNOSIS — E78.5 DYSLIPIDEMIA: ICD-10-CM

## 2023-06-23 DIAGNOSIS — F03.918 DEMENTIA WITH BEHAVIORAL DISTURBANCE: Chronic | ICD-10-CM

## 2023-06-23 DIAGNOSIS — E11.9 TYPE 2 DIABETES MELLITUS WITHOUT COMPLICATION, WITHOUT LONG-TERM CURRENT USE OF INSULIN: ICD-10-CM

## 2023-06-23 DIAGNOSIS — R05.9 COUGH, UNSPECIFIED TYPE: Primary | ICD-10-CM

## 2023-06-23 LAB
ALBUMIN SERPL BCP-MCNC: 3.5 G/DL (ref 3.5–5.2)
ALP SERPL-CCNC: 53 U/L (ref 55–135)
ALT SERPL W/O P-5'-P-CCNC: 9 U/L (ref 10–44)
ANION GAP SERPL CALC-SCNC: 11 MMOL/L (ref 8–16)
AST SERPL-CCNC: 18 U/L (ref 10–40)
BILIRUB SERPL-MCNC: 0.3 MG/DL (ref 0.1–1)
BUN SERPL-MCNC: 37 MG/DL (ref 8–23)
CA-I BLDV-SCNC: 1.31 MMOL/L (ref 1.06–1.42)
CALCIUM SERPL-MCNC: 9.3 MG/DL (ref 8.7–10.5)
CHLORIDE SERPL-SCNC: 108 MMOL/L (ref 95–110)
CO2 SERPL-SCNC: 22 MMOL/L (ref 23–29)
CREAT SERPL-MCNC: 2.3 MG/DL (ref 0.5–1.4)
EST. GFR  (NO RACE VARIABLE): 26.3 ML/MIN/1.73 M^2
ESTIMATED AVG GLUCOSE: 100 MG/DL (ref 68–131)
FERRITIN SERPL-MCNC: 212 NG/ML (ref 20–300)
GLUCOSE SERPL-MCNC: 117 MG/DL (ref 70–110)
HBA1C MFR BLD: 5.1 % (ref 4–5.6)
IRON SERPL-MCNC: 64 UG/DL (ref 45–160)
PHOSPHATE SERPL-MCNC: 3.5 MG/DL (ref 2.7–4.5)
POTASSIUM SERPL-SCNC: 4.9 MMOL/L (ref 3.5–5.1)
PROT SERPL-MCNC: 6.8 G/DL (ref 6–8.4)
SATURATED IRON: 27 % (ref 20–50)
SODIUM SERPL-SCNC: 141 MMOL/L (ref 136–145)
TOTAL IRON BINDING CAPACITY: 234 UG/DL (ref 250–450)
TRANSFERRIN SERPL-MCNC: 158 MG/DL (ref 200–375)

## 2023-06-23 PROCEDURE — 84466 ASSAY OF TRANSFERRIN: CPT | Performed by: FAMILY MEDICINE

## 2023-06-23 PROCEDURE — 84443 ASSAY THYROID STIM HORMONE: CPT | Performed by: FAMILY MEDICINE

## 2023-06-23 PROCEDURE — 99999 PR PBB SHADOW E&M-EST. PATIENT-LVL V: ICD-10-PCS | Mod: PBBFAC,,, | Performed by: FAMILY MEDICINE

## 2023-06-23 PROCEDURE — 84100 ASSAY OF PHOSPHORUS: CPT | Performed by: FAMILY MEDICINE

## 2023-06-23 PROCEDURE — 80177 DRUG SCRN QUAN LEVETIRACETAM: CPT | Performed by: FAMILY MEDICINE

## 2023-06-23 PROCEDURE — 99214 PR OFFICE/OUTPT VISIT, EST, LEVL IV, 30-39 MIN: ICD-10-PCS | Mod: S$PBB,,, | Performed by: FAMILY MEDICINE

## 2023-06-23 PROCEDURE — 99999 PR PBB SHADOW E&M-EST. PATIENT-LVL V: CPT | Mod: PBBFAC,,, | Performed by: FAMILY MEDICINE

## 2023-06-23 PROCEDURE — 82728 ASSAY OF FERRITIN: CPT | Performed by: FAMILY MEDICINE

## 2023-06-23 PROCEDURE — 82330 ASSAY OF CALCIUM: CPT | Performed by: FAMILY MEDICINE

## 2023-06-23 PROCEDURE — 99214 OFFICE O/P EST MOD 30 MIN: CPT | Mod: S$PBB,,, | Performed by: FAMILY MEDICINE

## 2023-06-23 PROCEDURE — 83970 ASSAY OF PARATHORMONE: CPT | Performed by: FAMILY MEDICINE

## 2023-06-23 PROCEDURE — 82306 VITAMIN D 25 HYDROXY: CPT | Performed by: FAMILY MEDICINE

## 2023-06-23 PROCEDURE — 99215 OFFICE O/P EST HI 40 MIN: CPT | Mod: PBBFAC,PO | Performed by: FAMILY MEDICINE

## 2023-06-23 PROCEDURE — 80053 COMPREHEN METABOLIC PANEL: CPT | Performed by: FAMILY MEDICINE

## 2023-06-23 PROCEDURE — 83036 HEMOGLOBIN GLYCOSYLATED A1C: CPT | Performed by: FAMILY MEDICINE

## 2023-06-23 RX ORDER — HYDRALAZINE HYDROCHLORIDE 50 MG/1
50 TABLET, FILM COATED ORAL EVERY 12 HOURS
Qty: 180 TABLET | Refills: 4 | Status: ON HOLD | OUTPATIENT
Start: 2023-06-23 | End: 2023-12-04 | Stop reason: HOSPADM

## 2023-06-23 RX ORDER — QUETIAPINE FUMARATE 25 MG/1
25 TABLET, FILM COATED ORAL NIGHTLY PRN
Qty: 30 TABLET | Refills: 3 | Status: ON HOLD | OUTPATIENT
Start: 2023-06-23 | End: 2023-12-08 | Stop reason: HOSPADM

## 2023-06-23 RX ORDER — CALCITRIOL 0.25 UG/1
0.25 CAPSULE ORAL DAILY
Qty: 90 CAPSULE | Refills: 4 | Status: SHIPPED | OUTPATIENT
Start: 2023-06-23

## 2023-06-23 RX ORDER — ROSUVASTATIN CALCIUM 10 MG/1
10 TABLET, COATED ORAL DAILY
Qty: 90 TABLET | Refills: 4 | Status: ON HOLD | OUTPATIENT
Start: 2023-06-23 | End: 2023-12-04 | Stop reason: HOSPADM

## 2023-06-23 RX ORDER — AMLODIPINE BESYLATE 5 MG/1
5 TABLET ORAL NIGHTLY
Qty: 90 TABLET | Refills: 3 | Status: ON HOLD | OUTPATIENT
Start: 2023-06-23 | End: 2023-12-04 | Stop reason: HOSPADM

## 2023-06-23 NOTE — PATIENT INSTRUCTIONS
Patient Education       Type 2 Diabetes Discharge Instructions   About this topic   Type 2 diabetes is the most common type of diabetes. If you have this illness, your body does not make enough insulin or does not correctly use the insulin it does make. When you eat, your body breaks down all sugars and starches into glucose. Your body needs insulin to use glucose for energy. The insulin takes the glucose from your blood into your cells. If you do not have enough insulin, or your body does not use the insulin well, the glucose or sugar stays in your blood instead of going into your cells. This causes your blood sugar levels to be too high. Over time, this can damage your heart, nerves, eyes, kidneys, and other organs.     What care is needed at home?   Learn how to take care of your diabetes.  Ask your doctor what you need to do when you go home. Make sure you ask questions if you do not understand what the doctor says. This way you will know what you need to do.  Based on what diabetes drugs you take, you might need to check your blood sugar regularly at home. But not everyone with type 2 diabetes needs to do this. If you need to, your doctor will teach you how to check your blood sugar. Ask what the goal numbers are for your blood sugar. Keep a list of your blood sugar levels. This will help you learn what causes high or low readings and help you manage your diabetes.     Take your diabetes drugs as directed. Ask what to do if you miss a dose of your diabetes drugs.  Learn when, what, and how much to eat.  Be active. Walk, garden, or do something active for 30 minutes or more on most days of the week. This will also help you control your weight. Ask your doctor for proper food and exercise programs to follow.  Check your feet often. Look for blisters or sores. Always wear socks and shoes. Never walk barefoot, especially outdoors. Take special care around the pool and at the beach, as these surfaces may be  extremely hot and burn your feet. Report any problems with your feet to your doctor.  Wear a medical ID.  Limit or avoid beer, wine, and mixed drinks (alcohol).  If you smoke, try to quit. Your doctor or nurse can help.  Talk with your doctor about if you need to check your blood sugar at home.  If you are overweight, ask your doctor if you would be a good candidate for bariatric surgery as this can reverse diabetes in some cases.  What follow-up care is needed?   Your doctor may ask you to make visits to the office to check on your progress. Be sure to keep these visits.  What drugs may be needed?   Diabetes drugs will help control your blood sugar. You may have more than one diabetes drug. Your doctor may order drugs for you to take by mouth or insulin as a shot. You will be trained on how to give insulin shots, if needed. Talk to your doctor about your diabetes drugs and what you need to do when you go home.  Will physical activity be limited?   Being active can lower blood sugar and blood pressure. It can also help control weight. Be sure to check with your doctor before starting any exercise program.  Try to walk, bike, or swim every day. Start with 5 to 10 minutes each day. Work up to about 30 minutes most days.  Drink lots of water during workouts.  What changes to diet are needed?   Eating a healthy diet is important. This means you need to eat regularly throughout the day. You need to include a variety of foods such as fruits and vegetables, whole grains, nonfat dairy products, and lean meats. Do not eat too much food at one time and do not skip meals. Limit foods high in sugar like sweets, desserts, and fruit juices. Ask your doctor about what kind of diet is right for you.  What problems could happen?   Heart, kidney, and nerve problems  Foot problems. Sores and infection may also happen.  Eye problems  Dangerously high blood sugar levels requiring emergency treatment  Severe infections  Talk with your  doctor often. Other drugs or care may be needed to treat or prevent these problems.  When do I need to call the doctor?   You have signs of high blood sugar like you:  Are more thirsty  Are urinating more often  Have new shortness of breath  Have belly pain, an upset stomach, or are throwing up.  Are more tired than normal  Have a very dry mouth or a fruity breath odor  Signs of infection. These include a fever of 100.4°F (38°C) or higher, chills, or a wound that will not heal.  Blurred vision  Chest pain  Swelling in your legs  Change in color and odor of your feet  Blood sugar that remains high and does not respond to treatment  Helpful tips   Talk to your doctor about getting a flu shot and pneumonia shot.  Teach Back: Helping You Understand   The Teach Back Method helps you understand the information we are giving you. After you talk with the staff, tell them in your own words what you learned. This helps to make sure the staff has described each thing clearly. It also helps to explain things that may have been confusing. Before going home, make sure you can do these:  I can tell you about my condition.  I can tell you what I need to do to control my blood sugar.  I can tell you what I will do if I have signs of high blood sugar.  Where can I learn more?   Center for Disease Control and Prevention  https://www.cdc.gov/diabetes/basics/type2.html   International Diabetes Foundation  https://www.idf.org/aboutdiabetes/type-2-diabetes.html   UpToDate  https://www.[employer].com/contents/type-2-diabetes-overview-beyond-the-basics   Last Reviewed Date   2021-05-04  Consumer Information Use and Disclaimer   This information is not specific medical advice and does not replace information you receive from your health care provider. This is only a brief summary of general information. It does NOT include all information about conditions, illnesses, injuries, tests, procedures, treatments, therapies, discharge instructions or  life-style choices that may apply to you. You must talk with your health care provider for complete information about your health and treatment options. This information should not be used to decide whether or not to accept your health care providers advice, instructions or recommendations. Only your health care provider has the knowledge and training to provide advice that is right for you.  Copyright   Copyright © 2021 [employer] and its affiliates and/or licensors. All rights reserved.

## 2023-06-24 LAB
25(OH)D3+25(OH)D2 SERPL-MCNC: 27 NG/ML (ref 30–96)
PTH-INTACT SERPL-MCNC: 118.8 PG/ML (ref 9–77)
TSH SERPL DL<=0.005 MIU/L-ACNC: 1.33 UIU/ML (ref 0.4–4)

## 2023-06-25 NOTE — PROGRESS NOTES
I note some minor lab abnormalities that have been stable over time, these are of stable clinical significance.  The parathyroid (PTH) still present.; continue taking Calcitrol (a form of vitamin-D).  Iron levels are stable, needs to continue taking iron rich vegetables.   Kidneys function are working only 1/4 of the function.  Please continue hydration and try to avoid high starchy diet.  Thyroid stable.  Diabetes fairly controlled.  Normal phosphorus.  Also calcium normal levels.  He will benefit from nephrologist appointment.  And labs should be repeated at least 3 times a year.

## 2023-06-26 LAB — LEVETIRACETAM SERPL-MCNC: 43.5 UG/ML (ref 3–60)

## 2023-06-27 ENCOUNTER — TELEPHONE (OUTPATIENT)
Dept: FAMILY MEDICINE | Facility: CLINIC | Age: 88
End: 2023-06-27
Payer: OTHER GOVERNMENT

## 2023-06-27 NOTE — TELEPHONE ENCOUNTER
----- Message from Aristides Haynes MD sent at 6/25/2023  3:14 PM CDT -----  I note some minor lab abnormalities that have been stable over time, these are of stable clinical significance.  The parathyroid (PTH) still present.; continue taking Calcitrol (a form of vitamin-D).  Iron levels are stable, needs to continue taking iron rich vegetables.   Kidneys function are working only 1/4 of the function.  Please continue hydration and try to avoid high starchy diet.  Thyroid stable.  Diabetes fairly controlled.  Normal phosphorus.  Also calcium normal levels.  He will benefit from nephrologist appointment.  And labs should be repeated at least 3 times a year.

## 2023-07-07 NOTE — PROGRESS NOTES
SUBJECTIVE:   Lavon Brandon is a 89 y.o. male presenting for his annual checkup.  Dementia: He is here for evaluation and treatment of cognitive problems. He is accompanied by patient and . Primary caregiver is  and wife. The family and the patient identify problems with changes in short and long term memory, day/night changes, and getting disoriented outside of familiar environment. Family and patient report problems with delusional thinking. Family and patient are concerned about  inability to stay clean/ incontenent, however, they are not concerned about inability to maintain adequate nutrition. Medication administration: family monitors medication usage, family sets up medications, and caregiver monitors the use of medications    Functional Assessment:   Activities of Daily Living (ADLs):    He is independent in the following: ambulation and feeding  Requires assistance with the following: bathing and hygiene, continence, grooming, toileting, and dressing  No polyuria, polydipsia, blurry vision, chest pain, dyspnea or claudication.  No foot burning, numbness or pain. Taking medication compliantly without noted sided effects. Follows diet fairly well.  No home glucose monitoring  Has seen diabetic educator. Last eye exam is within the year was reportedly negative. Last foot exam negative within the past year.  Last hemoglobin A1c.  Lab Results   Component Value Date    HGBA1C 5.1 06/23/2023           Current Outpatient Medications   Medication Sig Dispense Refill    amLODIPine (NORVASC) 5 MG tablet Take 1 tablet (5 mg total) by mouth every evening. 90 tablet 3    aspirin 81 MG Chew Take 1 tablet (81 mg total) by mouth once daily. 90 tablet 0    calcitRIOL (ROCALTROL) 0.25 MCG Cap Take 1 capsule (0.25 mcg total) by mouth once daily. 90 capsule 4    cetirizine (ZYRTEC) 5 MG tablet Take 1 tablet (5 mg total) by mouth once daily. for 20 days 20 tablet 0    citalopram (CELEXA) 20 MG tablet Take  "1 tablet (20 mg total) by mouth once daily. 90 tablet 11    docusate sodium (COLACE) 100 MG capsule Take 1 capsule (100 mg total) by mouth once daily. 90 capsule 3    fluticasone propionate (FLONASE) 50 mcg/actuation nasal spray 2 sprays (100 mcg total) by Each Nostril route once daily. 18.2 mL 0    hydrALAZINE (APRESOLINE) 50 MG tablet Take 1 tablet (50 mg total) by mouth every 12 (twelve) hours. 180 tablet 4    levETIRAcetam (KEPPRA) 750 MG Tab Take 1 tablet (750 mg total) by mouth 2 (two) times daily. 180 tablet 3    methyl salicylate-menthol 15-10% 15-10 % Crea APPLY MODERATE AMOUNT TOPICALLY TWICE A DAY FOR PAIN      QUEtiapine (SEROQUEL) 25 MG Tab Take 1 tablet (25 mg total) by mouth nightly as needed (restless). 30 tablet 3    rosuvastatin (CRESTOR) 10 MG tablet Take 1 tablet (10 mg total) by mouth once daily. 90 tablet 4     No current facility-administered medications for this visit.     Allergies: Ciprofloxacin (bulk)     ROS:  Feeling well. No dyspnea or chest pain on exertion. No abdominal pain, change in bowel habits, black or bloody stools. No urinary tract or prostatic symptoms. No neurological complaints.    OBJECTIVE:   The patient appears well, alert, oriented x 3, in no distress.   BP (!) 122/42 (BP Location: Left arm, Patient Position: Sitting, BP Method: Medium (Manual))   Pulse 62   Temp 97.6 °F (36.4 °C) (Oral)   Ht 5' 10" (1.778 m)   Wt 82.6 kg (182 lb 1.6 oz)   SpO2 96%   BMI 26.13 kg/m²   ENT normal.  Neck supple. No adenopathy or thyromegaly. MARY. Lungs are clear, good air entry, no wheezes, rhonchi or rales. S1 and S2 normal, no murmurs, regular rate and rhythm. Abdomen is soft without tenderness, guarding, mass or organomegaly.  exam: no penile lesions or discharge, no testicular masses or tenderness, no hernias.  Extremities show no edema, normal peripheral pulses. Neurological is normal without focal findings.    ASSESSMENT:   healthy adult male  Cough, unspecified type  -    "  rosuvastatin (CRESTOR) 10 MG tablet; Take 1 tablet (10 mg total) by mouth once daily.  Dispense: 90 tablet; Refill: 4    Dementia with behavioral disturbance  -     amLODIPine (NORVASC) 5 MG tablet; Take 1 tablet (5 mg total) by mouth every evening.  Dispense: 90 tablet; Refill: 3  -     QUEtiapine (SEROQUEL) 25 MG Tab; Take 1 tablet (25 mg total) by mouth nightly as needed (restless).  Dispense: 30 tablet; Refill: 3    Primary hypertension    Anemia of chronic renal failure, stage 4 (severe)  -     Comprehensive metabolic panel; Future; Expected date: 06/23/2023  -     Lipid panel; Future; Expected date: 06/23/2023  -     Microalbumin/creatinine urine ratio; Future; Expected date: 06/23/2023  -     TSH; Future; Expected date: 06/23/2023  -     Vitamin D; Future; Expected date: 06/23/2023  -     PTH, intact; Future; Expected date: 06/23/2023  -     Phosphorus; Future; Expected date: 06/23/2023  -     Calcium, ionized; Future; Expected date: 06/23/2023  -     Ambulatory referral/consult to Nephrology; Future; Expected date: 06/30/2023  -     Ferritin; Future; Expected date: 06/23/2023  -     Iron and TIBC; Future; Expected date: 06/23/2023    Type 2 diabetes mellitus without complication, without long-term current use of insulin  -     HEMOGLOBIN A1C; Future; Expected date: 06/23/2023    Overweight (BMI 25.0-29.9)    Hypertension, unspecified type  -     amLODIPine (NORVASC) 5 MG tablet; Take 1 tablet (5 mg total) by mouth every evening.  Dispense: 90 tablet; Refill: 3  -     rosuvastatin (CRESTOR) 10 MG tablet; Take 1 tablet (10 mg total) by mouth once daily.  Dispense: 90 tablet; Refill: 4    DM (diabetes mellitus), type 2 with neurological complications    Dyslipidemia    Seizure  -     Levetiracetam level; Future; Expected date: 06/23/2023    Other orders  -     calcitRIOL (ROCALTROL) 0.25 MCG Cap; Take 1 capsule (0.25 mcg total) by mouth once daily.  Dispense: 90 capsule; Refill: 4  -     hydrALAZINE  (APRESOLINE) 50 MG tablet; Take 1 tablet (50 mg total) by mouth every 12 (twelve) hours.  Dispense: 180 tablet; Refill: 4        PLAN:   begin progressive daily aerobic exercise program, follow a low fat, low cholesterol diet, attempt to lose weight, reduce salt in diet and cooking, reduce exposure to stress, continue current medications, continue current healthy lifestyle patterns, and return for routine annual checkups  Discussed health maintenance guidelines appropriate for age.

## 2023-08-01 ENCOUNTER — OFFICE VISIT (OUTPATIENT)
Dept: NEPHROLOGY | Facility: CLINIC | Age: 88
End: 2023-08-01
Payer: OTHER GOVERNMENT

## 2023-08-01 VITALS — SYSTOLIC BLOOD PRESSURE: 138 MMHG | HEIGHT: 70 IN | DIASTOLIC BLOOD PRESSURE: 60 MMHG | BODY MASS INDEX: 26.13 KG/M2

## 2023-08-01 DIAGNOSIS — N18.4 CKD (CHRONIC KIDNEY DISEASE) STAGE 4, GFR 15-29 ML/MIN: Primary | ICD-10-CM

## 2023-08-01 DIAGNOSIS — N18.4 ANEMIA OF CHRONIC RENAL FAILURE, STAGE 4 (SEVERE): Chronic | ICD-10-CM

## 2023-08-01 DIAGNOSIS — D63.1 ANEMIA OF CHRONIC RENAL FAILURE, STAGE 4 (SEVERE): Chronic | ICD-10-CM

## 2023-08-01 PROCEDURE — 99999 PR PBB SHADOW E&M-EST. PATIENT-LVL IV: ICD-10-PCS | Mod: PBBFAC,,, | Performed by: INTERNAL MEDICINE

## 2023-08-01 PROCEDURE — 99999 PR PBB SHADOW E&M-EST. PATIENT-LVL IV: CPT | Mod: PBBFAC,,, | Performed by: INTERNAL MEDICINE

## 2023-08-01 PROCEDURE — 99214 OFFICE O/P EST MOD 30 MIN: CPT | Mod: PBBFAC,PN | Performed by: INTERNAL MEDICINE

## 2023-08-01 PROCEDURE — 99204 OFFICE O/P NEW MOD 45 MIN: CPT | Mod: S$PBB,,, | Performed by: INTERNAL MEDICINE

## 2023-08-01 PROCEDURE — 99204 PR OFFICE/OUTPT VISIT, NEW, LEVL IV, 45-59 MIN: ICD-10-PCS | Mod: S$PBB,,, | Performed by: INTERNAL MEDICINE

## 2023-08-01 NOTE — PROGRESS NOTES
Subjective:       Patient ID: Lavon Brandon is a 89 y.o. Black or  male who presents for new patient evaluation for chronic renal failure.    Lavon Brandon is referred by Aristides Haynes MD to be evaluated for chronic renal failure.  He had a seizure around [facility] but has been in his usual state of health since then.  He had COVID in October of 2022.  He denies any new medications and has some chronic edema which he wears stockings.  He does not take NSAIDs regularly.      Review of Systems   Constitutional:  Negative for fever.   HENT:  Negative for congestion.    Respiratory:  Negative for shortness of breath.    Cardiovascular:  Negative for chest pain.   Gastrointestinal:  Negative for abdominal pain.   Genitourinary:  Positive for frequency (at night).   Musculoskeletal:  Positive for arthralgias and gait problem.   Neurological:  Negative for headaches.   Psychiatric/Behavioral:  Positive for decreased concentration.          The past medical, family and social histories were reviewed for this encounter.     Past Medical History:   Diagnosis Date    Bradyarrhythmia     CVA (cerebral infarction) 2006    Dementia     Depression     Hyperlipidemia     Hypertension     renal htn    Pulmonary embolism 2002    Seizure disorder      Past Surgical History:   Procedure Laterality Date    left foot  with crushed injry       Social History     Socioeconomic History    Marital status:    Tobacco Use    Smoking status: Never    Smokeless tobacco: Never   Substance and Sexual Activity    Alcohol use: No    Drug use: No     Social Determinants of Health     Financial Resource Strain: Unknown (10/15/2022)    Overall Financial Resource Strain (CARDIA)     Difficulty of Paying Living Expenses: Patient refused   Food Insecurity: Unknown (10/15/2022)    Hunger Vital Sign     Worried About Running Out of Food in the Last Year: Patient refused     Ran Out of Food in the Last Year: Patient refused    Transportation Needs: Unknown (10/15/2022)    PRAPARE - Transportation     Lack of Transportation (Medical): Patient refused     Lack of Transportation (Non-Medical): Patient refused   Physical Activity: Unknown (10/15/2022)    Exercise Vital Sign     Days of Exercise per Week: Patient refused     Minutes of Exercise per Session: Patient refused   Stress: Unknown (10/15/2022)    [employer] - Occupational Stress Questionnaire     Feeling of Stress : Patient refused   Social Connections: Unknown (10/15/2022)    Social Connection and Isolation Panel [NHANES]     Frequency of Communication with Friends and Family: Patient refused     Frequency of Social Gatherings with Friends and Family: Patient refused     Attends Anglican Services: Patient refused     Active Member of Clubs or Organizations: Patient refused     Attends Club or Organization Meetings: Patient refused     Marital Status: Patient refused   Housing Stability: Unknown (10/15/2022)    Housing Stability Vital Sign     Unable to Pay for Housing in the Last Year: Patient refused     Unstable Housing in the Last Year: Patient refused     Current Outpatient Medications   Medication Sig    amLODIPine (NORVASC) 5 MG tablet Take 1 tablet (5 mg total) by mouth every evening.    aspirin 81 MG Chew Take 1 tablet (81 mg total) by mouth once daily.    calcitRIOL (ROCALTROL) 0.25 MCG Cap Take 1 capsule (0.25 mcg total) by mouth once daily.    cetirizine (ZYRTEC) 5 MG tablet Take 1 tablet (5 mg total) by mouth once daily. for 20 days    citalopram (CELEXA) 20 MG tablet Take 1 tablet (20 mg total) by mouth once daily.    docusate sodium (COLACE) 100 MG capsule Take 1 capsule (100 mg total) by mouth once daily.    fluticasone propionate (FLONASE) 50 mcg/actuation nasal spray 2 sprays (100 mcg total) by Each Nostril route once daily.    hydrALAZINE (APRESOLINE) 50 MG tablet Take 1 tablet (50 mg total) by mouth every 12 (twelve) hours.     "levETIRAcetam (KEPPRA) 750 MG Tab Take 1 tablet (750 mg total) by mouth 2 (two) times daily.    methyl salicylate-menthol 15-10% 15-10 % Crea APPLY MODERATE AMOUNT TOPICALLY TWICE A DAY FOR PAIN    mv-min/folic/K1/lycopen/lutein (CENTRUM SILVER ULTRA MEN'S ORAL) Take by mouth.    rosuvastatin (CRESTOR) 10 MG tablet Take 1 tablet (10 mg total) by mouth once daily.    QUEtiapine (SEROQUEL) 25 MG Tab Take 1 tablet (25 mg total) by mouth nightly as needed (restless). (Patient not taking: Reported on 8/1/2023)     No current facility-administered medications for this visit.       /60 (BP Location: Left arm, Patient Position: Sitting, BP Method: Medium (Manual))   Ht 5' 10" (1.778 m)   BMI 26.13 kg/m²     Objective:      Physical Exam  Vitals reviewed.   Constitutional:       General: He is not in acute distress.  HENT:      Head: Normocephalic and atraumatic.   Eyes:      General: No scleral icterus.  Cardiovascular:      Rate and Rhythm: Normal rate.      Heart sounds: No murmur heard.  Pulmonary:      Effort: Pulmonary effort is normal. No respiratory distress.   Abdominal:      General: Abdomen is flat.      Palpations: Abdomen is soft.   Musculoskeletal:      Right lower leg: Edema (trace) present.      Left lower leg: Edema (trace) present.   Skin:     General: Skin is warm and dry.   Neurological:      Mental Status: He is alert.         Assessment:       1. CKD (chronic kidney disease) stage 4, GFR 15-29 ml/min    2. Anemia of chronic renal failure, stage 4 (severe)        Plan:   Return to clinic in 4 months.  Labs for next visit include rp pth.  Baseline creatinine is 2.0-2.2 since 2013.  Urine sediment was negative.  Regarding his anemia, his Hgb is greater that 10 thus there is no role for epogen at this time.  Blood pressure is well controlled.  His amlodipine may be contributing to his edema.    " reflux/heartburn

## 2023-08-24 ENCOUNTER — TELEPHONE (OUTPATIENT)
Dept: FAMILY MEDICINE | Facility: CLINIC | Age: 88
End: 2023-08-24
Payer: OTHER GOVERNMENT

## 2023-08-24 NOTE — TELEPHONE ENCOUNTER
----- Message from Marcia Kevin sent at 8/24/2023  9:15 AM CDT -----  Contact: pt daughter  Type:  Needs Medical Advice    Who Called: pt daughter  Symptoms (please be specific): severe back pain   How long has patient had these symptoms:  a long time    Pharmacy name and phone #:    [id number] [address] [address]  Phone: [phone] Fax: [phone]        Would the patient rather a call back or a response via [facility]? call  Best Call Back Number: [phone]  Additional Information: States that she need  callback as soon as possible. States that she wants to know if office can call in some pain Rx for the pt. Please advise thank you

## 2023-09-28 ENCOUNTER — DOCUMENT SCAN (OUTPATIENT)
Dept: HOME HEALTH SERVICES | Facility: HOSPITAL | Age: 88
End: 2023-09-28
Payer: OTHER GOVERNMENT

## 2023-11-01 ENCOUNTER — TELEPHONE (OUTPATIENT)
Dept: FAMILY MEDICINE | Facility: CLINIC | Age: 88
End: 2023-11-01

## 2023-11-01 ENCOUNTER — OFFICE VISIT (OUTPATIENT)
Dept: FAMILY MEDICINE | Facility: CLINIC | Age: 88
End: 2023-11-01
Payer: OTHER GOVERNMENT

## 2023-11-01 VITALS
WEIGHT: 155.88 LBS | OXYGEN SATURATION: 98 % | DIASTOLIC BLOOD PRESSURE: 56 MMHG | RESPIRATION RATE: 18 BRPM | HEIGHT: 70 IN | SYSTOLIC BLOOD PRESSURE: 128 MMHG | BODY MASS INDEX: 22.32 KG/M2 | HEART RATE: 77 BPM | TEMPERATURE: 98 F

## 2023-11-01 DIAGNOSIS — E78.2 MIXED HYPERLIPIDEMIA: Chronic | ICD-10-CM

## 2023-11-01 DIAGNOSIS — E11.9 TYPE 2 DIABETES MELLITUS WITHOUT COMPLICATION, WITHOUT LONG-TERM CURRENT USE OF INSULIN: ICD-10-CM

## 2023-11-01 DIAGNOSIS — N18.4 CHRONIC RENAL DISEASE, STAGE IV: Primary | Chronic | ICD-10-CM

## 2023-11-01 DIAGNOSIS — R53.81 PHYSICAL DECONDITIONING: ICD-10-CM

## 2023-11-01 DIAGNOSIS — I10 PRIMARY HYPERTENSION: Chronic | ICD-10-CM

## 2023-11-01 DIAGNOSIS — F03.918 DEMENTIA WITH BEHAVIORAL DISTURBANCE: Chronic | ICD-10-CM

## 2023-11-01 DIAGNOSIS — N25.81 SECONDARY HYPERPARATHYROIDISM OF RENAL ORIGIN: ICD-10-CM

## 2023-11-01 DIAGNOSIS — Z86.73 HISTORY OF CVA IN ADULTHOOD: ICD-10-CM

## 2023-11-01 DIAGNOSIS — G40.409 CENTRENCEPHALIC EPILEPSY: Chronic | ICD-10-CM

## 2023-11-01 DIAGNOSIS — R63.4 WEIGHT LOSS: ICD-10-CM

## 2023-11-01 PROCEDURE — 99214 OFFICE O/P EST MOD 30 MIN: CPT | Mod: S$PBB,,, | Performed by: NURSE PRACTITIONER

## 2023-11-01 PROCEDURE — 99214 PR OFFICE/OUTPT VISIT, EST, LEVL IV, 30-39 MIN: ICD-10-PCS | Mod: S$PBB,,, | Performed by: NURSE PRACTITIONER

## 2023-11-01 PROCEDURE — 99999 PR PBB SHADOW E&M-EST. PATIENT-LVL V: ICD-10-PCS | Mod: PBBFAC,,, | Performed by: NURSE PRACTITIONER

## 2023-11-01 PROCEDURE — 99999 PR PBB SHADOW E&M-EST. PATIENT-LVL V: CPT | Mod: PBBFAC,,, | Performed by: NURSE PRACTITIONER

## 2023-11-01 PROCEDURE — 99215 OFFICE O/P EST HI 40 MIN: CPT | Mod: PBBFAC,PO | Performed by: NURSE PRACTITIONER

## 2023-11-01 NOTE — PROGRESS NOTES
This dictation has been generated using Modal Fluency Dictation some phonetic errors may occur. Please contact author for clarification if needed.     Problem List Items Addressed This Visit       Hypertension (Chronic)    Overview     renal htn         Relevant Orders    Cardiac Monitor - 3-15 Day Adult (Cupid Only)    Hyperlipidemia (Chronic)    Relevant Orders    Lipid Panel    TSH    Dementia with behavioral disturbance (Chronic)    Chronic renal disease, stage IV - Primary (Chronic)    Relevant Orders    Comprehensive Metabolic Panel    Iron and TIBC    CALCIUM, IONIZED    PHOSPHORUS    Ambulatory referral/consult to Home Health    Centrencephalic epilepsy (Chronic)    Relevant Orders    Levetiracetam level    Ambulatory referral/consult to Home Health    History of CVA in adulthood    Relevant Orders    Ambulatory referral/consult to Home Health    Secondary hyperparathyroidism of renal origin    Type 2 diabetes mellitus without complication, without long-term current use of insulin    Relevant Orders    Hemoglobin A1C    Lipid Panel     Other Visit Diagnoses       Weight loss        Relevant Orders    Prealbumin    Physical deconditioning                Orders Placed This Encounter    Comprehensive Metabolic Panel    Hemoglobin A1C    Lipid Panel    TSH    Iron and TIBC    CALCIUM, IONIZED    Levetiracetam level    PHOSPHORUS    Prealbumin    Ambulatory referral/consult to Home Health    Cardiac Monitor - 3-15 Day Adult (Cupid Only)     Four-month follow-up   Hypertension stable continue current therapy  Hyperlipidemia due for reassessment I will review and address accordingly   History of CVA, physical deconditioning, weight loss, seizures, and dementia refer to Home Health for evaluation.  Secondary hyperparathyroidism of renal origin labs in 6 months.    Diabetes without insulin use A1c was acceptable in June of this year repeat next June.    Iron-deficiency anemia continue iron rich diet.    Weight loss  check CMP and pre-albumin.  I will review and address accordingly.    No follow-ups on file.    ________________________________________________________________  ________________________________________________________________      Chief Complaint   Patient presents with    Follow-up     History of present illness  This 89 y.o. presents today for complaint of four-month follow-up.  Patient has hypertension hyperlipidemia CVA physical deconditioning weight loss seizures secondary hyperparathyroidism renal origin diabetes anemia and dementia.  Follows with 1 of my partners.  Due for update of labs.  Daughter notes need for additional assistance in ask for home health referral.  Does note large stools and ask about stool test previously which was acceptable.  Does note strong odor of urine.  Daughter gives history due to patient's dementia.    Past Medical History:   Diagnosis Date    Bradyarrhythmia     CVA (cerebral infarction) 2006    Dementia     Depression     Hyperlipidemia     Hypertension     renal htn    Pulmonary embolism 2002    Seizure disorder        Past Surgical History:   Procedure Laterality Date    left foot  with crushed injry         Family History   Problem Relation Age of Onset    Diabetes Mother     Cancer Neg Hx        Social History     Socioeconomic History    Marital status:    Tobacco Use    Smoking status: Never    Smokeless tobacco: Never   Substance and Sexual Activity    Alcohol use: No    Drug use: No     Social Determinants of Health     Financial Resource Strain: Unknown (10/15/2022)    Overall Financial Resource Strain (CARDIA)     Difficulty of Paying Living Expenses: Patient refused   Food Insecurity: Unknown (10/15/2022)    Hunger Vital Sign     Worried About Running Out of Food in the Last Year: Patient refused     Ran Out of Food in the Last Year: Patient refused   Transportation Needs: Unknown (10/15/2022)    PRAPARE - Transportation     Lack of Transportation (Medical):  Patient refused     Lack of Transportation (Non-Medical): Patient refused   Physical Activity: Unknown (10/15/2022)    Exercise Vital Sign     Days of Exercise per Week: Patient refused     Minutes of Exercise per Session: Patient refused   Stress: Unknown (10/15/2022)    [employer] - Occupational Stress Questionnaire     Feeling of Stress : Patient refused   Social Connections: Unknown (10/15/2022)    Social Connection and Isolation Panel [NHANES]     Frequency of Communication with Friends and Family: Patient refused     Frequency of Social Gatherings with Friends and Family: Patient refused     Attends Yazdanism Services: Patient refused     Active Member of Clubs or Organizations: Patient refused     Attends Club or Organization Meetings: Patient refused     Marital Status: Patient refused   Housing Stability: Unknown (10/15/2022)    Housing Stability Vital Sign     Unable to Pay for Housing in the Last Year: Patient refused     Unstable Housing in the Last Year: Patient refused       Current Outpatient Medications   Medication Sig Dispense Refill    amLODIPine (NORVASC) 5 MG tablet Take 1 tablet (5 mg total) by mouth every evening. 90 tablet 3    calcitRIOL (ROCALTROL) 0.25 MCG Cap Take 1 capsule (0.25 mcg total) by mouth once daily. 90 capsule 4    cetirizine (ZYRTEC) 5 MG tablet Take 1 tablet (5 mg total) by mouth once daily. for 20 days 20 tablet 0    citalopram (CELEXA) 20 MG tablet Take 1 tablet (20 mg total) by mouth once daily. 90 tablet 11    docusate sodium (COLACE) 100 MG capsule Take 1 capsule (100 mg total) by mouth once daily. 90 capsule 3    hydrALAZINE (APRESOLINE) 50 MG tablet Take 1 tablet (50 mg total) by mouth every 12 (twelve) hours. 180 tablet 4    methyl salicylate-menthol 15-10% 15-10 % Crea APPLY MODERATE AMOUNT TOPICALLY TWICE A DAY FOR PAIN      mv-min/folic/K1/lycopen/lutein (CENTRUM SILVER ULTRA MEN'S ORAL) Take by mouth.      rosuvastatin (CRESTOR) 10  MG tablet Take 1 tablet (10 mg total) by mouth once daily. 90 tablet 4    aspirin 81 MG Chew Take 1 tablet (81 mg total) by mouth once daily. (Patient not taking: Reported on 11/1/2023) 90 tablet 0    fluticasone propionate (FLONASE) 50 mcg/actuation nasal spray 2 sprays (100 mcg total) by Each Nostril route once daily. (Patient not taking: Reported on 11/1/2023) 18.2 mL 0    levETIRAcetam (KEPPRA) 750 MG Tab Take 1 tablet (750 mg total) by mouth 2 (two) times daily. (Patient not taking: Reported on 11/1/2023) 180 tablet 3    QUEtiapine (SEROQUEL) 25 MG Tab Take 1 tablet (25 mg total) by mouth nightly as needed (restless). (Patient not taking: Reported on 8/1/2023) 30 tablet 3     No current facility-administered medications for this visit.       Review of patient's allergies indicates:   Allergen Reactions    Ciprofloxacin (bulk) Swelling       Physical examination  Vitals Reviewed\  Vitals:    11/01/23 1439   BP: (!) 128/56   Pulse: 77   Resp: 18   Temp: 98 °F (36.7 °C)     Body mass index is 22.36 kg/m².   Gen. Well-dressed well-nourished   Skin warm dry and intact.  No rashes noted.  HEENT.  TM intact bilateral with normal light reflex.  No mastoid tenderness during percussion.  Nares patent bilateral.  Pharynx is unremarkable.  No maxillary or frontal sinus tenderness when percussed.    Neck is supple without adenopathy  Chest.  Respirations are even unlabored.  Lungs are clear to auscultation.  Cardiac regular rate and rhythm.  No chest wall adenopathy noted.  Neuro. Awake alert oriented x4.  Normal judgment and cognition noted.  Extremities no clubbing cyanosis or edema noted.     Call or return to clinic prn if these symptoms worsen or fail to improve as anticipated.

## 2023-11-08 ENCOUNTER — LAB VISIT (OUTPATIENT)
Dept: LAB | Facility: HOSPITAL | Age: 88
End: 2023-11-08
Attending: FAMILY MEDICINE
Payer: OTHER GOVERNMENT

## 2023-11-08 DIAGNOSIS — G40.909 SEIZURE DISORDER: ICD-10-CM

## 2023-11-08 DIAGNOSIS — R63.4 WEIGHT LOSS: ICD-10-CM

## 2023-11-08 DIAGNOSIS — E78.2 MIXED HYPERLIPIDEMIA: Chronic | ICD-10-CM

## 2023-11-08 DIAGNOSIS — N18.4 CHRONIC RENAL DISEASE, STAGE IV: Chronic | ICD-10-CM

## 2023-11-08 DIAGNOSIS — G40.409 CENTRENCEPHALIC EPILEPSY: Chronic | ICD-10-CM

## 2023-11-08 DIAGNOSIS — E11.9 TYPE 2 DIABETES MELLITUS WITHOUT COMPLICATION, WITHOUT LONG-TERM CURRENT USE OF INSULIN: ICD-10-CM

## 2023-11-08 DIAGNOSIS — I10 PRIMARY HYPERTENSION: ICD-10-CM

## 2023-11-08 LAB
ALBUMIN SERPL BCP-MCNC: 3.3 G/DL (ref 3.5–5.2)
ALP SERPL-CCNC: 52 U/L (ref 55–135)
ALT SERPL W/O P-5'-P-CCNC: 6 U/L (ref 10–44)
ANION GAP SERPL CALC-SCNC: 7 MMOL/L (ref 8–16)
AST SERPL-CCNC: 15 U/L (ref 10–40)
BASOPHILS # BLD AUTO: 0.04 K/UL (ref 0–0.2)
BASOPHILS NFR BLD: 0.8 % (ref 0–1.9)
BILIRUB SERPL-MCNC: 0.5 MG/DL (ref 0.1–1)
BUN SERPL-MCNC: 31 MG/DL (ref 8–23)
CA-I BLDV-SCNC: 1.29 MMOL/L (ref 1.06–1.42)
CALCIUM SERPL-MCNC: 9.1 MG/DL (ref 8.7–10.5)
CHLORIDE SERPL-SCNC: 110 MMOL/L (ref 95–110)
CHOLEST SERPL-MCNC: 109 MG/DL (ref 120–199)
CHOLEST/HDLC SERPL: 2.2 {RATIO} (ref 2–5)
CO2 SERPL-SCNC: 27 MMOL/L (ref 23–29)
CREAT SERPL-MCNC: 2.1 MG/DL (ref 0.5–1.4)
DIFFERENTIAL METHOD: ABNORMAL
EOSINOPHIL # BLD AUTO: 0.1 K/UL (ref 0–0.5)
EOSINOPHIL NFR BLD: 2.1 % (ref 0–8)
ERYTHROCYTE [DISTWIDTH] IN BLOOD BY AUTOMATED COUNT: 14.2 % (ref 11.5–14.5)
EST. GFR  (NO RACE VARIABLE): 29.4 ML/MIN/1.73 M^2
GLUCOSE SERPL-MCNC: 86 MG/DL (ref 70–110)
HCT VFR BLD AUTO: 33.1 % (ref 40–54)
HDLC SERPL-MCNC: 49 MG/DL (ref 40–75)
HDLC SERPL: 45 % (ref 20–50)
HGB BLD-MCNC: 10.4 G/DL (ref 14–18)
IMM GRANULOCYTES # BLD AUTO: 0.01 K/UL (ref 0–0.04)
IMM GRANULOCYTES NFR BLD AUTO: 0.2 % (ref 0–0.5)
IRON SERPL-MCNC: 89 UG/DL (ref 45–160)
LDLC SERPL CALC-MCNC: 53.2 MG/DL (ref 63–159)
LYMPHOCYTES # BLD AUTO: 2.4 K/UL (ref 1–4.8)
LYMPHOCYTES NFR BLD: 46.6 % (ref 18–48)
MCH RBC QN AUTO: 28.8 PG (ref 27–31)
MCHC RBC AUTO-ENTMCNC: 31.4 G/DL (ref 32–36)
MCV RBC AUTO: 92 FL (ref 82–98)
MONOCYTES # BLD AUTO: 0.5 K/UL (ref 0.3–1)
MONOCYTES NFR BLD: 9.9 % (ref 4–15)
NEUTROPHILS # BLD AUTO: 2.1 K/UL (ref 1.8–7.7)
NEUTROPHILS NFR BLD: 40.4 % (ref 38–73)
NONHDLC SERPL-MCNC: 60 MG/DL
NRBC BLD-RTO: 0 /100 WBC
PHOSPHATE SERPL-MCNC: 3.4 MG/DL (ref 2.7–4.5)
PLATELET # BLD AUTO: 143 K/UL (ref 150–450)
PMV BLD AUTO: 11.5 FL (ref 9.2–12.9)
POTASSIUM SERPL-SCNC: 4.8 MMOL/L (ref 3.5–5.1)
PROT SERPL-MCNC: 6.2 G/DL (ref 6–8.4)
RBC # BLD AUTO: 3.61 M/UL (ref 4.6–6.2)
SATURATED IRON: 40 % (ref 20–50)
SODIUM SERPL-SCNC: 144 MMOL/L (ref 136–145)
TOTAL IRON BINDING CAPACITY: 223 UG/DL (ref 250–450)
TRANSFERRIN SERPL-MCNC: 151 MG/DL (ref 200–375)
TRIGL SERPL-MCNC: 34 MG/DL (ref 30–150)
TSH SERPL DL<=0.005 MIU/L-ACNC: 2.37 UIU/ML (ref 0.4–4)
WBC # BLD AUTO: 5.15 K/UL (ref 3.9–12.7)

## 2023-11-08 PROCEDURE — 84100 ASSAY OF PHOSPHORUS: CPT | Performed by: NURSE PRACTITIONER

## 2023-11-08 PROCEDURE — 85025 COMPLETE CBC W/AUTO DIFF WBC: CPT | Performed by: NURSE PRACTITIONER

## 2023-11-08 PROCEDURE — 84134 ASSAY OF PREALBUMIN: CPT | Performed by: NURSE PRACTITIONER

## 2023-11-08 PROCEDURE — 84466 ASSAY OF TRANSFERRIN: CPT | Performed by: NURSE PRACTITIONER

## 2023-11-08 PROCEDURE — 80177 DRUG SCRN QUAN LEVETIRACETAM: CPT | Performed by: NURSE PRACTITIONER

## 2023-11-08 PROCEDURE — 84443 ASSAY THYROID STIM HORMONE: CPT | Performed by: NURSE PRACTITIONER

## 2023-11-08 PROCEDURE — 80061 LIPID PANEL: CPT | Performed by: NURSE PRACTITIONER

## 2023-11-08 PROCEDURE — 80053 COMPREHEN METABOLIC PANEL: CPT | Performed by: NURSE PRACTITIONER

## 2023-11-08 PROCEDURE — 83036 HEMOGLOBIN GLYCOSYLATED A1C: CPT | Performed by: NURSE PRACTITIONER

## 2023-11-08 PROCEDURE — 83540 ASSAY OF IRON: CPT | Performed by: NURSE PRACTITIONER

## 2023-11-08 PROCEDURE — 36415 COLL VENOUS BLD VENIPUNCTURE: CPT | Mod: PO | Performed by: NURSE PRACTITIONER

## 2023-11-08 PROCEDURE — 82330 ASSAY OF CALCIUM: CPT | Performed by: NURSE PRACTITIONER

## 2023-11-09 LAB
ESTIMATED AVG GLUCOSE: 103 MG/DL (ref 68–131)
HBA1C MFR BLD: 5.2 % (ref 4–5.6)
PREALB SERPL-MCNC: 18 MG/DL (ref 20–43)

## 2023-11-11 LAB
LEVETIRACETAM SERPL-MCNC: 37.1 UG/ML (ref 3–60)
LEVETIRACETAM SERPL-MCNC: 37.1 UG/ML (ref 3–60)

## 2023-11-30 ENCOUNTER — HOSPITAL ENCOUNTER (OUTPATIENT)
Facility: HOSPITAL | Age: 88
Discharge: NURSING, REHAB OR LONG-TERM CARE | End: 2023-12-08
Attending: EMERGENCY MEDICINE | Admitting: STUDENT IN AN ORGANIZED HEALTH CARE EDUCATION/TRAINING PROGRAM
Payer: MEDICARE

## 2023-11-30 DIAGNOSIS — N18.9 CHRONIC KIDNEY DISEASE, UNSPECIFIED CKD STAGE: ICD-10-CM

## 2023-11-30 DIAGNOSIS — R41.0 CONFUSION: Primary | ICD-10-CM

## 2023-11-30 DIAGNOSIS — D64.9 ANEMIA, UNSPECIFIED TYPE: ICD-10-CM
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
## 2023-11-30 DIAGNOSIS — R06.02 SHORTNESS OF BREATH: ICD-10-CM

## 2023-11-30 DIAGNOSIS — E87.20 LACTIC ACIDOSIS: ICD-10-CM

## 2023-11-30 LAB
ALBUMIN SERPL BCP-MCNC: 4 G/DL (ref 3.5–5.2)
ALP SERPL-CCNC: 52 U/L (ref 55–135)
ALT SERPL W/O P-5'-P-CCNC: 7 U/L (ref 10–44)
AMMONIA PLAS-SCNC: 24 UMOL/L (ref 10–50)
AMPHET+METHAMPHET UR QL: NEGATIVE
ANION GAP SERPL CALC-SCNC: 7 MMOL/L (ref 8–16)
AST SERPL-CCNC: 19 U/L (ref 10–40)
BACTERIA #/AREA URNS HPF: NEGATIVE /HPF
BARBITURATES UR QL SCN>200 NG/ML: NEGATIVE
BASOPHILS # BLD AUTO: 0.01 K/UL (ref 0–0.2)
BASOPHILS NFR BLD: 0.2 % (ref 0–1.9)
BENZODIAZ UR QL SCN>200 NG/ML: NEGATIVE
BILIRUB SERPL-MCNC: 0.4 MG/DL (ref 0.1–1)
BILIRUB UR QL STRIP: NEGATIVE
BNP SERPL-MCNC: 168 PG/ML (ref 0–99)
BUN SERPL-MCNC: 29 MG/DL (ref 8–23)
BZE UR QL SCN: NEGATIVE
CALCIUM SERPL-MCNC: 9.6 MG/DL (ref 8.7–10.5)
CANNABINOIDS UR QL SCN: NEGATIVE
CHLORIDE SERPL-SCNC: 106 MMOL/L (ref 95–110)
CK SERPL-CCNC: 250 U/L (ref 20–200)
CLARITY UR: CLEAR
CO2 SERPL-SCNC: 26 MMOL/L (ref 23–29)
COLOR UR: COLORLESS
CREAT SERPL-MCNC: 2.2 MG/DL (ref 0.5–1.4)
CREAT UR-MCNC: 47.3 MG/DL (ref 23–375)
DIFFERENTIAL METHOD: ABNORMAL
EOSINOPHIL # BLD AUTO: 0 K/UL (ref 0–0.5)
EOSINOPHIL NFR BLD: 0.2 % (ref 0–8)
ERYTHROCYTE [DISTWIDTH] IN BLOOD BY AUTOMATED COUNT: 14.1 % (ref 11.5–14.5)
EST. GFR  (NO RACE VARIABLE): 27.8 ML/MIN/1.73 M^2
GLUCOSE SERPL-MCNC: 123 MG/DL (ref 70–110)
GLUCOSE UR QL STRIP: NEGATIVE
HCT VFR BLD AUTO: 35.8 % (ref 40–54)
HGB BLD-MCNC: 11.8 G/DL (ref 14–18)
HGB UR QL STRIP: ABNORMAL
HYALINE CASTS #/AREA URNS LPF: 0 /LPF
IMM GRANULOCYTES # BLD AUTO: 0.02 K/UL (ref 0–0.04)
IMM GRANULOCYTES NFR BLD AUTO: 0.3 % (ref 0–0.5)
INR PPP: 0.9 (ref 0.8–1.2)
KETONES UR QL STRIP: NEGATIVE
LACTATE SERPL-SCNC: 2.3 MMOL/L (ref 0.5–1.9)
LEUKOCYTE ESTERASE UR QL STRIP: NEGATIVE
LYMPHOCYTES # BLD AUTO: 0.7 K/UL (ref 1–4.8)
LYMPHOCYTES NFR BLD: 11.6 % (ref 18–48)
MAGNESIUM SERPL-MCNC: 1.9 MG/DL (ref 1.6–2.6)
MCH RBC QN AUTO: 29.8 PG (ref 27–31)
MCHC RBC AUTO-ENTMCNC: 33 G/DL (ref 32–36)
MCV RBC AUTO: 90 FL (ref 82–98)
MICROSCOPIC COMMENT: ABNORMAL
MONOCYTES # BLD AUTO: 0.5 K/UL (ref 0.3–1)
MONOCYTES NFR BLD: 8.1 % (ref 4–15)
NEUTROPHILS # BLD AUTO: 5 K/UL (ref 1.8–7.7)
NEUTROPHILS NFR BLD: 79.6 % (ref 38–73)
NITRITE UR QL STRIP: NEGATIVE
NRBC BLD-RTO: 0 /100 WBC
OPIATES UR QL SCN: NEGATIVE
PCP UR QL SCN>25 NG/ML: NEGATIVE
PH UR STRIP: 6 [PH] (ref 5–8)
PLATELET # BLD AUTO: 147 K/UL (ref 150–450)
PMV BLD AUTO: 11 FL (ref 9.2–12.9)
POTASSIUM SERPL-SCNC: 4.3 MMOL/L (ref 3.5–5.1)
PROT SERPL-MCNC: 7 G/DL (ref 6–8.4)
PROT UR QL STRIP: NEGATIVE
PROTHROMBIN TIME: 10.3 SEC (ref 9–12.5)
RBC # BLD AUTO: 3.96 M/UL (ref 4.6–6.2)
RBC #/AREA URNS HPF: 13 /HPF (ref 0–4)
SODIUM SERPL-SCNC: 139 MMOL/L (ref 136–145)
SP GR UR STRIP: 1.01 (ref 1–1.03)
SQUAMOUS #/AREA URNS HPF: 0 /HPF
TOXICOLOGY INFORMATION: NORMAL
TROPONIN I SERPL HS-MCNC: 30.8 PG/ML (ref 0–14.9)
TROPONIN I SERPL HS-MCNC: 43.4 PG/ML (ref 0–14.9)
URN SPEC COLLECT METH UR: ABNORMAL
UROBILINOGEN UR STRIP-ACNC: NEGATIVE EU/DL
WBC # BLD AUTO: 6.32 K/UL (ref 3.9–12.7)
WBC #/AREA URNS HPF: 0 /HPF (ref 0–5)

## 2023-11-30 PROCEDURE — 93010 ELECTROCARDIOGRAM REPORT: CPT | Mod: ,,, | Performed by: GENERAL PRACTICE

## 2023-11-30 PROCEDURE — 63600175 PHARM REV CODE 636 W HCPCS: Performed by: STUDENT IN AN ORGANIZED HEALTH CARE EDUCATION/TRAINING PROGRAM

## 2023-11-30 PROCEDURE — 85025 COMPLETE CBC W/AUTO DIFF WBC: CPT | Performed by: EMERGENCY MEDICINE

## 2023-11-30 PROCEDURE — 25000003 PHARM REV CODE 250: Performed by: STUDENT IN AN ORGANIZED HEALTH CARE EDUCATION/TRAINING PROGRAM

## 2023-11-30 PROCEDURE — 96372 THER/PROPH/DIAG INJ SC/IM: CPT | Mod: 59 | Performed by: STUDENT IN AN ORGANIZED HEALTH CARE EDUCATION/TRAINING PROGRAM

## 2023-11-30 PROCEDURE — 63600175 PHARM REV CODE 636 W HCPCS: Performed by: EMERGENCY MEDICINE

## 2023-11-30 PROCEDURE — G0378 HOSPITAL OBSERVATION PER HR: HCPCS

## 2023-11-30 PROCEDURE — 96372 THER/PROPH/DIAG INJ SC/IM: CPT | Performed by: STUDENT IN AN ORGANIZED HEALTH CARE EDUCATION/TRAINING PROGRAM

## 2023-11-30 PROCEDURE — 83735 ASSAY OF MAGNESIUM: CPT | Performed by: EMERGENCY MEDICINE

## 2023-11-30 PROCEDURE — 80307 DRUG TEST PRSMV CHEM ANLYZR: CPT | Performed by: EMERGENCY MEDICINE

## 2023-11-30 PROCEDURE — 80053 COMPREHEN METABOLIC PANEL: CPT | Performed by: EMERGENCY MEDICINE

## 2023-11-30 PROCEDURE — 99285 EMERGENCY DEPT VISIT HI MDM: CPT | Mod: 25

## 2023-11-30 PROCEDURE — 82140 ASSAY OF AMMONIA: CPT | Performed by: EMERGENCY MEDICINE

## 2023-11-30 PROCEDURE — 81001 URINALYSIS AUTO W/SCOPE: CPT | Mod: 59 | Performed by: EMERGENCY MEDICINE

## 2023-11-30 PROCEDURE — 84484 ASSAY OF TROPONIN QUANT: CPT | Mod: 91 | Performed by: EMERGENCY MEDICINE

## 2023-11-30 PROCEDURE — 85610 PROTHROMBIN TIME: CPT | Performed by: EMERGENCY MEDICINE

## 2023-11-30 PROCEDURE — 93010 EKG 12-LEAD: ICD-10-PCS | Mod: ,,, | Performed by: GENERAL PRACTICE

## 2023-11-30 PROCEDURE — 96374 THER/PROPH/DIAG INJ IV PUSH: CPT

## 2023-11-30 PROCEDURE — 87154 CUL TYP ID BLD PTHGN 6+ TRGT: CPT | Performed by: EMERGENCY MEDICINE

## 2023-11-30 PROCEDURE — 87040 BLOOD CULTURE FOR BACTERIA: CPT | Performed by: EMERGENCY MEDICINE

## 2023-11-30 PROCEDURE — 96375 TX/PRO/DX INJ NEW DRUG ADDON: CPT

## 2023-11-30 PROCEDURE — 82550 ASSAY OF CK (CPK): CPT | Performed by: EMERGENCY MEDICINE

## 2023-11-30 PROCEDURE — 84484 ASSAY OF TROPONIN QUANT: CPT | Performed by: EMERGENCY MEDICINE

## 2023-11-30 PROCEDURE — 83605 ASSAY OF LACTIC ACID: CPT | Performed by: EMERGENCY MEDICINE

## 2023-11-30 PROCEDURE — 93005 ELECTROCARDIOGRAM TRACING: CPT | Performed by: GENERAL PRACTICE

## 2023-11-30 PROCEDURE — 83880 ASSAY OF NATRIURETIC PEPTIDE: CPT | Performed by: EMERGENCY MEDICINE

## 2023-11-30 PROCEDURE — 36415 COLL VENOUS BLD VENIPUNCTURE: CPT | Performed by: EMERGENCY MEDICINE

## 2023-11-30 RX ORDER — HEPARIN SODIUM 5000 [USP'U]/ML
5000 INJECTION, SOLUTION INTRAVENOUS; SUBCUTANEOUS EVERY 8 HOURS
Status: DISCONTINUED | OUTPATIENT
Start: 2023-11-30 | End: 2023-12-08 | Stop reason: HOSPADM

## 2023-11-30 RX ORDER — AMLODIPINE BESYLATE 5 MG/1
5 TABLET ORAL
Status: DISCONTINUED | OUTPATIENT
Start: 2023-11-30 | End: 2023-11-30

## 2023-11-30 RX ORDER — TALC
6 POWDER (GRAM) TOPICAL NIGHTLY PRN
Status: DISCONTINUED | OUTPATIENT
Start: 2023-11-30 | End: 2023-12-08 | Stop reason: HOSPADM

## 2023-11-30 RX ORDER — LABETALOL HYDROCHLORIDE 5 MG/ML
10 INJECTION, SOLUTION INTRAVENOUS
Status: COMPLETED | OUTPATIENT
Start: 2023-11-30 | End: 2023-11-30

## 2023-11-30 RX ORDER — SODIUM CHLORIDE, SODIUM LACTATE, POTASSIUM CHLORIDE, CALCIUM CHLORIDE 600; 310; 30; 20 MG/100ML; MG/100ML; MG/100ML; MG/100ML
INJECTION, SOLUTION INTRAVENOUS CONTINUOUS
Status: DISCONTINUED | OUTPATIENT
Start: 2023-11-30 | End: 2023-11-30

## 2023-11-30 RX ORDER — AMLODIPINE BESYLATE 5 MG/1
5 TABLET ORAL NIGHTLY
Status: DISCONTINUED | OUTPATIENT
Start: 2023-11-30 | End: 2023-11-30

## 2023-11-30 RX ORDER — CETIRIZINE HYDROCHLORIDE 5 MG/1
5 TABLET ORAL DAILY
Status: DISCONTINUED | OUTPATIENT
Start: 2023-11-30 | End: 2023-12-08 | Stop reason: HOSPADM

## 2023-11-30 RX ORDER — HYDRALAZINE HYDROCHLORIDE 20 MG/ML
10 INJECTION INTRAMUSCULAR; INTRAVENOUS
Status: COMPLETED | OUTPATIENT
Start: 2023-11-30 | End: 2023-11-30

## 2023-11-30 RX ORDER — HYDRALAZINE HYDROCHLORIDE 25 MG/1
50 TABLET, FILM COATED ORAL EVERY 8 HOURS
Status: DISCONTINUED | OUTPATIENT
Start: 2023-11-30 | End: 2023-12-05

## 2023-11-30 RX ORDER — HYDRALAZINE HYDROCHLORIDE 25 MG/1
50 TABLET, FILM COATED ORAL EVERY 12 HOURS
Status: DISCONTINUED | OUTPATIENT
Start: 2023-11-30 | End: 2023-11-30

## 2023-11-30 RX ORDER — HYDRALAZINE HYDROCHLORIDE 20 MG/ML
20 INJECTION INTRAMUSCULAR; INTRAVENOUS EVERY 4 HOURS PRN
Status: DISCONTINUED | OUTPATIENT
Start: 2023-11-30 | End: 2023-12-08 | Stop reason: HOSPADM

## 2023-11-30 RX ORDER — ATORVASTATIN CALCIUM 40 MG/1
40 TABLET, FILM COATED ORAL DAILY
Status: DISCONTINUED | OUTPATIENT
Start: 2023-11-30 | End: 2023-12-04

## 2023-11-30 RX ORDER — AMLODIPINE BESYLATE 5 MG/1
10 TABLET ORAL
Status: DISCONTINUED | OUTPATIENT
Start: 2023-11-30 | End: 2023-12-08 | Stop reason: HOSPADM

## 2023-11-30 RX ORDER — SODIUM CHLORIDE 0.9 % (FLUSH) 0.9 %
10 SYRINGE (ML) INJECTION
Status: DISCONTINUED | OUTPATIENT
Start: 2023-11-30 | End: 2023-12-08 | Stop reason: HOSPADM

## 2023-11-30 RX ORDER — DOCUSATE SODIUM 100 MG/1
100 CAPSULE, LIQUID FILLED ORAL DAILY
Status: DISCONTINUED | OUTPATIENT
Start: 2023-11-30 | End: 2023-12-08 | Stop reason: HOSPADM

## 2023-11-30 RX ORDER — CITALOPRAM 20 MG/1
20 TABLET, FILM COATED ORAL DAILY
Status: DISCONTINUED | OUTPATIENT
Start: 2023-11-30 | End: 2023-12-08 | Stop reason: HOSPADM

## 2023-11-30 RX ADMIN — SODIUM CHLORIDE, SODIUM LACTATE, POTASSIUM CHLORIDE, AND CALCIUM CHLORIDE: .6; .31; .03; .02 INJECTION, SOLUTION INTRAVENOUS at 10:11

## 2023-11-30 RX ADMIN — HEPARIN SODIUM 5000 UNITS: 5000 INJECTION INTRAVENOUS; SUBCUTANEOUS at 08:11

## 2023-11-30 RX ADMIN — HYDRALAZINE HYDROCHLORIDE 50 MG: 25 TABLET, FILM COATED ORAL at 02:11

## 2023-11-30 RX ADMIN — HEPARIN SODIUM 5000 UNITS: 5000 INJECTION INTRAVENOUS; SUBCUTANEOUS at 06:11

## 2023-11-30 RX ADMIN — HEPARIN SODIUM 5000 UNITS: 5000 INJECTION INTRAVENOUS; SUBCUTANEOUS at 02:11

## 2023-11-30 RX ADMIN — HYDRALAZINE HYDROCHLORIDE 10 MG: 20 INJECTION INTRAMUSCULAR; INTRAVENOUS at 03:11

## 2023-11-30 RX ADMIN — HYDRALAZINE HYDROCHLORIDE 50 MG: 25 TABLET, FILM COATED ORAL at 08:11

## 2023-11-30 RX ADMIN — LEVETIRACETAM 750 MG: 250 TABLET, FILM COATED ORAL at 08:11

## 2023-11-30 RX ADMIN — SODIUM CHLORIDE, SODIUM LACTATE, POTASSIUM CHLORIDE, AND CALCIUM CHLORIDE: .6; .31; .03; .02 INJECTION, SOLUTION INTRAVENOUS at 02:11

## 2023-11-30 RX ADMIN — LABETALOL HYDROCHLORIDE 10 MG: 5 INJECTION, SOLUTION INTRAVENOUS at 04:11

## 2023-11-30 RX ADMIN — CITALOPRAM HYDROBROMIDE 20 MG: 20 TABLET ORAL at 08:11

## 2023-11-30 RX ADMIN — CETIRIZINE HYDROCHLORIDE 5 MG: 5 TABLET ORAL at 02:11

## 2023-11-30 RX ADMIN — ATORVASTATIN CALCIUM 40 MG: 40 TABLET, FILM COATED ORAL at 08:11

## 2023-11-30 RX ADMIN — AMLODIPINE BESYLATE 10 MG: 5 TABLET ORAL at 05:11

## 2023-11-30 NOTE — PT/OT/SLP PROGRESS
Physical Therapy      Patient Name:  Lavon Brandon   MRN:  [id number]    Patient not seen today secondary to Unarousable (Found in ED, nurse stating he needs to get up. Pt fast asleep, unable to keep aroused. Placed on EOB with total A x 2. Pt posturing into extension, does not arouse. Returned to supine. No family present. Pt has floor orders.). NO family present.Will follow-up tomorrow.

## 2023-11-30 NOTE — ASSESSMENT & PLAN NOTE
Multifactorial- dehydration, seizures?  Get orthostat once able to   CT head reviewed  Not sure if pt taking Keppra  Resumed Keppra

## 2023-11-30 NOTE — NURSING
MD aware of patient's elevated Bps. Said to give 1400 hydralazine..and reasses.. may need to give norvasc early

## 2023-11-30 NOTE — PT/OT/SLP PROGRESS
Occupational Therapy      Patient Name:  Lavon Brandon   MRN:  [id number]    Patient not seen today secondary to Other (Comment) (Pt with bedrest orders until 0238 on Friday). Will follow-up next service date.    11/30/2023

## 2023-11-30 NOTE — H&P
[facility] - [place] - Emergency Dept  Hospital Medicine  History & Physical    Patient Name: Lavon Brandon  MRN: [id number]  Patient Class: OP- Observation  Admission Date: 11/30/2023  Attending Physician: Josse Capone MD  Primary Care Provider: Aristides Haynes MD         Patient information was obtained from relative(s) and ER records.     Subjective:     Principal Problem:Fall    Chief Complaint:   Chief Complaint   Patient presents with    Altered Mental Status    Fall     Patient daughter states that patient fell. She is unsure if he hit his head. She denies any LOC. She also denies any blood thinners        HPI: Mr. Brandon is a 89 year old man with PMHx of HTN, HLD, Lewy body dementia?- was brought today by daughter because he fell tonight at 12 AM- she said he has been doing ok lately - eating drinking ok, he does phases out and she thinks his dementia is getting worse but he was not complaining of any chest pain, palpitations, sob, fever, chills, n/v, urine or bowel problem.     Pt himself said he thinks his leg gave away that's why he fell.     In ER his BP was elevated and was given hydralazine and labetalol, lactate is elevated.    Past Medical History:   Diagnosis Date    Bradyarrhythmia     CVA (cerebral infarction) 2006    Dementia     Depression     Hyperlipidemia     Hypertension     renal htn    Pulmonary embolism 2002    Seizure disorder        Past Surgical History:   Procedure Laterality Date    left foot  with crushed injry         Review of patient's allergies indicates:   Allergen Reactions    Ciprofloxacin (bulk) Swelling       No current facility-administered medications on file prior to encounter.     Current Outpatient Medications on File Prior to Encounter   Medication Sig    amLODIPine (NORVASC) 5 MG tablet Take 1 tablet (5 mg total) by mouth every evening.    aspirin 81 MG Chew Take 1 tablet (81 mg total) by mouth once daily. (Patient not taking: Reported on 11/1/2023)     calcitRIOL (ROCALTROL) 0.25 MCG Cap Take 1 capsule (0.25 mcg total) by mouth once daily.    cetirizine (ZYRTEC) 5 MG tablet Take 1 tablet (5 mg total) by mouth once daily. for 20 days    citalopram (CELEXA) 20 MG tablet Take 1 tablet (20 mg total) by mouth once daily.    docusate sodium (COLACE) 100 MG capsule Take 1 capsule (100 mg total) by mouth once daily.    fluticasone propionate (FLONASE) 50 mcg/actuation nasal spray 2 sprays (100 mcg total) by Each Nostril route once daily. (Patient not taking: Reported on 11/1/2023)    hydrALAZINE (APRESOLINE) 50 MG tablet Take 1 tablet (50 mg total) by mouth every 12 (twelve) hours.    levETIRAcetam (KEPPRA) 750 MG Tab Take 1 tablet (750 mg total) by mouth 2 (two) times daily. (Patient not taking: Reported on 11/1/2023)    methyl salicylate-menthol 15-10% 15-10 % Crea APPLY MODERATE AMOUNT TOPICALLY TWICE A DAY FOR PAIN    mv-min/folic/K1/lycopen/lutein (CENTRUM SILVER ULTRA MEN'S ORAL) Take by mouth.    QUEtiapine (SEROQUEL) 25 MG Tab Take 1 tablet (25 mg total) by mouth nightly as needed (restless). (Patient not taking: Reported on 8/1/2023)    rosuvastatin (CRESTOR) 10 MG tablet Take 1 tablet (10 mg total) by mouth once daily.     Family History       Problem Relation (Age of Onset)    Diabetes Mother          Tobacco Use    Smoking status: Never    Smokeless tobacco: Never   Substance and Sexual Activity    Alcohol use: No    Drug use: No    Sexual activity: Not on file     Review of Systems   Constitutional:  Negative for activity change, appetite change, chills and fever.   HENT:  Negative for congestion, ear pain and nosebleeds.    Eyes:  Negative for itching and visual disturbance.   Respiratory:  Negative for apnea, cough, chest tightness, shortness of breath and wheezing.    Cardiovascular:  Negative for chest pain, palpitations and leg swelling.   Gastrointestinal:  Negative for abdominal distention, abdominal pain, constipation, diarrhea, nausea and vomiting.    Genitourinary:  Negative for dysuria and flank pain.   Musculoskeletal:  Negative for back pain.   Neurological:  Negative for dizziness and headaches.     Objective:     Vital Signs (Most Recent):  Temp: 98.4 °F (36.9 °C) (11/30/23 0219)  Pulse: 80 (11/30/23 0500)  Resp: 14 (11/30/23 0500)  BP: (!) 197/86 (11/30/23 0500)  SpO2: 95 % (11/30/23 0500) Vital Signs (24h Range):  Temp:  [98.4 °F (36.9 °C)] 98.4 °F (36.9 °C)  Pulse:  [79-84] 80  Resp:  [14-18] 14  SpO2:  [95 %-98 %] 95 %  BP: (193-224)/(79-96) 197/86     Weight: 59 kg (130 lb)  Body mass index is 18.65 kg/m².     Physical Exam  Vitals reviewed.   Constitutional:       General: He is not in acute distress.     Appearance: He is ill-appearing. He is not toxic-appearing or diaphoretic.   HENT:      Head: Normocephalic and atraumatic.      Mouth/Throat:      Mouth: Mucous membranes are moist.      Pharynx: Oropharynx is clear.   Eyes:      General: No scleral icterus.     Conjunctiva/sclera: Conjunctivae normal.   Neck:      Vascular: No carotid bruit.   Cardiovascular:      Rate and Rhythm: Normal rate and regular rhythm.      Pulses: Normal pulses.      Heart sounds: Normal heart sounds.   Pulmonary:      Effort: Pulmonary effort is normal.      Breath sounds: Normal breath sounds.   Abdominal:      General: Abdomen is flat. Bowel sounds are normal.      Palpations: Abdomen is soft.   Musculoskeletal:         General: Normal range of motion.   Skin:     General: Skin is warm.   Neurological:      Mental Status: Mental status is at baseline.                Significant Labs: All pertinent labs within the past 24 hours have been reviewed.  BMP:   Recent Labs   Lab 11/30/23 0251   *      K 4.3      CO2 26   BUN 29*   CREATININE 2.2*   CALCIUM 9.6   MG 1.9     CBC:   Recent Labs   Lab 11/30/23 0251   WBC 6.32   HGB 11.8*   HCT 35.8*   *     CMP:   Recent Labs   Lab 11/30/23 0251      K 4.3      CO2 26   *   BUN  29*   CREATININE 2.2*   CALCIUM 9.6   PROT 7.0   ALBUMIN 4.0   BILITOT 0.4   ALKPHOS 52*   AST 19   ALT 7*   ANIONGAP 7*     Magnesium:   Recent Labs   Lab 11/30/23  0251   MG 1.9       Significant Imaging: I have reviewed all pertinent imaging results/findings within the past 24 hours.  Assessment/Plan:     * Fall  Multifactorial- dehydration, seizures?  Get orthostat once able to   CT head reviewed  Not sure if pt taking Keppra  Resumed Keppra      Dementia with behavioral disturbance  Daughter not sure if its actually progressing      Hyperlipidemia  Resume home meds      Seizure  Not sure if he did have seizures- resume home Keppra  Should consider EEG    Hypertension  Required IV doses of hydralazine and labetalol  Resume home meds        VTE Risk Mitigation (From admission, onward)           Ordered     heparin (porcine) injection 5,000 Units  Every 8 hours         11/30/23 0538     IP VTE HIGH RISK PATIENT  Once         11/30/23 0538     Place sequential compression device  Until discontinued         11/30/23 0538                       On 11/30/2023, patient should be placed in hospital observation services under my care.             Josse Capone MD  Department of Hospital Medicine  [facility] - Emergency Dept

## 2023-11-30 NOTE — HPI
Mr. Brandon is a 89 year old man with PMHx of HTN, HLD, Lewy body dementia?- was brought today by daughter because he fell tonight at 12 AM- she said he has been doing ok lately - eating drinking ok, he does phases out and she thinks his dementia is getting worse but he was not complaining of any chest pain, palpitations, sob, fever, chills, n/v, urine or bowel problem.     Pt himself said he thinks his leg gave away that's why he fell.     In ER his BP was elevated and was given hydralazine and labetalol, lactate is elevated.

## 2023-11-30 NOTE — ED PROVIDER NOTES
Encounter Date: 11/30/2023       History     Chief Complaint   Patient presents with    Altered Mental Status    Fall     Patient daughter states that patient fell. She is unsure if he hit his head. She denies any LOC. She also denies any blood thinners     89-year-old male presented emergency department after a fall.  Patient was in his room and daughter walked into the room as patient fell.  Patient was on the floor when she went into the room and thought he was little confused.  Patient had confusions like this in the past as well and has history of Lewy body dementia per daughter.  Patient currently denies any complaints.  Patient is oriented to place and person at this time.  Daughter states patient's urine does smell strong.  Denies fever or chills nausea vomiting or chest pain or shortness of breath.  Denies any other complaints at this time.      Review of patient's allergies indicates:   Allergen Reactions    Ciprofloxacin (bulk) Swelling     Past Medical History:   Diagnosis Date    Bradyarrhythmia     CVA (cerebral infarction) 2006    Dementia     Depression     Hyperlipidemia     Hypertension     renal htn    Pulmonary embolism 2002    Seizure disorder      Past Surgical History:   Procedure Laterality Date    left foot  with crushed injry       Family History   Problem Relation Age of Onset    Diabetes Mother     Cancer Neg Hx      Social History     Tobacco Use    Smoking status: Never    Smokeless tobacco: Never   Substance Use Topics    Alcohol use: No    Drug use: No     Review of Systems   Constitutional: Negative.    HENT: Negative.     Eyes: Negative.    Respiratory: Negative.     Cardiovascular: Negative.    Gastrointestinal: Negative.    Endocrine: Negative.    Genitourinary: Negative.    Musculoskeletal: Negative.    Skin: Negative.    Allergic/Immunologic: Negative.    Neurological: Negative.    Hematological: Negative.    Psychiatric/Behavioral:  Positive for confusion.    All other systems  reviewed and are negative.      Physical Exam     Initial Vitals   BP Pulse Resp Temp SpO2   11/30/23 0235 11/30/23 0219 11/30/23 0219 11/30/23 0219 11/30/23 0219   (!) 201/91 84 18 98.4 °F (36.9 °C) 96 %      MAP       --                Physical Exam    Nursing note and vitals reviewed.  Constitutional: He appears well-developed and well-nourished.   HENT:   Head: Normocephalic and atraumatic.   Nose: Nose normal.   Eyes: Conjunctivae and EOM are normal.   Neck: Neck supple. No tracheal deviation present.   Normal range of motion.  Cardiovascular:  Normal rate, regular rhythm, normal heart sounds and intact distal pulses.     Exam reveals no friction rub.       No murmur heard.  Pulmonary/Chest: Breath sounds normal. No respiratory distress. He has no wheezes. He has no rales.   Abdominal: Abdomen is soft. He exhibits no distension. There is no abdominal tenderness.   Musculoskeletal:         General: Normal range of motion.      Cervical back: Normal range of motion and neck supple.      Comments: Walks with a walker     Neurological: He is alert. He has normal strength. No sensory deficit.   Oriented to place and person   Skin: Skin is warm and dry. Capillary refill takes less than 2 seconds.   Psychiatric: He has a normal mood and affect. Thought content normal.         ED Course   Procedures  Labs Reviewed   CBC W/ AUTO DIFFERENTIAL - Abnormal; Notable for the following components:       Result Value    RBC 3.96 (*)     Hemoglobin 11.8 (*)     Hematocrit 35.8 (*)     Platelets 147 (*)     Lymph # 0.7 (*)     Gran % 79.6 (*)     Lymph % 11.6 (*)     All other components within normal limits   COMPREHENSIVE METABOLIC PANEL - Abnormal; Notable for the following components:    Glucose 123 (*)     BUN 29 (*)     Creatinine 2.2 (*)     Alkaline Phosphatase 52 (*)     ALT 7 (*)     eGFR 27.8 (*)     Anion Gap 7 (*)     All other components within normal limits   TROPONIN I HIGH SENSITIVITY - Abnormal; Notable for the  following components:    Troponin I High Sensitivity 30.8 (*)     All other components within normal limits   B-TYPE NATRIURETIC PEPTIDE - Abnormal; Notable for the following components:     (*)     All other components within normal limits   URINALYSIS, REFLEX TO URINE CULTURE - Abnormal; Notable for the following components:    Color, UA Colorless (*)     Occult Blood UA 1+ (*)     All other components within normal limits    Narrative:     Specimen Source->Urine   LACTIC ACID, PLASMA - Abnormal; Notable for the following components:    Lactate (Lactic Acid) 2.3 (*)     All other components within normal limits   URINALYSIS MICROSCOPIC - Abnormal; Notable for the following components:    RBC, UA 13 (*)     All other components within normal limits    Narrative:     Specimen Source->Urine   CULTURE, BLOOD   CULTURE, BLOOD   PROTIME-INR   MAGNESIUM   AMMONIA   DRUG SCREEN PANEL, URINE EMERGENCY    Narrative:     Specimen Source->Urine   LACTIC ACID, PLASMA   TROPONIN I HIGH SENSITIVITY     EKG Readings: (Independently Interpreted)   Initial Reading: No STEMI. Rhythm: Normal Sinus Rhythm. Heart Rate: 83. Ectopy: No Ectopy. Conduction: 1st Degree AV Block.       Imaging Results              CT Cervical Spine Without Contrast (Final result)  Result time 11/30/23 04:00:38      Final result by Emmy Pollack DO (11/30/23 04:00:38)                   Narrative:    EXAM:  CT Head Without Intravenous Contrast and CT Cervical Spine Without Intravenous Contrast    CLINICAL HISTORY:  Head trauma, minor. Neck trauma (Age >= 65y).    TECHNIQUE:  Axial, coronal, and sagittal computed tomography images of the head/brain without intravenous contrast.   Axial, coronal, and sagittal computed tomography images of the cervical spine without intravenous contrast.  This CT exam was performed using one or more of the following dose reduction techniques:  automated exposure control, adjustment of the mA and/or kV according to  patient size, and/or use of iterative reconstruction technique.    COMPARISON:  CT head 4/13/2023. CT cervical spine 4/11/2023.    FINDINGS:  Brain:  No acute hemorrhage. No evidence of acute infarct; MRI more sensitive. Periventricular and subcortical white matter hypodensities are nonspecific but most commonly due to chronic small vessel ischemic changes in a patient of this age. Chronic left pontine infarct appears similar.  Ventricles:  Unremarkable.  No ventriculomegaly.  Skull:  Near-complete opacification of the right frontal, maxillary and anterior ethmoid sinuses again seen.  No acute fracture.  Sinuses:  See above.  Mastoid air cells:  Unremarkable as visualized.  No mastoid effusion.  Vertebrae:  No acute fracture or traumatic subluxation of the cervical spine.  Discs/spinal canal/neural foramina:  Multilevel disc space narrowing, moderate to severe at C5-6, C6-7 and C7-T1. Mild to moderate disc degenerative changes elsewhere. Facet arthropathy. No critical canal stenosis. Foraminal stenosis appears similar.  Soft tissues:  Unremarkable.  Vasculature:  Carotid vascular calcifications.    IMPRESSION:  1.  No acute intracranial abnormality.  2.  No acute osseous abnormality of the cervical spine. Cervical spondylosis.    Electronically signed by:  Kathleen Montoya MD  11/30/2023 04:00 AM CST Workstation: [id number]                                     CT Head Without Contrast (Final result)  Result time 11/30/23 04:00:38      Final result by Emmy Pollack DO (11/30/23 04:00:38)                   Narrative:    EXAM:  CT Head Without Intravenous Contrast and CT Cervical Spine Without Intravenous Contrast    CLINICAL HISTORY:  Head trauma, minor. Neck trauma (Age >= 65y).    TECHNIQUE:  Axial, coronal, and sagittal computed tomography images of the head/brain without intravenous contrast.   Axial, coronal, and sagittal computed tomography images of the cervical spine without intravenous contrast.  This  CT exam was performed using one or more of the following dose reduction techniques:  automated exposure control, adjustment of the mA and/or kV according to patient size, and/or use of iterative reconstruction technique.    COMPARISON:  CT head 4/13/2023. CT cervical spine 4/11/2023.    FINDINGS:  Brain:  No acute hemorrhage. No evidence of acute infarct; MRI more sensitive. Periventricular and subcortical white matter hypodensities are nonspecific but most commonly due to chronic small vessel ischemic changes in a patient of this age. Chronic left pontine infarct appears similar.  Ventricles:  Unremarkable.  No ventriculomegaly.  Skull:  Near-complete opacification of the right frontal, maxillary and anterior ethmoid sinuses again seen.  No acute fracture.  Sinuses:  See above.  Mastoid air cells:  Unremarkable as visualized.  No mastoid effusion.  Vertebrae:  No acute fracture or traumatic subluxation of the cervical spine.  Discs/spinal canal/neural foramina:  Multilevel disc space narrowing, moderate to severe at C5-6, C6-7 and C7-T1. Mild to moderate disc degenerative changes elsewhere. Facet arthropathy. No critical canal stenosis. Foraminal stenosis appears similar.  Soft tissues:  Unremarkable.  Vasculature:  Carotid vascular calcifications.    IMPRESSION:  1.  No acute intracranial abnormality.  2.  No acute osseous abnormality of the cervical spine. Cervical spondylosis.    Electronically signed by:  Kathleen Montoya MD  11/30/2023 04:00 AM CST Workstation: [id number]                                     X-Ray Chest AP Portable (In process)                   X-Rays:   Independently Interpreted Readings:   Chest X-Ray: No infiltrates.  No acute abnormalities. Cardiomegaly present.     Medications   lactated ringers infusion ( Intravenous New Bag 11/30/23 0252)   hydrALAZINE injection 10 mg (10 mg Intravenous Given 11/30/23 0350)     Medical Decision Making  I took over care of this patient at the end of  's shift while awaiting urinalysis, CT scan of the head and cervical spine, repeat troponin, and lactate  Josselyn Martínez M.D.  3:44 AM 11/30/2023      Amount and/or Complexity of Data Reviewed  Labs: ordered.     Details: H&H 11.8 and 35.8 platelets [id number] stable with baseline  BUN 29 creatinine 2.2 stable with baseline  Troponin 30.8    Normal coags  Magnesium 1.9  Ammonia 24  Radiology: ordered and independent interpretation performed.  ECG/medicine tests: ordered and independent interpretation performed.    Risk  Prescription drug management.  Decision regarding hospitalization.               ED Course as of 11/30/23 0447   Thu Nov 30, 2023   0352 Lactate returned at 2.3  Drug screen negative [RM]   0429   IMPRESSION:  1.  No acute intracranial abnormality.  2.  No acute osseous abnormality of the cervical spine. Cervical spondylosis.  CT results above [RM]      ED Course User Index  [RM] Josselyn Martínez MD          [facility]    Patient presents for emergent evaluation of acute unwitnessed fall from bed, altered mental status today prior to going to bed and when he awoke.  that poses a possible threat to life and/or bodily function.    Differential diagnosis includes but is not limited to sepsis, encephalopathy, renal failure, electrolyte abnormalities, ACS, MI, stroke, seizure, postictal state, dementia  In the ED patient found to have acute altered mental status with confusion possibly postictal versus hypertensive encephalopathy with lactic acidosis.   I ordered labs and personally reviewed them.  Labs significant for see below.    I ordered X-rays and personally reviewed them and reviewed the radiologist interpretation.  Xray significant for see below.    I ordered EKG and personally reviewed it.  EKG significant for see below.    I ordered CT scan and personally reviewed it and reviewed the radiologist interpretation.  CT significant for see below.      Admission MDM  I discussed the patient  presentation labs, ekg, X-rays, CT findings with the Hospitalist   Patient was managed in the ED with IV patient was given hydralazine earlier due to elevated blood pressure blood pressure did not respond will be given 10 mg of IV labetalol.  Hospital Medicine is admitting the patient  The response to treatment was stable.    Patient required emergent consultation to hospitalist for admission.  Josselyn Martínez M.D.                    Clinical Impression:  Final diagnoses:  [R06.02] Shortness of breath  [R41.0] Confusion (Primary)  [D64.9] Anemia, unspecified type  [N18.9] Chronic kidney disease, unspecified CKD stage  [E87.20] Lactic acidosis          ED Disposition Condition    Admit Stable                Josselyn Martínez MD  11/30/23 9926

## 2023-11-30 NOTE — SUBJECTIVE & OBJECTIVE
Past Medical History:   Diagnosis Date    Bradyarrhythmia     CVA (cerebral infarction) 2006    Dementia     Depression     Hyperlipidemia     Hypertension     renal htn    Pulmonary embolism 2002    Seizure disorder        Past Surgical History:   Procedure Laterality Date    left foot  with crushed injry         Review of patient's allergies indicates:   Allergen Reactions    Ciprofloxacin (bulk) Swelling       No current facility-administered medications on file prior to encounter.     Current Outpatient Medications on File Prior to Encounter   Medication Sig    amLODIPine (NORVASC) 5 MG tablet Take 1 tablet (5 mg total) by mouth every evening.    aspirin 81 MG Chew Take 1 tablet (81 mg total) by mouth once daily. (Patient not taking: Reported on 11/1/2023)    calcitRIOL (ROCALTROL) 0.25 MCG Cap Take 1 capsule (0.25 mcg total) by mouth once daily.    cetirizine (ZYRTEC) 5 MG tablet Take 1 tablet (5 mg total) by mouth once daily. for 20 days    citalopram (CELEXA) 20 MG tablet Take 1 tablet (20 mg total) by mouth once daily.    docusate sodium (COLACE) 100 MG capsule Take 1 capsule (100 mg total) by mouth once daily.    fluticasone propionate (FLONASE) 50 mcg/actuation nasal spray 2 sprays (100 mcg total) by Each Nostril route once daily. (Patient not taking: Reported on 11/1/2023)    hydrALAZINE (APRESOLINE) 50 MG tablet Take 1 tablet (50 mg total) by mouth every 12 (twelve) hours.    levETIRAcetam (KEPPRA) 750 MG Tab Take 1 tablet (750 mg total) by mouth 2 (two) times daily. (Patient not taking: Reported on 11/1/2023)    methyl salicylate-menthol 15-10% 15-10 % Crea APPLY MODERATE AMOUNT TOPICALLY TWICE A DAY FOR PAIN    mv-min/folic/K1/lycopen/lutein (CENTRUM SILVER ULTRA MEN'S ORAL) Take by mouth.    QUEtiapine (SEROQUEL) 25 MG Tab Take 1 tablet (25 mg total) by mouth nightly as needed (restless). (Patient not taking: Reported on 8/1/2023)    rosuvastatin (CRESTOR) 10 MG tablet Take 1 tablet (10 mg total) by  mouth once daily.     Family History       Problem Relation (Age of Onset)    Diabetes Mother          Tobacco Use    Smoking status: Never    Smokeless tobacco: Never   Substance and Sexual Activity    Alcohol use: No    Drug use: No    Sexual activity: Not on file     Review of Systems   Constitutional:  Negative for activity change, appetite change, chills and fever.   HENT:  Negative for congestion, ear pain and nosebleeds.    Eyes:  Negative for itching and visual disturbance.   Respiratory:  Negative for apnea, cough, chest tightness, shortness of breath and wheezing.    Cardiovascular:  Negative for chest pain, palpitations and leg swelling.   Gastrointestinal:  Negative for abdominal distention, abdominal pain, constipation, diarrhea, nausea and vomiting.   Genitourinary:  Negative for dysuria and flank pain.   Musculoskeletal:  Negative for back pain.   Neurological:  Negative for dizziness and headaches.     Objective:     Vital Signs (Most Recent):  Temp: 98.4 °F (36.9 °C) (11/30/23 0219)  Pulse: 80 (11/30/23 0500)  Resp: 14 (11/30/23 0500)  BP: (!) 197/86 (11/30/23 0500)  SpO2: 95 % (11/30/23 0500) Vital Signs (24h Range):  Temp:  [98.4 °F (36.9 °C)] 98.4 °F (36.9 °C)  Pulse:  [79-84] 80  Resp:  [14-18] 14  SpO2:  [95 %-98 %] 95 %  BP: (193-224)/(79-96) 197/86     Weight: 59 kg (130 lb)  Body mass index is 18.65 kg/m².     Physical Exam  Vitals reviewed.   Constitutional:       General: He is not in acute distress.     Appearance: He is ill-appearing. He is not toxic-appearing or diaphoretic.   HENT:      Head: Normocephalic and atraumatic.      Mouth/Throat:      Mouth: Mucous membranes are moist.      Pharynx: Oropharynx is clear.   Eyes:      General: No scleral icterus.     Conjunctiva/sclera: Conjunctivae normal.   Neck:      Vascular: No carotid bruit.   Cardiovascular:      Rate and Rhythm: Normal rate and regular rhythm.      Pulses: Normal pulses.      Heart sounds: Normal heart sounds.    Pulmonary:      Effort: Pulmonary effort is normal.      Breath sounds: Normal breath sounds.   Abdominal:      General: Abdomen is flat. Bowel sounds are normal.      Palpations: Abdomen is soft.   Musculoskeletal:         General: Normal range of motion.   Skin:     General: Skin is warm.   Neurological:      Mental Status: Mental status is at baseline.                Significant Labs: All pertinent labs within the past 24 hours have been reviewed.  BMP:   Recent Labs   Lab 11/30/23 0251   *      K 4.3      CO2 26   BUN 29*   CREATININE 2.2*   CALCIUM 9.6   MG 1.9     CBC:   Recent Labs   Lab 11/30/23 0251   WBC 6.32   HGB 11.8*   HCT 35.8*   *     CMP:   Recent Labs   Lab 11/30/23 0251      K 4.3      CO2 26   *   BUN 29*   CREATININE 2.2*   CALCIUM 9.6   PROT 7.0   ALBUMIN 4.0   BILITOT 0.4   ALKPHOS 52*   AST 19   ALT 7*   ANIONGAP 7*     Magnesium:   Recent Labs   Lab 11/30/23  0251   MG 1.9       Significant Imaging: I have reviewed all pertinent imaging results/findings within the past 24 hours.

## 2023-11-30 NOTE — PHARMACY MED REC
"Admission Medication History     The home medication history was taken by Sina Nation.    You may go to "Admission" then "Reconcile Home Medications" tabs to review and/or act upon these items.     The home medication list has been updated by the Pharmacy department.   Please read ALL comments highlighted in yellow.   Please address this information as you see fit.    Feel free to contact us if you have any questions or require assistance.      The medications listed below were removed from the home medication list. Please reorder if appropriate:  Patient reports no longer taking the following medication(s):  Centrum Silver    Medications listed below were obtained from: Patient/family and Analytic software- [facility]  No current facility-administered medications on file prior to encounter.     Current Outpatient Medications on File Prior to Encounter   Medication Sig Dispense Refill    amLODIPine (NORVASC) 5 MG tablet Take 1 tablet (5 mg total) by mouth every evening. 90 tablet 3    calcitRIOL (ROCALTROL) 0.25 MCG Cap Take 1 capsule (0.25 mcg total) by mouth once daily. 90 capsule 4    citalopram (CELEXA) 20 MG tablet Take 1 tablet (20 mg total) by mouth once daily. 90 tablet 11    hydrALAZINE (APRESOLINE) 50 MG tablet Take 1 tablet (50 mg total) by mouth every 12 (twelve) hours. 180 tablet 4    levETIRAcetam (KEPPRA) 750 MG Tab Take 1 tablet (750 mg total) by mouth 2 (two) times daily. 180 tablet 3    methyl salicylate-menthol 15-10% 15-10 % Crea Apply 1 application  topically 2 (two) times a day.      QUEtiapine (SEROQUEL) 25 MG Tab Take 1 tablet (25 mg total) by mouth nightly as needed (restless). (Patient taking differently: Take 25 mg by mouth nightly as needed (restlessness / hallucinations).) 30 tablet 3    rosuvastatin (CRESTOR) 10 MG tablet Take 1 tablet (10 mg total) by mouth once daily. 90 tablet 4    aspirin 81 MG Chew Take 1 tablet (81 mg total) by mouth once daily. (Patient not taking: Reported on " Escribed #90 with 0RF.    11/30/2023) 90 tablet 0    cetirizine (ZYRTEC) 5 MG tablet Take 1 tablet (5 mg total) by mouth once daily. for 20 days (Patient not taking: Reported on 11/30/2023) 20 tablet 0    docusate sodium (COLACE) 100 MG capsule Take 1 capsule (100 mg total) by mouth once daily. (Patient not taking: Reported on 11/30/2023) 90 capsule 3    fluticasone propionate (FLONASE) 50 mcg/actuation nasal spray 2 sprays (100 mcg total) by Each Nostril route once daily. (Patient not taking: Reported on 11/30/2023) 18.2 mL 0    [DISCONTINUED] mv-min/folic/K1/lycopen/lutein (CENTRUM SILVER ULTRA MEN'S ORAL) Take by mouth.             Sina Nation  EXT 1924                .

## 2023-11-30 NOTE — PLAN OF CARE
Pt asleep, unable to complete assessment with pt. Called daughter, Gabriela [phone], to complete assessment, no answer, LVM     11/30/23 4088   Discharge Assessment   Assessment Type Discharge Planning Assessment

## 2023-11-30 NOTE — PT/OT/SLP PROGRESS
Occupational Therapy      Patient Name:  Lavon Brandon   MRN:  [id number]    Patient not seen today secondary to Other (Comment) (Noted MD discontinued bed rest orders; Pt not appriopriate for therapy this date due to pt being unarousable, per discussion with PT.). Will follow-up next service date.    11/30/2023

## 2023-12-01 ENCOUNTER — TELEPHONE (OUTPATIENT)
Dept: NEPHROLOGY | Facility: CLINIC | Age: 88
End: 2023-12-01
Payer: OTHER GOVERNMENT

## 2023-12-01 LAB
ACINETOBACTER CALCOACETICUS/BAUMANNII COMPLEX: NOT DETECTED
ALBUMIN SERPL BCP-MCNC: 3.3 G/DL (ref 3.5–5.2)
ALP SERPL-CCNC: 47 U/L (ref 55–135)
ALT SERPL W/O P-5'-P-CCNC: 6 U/L (ref 10–44)
ANION GAP SERPL CALC-SCNC: 5 MMOL/L (ref 8–16)
AST SERPL-CCNC: 20 U/L (ref 10–40)
BACTEROIDES FRAGILIS: NOT DETECTED
BASOPHILS # BLD AUTO: 0.03 K/UL (ref 0–0.2)
BASOPHILS NFR BLD: 0.6 % (ref 0–1.9)
BILIRUB SERPL-MCNC: 0.5 MG/DL (ref 0.1–1)
BUN SERPL-MCNC: 23 MG/DL (ref 8–23)
CALCIUM SERPL-MCNC: 9.4 MG/DL (ref 8.7–10.5)
CANDIDA ALBICANS: NOT DETECTED
CANDIDA AURIS: NOT DETECTED
CANDIDA GLABRATA: NOT DETECTED
CANDIDA KRUSEI: NOT DETECTED
CANDIDA PARAPSILOSIS: NOT DETECTED
CANDIDA TROPICALIS: NOT DETECTED
CHLORIDE SERPL-SCNC: 108 MMOL/L (ref 95–110)
CO2 SERPL-SCNC: 27 MMOL/L (ref 23–29)
CREAT SERPL-MCNC: 2 MG/DL (ref 0.5–1.4)
CRYPTOCOCCUS NEOFORMANS/GATTII: NOT DETECTED
CTX-M GENE: ABNORMAL
DIFFERENTIAL METHOD: ABNORMAL
ENTEROBACTER CLOACAE COMPLEX: NOT DETECTED
ENTEROBACTERALES: NOT DETECTED
ENTEROCOCCUS FAECALIS: NOT DETECTED
ENTEROCOCCUS FAECIUM: NOT DETECTED
EOSINOPHIL # BLD AUTO: 0.1 K/UL (ref 0–0.5)
EOSINOPHIL NFR BLD: 2.1 % (ref 0–8)
ERYTHROCYTE [DISTWIDTH] IN BLOOD BY AUTOMATED COUNT: 14.2 % (ref 11.5–14.5)
ESCHERICHIA COLI: NOT DETECTED
EST. GFR  (NO RACE VARIABLE): 31.1 ML/MIN/1.73 M^2
GLUCOSE SERPL-MCNC: 92 MG/DL (ref 70–110)
HAEMOPHILUS INFLUENZAE: NOT DETECTED
HCT VFR BLD AUTO: 35.6 % (ref 40–54)
HGB BLD-MCNC: 11.5 G/DL (ref 14–18)
IMM GRANULOCYTES # BLD AUTO: 0.02 K/UL (ref 0–0.04)
IMM GRANULOCYTES NFR BLD AUTO: 0.4 % (ref 0–0.5)
IMP GENE: ABNORMAL
KLEBSIELLA AEROGENES: NOT DETECTED
KLEBSIELLA OXYTOCA: NOT DETECTED
KLEBSIELLA PNEUMONIAE GROUP: NOT DETECTED
KPC: ABNORMAL
LISTERIA MONOCYTOGENES: NOT DETECTED
LYMPHOCYTES # BLD AUTO: 1.8 K/UL (ref 1–4.8)
LYMPHOCYTES NFR BLD: 38.2 % (ref 18–48)
MCH RBC QN AUTO: 29.2 PG (ref 27–31)
MCHC RBC AUTO-ENTMCNC: 32.3 G/DL (ref 32–36)
MCR-1: ABNORMAL
MCV RBC AUTO: 90 FL (ref 82–98)
MEC A/C AND MREJ (MRSA): ABNORMAL
MEC A/C: DETECTED
MONOCYTES # BLD AUTO: 0.5 K/UL (ref 0.3–1)
MONOCYTES NFR BLD: 10.8 % (ref 4–15)
NDM GENE: ABNORMAL
NEISSERIA MENINGITIDIS: NOT DETECTED
NEUTROPHILS # BLD AUTO: 2.3 K/UL (ref 1.8–7.7)
NEUTROPHILS NFR BLD: 47.9 % (ref 38–73)
NRBC BLD-RTO: 0 /100 WBC
OXA-48-LIKE: ABNORMAL
PLATELET # BLD AUTO: 150 K/UL (ref 150–450)
PMV BLD AUTO: 11.6 FL (ref 9.2–12.9)
POTASSIUM SERPL-SCNC: 4.3 MMOL/L (ref 3.5–5.1)
PROT SERPL-MCNC: 5.7 G/DL (ref 6–8.4)
PROTEUS SPECIES: NOT DETECTED
PSEUDOMONAS AERUGINOSA: NOT DETECTED
RBC # BLD AUTO: 3.94 M/UL (ref 4.6–6.2)
SALMONELLA SP: NOT DETECTED
SERRATIA MARCESCENS: NOT DETECTED
SODIUM SERPL-SCNC: 140 MMOL/L (ref 136–145)
STAPHYLOCOCCUS AUREUS: NOT DETECTED
STAPHYLOCOCCUS EPIDERMIDIS: DETECTED
STAPHYLOCOCCUS LUGDUNESIS: NOT DETECTED
STAPHYLOCOCCUS SPECIES: ABNORMAL
STENOTROPHOMONAS MALTOPHILIA: NOT DETECTED
STREPTOCOCCUS AGALACTIAE: NOT DETECTED
STREPTOCOCCUS PNEUMONIAE: NOT DETECTED
STREPTOCOCCUS PYOGENES: NOT DETECTED
STREPTOCOCCUS SPECIES: NOT DETECTED
VAN A/B: ABNORMAL
VIM GENE: ABNORMAL
WBC # BLD AUTO: 4.74 K/UL (ref 3.9–12.7)

## 2023-12-01 PROCEDURE — 95819 EEG AWAKE AND ASLEEP: CPT

## 2023-12-01 PROCEDURE — 96372 THER/PROPH/DIAG INJ SC/IM: CPT | Mod: 59 | Performed by: STUDENT IN AN ORGANIZED HEALTH CARE EDUCATION/TRAINING PROGRAM

## 2023-12-01 PROCEDURE — 94799 UNLISTED PULMONARY SVC/PX: CPT

## 2023-12-01 PROCEDURE — 99900035 HC TECH TIME PER 15 MIN (STAT)

## 2023-12-01 PROCEDURE — 25000003 PHARM REV CODE 250: Performed by: STUDENT IN AN ORGANIZED HEALTH CARE EDUCATION/TRAINING PROGRAM

## 2023-12-01 PROCEDURE — 36415 COLL VENOUS BLD VENIPUNCTURE: CPT | Performed by: STUDENT IN AN ORGANIZED HEALTH CARE EDUCATION/TRAINING PROGRAM

## 2023-12-01 PROCEDURE — G0378 HOSPITAL OBSERVATION PER HR: HCPCS

## 2023-12-01 PROCEDURE — 86580 TB INTRADERMAL TEST: CPT | Performed by: STUDENT IN AN ORGANIZED HEALTH CARE EDUCATION/TRAINING PROGRAM

## 2023-12-01 PROCEDURE — 30200315 PPD INTRADERMAL TEST REV CODE 302: Performed by: STUDENT IN AN ORGANIZED HEALTH CARE EDUCATION/TRAINING PROGRAM

## 2023-12-01 PROCEDURE — 63600175 PHARM REV CODE 636 W HCPCS: Performed by: STUDENT IN AN ORGANIZED HEALTH CARE EDUCATION/TRAINING PROGRAM

## 2023-12-01 PROCEDURE — 85025 COMPLETE CBC W/AUTO DIFF WBC: CPT | Performed by: STUDENT IN AN ORGANIZED HEALTH CARE EDUCATION/TRAINING PROGRAM

## 2023-12-01 PROCEDURE — 80053 COMPREHEN METABOLIC PANEL: CPT | Performed by: STUDENT IN AN ORGANIZED HEALTH CARE EDUCATION/TRAINING PROGRAM

## 2023-12-01 PROCEDURE — 97166 OT EVAL MOD COMPLEX 45 MIN: CPT

## 2023-12-01 PROCEDURE — 97530 THERAPEUTIC ACTIVITIES: CPT

## 2023-12-01 PROCEDURE — 94761 N-INVAS EAR/PLS OXIMETRY MLT: CPT

## 2023-12-01 PROCEDURE — 97162 PT EVAL MOD COMPLEX 30 MIN: CPT

## 2023-12-01 PROCEDURE — 99900031 HC PATIENT EDUCATION (STAT)

## 2023-12-01 RX ADMIN — TUBERCULIN PURIFIED PROTEIN DERIVATIVE 5 UNITS: 5 INJECTION INTRADERMAL at 05:12

## 2023-12-01 RX ADMIN — ATORVASTATIN CALCIUM 40 MG: 40 TABLET, FILM COATED ORAL at 08:12

## 2023-12-01 RX ADMIN — HEPARIN SODIUM 5000 UNITS: 5000 INJECTION INTRAVENOUS; SUBCUTANEOUS at 09:12

## 2023-12-01 RX ADMIN — DOCUSATE SODIUM 100 MG: 100 CAPSULE, LIQUID FILLED ORAL at 08:12

## 2023-12-01 RX ADMIN — HYDRALAZINE HYDROCHLORIDE 50 MG: 25 TABLET, FILM COATED ORAL at 01:12

## 2023-12-01 RX ADMIN — LEVETIRACETAM 750 MG: 250 TABLET, FILM COATED ORAL at 08:12

## 2023-12-01 RX ADMIN — LEVETIRACETAM 750 MG: 250 TABLET, FILM COATED ORAL at 09:12

## 2023-12-01 RX ADMIN — HEPARIN SODIUM 5000 UNITS: 5000 INJECTION INTRAVENOUS; SUBCUTANEOUS at 01:12

## 2023-12-01 RX ADMIN — AMLODIPINE BESYLATE 10 MG: 5 TABLET ORAL at 05:12

## 2023-12-01 RX ADMIN — HEPARIN SODIUM 5000 UNITS: 5000 INJECTION INTRAVENOUS; SUBCUTANEOUS at 05:12

## 2023-12-01 RX ADMIN — CITALOPRAM HYDROBROMIDE 20 MG: 20 TABLET ORAL at 08:12

## 2023-12-01 RX ADMIN — CETIRIZINE HYDROCHLORIDE 5 MG: 5 TABLET ORAL at 08:12

## 2023-12-01 RX ADMIN — HYDRALAZINE HYDROCHLORIDE 50 MG: 25 TABLET, FILM COATED ORAL at 05:12

## 2023-12-01 NOTE — PLAN OF CARE
Goals to be met by: 24     Patient will increase functional independence with mobility by performin. Supine to sit with Supervision  2. Sit to stand transfer with Supervision  3. Bed to chair transfer with Supervision using Rolling Walker  4. Gait  x 150 feet with Supervision using Rolling Walker.

## 2023-12-01 NOTE — PLAN OF CARE
RUPALI called in, completed PASRR faxed to [facility], awaiting 142.      12/01/23 2013   Post-Acute Status   Post-Acute Authorization Placement   Post-Acute Placement Status Pending state direction/certification

## 2023-12-01 NOTE — ASSESSMENT & PLAN NOTE
Required IV doses of hydralazine and labetalol  Resume home meds  Home hydralazine and Norvasc increased.

## 2023-12-01 NOTE — ASSESSMENT & PLAN NOTE
Not sure if he did have seizures- resume home Keppra  EEG ordered.  Neurology consulted.  MRI brain with no acute findings.

## 2023-12-01 NOTE — ASSESSMENT & PLAN NOTE
Multifactorial- dehydration, seizures?  Get orthostat once able to   CT head reviewed  Not sure if pt taking Keppra  Resumed Keppra  MRI brain with no acute findings   PTOT

## 2023-12-01 NOTE — CARE UPDATE
12/01/23 0816   Patient Assessment/Suction   Respiratory Effort Unlabored   Expansion/Accessory Muscles/Retractions expansion symmetric;no retractions;no use of accessory muscles   Rhythm/Pattern, Respiratory depth regular;pattern regular;unlabored   PRE-TX-O2   Device (Oxygen Therapy) room air   SpO2 96 %   Pulse Oximetry Type Intermittent   $ Pulse Oximetry - Multiple Charge Pulse Oximetry - Multiple   Pulse 64   Resp 18   Education   $ Education 15 min   Respiratory Evaluation   $ Care Plan Tech Time 15 min

## 2023-12-01 NOTE — PLAN OF CARE
[facility] referrals sent to [facility], and [facility]. First choice is [facility].     12/01/23 0191   Post-Acute Status   Post-Acute Authorization Placement   Post-Acute Placement Status Referrals Sent   Patient choice form signed by patient/caregiver List from [facility]

## 2023-12-01 NOTE — PLAN OF CARE
Pt AAOx 1. Answers questions appropriately. Responds to name. Rested well this shift. Episodes of hypertension overnight corrected with medication. No signs, symptoms, or complaints of distress at this time. Care ongoing.     Problem: Adult Inpatient Plan of Care  Goal: Plan of Care Review  Outcome: Ongoing, Progressing  Goal: Patient-Specific Goal (Individualized)  Outcome: Ongoing, Progressing  Goal: Absence of Hospital-Acquired Illness or Injury  Outcome: Ongoing, Progressing  Goal: Optimal Comfort and Wellbeing  Outcome: Ongoing, Progressing  Goal: Readiness for Transition of Care  Outcome: Ongoing, Progressing     Problem: Skin Injury Risk Increased  Goal: Skin Health and Integrity  Outcome: Ongoing, Progressing     Problem: Fall Injury Risk  Goal: Absence of Fall and Fall-Related Injury  Outcome: Ongoing, Progressing     Problem: Diabetes Comorbidity  Goal: Blood Glucose Level Within Targeted Range  Outcome: Ongoing, Progressing     Problem: Seizure, Active Management  Goal: Absence of Seizure/Seizure-Related Injury  Outcome: Ongoing, Progressing     Problem: Confusion Acute  Goal: Optimal Cognitive Function  Outcome: Ongoing, Progressing     Problem: Hypertension Acute  Goal: Blood Pressure Within Desired Range  Outcome: Ongoing, Progressing     Problem: Syncope  Goal: Absence of Syncopal Symptoms  Outcome: Ongoing, Progressing

## 2023-12-01 NOTE — PROGRESS NOTES
[facility] Medicine  Progress Note    Patient Name: Lavon Brandon  MRN: [id number]  Patient Class: OP- Observation   Admission Date: 11/30/2023  Length of Stay: 0 days  Attending Physician: Memo Downs MD  Primary Care Provider: Aristides Haynes MD        Subjective:     Principal Problem:Fall        HPI:  Mr. Brandon is a 89 year old man with PMHx of HTN, HLD, Lewy body dementia?- was brought today by daughter because he fell tonight at 12 AM- she said he has been doing ok lately - eating drinking ok, he does phases out and she thinks his dementia is getting worse but he was not complaining of any chest pain, palpitations, sob, fever, chills, n/v, urine or bowel problem.     Pt himself said he thinks his leg gave away that's why he fell.     In ER his BP was elevated and was given hydralazine and labetalol, lactate is elevated.    Overview/Hospital Course:  No notes on file    Interval History:  MRI brain negative.  Neurology consulted.  Blood culture with staph epi, likely a contaminant.    Review of Systems   Constitutional:  Negative for activity change, appetite change, chills and fever.   HENT:  Negative for congestion, ear pain and nosebleeds.    Eyes:  Negative for itching and visual disturbance.   Respiratory:  Negative for apnea, cough, chest tightness, shortness of breath and wheezing.    Cardiovascular:  Negative for chest pain, palpitations and leg swelling.   Gastrointestinal:  Negative for abdominal distention, abdominal pain, constipation, diarrhea, nausea and vomiting.   Genitourinary:  Negative for dysuria and flank pain.   Musculoskeletal:  Negative for back pain.   Neurological:  Negative for dizziness and headaches.     Objective:     Vital Signs (Most Recent):  Temp: 98.1 °F (36.7 °C) (12/01/23 1155)  Pulse: 72 (12/01/23 1155)  Resp: 18 (12/01/23 1155)  BP: (!) 142/65 (12/01/23 1155)  SpO2: 95 % (12/01/23 1155) Vital Signs (24h Range):  Temp:  [97.7 °F (36.5 °C)-98.1  °F (36.7 °C)] 98.1 °F (36.7 °C)  Pulse:  [50-72] 72  Resp:  [14-18] 18  SpO2:  [95 %-97 %] 95 %  BP: (130-197)/(64-87) 142/65     Weight: 78.5 kg (173 lb 1 oz)  Body mass index is 24.83 kg/m².    Intake/Output Summary (Last 24 hours) at 12/1/2023 1429  Last data filed at 12/1/2023 1420  Gross per 24 hour   Intake 480 ml   Output 800 ml   Net -320 ml         Physical Exam  Vitals reviewed.   Constitutional:       General: He is not in acute distress.     Appearance: He is ill-appearing. He is not toxic-appearing or diaphoretic.   HENT:      Head: Normocephalic and atraumatic.      Mouth/Throat:      Mouth: Mucous membranes are moist.      Pharynx: Oropharynx is clear.   Eyes:      General: No scleral icterus.     Conjunctiva/sclera: Conjunctivae normal.   Neck:      Vascular: No carotid bruit.   Cardiovascular:      Rate and Rhythm: Normal rate and regular rhythm.      Pulses: Normal pulses.      Heart sounds: Normal heart sounds.   Pulmonary:      Effort: Pulmonary effort is normal.      Breath sounds: Normal breath sounds.   Abdominal:      General: Abdomen is flat. Bowel sounds are normal.      Palpations: Abdomen is soft.   Musculoskeletal:         General: Normal range of motion.   Skin:     General: Skin is warm.   Neurological:      Mental Status: Mental status is at baseline.             Significant Labs: All pertinent labs within the past 24 hours have been reviewed.  Recent Lab Results         12/01/23  0530        Albumin 3.3       ALP 47       ALT 6       Anion Gap 5       AST 20       Baso # 0.03       Basophil % 0.6       BILIRUBIN TOTAL 0.5  Comment: For infants and newborns, interpretation of results should be based  on gestational age, weight and in agreement with clinical  observations.    Premature Infant recommended reference ranges:  Up to 24 hours.............<8.0 mg/dL  Up to 48 hours............<12.0 mg/dL  3-5 days..................<15.0 mg/dL  6-29 days.................<15.0 mg/dL          BUN 23       Calcium 9.4       Chloride 108       CO2 27       Creatinine 2.0       Differential Method Automated       eGFR 31.1       Eos # 0.1       Eosinophil % 2.1       Glucose 92       Gran # (ANC) 2.3       Gran % 47.9       Hematocrit 35.6       Hemoglobin 11.5       Immature Grans (Abs) 0.02  Comment: Mild elevation in immature granulocytes is non specific and   can be seen in a variety of conditions including stress response,   acute inflammation, trauma and pregnancy. Correlation with other   laboratory and clinical findings is essential.         Immature Granulocytes 0.4       Lymph # 1.8       Lymph % 38.2       MCH 29.2       MCHC 32.3       MCV 90       Mono # 0.5       Mono % 10.8       MPV 11.6       nRBC 0       Platelet Count 150       Potassium 4.3       PROTEIN TOTAL 5.7       RBC 3.94       RDW 14.2       Sodium 140       WBC 4.74               Significant Imaging: I have reviewed all pertinent imaging results/findings within the past 24 hours.    Assessment/Plan:      * Fall  Multifactorial- dehydration, seizures?  Get orthostat once able to   CT head reviewed  Not sure if pt taking Keppra  Resumed Keppra  MRI brain with no acute findings   PTOT      Dementia with behavioral disturbance  Daughter not sure if its actually progressing      Hyperlipidemia  Resume home meds      Seizure  Not sure if he did have seizures- resume home Keppra  EEG ordered.  Neurology consulted.  MRI brain with no acute findings.    Hypertension  Required IV doses of hydralazine and labetalol  Resume home meds  Home hydralazine and Norvasc increased.        VTE Risk Mitigation (From admission, onward)           Ordered     heparin (porcine) injection 5,000 Units  Every 8 hours         11/30/23 0538     IP VTE HIGH RISK PATIENT  Once         11/30/23 0538     Place sequential compression device  Until discontinued         11/30/23 0538                    Discharge Planning   JOANNE: 12/2/2023     Code Status: Full Code    Is the patient medically ready for discharge?:     Reason for patient still in hospital (select all that apply): Treatment  Discharge Plan A: Skilled Nursing Facility                  Memo Downs MD  Department of Hospital Medicine   [facility]

## 2023-12-01 NOTE — PT/OT/SLP EVAL
"Physical Therapy Evaluation    Patient Name:  Lavon Brandon   MRN:  [id number]    Recommendations:     Discharge Recommendations: Moderate Intensity Therapy (vs Low Intensity w/ 24/7 assist)   Discharge Equipment Recommendations: none   Barriers to discharge:  PT unsure of level of caregiver support available.    Assessment:     Lavon Brandon is a 89 y.o. male admitted with a medical diagnosis of Fall.  He presents with the following impairments/functional limitations: weakness, impaired endurance, impaired self care skills, impaired functional mobility, gait instability, impaired balance, impaired cognition, decreased safety awareness, pain, impaired coordination.    Pt found HOB elevated and agreeable to working with PT. Pt A & O x  2 and has the following co-morbidities: Dementia, Seizure, HTN, CVA.  Pt tolerated session fairly and required moderate to minimal A for safe mobilization during session today. Pt would benefit from acute PT during hospitalization to increase strength, endurance and safety with mobility. Pt demonstrates a need for/would benefit from Moderate Intensity Therapy due to a fall with decline in functional status prior to discharge home. If pt's daughter feels she is able to assist pt with all mobility at home, pt would also benefit from Low Intensity Therapy.      Rehab Prognosis: Fair; patient would benefit from acute skilled PT services to address these deficits and reach maximum level of function.    Recent Surgery: * No surgery found *      Plan:     During this hospitalization, patient to be seen 6 x/week to address the identified rehab impairments via gait training, therapeutic activities, therapeutic exercises and progress toward the following goals:    Plan of Care Expires:  01/01/24    Subjective     Chief Complaint: back pain rated as "a little."  Patient/Family Comments/goals: pt did not express  Pain/Comfort:  Pain Rating 1:  (unable to give a number, but stated "a " "little")  Location 1: back  Pain Addressed 1: Reposition, Distraction, Cessation of Activity, Nurse notified    Patients cultural, spiritual, Alevism conflicts given the current situation:      Living Environment:  Pt reported he lives with his daughter in a [facility] house with no steps to enter.  Prior to admission, patients level of function was unclear, but pt reported modified independent with RW.  Equipment used at home: bedside commode, hospital bed, walker, rolling, cane, straight, wheelchair, rollator.  DME owned (not currently used): none.  Upon discharge, patient will have assistance from facility staff and daughter.    Objective:     Communicated with NOE Doherty prior to and after session.  Patient found HOB elevated with bed alarm, PureWick, peripheral IV, telemetry  upon PT entry to room.    General Precautions: Standard, fall  Orthopedic Precautions:N/A   Braces: N/A  Respiratory Status: Room air    Exams:  Cognitive Exam:  Patient is oriented to Person and Place  RLE ROM: WFL  LLE ROM: WFL    Functional Mobility:  Bed Mobility:     Scooting: minimum assistance  Supine to Sit: minimum assistance  Sit to Supine: minimum assistance  Transfers:     Sit to Stand:  moderate assistance and vc plus extra time with rolling walker  Gait: x 20' with RW and minimal A/vc for upright posture.      AM-PAC 6 CLICK MOBILITY  Total Score:15       Treatment & Education:  Pt was educated on the following: call light use, importance of OOB activity and functional mobility to negate the negative effects of prolonged bed rest during this hospitalization, safe transfers/ambulation and discharge planning recommendations/options.      Patient left HOB elevated with all lines intact, call button in reach, bed alarm on, and RN notified.    GOALS:   Multidisciplinary Problems       Physical Therapy Goals          Problem: Physical Therapy    Goal Priority Disciplines Outcome Goal Variances Interventions   Physical Therapy Goal     " PT, PT/OT      Description: Goals to be met by: 24     Patient will increase functional independence with mobility by performin. Supine to sit with Supervision  2. Sit to stand transfer with Supervision  3. Bed to chair transfer with Supervision using Rolling Walker  4. Gait  x 150 feet with Supervision using Rolling Walker.                              History:     Past Medical History:   Diagnosis Date    Bradyarrhythmia     CVA (cerebral infarction)     Dementia     Depression     Hyperlipidemia     Hypertension     renal htn    Pulmonary embolism     Seizure disorder        Past Surgical History:   Procedure Laterality Date    left foot  with crushed injry         Time Tracking:     PT Received On: 23  PT Start Time: 958     PT Stop Time: 1015  PT Total Time (min): 17 min     Billable Minutes: Evaluation 8 and Therapeutic Activity 9      2023

## 2023-12-01 NOTE — PLAN OF CARE
BULLOCK explained, daughter verbalized understanding and gave verbal consent over the phone.      12/01/23 1412   BULLOCK Message   Medicare Outpatient and Observation Notification regarding financial responsibility Given to patient/caregiver;Explained to patient/caregiver;Signed/date by patient/caregiver   Date BULLOCK was signed 12/01/23   Time BULLOCK was signed [id number]

## 2023-12-01 NOTE — PLAN OF CARE
[facility]ly [facility]  Initial Discharge Assessment       Primary Care Provider: Aristides Haynes MD    Admission Diagnosis: Confusion [R41.0]    Admission Date: 11/30/2023  Expected Discharge Date: 12/2/2023    Transition of Care Barriers: None    Payor: HUMANA MANAGED MEDICARE / Plan: HUMANA MEDICARE PPO / Product Type: Medicare Advantage /     Extended Emergency Contact Information  Primary Emergency Contact: Gabriela Merino   [place]  Work Phone: [phone]  Mobile Phone: [phone]  Relation: Daughter  Preferred language: English   needed? No  Secondary Emergency Contact: Nilda Brandon  Mobile Phone: [phone]  Relation: Daughter  Preferred language: English   needed? No    Discharge Plan A: Skilled Nursing Facility  Discharge Plan B: [facility]      [id number] - [place], [place] - [address]  [address] [facility]  [address] [facility] [address]  Phone: [phone] Fax: [phone]    [facility]  [facility] [address] [place] [address]  [address] [facility] [address]  Phone: [phone] Fax: [phone]    [facility] [address] [place] [address] [address] [facility].  [address] [facility] [address]  Phone: [phone] Fax: [phone]    DC assessment completed over the phone with pt's daughter/POA Gabriela [phone]. Information on facesheet verified. Lives at listed address with daughter. PCP is Dr. Haynes. Pharm is [facility] [address]. Denies coumadin, HH, HD. DME is listed and used for ADLs. Spoke with daughter about placement, [facility] is first choice for SNF. Ok with referrals being sent elsewhere for backup. Insurance verified. Denies recent admission. CM following for DC planning.     Initial Assessment (most recent)       Adult Discharge Assessment - 12/01/23 1406          Discharge Assessment    Assessment Type Discharge Planning Assessment     Confirmed/corrected address,  phone number and insurance Yes     Confirmed Demographics Correct on Facesheet     Source of Information family     If unable to respond/provide information was family/caregiver contacted? Yes     Contact Name/Number daughterGabriela [phone]     Does patient/caregiver understand observation status Yes     Communicated JOANNE with patient/caregiver Yes     People in Home child(idalmis), adult     Facility Arrived From: home     Do you expect to return to your current living situation? Other (see comments)   SNF    Do you have help at home or someone to help you manage your care at home? Yes     Who are your caregiver(s) and their phone number(s)? daughter     Prior to hospitilization cognitive status: Unable to Assess     Current cognitive status: Unable to Assess     Equipment Currently Used at Home bedside commode;hospital bed;walker, rolling;cane, straight;wheelchair;shower chair     Readmission within 30 days? No     Patient currently being followed by outpatient case management? No     Do you currently have service(s) that help you manage your care at home? No     Do you take prescription medications? Yes     Do you have prescription coverage? Yes     Coverage [employer]a     Do you have any problems affording any of your prescribed medications? No     Is the patient taking medications as prescribed? yes     Who is going to help you get home at discharge? snf     How do you get to doctors appointments? family or friend will provide     Are you on dialysis? No     Do you take coumadin? No     DME Needed Upon Discharge  none     Discharge Plan discussed with: Adult children     Transition of Care Barriers None     Discharge Plan A Skilled Nursing Facility     Discharge Plan B Home Health

## 2023-12-01 NOTE — SUBJECTIVE & OBJECTIVE
Interval History:  MRI brain negative.  Neurology consulted.  Blood culture with staph epi, likely a contaminant.    Review of Systems   Constitutional:  Negative for activity change, appetite change, chills and fever.   HENT:  Negative for congestion, ear pain and nosebleeds.    Eyes:  Negative for itching and visual disturbance.   Respiratory:  Negative for apnea, cough, chest tightness, shortness of breath and wheezing.    Cardiovascular:  Negative for chest pain, palpitations and leg swelling.   Gastrointestinal:  Negative for abdominal distention, abdominal pain, constipation, diarrhea, nausea and vomiting.   Genitourinary:  Negative for dysuria and flank pain.   Musculoskeletal:  Negative for back pain.   Neurological:  Negative for dizziness and headaches.     Objective:     Vital Signs (Most Recent):  Temp: 98.1 °F (36.7 °C) (12/01/23 1155)  Pulse: 72 (12/01/23 1155)  Resp: 18 (12/01/23 1155)  BP: (!) 142/65 (12/01/23 1155)  SpO2: 95 % (12/01/23 1155) Vital Signs (24h Range):  Temp:  [97.7 °F (36.5 °C)-98.1 °F (36.7 °C)] 98.1 °F (36.7 °C)  Pulse:  [50-72] 72  Resp:  [14-18] 18  SpO2:  [95 %-97 %] 95 %  BP: (130-197)/(64-87) 142/65     Weight: 78.5 kg (173 lb 1 oz)  Body mass index is 24.83 kg/m².    Intake/Output Summary (Last 24 hours) at 12/1/2023 1429  Last data filed at 12/1/2023 1420  Gross per 24 hour   Intake 480 ml   Output 800 ml   Net -320 ml         Physical Exam  Vitals reviewed.   Constitutional:       General: He is not in acute distress.     Appearance: He is ill-appearing. He is not toxic-appearing or diaphoretic.   HENT:      Head: Normocephalic and atraumatic.      Mouth/Throat:      Mouth: Mucous membranes are moist.      Pharynx: Oropharynx is clear.   Eyes:      General: No scleral icterus.     Conjunctiva/sclera: Conjunctivae normal.   Neck:      Vascular: No carotid bruit.   Cardiovascular:      Rate and Rhythm: Normal rate and regular rhythm.      Pulses: Normal pulses.      Heart  sounds: Normal heart sounds.   Pulmonary:      Effort: Pulmonary effort is normal.      Breath sounds: Normal breath sounds.   Abdominal:      General: Abdomen is flat. Bowel sounds are normal.      Palpations: Abdomen is soft.   Musculoskeletal:         General: Normal range of motion.   Skin:     General: Skin is warm.   Neurological:      Mental Status: Mental status is at baseline.             Significant Labs: All pertinent labs within the past 24 hours have been reviewed.  Recent Lab Results         12/01/23  0530        Albumin 3.3       ALP 47       ALT 6       Anion Gap 5       AST 20       Baso # 0.03       Basophil % 0.6       BILIRUBIN TOTAL 0.5  Comment: For infants and newborns, interpretation of results should be based  on gestational age, weight and in agreement with clinical  observations.    Premature Infant recommended reference ranges:  Up to 24 hours.............<8.0 mg/dL  Up to 48 hours............<12.0 mg/dL  3-5 days..................<15.0 mg/dL  6-29 days.................<15.0 mg/dL         BUN 23       Calcium 9.4       Chloride 108       CO2 27       Creatinine 2.0       Differential Method Automated       eGFR 31.1       Eos # 0.1       Eosinophil % 2.1       Glucose 92       Gran # (ANC) 2.3       Gran % 47.9       Hematocrit 35.6       Hemoglobin 11.5       Immature Grans (Abs) 0.02  Comment: Mild elevation in immature granulocytes is non specific and   can be seen in a variety of conditions including stress response,   acute inflammation, trauma and pregnancy. Correlation with other   laboratory and clinical findings is essential.         Immature Granulocytes 0.4       Lymph # 1.8       Lymph % 38.2       MCH 29.2       MCHC 32.3       MCV 90       Mono # 0.5       Mono % 10.8       MPV 11.6       nRBC 0       Platelet Count 150       Potassium 4.3       PROTEIN TOTAL 5.7       RBC 3.94       RDW 14.2       Sodium 140       WBC 4.74               Significant Imaging: I have reviewed  all pertinent imaging results/findings within the past 24 hours.

## 2023-12-01 NOTE — PT/OT/SLP EVAL
Occupational Therapy   Evaluation    Name: Lavon Brandon  MRN: [id number]  Admitting Diagnosis: Fall  Recent Surgery: * No surgery found *      Recommendations:     Discharge Recommendations: Moderate Intensity Therapy (vs Low Intensity with 24/7 supervision/assist)  Discharge Equipment Recommendations:  none  Barriers to discharge:   (increased assist with ADLs and mobility)    Assessment:     Lavon Brandon is a 89 y.o. male with a medical diagnosis of Fall. Pt agreeable to OT evaluation this AM. Performance deficits affecting function: weakness, impaired endurance, impaired self care skills, impaired functional mobility, gait instability, impaired balance, decreased upper extremity function, decreased lower extremity function, decreased safety awareness, impaired cardiopulmonary response to activity.  Limited evaluation due to pt declining EOB or OOB with OT this date.    Rehab Prognosis: Fair; patient would benefit from acute skilled OT services to address these deficits and reach maximum level of function.       Plan:     Patient to be seen 5 x/week to address the above listed problems via self-care/home management, therapeutic activities, therapeutic exercises  Plan of Care Expires: 01/01/24  Plan of Care Reviewed with: patient    Subjective     Chief Complaint: fatigued   Patient/Family Comments/goals: none stated    Occupational Profile:  Living Environment: Pt lives with daughter in a 1 story home with 1 MERLYN. Pt has a walk-in shower with a shower chair.  Previous level of function: Mod I with ADLs and mobility; Daughter IADLs  Roles and Routines: father  Equipment Used at Home: bedside commode, hospital bed, walker, rolling, cane, straight, wheelchair, shower chair  Assistance upon Discharge: yes, from daughter    Pain/Comfort:  Pain Rating 1: 0/10    Patients cultural, spiritual, Holiness conflicts given the current situation:      Objective:     Communicated with: nursing prior to session.  Patient  found HOB elevated with bed alarm, PureWick, peripheral IV, telemetry upon OT entry to room.    General Precautions: Standard, fall  Orthopedic Precautions: N/A  Braces: N/A  Respiratory Status: Room air    Occupational Performance:    Bed Mobility:    declined    Activities of Daily Living:  Feeding:  setup assistance for breakfast tray    Grooming: setup assistance bed level to wash face    Toileting: purewick      Cognitive/Visual Perceptual:  Cognitive/Psychosocial Skills:     -       Follows Commands/attention:Follows one-step commands  -       Communication: clear/fluent  -       Safety awareness/insight to disability: impaired   -       Mood/Affect/Coping skills/emotional control: Appropriate to situation, Cooperative, and Flat affect    Physical Exam:  Upper Extremity Range of Motion:     -       Right Upper Extremity: WFL  -       Left Upper Extremity: WFL  Upper Extremity Strength:    -       Right Upper Extremity: WFL  -       Left Upper Extremity: WFL   Strength:    -       Right Upper Extremity: fair   -       Left Upper Extremity: fair   Gross motor coordination:   WFL    AMPAC 6 Click ADL:  AMPAC Total Score: 17    Treatment & Education:  Pt educated on role of OT/POC, importance of OOB/EOB activity, use of call bell, and safety during ADLs, transfers, and functional mobility.    Patient left HOB elevated with all lines intact, call button in reach, and bed alarm on    GOALS:   Multidisciplinary Problems       Occupational Therapy Goals          Problem: Occupational Therapy    Goal Priority Disciplines Outcome Interventions   Occupational Therapy Goal     OT, PT/OT     Description: Goals to be met by: 1/1/24     Patient will increase functional independence with ADLs by performing:    UE Dressing with Supervision.  LE Dressing with Supervision.  Grooming while seated with Supervision.  Toileting from toilet with Supervision for hygiene and clothing management.   Toilet transfer to toilet  with Supervision.                         History:     Past Medical History:   Diagnosis Date    Bradyarrhythmia     CVA (cerebral infarction) 2006    Dementia     Depression     Hyperlipidemia     Hypertension     renal htn    Pulmonary embolism 2002    Seizure disorder          Past Surgical History:   Procedure Laterality Date    left foot  with crushed injry         Time Tracking:     OT Date of Treatment: 12/01/23  OT Start Time: 0925  OT Stop Time: 0932  OT Total Time (min): 7 min    Billable Minutes:Evaluation 7    12/1/2023

## 2023-12-01 NOTE — NURSING
Pt refusing to eat breakfast, resting comfortable in bed. Took medication w/o difficulty. Will continue to monitor.

## 2023-12-01 NOTE — TELEPHONE ENCOUNTER
----- Message from Nilda Baez sent at 12/1/2023  9:24 AM CST -----  Type: Needs Medical Advice  Who Called:  pt's daughter/caretaker  Best Call Back Number: [phone]    Additional Information: pt is calling in regards to the pt being in the hospital and having labs drawn while there. Pt's daughter wants to know if the dr office can pull the labs that he gets done while in the hospital to get the results for the labs that were ordered for Dr. Baer so that the pt doesn't have to have another lab appointment once discharged form the hospital. Please call back and advise. Thanks!

## 2023-12-01 NOTE — NURSING
Nurses Note -- 4 Eyes      11/30/2023   6:24 PM      Skin assessed during: Admit      [x] No Altered Skin Integrity Present    [x]Prevention Measures Documented      [] Yes- Altered Skin Integrity Present or Discovered   [] LDA Added if Not in Epic (Describe Wound)   [] New Altered Skin Integrity was Present on Admit and Documented in LDA   [] Wound Image Taken    Wound Care Consulted? No    Attending Nurse:  Kat De Guzman RN/Staff Member:   Shanice

## 2023-12-02 LAB
ALBUMIN SERPL BCP-MCNC: 3.2 G/DL (ref 3.5–5.2)
ALP SERPL-CCNC: 41 U/L (ref 55–135)
ALT SERPL W/O P-5'-P-CCNC: 5 U/L (ref 10–44)
ANION GAP SERPL CALC-SCNC: 3 MMOL/L (ref 8–16)
AST SERPL-CCNC: 16 U/L (ref 10–40)
BACTERIA BLD CULT: ABNORMAL
BASOPHILS # BLD AUTO: 0.03 K/UL (ref 0–0.2)
BASOPHILS NFR BLD: 0.5 % (ref 0–1.9)
BILIRUB SERPL-MCNC: 0.5 MG/DL (ref 0.1–1)
BUN SERPL-MCNC: 32 MG/DL (ref 8–23)
CALCIUM SERPL-MCNC: 8.9 MG/DL (ref 8.7–10.5)
CHLORIDE SERPL-SCNC: 108 MMOL/L (ref 95–110)
CO2 SERPL-SCNC: 27 MMOL/L (ref 23–29)
CREAT SERPL-MCNC: 2.7 MG/DL (ref 0.5–1.4)
DIFFERENTIAL METHOD: ABNORMAL
EOSINOPHIL # BLD AUTO: 0.1 K/UL (ref 0–0.5)
EOSINOPHIL NFR BLD: 1.7 % (ref 0–8)
ERYTHROCYTE [DISTWIDTH] IN BLOOD BY AUTOMATED COUNT: 14.5 % (ref 11.5–14.5)
EST. GFR  (NO RACE VARIABLE): 21.7 ML/MIN/1.73 M^2
GLUCOSE SERPL-MCNC: 95 MG/DL (ref 70–110)
HCT VFR BLD AUTO: 32.3 % (ref 40–54)
HGB BLD-MCNC: 10.4 G/DL (ref 14–18)
IMM GRANULOCYTES # BLD AUTO: 0.02 K/UL (ref 0–0.04)
IMM GRANULOCYTES NFR BLD AUTO: 0.3 % (ref 0–0.5)
LYMPHOCYTES # BLD AUTO: 2.4 K/UL (ref 1–4.8)
LYMPHOCYTES NFR BLD: 38.1 % (ref 18–48)
MCH RBC QN AUTO: 29.5 PG (ref 27–31)
MCHC RBC AUTO-ENTMCNC: 32.2 G/DL (ref 32–36)
MCV RBC AUTO: 92 FL (ref 82–98)
MONOCYTES # BLD AUTO: 0.8 K/UL (ref 0.3–1)
MONOCYTES NFR BLD: 12.1 % (ref 4–15)
NEUTROPHILS # BLD AUTO: 3 K/UL (ref 1.8–7.7)
NEUTROPHILS NFR BLD: 47.3 % (ref 38–73)
NRBC BLD-RTO: 0 /100 WBC
PLATELET # BLD AUTO: 148 K/UL (ref 150–450)
PMV BLD AUTO: 10.9 FL (ref 9.2–12.9)
POTASSIUM SERPL-SCNC: 4.6 MMOL/L (ref 3.5–5.1)
PROT SERPL-MCNC: 5.8 G/DL (ref 6–8.4)
RBC # BLD AUTO: 3.53 M/UL (ref 4.6–6.2)
SODIUM SERPL-SCNC: 138 MMOL/L (ref 136–145)
WBC # BLD AUTO: 6.35 K/UL (ref 3.9–12.7)

## 2023-12-02 PROCEDURE — 36415 COLL VENOUS BLD VENIPUNCTURE: CPT | Performed by: STUDENT IN AN ORGANIZED HEALTH CARE EDUCATION/TRAINING PROGRAM

## 2023-12-02 PROCEDURE — 85025 COMPLETE CBC W/AUTO DIFF WBC: CPT | Performed by: STUDENT IN AN ORGANIZED HEALTH CARE EDUCATION/TRAINING PROGRAM

## 2023-12-02 PROCEDURE — 80053 COMPREHEN METABOLIC PANEL: CPT | Performed by: STUDENT IN AN ORGANIZED HEALTH CARE EDUCATION/TRAINING PROGRAM

## 2023-12-02 PROCEDURE — 25000003 PHARM REV CODE 250: Performed by: STUDENT IN AN ORGANIZED HEALTH CARE EDUCATION/TRAINING PROGRAM

## 2023-12-02 PROCEDURE — 94761 N-INVAS EAR/PLS OXIMETRY MLT: CPT

## 2023-12-02 PROCEDURE — 94760 N-INVAS EAR/PLS OXIMETRY 1: CPT | Mod: XB

## 2023-12-02 PROCEDURE — 97530 THERAPEUTIC ACTIVITIES: CPT

## 2023-12-02 PROCEDURE — 99900035 HC TECH TIME PER 15 MIN (STAT)

## 2023-12-02 PROCEDURE — G0378 HOSPITAL OBSERVATION PER HR: HCPCS

## 2023-12-02 PROCEDURE — 63600175 PHARM REV CODE 636 W HCPCS: Performed by: STUDENT IN AN ORGANIZED HEALTH CARE EDUCATION/TRAINING PROGRAM

## 2023-12-02 PROCEDURE — 94799 UNLISTED PULMONARY SVC/PX: CPT

## 2023-12-02 PROCEDURE — 96372 THER/PROPH/DIAG INJ SC/IM: CPT | Performed by: STUDENT IN AN ORGANIZED HEALTH CARE EDUCATION/TRAINING PROGRAM

## 2023-12-02 PROCEDURE — 97116 GAIT TRAINING THERAPY: CPT

## 2023-12-02 RX ADMIN — CITALOPRAM HYDROBROMIDE 20 MG: 20 TABLET ORAL at 10:12

## 2023-12-02 RX ADMIN — HYDRALAZINE HYDROCHLORIDE 50 MG: 25 TABLET, FILM COATED ORAL at 11:12

## 2023-12-02 RX ADMIN — HEPARIN SODIUM 5000 UNITS: 5000 INJECTION INTRAVENOUS; SUBCUTANEOUS at 02:12

## 2023-12-02 RX ADMIN — ATORVASTATIN CALCIUM 40 MG: 40 TABLET, FILM COATED ORAL at 10:12

## 2023-12-02 RX ADMIN — HYDRALAZINE HYDROCHLORIDE 50 MG: 25 TABLET, FILM COATED ORAL at 02:12

## 2023-12-02 RX ADMIN — DOCUSATE SODIUM 100 MG: 100 CAPSULE, LIQUID FILLED ORAL at 10:12

## 2023-12-02 RX ADMIN — HYDRALAZINE HYDROCHLORIDE 50 MG: 25 TABLET, FILM COATED ORAL at 05:12

## 2023-12-02 RX ADMIN — CETIRIZINE HYDROCHLORIDE 5 MG: 5 TABLET ORAL at 10:12

## 2023-12-02 RX ADMIN — HEPARIN SODIUM 5000 UNITS: 5000 INJECTION INTRAVENOUS; SUBCUTANEOUS at 05:12

## 2023-12-02 RX ADMIN — LEVETIRACETAM 750 MG: 250 TABLET, FILM COATED ORAL at 10:12

## 2023-12-02 RX ADMIN — AMLODIPINE BESYLATE 10 MG: 5 TABLET ORAL at 06:12

## 2023-12-02 RX ADMIN — LEVETIRACETAM 750 MG: 250 TABLET, FILM COATED ORAL at 11:12

## 2023-12-02 RX ADMIN — HEPARIN SODIUM 5000 UNITS: 5000 INJECTION INTRAVENOUS; SUBCUTANEOUS at 11:12

## 2023-12-02 NOTE — SUBJECTIVE & OBJECTIVE
Interval History: EEG completed.     Review of Systems   Constitutional:  Negative for activity change, appetite change, chills and fever.   HENT:  Negative for congestion, ear pain and nosebleeds.    Eyes:  Negative for itching and visual disturbance.   Respiratory:  Negative for apnea, cough, chest tightness, shortness of breath and wheezing.    Cardiovascular:  Negative for chest pain, palpitations and leg swelling.   Gastrointestinal:  Negative for abdominal distention, abdominal pain, constipation, diarrhea, nausea and vomiting.   Genitourinary:  Negative for dysuria and flank pain.   Musculoskeletal:  Negative for back pain.   Neurological:  Negative for dizziness and headaches.     Objective:     Vital Signs (Most Recent):  Temp: 97.7 °F (36.5 °C) (12/02/23 1158)  Pulse: 76 (12/02/23 1158)  Resp: 17 (12/02/23 1158)  BP: (!) 149/67 (12/02/23 1158)  SpO2: 97 % (12/02/23 1158) Vital Signs (24h Range):  Temp:  [97.7 °F (36.5 °C)-98.2 °F (36.8 °C)] 97.7 °F (36.5 °C)  Pulse:  [66-78] 76  Resp:  [16-19] 17  SpO2:  [94 %-97 %] 97 %  BP: (114-149)/(49-68) 149/67     Weight: 78.5 kg (173 lb 1 oz)  Body mass index is 24.83 kg/m².    Intake/Output Summary (Last 24 hours) at 12/2/2023 1628  Last data filed at 12/2/2023 0501  Gross per 24 hour   Intake --   Output 500 ml   Net -500 ml           Physical Exam  Vitals reviewed.   Constitutional:       General: He is not in acute distress.     Appearance: He is ill-appearing. He is not toxic-appearing or diaphoretic.   HENT:      Head: Normocephalic and atraumatic.      Mouth/Throat:      Mouth: Mucous membranes are moist.      Pharynx: Oropharynx is clear.   Eyes:      General: No scleral icterus.     Conjunctiva/sclera: Conjunctivae normal.   Neck:      Vascular: No carotid bruit.   Cardiovascular:      Rate and Rhythm: Normal rate and regular rhythm.      Pulses: Normal pulses.      Heart sounds: Normal heart sounds.   Pulmonary:      Effort: Pulmonary effort is normal.       Breath sounds: Normal breath sounds.   Abdominal:      General: Abdomen is flat. Bowel sounds are normal.      Palpations: Abdomen is soft.   Musculoskeletal:         General: Normal range of motion.   Skin:     General: Skin is warm.   Neurological:      Mental Status: Mental status is at baseline.             Significant Labs: All pertinent labs within the past 24 hours have been reviewed.  Recent Lab Results         12/02/23  0514   12/02/23  0513        Albumin   3.2       ALP   41       ALT   5       Anion Gap   3       AST   16       Baso # 0.03         Basophil % 0.5         BILIRUBIN TOTAL   0.5  Comment: For infants and newborns, interpretation of results should be based  on gestational age, weight and in agreement with clinical  observations.    Premature Infant recommended reference ranges:  Up to 24 hours.............<8.0 mg/dL  Up to 48 hours............<12.0 mg/dL  3-5 days..................<15.0 mg/dL  6-29 days.................<15.0 mg/dL         BUN   32       Calcium   8.9       Chloride   108       CO2   27       Creatinine   2.7       Differential Method Automated         eGFR   21.7       Eos # 0.1         Eosinophil % 1.7         Glucose   95       Gran # (ANC) 3.0         Gran % 47.3         Hematocrit 32.3         Hemoglobin 10.4         Immature Grans (Abs) 0.02  Comment: Mild elevation in immature granulocytes is non specific and   can be seen in a variety of conditions including stress response,   acute inflammation, trauma and pregnancy. Correlation with other   laboratory and clinical findings is essential.           Immature Granulocytes 0.3         Lymph # 2.4         Lymph % 38.1         MCH 29.5         MCHC 32.2         MCV 92         Mono # 0.8         Mono % 12.1         MPV 10.9         nRBC 0         Platelet Count 148         Potassium   4.6       PROTEIN TOTAL   5.8       RBC 3.53         RDW 14.5         Sodium   138       WBC 6.35                 Significant Imaging: I  have reviewed all pertinent imaging results/findings within the past 24 hours.

## 2023-12-02 NOTE — CONSULTS
[facility]  Neurology Consult    Patient Name: Lavon Brandon  MRN: [id number]  : 1932  TODAY'S DATE: 2023  ADMIT DATE: 2023  2:09 AM                                          CONSULTED PROVIDER: Tenisha Tineo MD, Neurologist. On-call Phone: [phone]  CONSULT REQUESTED BY: Memo Downs MD     Chief Complaint   Patient presents with    Altered Mental Status    Fall     Patient daughter states that patient fell. She is unsure if he hit his head. She denies any LOC. She also denies any blood thinners       HPI per EMR:  Mr. Brandon is a 89 year old man with PMHx of HTN, HLD, Lewy body dementia?- was brought today by daughter because he fell tonight at 12 AM- she said he has been doing ok lately - eating drinking ok, he does phases out and she thinks his dementia is getting worse but he was not complaining of any chest pain, palpitations, sob, fever, chills, n/v, urine or bowel problem.      Pt himself said he thinks his leg gave away that's why he fell.      In ER his BP was elevated and was given hydralazine and labetalol, lactate is elevated.     Interval History:  MRI brain negative.  Neurology consulted.  Blood culture with staph epi, likely a contaminant.    Neurology Consult:  Patient was seen and examined by me today. He is a very pleasant 89 yo man with history of seizures, bradyarrhythmia, stroke and dementia who presented to the ED after experiencing a fall. HPI as per chart review and patient. He thinks that his legs gave away and that is why he fell, but it may be that he lost his balance. As per chart review, patient's daughter thinks his dementia has gotten worse over the past few months. Patient denies any weakness, numbness, dizziness, slurred speech, vision loss or other neuro deficits.    Review of Systems:    A comprehensive ROS was performed and all systems were negative except as noted above in HPI.    Past Medical History:  has a past medical history  of Bradyarrhythmia, CVA (cerebral infarction) (2006), Dementia, Depression, Hyperlipidemia, Hypertension, Pulmonary embolism (2002), and Seizure disorder.    Past Surgical History:  has a past surgical history that includes left foot  with crushed injry.    Family Medical History: family history includes Diabetes in his mother.    Social History:  reports that he has never smoked. He has never used smokeless tobacco. He reports that he does not drink alcohol and does not use drugs.    PCP: Aristides Haynes MD    Allergies:   Review of patient's allergies indicates:   Allergen Reactions    Ciprofloxacin (bulk) Swelling       Medications:   No current facility-administered medications on file prior to encounter.     Current Outpatient Medications on File Prior to Encounter   Medication Sig Dispense Refill    amLODIPine (NORVASC) 5 MG tablet Take 1 tablet (5 mg total) by mouth every evening. 90 tablet 3    calcitRIOL (ROCALTROL) 0.25 MCG Cap Take 1 capsule (0.25 mcg total) by mouth once daily. 90 capsule 4    citalopram (CELEXA) 20 MG tablet Take 1 tablet (20 mg total) by mouth once daily. 90 tablet 11    hydrALAZINE (APRESOLINE) 50 MG tablet Take 1 tablet (50 mg total) by mouth every 12 (twelve) hours. 180 tablet 4    levETIRAcetam (KEPPRA) 750 MG Tab Take 1 tablet (750 mg total) by mouth 2 (two) times daily. 180 tablet 3    methyl salicylate-menthol 15-10% 15-10 % Crea Apply 1 application  topically 2 (two) times a day.      QUEtiapine (SEROQUEL) 25 MG Tab Take 1 tablet (25 mg total) by mouth nightly as needed (restless). (Patient taking differently: Take 25 mg by mouth nightly as needed (restlessness / hallucinations).) 30 tablet 3    rosuvastatin (CRESTOR) 10 MG tablet Take 1 tablet (10 mg total) by mouth once daily. 90 tablet 4    aspirin 81 MG Chew Take 1 tablet (81 mg total) by mouth once daily. (Patient not taking: Reported on 11/30/2023) 90 tablet 0    cetirizine (ZYRTEC) 5 MG tablet Take 1 tablet (5 mg total)  by mouth once daily. for 20 days (Patient not taking: Reported on 11/30/2023) 20 tablet 0    docusate sodium (COLACE) 100 MG capsule Take 1 capsule (100 mg total) by mouth once daily. (Patient not taking: Reported on 11/30/2023) 90 capsule 3    fluticasone propionate (FLONASE) 50 mcg/actuation nasal spray 2 sprays (100 mcg total) by Each Nostril route once daily. (Patient not taking: Reported on 11/30/2023) 18.2 mL 0     Scheduled Meds:   amLODIPine  10 mg Oral Daily before evening meal    atorvastatin  40 mg Oral Daily    cetirizine  5 mg Oral Daily    citalopram  20 mg Oral Daily    docusate sodium  100 mg Oral Daily    heparin (porcine)  5,000 Units Subcutaneous Q8H    hydrALAZINE  50 mg Oral Q8H    levETIRAcetam  750 mg Oral BID     Continuous Infusions:  PRN Meds:.hydrALAZINE, melatonin, sodium chloride 0.9%      Physical Exam  Current Vitals:  Vitals:    12/02/23 0837   BP:    Pulse: 74   Resp: 16   Temp:      Physical Exam:  General: AO x2  HEENT: PERRL, EOMI  CV: bradycardic  Lungs: no respiratory distress  Abdomen: soft, non tender     Neurological Exam     MENTAL STATUS EXAM:  Patient is awake, alert, oriented to self and place, not time or situation. His speech is fluent with intact naming, comprehension and repetition.     CRANIAL NERVE EXAM:  II/III: fundoscopic exam deferred, PERRL; visual fields full to threat  III/IV/VI: EOMI   V: no deficits appreciated to light touch  VII: no facial asymmetry noted  VIII: hard of hearing at baseline  IX/X: palate @ ML and raises symmetrically  XI: shoulder shrug 5/5 bilaterally  XII: tongue to midline w/out asymmetry     MOTOR EXAM:  Bulk and Tone: normal throughout  Strength is 5/5 proximally and distally in upper and lower extremities without deficits.  No pronator drift in bilateral UE. No tremor either at rest or with intention.     REFLEXES:  2+ in bilateral upper and lower extremities, no Corbin's, no clonus. Downgoing plantars.     SENSORY EXAM  Light touch  "intact throughout in upper and lower extremities without deficits.     COORDINATION/CEREBELLAR EXAM:  FTN: no dysmetria or other signs of appendicular ataxia     GAIT:  Deferred for safety.    Laboratory Data & Studies    Recent Labs   Lab 11/30/23 0251 12/01/23 0530 12/02/23 0514   WBC 6.32 4.74 6.35   HGB 11.8* 11.5* 10.4*   * 150 148*   MCV 90 90 92       Recent Labs   Lab 11/30/23 0251 12/01/23 0530 12/02/23 0513    140 138   K 4.3 4.3 4.6    108 108   CO2 26 27 27   BUN 29* 23 32*   * 92 95   CALCIUM 9.6 9.4 8.9   MG 1.9  --   --        Recent Labs   Lab 11/30/23 0251 12/01/23 0530 12/02/23 0513   PROT 7.0 5.7* 5.8*   ALBUMIN 4.0 3.3* 3.2*   BILITOT 0.4 0.5 0.5   AST 19 20 16   ALT 7* 6* 5*   ALKPHOS 52* 47* 41*       Recent Labs   Lab 11/30/23 0251   INR 0.9       No results for input(s): "HGBA1C", "CHOL", "TRIG", "LDLCALC", "HDL", "TSH" in the last 168 hours.      Microbiology:  Microbiology Results (last 7 days)       Procedure Component Value Units Date/Time    Blood culture x two cultures. Draw prior to antibiotics. [[id number]]  (Abnormal) Collected: 11/30/23 0256    Order Status: Completed Specimen: Blood from Antecubital, Left Arm Updated: 12/02/23 0815     Blood Culture, Routine Gram stain aer bottle: Gram positive cocci      Results called to and read back by:Ady Freitas RN-[address] [place] [address];      12/01/2023  11:37 CJD      STAPHYLOCOCCUS EPIDERMIDIS  Organism is a probable contaminant      Narrative:      Aerobic and anaerobic    Blood culture x two cultures. Draw prior to antibiotics. [[id number]] Collected: 11/30/23 0257    Order Status: Completed Specimen: Blood Updated: 12/02/23 0432     Blood Culture, Routine No Growth to date      No Growth to date      No Growth to date    Narrative:      Aerobic and anaerobic    Rapid Organism ID by PCR (from Blood culture) [[id number]]  (Abnormal) Collected: 11/30/23 0256    Order Status: Completed Updated: 12/01/23 2053     " Enterococcus faecalis Not Detected     Enterococcus faecium Not Detected     Listeria monocytogenes Not Detected     Staphylococcus spp. See species for ID     Staphylococcus aureus Not Detected     Staphylococcus epidermidis Detected     Staphylococcus lugdunensis Not Detected     Streptococcus species Not Detected     Streptococcus agalactiae Not Detected     Streptococcus pneumoniae Not Detected     Streptococcus pyogenes Not Detected     Acinetobacter calcoaceticus/baumannii complex Not Detected     Bacteroides fragilis Not Detected     Enterobacterales Not Detected     Enterobacter cloacae complex Not Detected     Escherichia coli Not Detected     Klebsiella aerogenes Not Detected     Klebsiella oxytoca Not Detected     Klebsiella pneumoniae group Not Detected     Proteus Not Detected     Salmonella sp Not Detected     Serratia marcescens Not Detected     Haemophilus influenzae Not Detected     Neisseria meningtidis Not Detected     Pseudomonas aeruginosa Not Detected     Stenotrophomonas maltophilia Not Detected     Candida albicans Not Detected     Candida auris Not Detected     Candida glabrata Not Detected     Candida krusei Not Detected     Candida parapsilosis Not Detected     Candida tropicalis Not Detected     Cryptococcus neoformans/gattii Not Detected     CTX-M (ESBL ) Test not applicable     IMP (Carbapenem resistant) Test not applicable     KPC resistance gene (Carbapenem resistant) Test not applicable     mcr-1  Test not applicable     mec A/C  Detected     Comment: Critical result(s) called and verbal readback obtained from Blaire Roland, nurse on [address] [place] [address], re: mecA/C resistance gene detected 12/1/2023   @ 13:07 vcb by VCB 12/01/2023 13:08          mec A/C and MREJ (MRSA) gene Test not applicable     NDM (Carbapenem resistant) Test not applicable     OXA-48-like (Carbapenem resistant) Test not applicable     van A/B (VRE gene) Test not applicable     VIM (Carbapenem resistant) Test not  applicable    Narrative:      Aerobic and anaerobic  Critical result(s) called and verbal readback obtained from Blaire Roland, nurse on [facility], re: mecA/C resistance gene detected 12/1/2023   @ 13:07 vcb by VCB 12/01/2023 13:08              Imaging:  MRI Brain Without Contrast    Result Date: 12/1/2023  REASON: Mental status change, unknown cause Fall; Mental status change, unknown cause Hx-CVA, HTN, Dementia TECHNIQUE: Multiplanar and multi sequential MRI of the brain, without IV contrast. COMPARISON: CT head November 30, 2023. MR brain April 12, 2023 FINDINGS: Gray-white matter distinction is preserved throughout the brain parenchyma. There are involutional changes of the brain parenchyma. Periventricular deep white matter FLAIR and T2 hyperintensities are consistent with changes of chronic vessel ischemic disease. There is a chronic lacunar infarct of the cedric. Small focal areas of susceptibility artifact are noted scattered throughout the brain parenchyma consistent with and amyloid angiopathy. The ventricles are midline without mass effect. No areas of restricted diffusion or hemorrhage identified. No intra or extra-axial fluid collections or mass. Vascular flow voids are within normal limits. Calvarial bone marrow signal is normal. IMPRESSION: 1.  No acute intracranial abnormality. 2.   Chronic involutional changes of the brain as described. Electronically signed by:  Prateek Troy DO  12/01/2023 07:03 AM CST Workstation: [id number]    X-Ray Chest AP Portable    Result Date: 11/30/2023  HISTORY: CHF  altered mental status. FINDINGS: Portable chest radiograph at 0302 hours compared to prior exams shows the cardiomediastinal silhouette and pulmonary vasculature are within normal limits. The lungs are hypoexpanded, with no consolidation, large pleural effusion, or evidence of pulmonary edema. No confluent infiltrates or pneumothorax. There are no significant osseous abnormalities. IMPRESSION: No evidence of  active cardiopulmonary disease. Electronically signed by:  Fly Cherry MD  11/30/2023 07:55 AM CST Workstation: [id number]    CT Head Without Contrast    Result Date: 11/30/2023  EXAM: CT Head Without Intravenous Contrast and CT Cervical Spine Without Intravenous Contrast CLINICAL HISTORY: Head trauma, minor. Neck trauma (Age >= 65y). TECHNIQUE: Axial, coronal, and sagittal computed tomography images of the head/brain without intravenous contrast.   Axial, coronal, and sagittal computed tomography images of the cervical spine without intravenous contrast.  This CT exam was performed using one or more of the following dose reduction techniques:  automated exposure control, adjustment of the mA and/or kV according to patient size, and/or use of iterative reconstruction technique. COMPARISON: CT head 4/13/2023. CT cervical spine 4/11/2023. FINDINGS: Brain:  No acute hemorrhage. No evidence of acute infarct; MRI more sensitive. Periventricular and subcortical white matter hypodensities are nonspecific but most commonly due to chronic small vessel ischemic changes in a patient of this age. Chronic left pontine infarct appears similar. Ventricles:  Unremarkable.  No ventriculomegaly. Skull:  Near-complete opacification of the right frontal, maxillary and anterior ethmoid sinuses again seen.  No acute fracture. Sinuses:  See above. Mastoid air cells:  Unremarkable as visualized.  No mastoid effusion. Vertebrae:  No acute fracture or traumatic subluxation of the cervical spine. Discs/spinal canal/neural foramina:  Multilevel disc space narrowing, moderate to severe at C5-6, C6-7 and C7-T1. Mild to moderate disc degenerative changes elsewhere. Facet arthropathy. No critical canal stenosis. Foraminal stenosis appears similar. Soft tissues:  Unremarkable. Vasculature:  Carotid vascular calcifications. IMPRESSION: 1.  No acute intracranial abnormality. 2.  No acute osseous abnormality of the cervical spine. Cervical  spondylosis. Electronically signed by:  Kathleen Montoya MD  11/30/2023 04:00 AM CST Workstation: [id number]    CT Cervical Spine Without Contrast    Result Date: 11/30/2023  EXAM: CT Head Without Intravenous Contrast and CT Cervical Spine Without Intravenous Contrast CLINICAL HISTORY: Head trauma, minor. Neck trauma (Age >= 65y). TECHNIQUE: Axial, coronal, and sagittal computed tomography images of the head/brain without intravenous contrast.   Axial, coronal, and sagittal computed tomography images of the cervical spine without intravenous contrast.  This CT exam was performed using one or more of the following dose reduction techniques:  automated exposure control, adjustment of the mA and/or kV according to patient size, and/or use of iterative reconstruction technique. COMPARISON: CT head 4/13/2023. CT cervical spine 4/11/2023. FINDINGS: Brain:  No acute hemorrhage. No evidence of acute infarct; MRI more sensitive. Periventricular and subcortical white matter hypodensities are nonspecific but most commonly due to chronic small vessel ischemic changes in a patient of this age. Chronic left pontine infarct appears similar. Ventricles:  Unremarkable.  No ventriculomegaly. Skull:  Near-complete opacification of the right frontal, maxillary and anterior ethmoid sinuses again seen.  No acute fracture. Sinuses:  See above. Mastoid air cells:  Unremarkable as visualized.  No mastoid effusion. Vertebrae:  No acute fracture or traumatic subluxation of the cervical spine. Discs/spinal canal/neural foramina:  Multilevel disc space narrowing, moderate to severe at C5-6, C6-7 and C7-T1. Mild to moderate disc degenerative changes elsewhere. Facet arthropathy. No critical canal stenosis. Foraminal stenosis appears similar. Soft tissues:  Unremarkable. Vasculature:  Carotid vascular calcifications. IMPRESSION: 1.  No acute intracranial abnormality. 2.  No acute osseous abnormality of the cervical spine. Cervical spondylosis.  Electronically signed by:  Kathleen Montoya MD  11/30/2023 04:00 AM CST Workstation: [id number]        Assessment and Plan:    Fall - Syncope vs seizure  Dementia without behavioral disturbance  89 yo man with history of seizures, bradyarrhythmia, stroke and dementia who presented to the ED after experiencing a fall. HPI as per chart review and patient. He thinks that his legs gave away and that is why he fell, but it may be that he lost his balance. As per chart review, patient's daughter thinks his dementia has gotten worse over the past few months. Patient denies any weakness, numbness, dizziness, slurred speech, vision loss or other neuro deficits.  - Admitted to hospital medicine with q4 hour neuro checks, on telemetry, continuous pulse oximetry  - Seizure precautions while inpatient  - Continue Keppra 750 mg BID for prevention of seizures  - Diet after eval per SLP  - Ordered EEG routine which showed no evidence of epileptiform activity  - MRI brain without contrast showed no evidence of stroke or acute abnormality. It also showed cortical microbleeds consistent with cerebral amyloid angiopathy  - Ordered encephalopathy labs  - Avoid seizure threshold lowering medications such as:  Bupropion, diphenhydramine, tramadol, certain antibiotics  - patient will need follow-up as outpatient with Neurology for long-term EEG monitoring to better characterize epileptiform abnormalities  - Maintain Euthermia with Tylenol prn temp > 37.2 degrees C.  - Assessment for rehab with PT/OT/SLP evaluation and treatment.  - workup and treatment of metabolic and infectious abnormalities as per primary team  - Will follow peripherally. Please call with questions, concerns or neurological decline    Seizure education:  No driving for now, no swimming alone, no climbing high areas, no operation of heavy machinery or working with high risk electricity equipment.  Continue to take AEDs as prescribed. If any major side effects from  neurological medications go to the ED including mood changes and severe depression.  Patient and/or next of kin informed.  Medication side effects discussed with the patient and/or caregiver.       DVT prophylaxis with chemo/SCD prophylaxis      [phone] within 3 days from discharge.        All questions were answered.                              Thank you kindly for including us in the care of this patient. Please do not hesitate to contact us with any questions.       65 minutes of care time has been spent evaluating with the patient. Time includes chart review not limited to diagnostic imaging, labs, and vitals, patient assessment, discussion with family and nursing, current order evaluations, and new order entries.      Tenisha Tineo MD  Neurology

## 2023-12-02 NOTE — PROGRESS NOTES
[facility] Medicine  Progress Note    Patient Name: Lavon Brandon  MRN: [id number]  Patient Class: OP- Observation   Admission Date: 11/30/2023  Length of Stay: 0 days  Attending Physician: Memo Downs MD  Primary Care Provider: Aristides Haynes MD        Subjective:     Principal Problem:Fall        HPI:  Mr. Brandon is a 89 year old man with PMHx of HTN, HLD, Lewy body dementia?- was brought today by daughter because he fell tonight at 12 AM- she said he has been doing ok lately - eating drinking ok, he does phases out and she thinks his dementia is getting worse but he was not complaining of any chest pain, palpitations, sob, fever, chills, n/v, urine or bowel problem.     Pt himself said he thinks his leg gave away that's why he fell.     In ER his BP was elevated and was given hydralazine and labetalol, lactate is elevated.    Overview/Hospital Course:  No notes on file    Interval History: EEG completed.     Review of Systems   Constitutional:  Negative for activity change, appetite change, chills and fever.   HENT:  Negative for congestion, ear pain and nosebleeds.    Eyes:  Negative for itching and visual disturbance.   Respiratory:  Negative for apnea, cough, chest tightness, shortness of breath and wheezing.    Cardiovascular:  Negative for chest pain, palpitations and leg swelling.   Gastrointestinal:  Negative for abdominal distention, abdominal pain, constipation, diarrhea, nausea and vomiting.   Genitourinary:  Negative for dysuria and flank pain.   Musculoskeletal:  Negative for back pain.   Neurological:  Negative for dizziness and headaches.     Objective:     Vital Signs (Most Recent):  Temp: 97.7 °F (36.5 °C) (12/02/23 1158)  Pulse: 76 (12/02/23 1158)  Resp: 17 (12/02/23 1158)  BP: (!) 149/67 (12/02/23 1158)  SpO2: 97 % (12/02/23 1158) Vital Signs (24h Range):  Temp:  [97.7 °F (36.5 °C)-98.2 °F (36.8 °C)] 97.7 °F (36.5 °C)  Pulse:  [66-78] 76  Resp:  [16-19] 17  SpO2:   [94 %-97 %] 97 %  BP: (114-149)/(49-68) 149/67     Weight: 78.5 kg (173 lb 1 oz)  Body mass index is 24.83 kg/m².    Intake/Output Summary (Last 24 hours) at 12/2/2023 1628  Last data filed at 12/2/2023 0501  Gross per 24 hour   Intake --   Output 500 ml   Net -500 ml           Physical Exam  Vitals reviewed.   Constitutional:       General: He is not in acute distress.     Appearance: He is ill-appearing. He is not toxic-appearing or diaphoretic.   HENT:      Head: Normocephalic and atraumatic.      Mouth/Throat:      Mouth: Mucous membranes are moist.      Pharynx: Oropharynx is clear.   Eyes:      General: No scleral icterus.     Conjunctiva/sclera: Conjunctivae normal.   Neck:      Vascular: No carotid bruit.   Cardiovascular:      Rate and Rhythm: Normal rate and regular rhythm.      Pulses: Normal pulses.      Heart sounds: Normal heart sounds.   Pulmonary:      Effort: Pulmonary effort is normal.      Breath sounds: Normal breath sounds.   Abdominal:      General: Abdomen is flat. Bowel sounds are normal.      Palpations: Abdomen is soft.   Musculoskeletal:         General: Normal range of motion.   Skin:     General: Skin is warm.   Neurological:      Mental Status: Mental status is at baseline.             Significant Labs: All pertinent labs within the past 24 hours have been reviewed.  Recent Lab Results         12/02/23  0514   12/02/23  0513        Albumin   3.2       ALP   41       ALT   5       Anion Gap   3       AST   16       Baso # 0.03         Basophil % 0.5         BILIRUBIN TOTAL   0.5  Comment: For infants and newborns, interpretation of results should be based  on gestational age, weight and in agreement with clinical  observations.    Premature Infant recommended reference ranges:  Up to 24 hours.............<8.0 mg/dL  Up to 48 hours............<12.0 mg/dL  3-5 days..................<15.0 mg/dL  6-29 days.................<15.0 mg/dL         BUN   32       Calcium   8.9       Chloride    108       CO2   27       Creatinine   2.7       Differential Method Automated         eGFR   21.7       Eos # 0.1         Eosinophil % 1.7         Glucose   95       Gran # (ANC) 3.0         Gran % 47.3         Hematocrit 32.3         Hemoglobin 10.4         Immature Grans (Abs) 0.02  Comment: Mild elevation in immature granulocytes is non specific and   can be seen in a variety of conditions including stress response,   acute inflammation, trauma and pregnancy. Correlation with other   laboratory and clinical findings is essential.           Immature Granulocytes 0.3         Lymph # 2.4         Lymph % 38.1         MCH 29.5         MCHC 32.2         MCV 92         Mono # 0.8         Mono % 12.1         MPV 10.9         nRBC 0         Platelet Count 148         Potassium   4.6       PROTEIN TOTAL   5.8       RBC 3.53         RDW 14.5         Sodium   138       WBC 6.35                 Significant Imaging: I have reviewed all pertinent imaging results/findings within the past 24 hours.    Assessment/Plan:      * Fall  Multifactorial- dehydration, seizures?  Get orthostat once able to   CT head reviewed  Not sure if pt taking Keppra  Resumed Keppra  MRI brain with no acute findings   PTOT      Seizure  Not sure if he did have seizures- resume home Keppra  Neurology consulted.  MRI brain with no acute findings  EEG - abnormal awake and drowsy routine EEG due to diffuse slowing of the background activity consistent with a moderate encephalopathy. No epileptiform discharges or seizures are captured     Dementia with behavioral disturbance  Daughter not sure if its actually progressing      Hyperlipidemia  Resume home meds      Hypertension  Required IV doses of hydralazine and labetalol  Resume home meds  Home hydralazine and Norvasc increased.        VTE Risk Mitigation (From admission, onward)           Ordered     heparin (porcine) injection 5,000 Units  Every 8 hours         11/30/23 0538     IP VTE HIGH RISK PATIENT   Once         11/30/23 0538     Place sequential compression device  Until discontinued         11/30/23 0538                    Discharge Planning   JOANNE: 12/2/2023     Code Status: Full Code   Is the patient medically ready for discharge?:     Reason for patient still in hospital (select all that apply): Treatment  Discharge Plan A: Skilled Nursing Facility                  Memo Downs MD  Department of Hospital Medicine   [facility]

## 2023-12-02 NOTE — PROCEDURES
EEG REPORT    NAME: Lavon Brandon  : 1932  MRN: [id number]    DATE of EE2023    CLINICAL INDICATION: This is a 89 y.o. male with history of seizure disorder being evaluated for syncope vs seizure.    MEDICATIONS:  Scheduled Meds:   amLODIPine  10 mg Oral Daily before evening meal    atorvastatin  40 mg Oral Daily    cetirizine  5 mg Oral Daily    citalopram  20 mg Oral Daily    docusate sodium  100 mg Oral Daily    heparin (porcine)  5,000 Units Subcutaneous Q8H    hydrALAZINE  50 mg Oral Q8H    levETIRAcetam  750 mg Oral BID     Continuous Infusions:  PRN Meds:.hydrALAZINE, melatonin, sodium chloride 0.9%      EEG DESCRIPTION:  A 16-channel EEG was performed with electrode placement in 10/[employer] System. Longitudinal bipolar and referential montages were utilized in analysis. Total duration of this study was 25 minutes.    This is a technically adequate study with minor muscle and motion artifacts.    There is a posterior dominant rhythm of 5-6 Hz which is interspersed with slower theta frequencies and occasional delta frequencies during wakefulness. Stage II sleep structures are not seen.    Photic stimulation was performed with no abnormal reactivity noted. Hyperventilation was not performed.    No epileptiform discharges or seizures are captured.    Impression:  This is an abnormal awake and drowsy routine EEG due to diffuse slowing of the background activity consistent with a moderate encephalopathy. No epileptiform discharges or seizures are captured.  Findings of this study indicate a generalized encephalopathic process that is nonspecific in type. Toxic, metabolic, or structural abnormalities may be considered.  Further clinical correlation is advised.      Tenisha Tineo MD  Neurology

## 2023-12-02 NOTE — RESPIRATORY THERAPY
12/01/23 1950   Patient Assessment/Suction   Level of Consciousness (AVPU) responds to voice   Respiratory Effort Normal;Unlabored   Expansion/Accessory Muscles/Retractions no retractions;expansion symmetric   All Lung Fields Breath Sounds Anterior:;diminished;equal bilaterally   Rhythm/Pattern, Respiratory unlabored;pattern regular;depth regular   Cough Frequency no cough   PRE-TX-O2   Device (Oxygen Therapy) room air   SpO2 (!) 94 %   Pulse Oximetry Type Intermittent   $ Pulse Oximetry - Multiple Charge Pulse Oximetry - Multiple   Positioning HOB elevated 30 degrees   Education   $ Education 15 min;Other (see comment)  (O2 CHECK)   Respiratory Evaluation   $ Care Plan Tech Time 15 min   $ Eval/Re-eval Charges Re-evaluation

## 2023-12-02 NOTE — PT/OT/SLP PROGRESS
Physical Therapy Treatment    Patient Name:  Lavon Brandon   MRN:  [id number]    Recommendations:     Discharge Recommendations: Moderate Intensity Therapy  Discharge Equipment Recommendations: none  Barriers to discharge: None    Assessment:     Lavon Brandon is a 89 y.o. male admitted with a medical diagnosis of Fall.  He presents with the following impairments/functional limitations: weakness, impaired endurance, impaired self care skills, impaired functional mobility, gait instability, impaired balance, decreased lower extremity function, decreased safety awareness, impaired cardiopulmonary response to activity .    Rehab Prognosis: Good; patient would benefit from acute skilled PT services to address these deficits and reach maximum level of function.    Recent Surgery: * No surgery found *      Plan:     During this hospitalization, patient to be seen 6 x/week to address the identified rehab impairments via gait training, therapeutic activities, therapeutic exercises and progress toward the following goals:    Plan of Care Expires:  01/01/24    Subjective     Chief Complaint: none   Patient/Family Comments/goals: Return home with his children  Pain/Comfort:         Objective:     Communicated with nurse prior to session.  Patient found supine with bed alarm, PureWick, peripheral IV, telemetry upon PT entry to room.     General Precautions: Standard, fall  Orthopedic Precautions: N/A  Braces: N/A  Respiratory Status: Room air     Functional Mobility:  Bed Mobility:     Supine to Sit: minimum assistance  Sit to Supine: minimum assistance  Transfers:     Sit to Stand:  minimum assistance with rolling walker  Gait: 30 ft x2 RW and Min A  cueing for  RW management      AM-PAC 6 CLICK MOBILITY          Treatment & Education:  Pt was educated on  safety, use of call light, RW placement during gait. Functional mobility as indicated above    Patient left supine with all lines intact, call button in reach, and bed  alarm on..    GOALS:   Multidisciplinary Problems       Physical Therapy Goals          Problem: Physical Therapy    Goal Priority Disciplines Outcome Goal Variances Interventions   Physical Therapy Goal     PT, PT/OT      Description: Goals to be met by: 24     Patient will increase functional independence with mobility by performin. Supine to sit with Supervision  2. Sit to stand transfer with Supervision  3. Bed to chair transfer with Supervision using Rolling Walker  4. Gait  x 150 feet with Supervision using Rolling Walker.                              Time Tracking:     PT Received On: 23  PT Start Time: 1047     PT Stop Time: 1110  PT Total Time (min): 23 min     Billable Minutes: Gait Training 12 minutes and Therapeutic Activity 11 minutes    Treatment Type: Treatment  PT/PTA: PT           2023

## 2023-12-02 NOTE — ASSESSMENT & PLAN NOTE
Not sure if he did have seizures- resume home [facility]  Neurology consulted.  MRI brain with no acute findings  EEG - abnormal awake and drowsy routine EEG due to diffuse slowing of the background activity consistent with a moderate encephalopathy. No epileptiform discharges or seizures are captured

## 2023-12-03 LAB
ALBUMIN SERPL BCP-MCNC: 3.2 G/DL (ref 3.5–5.2)
ALP SERPL-CCNC: 41 U/L (ref 55–135)
ALT SERPL W/O P-5'-P-CCNC: 5 U/L (ref 10–44)
AMMONIA PLAS-SCNC: <10 UMOL/L (ref 10–50)
ANION GAP SERPL CALC-SCNC: 4 MMOL/L (ref 8–16)
AST SERPL-CCNC: 14 U/L (ref 10–40)
BASOPHILS # BLD AUTO: 0.02 K/UL (ref 0–0.2)
BASOPHILS NFR BLD: 0.3 % (ref 0–1.9)
BILIRUB SERPL-MCNC: 0.5 MG/DL (ref 0.1–1)
BUN SERPL-MCNC: 33 MG/DL (ref 8–23)
CALCIUM SERPL-MCNC: 8.7 MG/DL (ref 8.7–10.5)
CHLORIDE SERPL-SCNC: 106 MMOL/L (ref 95–110)
CO2 SERPL-SCNC: 28 MMOL/L (ref 23–29)
CREAT SERPL-MCNC: 2.5 MG/DL (ref 0.5–1.4)
CRP SERPL-MCNC: 12 MG/L (ref 0–8.2)
DIFFERENTIAL METHOD: ABNORMAL
EOSINOPHIL # BLD AUTO: 0.1 K/UL (ref 0–0.5)
EOSINOPHIL NFR BLD: 1.9 % (ref 0–8)
ERYTHROCYTE [DISTWIDTH] IN BLOOD BY AUTOMATED COUNT: 14.4 % (ref 11.5–14.5)
ERYTHROCYTE [SEDIMENTATION RATE] IN BLOOD BY WESTERGREN METHOD: 16 MM/HR (ref 0–10)
EST. GFR  (NO RACE VARIABLE): 23.8 ML/MIN/1.73 M^2
GLUCOSE SERPL-MCNC: 99 MG/DL (ref 70–110)
HCT VFR BLD AUTO: 29.7 % (ref 40–54)
HGB BLD-MCNC: 9.6 G/DL (ref 14–18)
IMM GRANULOCYTES # BLD AUTO: 0.01 K/UL (ref 0–0.04)
IMM GRANULOCYTES NFR BLD AUTO: 0.2 % (ref 0–0.5)
LYMPHOCYTES # BLD AUTO: 1.9 K/UL (ref 1–4.8)
LYMPHOCYTES NFR BLD: 33 % (ref 18–48)
MCH RBC QN AUTO: 29.7 PG (ref 27–31)
MCHC RBC AUTO-ENTMCNC: 32.3 G/DL (ref 32–36)
MCV RBC AUTO: 92 FL (ref 82–98)
MONOCYTES # BLD AUTO: 0.7 K/UL (ref 0.3–1)
MONOCYTES NFR BLD: 12.3 % (ref 4–15)
NEUTROPHILS # BLD AUTO: 3 K/UL (ref 1.8–7.7)
NEUTROPHILS NFR BLD: 52.3 % (ref 38–73)
NRBC BLD-RTO: 0 /100 WBC
PLATELET # BLD AUTO: 143 K/UL (ref 150–450)
PMV BLD AUTO: 11 FL (ref 9.2–12.9)
POTASSIUM SERPL-SCNC: 4.3 MMOL/L (ref 3.5–5.1)
PROT SERPL-MCNC: 5.7 G/DL (ref 6–8.4)
RBC # BLD AUTO: 3.23 M/UL (ref 4.6–6.2)
SODIUM SERPL-SCNC: 138 MMOL/L (ref 136–145)
VIT B12 SERPL-MCNC: 334 PG/ML (ref 210–950)
WBC # BLD AUTO: 5.78 K/UL (ref 3.9–12.7)

## 2023-12-03 PROCEDURE — 94761 N-INVAS EAR/PLS OXIMETRY MLT: CPT

## 2023-12-03 PROCEDURE — 86140 C-REACTIVE PROTEIN: CPT | Performed by: STUDENT IN AN ORGANIZED HEALTH CARE EDUCATION/TRAINING PROGRAM

## 2023-12-03 PROCEDURE — 82140 ASSAY OF AMMONIA: CPT | Performed by: STUDENT IN AN ORGANIZED HEALTH CARE EDUCATION/TRAINING PROGRAM

## 2023-12-03 PROCEDURE — 84425 ASSAY OF VITAMIN B-1: CPT | Performed by: STUDENT IN AN ORGANIZED HEALTH CARE EDUCATION/TRAINING PROGRAM

## 2023-12-03 PROCEDURE — 25000003 PHARM REV CODE 250: Performed by: STUDENT IN AN ORGANIZED HEALTH CARE EDUCATION/TRAINING PROGRAM

## 2023-12-03 PROCEDURE — 99900031 HC PATIENT EDUCATION (STAT)

## 2023-12-03 PROCEDURE — 94799 UNLISTED PULMONARY SVC/PX: CPT

## 2023-12-03 PROCEDURE — 80053 COMPREHEN METABOLIC PANEL: CPT | Performed by: STUDENT IN AN ORGANIZED HEALTH CARE EDUCATION/TRAINING PROGRAM

## 2023-12-03 PROCEDURE — 83921 ORGANIC ACID SINGLE QUANT: CPT | Performed by: STUDENT IN AN ORGANIZED HEALTH CARE EDUCATION/TRAINING PROGRAM

## 2023-12-03 PROCEDURE — 85651 RBC SED RATE NONAUTOMATED: CPT | Performed by: STUDENT IN AN ORGANIZED HEALTH CARE EDUCATION/TRAINING PROGRAM

## 2023-12-03 PROCEDURE — 82607 VITAMIN B-12: CPT | Performed by: STUDENT IN AN ORGANIZED HEALTH CARE EDUCATION/TRAINING PROGRAM

## 2023-12-03 PROCEDURE — 85025 COMPLETE CBC W/AUTO DIFF WBC: CPT | Performed by: STUDENT IN AN ORGANIZED HEALTH CARE EDUCATION/TRAINING PROGRAM

## 2023-12-03 PROCEDURE — 63600175 PHARM REV CODE 636 W HCPCS: Performed by: STUDENT IN AN ORGANIZED HEALTH CARE EDUCATION/TRAINING PROGRAM

## 2023-12-03 PROCEDURE — G0378 HOSPITAL OBSERVATION PER HR: HCPCS

## 2023-12-03 PROCEDURE — 99900035 HC TECH TIME PER 15 MIN (STAT)

## 2023-12-03 PROCEDURE — 96372 THER/PROPH/DIAG INJ SC/IM: CPT | Mod: 59 | Performed by: STUDENT IN AN ORGANIZED HEALTH CARE EDUCATION/TRAINING PROGRAM

## 2023-12-03 RX ORDER — LANOLIN ALCOHOL/MO/W.PET/CERES
1000 CREAM (GRAM) TOPICAL DAILY
Status: DISCONTINUED | OUTPATIENT
Start: 2023-12-03 | End: 2023-12-08 | Stop reason: HOSPADM

## 2023-12-03 RX ADMIN — CITALOPRAM HYDROBROMIDE 20 MG: 20 TABLET ORAL at 08:12

## 2023-12-03 RX ADMIN — Medication 6 MG: at 09:12

## 2023-12-03 RX ADMIN — HEPARIN SODIUM 5000 UNITS: 5000 INJECTION INTRAVENOUS; SUBCUTANEOUS at 01:12

## 2023-12-03 RX ADMIN — AMLODIPINE BESYLATE 10 MG: 5 TABLET ORAL at 04:12

## 2023-12-03 RX ADMIN — HYDRALAZINE HYDROCHLORIDE 50 MG: 25 TABLET, FILM COATED ORAL at 01:12

## 2023-12-03 RX ADMIN — HYDRALAZINE HYDROCHLORIDE 50 MG: 25 TABLET, FILM COATED ORAL at 09:12

## 2023-12-03 RX ADMIN — ATORVASTATIN CALCIUM 40 MG: 40 TABLET, FILM COATED ORAL at 08:12

## 2023-12-03 RX ADMIN — DOCUSATE SODIUM 100 MG: 100 CAPSULE, LIQUID FILLED ORAL at 08:12

## 2023-12-03 RX ADMIN — LEVETIRACETAM 750 MG: 250 TABLET, FILM COATED ORAL at 09:12

## 2023-12-03 RX ADMIN — CYANOCOBALAMIN TAB 1000 MCG 1000 MCG: 1000 TAB at 08:12

## 2023-12-03 RX ADMIN — HEPARIN SODIUM 5000 UNITS: 5000 INJECTION INTRAVENOUS; SUBCUTANEOUS at 09:12

## 2023-12-03 RX ADMIN — CETIRIZINE HYDROCHLORIDE 5 MG: 5 TABLET ORAL at 08:12

## 2023-12-03 RX ADMIN — HYDRALAZINE HYDROCHLORIDE 50 MG: 25 TABLET, FILM COATED ORAL at 05:12

## 2023-12-03 RX ADMIN — LEVETIRACETAM 750 MG: 250 TABLET, FILM COATED ORAL at 08:12

## 2023-12-03 RX ADMIN — HEPARIN SODIUM 5000 UNITS: 5000 INJECTION INTRAVENOUS; SUBCUTANEOUS at 05:12

## 2023-12-03 NOTE — ASSESSMENT & PLAN NOTE
Multifactorial- dehydration, seizures?  Get orthostat once able to   CT head reviewed  Not sure if pt taking Keppra  Resumed Keppra  MRI brain with no acute findings   PTOT  Pending SNF placement

## 2023-12-03 NOTE — CARE UPDATE
12/02/23 6989   Patient Assessment/Suction   Level of Consciousness (AVPU) alert   Respiratory Effort Normal;Unlabored   Expansion/Accessory Muscles/Retractions no use of accessory muscles;no retractions;expansion symmetric   All Lung Fields Breath Sounds diminished;clear;equal bilaterally   Rhythm/Pattern, Respiratory unlabored;pattern regular;no shortness of breath reported   Cough Frequency no cough   PRE-TX-O2   Device (Oxygen Therapy) room air   SpO2 95 %   Pulse Oximetry Type Intermittent   $ Pulse Oximetry - Single Charge Pulse Oximetry - Single   Pulse 69   Resp 20   Positioning   Head of Bed (HOB) Positioning HOB at 30-45 degrees   Respiratory Evaluation   $ Care Plan Tech Time 15 min   $ Eval/Re-eval Charges Re-evaluation   Evaluation For New Orders

## 2023-12-03 NOTE — PROGRESS NOTES
[facility] Medicine  Progress Note    Patient Name: Lavon Brandon  MRN: [id number]  Patient Class: OP- Observation   Admission Date: 11/30/2023  Length of Stay: 0 days  Attending Physician: Memo Downs MD  Primary Care Provider: Aristides Haynes MD        Subjective:     Principal Problem:Fall        HPI:  Mr. Brandon is a 89 year old man with PMHx of HTN, HLD, Lewy body dementia?- was brought today by daughter because he fell tonight at 12 AM- she said he has been doing ok lately - eating drinking ok, he does phases out and she thinks his dementia is getting worse but he was not complaining of any chest pain, palpitations, sob, fever, chills, n/v, urine or bowel problem.     Pt himself said he thinks his leg gave away that's why he fell.     In ER his BP was elevated and was given hydralazine and labetalol, lactate is elevated.    Overview/Hospital Course:  No notes on file    Interval History:  B12 low normal.  Replacement started.  B1 level pending.  Pending SNF placement.    Review of Systems   Constitutional:  Negative for activity change, appetite change, chills and fever.   HENT:  Negative for congestion, ear pain and nosebleeds.    Eyes:  Negative for itching and visual disturbance.   Respiratory:  Negative for apnea, cough, chest tightness, shortness of breath and wheezing.    Cardiovascular:  Negative for chest pain, palpitations and leg swelling.   Gastrointestinal:  Negative for abdominal distention, abdominal pain, constipation, diarrhea, nausea and vomiting.   Genitourinary:  Negative for dysuria and flank pain.   Musculoskeletal:  Negative for back pain.   Neurological:  Negative for dizziness and headaches.     Objective:     Vital Signs (Most Recent):  Temp: 97.8 °F (36.6 °C) (12/03/23 1121)  Pulse: 75 (12/03/23 1121)  Resp: 19 (12/03/23 1121)  BP: (!) 165/64 (12/03/23 1121)  SpO2: 95 % (12/03/23 1121) Vital Signs (24h Range):  Temp:  [97.8 °F (36.6 °C)-99.1 °F (37.3 °C)]  97.8 °F (36.6 °C)  Pulse:  [] 75  Resp:  [18-20] 19  SpO2:  [94 %-97 %] 95 %  BP: (133-169)/(60-74) 165/64     Weight: 78.5 kg (173 lb 1 oz)  Body mass index is 24.83 kg/m².    Intake/Output Summary (Last 24 hours) at 12/3/2023 1236  Last data filed at 12/3/2023 1007  Gross per 24 hour   Intake 1000 ml   Output 800 ml   Net 200 ml           Physical Exam  Vitals reviewed.   Constitutional:       General: He is not in acute distress.     Appearance: He is ill-appearing. He is not toxic-appearing or diaphoretic.   HENT:      Head: Normocephalic and atraumatic.      Mouth/Throat:      Mouth: Mucous membranes are moist.      Pharynx: Oropharynx is clear.   Eyes:      General: No scleral icterus.     Conjunctiva/sclera: Conjunctivae normal.   Neck:      Vascular: No carotid bruit.   Cardiovascular:      Rate and Rhythm: Normal rate and regular rhythm.      Pulses: Normal pulses.      Heart sounds: Normal heart sounds.   Pulmonary:      Effort: Pulmonary effort is normal.      Breath sounds: Normal breath sounds.   Abdominal:      General: Abdomen is flat. Bowel sounds are normal.      Palpations: Abdomen is soft.   Musculoskeletal:         General: Normal range of motion.   Skin:     General: Skin is warm.   Neurological:      Mental Status: Mental status is at baseline.             Significant Labs: All pertinent labs within the past 24 hours have been reviewed.  Recent Lab Results         12/03/23  0526        Albumin 3.2       ALP 41       ALT 5       Ammonia <10       Anion Gap 4       AST 14       Baso # 0.02       Basophil % 0.3       BILIRUBIN TOTAL 0.5  Comment: For infants and newborns, interpretation of results should be based  on gestational age, weight and in agreement with clinical  observations.    Premature Infant recommended reference ranges:  Up to 24 hours.............<8.0 mg/dL  Up to 48 hours............<12.0 mg/dL  3-5 days..................<15.0 mg/dL  6-29 days.................<15.0 mg/dL          BUN 33       Calcium 8.7       Chloride 106       CO2 28       Creatinine 2.5       Differential Method Automated       eGFR 23.8       Eos # 0.1       Eosinophil % 1.9       Glucose 99       Gran # (ANC) 3.0       Gran % 52.3       Hematocrit 29.7       Hemoglobin 9.6       Immature Grans (Abs) 0.01  Comment: Mild elevation in immature granulocytes is non specific and   can be seen in a variety of conditions including stress response,   acute inflammation, trauma and pregnancy. Correlation with other   laboratory and clinical findings is essential.         Immature Granulocytes 0.2       Lymph # 1.9       Lymph % 33.0       MCH 29.7       MCHC 32.3       MCV 92       Mono # 0.7       Mono % 12.3       MPV 11.0       nRBC 0       Platelet Count 143       Potassium 4.3       PROTEIN TOTAL 5.7       RBC 3.23       RDW 14.4       Sed Rate 16       Sodium 138       Vitamin B-12 334       WBC 5.78               Significant Imaging: I have reviewed all pertinent imaging results/findings within the past 24 hours.    Assessment/Plan:      * Fall  Multifactorial- dehydration, seizures?  Get orthostat once able to   CT head reviewed  Not sure if pt taking Keppra  Resumed Keppra  MRI brain with no acute findings   PTOT  Pending SNF placement      Seizure  Not sure if he did have seizures- resume home Keppra  Neurology consulted.  MRI brain with no acute findings  EEG - abnormal awake and drowsy routine EEG due to diffuse slowing of the background activity consistent with a moderate encephalopathy. No epileptiform discharges or seizures are captured     Dementia with behavioral disturbance  Daughter not sure if its actually progressing  B12 low normal. Start replacement. B1 pendng.       Hyperlipidemia  Resume home meds      Hypertension  Required IV doses of hydralazine and labetalol  Resume home meds  Home hydralazine and Norvasc increased.        VTE Risk Mitigation (From admission, onward)           Ordered     heparin  (porcine) injection 5,000 Units  Every 8 hours         11/30/23 0538     IP VTE HIGH RISK PATIENT  Once         11/30/23 0538     Place sequential compression device  Until discontinued         11/30/23 0538                    Discharge Planning   JOANNE: 12/2/2023     Code Status: Full Code   Is the patient medically ready for discharge?:     Reason for patient still in hospital (select all that apply): Treatment  Discharge Plan A: Skilled Nursing Facility                  Memo Downs MD  Department of Hospital Medicine   [facility]

## 2023-12-03 NOTE — PLAN OF CARE
Problem: Adult Inpatient Plan of Care  Goal: Plan of Care Review  Outcome: Ongoing, Progressing  Goal: Patient-Specific Goal (Individualized)  Outcome: Ongoing, Progressing  Goal: Absence of Hospital-Acquired Illness or Injury  Outcome: Ongoing, Progressing  Goal: Optimal Comfort and Wellbeing  Outcome: Ongoing, Progressing  Goal: Readiness for Transition of Care  Outcome: Ongoing, Progressing     Problem: Skin Injury Risk Increased  Goal: Skin Health and Integrity  Outcome: Ongoing, Progressing     Problem: Fall Injury Risk  Goal: Absence of Fall and Fall-Related Injury  Outcome: Ongoing, Progressing     Problem: Diabetes Comorbidity  Goal: Blood Glucose Level Within Targeted Range  Outcome: Ongoing, Progressing     Problem: Seizure, Active Management  Goal: Absence of Seizure/Seizure-Related Injury  Outcome: Ongoing, Progressing     Problem: Confusion Acute  Goal: Optimal Cognitive Function  Outcome: Ongoing, Progressing     Problem: Hypertension Acute  Goal: Blood Pressure Within Desired Range  Outcome: Ongoing, Progressing     Problem: Syncope  Goal: Absence of Syncopal Symptoms  Outcome: Ongoing, Progressing

## 2023-12-03 NOTE — SUBJECTIVE & OBJECTIVE
Interval History:  B12 low normal.  Replacement started.  B1 level pending.  Pending SNF placement.    Review of Systems   Constitutional:  Negative for activity change, appetite change, chills and fever.   HENT:  Negative for congestion, ear pain and nosebleeds.    Eyes:  Negative for itching and visual disturbance.   Respiratory:  Negative for apnea, cough, chest tightness, shortness of breath and wheezing.    Cardiovascular:  Negative for chest pain, palpitations and leg swelling.   Gastrointestinal:  Negative for abdominal distention, abdominal pain, constipation, diarrhea, nausea and vomiting.   Genitourinary:  Negative for dysuria and flank pain.   Musculoskeletal:  Negative for back pain.   Neurological:  Negative for dizziness and headaches.     Objective:     Vital Signs (Most Recent):  Temp: 97.8 °F (36.6 °C) (12/03/23 1121)  Pulse: 75 (12/03/23 1121)  Resp: 19 (12/03/23 1121)  BP: (!) 165/64 (12/03/23 1121)  SpO2: 95 % (12/03/23 1121) Vital Signs (24h Range):  Temp:  [97.8 °F (36.6 °C)-99.1 °F (37.3 °C)] 97.8 °F (36.6 °C)  Pulse:  [] 75  Resp:  [18-20] 19  SpO2:  [94 %-97 %] 95 %  BP: (133-169)/(60-74) 165/64     Weight: 78.5 kg (173 lb 1 oz)  Body mass index is 24.83 kg/m².    Intake/Output Summary (Last 24 hours) at 12/3/2023 1236  Last data filed at 12/3/2023 1007  Gross per 24 hour   Intake 1000 ml   Output 800 ml   Net 200 ml           Physical Exam  Vitals reviewed.   Constitutional:       General: He is not in acute distress.     Appearance: He is ill-appearing. He is not toxic-appearing or diaphoretic.   HENT:      Head: Normocephalic and atraumatic.      Mouth/Throat:      Mouth: Mucous membranes are moist.      Pharynx: Oropharynx is clear.   Eyes:      General: No scleral icterus.     Conjunctiva/sclera: Conjunctivae normal.   Neck:      Vascular: No carotid bruit.   Cardiovascular:      Rate and Rhythm: Normal rate and regular rhythm.      Pulses: Normal pulses.      Heart sounds: Normal  heart sounds.   Pulmonary:      Effort: Pulmonary effort is normal.      Breath sounds: Normal breath sounds.   Abdominal:      General: Abdomen is flat. Bowel sounds are normal.      Palpations: Abdomen is soft.   Musculoskeletal:         General: Normal range of motion.   Skin:     General: Skin is warm.   Neurological:      Mental Status: Mental status is at baseline.             Significant Labs: All pertinent labs within the past 24 hours have been reviewed.  Recent Lab Results         12/03/23  0526        Albumin 3.2       ALP 41       ALT 5       Ammonia <10       Anion Gap 4       AST 14       Baso # 0.02       Basophil % 0.3       BILIRUBIN TOTAL 0.5  Comment: For infants and newborns, interpretation of results should be based  on gestational age, weight and in agreement with clinical  observations.    Premature Infant recommended reference ranges:  Up to 24 hours.............<8.0 mg/dL  Up to 48 hours............<12.0 mg/dL  3-5 days..................<15.0 mg/dL  6-29 days.................<15.0 mg/dL         BUN 33       Calcium 8.7       Chloride 106       CO2 28       Creatinine 2.5       Differential Method Automated       eGFR 23.8       Eos # 0.1       Eosinophil % 1.9       Glucose 99       Gran # (ANC) 3.0       Gran % 52.3       Hematocrit 29.7       Hemoglobin 9.6       Immature Grans (Abs) 0.01  Comment: Mild elevation in immature granulocytes is non specific and   can be seen in a variety of conditions including stress response,   acute inflammation, trauma and pregnancy. Correlation with other   laboratory and clinical findings is essential.         Immature Granulocytes 0.2       Lymph # 1.9       Lymph % 33.0       MCH 29.7       MCHC 32.3       MCV 92       Mono # 0.7       Mono % 12.3       MPV 11.0       nRBC 0       Platelet Count 143       Potassium 4.3       PROTEIN TOTAL 5.7       RBC 3.23       RDW 14.4       Sed Rate 16       Sodium 138       Vitamin B-12 334       WBC 5.78                Significant Imaging: I have reviewed all pertinent imaging results/findings within the past 24 hours.

## 2023-12-04 LAB
ALBUMIN SERPL BCP-MCNC: 3.5 G/DL (ref 3.5–5.2)
ALP SERPL-CCNC: 45 U/L (ref 55–135)
ALT SERPL W/O P-5'-P-CCNC: 6 U/L (ref 10–44)
ANION GAP SERPL CALC-SCNC: 6 MMOL/L (ref 8–16)
AST SERPL-CCNC: 14 U/L (ref 10–40)
BASOPHILS # BLD AUTO: 0.02 K/UL (ref 0–0.2)
BASOPHILS NFR BLD: 0.3 % (ref 0–1.9)
BILIRUB SERPL-MCNC: 0.4 MG/DL (ref 0.1–1)
BUN SERPL-MCNC: 34 MG/DL (ref 8–23)
CALCIUM SERPL-MCNC: 8.9 MG/DL (ref 8.7–10.5)
CHLORIDE SERPL-SCNC: 106 MMOL/L (ref 95–110)
CO2 SERPL-SCNC: 27 MMOL/L (ref 23–29)
CREAT SERPL-MCNC: 2.3 MG/DL (ref 0.5–1.4)
DIFFERENTIAL METHOD: ABNORMAL
EOSINOPHIL # BLD AUTO: 0 K/UL (ref 0–0.5)
EOSINOPHIL NFR BLD: 0.4 % (ref 0–8)
ERYTHROCYTE [DISTWIDTH] IN BLOOD BY AUTOMATED COUNT: 14.3 % (ref 11.5–14.5)
EST. GFR  (NO RACE VARIABLE): 26.3 ML/MIN/1.73 M^2
GLUCOSE SERPL-MCNC: 124 MG/DL (ref 70–110)
HCT VFR BLD AUTO: 32.7 % (ref 40–54)
HGB BLD-MCNC: 10.5 G/DL (ref 14–18)
IMM GRANULOCYTES # BLD AUTO: 0.02 K/UL (ref 0–0.04)
IMM GRANULOCYTES NFR BLD AUTO: 0.3 % (ref 0–0.5)
LYMPHOCYTES # BLD AUTO: 2.1 K/UL (ref 1–4.8)
LYMPHOCYTES NFR BLD: 26.9 % (ref 18–48)
MCH RBC QN AUTO: 29.7 PG (ref 27–31)
MCHC RBC AUTO-ENTMCNC: 32.1 G/DL (ref 32–36)
MCV RBC AUTO: 93 FL (ref 82–98)
MONOCYTES # BLD AUTO: 0.9 K/UL (ref 0.3–1)
MONOCYTES NFR BLD: 11.8 % (ref 4–15)
NEUTROPHILS # BLD AUTO: 4.7 K/UL (ref 1.8–7.7)
NEUTROPHILS NFR BLD: 60.3 % (ref 38–73)
NRBC BLD-RTO: 0 /100 WBC
PLATELET # BLD AUTO: 159 K/UL (ref 150–450)
PMV BLD AUTO: 11 FL (ref 9.2–12.9)
POTASSIUM SERPL-SCNC: 4.4 MMOL/L (ref 3.5–5.1)
PROT SERPL-MCNC: 6.3 G/DL (ref 6–8.4)
RBC # BLD AUTO: 3.53 M/UL (ref 4.6–6.2)
SODIUM SERPL-SCNC: 139 MMOL/L (ref 136–145)
TB INDURATION 48 - 72 HR READ: 0 MM
WBC # BLD AUTO: 7.8 K/UL (ref 3.9–12.7)

## 2023-12-04 PROCEDURE — 63600175 PHARM REV CODE 636 W HCPCS: Performed by: STUDENT IN AN ORGANIZED HEALTH CARE EDUCATION/TRAINING PROGRAM

## 2023-12-04 PROCEDURE — 36415 COLL VENOUS BLD VENIPUNCTURE: CPT | Performed by: STUDENT IN AN ORGANIZED HEALTH CARE EDUCATION/TRAINING PROGRAM

## 2023-12-04 PROCEDURE — 85025 COMPLETE CBC W/AUTO DIFF WBC: CPT | Performed by: STUDENT IN AN ORGANIZED HEALTH CARE EDUCATION/TRAINING PROGRAM

## 2023-12-04 PROCEDURE — 96372 THER/PROPH/DIAG INJ SC/IM: CPT | Mod: 59 | Performed by: STUDENT IN AN ORGANIZED HEALTH CARE EDUCATION/TRAINING PROGRAM

## 2023-12-04 PROCEDURE — 25000003 PHARM REV CODE 250: Performed by: STUDENT IN AN ORGANIZED HEALTH CARE EDUCATION/TRAINING PROGRAM

## 2023-12-04 PROCEDURE — G0378 HOSPITAL OBSERVATION PER HR: HCPCS

## 2023-12-04 PROCEDURE — 97530 THERAPEUTIC ACTIVITIES: CPT

## 2023-12-04 PROCEDURE — 97535 SELF CARE MNGMENT TRAINING: CPT

## 2023-12-04 PROCEDURE — 97116 GAIT TRAINING THERAPY: CPT

## 2023-12-04 PROCEDURE — 96376 TX/PRO/DX INJ SAME DRUG ADON: CPT

## 2023-12-04 PROCEDURE — 80053 COMPREHEN METABOLIC PANEL: CPT | Performed by: STUDENT IN AN ORGANIZED HEALTH CARE EDUCATION/TRAINING PROGRAM

## 2023-12-04 RX ORDER — CARVEDILOL 6.25 MG/1
6.25 TABLET ORAL 2 TIMES DAILY
Status: DISCONTINUED | OUTPATIENT
Start: 2023-12-04 | End: 2023-12-08 | Stop reason: HOSPADM

## 2023-12-04 RX ORDER — CARVEDILOL 6.25 MG/1
6.25 TABLET ORAL 2 TIMES DAILY
Qty: 60 TABLET | Refills: 11 | Status: SHIPPED | OUTPATIENT
Start: 2023-12-04 | End: 2024-12-03

## 2023-12-04 RX ORDER — CARVEDILOL 3.12 MG/1
3.12 TABLET ORAL 2 TIMES DAILY
Status: DISCONTINUED | OUTPATIENT
Start: 2023-12-04 | End: 2023-12-04

## 2023-12-04 RX ORDER — HYDRALAZINE HYDROCHLORIDE 50 MG/1
50 TABLET, FILM COATED ORAL EVERY 8 HOURS
Qty: 90 TABLET | Refills: 11 | Status: SHIPPED | OUTPATIENT
Start: 2023-12-04 | End: 2023-12-08 | Stop reason: HOSPADM

## 2023-12-04 RX ORDER — LANOLIN ALCOHOL/MO/W.PET/CERES
1000 CREAM (GRAM) TOPICAL DAILY
Qty: 30 TABLET | Refills: 2 | Status: SHIPPED | OUTPATIENT
Start: 2023-12-05 | End: 2024-03-04

## 2023-12-04 RX ORDER — AMLODIPINE BESYLATE 10 MG/1
10 TABLET ORAL
Qty: 30 TABLET | Refills: 11 | Status: SHIPPED | OUTPATIENT
Start: 2023-12-04 | End: 2024-12-03

## 2023-12-04 RX ADMIN — HYDRALAZINE HYDROCHLORIDE 50 MG: 25 TABLET, FILM COATED ORAL at 06:12

## 2023-12-04 RX ADMIN — DOCUSATE SODIUM 100 MG: 100 CAPSULE, LIQUID FILLED ORAL at 08:12

## 2023-12-04 RX ADMIN — LEVETIRACETAM 750 MG: 250 TABLET, FILM COATED ORAL at 08:12

## 2023-12-04 RX ADMIN — HYDRALAZINE HYDROCHLORIDE 20 MG: 20 INJECTION INTRAMUSCULAR; INTRAVENOUS at 05:12

## 2023-12-04 RX ADMIN — CYANOCOBALAMIN TAB 1000 MCG 1000 MCG: 1000 TAB at 08:12

## 2023-12-04 RX ADMIN — HYDRALAZINE HYDROCHLORIDE 50 MG: 25 TABLET, FILM COATED ORAL at 09:12

## 2023-12-04 RX ADMIN — CARVEDILOL 6.25 MG: 6.25 TABLET, FILM COATED ORAL at 11:12

## 2023-12-04 RX ADMIN — HEPARIN SODIUM 5000 UNITS: 5000 INJECTION INTRAVENOUS; SUBCUTANEOUS at 01:12

## 2023-12-04 RX ADMIN — LEVETIRACETAM 750 MG: 250 TABLET, FILM COATED ORAL at 09:12

## 2023-12-04 RX ADMIN — HEPARIN SODIUM 5000 UNITS: 5000 INJECTION INTRAVENOUS; SUBCUTANEOUS at 09:12

## 2023-12-04 RX ADMIN — HEPARIN SODIUM 5000 UNITS: 5000 INJECTION INTRAVENOUS; SUBCUTANEOUS at 06:12

## 2023-12-04 RX ADMIN — CETIRIZINE HYDROCHLORIDE 5 MG: 5 TABLET ORAL at 08:12

## 2023-12-04 RX ADMIN — AMLODIPINE BESYLATE 10 MG: 5 TABLET ORAL at 05:12

## 2023-12-04 RX ADMIN — CARVEDILOL 6.25 MG: 6.25 TABLET, FILM COATED ORAL at 09:12

## 2023-12-04 RX ADMIN — CITALOPRAM HYDROBROMIDE 20 MG: 20 TABLET ORAL at 08:12

## 2023-12-04 RX ADMIN — HYDRALAZINE HYDROCHLORIDE 50 MG: 25 TABLET, FILM COATED ORAL at 01:12

## 2023-12-04 NOTE — PT/OT/SLP PROGRESS
Physical Therapy Treatment    Patient Name:  Lavon Brandon   MRN:  [id number]    Recommendations:     Discharge Recommendations: Moderate Intensity Therapy  Discharge Equipment Recommendations: none  Barriers to discharge:  Increased caregiver burden of care    Assessment:     Lavon Brandon is a 89 y.o. male admitted with a medical diagnosis of Fall.  He presents with the following impairments/functional limitations: weakness, impaired endurance, impaired self care skills, impaired functional mobility, gait instability, impaired balance, decreased lower extremity function, decreased safety awareness, impaired cardiopulmonary response to activity.    Pt presented in supine and readily consented to PT. He required  Mod A instead of Min A as on 12/2/2023 for t/f supine to sit.  He scooted closer to EOB with min A requiring extra time. Pt t/f'ed to stand with Min A  and ambulated 40 ft x2 RW and Min A  with cueing for RW management. He returned to supine with  CGA.  This therapist witnessed pt attempting to get OOB `30 minutes after his PT session as pt  was confused and asked for equipment to be removed , equipment for scraping the floor.  Bed alarm was engaged. Charge nurse was informed of pt's behavior.    Rehab Prognosis: Fair; patient would benefit from acute skilled PT services to address these deficits and reach maximum level of function.    Recent Surgery: * No surgery found *      Plan:     During this hospitalization, patient to be seen 6 x/week to address the identified rehab impairments via gait training, therapeutic activities, therapeutic exercises and progress toward the following goals:    Plan of Care Expires:  01/01/24    Subjective     Chief Complaint :fatigue after gait in the amado  Patient/Family Comments/goals: none stated  Pain/Comfort:         Objective:     Communicated with nurse prior to session.  Patient found supine with bed alarm, PureWick, peripheral IV, telemetry upon PT entry to room.      General Precautions: Standard, fall  Orthopedic Precautions: N/A  Braces: N/A  Respiratory Status: Room air     Functional Mobility:  Bed Mobility:     Supine to Sit: moderate assistance  Sit to Supine: contact guard assistance  Transfers:     Sit to Stand:  minimum assistance with rolling walker  Gait: 40 ft xw RW Min A  with  cueing for staying closer to RW and I for increased trunk ext.      AM-PAC 6 CLICK MOBILITY          Treatment & Education:  Educated on safety, use of call light,Functional mobility and gait training as indicated above    Patient left HOB elevated with all lines intact, call button in reach, bed alarm on, and nurse notified..    GOALS:   Multidisciplinary Problems       Physical Therapy Goals          Problem: Physical Therapy    Goal Priority Disciplines Outcome Goal Variances Interventions   Physical Therapy Goal     PT, PT/OT      Description: Goals to be met by: 24     Patient will increase functional independence with mobility by performin. Supine to sit with Supervision  2. Sit to stand transfer with Supervision  3. Bed to chair transfer with Supervision using Rolling Walker  4. Gait  x 150 feet with Supervision using Rolling Walker.                              Time Tracking:     PT Received On: 23  PT Start Time: 1350     PT Stop Time: 1414  PT Total Time (min): 24 min     Billable Minutes: Gait Training 12 minutes  and Therapeutic Activity 12 minutes    Treatment Type: Treatment  PT/PTA: PT           2023

## 2023-12-04 NOTE — PLAN OF CARE
Tonja ([facility]) informed  no bed available today, but [facility] can accept Pt tomorrow, 12/5/203.      contacted Mindy ([facility]) to inquire about bed availability.

## 2023-12-04 NOTE — CARE UPDATE
12/03/23 2307   Patient Assessment/Suction   Level of Consciousness (AVPU) alert   Respiratory Effort Normal   Expansion/Accessory Muscles/Retractions no use of accessory muscles   PRE-TX-O2   Device (Oxygen Therapy) room air   SpO2 95 %   Pulse Oximetry Type Intermittent   $ Pulse Oximetry - Multiple Charge Pulse Oximetry - Multiple   Pulse 72   Resp 18   Positioning HOB elevated 30 degrees   Education   $ Education Other (see comment);15 min   Respiratory Evaluation   $ Care Plan Tech Time 15 min   $ Eval/Re-eval Charges Re-evaluation

## 2023-12-04 NOTE — PROGRESS NOTES
"[facility]  Adult Nutrition   Progress Note (Initial Assessment)     SUMMARY     Recommendations  Recommendation/Intervention:   1. Monitor patient for tolerance of cardiac diet.  2. Recommend Ensure +HP BID for adequate nutrition.  Goals:   1. Oral intake increases to >50% for meals.  2. Labs to improve to be WNL.   Nutrition Goal Status: progressing towards goal    PES: Inadequate oral intake related to diagnosis of dementia as evidenced by reports of impaired self-care skills and a diet intake of 25%.    Dietitian Rounds Brief  Pt. Is a 89 y.o. male admitted on 11/30/2023 due to a fall. He has dementia and shows signs of weakness, impaired endurance, and impaired self-care skills. Pt. Has been put on a cardiac diet and diet intake is ~25-50% of meals; will add supplement BID. Dietetic intern went to go speak with patient, but he was sleeping. Patient did not look malnourished based on assessment of of muscle/fat wasting on temples and orbital fat pads. Last BM was 11/29/2023.     Diet order:   Current Diet Order: Cardiac diet         Evaluation of Received Nutrient/Fluid Intake  Total Calories (kcal): 2561.25  Total Calories (kcal/kg): 32.6  Total Protein (gm): 98.33  Total Protein (gm/kg): 1.25  Total Fluid Intake (mL): 2355     % Intake of Estimated Energy Needs: 25 - 50 %  % Meal Intake: 25 - 50 %      Intake/Output Summary (Last 24 hours) at 12/4/2023 1337  Last data filed at 12/4/2023 0445  Gross per 24 hour   Intake --   Output 1400 ml   Net -1400 ml        Anthropometrics  Temp: 98.4 °F (36.9 °C)  Height Method: Stated  Height: 5' 10" (177.8 cm)  Height (inches): 70 in  Weight Method: Bed Scale  Weight: 78.5 kg (173 lb 1 oz)  Weight (lb): 173.06 lb  Ideal Body Weight (IBW), Male: 166 lb  % Ideal Body Weight, Male (lb): 104.25 %  BMI (Calculated): 24.8  BMI Grade: 18.5-24.9 - normal       Estimated/Assessed Needs  Weight Used For Calorie Calculations: 78.5 kg (173 lb 1 oz)  Energy Calorie " Requirements (kcal): 6714-0332.5 kcal (30-35 kcal/kg)  Energy Need Method: Kcal/kg  Protein Requirements: 78. g (1-1.5 g/kg)  Weight Used For Protein Calculations: 78.5 kg (173 lb 1 oz)  Fluid Requirements (mL): 2355 mL  Estimated Fluid Requirement Method: RDA Method  RDA Method (mL): 2355       Reason for Assessment  Reason For Assessment: identified at risk by screening criteria (MST score of 2)  Diagnosis: other (see comments) (fall)  Relevant Medical History: hypertension, hyperlipidemia, lewy body dementia  Interdisciplinary Rounds: did not attend    Nutrition/Diet History  Patient Reported Diet/Restrictions/Preferences: heart healthy (cardiac diet)  Typical Food/Fluid Intake: 25%  Spiritual, Cultural Beliefs, Buddhist Practices, Values that Affect Care: no  Food Allergies: NKFA  Factors Affecting Nutritional Intake: impaired cognitive status/motor control (Reports of weakness and impaired self-car skills.)    Nutrition Risk Screen  Nutrition Risk Screen: no indicators present     MST Score: 2  Have you recently lost weight without trying?: Yes: 14-23 lbs (fluis loss as stated by family.)  Weight loss score: 2  Have you been eating poorly because of a decreased appetite?: No  Appetite score: 0       Weight History:  Wt Readings from Last 10 Encounters:   12/04/23 78.5 kg (173 lb 1 oz)   11/01/23 70.7 kg (155 lb 13.8 oz)   06/23/23 82.6 kg (182 lb 1.6 oz)   04/17/23 80.1 kg (176 lb 9.4 oz)   04/14/23 80.7 kg (177 lb 14.6 oz)   04/11/23 82.6 kg (182 lb 1.6 oz)   02/14/23 80.5 kg (177 lb 7.5 oz)   10/24/22 77 kg (169 lb 12.1 oz)   10/17/22 84.8 kg (186 lb 15.2 oz)   08/02/22 88.3 kg (194 lb 10.7 oz)        Lab/Procedures/Meds: Pertinent Labs/Meds Reviewed    Medications:Pertinent Medications Reviewed  Scheduled Meds:   amLODIPine  10 mg Oral Daily before evening meal    carvediloL  6.25 mg Oral BID    cetirizine  5 mg Oral Daily    citalopram  20 mg Oral Daily    cyanocobalamin  1,000 mcg Oral Daily     "docusate sodium  100 mg Oral Daily    heparin (porcine)  5,000 Units Subcutaneous Q8H    hydrALAZINE  50 mg Oral Q8H    levETIRAcetam  750 mg Oral BID     Continuous Infusions:  PRN Meds:.hydrALAZINE, melatonin, sodium chloride 0.9%    Labs: Pertinent Labs Reviewed  Clinical Chemistry:  Recent Labs   Lab 11/30/23  0251 12/01/23  0530 12/02/23  0513 12/03/23  0526 12/04/23  0318      < > 138 138 139   K 4.3   < > 4.6 4.3 4.4      < > 108 106 106   CO2 26   < > 27 28 27   *   < > 95 99 124*   BUN 29*   < > 32* 33* 34*   CREATININE 2.2*   < > 2.7* 2.5* 2.3*   CALCIUM 9.6   < > 8.9 8.7 8.9   PROT 7.0   < > 5.8* 5.7* 6.3   ALBUMIN 4.0   < > 3.2* 3.2* 3.5   BILITOT 0.4   < > 0.5 0.5 0.4   ALKPHOS 52*   < > 41* 41* 45*   AST 19   < > 16 14 14   ALT 7*   < > 5* 5* 6*   ANIONGAP 7*   < > 3* 4* 6*   MG 1.9  --   --   --   --     < > = values in this interval not displayed.     CBC:   Recent Labs   Lab 12/04/23 0318   WBC 7.80   RBC 3.53*   HGB 10.5*   HCT 32.7*      MCV 93   MCH 29.7   MCHC 32.1     Lipid Panel:  No results for input(s): "CHOL", "HDL", "LDLCALC", "TRIG", "CHOLHDL" in the last 168 hours.  Cardiac Profile:  Recent Labs   Lab 11/30/23 0251   *   *     Inflammatory Labs:  Recent Labs   Lab 12/03/23 0526   CRP 12.0*     Diabetes:  No results for input(s): "HGBA1C", "POCTGLUCOSE" in the last 168 hours.  Thyroid & Parathyroid:  No results for input(s): "TSH", "FREET4", "O3MSRAT", "U1CLBYL", "THYROIDAB" in the last 168 hours.    Monitor and Evaluation  Food and Nutrient Intake: energy intake, food and beverage intake  Food and Nutrient Adminstration: diet order  Knowledge/Beliefs/Attitudes: food and nutrition knowledge/skill  Physical Activity and Function: nutrition-related ADLs and IADLs  Anthropometric Measurements: weight change, body mass index  Biochemical Data, Medical Tests and Procedures: electrolyte and renal panel, glucose/endocrine profile  Nutrition-Focused " Physical Findings: overall appearance     Nutrition Risk  Level of Risk/Frequency of Follow-up: low - moderate     Nutrition Follow-Up  RD Follow-up?: Yes      Jaz Gee, Student Dietitian 12/04/2023 1:37 PM     I certify that I directed the dietetic intern in service delivery and guided them using my skilled judgment. As the cosigning dietitian, I have reviewed the dietetic interns documentation and am responsible for the treatment, assessment, and plan.   LUCINDA Sapp 12/04/2023 2:36 PM

## 2023-12-04 NOTE — PROGRESS NOTES
[facility]  Progress Note    Patient Name: Lavon Brandon  MRN: [id number]  Patient Class: OP- Observation   Admission Date: 11/30/2023  Length of Stay: 0 days  Attending Physician: Memo Downs MD  Primary Care Provider: Aristides Haynes MD        Subjective:     Principal Problem:Fall        HPI:  Mr. Brandon is a 89 year old man with PMHx of HTN, HLD, Lewy body dementia?- was brought today by daughter because he fell tonight at 12 AM- she said he has been doing ok lately - eating drinking ok, he does phases out and she thinks his dementia is getting worse but he was not complaining of any chest pain, palpitations, sob, fever, chills, n/v, urine or bowel problem.     Pt himself said he thinks his leg gave away that's why he fell.     In ER his BP was elevated and was given hydralazine and labetalol, lactate is elevated.    Overview/Hospital Course:  No notes on file    Interval History:   Pending SNF placement.    Review of Systems   Constitutional:  Negative for activity change, appetite change, chills and fever.   HENT:  Negative for congestion, ear pain and nosebleeds.    Eyes:  Negative for itching and visual disturbance.   Respiratory:  Negative for apnea, cough, chest tightness, shortness of breath and wheezing.    Cardiovascular:  Negative for chest pain, palpitations and leg swelling.   Gastrointestinal:  Negative for abdominal distention, abdominal pain, constipation, diarrhea, nausea and vomiting.   Genitourinary:  Negative for dysuria and flank pain.   Musculoskeletal:  Negative for back pain.   Neurological:  Negative for dizziness and headaches.     Objective:     Vital Signs (Most Recent):  Temp: 98.4 °F (36.9 °C) (12/04/23 1135)  Pulse: 78 (12/04/23 1135)  Resp: 20 (12/04/23 1135)  BP: (!) 153/75 (notified nurse jailyn) (12/04/23 1135)  SpO2: 96 % (12/04/23 1135) Vital Signs (24h Range):  Temp:  [97.7 °F (36.5 °C)-98.5 °F (36.9 °C)] 98.4 °F (36.9 °C)  Pulse:   [68-78] 78  Resp:  [18-20] 20  SpO2:  [95 %-97 %] 96 %  BP: (151-177)/(66-79) 153/75     Weight: 78.5 kg (173 lb 1 oz)  Body mass index is 24.83 kg/m².    Intake/Output Summary (Last 24 hours) at 12/4/2023 1144  Last data filed at 12/4/2023 0445  Gross per 24 hour   Intake --   Output 1400 ml   Net -1400 ml           Physical Exam  Vitals reviewed.   Constitutional:       General: He is not in acute distress.     Appearance: He is ill-appearing. He is not toxic-appearing or diaphoretic.   HENT:      Head: Normocephalic and atraumatic.      Mouth/Throat:      Mouth: Mucous membranes are moist.      Pharynx: Oropharynx is clear.   Eyes:      General: No scleral icterus.     Conjunctiva/sclera: Conjunctivae normal.   Neck:      Vascular: No carotid bruit.   Cardiovascular:      Rate and Rhythm: Normal rate and regular rhythm.      Pulses: Normal pulses.      Heart sounds: Normal heart sounds.   Pulmonary:      Effort: Pulmonary effort is normal.      Breath sounds: Normal breath sounds.   Abdominal:      General: Abdomen is flat. Bowel sounds are normal.      Palpations: Abdomen is soft.   Musculoskeletal:         General: Normal range of motion.   Skin:     General: Skin is warm.   Neurological:      Mental Status: Mental status is at baseline.             Significant Labs: All pertinent labs within the past 24 hours have been reviewed.  Recent Lab Results         12/04/23  0318        Albumin 3.5       ALP 45       ALT 6       Anion Gap 6       AST 14       Baso # 0.02       Basophil % 0.3       BILIRUBIN TOTAL 0.4  Comment: For infants and newborns, interpretation of results should be based  on gestational age, weight and in agreement with clinical  observations.    Premature Infant recommended reference ranges:  Up to 24 hours.............<8.0 mg/dL  Up to 48 hours............<12.0 mg/dL  3-5 days..................<15.0 mg/dL  6-29 days.................<15.0 mg/dL         BUN 34       Calcium 8.9       Chloride  106       CO2 27       Creatinine 2.3       Differential Method Automated       eGFR 26.3       Eos # 0.0       Eosinophil % 0.4       Glucose 124       Gran # (ANC) 4.7       Gran % 60.3       Hematocrit 32.7       Hemoglobin 10.5       Immature Grans (Abs) 0.02  Comment: Mild elevation in immature granulocytes is non specific and   can be seen in a variety of conditions including stress response,   acute inflammation, trauma and pregnancy. Correlation with other   laboratory and clinical findings is essential.         Immature Granulocytes 0.3       Lymph # 2.1       Lymph % 26.9       MCH 29.7       MCHC 32.1       MCV 93       Mono # 0.9       Mono % 11.8       MPV 11.0       nRBC 0       Platelet Count 159       Potassium 4.4       PROTEIN TOTAL 6.3       RBC 3.53       RDW 14.3       Sodium 139       WBC 7.80               Significant Imaging: I have reviewed all pertinent imaging results/findings within the past 24 hours.    Assessment/Plan:      * Fall  Multifactorial- dehydration, seizures?  Get orthostat once able to   CT head reviewed  Not sure if pt taking Keppra  Resumed Keppra  MRI brain with no acute findings   PTOT  Pending SNF placement      Seizure  Not sure if he did have seizures- resume home Keppra  Neurology consulted.  MRI brain with no acute findings  EEG - abnormal awake and drowsy routine EEG due to diffuse slowing of the background activity consistent with a moderate encephalopathy. No epileptiform discharges or seizures are captured     Dementia with behavioral disturbance  Daughter not sure if its actually progressing  B12 low normal. Start replacement. B1 pendng.       Hyperlipidemia  Resume home meds      Hypertension  Required IV doses of hydralazine and labetalol  Resume home meds  Home hydralazine and Norvasc increased.        VTE Risk Mitigation (From admission, onward)           Ordered     heparin (porcine) injection 5,000 Units  Every 8 hours         11/30/23 0538     IP VTE  HIGH RISK PATIENT  Once         11/30/23 0538     Place sequential compression device  Until discontinued         11/30/23 0538                    Discharge Planning   JOANNE: 12/4/2023     Code Status: Full Code   Is the patient medically ready for discharge?: Yes    Reason for patient still in hospital (select all that apply): Treatment  Discharge Plan A: Skilled Nursing Facility   Discharge Delays: None known at this time              Memo Downs MD  Department of Hospital Medicine   [facility]

## 2023-12-04 NOTE — PLAN OF CARE
called [employer] to inquire about authorization expiration date. Per representative, authorization will  2023.

## 2023-12-04 NOTE — PLAN OF CARE
Zaina ([facility]) informed  Pt cannot be accepted due to needing 1:1 supervision.  sent message to Manoj Pascual) to review Pt for skilled nursing.       12/04/23 1040   Post-Acute Status   Post-Acute Authorization Placement   Post-Acute Placement Status Pending post-acute provider review/more information requested   Discharge Delays None known at this time   Discharge Plan   Discharge Plan A Skilled Nursing Facility   Discharge Plan B Skilled Nursing Facility

## 2023-12-04 NOTE — PT/OT/SLP PROGRESS
Occupational Therapy   Treatment    Name: Lavon Brandon  MRN: [id number]  Admitting Diagnosis:  Fall       Recommendations:     Discharge Recommendations: Moderate Intensity Therapy (vs Low Intensity with 24/7 supervision/assist)  Discharge Equipment Recommendations:  none  Barriers to discharge:       Assessment:     Lavon Brandon is a 89 y.o. male with a medical diagnosis of Fall. Performance deficits affecting function are weakness, impaired endurance, impaired self care skills, impaired functional mobility, gait instability, impaired balance, decreased lower extremity function, decreased safety awareness, impaired cardiopulmonary response to activity.     Rehab Prognosis:  Good; patient would benefit from acute skilled OT services to address these deficits and reach maximum level of function.       Plan:     Patient to be seen 5 x/week to address the above listed problems via self-care/home management, therapeutic activities, therapeutic exercises  Plan of Care Expires: 01/01/24  Plan of Care Reviewed with: patient    Subjective     Chief Complaint: none  Patient/Family Comments/goals: none  Pain/Comfort:  Pain Rating 1: 0/10  Pain Rating Post-Intervention 1: 0/10    Objective:     Communicated with: nurse Santo prior to session.  Patient found HOB elevated with bed alarm, PureWick, telemetry upon OT entry to room.    General Precautions: Standard, fall    Orthopedic Precautions:N/A  Braces: N/A  Respiratory Status: Room air     Occupational Performance:     Bed Mobility:    Patient completed Scooting/Bridging with stand by assistance  Patient completed Supine to Sit with stand by assistance  Patient completed Sit to Supine with stand by assistance     Functional Mobility/Transfers:  Patient completed Sit <> Stand Transfer x 2 with minimum assistance  with  rolling walker   Functional Mobility: noted minimal unsteadiness when taking steps to L and R side requiring Min A for balance.     Activities of Daily  Living:  Grooming: stand by assistance with oral and facial hygiene while seated EOB  Toileting: dependence with Purewick in place      AMPAC 6 Click ADL:      Treatment & Education:  OT ed patient on safety with walker use for functional mobility with cues for hand placement & sequencing.       Patient left HOB elevated with all lines intact, call button in reach, bed alarm on, and daughter present    GOALS:   Multidisciplinary Problems       Occupational Therapy Goals          Problem: Occupational Therapy    Goal Priority Disciplines Outcome Interventions   Occupational Therapy Goal     OT, PT/OT     Description: Goals to be met by: 1/1/24     Patient will increase functional independence with ADLs by performing:    UE Dressing with Supervision.  LE Dressing with Supervision.  Grooming while seated with Supervision.  Toileting from toilet with Supervision for hygiene and clothing management.   Toilet transfer to toilet with Supervision.                         Time Tracking:     OT Date of Treatment: 12/04/23  OT Start Time: 1109  OT Stop Time: 1137  OT Total Time (min): 28 min    Billable Minutes:Self Care/Home Management 28    OT/KIMMY: OT          12/4/2023

## 2023-12-04 NOTE — SUBJECTIVE & OBJECTIVE
Interval History:   Pending SNF placement.    Review of Systems   Constitutional:  Negative for activity change, appetite change, chills and fever.   HENT:  Negative for congestion, ear pain and nosebleeds.    Eyes:  Negative for itching and visual disturbance.   Respiratory:  Negative for apnea, cough, chest tightness, shortness of breath and wheezing.    Cardiovascular:  Negative for chest pain, palpitations and leg swelling.   Gastrointestinal:  Negative for abdominal distention, abdominal pain, constipation, diarrhea, nausea and vomiting.   Genitourinary:  Negative for dysuria and flank pain.   Musculoskeletal:  Negative for back pain.   Neurological:  Negative for dizziness and headaches.     Objective:     Vital Signs (Most Recent):  Temp: 98.4 °F (36.9 °C) (12/04/23 1135)  Pulse: 78 (12/04/23 1135)  Resp: 20 (12/04/23 1135)  BP: (!) 153/75 (notified nurse jailyn) (12/04/23 1135)  SpO2: 96 % (12/04/23 1135) Vital Signs (24h Range):  Temp:  [97.7 °F (36.5 °C)-98.5 °F (36.9 °C)] 98.4 °F (36.9 °C)  Pulse:  [68-78] 78  Resp:  [18-20] 20  SpO2:  [95 %-97 %] 96 %  BP: (151-177)/(66-79) 153/75     Weight: 78.5 kg (173 lb 1 oz)  Body mass index is 24.83 kg/m².    Intake/Output Summary (Last 24 hours) at 12/4/2023 1144  Last data filed at 12/4/2023 0445  Gross per 24 hour   Intake --   Output 1400 ml   Net -1400 ml           Physical Exam  Vitals reviewed.   Constitutional:       General: He is not in acute distress.     Appearance: He is ill-appearing. He is not toxic-appearing or diaphoretic.   HENT:      Head: Normocephalic and atraumatic.      Mouth/Throat:      Mouth: Mucous membranes are moist.      Pharynx: Oropharynx is clear.   Eyes:      General: No scleral icterus.     Conjunctiva/sclera: Conjunctivae normal.   Neck:      Vascular: No carotid bruit.   Cardiovascular:      Rate and Rhythm: Normal rate and regular rhythm.      Pulses: Normal pulses.      Heart sounds: Normal heart sounds.   Pulmonary:       Effort: Pulmonary effort is normal.      Breath sounds: Normal breath sounds.   Abdominal:      General: Abdomen is flat. Bowel sounds are normal.      Palpations: Abdomen is soft.   Musculoskeletal:         General: Normal range of motion.   Skin:     General: Skin is warm.   Neurological:      Mental Status: Mental status is at baseline.             Significant Labs: All pertinent labs within the past 24 hours have been reviewed.  Recent Lab Results         12/04/23  0318        Albumin 3.5       ALP 45       ALT 6       Anion Gap 6       AST 14       Baso # 0.02       Basophil % 0.3       BILIRUBIN TOTAL 0.4  Comment: For infants and newborns, interpretation of results should be based  on gestational age, weight and in agreement with clinical  observations.    Premature Infant recommended reference ranges:  Up to 24 hours.............<8.0 mg/dL  Up to 48 hours............<12.0 mg/dL  3-5 days..................<15.0 mg/dL  6-29 days.................<15.0 mg/dL         BUN 34       Calcium 8.9       Chloride 106       CO2 27       Creatinine 2.3       Differential Method Automated       eGFR 26.3       Eos # 0.0       Eosinophil % 0.4       Glucose 124       Gran # (ANC) 4.7       Gran % 60.3       Hematocrit 32.7       Hemoglobin 10.5       Immature Grans (Abs) 0.02  Comment: Mild elevation in immature granulocytes is non specific and   can be seen in a variety of conditions including stress response,   acute inflammation, trauma and pregnancy. Correlation with other   laboratory and clinical findings is essential.         Immature Granulocytes 0.3       Lymph # 2.1       Lymph % 26.9       MCH 29.7       MCHC 32.1       MCV 93       Mono # 0.9       Mono % 11.8       MPV 11.0       nRBC 0       Platelet Count 159       Potassium 4.4       PROTEIN TOTAL 6.3       RBC 3.53       RDW 14.3       Sodium 139       WBC 7.80               Significant Imaging: I have reviewed all pertinent imaging results/findings within  the past 24 hours.

## 2023-12-05 LAB
ALBUMIN SERPL BCP-MCNC: 3.5 G/DL (ref 3.5–5.2)
ALP SERPL-CCNC: 47 U/L (ref 55–135)
ALT SERPL W/O P-5'-P-CCNC: 6 U/L (ref 10–44)
ANION GAP SERPL CALC-SCNC: 5 MMOL/L (ref 8–16)
AST SERPL-CCNC: 13 U/L (ref 10–40)
BACTERIA BLD CULT: NORMAL
BASOPHILS # BLD AUTO: 0.03 K/UL (ref 0–0.2)
BASOPHILS NFR BLD: 0.5 % (ref 0–1.9)
BILIRUB SERPL-MCNC: 0.4 MG/DL (ref 0.1–1)
BUN SERPL-MCNC: 31 MG/DL (ref 8–23)
CALCIUM SERPL-MCNC: 9.2 MG/DL (ref 8.7–10.5)
CHLORIDE SERPL-SCNC: 106 MMOL/L (ref 95–110)
CO2 SERPL-SCNC: 28 MMOL/L (ref 23–29)
CREAT SERPL-MCNC: 2 MG/DL (ref 0.5–1.4)
DIFFERENTIAL METHOD: ABNORMAL
EOSINOPHIL # BLD AUTO: 0 K/UL (ref 0–0.5)
EOSINOPHIL NFR BLD: 0.6 % (ref 0–8)
ERYTHROCYTE [DISTWIDTH] IN BLOOD BY AUTOMATED COUNT: 14.2 % (ref 11.5–14.5)
EST. GFR  (NO RACE VARIABLE): 31.1 ML/MIN/1.73 M^2
GLUCOSE SERPL-MCNC: 113 MG/DL (ref 70–110)
HCT VFR BLD AUTO: 38.1 % (ref 40–54)
HGB BLD-MCNC: 11.8 G/DL (ref 14–18)
IMM GRANULOCYTES # BLD AUTO: 0.02 K/UL (ref 0–0.04)
IMM GRANULOCYTES NFR BLD AUTO: 0.3 % (ref 0–0.5)
LYMPHOCYTES # BLD AUTO: 1.5 K/UL (ref 1–4.8)
LYMPHOCYTES NFR BLD: 23.5 % (ref 18–48)
MCH RBC QN AUTO: 29.3 PG (ref 27–31)
MCHC RBC AUTO-ENTMCNC: 31 G/DL (ref 32–36)
MCV RBC AUTO: 95 FL (ref 82–98)
MONOCYTES # BLD AUTO: 0.7 K/UL (ref 0.3–1)
MONOCYTES NFR BLD: 11.5 % (ref 4–15)
NEUTROPHILS # BLD AUTO: 4 K/UL (ref 1.8–7.7)
NEUTROPHILS NFR BLD: 63.6 % (ref 38–73)
NRBC BLD-RTO: 0 /100 WBC
PLATELET # BLD AUTO: 159 K/UL (ref 150–450)
PMV BLD AUTO: 11.3 FL (ref 9.2–12.9)
POTASSIUM SERPL-SCNC: 4.5 MMOL/L (ref 3.5–5.1)
PROT SERPL-MCNC: 6.5 G/DL (ref 6–8.4)
RBC # BLD AUTO: 4.03 M/UL (ref 4.6–6.2)
SODIUM SERPL-SCNC: 139 MMOL/L (ref 136–145)
WBC # BLD AUTO: 6.25 K/UL (ref 3.9–12.7)

## 2023-12-05 PROCEDURE — 63600175 PHARM REV CODE 636 W HCPCS: Performed by: STUDENT IN AN ORGANIZED HEALTH CARE EDUCATION/TRAINING PROGRAM

## 2023-12-05 PROCEDURE — 96376 TX/PRO/DX INJ SAME DRUG ADON: CPT

## 2023-12-05 PROCEDURE — G0378 HOSPITAL OBSERVATION PER HR: HCPCS

## 2023-12-05 PROCEDURE — 25000003 PHARM REV CODE 250: Performed by: STUDENT IN AN ORGANIZED HEALTH CARE EDUCATION/TRAINING PROGRAM

## 2023-12-05 PROCEDURE — 85025 COMPLETE CBC W/AUTO DIFF WBC: CPT | Performed by: STUDENT IN AN ORGANIZED HEALTH CARE EDUCATION/TRAINING PROGRAM

## 2023-12-05 PROCEDURE — 97530 THERAPEUTIC ACTIVITIES: CPT | Mod: CQ

## 2023-12-05 PROCEDURE — 96372 THER/PROPH/DIAG INJ SC/IM: CPT | Mod: 59 | Performed by: STUDENT IN AN ORGANIZED HEALTH CARE EDUCATION/TRAINING PROGRAM

## 2023-12-05 PROCEDURE — 80053 COMPREHEN METABOLIC PANEL: CPT | Performed by: STUDENT IN AN ORGANIZED HEALTH CARE EDUCATION/TRAINING PROGRAM

## 2023-12-05 PROCEDURE — 36415 COLL VENOUS BLD VENIPUNCTURE: CPT | Performed by: STUDENT IN AN ORGANIZED HEALTH CARE EDUCATION/TRAINING PROGRAM

## 2023-12-05 RX ORDER — HYDRALAZINE HYDROCHLORIDE 25 MG/1
75 TABLET, FILM COATED ORAL EVERY 8 HOURS
Status: DISCONTINUED | OUTPATIENT
Start: 2023-12-05 | End: 2023-12-06

## 2023-12-05 RX ADMIN — CARVEDILOL 6.25 MG: 6.25 TABLET, FILM COATED ORAL at 08:12

## 2023-12-05 RX ADMIN — HEPARIN SODIUM 5000 UNITS: 5000 INJECTION INTRAVENOUS; SUBCUTANEOUS at 01:12

## 2023-12-05 RX ADMIN — CITALOPRAM HYDROBROMIDE 20 MG: 20 TABLET ORAL at 08:12

## 2023-12-05 RX ADMIN — HYDRALAZINE HYDROCHLORIDE 75 MG: 25 TABLET, FILM COATED ORAL at 09:12

## 2023-12-05 RX ADMIN — CARVEDILOL 6.25 MG: 6.25 TABLET, FILM COATED ORAL at 09:12

## 2023-12-05 RX ADMIN — CETIRIZINE HYDROCHLORIDE 5 MG: 5 TABLET ORAL at 08:12

## 2023-12-05 RX ADMIN — HEPARIN SODIUM 5000 UNITS: 5000 INJECTION INTRAVENOUS; SUBCUTANEOUS at 05:12

## 2023-12-05 RX ADMIN — CYANOCOBALAMIN TAB 1000 MCG 1000 MCG: 1000 TAB at 08:12

## 2023-12-05 RX ADMIN — AMLODIPINE BESYLATE 10 MG: 5 TABLET ORAL at 05:12

## 2023-12-05 RX ADMIN — HYDRALAZINE HYDROCHLORIDE 20 MG: 20 INJECTION INTRAMUSCULAR; INTRAVENOUS at 12:12

## 2023-12-05 RX ADMIN — HYDRALAZINE HYDROCHLORIDE 50 MG: 25 TABLET, FILM COATED ORAL at 05:12

## 2023-12-05 RX ADMIN — HYDRALAZINE HYDROCHLORIDE 75 MG: 25 TABLET, FILM COATED ORAL at 01:12

## 2023-12-05 RX ADMIN — LEVETIRACETAM 750 MG: 250 TABLET, FILM COATED ORAL at 08:12

## 2023-12-05 RX ADMIN — DOCUSATE SODIUM 100 MG: 100 CAPSULE, LIQUID FILLED ORAL at 08:12

## 2023-12-05 RX ADMIN — LEVETIRACETAM 750 MG: 250 TABLET, FILM COATED ORAL at 09:12

## 2023-12-05 RX ADMIN — HEPARIN SODIUM 5000 UNITS: 5000 INJECTION INTRAVENOUS; SUBCUTANEOUS at 09:12

## 2023-12-05 NOTE — ASSESSMENT & PLAN NOTE
Required IV doses of hydralazine and labetalol  Resume home meds  Home hydralazine and Norvasc increased.  Coreg added

## 2023-12-05 NOTE — PT/OT/SLP PROGRESS
Occupational Therapy      Patient Name:  Lavon Brandon   MRN:  [id number]    Patient not seen today secondary to Patient fatigue. Will follow-up next scheduled service date.    12/5/2023   You can access the FollowMyHealth Patient Portal offered by [facility] by registering at the following website: [email or web address]. By joining [email or web address]’s FollowMyHealth portal, you will also be able to view your health information using other applications (apps) compatible with our system.

## 2023-12-05 NOTE — PT/OT/SLP PROGRESS
Physical Therapy Treatment    Patient Name:  Lavon Brandon   MRN:  [id number]    Recommendations:     Discharge Recommendations: Moderate Intensity Therapy  Discharge Equipment Recommendations: none  Barriers to discharge:  increased assist with mobility, decreased activity tolerance, high fall risk, decreased safety awareness    Assessment:     Lavon Brandon is a 89 y.o. male admitted with a medical diagnosis of Fall.  He presents with the following impairments/functional limitations: weakness, impaired endurance, impaired self care skills, impaired functional mobility, gait instability, impaired balance, decreased lower extremity function, decreased safety awareness, impaired cardiopulmonary response to activity.    Pt sleeping with HOB elevated. Pt responds when calling his name but would not open his eyes. Pt agreeable to sitting EOB. Pt required mod to max assist for supine to sit transfer. Pt tolerated sitting EOB with close stand by assist but kept his eyes closed the entire time. Pt required min to mod assist for scooting along EOB in sitting. Pt returned to supine with mod to max assist. Pt able to scoot towards HOB in supine using BUE and BLE with contact guard assist. Pt rolled left and right with mod assist to fix linens.    Rehab Prognosis: Fair; patient would benefit from acute skilled PT services to address these deficits and reach maximum level of function.    Recent Surgery: * No surgery found *      Plan:     During this hospitalization, patient to be seen 6 x/week to address the identified rehab impairments via gait training, therapeutic activities, therapeutic exercises and progress toward the following goals:    Plan of Care Expires:  01/01/24    Subjective     Chief Complaint: tired  Patient/Family Comments/goals: to sleep  Pain/Comfort:  Pain Rating 1: 0/10      Objective:     Communicated with RN prior to session.  Patient found HOB elevated with bed alarm, PureWick, telemetry upon PT  entry to room.     General Precautions: Standard, fall  Orthopedic Precautions: N/A  Braces: N/A  Respiratory Status: Room air     Functional Mobility:  Bed Mobility:     Rolling Left:  moderate assistance  Rolling Right: moderate assistance  Scooting: minimum assistance, moderate assistance, for scooting along EOB in sitting and contact guard assist for scooting up in bed  Supine to Sit: moderate assistance and maximal assistance  Sit to Supine: moderate assistance and maximal assistance  Balance: sitting EOB with close SBA      AM-PAC 6 CLICK MOBILITY          Treatment & Education:  Pt educated on importance of time OOB, importance of intermittent mobility, safe techniques for transfers/ambulation, discharge recommendations/options, and use of call light for assistance and fall prevention.      Patient left HOB elevated with all lines intact, call button in reach, and bed alarm on..    GOALS:   Multidisciplinary Problems       Physical Therapy Goals          Problem: Physical Therapy    Goal Priority Disciplines Outcome Goal Variances Interventions   Physical Therapy Goal     PT, PT/OT      Description: Goals to be met by: 24     Patient will increase functional independence with mobility by performin. Supine to sit with Supervision  2. Sit to stand transfer with Supervision  3. Bed to chair transfer with Supervision using Rolling Walker  4. Gait  x 150 feet with Supervision using Rolling Walker.                              Time Tracking:     PT Received On: 23  PT Start Time: 1032     PT Stop Time: 1042  PT Total Time (min): 10 min     Billable Minutes: Therapeutic Activity 10    Treatment Type: Treatment  PT/PTA: PTA     Number of PTA visits since last PT visit: 2023

## 2023-12-05 NOTE — PLAN OF CARE
spoke with Pt daughter Gabriela in regards to Pt discharge plan. Pt daughter voiced her concerns and hope to find a accepting facility in [place].    10:08am-  spoke with Manoj with [facility] Pt is currently being reviewed by nursing.    1500- SW callled and spoke with Manoj with [facility]. Per [facility] team is still reviewing she will reach out to family to get more information on Pt dementia.     1600-  received a called from Manoj with  [facility]. Per Manoj Nunez  has denied also said they have a fear of how the Pt would be at night or if he would get out. Due to this reason they will not accept. Manoj also stated she spoke with daughter and  also have a fear of the sun downing.      12/05/23 1012   Post-Acute Status   Post-Acute Authorization Placement   Post-Acute Placement Status Pending payor medical review/second level review   Coverage [employer] MANAGED MEDICARE - [employer] MEDICARE PPO -   Discharge Delays None known at this time   Discharge Plan   Discharge Plan A Skilled Nursing Facility   Discharge Plan B Skilled Nursing Facility

## 2023-12-05 NOTE — CONSULTS
Consult noted; chart reviewed. Pt lacks capacity for complex medical decision making. No family members present. Called Daughter/HCGabriela WOODY.     Advance Care Planning     Date: 12/05/2023    [facility]  I engaged the healthcare power of   in a voluntary conversation about advance care planning and we specifically addressed what the goals of care would be moving forward, in light of the patient's change in clinical status. We explored the patient's values and preferences for future care.  The healthcare power of   endorses that what is most important right now is to focus on remaining as independent as possible    Accordingly, we have decided that the best plan to meet the patient's goals includes continuing with treatment    I did explain the role for hospice care at this stage of the patient's illness, including its ability to help the patient live with the best quality of life possible.  We will not be making a hospice referral today. Daughter Gabriela requesting SNF in [facility]. CM and medical team updated.     I spent a total of 35 minutes engaging the patient in this advance care planning discussion.     Discussed code status at length. Daughter desires for pt to remain FULL CODE.

## 2023-12-05 NOTE — PLAN OF CARE
Problem: Physical Therapy  Goal: Physical Therapy Goal  Description: Goals to be met by: 24     Patient will increase functional independence with mobility by performin. Supine to sit with Supervision  2. Sit to stand transfer with Supervision  3. Bed to chair transfer with Supervision using Rolling Walker  4. Gait  x 150 feet with Supervision using Rolling Walker.         Outcome: Ongoing, Progressing

## 2023-12-05 NOTE — PROGRESS NOTES
[facility] Medicine  Progress Note    Patient Name: Lavon Brandon  MRN: [id number]  Patient Class: OP- Observation   Admission Date: 11/30/2023  Length of Stay: 0 days  Attending Physician: Memo Downs MD  Primary Care Provider: Aristides Haynes MD        Subjective:     Principal Problem:Fall        HPI:  Mr. Brandon is a 89 year old man with PMHx of HTN, HLD, Lewy body dementia?- was brought today by daughter because he fell tonight at 12 AM- she said he has been doing ok lately - eating drinking ok, he does phases out and she thinks his dementia is getting worse but he was not complaining of any chest pain, palpitations, sob, fever, chills, n/v, urine or bowel problem.     Pt himself said he thinks his leg gave away that's why he fell.     In ER his BP was elevated and was given hydralazine and labetalol, lactate is elevated.    Overview/Hospital Course:  No notes on file    Interval History:   Pending SNF placement.  P.o. hydralazine increased.  Palliative Care consulted.    Review of Systems   Constitutional:  Negative for activity change, appetite change, chills and fever.   HENT:  Negative for congestion, ear pain and nosebleeds.    Eyes:  Negative for itching and visual disturbance.   Respiratory:  Negative for apnea, cough, chest tightness, shortness of breath and wheezing.    Cardiovascular:  Negative for chest pain, palpitations and leg swelling.   Gastrointestinal:  Negative for abdominal distention, abdominal pain, constipation, diarrhea, nausea and vomiting.   Genitourinary:  Negative for dysuria and flank pain.   Musculoskeletal:  Negative for back pain.   Neurological:  Negative for dizziness and headaches.     Objective:     Vital Signs (Most Recent):  Temp: 98.4 °F (36.9 °C) (12/05/23 1103)  Pulse: 63 (12/05/23 1103)  Resp: 18 (12/05/23 1103)  BP: (!) 134/56 (12/05/23 1103)  SpO2: 97 % (12/05/23 1103) Vital Signs (24h Range):  Temp:  [98 °F (36.7 °C)-98.6 °F (37 °C)]  98.4 °F (36.9 °C)  Pulse:  [56-69] 63  Resp:  [18-20] 18  SpO2:  [95 %-97 %] 97 %  BP: (134-192)/(56-82) 134/56     Weight: 78.5 kg (173 lb 1 oz)  Body mass index is 24.83 kg/m².    Intake/Output Summary (Last 24 hours) at 12/5/2023 1230  Last data filed at 12/5/2023 0012  Gross per 24 hour   Intake --   Output 900 ml   Net -900 ml           Physical Exam  Vitals reviewed.   Constitutional:       General: He is not in acute distress.     Appearance: He is ill-appearing. He is not toxic-appearing or diaphoretic.   HENT:      Head: Normocephalic and atraumatic.      Mouth/Throat:      Mouth: Mucous membranes are moist.      Pharynx: Oropharynx is clear.   Eyes:      General: No scleral icterus.     Conjunctiva/sclera: Conjunctivae normal.   Neck:      Vascular: No carotid bruit.   Cardiovascular:      Rate and Rhythm: Normal rate and regular rhythm.      Pulses: Normal pulses.      Heart sounds: Normal heart sounds.   Pulmonary:      Effort: Pulmonary effort is normal.      Breath sounds: Normal breath sounds.   Abdominal:      General: Abdomen is flat. Bowel sounds are normal.      Palpations: Abdomen is soft.   Musculoskeletal:         General: Normal range of motion.   Skin:     General: Skin is warm.   Neurological:      Mental Status: Mental status is at baseline.             Significant Labs: All pertinent labs within the past 24 hours have been reviewed.  Recent Lab Results         12/05/23  0550        Albumin 3.5       ALP 47       ALT 6       Anion Gap 5       AST 13       Baso # 0.03       Basophil % 0.5       BILIRUBIN TOTAL 0.4  Comment: For infants and newborns, interpretation of results should be based  on gestational age, weight and in agreement with clinical  observations.    Premature Infant recommended reference ranges:  Up to 24 hours.............<8.0 mg/dL  Up to 48 hours............<12.0 mg/dL  3-5 days..................<15.0 mg/dL  6-29 days.................<15.0 mg/dL         BUN 31        Calcium 9.2       Chloride 106       CO2 28       Creatinine 2.0       Differential Method Automated       eGFR 31.1       Eos # 0.0       Eosinophil % 0.6       Glucose 113       Gran # (ANC) 4.0       Gran % 63.6       Hematocrit 38.1       Hemoglobin 11.8       Immature Grans (Abs) 0.02  Comment: Mild elevation in immature granulocytes is non specific and   can be seen in a variety of conditions including stress response,   acute inflammation, trauma and pregnancy. Correlation with other   laboratory and clinical findings is essential.         Immature Granulocytes 0.3       Lymph # 1.5       Lymph % 23.5       MCH 29.3       MCHC 31.0       MCV 95       Mono # 0.7       Mono % 11.5       MPV 11.3       nRBC 0       Platelet Count 159       Potassium 4.5       PROTEIN TOTAL 6.5       RBC 4.03       RDW 14.2       Sodium 139       WBC 6.25               Significant Imaging: I have reviewed all pertinent imaging results/findings within the past 24 hours.    Assessment/Plan:      * Fall  Multifactorial- dehydration, seizures?  Get orthostat once able to   CT head reviewed  Not sure if pt taking Keppra  Resumed Keppra  MRI brain with no acute findings   PTOT  Pending SNF placement      Seizure  Not sure if he did have seizures- resume home Keppra  Neurology consulted.  MRI brain with no acute findings  EEG - abnormal awake and drowsy routine EEG due to diffuse slowing of the background activity consistent with a moderate encephalopathy. No epileptiform discharges or seizures are captured     Dementia with behavioral disturbance  Daughter not sure if its actually progressing  B12 low normal. Start replacement. B1 pendng.     Palliative care consulted.       Hyperlipidemia  Resume home meds      Hypertension  Required IV doses of hydralazine and labetalol  Resume home meds  Home hydralazine and Norvasc increased.  Coreg added        VTE Risk Mitigation (From admission, onward)           Ordered     heparin (porcine)  injection 5,000 Units  Every 8 hours         11/30/23 0538     IP VTE HIGH RISK PATIENT  Once         11/30/23 0538     Place sequential compression device  Until discontinued         11/30/23 0538                    Discharge Planning   JOANNE: 12/7/2023     Code Status: Full Code   Is the patient medically ready for discharge?: Yes    Reason for patient still in hospital (select all that apply): Treatment  Discharge Plan A: Skilled Nursing Facility   Discharge Delays: None known at this time              Memo Downs MD  Department of Hospital Medicine   [facility]

## 2023-12-05 NOTE — SUBJECTIVE & OBJECTIVE
Interval History:   Pending SNF placement.  P.o. hydralazine increased.  Palliative Care consulted.    Review of Systems   Constitutional:  Negative for activity change, appetite change, chills and fever.   HENT:  Negative for congestion, ear pain and nosebleeds.    Eyes:  Negative for itching and visual disturbance.   Respiratory:  Negative for apnea, cough, chest tightness, shortness of breath and wheezing.    Cardiovascular:  Negative for chest pain, palpitations and leg swelling.   Gastrointestinal:  Negative for abdominal distention, abdominal pain, constipation, diarrhea, nausea and vomiting.   Genitourinary:  Negative for dysuria and flank pain.   Musculoskeletal:  Negative for back pain.   Neurological:  Negative for dizziness and headaches.     Objective:     Vital Signs (Most Recent):  Temp: 98.4 °F (36.9 °C) (12/05/23 1103)  Pulse: 63 (12/05/23 1103)  Resp: 18 (12/05/23 1103)  BP: (!) 134/56 (12/05/23 1103)  SpO2: 97 % (12/05/23 1103) Vital Signs (24h Range):  Temp:  [98 °F (36.7 °C)-98.6 °F (37 °C)] 98.4 °F (36.9 °C)  Pulse:  [56-69] 63  Resp:  [18-20] 18  SpO2:  [95 %-97 %] 97 %  BP: (134-192)/(56-82) 134/56     Weight: 78.5 kg (173 lb 1 oz)  Body mass index is 24.83 kg/m².    Intake/Output Summary (Last 24 hours) at 12/5/2023 1230  Last data filed at 12/5/2023 0012  Gross per 24 hour   Intake --   Output 900 ml   Net -900 ml           Physical Exam  Vitals reviewed.   Constitutional:       General: He is not in acute distress.     Appearance: He is ill-appearing. He is not toxic-appearing or diaphoretic.   HENT:      Head: Normocephalic and atraumatic.      Mouth/Throat:      Mouth: Mucous membranes are moist.      Pharynx: Oropharynx is clear.   Eyes:      General: No scleral icterus.     Conjunctiva/sclera: Conjunctivae normal.   Neck:      Vascular: No carotid bruit.   Cardiovascular:      Rate and Rhythm: Normal rate and regular rhythm.      Pulses: Normal pulses.      Heart sounds: Normal heart  sounds.   Pulmonary:      Effort: Pulmonary effort is normal.      Breath sounds: Normal breath sounds.   Abdominal:      General: Abdomen is flat. Bowel sounds are normal.      Palpations: Abdomen is soft.   Musculoskeletal:         General: Normal range of motion.   Skin:     General: Skin is warm.   Neurological:      Mental Status: Mental status is at baseline.             Significant Labs: All pertinent labs within the past 24 hours have been reviewed.  Recent Lab Results         12/05/23  0550        Albumin 3.5       ALP 47       ALT 6       Anion Gap 5       AST 13       Baso # 0.03       Basophil % 0.5       BILIRUBIN TOTAL 0.4  Comment: For infants and newborns, interpretation of results should be based  on gestational age, weight and in agreement with clinical  observations.    Premature Infant recommended reference ranges:  Up to 24 hours.............<8.0 mg/dL  Up to 48 hours............<12.0 mg/dL  3-5 days..................<15.0 mg/dL  6-29 days.................<15.0 mg/dL         BUN 31       Calcium 9.2       Chloride 106       CO2 28       Creatinine 2.0       Differential Method Automated       eGFR 31.1       Eos # 0.0       Eosinophil % 0.6       Glucose 113       Gran # (ANC) 4.0       Gran % 63.6       Hematocrit 38.1       Hemoglobin 11.8       Immature Grans (Abs) 0.02  Comment: Mild elevation in immature granulocytes is non specific and   can be seen in a variety of conditions including stress response,   acute inflammation, trauma and pregnancy. Correlation with other   laboratory and clinical findings is essential.         Immature Granulocytes 0.3       Lymph # 1.5       Lymph % 23.5       MCH 29.3       MCHC 31.0       MCV 95       Mono # 0.7       Mono % 11.5       MPV 11.3       nRBC 0       Platelet Count 159       Potassium 4.5       PROTEIN TOTAL 6.5       RBC 4.03       RDW 14.2       Sodium 139       WBC 6.25               Significant Imaging: I have reviewed all pertinent  imaging results/findings within the past 24 hours.

## 2023-12-05 NOTE — ASSESSMENT & PLAN NOTE
Daughter not sure if its actually progressing  B12 low normal. Start replacement. B1 pendng.     Palliative care consulted.

## 2023-12-06 LAB
ALBUMIN SERPL BCP-MCNC: 3.2 G/DL (ref 3.5–5.2)
ALP SERPL-CCNC: 37 U/L (ref 55–135)
ALT SERPL W/O P-5'-P-CCNC: 5 U/L (ref 10–44)
ANION GAP SERPL CALC-SCNC: 2 MMOL/L (ref 8–16)
AST SERPL-CCNC: 14 U/L (ref 10–40)
BASOPHILS # BLD AUTO: 0.03 K/UL (ref 0–0.2)
BASOPHILS NFR BLD: 0.5 % (ref 0–1.9)
BILIRUB SERPL-MCNC: 0.4 MG/DL (ref 0.1–1)
BUN SERPL-MCNC: 36 MG/DL (ref 8–23)
CALCIUM SERPL-MCNC: 8.7 MG/DL (ref 8.7–10.5)
CHLORIDE SERPL-SCNC: 109 MMOL/L (ref 95–110)
CO2 SERPL-SCNC: 27 MMOL/L (ref 23–29)
CREAT SERPL-MCNC: 2.1 MG/DL (ref 0.5–1.4)
DIFFERENTIAL METHOD: ABNORMAL
EOSINOPHIL # BLD AUTO: 0.1 K/UL (ref 0–0.5)
EOSINOPHIL NFR BLD: 1.5 % (ref 0–8)
ERYTHROCYTE [DISTWIDTH] IN BLOOD BY AUTOMATED COUNT: 14.2 % (ref 11.5–14.5)
EST. GFR  (NO RACE VARIABLE): 29.4 ML/MIN/1.73 M^2
GLUCOSE SERPL-MCNC: 96 MG/DL (ref 70–110)
HCT VFR BLD AUTO: 31.4 % (ref 40–54)
HGB BLD-MCNC: 10.1 G/DL (ref 14–18)
IMM GRANULOCYTES # BLD AUTO: 0.01 K/UL (ref 0–0.04)
IMM GRANULOCYTES NFR BLD AUTO: 0.2 % (ref 0–0.5)
LYMPHOCYTES # BLD AUTO: 2 K/UL (ref 1–4.8)
LYMPHOCYTES NFR BLD: 32.8 % (ref 18–48)
MCH RBC QN AUTO: 29.4 PG (ref 27–31)
MCHC RBC AUTO-ENTMCNC: 32.2 G/DL (ref 32–36)
MCV RBC AUTO: 91 FL (ref 82–98)
MONOCYTES # BLD AUTO: 0.8 K/UL (ref 0.3–1)
MONOCYTES NFR BLD: 13.3 % (ref 4–15)
NEUTROPHILS # BLD AUTO: 3.2 K/UL (ref 1.8–7.7)
NEUTROPHILS NFR BLD: 51.7 % (ref 38–73)
NRBC BLD-RTO: 0 /100 WBC
PLATELET # BLD AUTO: 167 K/UL (ref 150–450)
PMV BLD AUTO: 11.3 FL (ref 9.2–12.9)
POTASSIUM SERPL-SCNC: 5 MMOL/L (ref 3.5–5.1)
PROT SERPL-MCNC: 5.9 G/DL (ref 6–8.4)
RBC # BLD AUTO: 3.44 M/UL (ref 4.6–6.2)
SODIUM SERPL-SCNC: 138 MMOL/L (ref 136–145)
WBC # BLD AUTO: 6.1 K/UL (ref 3.9–12.7)

## 2023-12-06 PROCEDURE — 96372 THER/PROPH/DIAG INJ SC/IM: CPT | Performed by: STUDENT IN AN ORGANIZED HEALTH CARE EDUCATION/TRAINING PROGRAM

## 2023-12-06 PROCEDURE — 85025 COMPLETE CBC W/AUTO DIFF WBC: CPT | Performed by: STUDENT IN AN ORGANIZED HEALTH CARE EDUCATION/TRAINING PROGRAM

## 2023-12-06 PROCEDURE — 97530 THERAPEUTIC ACTIVITIES: CPT | Mod: CQ

## 2023-12-06 PROCEDURE — 80053 COMPREHEN METABOLIC PANEL: CPT | Performed by: STUDENT IN AN ORGANIZED HEALTH CARE EDUCATION/TRAINING PROGRAM

## 2023-12-06 PROCEDURE — 25000003 PHARM REV CODE 250: Performed by: STUDENT IN AN ORGANIZED HEALTH CARE EDUCATION/TRAINING PROGRAM

## 2023-12-06 PROCEDURE — G0378 HOSPITAL OBSERVATION PER HR: HCPCS

## 2023-12-06 PROCEDURE — 36415 COLL VENOUS BLD VENIPUNCTURE: CPT | Performed by: STUDENT IN AN ORGANIZED HEALTH CARE EDUCATION/TRAINING PROGRAM

## 2023-12-06 PROCEDURE — 63600175 PHARM REV CODE 636 W HCPCS: Performed by: STUDENT IN AN ORGANIZED HEALTH CARE EDUCATION/TRAINING PROGRAM

## 2023-12-06 PROCEDURE — 96376 TX/PRO/DX INJ SAME DRUG ADON: CPT

## 2023-12-06 RX ORDER — LIDOCAINE 50 MG/G
1 PATCH TOPICAL
Status: DISCONTINUED | OUTPATIENT
Start: 2023-12-06 | End: 2023-12-08 | Stop reason: HOSPADM

## 2023-12-06 RX ORDER — HYDRALAZINE HYDROCHLORIDE 25 MG/1
100 TABLET, FILM COATED ORAL EVERY 8 HOURS
Status: DISCONTINUED | OUTPATIENT
Start: 2023-12-06 | End: 2023-12-08 | Stop reason: HOSPADM

## 2023-12-06 RX ORDER — ACETAMINOPHEN 325 MG/1
650 TABLET ORAL EVERY 4 HOURS PRN
Status: DISCONTINUED | OUTPATIENT
Start: 2023-12-06 | End: 2023-12-08 | Stop reason: HOSPADM

## 2023-12-06 RX ADMIN — CARVEDILOL 6.25 MG: 6.25 TABLET, FILM COATED ORAL at 09:12

## 2023-12-06 RX ADMIN — LEVETIRACETAM 750 MG: 250 TABLET, FILM COATED ORAL at 09:12

## 2023-12-06 RX ADMIN — HEPARIN SODIUM 5000 UNITS: 5000 INJECTION INTRAVENOUS; SUBCUTANEOUS at 09:12

## 2023-12-06 RX ADMIN — HYDRALAZINE HYDROCHLORIDE 75 MG: 25 TABLET, FILM COATED ORAL at 05:12

## 2023-12-06 RX ADMIN — HEPARIN SODIUM 5000 UNITS: 5000 INJECTION INTRAVENOUS; SUBCUTANEOUS at 03:12

## 2023-12-06 RX ADMIN — CYANOCOBALAMIN TAB 1000 MCG 1000 MCG: 1000 TAB at 09:12

## 2023-12-06 RX ADMIN — CITALOPRAM HYDROBROMIDE 20 MG: 20 TABLET ORAL at 09:12

## 2023-12-06 RX ADMIN — AMLODIPINE BESYLATE 10 MG: 5 TABLET ORAL at 06:12

## 2023-12-06 RX ADMIN — DOCUSATE SODIUM 100 MG: 100 CAPSULE, LIQUID FILLED ORAL at 09:12

## 2023-12-06 RX ADMIN — HYDRALAZINE HYDROCHLORIDE 100 MG: 25 TABLET, FILM COATED ORAL at 03:12

## 2023-12-06 RX ADMIN — LIDOCAINE 1 PATCH: 50 PATCH CUTANEOUS at 09:12

## 2023-12-06 RX ADMIN — HEPARIN SODIUM 5000 UNITS: 5000 INJECTION INTRAVENOUS; SUBCUTANEOUS at 05:12

## 2023-12-06 RX ADMIN — HYDRALAZINE HYDROCHLORIDE 100 MG: 25 TABLET, FILM COATED ORAL at 09:12

## 2023-12-06 RX ADMIN — HYDRALAZINE HYDROCHLORIDE 20 MG: 20 INJECTION INTRAMUSCULAR; INTRAVENOUS at 11:12

## 2023-12-06 RX ADMIN — CETIRIZINE HYDROCHLORIDE 5 MG: 5 TABLET ORAL at 09:12

## 2023-12-06 RX ADMIN — ACETAMINOPHEN 650 MG: 325 TABLET ORAL at 06:12

## 2023-12-06 NOTE — SUBJECTIVE & OBJECTIVE
Interval History:   Pending SNF placement.  P.o. hydralazine increased again.     Review of Systems   Constitutional:  Negative for activity change, appetite change, chills and fever.   HENT:  Negative for congestion, ear pain and nosebleeds.    Eyes:  Negative for itching and visual disturbance.   Respiratory:  Negative for apnea, cough, chest tightness, shortness of breath and wheezing.    Cardiovascular:  Negative for chest pain, palpitations and leg swelling.   Gastrointestinal:  Negative for abdominal distention, abdominal pain, constipation, diarrhea, nausea and vomiting.   Genitourinary:  Negative for dysuria and flank pain.   Musculoskeletal:  Negative for back pain.   Neurological:  Negative for dizziness and headaches.     Objective:     Vital Signs (Most Recent):  Temp: 97.5 °F (36.4 °C) (12/06/23 1100)  Pulse: 66 (12/06/23 1100)  Resp: 18 (12/06/23 1100)  BP: (!) 183/77 (notified nurse Ady) (12/06/23 1100)  SpO2: 95 % (12/06/23 1100) Vital Signs (24h Range):  Temp:  [97.5 °F (36.4 °C)-98.8 °F (37.1 °C)] 97.5 °F (36.4 °C)  Pulse:  [58-66] 66  Resp:  [18] 18  SpO2:  [94 %-96 %] 95 %  BP: (143-183)/(51-77) 183/77     Weight: 78.5 kg (173 lb 1 oz)  Body mass index is 24.83 kg/m².    Intake/Output Summary (Last 24 hours) at 12/6/2023 1235  Last data filed at 12/5/2023 1752  Gross per 24 hour   Intake --   Output 600 ml   Net -600 ml           Physical Exam  Vitals reviewed.   Constitutional:       General: He is not in acute distress.     Appearance: He is ill-appearing. He is not toxic-appearing or diaphoretic.   HENT:      Head: Normocephalic and atraumatic.      Mouth/Throat:      Mouth: Mucous membranes are moist.      Pharynx: Oropharynx is clear.   Eyes:      General: No scleral icterus.     Conjunctiva/sclera: Conjunctivae normal.   Neck:      Vascular: No carotid bruit.   Cardiovascular:      Rate and Rhythm: Normal rate and regular rhythm.      Pulses: Normal pulses.      Heart sounds: Normal heart  sounds.   Pulmonary:      Effort: Pulmonary effort is normal.      Breath sounds: Normal breath sounds.   Abdominal:      General: Abdomen is flat. Bowel sounds are normal.      Palpations: Abdomen is soft.   Musculoskeletal:         General: Normal range of motion.   Skin:     General: Skin is warm.   Neurological:      Mental Status: Mental status is at baseline.             Significant Labs: All pertinent labs within the past 24 hours have been reviewed.  Recent Lab Results         12/06/23  0416        Albumin 3.2       ALP 37       ALT 5       Anion Gap 2       AST 14       Baso # 0.03       Basophil % 0.5       BILIRUBIN TOTAL 0.4  Comment: For infants and newborns, interpretation of results should be based  on gestational age, weight and in agreement with clinical  observations.    Premature Infant recommended reference ranges:  Up to 24 hours.............<8.0 mg/dL  Up to 48 hours............<12.0 mg/dL  3-5 days..................<15.0 mg/dL  6-29 days.................<15.0 mg/dL         BUN 36       Calcium 8.7       Chloride 109       CO2 27       Creatinine 2.1       Differential Method Automated       eGFR 29.4       Eos # 0.1       Eosinophil % 1.5       Glucose 96       Gran # (ANC) 3.2       Gran % 51.7       Hematocrit 31.4       Hemoglobin 10.1       Immature Grans (Abs) 0.01  Comment: Mild elevation in immature granulocytes is non specific and   can be seen in a variety of conditions including stress response,   acute inflammation, trauma and pregnancy. Correlation with other   laboratory and clinical findings is essential.         Immature Granulocytes 0.2       Lymph # 2.0       Lymph % 32.8       MCH 29.4       MCHC 32.2       MCV 91       Mono # 0.8       Mono % 13.3       MPV 11.3       nRBC 0       Platelet Count 167       Potassium 5.0       PROTEIN TOTAL 5.9       RBC 3.44       RDW 14.2       Sodium 138       WBC 6.10               Significant Imaging: I have reviewed all pertinent imaging  results/findings within the past 24 hours.

## 2023-12-06 NOTE — PT/OT/SLP PROGRESS
"Physical Therapy Treatment    Patient Name:  Lavon Brandon   MRN:  [id number]    Recommendations:     Discharge Recommendations: Moderate Intensity Therapy  Discharge Equipment Recommendations: none  Barriers to discharge:  weakness, impaired self care skills, impaired functional mobility independence, poor activity tolerance, decreased safety awareness.     Assessment:     Lavon Brandon is a 89 y.o. male admitted with a medical diagnosis of Fall.  He presents with the following impairments/functional limitations: weakness, impaired endurance, impaired self care skills, impaired functional mobility, gait instability, impaired balance, decreased lower extremity function, decreased safety awareness, impaired cardiopulmonary response to activity.    Pt found resting with HOB elevated and eyes closed. Upon calling his name he opened his eyes and was agreeable to skilled physical therapy session. modA required to transfer from supine to sitting EOB. Denied getting out of bed and sitting up in chair for lunch. Agreeable to repositioning in bed. ModA reqiured to perform first sit to stand with noted post lean. Pt sat EOB before attempting to side step toward HOB for improved positioning and comfort. Thompson required for second sit to stand with post sway still noted. Pt utilized RW and performed three side steps to HOB followed by sitting with CGA. SBA given to transfer back to supine. HOB elevated, call light within reach, bed alarm on, all needs met.     Rehab Prognosis: Fair; patient would benefit from acute skilled PT services to address these deficits and reach maximum level of function.    Recent Surgery: * No surgery found *      Plan:     During this hospitalization, patient to be seen 6 x/week to address the identified rehab impairments via gait training, therapeutic activities, therapeutic exercises and progress toward the following goals:    Plan of Care Expires:  01/01/24    Subjective     Chief Complaint: "I'm " "cold"  Patient/Family Comments/goals: to get better and go home  Pain/Comfort:  Pain Rating 1: 0/10      Objective:     Communicated with RN prior to session.  Patient found HOB elevated with bed alarm, PureWick, telemetry upon PT entry to room.     General Precautions: Standard, fall  Orthopedic Precautions: N/A  Braces: N/A  Respiratory Status: Room air     Functional Mobility:  Bed Mobility:     Supine to Sit: moderate assistance  Sit to Supine: stand by assistance  Transfers:     Sit to Stand:  minimum assistance and moderate assistance with rolling walker      AM-PAC 6 CLICK MOBILITY          Treatment & Education:  Pt educated on importance of time OOB, importance of intermittent mobility, safe techniques for transfers/ambulation, discharge recommendations/options, and use of call light for assistance and fall prevention.      Patient left HOB elevated with all lines intact, call button in reach, bed alarm on, and RN notified..    GOALS:   Multidisciplinary Problems       Physical Therapy Goals          Problem: Physical Therapy    Goal Priority Disciplines Outcome Goal Variances Interventions   Physical Therapy Goal     PT, PT/OT Ongoing, Progressing     Description: Goals to be met by: 24     Patient will increase functional independence with mobility by performin. Supine to sit with Supervision  2. Sit to stand transfer with Supervision  3. Bed to chair transfer with Supervision using Rolling Walker  4. Gait  x 150 feet with Supervision using Rolling Walker.                              Time Tracking:     PT Received On: 23  PT Start Time: 1157     PT Stop Time: 1208  PT Total Time (min): 11 min     Billable Minutes: Therapeutic Activity 11    Treatment Type: Treatment  PT/PTA: PTA     Number of PTA visits since last PT visit: 2     2023  "

## 2023-12-06 NOTE — PLAN OF CARE
Per Basilia RN also spoke with Pt  daughter. SNF is the plan. Basilia asked if we could expand the search area to give her more options but, other than [facility], he was not willing.

## 2023-12-06 NOTE — PROGRESS NOTES
[facility]  Progress Note    Patient Name: Lavon Brandon  MRN: [id number]  Patient Class: OP- Observation   Admission Date: 11/30/2023  Length of Stay: 0 days  Attending Physician: Memo Downs MD  Primary Care Provider: Aristides Haynes MD        Subjective:     Principal Problem:Fall        HPI:  Mr. Brandon is a 89 year old man with PMHx of HTN, HLD, Lewy body dementia?- was brought today by daughter because he fell tonight at 12 AM- she said he has been doing ok lately - eating drinking ok, he does phases out and she thinks his dementia is getting worse but he was not complaining of any chest pain, palpitations, sob, fever, chills, n/v, urine or bowel problem.     Pt himself said he thinks his leg gave away that's why he fell.     In ER his BP was elevated and was given hydralazine and labetalol, lactate is elevated.    Overview/Hospital Course:  No notes on file    Interval History:   Pending SNF placement.  P.o. hydralazine increased again.     Review of Systems   Constitutional:  Negative for activity change, appetite change, chills and fever.   HENT:  Negative for congestion, ear pain and nosebleeds.    Eyes:  Negative for itching and visual disturbance.   Respiratory:  Negative for apnea, cough, chest tightness, shortness of breath and wheezing.    Cardiovascular:  Negative for chest pain, palpitations and leg swelling.   Gastrointestinal:  Negative for abdominal distention, abdominal pain, constipation, diarrhea, nausea and vomiting.   Genitourinary:  Negative for dysuria and flank pain.   Musculoskeletal:  Negative for back pain.   Neurological:  Negative for dizziness and headaches.     Objective:     Vital Signs (Most Recent):  Temp: 97.5 °F (36.4 °C) (12/06/23 1100)  Pulse: 66 (12/06/23 1100)  Resp: 18 (12/06/23 1100)  BP: (!) 183/77 (notified nurse Ady) (12/06/23 1100)  SpO2: 95 % (12/06/23 1100) Vital Signs (24h Range):  Temp:  [97.5 °F (36.4 °C)-98.8 °F (37.1  °C)] 97.5 °F (36.4 °C)  Pulse:  [58-66] 66  Resp:  [18] 18  SpO2:  [94 %-96 %] 95 %  BP: (143-183)/(51-77) 183/77     Weight: 78.5 kg (173 lb 1 oz)  Body mass index is 24.83 kg/m².    Intake/Output Summary (Last 24 hours) at 12/6/2023 1235  Last data filed at 12/5/2023 1752  Gross per 24 hour   Intake --   Output 600 ml   Net -600 ml           Physical Exam  Vitals reviewed.   Constitutional:       General: He is not in acute distress.     Appearance: He is ill-appearing. He is not toxic-appearing or diaphoretic.   HENT:      Head: Normocephalic and atraumatic.      Mouth/Throat:      Mouth: Mucous membranes are moist.      Pharynx: Oropharynx is clear.   Eyes:      General: No scleral icterus.     Conjunctiva/sclera: Conjunctivae normal.   Neck:      Vascular: No carotid bruit.   Cardiovascular:      Rate and Rhythm: Normal rate and regular rhythm.      Pulses: Normal pulses.      Heart sounds: Normal heart sounds.   Pulmonary:      Effort: Pulmonary effort is normal.      Breath sounds: Normal breath sounds.   Abdominal:      General: Abdomen is flat. Bowel sounds are normal.      Palpations: Abdomen is soft.   Musculoskeletal:         General: Normal range of motion.   Skin:     General: Skin is warm.   Neurological:      Mental Status: Mental status is at baseline.             Significant Labs: All pertinent labs within the past 24 hours have been reviewed.  Recent Lab Results         12/06/23  0416        Albumin 3.2       ALP 37       ALT 5       Anion Gap 2       AST 14       Baso # 0.03       Basophil % 0.5       BILIRUBIN TOTAL 0.4  Comment: For infants and newborns, interpretation of results should be based  on gestational age, weight and in agreement with clinical  observations.    Premature Infant recommended reference ranges:  Up to 24 hours.............<8.0 mg/dL  Up to 48 hours............<12.0 mg/dL  3-5 days..................<15.0 mg/dL  6-29 days.................<15.0 mg/dL         BUN 36        Calcium 8.7       Chloride 109       CO2 27       Creatinine 2.1       Differential Method Automated       eGFR 29.4       Eos # 0.1       Eosinophil % 1.5       Glucose 96       Gran # (ANC) 3.2       Gran % 51.7       Hematocrit 31.4       Hemoglobin 10.1       Immature Grans (Abs) 0.01  Comment: Mild elevation in immature granulocytes is non specific and   can be seen in a variety of conditions including stress response,   acute inflammation, trauma and pregnancy. Correlation with other   laboratory and clinical findings is essential.         Immature Granulocytes 0.2       Lymph # 2.0       Lymph % 32.8       MCH 29.4       MCHC 32.2       MCV 91       Mono # 0.8       Mono % 13.3       MPV 11.3       nRBC 0       Platelet Count 167       Potassium 5.0       PROTEIN TOTAL 5.9       RBC 3.44       RDW 14.2       Sodium 138       WBC 6.10               Significant Imaging: I have reviewed all pertinent imaging results/findings within the past 24 hours.    Assessment/Plan:      * Fall  Multifactorial- dehydration, seizures?  Get orthostat once able to   CT head reviewed  Not sure if pt taking Keppra  Resumed Keppra  MRI brain with no acute findings   PTOT  Pending SNF placement      Seizure  Not sure if he did have seizures- resume home Keppra  Neurology consulted.  MRI brain with no acute findings  EEG - abnormal awake and drowsy routine EEG due to diffuse slowing of the background activity consistent with a moderate encephalopathy. No epileptiform discharges or seizures are captured     Dementia with behavioral disturbance  Daughter not sure if its actually progressing  B12 low normal. Start replacement. B1 pendng.     Palliative care consulted.       Hyperlipidemia  Resume home meds      Hypertension  Required IV doses of hydralazine and labetalol  Resume home meds  Home hydralazine and Norvasc increased.  Coreg added        VTE Risk Mitigation (From admission, onward)           Ordered     heparin (porcine)  injection 5,000 Units  Every 8 hours         11/30/23 0538     IP VTE HIGH RISK PATIENT  Once         11/30/23 0538     Place sequential compression device  Until discontinued         11/30/23 0538                    Discharge Planning   JOANNE: 12/7/2023     Code Status: Full Code   Is the patient medically ready for discharge?: Yes    Reason for patient still in hospital (select all that apply): Pending disposition  Discharge Plan A: Skilled Nursing Facility   Discharge Delays: None known at this time              Memo Downs MD  Department of Hospital Medicine   [facility]

## 2023-12-06 NOTE — PT/OT/SLP PROGRESS
Occupational Therapy      Patient Name:  Lavon Brandon   MRN:  [id number]    Patient not seen today secondary to unarousable and unable to participate in therapy. Will follow-up next scheduled treatment date.    12/6/2023

## 2023-12-06 NOTE — PLAN OF CARE
spoke with Pt daughter in regards to discharge plan she is open to referral being sent to [facility].     941am: DOM spoke with Tonja at [facility] to explain that doctor do not have any fear about Pt behaviors and that he does not need 1:1. Tonja explained that they denied Pt for the concern of falling.    9:51am: DOM spoke with Mindy with [facility] to see if beds are available and if they could re view Pt. Per Mindy they are reviewing Pt and will give SW a call back after morning rounds.     ODM sent additional referrals via [facility] to continue to follow.      1430: [facility] accepted and will contact family today.      12/06/23 5804   Post-Acute Status   Post-Acute Authorization Placement   Post-Acute Placement Status Referrals Sent   Coverage Payor: [employer] MANAGED MEDICARE - [employer] MED[employer]ARE PPO -   Discharge Delays None known at this time   Discharge Plan   Discharge Plan A Skilled Nursing Facility   Discharge Plan B Skilled Nursing Facility

## 2023-12-07 LAB — VIT B1 BLD-SCNC: 77.5 NMOL/L (ref 66.5–200)

## 2023-12-07 PROCEDURE — 25000003 PHARM REV CODE 250: Performed by: STUDENT IN AN ORGANIZED HEALTH CARE EDUCATION/TRAINING PROGRAM

## 2023-12-07 PROCEDURE — G0378 HOSPITAL OBSERVATION PER HR: HCPCS

## 2023-12-07 PROCEDURE — 96372 THER/PROPH/DIAG INJ SC/IM: CPT | Performed by: STUDENT IN AN ORGANIZED HEALTH CARE EDUCATION/TRAINING PROGRAM

## 2023-12-07 PROCEDURE — 63600175 PHARM REV CODE 636 W HCPCS: Performed by: STUDENT IN AN ORGANIZED HEALTH CARE EDUCATION/TRAINING PROGRAM

## 2023-12-07 PROCEDURE — 97530 THERAPEUTIC ACTIVITIES: CPT | Mod: CQ

## 2023-12-07 RX ADMIN — HYDRALAZINE HYDROCHLORIDE 100 MG: 25 TABLET, FILM COATED ORAL at 09:12

## 2023-12-07 RX ADMIN — LIDOCAINE 1 PATCH: 50 PATCH CUTANEOUS at 09:12

## 2023-12-07 RX ADMIN — HEPARIN SODIUM 5000 UNITS: 5000 INJECTION INTRAVENOUS; SUBCUTANEOUS at 02:12

## 2023-12-07 RX ADMIN — LEVETIRACETAM 750 MG: 250 TABLET, FILM COATED ORAL at 09:12

## 2023-12-07 RX ADMIN — HEPARIN SODIUM 5000 UNITS: 5000 INJECTION INTRAVENOUS; SUBCUTANEOUS at 09:12

## 2023-12-07 RX ADMIN — CYANOCOBALAMIN TAB 1000 MCG 1000 MCG: 1000 TAB at 08:12

## 2023-12-07 RX ADMIN — DOCUSATE SODIUM 100 MG: 100 CAPSULE, LIQUID FILLED ORAL at 08:12

## 2023-12-07 RX ADMIN — CARVEDILOL 6.25 MG: 6.25 TABLET, FILM COATED ORAL at 09:12

## 2023-12-07 RX ADMIN — HEPARIN SODIUM 5000 UNITS: 5000 INJECTION INTRAVENOUS; SUBCUTANEOUS at 05:12

## 2023-12-07 RX ADMIN — CITALOPRAM HYDROBROMIDE 20 MG: 20 TABLET ORAL at 08:12

## 2023-12-07 RX ADMIN — CARVEDILOL 6.25 MG: 6.25 TABLET, FILM COATED ORAL at 08:12

## 2023-12-07 RX ADMIN — HYDRALAZINE HYDROCHLORIDE 100 MG: 25 TABLET, FILM COATED ORAL at 05:12

## 2023-12-07 RX ADMIN — HYDRALAZINE HYDROCHLORIDE 100 MG: 25 TABLET, FILM COATED ORAL at 02:12

## 2023-12-07 RX ADMIN — AMLODIPINE BESYLATE 10 MG: 5 TABLET ORAL at 05:12

## 2023-12-07 RX ADMIN — LEVETIRACETAM 750 MG: 250 TABLET, FILM COATED ORAL at 08:12

## 2023-12-07 RX ADMIN — CETIRIZINE HYDROCHLORIDE 5 MG: 5 TABLET ORAL at 08:12

## 2023-12-07 NOTE — HOSPITAL COURSE
Patient is a 89-year-old male who presented to the emergency department after experiencing a fall.  MRI brain does not show any acute intracranial abnormality.  Neurology was consulted.  Patient was placed on seizure precautions and q.4 neuro checks.  Keppra was continued to prevent seizures.  EEG showed no evidence of epileptiform activity.  PTOT and speech language pathology evaluated patient.  Patient is stable for discharge to nursing facility.   Patient will need follow up with primary care physician and Neurology outpatient.  Please rechecked blood pressure needs.  Hydralazine has been increased.    General:     Appears stated age.  Family at bedside.  Eyes:    Anicteric sclera. Conjunctiva non-injected  HENT:    Head normocephalic, atraumatic. Mucous membranes moist.   Neck:   Trachea midline, no thyromegaly.  No JVD or adenopathy.   Heart:    S1, S2 auscultated. No murmurs rubs or gallops. Regular rhythm and rate.   Lungs:    Bilaterally clear in all lobes with equal chest rise.  No wheezes, rales, or rhonchi.   Abdomen:    Soft, nontender, nondistended.  Positive bowel sounds. No palpable masses.  No rebound or guarding.   Extremities:    Without clubbing or cyanosis.  No edema.  Peripheral pulses II/IV.  Neuro:    Alert, awake and oriented x3.  Cranial nerves appear grossly intact on limited neurological exam.  No focal motor or sensory deficit.  4/5 strength in bilateral lower extremities  Skin:    No rashes, exudates, or nodules on limited skin exam  Psych:    Normal mood, affect and behavior with intact judgement and insight.    Imaging Results              MRI Brain Without Contrast (Final result)  Result time 12/01/23 07:03:41      Final result by Prateek Troy MD (12/01/23 07:03:41)                   Narrative:    REASON: Mental status change, unknown cause Fall; Mental status change, unknown cause Hx-CVA, HTN, Dementia    TECHNIQUE: Multiplanar and multi sequential MRI of the brain, without IV  contrast.    COMPARISON: CT head November 30, 2023. MR brain April 12, 2023    FINDINGS:    Gray-white matter distinction is preserved throughout the brain parenchyma. There are involutional changes of the brain parenchyma. Periventricular deep white matter FLAIR and T2 hyperintensities are consistent with changes of chronic vessel ischemic disease. There is a chronic lacunar infarct of the cedric. Small focal areas of susceptibility artifact are noted scattered throughout the brain parenchyma consistent with and amyloid angiopathy. The ventricles are midline without mass effect. No areas of restricted diffusion or hemorrhage identified. No intra or extra-axial fluid collections or mass. Vascular flow voids are within normal limits. Calvarial bone marrow signal is normal.    IMPRESSION:    1.  No acute intracranial abnormality.  2.   Chronic involutional changes of the brain as described.    Electronically signed by:  Prateek Troy DO  12/01/2023 07:03 AM CST Workstation: [id number]                                     CT Cervical Spine Without Contrast (Final result)  Result time 11/30/23 04:00:38      Final result by Emmy Pollack DO (11/30/23 04:00:38)                   Narrative:    EXAM:  CT Head Without Intravenous Contrast and CT Cervical Spine Without Intravenous Contrast    CLINICAL HISTORY:  Head trauma, minor. Neck trauma (Age >= 65y).    TECHNIQUE:  Axial, coronal, and sagittal computed tomography images of the head/brain without intravenous contrast.   Axial, coronal, and sagittal computed tomography images of the cervical spine without intravenous contrast.  This CT exam was performed using one or more of the following dose reduction techniques:  automated exposure control, adjustment of the mA and/or kV according to patient size, and/or use of iterative reconstruction technique.    COMPARISON:  CT head 4/13/2023. CT cervical spine 4/11/2023.    FINDINGS:  Brain:  No acute hemorrhage. No evidence of  acute infarct; MRI more sensitive. Periventricular and subcortical white matter hypodensities are nonspecific but most commonly due to chronic small vessel ischemic changes in a patient of this age. Chronic left pontine infarct appears similar.  Ventricles:  Unremarkable.  No ventriculomegaly.  Skull:  Near-complete opacification of the right frontal, maxillary and anterior ethmoid sinuses again seen.  No acute fracture.  Sinuses:  See above.  Mastoid air cells:  Unremarkable as visualized.  No mastoid effusion.  Vertebrae:  No acute fracture or traumatic subluxation of the cervical spine.  Discs/spinal canal/neural foramina:  Multilevel disc space narrowing, moderate to severe at C5-6, C6-7 and C7-T1. Mild to moderate disc degenerative changes elsewhere. Facet arthropathy. No critical canal stenosis. Foraminal stenosis appears similar.  Soft tissues:  Unremarkable.  Vasculature:  Carotid vascular calcifications.    IMPRESSION:  1.  No acute intracranial abnormality.  2.  No acute osseous abnormality of the cervical spine. Cervical spondylosis.    Electronically signed by:  Kathleen Montoya MD  11/30/2023 04:00 AM CST Workstation: [id number]                                     CT Head Without Contrast (Final result)  Result time 11/30/23 04:00:38      Final result by Emmy Pollack DO (11/30/23 04:00:38)                   Narrative:    EXAM:  CT Head Without Intravenous Contrast and CT Cervical Spine Without Intravenous Contrast    CLINICAL HISTORY:  Head trauma, minor. Neck trauma (Age >= 65y).    TECHNIQUE:  Axial, coronal, and sagittal computed tomography images of the head/brain without intravenous contrast.   Axial, coronal, and sagittal computed tomography images of the cervical spine without intravenous contrast.  This CT exam was performed using one or more of the following dose reduction techniques:  automated exposure control, adjustment of the mA and/or kV according to patient size, and/or use of  iterative reconstruction technique.    COMPARISON:  CT head 4/13/2023. CT cervical spine 4/11/2023.    FINDINGS:  Brain:  No acute hemorrhage. No evidence of acute infarct; MRI more sensitive. Periventricular and subcortical white matter hypodensities are nonspecific but most commonly due to chronic small vessel ischemic changes in a patient of this age. Chronic left pontine infarct appears similar.  Ventricles:  Unremarkable.  No ventriculomegaly.  Skull:  Near-complete opacification of the right frontal, maxillary and anterior ethmoid sinuses again seen.  No acute fracture.  Sinuses:  See above.  Mastoid air cells:  Unremarkable as visualized.  No mastoid effusion.  Vertebrae:  No acute fracture or traumatic subluxation of the cervical spine.  Discs/spinal canal/neural foramina:  Multilevel disc space narrowing, moderate to severe at C5-6, C6-7 and C7-T1. Mild to moderate disc degenerative changes elsewhere. Facet arthropathy. No critical canal stenosis. Foraminal stenosis appears similar.  Soft tissues:  Unremarkable.  Vasculature:  Carotid vascular calcifications.    IMPRESSION:  1.  No acute intracranial abnormality.  2.  No acute osseous abnormality of the cervical spine. Cervical spondylosis.    Electronically signed by:  Kathleen Montoya MD  11/30/2023 04:00 AM CST Workstation: [id number]                                     X-Ray Chest AP Portable (Final result)  Result time 11/30/23 07:55:16      Final result by Fly Cherry MD (11/30/23 07:55:16)                   Narrative:    HISTORY: CHF  altered mental status.    FINDINGS: Portable chest radiograph at 0302 hours compared to prior exams shows the cardiomediastinal silhouette and pulmonary vasculature are within normal limits.    The lungs are hypoexpanded, with no consolidation, large pleural effusion, or evidence of pulmonary edema. No confluent infiltrates or pneumothorax. There are no significant osseous abnormalities.    IMPRESSION: No  evidence of active cardiopulmonary disease.    Electronically signed by:  Fly Cherry MD  11/30/2023 07:55 AM [facility] Workstation: [id number]

## 2023-12-07 NOTE — PROGRESS NOTES
[facility] Medicine  Progress Note    Patient Name: Lavon Brandon  MRN: [id number]  Patient Class: OP- Observation   Admission Date: 11/30/2023  Length of Stay: 0 days  Attending Physician: Amor Giron DO  Primary Care Provider: Aristides Haynes MD        Subjective:     Principal Problem:Fall        HPI:  Mr. Brandon is a 89 year old man with PMHx of HTN, HLD, Lewy body dementia?- was brought today by daughter because he fell tonight at 12 AM- she said he has been doing ok lately - eating drinking ok, he does phases out and she thinks his dementia is getting worse but he was not complaining of any chest pain, palpitations, sob, fever, chills, n/v, urine or bowel problem.     Pt himself said he thinks his leg gave away that's why he fell.     In ER his BP was elevated and was given hydralazine and labetalol, lactate is elevated.    Overview/Hospital Course:  Patient is a 89-year-old male who presented to the emergency department after experiencing a fall.  MRI brain does not show any acute intracranial abnormality.  Neurology was consulted.  Patient was placed on seizure precautions and q.4 neuro checks.  Keppra was continued to prevent seizures.  EEG showed no evidence of epileptiform activity.  PTOT and speech language pathology evaluated patient.  Patient is stable for discharge to nursing facility.  Pending authorization.  Patient will need follow up with primary care physician and Neurology outpatient.    Interval History:  Pending placement.    Review of Systems  Constitutional: Negative For: Fatigue, Chills, Fever  Respiratory: Negative For: Cough, Shortness of breath.  Cardiovascular: Negative For: Chest pain, Palpitations, Swelling in leg or feet.  Gastrointestinal: Negative For: Abdominal pain, Acid reflux, Blood in stool, Constipation, Diarrhea, Nausea, Vomiting.  : Negative For: Dysuria, hematuria, or incontinence,  Endocrine: Negative For: cold or heat  intolerance  Hematologic: Negative For: Easy bleeding/bruising  Musculoskeletal:  Complains of low back pain  Objective:     Vital Signs (Most Recent):  Temp: 98.2 °F (36.8 °C) (12/07/23 1100)  Pulse: (!) 51 (12/07/23 1100)  Resp: 19 (12/07/23 1100)  BP: 136/60 (12/07/23 1100)  SpO2: 98 % (12/07/23 1100) Vital Signs (24h Range):  Temp:  [97.2 °F (36.2 °C)-98.8 °F (37.1 °C)] 98.2 °F (36.8 °C)  Pulse:  [51-67] 51  Resp:  [18-19] 19  SpO2:  [95 %-99 %] 98 %  BP: (123-156)/(55-65) 136/60     Weight: 78.5 kg (173 lb 1 oz)  Body mass index is 24.83 kg/m².    Intake/Output Summary (Last 24 hours) at 12/7/2023 1355  Last data filed at 12/7/2023 1343  Gross per 24 hour   Intake 240 ml   Output 500 ml   Net -260 ml         Physical Exam    General:     Appears stated age.  Family at bedside.  Eyes:    Anicteric sclera. Conjunctiva non-injected  HENT:    Head normocephalic, atraumatic. Mucous membranes moist.   Neck:   Trachea midline, no thyromegaly.  No JVD or adenopathy.   Heart:    S1, S2 auscultated. No murmurs rubs or gallops. Regular rhythm and rate.   Lungs:    Bilaterally clear in all lobes with equal chest rise.  No wheezes, rales, or rhonchi.   Abdomen:    Soft, nontender, nondistended.  Positive bowel sounds. No palpable masses.  No rebound or guarding.   Extremities:    Without clubbing or cyanosis.  No edema.  Peripheral pulses II/IV.  Neuro:    Alert, awake and oriented x3.  Cranial nerves appear grossly intact on limited neurological exam.  No focal motor or sensory deficit.  4/5 strength in bilateral lower extremities  Skin:    No rashes, exudates, or nodules on limited skin exam  Psych:    Normal mood, affect and behavior with intact judgement and insight.    Significant Labs: All pertinent labs within the past 24 hours have been reviewed.  CBC:   Recent Labs   Lab 12/06/23  0416   WBC 6.10   HGB 10.1*   HCT 31.4*        CMP:   Recent Labs   Lab 12/06/23  0416      K 5.0      CO2 27   GLU  96   BUN 36*   CREATININE 2.1*   CALCIUM 8.7   PROT 5.9*   ALBUMIN 3.2*   BILITOT 0.4   ALKPHOS 37*   AST 14   ALT 5*   ANIONGAP 2*       Significant Imaging:  No new imaging    Assessment/Plan:      * Fall  Multifactorial- dehydration, seizures?  CT head reviewed  Not sure if pt taking Keppra  Resumed Keppra  MRI brain with no acute findings   PTOT    Ordered orthostatic vital signs.  Pending SNF placement      Dementia with behavioral disturbance  Daughter not sure if its actually progressing  B12 low normal.  Continue replacement.   B1 normal.     Palliative care consulted and confirmed full code status      Hyperlipidemia  Not on any chronic medications.    Seizure  Unclear if he did have seizures- resume home Keppra  Neurology consulted.  MRI brain with no acute findings  EEG - abnormal awake and drowsy routine EEG due to diffuse slowing of the background activity consistent with a moderate encephalopathy. No epileptiform discharges or seizures are captured     Hypertension  Required IV doses of hydralazine and labetalol  Continue amlodipine 10 mg and increased hydralazine 100 mg t.i.d..  Added Coreg 6.25 mg  Continue monitoring blood pressure daily.      VTE Risk Mitigation (From admission, onward)           Ordered     heparin (porcine) injection 5,000 Units  Every 8 hours         11/30/23 0538     IP VTE HIGH RISK PATIENT  Once         11/30/23 0538     Place sequential compression device  Until discontinued         11/30/23 0538                    Discharge Planning   JOANNE: 12/8/2023     Code Status: Full Code   Is the patient medically ready for discharge?: Yes    Reason for patient still in hospital (select all that apply): Pending disposition  Discharge Plan A: Skilled Nursing Facility   Discharge Delays: None known at this time              Amor Giron DO  Department of Hospital Medicine   [facility]

## 2023-12-07 NOTE — PT/OT/SLP PROGRESS
Physical Therapy Treatment    Patient Name:  Lavon Brandon   MRN:  [id number]    Recommendations:     Discharge Recommendations: Moderate Intensity Therapy  Discharge Equipment Recommendations: none  Barriers to discharge:  increased assist with mobility, poor command follow, decreased safety awareness, increased burden of care, decreased activity tolerance    Assessment:     Lavon Brandon is a 89 y.o. male admitted with a medical diagnosis of Fall.  He presents with the following impairments/functional limitations: weakness, impaired endurance, impaired self care skills, impaired functional mobility, gait instability, impaired balance, decreased lower extremity function, decreased safety awareness, impaired cardiopulmonary response to activity.    Pt agreeable to visit. Pt initially declined transfer to chair but agreeable to sit EOB. Pt required mod assist for supine to sit transfer with verbal cuing for hand placement and sequencing. Once sitting EOB, pt agreeable to transfer to chair.     Pt required mod assist for sit to stand transfer with RW and verbal cuing for hand placement, sequencing, and foot placement. Upon standing, pt with posterior lean and narrow NICHOL. Verbal cuing to correct lean and widen NICHOL. Pt took steps to chair with RW and min assist, verbal cuing for sequencing. Pt required min to mod assist for slow descent to chair with verbal cuing for hand placement.    Pt performed sit to stand from chair with max assist and RW with VC for hand placement and sequencing. Pad placed on chair while pt was standing and then pt returned to chair with min assist for slow, controlled descent as pt tries to plop down into chair.    Pt with chair alarm on and bedside table in front of him.    Rehab Prognosis: Fair; patient would benefit from acute skilled PT services to address these deficits and reach maximum level of function.    Recent Surgery: * No surgery found *      Plan:     During this  hospitalization, patient to be seen 6 x/week to address the identified rehab impairments via gait training, therapeutic activities, therapeutic exercises and progress toward the following goals:    Plan of Care Expires:  24    Subjective     Chief Complaint: pt reports that he is comfortable in bed  Patient/Family Comments/goals: to get better  Pain/Comfort:  Pain Rating 1: 0/10      Objective:     Communicated with RN prior to session.  Patient found HOB elevated with bed alarm, PureWick, telemetry upon PT entry to room.     General Precautions: Standard, fall  Orthopedic Precautions: N/A  Braces: N/A  Respiratory Status: Room air     Functional Mobility:  Bed Mobility:     Supine to Sit: moderate assistance  Transfers:     Sit to Stand:  moderate assistance and maximal assistance with rolling walker  Bed to Chair: minimum assistance with  rolling walker  using  Step Transfer  Gait: steps to chair with RW and VC Thompson for sequencing.      AM-PAC 6 CLICK MOBILITY          Treatment & Education:  Pt educated on importance of time OOB, importance of intermittent mobility, safe techniques for transfers/ambulation, discharge recommendations/options, and use of call light for assistance and fall prevention.      Patient left up in chair with all lines intact, call button in reach, chair alarm on, and extender notified..    GOALS:   Multidisciplinary Problems       Physical Therapy Goals          Problem: Physical Therapy    Goal Priority Disciplines Outcome Goal Variances Interventions   Physical Therapy Goal     PT, PT/OT Ongoing, Progressing     Description: Goals to be met by: 24     Patient will increase functional independence with mobility by performin. Supine to sit with Supervision  2. Sit to stand transfer with Supervision  3. Bed to chair transfer with Supervision using Rolling Walker  4. Gait  x 150 feet with Supervision using Rolling Walker.                              Time Tracking:     PT  Received On: 12/07/23  PT Start Time: 1011     PT Stop Time: 1025  PT Total Time (min): 14 min     Billable Minutes: Therapeutic Activity 14    Treatment Type: Treatment  PT/PTA: PTA     Number of PTA visits since last PT visit: 3     12/07/2023

## 2023-12-07 NOTE — SUBJECTIVE & OBJECTIVE
Interval History:  Pending placement.    Review of Systems  Constitutional: Negative For: Fatigue, Chills, Fever  Respiratory: Negative For: Cough, Shortness of breath.  Cardiovascular: Negative For: Chest pain, Palpitations, Swelling in leg or feet.  Gastrointestinal: Negative For: Abdominal pain, Acid reflux, Blood in stool, Constipation, Diarrhea, Nausea, Vomiting.  : Negative For: Dysuria, hematuria, or incontinence,  Endocrine: Negative For: cold or heat intolerance  Hematologic: Negative For: Easy bleeding/bruising  Musculoskeletal:  Complains of low back pain  Objective:     Vital Signs (Most Recent):  Temp: 98.2 °F (36.8 °C) (12/07/23 1100)  Pulse: (!) 51 (12/07/23 1100)  Resp: 19 (12/07/23 1100)  BP: 136/60 (12/07/23 1100)  SpO2: 98 % (12/07/23 1100) Vital Signs (24h Range):  Temp:  [97.2 °F (36.2 °C)-98.8 °F (37.1 °C)] 98.2 °F (36.8 °C)  Pulse:  [51-67] 51  Resp:  [18-19] 19  SpO2:  [95 %-99 %] 98 %  BP: (123-156)/(55-65) 136/60     Weight: 78.5 kg (173 lb 1 oz)  Body mass index is 24.83 kg/m².    Intake/Output Summary (Last 24 hours) at 12/7/2023 1355  Last data filed at 12/7/2023 1343  Gross per 24 hour   Intake 240 ml   Output 500 ml   Net -260 ml         Physical Exam    General:     Appears stated age.  Family at bedside.  Eyes:    Anicteric sclera. Conjunctiva non-injected  HENT:    Head normocephalic, atraumatic. Mucous membranes moist.   Neck:   Trachea midline, no thyromegaly.  No JVD or adenopathy.   Heart:    S1, S2 auscultated. No murmurs rubs or gallops. Regular rhythm and rate.   Lungs:    Bilaterally clear in all lobes with equal chest rise.  No wheezes, rales, or rhonchi.   Abdomen:    Soft, nontender, nondistended.  Positive bowel sounds. No palpable masses.  No rebound or guarding.   Extremities:    Without clubbing or cyanosis.  No edema.  Peripheral pulses II/IV.  Neuro:    Alert, awake and oriented x3.  Cranial nerves appear grossly intact on limited neurological exam.  No focal  motor or sensory deficit.  4/5 strength in bilateral lower extremities  Skin:    No rashes, exudates, or nodules on limited skin exam  Psych:    Normal mood, affect and behavior with intact judgement and insight.    Significant Labs: All pertinent labs within the past 24 hours have been reviewed.  CBC:   Recent Labs   Lab 12/06/23 0416   WBC 6.10   HGB 10.1*   HCT 31.4*        CMP:   Recent Labs   Lab 12/06/23 0416      K 5.0      CO2 27   GLU 96   BUN 36*   CREATININE 2.1*   CALCIUM 8.7   PROT 5.9*   ALBUMIN 3.2*   BILITOT 0.4   ALKPHOS 37*   AST 14   ALT 5*   ANIONGAP 2*       Significant Imaging:  No new imaging

## 2023-12-07 NOTE — ASSESSMENT & PLAN NOTE
Daughter not sure if its actually progressing  B12 low normal.  Continue replacement.   B1 normal.     Palliative care consulted and confirmed full code status

## 2023-12-07 NOTE — ASSESSMENT & PLAN NOTE
Multifactorial- dehydration, seizures?  CT head reviewed  Not sure if pt taking Keppra  Resumed Keppra  MRI brain with no acute findings   PTOT    Ordered orthostatic vital signs.  Pending SNF placement

## 2023-12-07 NOTE — PLAN OF CARE
8:41am-  spoke with Chaparrita with [facility]. Per Chaparrita she will contact family this morning and have Darline submit for auth.     11:13am-  reach out to [facility]. Per Carolee with [employer] auth is pending clinicals were attached about 40 min ago and the next step is for the nurse to review. SW to continue to follow.

## 2023-12-07 NOTE — ASSESSMENT & PLAN NOTE
Required IV doses of hydralazine and labetalol  Continue amlodipine 10 mg and increased hydralazine 100 mg t.i.d..  Added Coreg 6.25 mg  Continue monitoring blood pressure daily.

## 2023-12-07 NOTE — ASSESSMENT & PLAN NOTE
Unclear if he did have seizures- resume home [facility]  Neurology consulted.  MRI brain with no acute findings  EEG - abnormal awake and drowsy routine EEG due to diffuse slowing of the background activity consistent with a moderate encephalopathy. No epileptiform discharges or seizures are captured

## 2023-12-08 VITALS
RESPIRATION RATE: 16 BRPM | OXYGEN SATURATION: 98 % | WEIGHT: 173.06 LBS | DIASTOLIC BLOOD PRESSURE: 63 MMHG | HEIGHT: 70 IN | SYSTOLIC BLOOD PRESSURE: 143 MMHG | TEMPERATURE: 99 F | HEART RATE: 71 BPM | BODY MASS INDEX: 24.78 KG/M2
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
## 2023-12-08 PROBLEM — E53.8 B12 DEFICIENCY: Status: ACTIVE | Noted: 2023-12-08

## 2023-12-08 PROCEDURE — 97116 GAIT TRAINING THERAPY: CPT | Mod: CQ

## 2023-12-08 PROCEDURE — 86580 TB INTRADERMAL TEST: CPT | Performed by: INTERNAL MEDICINE

## 2023-12-08 PROCEDURE — G0378 HOSPITAL OBSERVATION PER HR: HCPCS

## 2023-12-08 PROCEDURE — 63600175 PHARM REV CODE 636 W HCPCS: Performed by: STUDENT IN AN ORGANIZED HEALTH CARE EDUCATION/TRAINING PROGRAM

## 2023-12-08 PROCEDURE — 30200315 PPD INTRADERMAL TEST REV CODE 302: Performed by: INTERNAL MEDICINE

## 2023-12-08 PROCEDURE — 25000003 PHARM REV CODE 250: Performed by: STUDENT IN AN ORGANIZED HEALTH CARE EDUCATION/TRAINING PROGRAM

## 2023-12-08 PROCEDURE — 96372 THER/PROPH/DIAG INJ SC/IM: CPT | Performed by: STUDENT IN AN ORGANIZED HEALTH CARE EDUCATION/TRAINING PROGRAM

## 2023-12-08 RX ORDER — HYDRALAZINE HYDROCHLORIDE 100 MG/1
100 TABLET, FILM COATED ORAL EVERY 8 HOURS
Qty: 90 TABLET | Refills: 11 | Status: SHIPPED | OUTPATIENT
Start: 2023-12-08 | End: 2024-12-07

## 2023-12-08 RX ADMIN — HYDRALAZINE HYDROCHLORIDE 100 MG: 25 TABLET, FILM COATED ORAL at 01:12

## 2023-12-08 RX ADMIN — HYDRALAZINE HYDROCHLORIDE 100 MG: 25 TABLET, FILM COATED ORAL at 05:12

## 2023-12-08 RX ADMIN — CITALOPRAM HYDROBROMIDE 20 MG: 20 TABLET ORAL at 09:12

## 2023-12-08 RX ADMIN — HEPARIN SODIUM 5000 UNITS: 5000 INJECTION INTRAVENOUS; SUBCUTANEOUS at 01:12

## 2023-12-08 RX ADMIN — TUBERCULIN PURIFIED PROTEIN DERIVATIVE 5 UNITS: 5 INJECTION, SOLUTION INTRADERMAL at 01:12

## 2023-12-08 RX ADMIN — LEVETIRACETAM 750 MG: 250 TABLET, FILM COATED ORAL at 09:12

## 2023-12-08 RX ADMIN — CARVEDILOL 6.25 MG: 6.25 TABLET, FILM COATED ORAL at 09:12

## 2023-12-08 RX ADMIN — HEPARIN SODIUM 5000 UNITS: 5000 INJECTION INTRAVENOUS; SUBCUTANEOUS at 05:12

## 2023-12-08 RX ADMIN — CETIRIZINE HYDROCHLORIDE 5 MG: 5 TABLET ORAL at 09:12

## 2023-12-08 RX ADMIN — CYANOCOBALAMIN TAB 1000 MCG 1000 MCG: 1000 TAB at 09:12

## 2023-12-08 RX ADMIN — DOCUSATE SODIUM 100 MG: 100 CAPSULE, LIQUID FILLED ORAL at 09:12

## 2023-12-08 NOTE — DISCHARGE SUMMARY
[facility]  Discharge Summary      Patient Name: Lavon Brandon  MRN: [id number]  MIMI: [id number]  Patient Class: OP- Observation  Admission Date: 11/30/2023  Hospital Length of Stay: 8 days  Discharge Date and Time:  12/08/2023 12:53 PM  Attending Physician: Amor Giron DO   Discharging Provider: Amro Giron DO  Primary Care Provider: Aristides Haynes MD    Primary Care Team: Networked reference to record PCT     HPI:   Mr. Brandon is a 89 year old man with PMHx of HTN, HLD, Lewy body dementia?- was brought today by daughter because he fell tonight at 12 AM- she said he has been doing ok lately - eating drinking ok, he does phases out and she thinks his dementia is getting worse but he was not complaining of any chest pain, palpitations, sob, fever, chills, n/v, urine or bowel problem.     Pt himself said he thinks his leg gave away that's why he fell.     In ER his BP was elevated and was given hydralazine and labetalol, lactate is elevated.    * No surgery found *      Hospital Course:   Patient is a 89-year-old male who presented to the emergency department after experiencing a fall.  MRI brain does not show any acute intracranial abnormality.  Neurology was consulted.  Patient was placed on seizure precautions and q.4 neuro checks.  Keppra was continued to prevent seizures.  EEG showed no evidence of epileptiform activity.  PTOT and speech language pathology evaluated patient.  Patient is stable for discharge to nursing facility.   Patient will need follow up with primary care physician and Neurology outpatient.  Please rechecked blood pressure needs.  Hydralazine has been increased.    General:     Appears stated age.  Family at bedside.  Eyes:    Anicteric sclera. Conjunctiva non-injected  HENT:    Head normocephalic, atraumatic. Mucous membranes moist.   Neck:   Trachea midline, no thyromegaly.  No JVD or adenopathy.   Heart:    S1, S2 auscultated. No murmurs  rubs or gallops. Regular rhythm and rate.   Lungs:    Bilaterally clear in all lobes with equal chest rise.  No wheezes, rales, or rhonchi.   Abdomen:    Soft, nontender, nondistended.  Positive bowel sounds. No palpable masses.  No rebound or guarding.   Extremities:    Without clubbing or cyanosis.  No edema.  Peripheral pulses II/IV.  Neuro:    Alert, awake and oriented x3.  Cranial nerves appear grossly intact on limited neurological exam.  No focal motor or sensory deficit.  4/5 strength in bilateral lower extremities  Skin:    No rashes, exudates, or nodules on limited skin exam  Psych:    Normal mood, affect and behavior with intact judgement and insight.    Imaging Results              MRI Brain Without Contrast (Final result)  Result time 12/01/23 07:03:41      Final result by Prateek Troy MD (12/01/23 07:03:41)                   Narrative:    REASON: Mental status change, unknown cause Fall; Mental status change, unknown cause Hx-CVA, HTN, Dementia    TECHNIQUE: Multiplanar and multi sequential MRI of the brain, without IV contrast.    COMPARISON: CT head November 30, 2023. MR brain April 12, 2023    FINDINGS:    Gray-white matter distinction is preserved throughout the brain parenchyma. There are involutional changes of the brain parenchyma. Periventricular deep white matter FLAIR and T2 hyperintensities are consistent with changes of chronic vessel ischemic disease. There is a chronic lacunar infarct of the cedric. Small focal areas of susceptibility artifact are noted scattered throughout the brain parenchyma consistent with and amyloid angiopathy. The ventricles are midline without mass effect. No areas of restricted diffusion or hemorrhage identified. No intra or extra-axial fluid collections or mass. Vascular flow voids are within normal limits. Calvarial bone marrow signal is normal.    IMPRESSION:    1.  No acute intracranial abnormality.  2.   Chronic involutional changes of the brain as  described.    Electronically signed by:  Prateek Troy DO  12/01/2023 07:03 AM CST Workstation: [id number]                                     CT Cervical Spine Without Contrast (Final result)  Result time 11/30/23 04:00:38      Final result by Emmy Pollack DO (11/30/23 04:00:38)                   Narrative:    EXAM:  CT Head Without Intravenous Contrast and CT Cervical Spine Without Intravenous Contrast    CLINICAL HISTORY:  Head trauma, minor. Neck trauma (Age >= 65y).    TECHNIQUE:  Axial, coronal, and sagittal computed tomography images of the head/brain without intravenous contrast.   Axial, coronal, and sagittal computed tomography images of the cervical spine without intravenous contrast.  This CT exam was performed using one or more of the following dose reduction techniques:  automated exposure control, adjustment of the mA and/or kV according to patient size, and/or use of iterative reconstruction technique.    COMPARISON:  CT head 4/13/2023. CT cervical spine 4/11/2023.    FINDINGS:  Brain:  No acute hemorrhage. No evidence of acute infarct; MRI more sensitive. Periventricular and subcortical white matter hypodensities are nonspecific but most commonly due to chronic small vessel ischemic changes in a patient of this age. Chronic left pontine infarct appears similar.  Ventricles:  Unremarkable.  No ventriculomegaly.  Skull:  Near-complete opacification of the right frontal, maxillary and anterior ethmoid sinuses again seen.  No acute fracture.  Sinuses:  See above.  Mastoid air cells:  Unremarkable as visualized.  No mastoid effusion.  Vertebrae:  No acute fracture or traumatic subluxation of the cervical spine.  Discs/spinal canal/neural foramina:  Multilevel disc space narrowing, moderate to severe at C5-6, C6-7 and C7-T1. Mild to moderate disc degenerative changes elsewhere. Facet arthropathy. No critical canal stenosis. Foraminal stenosis appears similar.  Soft tissues:   Unremarkable.  Vasculature:  Carotid vascular calcifications.    IMPRESSION:  1.  No acute intracranial abnormality.  2.  No acute osseous abnormality of the cervical spine. Cervical spondylosis.    Electronically signed by:  Kathleen Montoya MD  11/30/2023 04:00 AM CST Workstation: [id number]                                     CT Head Without Contrast (Final result)  Result time 11/30/23 04:00:38      Final result by Emmy Pollack DO (11/30/23 04:00:38)                   Narrative:    EXAM:  CT Head Without Intravenous Contrast and CT Cervical Spine Without Intravenous Contrast    CLINICAL HISTORY:  Head trauma, minor. Neck trauma (Age >= 65y).    TECHNIQUE:  Axial, coronal, and sagittal computed tomography images of the head/brain without intravenous contrast.   Axial, coronal, and sagittal computed tomography images of the cervical spine without intravenous contrast.  This CT exam was performed using one or more of the following dose reduction techniques:  automated exposure control, adjustment of the mA and/or kV according to patient size, and/or use of iterative reconstruction technique.    COMPARISON:  CT head 4/13/2023. CT cervical spine 4/11/2023.    FINDINGS:  Brain:  No acute hemorrhage. No evidence of acute infarct; MRI more sensitive. Periventricular and subcortical white matter hypodensities are nonspecific but most commonly due to chronic small vessel ischemic changes in a patient of this age. Chronic left pontine infarct appears similar.  Ventricles:  Unremarkable.  No ventriculomegaly.  Skull:  Near-complete opacification of the right frontal, maxillary and anterior ethmoid sinuses again seen.  No acute fracture.  Sinuses:  See above.  Mastoid air cells:  Unremarkable as visualized.  No mastoid effusion.  Vertebrae:  No acute fracture or traumatic subluxation of the cervical spine.  Discs/spinal canal/neural foramina:  Multilevel disc space narrowing, moderate to severe at C5-6, C6-7 and  C7-T1. Mild to moderate disc degenerative changes elsewhere. Facet arthropathy. No critical canal stenosis. Foraminal stenosis appears similar.  Soft tissues:  Unremarkable.  Vasculature:  Carotid vascular calcifications.    IMPRESSION:  1.  No acute intracranial abnormality.  2.  No acute osseous abnormality of the cervical spine. Cervical spondylosis.    Electronically signed by:  Kathleen Montoya MD  11/30/2023 04:00 AM CST Workstation: [id number]                                     X-Ray Chest AP Portable (Final result)  Result time 11/30/23 07:55:16      Final result by Fly Cherry MD (11/30/23 07:55:16)                   Narrative:    HISTORY: CHF  altered mental status.    FINDINGS: Portable chest radiograph at 0302 hours compared to prior exams shows the cardiomediastinal silhouette and pulmonary vasculature are within normal limits.    The lungs are hypoexpanded, with no consolidation, large pleural effusion, or evidence of pulmonary edema. No confluent infiltrates or pneumothorax. There are no significant osseous abnormalities.    IMPRESSION: No evidence of active cardiopulmonary disease.    Electronically signed by:  Fly Cherry MD  11/30/2023 07:55 AM CST Workstation: [id number]                                       Goals of Care Treatment Preferences:  Code Status: Full Code          What is most important right now is to focus on remaining as independent as possible.  Accordingly, we have decided that the best plan to meet the patient's goals includes continuing with treatment.      Consults:   Consults (From admission, onward)          Status Ordering Provider     Inpatient consult to Palliative Care  Once        Provider:  Chris Nesbitt MD    Completed YOLI ROGERS     Inpatient consult to Social Work/Case Management  Once        Provider:  (Not yet assigned)    Completed CATHI GROVER     Inpatient consult to Neurology  Once        Provider:  Tenisha Miner MD    Completed KEN  YOLI            No new Assessment & Plan notes have been filed under this hospital service since the last note was generated.  Service: Hospital Medicine    Final Active Diagnoses:    Diagnosis Date Noted POA    B12 deficiency [E53.8] 12/08/2023 No    Hypertension [I10]  Yes     Chronic    Dementia with behavioral disturbance [F03.918]  Yes     Chronic    Hyperlipidemia [E78.5]  Yes     Chronic      Problems Resolved During this Admission:    Diagnosis Date Noted Date Resolved POA    PRINCIPAL PROBLEM:  Fall [W19.XXXA] 11/30/2023 12/08/2023 Yes    Seizure [R56.9]  12/08/2023 Yes       Discharged Condition: stable    Disposition: Skilled Nursing Facility    Follow Up:   Follow-up Information       Aristides Haynes MD Follow up in 1 week(s).    Specialty: Family Medicine  Contact information:  [address] [facility]  [place] [address] [phone]                           Patient Instructions:   No discharge procedures on file.    Significant Diagnostic Studies:     Pending Diagnostic Studies:       Procedure Component Value Units Date/Time    Methylmalonic acid, serum [[id number]] Collected: 12/03/23 0526    Order Status: Sent Lab Status: In process Updated: 12/03/23 0544    Specimen: Blood            Medications:  Reconciled Home Medications:      Medication List        START taking these medications      aspirin 81 MG Chew  Take 1 tablet (81 mg total) by mouth once daily.     carvediloL 6.25 MG tablet  Commonly known as: COREG  Take 1 tablet (6.25 mg total) by mouth 2 (two) times daily.     cyanocobalamin 1000 MCG tablet  Commonly known as: VITAMIN B-12  Take 1 tablet (1,000 mcg total) by mouth once daily.            CHANGE how you take these medications      amLODIPine 10 MG tablet  Commonly known as: NORVASC  Take 1 tablet (10 mg total) by mouth before evening meal.  What changed:   medication strength  how much to take  when to take this     hydrALAZINE 100 MG tablet  Commonly known as: APRESOLINE  Take 1 tablet (100  mg total) by mouth every 8 (eight) hours.  What changed:   medication strength  how much to take  when to take this     QUEtiapine 25 MG Tab  Commonly known as: SEROQUEL  Take 1 tablet (25 mg total) by mouth nightly as needed (restless).  What changed: reasons to take this            CONTINUE taking these medications      calcitRIOL 0.25 MCG Cap  Commonly known as: ROCALTROL  Take 1 capsule (0.25 mcg total) by mouth once daily.     citalopram 20 MG tablet  Commonly known as: CeleXA  Take 1 tablet (20 mg total) by mouth once daily.     docusate sodium 100 MG capsule  Commonly known as: COLACE  Take 1 capsule (100 mg total) by mouth once daily.     fluticasone propionate 50 mcg/actuation nasal spray  Commonly known as: FLONASE  2 sprays (100 mcg total) by Each Nostril route once daily.     levETIRAcetam 750 MG Tab  Commonly known as: KEPPRA  Take 1 tablet (750 mg total) by mouth 2 (two) times daily.     methyl salicylate-menthol 15-10% 15-10 % Crea  Apply 1 application  topically 2 (two) times a day.            STOP taking these medications      cetirizine 5 MG tablet  Commonly known as: ZYRTEC     rosuvastatin 10 MG tablet  Commonly known as: CRESTOR              Indwelling Lines/Drains at time of discharge:   Lines/Drains/Airways       Drain  Duration             Male External Urinary Catheter 11/30/23 0000 8 days                    Time spent on the discharge of patient: 36 minutes         Amor Giron DO  Department of Hospital Medicine  [facility]

## 2023-12-08 NOTE — PT/OT/SLP PROGRESS
Physical Therapy Treatment    Patient Name:  Lavon Brandon   MRN:  [id number]    Recommendations:     Discharge Recommendations: Moderate Intensity Therapy  Discharge Equipment Recommendations: none  Barriers to discharge:  increased assist with mobility, decreased activity tolerance, decreased safety awareness    Assessment:     Lavon Brnadon is a 89 y.o. male admitted with a medical diagnosis of Fall.  He presents with the following impairments/functional limitations: weakness, impaired endurance, impaired self care skills, impaired functional mobility, gait instability, impaired balance, decreased lower extremity function, decreased safety awareness, impaired cardiopulmonary response to activity.    Pt awake and alert with HOB elevated. Pt agreeable to visit. Pt performed supine to sit transfer with stand by assist and verbal cuing for sequencing. Pt required min to contact guard assist for sit to stand transfer with verbal cuing for sequencing. Pt able to remember to push up from bed with BUE without verbal cuing today. Pt ambulated 40' with RW and min assist for safety and RW management. Pt would push RW way out in front of him requiring verbal cuing for closer proximity.   Pt returned to sitting EOB after ambulation.    Pt's brief noted to wet. Pt performed sit to stand with contact guard assist and RW for hygiene. Pt took steps to chair with RW and min assist. Pt required min assist for slow, controlled descent to chair.    Rehab Prognosis: Fair; patient would benefit from acute skilled PT services to address these deficits and reach maximum level of function.    Recent Surgery: * No surgery found *      Plan:     During this hospitalization, patient to be seen 6 x/week to address the identified rehab impairments via gait training, therapeutic activities, therapeutic exercises and progress toward the following goals:    Plan of Care Expires:  01/01/24    Subjective     Chief Complaint: pt reports some back  pain  Patient/Family Comments/goals: to get better  Pain/Comfort:  Pain Rating 1: other (see comments) (not rated)  Location 1: back  Pain Addressed 1: Reposition, Distraction, Cessation of Activity  Pain Rating Post-Intervention 1: other (see comments) (not rated)      Objective:     Communicated with RN prior to session.  Patient found HOB elevated with bed alarm, PureWick, telemetry upon PT entry to room.     General Precautions: Standard, fall  Orthopedic Precautions: N/A  Braces: N/A  Respiratory Status: Room air     Functional Mobility:  Bed Mobility:     Supine to Sit: stand by assistance  Transfers:     Sit to Stand:  contact guard assistance and minimum assistance with rolling walker  Bed to Chair: minimum assistance with  rolling walker  using  Step Transfer  Gait: x 40' with RW and Thompson for RW management due to pt pushing RW way out in front of him      AM-PAC 6 CLICK MOBILITY          Treatment & Education:  Pt educated on importance of time OOB, importance of intermittent mobility, safe techniques for transfers/ambulation, discharge recommendations/options, and use of call light for assistance and fall prevention.      Patient left up in chair with all lines intact, call button in reach, chair alarm on, and RN notified..    GOALS:   Multidisciplinary Problems       Physical Therapy Goals          Problem: Physical Therapy    Goal Priority Disciplines Outcome Goal Variances Interventions   Physical Therapy Goal     PT, PT/OT Ongoing, Progressing     Description: Goals to be met by: 24     Patient will increase functional independence with mobility by performin. Supine to sit with Supervision  2. Sit to stand transfer with Supervision  3. Bed to chair transfer with Supervision using Rolling Walker  4. Gait  x 150 feet with Supervision using Rolling Walker.                              Time Tracking:     PT Received On: 23  PT Start Time: 1012     PT Stop Time: 1027  PT Total Time (min):  15 min     Billable Minutes: Gait Training 15    Treatment Type: Treatment  PT/PTA: PTA     Number of PTA visits since last PT visit: 4     12/08/2023

## 2023-12-08 NOTE — PLAN OF CARE
8:38am- -  spoke with Chaparrita with [facility]. Per Chaparrita she has been trying to reach family since yesterday but has been unsuccessful. Auth is approved    8:40am- DOM spoke with Pt daughter Gabriela [phone]. Per Gabriela she did not receive any calls. DOM informed her to call Chaparrita at [phone]. DOM to continue to follow

## 2023-12-08 NOTE — PLAN OF CARE
Problem: Adult Inpatient Plan of Care  Goal: Plan of Care Review  Outcome: Ongoing, Progressing  Goal: Patient-Specific Goal (Individualized)  Outcome: Ongoing, Progressing  Goal: Absence of Hospital-Acquired Illness or Injury  Outcome: Ongoing, Progressing  Goal: Optimal Comfort and Wellbeing  Outcome: Ongoing, Progressing  Goal: Readiness for Transition of Care  Outcome: Ongoing, Progressing     Problem: Skin Injury Risk Increased  Goal: Skin Health and Integrity  Outcome: Ongoing, Progressing     Problem: Fall Injury Risk  Goal: Absence of Fall and Fall-Related Injury  Outcome: Ongoing, Progressing     Problem: Diabetes Comorbidity  Goal: Blood Glucose Level Within Targeted Range  Outcome: Ongoing, Progressing     Problem: Seizure, Active Management  Goal: Absence of Seizure/Seizure-Related Injury  Outcome: Ongoing, Progressing     Problem: Confusion Acute  Goal: Optimal Cognitive Function  Outcome: Ongoing, Progressing     Problem: Hypertension Acute  Goal: Blood Pressure Within Desired Range  Outcome: Ongoing, Progressing     Problem: Syncope  Goal: Absence of Syncopal Symptoms  Outcome: Ongoing, Progressing     Problem: Coping Ineffective  Goal: Effective Coping  Outcome: Ongoing, Progressing

## 2023-12-08 NOTE — PLAN OF CARE
Charts and orders reviewed. Pt to discharge home to SNF at [facility].  informed NOE Crowe that report can be called to [place] at [phone]. Pt daughter is aware.  Pt has no other needs to be addressed by case management. Pt cleared to discharge to [facility] from a case management standpoint.   12/08/23 1433   Final Note   Assessment Type Final Discharge Note   Anticipated Discharge Disposition SNF   What phone number can be called within the next 1-3 days to see how you are doing after discharge? [phone]   Hospital Resources/Appts/Education Provided Provided patient/caregiver with written discharge plan information   Post-Acute Status   Post-Acute Authorization Placement   Post-Acute Placement Status Set-up Complete/Auth obtained   Coverage Payor: [employer] MANAGED MEDICARE - [employer] MEDICARE PPO -   Discharge Delays None known at this time

## 2023-12-09 LAB — METHLYMALONIC ACID: 264 NMOL/L (ref 0–378)

## 2023-12-22 ENCOUNTER — PATIENT MESSAGE (OUTPATIENT)
Dept: PRIMARY CARE CLINIC | Facility: CLINIC | Age: 88
End: 2023-12-22
Payer: OTHER GOVERNMENT

## 2023-12-22 DIAGNOSIS — F03.918 DEMENTIA WITH BEHAVIORAL DISTURBANCE: Chronic | ICD-10-CM

## 2023-12-22 DIAGNOSIS — R54 FRAIL ELDERLY: Primary | ICD-10-CM

## 2023-12-22 DIAGNOSIS — G40.409 CENTRENCEPHALIC EPILEPSY: Primary | Chronic | ICD-10-CM

## 2023-12-22 DIAGNOSIS — N18.4 ANEMIA OF CHRONIC RENAL FAILURE, STAGE 4 (SEVERE): Chronic | ICD-10-CM

## 2023-12-22 DIAGNOSIS — D63.1 ANEMIA OF CHRONIC RENAL FAILURE, STAGE 4 (SEVERE): Chronic | ICD-10-CM

## 2024-02-01 ENCOUNTER — PATIENT MESSAGE (OUTPATIENT)
Dept: FAMILY MEDICINE | Facility: CLINIC | Age: 89
End: 2024-02-01
Payer: MEDICARE

## 2024-02-01 ENCOUNTER — PATIENT MESSAGE (OUTPATIENT)
Dept: PRIMARY CARE CLINIC | Facility: CLINIC | Age: 89
End: 2024-02-01
Payer: MEDICARE

## 2024-02-01 DIAGNOSIS — G40.409 CENTRENCEPHALIC EPILEPSY: Primary | ICD-10-CM

## 2024-02-01 DIAGNOSIS — D63.1 ANEMIA OF CHRONIC RENAL FAILURE, STAGE 4 (SEVERE): ICD-10-CM

## 2024-02-01 DIAGNOSIS — M19.90 CHRONIC ARTHRITIS: Chronic | ICD-10-CM

## 2024-02-01 DIAGNOSIS — F03.918 DEMENTIA WITH BEHAVIORAL DISTURBANCE: Primary | ICD-10-CM

## 2024-02-01 DIAGNOSIS — N18.4 ANEMIA OF CHRONIC RENAL FAILURE, STAGE 4 (SEVERE): ICD-10-CM

## 2024-02-01 DIAGNOSIS — R53.81 PHYSICAL DECONDITIONING: ICD-10-CM

## 2024-02-01 DIAGNOSIS — G40.409 CENTRENCEPHALIC EPILEPSY: ICD-10-CM

## 2024-02-02 NOTE — TELEPHONE ENCOUNTER
Orders written for [facility] too.He needs close follow up by nephrology. *Dr Vazquez)Please verify.

## 2024-02-15 ENCOUNTER — HOSPITAL ENCOUNTER (INPATIENT)
Facility: HOSPITAL | Age: 89
LOS: 5 days | Discharge: NURSING, REHAB OR LONG-TERM CARE | DRG: 057 | End: 2024-02-23
Attending: EMERGENCY MEDICINE | Admitting: INTERNAL MEDICINE
Payer: MEDICARE

## 2024-02-15 DIAGNOSIS — D64.9 ANEMIA, UNSPECIFIED TYPE: ICD-10-CM

## 2024-02-15 DIAGNOSIS — F05 DELIRIUM DUE TO MULTIPLE ETIOLOGIES: Primary | ICD-10-CM

## 2024-02-15 DIAGNOSIS — F03.918 DEMENTIA WITH BEHAVIORAL DISTURBANCE: Chronic | ICD-10-CM

## 2024-02-15 DIAGNOSIS — R41.82 ALTERED MENTAL STATUS: ICD-10-CM

## 2024-02-15 DIAGNOSIS — N18.9 CHRONIC KIDNEY DISEASE, UNSPECIFIED CKD STAGE: ICD-10-CM

## 2024-02-15 DIAGNOSIS — R07.9 CHEST PAIN: ICD-10-CM

## 2024-02-15 DIAGNOSIS — R44.1 VISUAL HALLUCINATIONS: ICD-10-CM

## 2024-02-15 DIAGNOSIS — I10 HYPERTENSION, UNSPECIFIED TYPE: ICD-10-CM

## 2024-02-15 DIAGNOSIS — G40.909 SEIZURE DISORDER: ICD-10-CM

## 2024-02-15 LAB
ALBUMIN SERPL BCP-MCNC: 3.6 G/DL (ref 3.5–5.2)
ALP SERPL-CCNC: 48 U/L (ref 55–135)
ALT SERPL W/O P-5'-P-CCNC: 5 U/L (ref 10–44)
ANION GAP SERPL CALC-SCNC: 5 MMOL/L (ref 8–16)
AST SERPL-CCNC: 14 U/L (ref 10–40)
BASOPHILS # BLD AUTO: 0.03 K/UL (ref 0–0.2)
BASOPHILS NFR BLD: 0.4 % (ref 0–1.9)
BILIRUB SERPL-MCNC: 0.3 MG/DL (ref 0.1–1)
BUN SERPL-MCNC: 30 MG/DL (ref 10–30)
CALCIUM SERPL-MCNC: 9.5 MG/DL (ref 8.7–10.5)
CHLORIDE SERPL-SCNC: 105 MMOL/L (ref 95–110)
CO2 SERPL-SCNC: 28 MMOL/L (ref 23–29)
CREAT SERPL-MCNC: 2 MG/DL (ref 0.5–1.4)
DIFFERENTIAL METHOD BLD: ABNORMAL
EOSINOPHIL # BLD AUTO: 0.1 K/UL (ref 0–0.5)
EOSINOPHIL NFR BLD: 1.8 % (ref 0–8)
ERYTHROCYTE [DISTWIDTH] IN BLOOD BY AUTOMATED COUNT: 13.8 % (ref 11.5–14.5)
EST. GFR  (NO RACE VARIABLE): 30.9 ML/MIN/1.73 M^2
GLUCOSE SERPL-MCNC: 127 MG/DL (ref 70–110)
HCT VFR BLD AUTO: 32 % (ref 40–54)
HGB BLD-MCNC: 10.3 G/DL (ref 14–18)
IMM GRANULOCYTES # BLD AUTO: 0.01 K/UL (ref 0–0.04)
IMM GRANULOCYTES NFR BLD AUTO: 0.1 % (ref 0–0.5)
LYMPHOCYTES # BLD AUTO: 1.8 K/UL (ref 1–4.8)
LYMPHOCYTES NFR BLD: 25.2 % (ref 18–48)
MCH RBC QN AUTO: 29.9 PG (ref 27–31)
MCHC RBC AUTO-ENTMCNC: 32.2 G/DL (ref 32–36)
MCV RBC AUTO: 93 FL (ref 82–98)
MONOCYTES # BLD AUTO: 0.7 K/UL (ref 0.3–1)
MONOCYTES NFR BLD: 9.2 % (ref 4–15)
NEUTROPHILS # BLD AUTO: 4.5 K/UL (ref 1.8–7.7)
NEUTROPHILS NFR BLD: 63.3 % (ref 38–73)
NRBC BLD-RTO: 0 /100 WBC
PLATELET # BLD AUTO: 161 K/UL (ref 150–450)
PMV BLD AUTO: 10.5 FL (ref 9.2–12.9)
POTASSIUM SERPL-SCNC: 5.1 MMOL/L (ref 3.5–5.1)
PROT SERPL-MCNC: 6 G/DL (ref 6–8.4)
RBC # BLD AUTO: 3.45 M/UL (ref 4.6–6.2)
SODIUM SERPL-SCNC: 138 MMOL/L (ref 136–145)
WBC # BLD AUTO: 7.07 K/UL (ref 3.9–12.7)

## 2024-02-15 PROCEDURE — 96374 THER/PROPH/DIAG INJ IV PUSH: CPT

## 2024-02-15 PROCEDURE — 80177 DRUG SCRN QUAN LEVETIRACETAM: CPT | Performed by: NURSE PRACTITIONER

## 2024-02-15 PROCEDURE — 93005 ELECTROCARDIOGRAM TRACING: CPT | Performed by: INTERNAL MEDICINE

## 2024-02-15 PROCEDURE — 85025 COMPLETE CBC W/AUTO DIFF WBC: CPT | Performed by: NURSE PRACTITIONER

## 2024-02-15 PROCEDURE — 80053 COMPREHEN METABOLIC PANEL: CPT | Performed by: NURSE PRACTITIONER

## 2024-02-15 PROCEDURE — 93010 ELECTROCARDIOGRAM REPORT: CPT | Mod: ,,, | Performed by: INTERNAL MEDICINE

## 2024-02-15 PROCEDURE — 99285 EMERGENCY DEPT VISIT HI MDM: CPT | Mod: 25

## 2024-02-16 PROBLEM — R41.0 DELIRIUM: Status: ACTIVE | Noted: 2024-02-16

## 2024-02-16 LAB
25(OH)D3+25(OH)D2 SERPL-MCNC: 26 NG/ML (ref 30–96)
AMPHET+METHAMPHET UR QL: NEGATIVE
ANION GAP SERPL CALC-SCNC: 6 MMOL/L (ref 8–16)
BARBITURATES UR QL SCN>200 NG/ML: NEGATIVE
BENZODIAZ UR QL SCN>200 NG/ML: NEGATIVE
BILIRUB UR QL STRIP: NEGATIVE
BUN SERPL-MCNC: 27 MG/DL (ref 10–30)
BZE UR QL SCN: NEGATIVE
CALCIUM SERPL-MCNC: 9.8 MG/DL (ref 8.7–10.5)
CANNABINOIDS UR QL SCN: NEGATIVE
CHLORIDE SERPL-SCNC: 104 MMOL/L (ref 95–110)
CLARITY UR: CLEAR
CO2 SERPL-SCNC: 28 MMOL/L (ref 23–29)
COLOR UR: COLORLESS
CREAT SERPL-MCNC: 2 MG/DL (ref 0.5–1.4)
CREAT UR-MCNC: 21.9 MG/DL (ref 23–375)
EST. GFR  (NO RACE VARIABLE): 30.9 ML/MIN/1.73 M^2
FOLATE SERPL-MCNC: 16.9 NG/ML (ref 4–24)
GLUCOSE SERPL-MCNC: 117 MG/DL (ref 70–110)
GLUCOSE UR QL STRIP: NEGATIVE
HGB UR QL STRIP: NEGATIVE
KETONES UR QL STRIP: NEGATIVE
LEUKOCYTE ESTERASE UR QL STRIP: NEGATIVE
MAGNESIUM SERPL-MCNC: 2 MG/DL (ref 1.6–2.6)
NITRITE UR QL STRIP: NEGATIVE
OPIATES UR QL SCN: NEGATIVE
PCP UR QL SCN>25 NG/ML: NEGATIVE
PH UR STRIP: 7 [PH] (ref 5–8)
PHOSPHATE SERPL-MCNC: 3.1 MG/DL (ref 2.7–4.5)
POTASSIUM SERPL-SCNC: 4.5 MMOL/L (ref 3.5–5.1)
PROT UR QL STRIP: NEGATIVE
SODIUM SERPL-SCNC: 138 MMOL/L (ref 136–145)
SP GR UR STRIP: 1.01 (ref 1–1.03)
T4 FREE SERPL-MCNC: 1.27 NG/DL (ref 0.71–1.51)
TOXICOLOGY INFORMATION: ABNORMAL
TSH SERPL DL<=0.005 MIU/L-ACNC: 3.36 UIU/ML (ref 0.34–5.6)
URN SPEC COLLECT METH UR: ABNORMAL
UROBILINOGEN UR STRIP-ACNC: NEGATIVE EU/DL
VIT B12 SERPL-MCNC: 860 PG/ML (ref 210–950)

## 2024-02-16 PROCEDURE — 84443 ASSAY THYROID STIM HORMONE: CPT | Performed by: NURSE PRACTITIONER

## 2024-02-16 PROCEDURE — 25000003 PHARM REV CODE 250: Performed by: EMERGENCY MEDICINE

## 2024-02-16 PROCEDURE — 83735 ASSAY OF MAGNESIUM: CPT | Performed by: NURSE PRACTITIONER

## 2024-02-16 PROCEDURE — 99900035 HC TECH TIME PER 15 MIN (STAT)

## 2024-02-16 PROCEDURE — 84439 ASSAY OF FREE THYROXINE: CPT | Performed by: NURSE PRACTITIONER

## 2024-02-16 PROCEDURE — 82306 VITAMIN D 25 HYDROXY: CPT | Performed by: NURSE PRACTITIONER

## 2024-02-16 PROCEDURE — 81003 URINALYSIS AUTO W/O SCOPE: CPT | Mod: 59 | Performed by: NURSE PRACTITIONER

## 2024-02-16 PROCEDURE — 84425 ASSAY OF VITAMIN B-1: CPT | Performed by: NURSE PRACTITIONER

## 2024-02-16 PROCEDURE — 80307 DRUG TEST PRSMV CHEM ANLYZR: CPT | Performed by: NURSE PRACTITIONER

## 2024-02-16 PROCEDURE — 97116 GAIT TRAINING THERAPY: CPT

## 2024-02-16 PROCEDURE — 25000003 PHARM REV CODE 250: Performed by: INTERNAL MEDICINE

## 2024-02-16 PROCEDURE — 80048 BASIC METABOLIC PNL TOTAL CA: CPT | Performed by: NURSE PRACTITIONER

## 2024-02-16 PROCEDURE — 63600175 PHARM REV CODE 636 W HCPCS: Performed by: EMERGENCY MEDICINE

## 2024-02-16 PROCEDURE — G0378 HOSPITAL OBSERVATION PER HR: HCPCS

## 2024-02-16 PROCEDURE — 82607 VITAMIN B-12: CPT | Performed by: NURSE PRACTITIONER

## 2024-02-16 PROCEDURE — 84100 ASSAY OF PHOSPHORUS: CPT | Performed by: NURSE PRACTITIONER

## 2024-02-16 PROCEDURE — 25000003 PHARM REV CODE 250: Performed by: NURSE PRACTITIONER

## 2024-02-16 PROCEDURE — 97162 PT EVAL MOD COMPLEX 30 MIN: CPT

## 2024-02-16 PROCEDURE — 82746 ASSAY OF FOLIC ACID SERUM: CPT | Performed by: NURSE PRACTITIONER

## 2024-02-16 RX ORDER — ALUMINUM HYDROXIDE, MAGNESIUM HYDROXIDE, AND SIMETHICONE 1200; 120; 1200 MG/30ML; MG/30ML; MG/30ML
30 SUSPENSION ORAL 4 TIMES DAILY PRN
Status: DISCONTINUED | OUTPATIENT
Start: 2024-02-16 | End: 2024-02-23 | Stop reason: HOSPADM

## 2024-02-16 RX ORDER — DOCUSATE SODIUM 100 MG/1
100 CAPSULE, LIQUID FILLED ORAL DAILY
Status: DISCONTINUED | OUTPATIENT
Start: 2024-02-16 | End: 2024-02-23 | Stop reason: HOSPADM

## 2024-02-16 RX ORDER — IPRATROPIUM BROMIDE AND ALBUTEROL SULFATE 2.5; .5 MG/3ML; MG/3ML
3 SOLUTION RESPIRATORY (INHALATION) EVERY 6 HOURS PRN
Status: DISCONTINUED | OUTPATIENT
Start: 2024-02-16 | End: 2024-02-23 | Stop reason: HOSPADM

## 2024-02-16 RX ORDER — FUROSEMIDE 40 MG/1
40 TABLET ORAL DAILY
Status: DISCONTINUED | OUTPATIENT
Start: 2024-02-16 | End: 2024-02-18

## 2024-02-16 RX ORDER — CARVEDILOL 6.25 MG/1
6.25 TABLET ORAL 2 TIMES DAILY
Status: DISCONTINUED | OUTPATIENT
Start: 2024-02-16 | End: 2024-02-17

## 2024-02-16 RX ORDER — LANOLIN ALCOHOL/MO/W.PET/CERES
1000 CREAM (GRAM) TOPICAL DAILY
Status: DISCONTINUED | OUTPATIENT
Start: 2024-02-16 | End: 2024-02-23 | Stop reason: HOSPADM

## 2024-02-16 RX ORDER — NAPROXEN SODIUM 220 MG/1
81 TABLET, FILM COATED ORAL DAILY
Status: DISCONTINUED | OUTPATIENT
Start: 2024-02-16 | End: 2024-02-23 | Stop reason: HOSPADM

## 2024-02-16 RX ORDER — NALOXONE HCL 0.4 MG/ML
0.02 VIAL (ML) INJECTION
Status: DISCONTINUED | OUTPATIENT
Start: 2024-02-16 | End: 2024-02-23 | Stop reason: HOSPADM

## 2024-02-16 RX ORDER — CITALOPRAM 20 MG/1
20 TABLET, FILM COATED ORAL DAILY
Status: DISCONTINUED | OUTPATIENT
Start: 2024-02-16 | End: 2024-02-23 | Stop reason: HOSPADM

## 2024-02-16 RX ORDER — SODIUM,POTASSIUM PHOSPHATES 280-250MG
2 POWDER IN PACKET (EA) ORAL
Status: DISCONTINUED | OUTPATIENT
Start: 2024-02-16 | End: 2024-02-23 | Stop reason: HOSPADM

## 2024-02-16 RX ORDER — QUETIAPINE FUMARATE 25 MG/1
25 TABLET, FILM COATED ORAL DAILY PRN
Status: DISCONTINUED | OUTPATIENT
Start: 2024-02-16 | End: 2024-02-23 | Stop reason: HOSPADM

## 2024-02-16 RX ORDER — LANOLIN ALCOHOL/MO/W.PET/CERES
800 CREAM (GRAM) TOPICAL
Status: DISCONTINUED | OUTPATIENT
Start: 2024-02-16 | End: 2024-02-23 | Stop reason: HOSPADM

## 2024-02-16 RX ORDER — AMOXICILLIN 250 MG
1 CAPSULE ORAL DAILY
Status: DISCONTINUED | OUTPATIENT
Start: 2024-02-16 | End: 2024-02-23 | Stop reason: HOSPADM

## 2024-02-16 RX ORDER — GLUCAGON 1 MG
1 KIT INJECTION
Status: DISCONTINUED | OUTPATIENT
Start: 2024-02-16 | End: 2024-02-23 | Stop reason: HOSPADM

## 2024-02-16 RX ORDER — AMLODIPINE BESYLATE 5 MG/1
10 TABLET ORAL
Status: DISCONTINUED | OUTPATIENT
Start: 2024-02-16 | End: 2024-02-23 | Stop reason: HOSPADM

## 2024-02-16 RX ORDER — CALCITRIOL 0.25 UG/1
0.25 CAPSULE ORAL DAILY
Status: DISCONTINUED | OUTPATIENT
Start: 2024-02-16 | End: 2024-02-23 | Stop reason: HOSPADM

## 2024-02-16 RX ORDER — ACETAMINOPHEN 325 MG/1
650 TABLET ORAL EVERY 4 HOURS PRN
Status: DISCONTINUED | OUTPATIENT
Start: 2024-02-16 | End: 2024-02-23 | Stop reason: HOSPADM

## 2024-02-16 RX ORDER — IBUPROFEN 200 MG
24 TABLET ORAL
Status: DISCONTINUED | OUTPATIENT
Start: 2024-02-16 | End: 2024-02-23 | Stop reason: HOSPADM

## 2024-02-16 RX ORDER — ONDANSETRON HYDROCHLORIDE 2 MG/ML
4 INJECTION, SOLUTION INTRAVENOUS EVERY 6 HOURS PRN
Status: DISCONTINUED | OUTPATIENT
Start: 2024-02-16 | End: 2024-02-23 | Stop reason: HOSPADM

## 2024-02-16 RX ORDER — HYDRALAZINE HYDROCHLORIDE 20 MG/ML
10 INJECTION INTRAMUSCULAR; INTRAVENOUS
Status: COMPLETED | OUTPATIENT
Start: 2024-02-16 | End: 2024-02-16

## 2024-02-16 RX ORDER — TALC
6 POWDER (GRAM) TOPICAL
Status: COMPLETED | OUTPATIENT
Start: 2024-02-16 | End: 2024-02-16

## 2024-02-16 RX ORDER — TALC
6 POWDER (GRAM) TOPICAL NIGHTLY PRN
Status: DISCONTINUED | OUTPATIENT
Start: 2024-02-16 | End: 2024-02-23 | Stop reason: HOSPADM

## 2024-02-16 RX ORDER — METHOCARBAMOL 500 MG/1
500 TABLET, FILM COATED ORAL 2 TIMES DAILY PRN
Status: ON HOLD | COMMUNITY
Start: 2023-12-18 | End: 2024-02-23 | Stop reason: HOSPADM

## 2024-02-16 RX ORDER — IBUPROFEN 200 MG
16 TABLET ORAL
Status: DISCONTINUED | OUTPATIENT
Start: 2024-02-16 | End: 2024-02-23 | Stop reason: HOSPADM

## 2024-02-16 RX ORDER — HYDRALAZINE HYDROCHLORIDE 25 MG/1
100 TABLET, FILM COATED ORAL EVERY 8 HOURS
Status: DISCONTINUED | OUTPATIENT
Start: 2024-02-16 | End: 2024-02-23 | Stop reason: HOSPADM

## 2024-02-16 RX ADMIN — SENNOSIDES AND DOCUSATE SODIUM 1 TABLET: 8.6; 5 TABLET ORAL at 09:02

## 2024-02-16 RX ADMIN — QUETIAPINE 25 MG: 25 TABLET ORAL at 02:02

## 2024-02-16 RX ADMIN — CITALOPRAM HYDROBROMIDE 20 MG: 20 TABLET ORAL at 12:02

## 2024-02-16 RX ADMIN — SODIUM ZIRCONIUM CYCLOSILICATE 10 G: 5 POWDER, FOR SUSPENSION ORAL at 09:02

## 2024-02-16 RX ADMIN — LEVETIRACETAM 750 MG: 250 TABLET, FILM COATED ORAL at 12:02

## 2024-02-16 RX ADMIN — CARVEDILOL 6.25 MG: 3.12 TABLET, FILM COATED ORAL at 08:02

## 2024-02-16 RX ADMIN — CYANOCOBALAMIN TAB 1000 MCG 1000 MCG: 1000 TAB at 12:02

## 2024-02-16 RX ADMIN — ASPIRIN 81 MG 81 MG: 81 TABLET ORAL at 12:02

## 2024-02-16 RX ADMIN — LEVETIRACETAM 750 MG: 250 TABLET, FILM COATED ORAL at 10:02

## 2024-02-16 RX ADMIN — FUROSEMIDE 40 MG: 40 TABLET ORAL at 09:02

## 2024-02-16 RX ADMIN — HYDRALAZINE HYDROCHLORIDE 10 MG: 20 INJECTION INTRAMUSCULAR; INTRAVENOUS at 02:02

## 2024-02-16 RX ADMIN — CALCITRIOL CAPSULES 0.25 MCG 0.25 MCG: 0.25 CAPSULE ORAL at 12:02

## 2024-02-16 RX ADMIN — HYDRALAZINE HYDROCHLORIDE 100 MG: 25 TABLET ORAL at 02:02

## 2024-02-16 RX ADMIN — DOCUSATE SODIUM 100 MG: 100 CAPSULE, LIQUID FILLED ORAL at 12:02

## 2024-02-16 RX ADMIN — Medication 6 MG: at 02:02

## 2024-02-16 RX ADMIN — CARVEDILOL 6.25 MG: 3.12 TABLET, FILM COATED ORAL at 12:02

## 2024-02-16 NOTE — ASSESSMENT & PLAN NOTE
Chronic, uncontrolled. Latest blood pressure and vitals reviewed-     Temp:  [97.6 °F (36.4 °C)]   Pulse:  [69-76]   Resp:  [14-22]   BP: (137-179)/(65-81)   SpO2:  [96 %-100 %] .   Home meds for hypertension were reviewed and noted below.   Hypertension Medications               amLODIPine (NORVASC) 10 MG tablet Take 1 tablet (10 mg total) by mouth before evening meal.    carvediloL (COREG) 6.25 MG tablet Take 1 tablet (6.25 mg total) by mouth 2 (two) times daily.    hydrALAZINE (APRESOLINE) 100 MG tablet Take 1 tablet (100 mg total) by mouth every 8 (eight) hours.            While in the hospital, will manage blood pressure as follows; Continue home antihypertensive regimen    Will utilize p.r.n. blood pressure medication only if patient's blood pressure greater than 180/110 and he develops symptoms such as worsening chest pain or shortness of breath.

## 2024-02-16 NOTE — ASSESSMENT & PLAN NOTE
Stable on labs still    Bun cr same as always      But his potassium is 5.1   its been creeping up       I'm  giving LOKELMA 10 gm once now and 40 mg PO lasix     He maybe benefit from some daily lasix to help remove excess water and Salt and potassium

## 2024-02-16 NOTE — SUBJECTIVE & OBJECTIVE
Interval History:  Lying on stretcher in ED, awake and alert.  Pleasant.  Oriented to person and place.  Does not know why he is here.  Denies pain or discomfort.  No acute issues at this time.    Review of Systems   Unable to perform ROS: Dementia     Objective:     Vital Signs (Most Recent):  Temp: 97.6 °F (36.4 °C) (02/15/24 2100)  Pulse: 76 (02/16/24 0645)  Resp: (!) 22 (02/16/24 0645)  BP: (!) 170/81 (02/16/24 0645)  SpO2: 99 % (02/16/24 0500) Vital Signs (24h Range):  Temp:  [97.6 °F (36.4 °C)] 97.6 °F (36.4 °C)  Pulse:  [69-76] 76  Resp:  [14-22] 22  SpO2:  [96 %-100 %] 99 %  BP: (137-179)/(65-81) 170/81     Weight: 76.2 kg (168 lb)  Body mass index is 24.11 kg/m².  No intake or output data in the 24 hours ending 02/16/24 1237      Physical Exam  Constitutional:       General: He is not in acute distress.  HENT:      Head: Normocephalic and atraumatic.      Nose: Nose normal. No congestion.      Mouth/Throat:      Mouth: Mucous membranes are moist.      Pharynx: Oropharynx is clear.   Eyes:      Extraocular Movements: Extraocular movements intact.      Conjunctiva/sclera: Conjunctivae normal.      Pupils: Pupils are equal, round, and reactive to light.   Cardiovascular:      Rate and Rhythm: Normal rate and regular rhythm.      Pulses: Normal pulses.      Heart sounds: Normal heart sounds.   Pulmonary:      Effort: Pulmonary effort is normal. No respiratory distress.      Breath sounds: Normal breath sounds.   Abdominal:      General: Bowel sounds are normal. There is no distension.      Palpations: Abdomen is soft.      Tenderness: There is no abdominal tenderness.   Musculoskeletal:         General: Tenderness (Left shoulder pain with ROM) present.      Right shoulder: Normal.      Left shoulder: Tenderness present. Decreased range of motion.      Left elbow: Decreased range of motion. Tenderness present.      Cervical back: Normal range of motion and neck supple.      Right lower leg: Edema (Ankle and  pedal edema) present.      Left lower leg: Edema (Ankle and pedal edema) present.      Comments: Unable to lift left arm.   Skin:     General: Skin is warm and dry.      Capillary Refill: Capillary refill takes less than 2 seconds.   Neurological:      General: No focal deficit present.      Mental Status: He is alert. He is disoriented.      Cranial Nerves: No cranial nerve deficit.      Motor: Weakness present.      Comments: 0x2, not to time or situation.  Left hand grasp weaker than right.  Unable to fully assess due to patient having limited ROM to left arm.   Psychiatric:         Attention and Perception: Attention normal.         Behavior: Behavior is cooperative.      Comments: Pleasant             Significant Labs: All pertinent labs within the past 24 hours have been reviewed.  CBC:   Recent Labs   Lab 02/15/24  2215   WBC 7.07   HGB 10.3*   HCT 32.0*        CMP:   Recent Labs   Lab 02/15/24  2215 02/16/24  1217    138   K 5.1 4.5    104   CO2 28 28   * 117*   BUN 30 27   CREATININE 2.0* 2.0*   CALCIUM 9.5 9.8   PROT 6.0  --    ALBUMIN 3.6  --    BILITOT 0.3  --    ALKPHOS 48*  --    AST 14  --    ALT 5*  --    ANIONGAP 5* 6*     TSH:   Recent Labs   Lab 02/16/24  1217   TSH 3.356     Urine Studies:   Recent Labs   Lab 02/16/24  0117   COLORU Colorless*   APPEARANCEUA Clear   PHUR 7.0   SPECGRAV 1.010   PROTEINUA Negative   GLUCUA Negative   KETONESU Negative   BILIRUBINUA Negative   OCCULTUA Negative   NITRITE Negative   UROBILINOGEN Negative   LEUKOCYTESUR Negative     X-Ray Chest 1 View  EXAM DESCRIPTION:  XR CHEST 1 VIEW  [facility]: XR CHEST 1 VIEW    CLINICAL HISTORY:  89 years  Male;  altered mental statis    TECHNOLOGIST NOTES: AMS    TECHNIQUE: Frontal portable view of the chest    COMPARISON: 11/30/2023.    FINDINGS:    The cardiomediastinal contour is unremarkable   There is no focal area of consolidation. There is no evidence of acute pulmonary edema. There is no  significant pleural effusion. There is no pneumothorax. No evidence of pulmonary fibrosis or honeycombing.Visualized osseous structures show no acute abnormality.    IMPRESSION:  No evidence of acute disease.    If clinically indicated, consider followup CT for more sensitive evaluation.    Electronically signed by:  Stanley Castillo MD  02/15/2024 11:02 PM CST Workstation: [id number]  CT head (if fall & altered, LOC>2, on Warfarin or other anticoagulants)  EXAM DESCRIPTION:  CT HEAD WITHOUT CONTRAST  RadLex: CT HEAD WITHOUT IV CONTRAST    CLINICAL HISTORY:  89 years  Male;  Mental status change, unknown cause    TECHNOLOGIST NOTES:    COMPARISONS: None currently available    TECHNIQUE: Axial CT images were obtained from the skull base to vertex. This exam was performed according to our departmental dose-optimization program, which includes automated exposure control, adjustment of the mA and/or kV according to patient size and/or use of iterative reconstruction techniques.    FINDINGS:  No acute intracranial hemorrhage. No mass effect, midline shift or hydrocephalus. The gray-white matter differentiation is grossly unremarkable. No evidence of acute large territory infarct.   Within the broad range of normal, brain volume is age appropriate. Nonspecific low attenuation in the white matter bilaterally. This is most commonly related to age-indeterminate small vessel ischemic disease. The right maxillary, frontal and anterior ethmoid sinuses are opacified.. The mastoid air cells are clear. There may be an old lacunar infarct in the cedric.    IMPRESSION:  1.  No acute intracranial process is identified.  2.  Nonspecific low attenuation in the white matter bilaterally. This is most commonly related to age-indeterminate small vessel ischemic disease.  3.  If symptoms persist or further imaging is clinically indicated, consider follow-up MRI or repeat CT imaging.    Electronically signed by:  Stanley Castillo MD  02/15/2024 11:02 PM  CST Workstation: [id number]        Significant Imaging: I have reviewed all pertinent imaging results/findings within the past 24 hours.

## 2024-02-16 NOTE — SUBJECTIVE & OBJECTIVE
Past Medical History:   Diagnosis Date    Bradyarrhythmia     CVA (cerebral infarction) 2006    Dementia     Depression     Hyperlipidemia     Hypertension     renal htn    Pulmonary embolism 2002    Seizure disorder        Past Surgical History:   Procedure Laterality Date    left foot  with crushed injry         Review of patient's allergies indicates:   Allergen Reactions    Ciprofloxacin (bulk) Swelling       No current facility-administered medications on file prior to encounter.     Current Outpatient Medications on File Prior to Encounter   Medication Sig    amLODIPine (NORVASC) 10 MG tablet Take 1 tablet (10 mg total) by mouth before evening meal.    aspirin 81 MG Chew Take 1 tablet (81 mg total) by mouth once daily.    calcitRIOL (ROCALTROL) 0.25 MCG Cap Take 1 capsule (0.25 mcg total) by mouth once daily.    carvediloL (COREG) 6.25 MG tablet Take 1 tablet (6.25 mg total) by mouth 2 (two) times daily.    citalopram (CELEXA) 20 MG tablet Take 1 tablet (20 mg total) by mouth once daily.    cyanocobalamin (VITAMIN B-12) 1000 MCG tablet Take 1 tablet (1,000 mcg total) by mouth once daily.    docusate sodium (COLACE) 100 MG capsule Take 1 capsule (100 mg total) by mouth once daily.    fluticasone propionate (FLONASE) 50 mcg/actuation nasal spray 2 sprays (100 mcg total) by Each Nostril route once daily.    hydrALAZINE (APRESOLINE) 100 MG tablet Take 1 tablet (100 mg total) by mouth every 8 (eight) hours.    levETIRAcetam (KEPPRA) 750 MG Tab Take 1 tablet (750 mg total) by mouth 2 (two) times daily.    methyl salicylate-menthol 15-10% 15-10 % Crea Apply 1 application  topically 2 (two) times a day.     Family History       Problem Relation (Age of Onset)    Diabetes Mother          Tobacco Use    Smoking status: Never    Smokeless tobacco: Never   Substance and Sexual Activity    Alcohol use: No    Drug use: No    Sexual activity: Not on file     Review of Systems   All other systems reviewed and are  negative.    Objective:     Vital Signs (Most Recent):  Temp: 97.6 °F (36.4 °C) (02/15/24 2100)  Pulse: 76 (02/16/24 0645)  Resp: (!) 22 (02/16/24 0645)  BP: (!) 170/81 (02/16/24 0645)  SpO2: 99 % (02/16/24 0500) Vital Signs (24h Range):  Temp:  [97.6 °F (36.4 °C)] 97.6 °F (36.4 °C)  Pulse:  [69-76] 76  Resp:  [14-22] 22  SpO2:  [96 %-100 %] 99 %  BP: (137-179)/(65-81) 170/81     Weight: 76.2 kg (168 lb)  Body mass index is 24.11 kg/m².     Physical Exam  Vitals and nursing note reviewed.   Constitutional:       Appearance: Normal appearance.   HENT:      Head: Normocephalic.      Nose: Nose normal.      Mouth/Throat:      Mouth: Mucous membranes are dry.   Eyes:      Extraocular Movements: Extraocular movements intact.      Pupils: Pupils are equal, round, and reactive to light.   Cardiovascular:      Rate and Rhythm: Normal rate and regular rhythm.      Pulses: Normal pulses.      Heart sounds: Normal heart sounds.   Pulmonary:      Effort: Pulmonary effort is normal.      Breath sounds: Normal breath sounds.   Abdominal:      General: Abdomen is flat. Bowel sounds are normal.      Palpations: Abdomen is soft.   Musculoskeletal:         General: Normal range of motion.      Cervical back: Normal range of motion and neck supple.   Skin:     General: Skin is warm and dry.      Capillary Refill: Capillary refill takes less than 2 seconds.   Neurological:      General: No focal deficit present.      Mental Status: He is alert.      Comments: Not oriented.  Only to person    Psychiatric:         Mood and Affect: Mood normal.              CRANIAL NERVES     CN III, IV, VI   Pupils are equal, round, and reactive to light.       Significant Labs: All pertinent labs within the past 24 hours have been reviewed.  CBC:   Recent Labs   Lab 02/15/24  2215   WBC 7.07   HGB 10.3*   HCT 32.0*        CMP:   Recent Labs   Lab 02/15/24  2215      K 5.1      CO2 28   *   BUN 30   CREATININE 2.0*   CALCIUM 9.5    PROT 6.0   ALBUMIN 3.6   BILITOT 0.3   ALKPHOS 48*   AST 14   ALT 5*   ANIONGAP 5*       Significant Imaging: I have reviewed all pertinent imaging results/findings within the past 24 hours.  I have reviewed and interpreted all pertinent imaging results/findings within the past 24 hours.

## 2024-02-16 NOTE — ASSESSMENT & PLAN NOTE
He has been fine all night     Calm and talks to me normally     I was going to check some labs like b12, folate tsh free t4     Looks like he was on low end of b12 in past and was put on oral B12     If still low I would give him B12 shot IM or SQ       Otherwise I'm resuming all his home meds    No changes

## 2024-02-16 NOTE — HPI
89 BM with pmh of HTN, CKD , dementia lewy body type     He lives at home with daughter  Non smoker  Non drinker      She brought him in because she said he was having worsening hallucinations and agitation and confusion     In the ER his labs showed nothing new for him.    Same CKD .      No UTI was found.       BP was high but not dangerously high       I saw him and he was calm and talked to me appropriately         We decided to admit him to observation and watch him and make sure he was ok and that BP was controlled

## 2024-02-16 NOTE — PROGRESS NOTES
[facility] - Emergency Dept  Hospital Medicine  Progress Note    Patient Name: Lavon Brandon  MRN: [id number]  Patient Class: OP- Observation   Admission Date: 2/15/2024  Length of Stay: 0 days  Attending Physician: Tatyana Neal MD  Primary Care Provider: Aristides Haynes MD        Subjective:     Principal Problem:Delirium        HPI:  89 BM with pmh of HTN, CKD , dementia lewy body type     He lives at home with daughter  Non smoker  Non drinker      She brought him in because she said he was having worsening hallucinations and agitation and confusion     In the ER his labs showed nothing new for him.    Same CKD .      No UTI was found.       BP was high but not dangerously high       I saw him and he was calm and talked to me appropriately         We decided to admit him to observation and watch him and make sure he was ok and that BP was controlled     Overview/Hospital Course:  No notes on file    Interval History:  Lying on stretcher in ED, awake and alert.  Pleasant.  Oriented to person and place.  Does not know why he is here.  Denies pain or discomfort.  No acute issues at this time.    Review of Systems   Unable to perform ROS: Dementia     Objective:     Vital Signs (Most Recent):  Temp: 97.6 °F (36.4 °C) (02/15/24 2100)  Pulse: 76 (02/16/24 0645)  Resp: (!) 22 (02/16/24 0645)  BP: (!) 170/81 (02/16/24 0645)  SpO2: 99 % (02/16/24 0500) Vital Signs (24h Range):  Temp:  [97.6 °F (36.4 °C)] 97.6 °F (36.4 °C)  Pulse:  [69-76] 76  Resp:  [14-22] 22  SpO2:  [96 %-100 %] 99 %  BP: (137-179)/(65-81) 170/81     Weight: 76.2 kg (168 lb)  Body mass index is 24.11 kg/m².  No intake or output data in the 24 hours ending 02/16/24 1237      Physical Exam  Constitutional:       General: He is not in acute distress.  HENT:      Head: Normocephalic and atraumatic.      Nose: Nose normal. No congestion.      Mouth/Throat:      Mouth: Mucous membranes are moist.      Pharynx: Oropharynx is clear.   Eyes:       Extraocular Movements: Extraocular movements intact.      Conjunctiva/sclera: Conjunctivae normal.      Pupils: Pupils are equal, round, and reactive to light.   Cardiovascular:      Rate and Rhythm: Normal rate and regular rhythm.      Pulses: Normal pulses.      Heart sounds: Normal heart sounds.   Pulmonary:      Effort: Pulmonary effort is normal. No respiratory distress.      Breath sounds: Normal breath sounds.   Abdominal:      General: Bowel sounds are normal. There is no distension.      Palpations: Abdomen is soft.      Tenderness: There is no abdominal tenderness.   Musculoskeletal:         General: Tenderness (Left shoulder pain with ROM) present.      Right shoulder: Normal.      Left shoulder: Tenderness present. Decreased range of motion.      Left elbow: Decreased range of motion. Tenderness present.      Cervical back: Normal range of motion and neck supple.      Right lower leg: Edema (Ankle and pedal edema) present.      Left lower leg: Edema (Ankle and pedal edema) present.      Comments: Unable to lift left arm.   Skin:     General: Skin is warm and dry.      Capillary Refill: Capillary refill takes less than 2 seconds.   Neurological:      General: No focal deficit present.      Mental Status: He is alert. He is disoriented.      Cranial Nerves: No cranial nerve deficit.      Motor: Weakness present.      Comments: 0x2, not to time or situation.  Left hand grasp weaker than right.  Unable to fully assess due to patient having limited ROM to left arm.   Psychiatric:         Attention and Perception: Attention normal.         Behavior: Behavior is cooperative.      Comments: Pleasant             Significant Labs: All pertinent labs within the past 24 hours have been reviewed.  CBC:   Recent Labs   Lab 02/15/24  2215   WBC 7.07   HGB 10.3*   HCT 32.0*        CMP:   Recent Labs   Lab 02/15/24  2215 02/16/24  1217    138   K 5.1 4.5    104   CO2 28 28   * 117*   BUN 30 27    CREATININE 2.0* 2.0*   CALCIUM 9.5 9.8   PROT 6.0  --    ALBUMIN 3.6  --    BILITOT 0.3  --    ALKPHOS 48*  --    AST 14  --    ALT 5*  --    ANIONGAP 5* 6*     TSH:   Recent Labs   Lab 02/16/24  1217   TSH 3.356     Urine Studies:   Recent Labs   Lab 02/16/24  0117   COLORU Colorless*   APPEARANCEUA Clear   PHUR 7.0   SPECGRAV 1.010   PROTEINUA Negative   GLUCUA Negative   KETONESU Negative   BILIRUBINUA Negative   OCCULTUA Negative   NITRITE Negative   UROBILINOGEN Negative   LEUKOCYTESUR Negative     X-Ray Chest 1 View  EXAM DESCRIPTION:  XR CHEST 1 VIEW  RadLex: XR CHEST 1 VIEW    CLINICAL HISTORY:  89 years  Male;  altered mental statis    TECHNOLOGIST NOTES: AMS    TECHNIQUE: Frontal portable view of the chest    COMPARISON: 11/30/2023.    FINDINGS:    The cardiomediastinal contour is unremarkable   There is no focal area of consolidation. There is no evidence of acute pulmonary edema. There is no significant pleural effusion. There is no pneumothorax. No evidence of pulmonary fibrosis or honeycombing.Visualized osseous structures show no acute abnormality.    IMPRESSION:  No evidence of acute disease.    If clinically indicated, consider followup CT for more sensitive evaluation.    Electronically signed by:  Stanley Castillo MD  02/15/2024 11:02 PM CST Workstation: [id number]  CT head (if fall & altered, LOC>2, on Warfarin or other anticoagulants)  EXAM DESCRIPTION:  CT HEAD WITHOUT CONTRAST  RadLex: CT HEAD WITHOUT IV CONTRAST    CLINICAL HISTORY:  89 years  Male;  Mental status change, unknown cause    TECHNOLOGIST NOTES:    COMPARISONS: None currently available    TECHNIQUE: Axial CT images were obtained from the skull base to vertex. This exam was performed according to our departmental dose-optimization program, which includes automated exposure control, adjustment of the mA and/or kV according to patient size and/or use of iterative reconstruction techniques.    FINDINGS:  No acute intracranial  hemorrhage. No mass effect, midline shift or hydrocephalus. The gray-white matter differentiation is grossly unremarkable. No evidence of acute large territory infarct.   Within the broad range of normal, brain volume is age appropriate. Nonspecific low attenuation in the white matter bilaterally. This is most commonly related to age-indeterminate small vessel ischemic disease. The right maxillary, frontal and anterior ethmoid sinuses are opacified.. The mastoid air cells are clear. There may be an old lacunar infarct in the cedric.    IMPRESSION:  1.  No acute intracranial process is identified.  2.  Nonspecific low attenuation in the white matter bilaterally. This is most commonly related to age-indeterminate small vessel ischemic disease.  3.  If symptoms persist or further imaging is clinically indicated, consider follow-up MRI or repeat CT imaging.    Electronically signed by:  Stanley Castillo MD  02/15/2024 11:02 PM [facility] Workstation: [id number]        Significant Imaging: I have reviewed all pertinent imaging results/findings within the past 24 hours.    Assessment/Plan:      * Delirium  Currently resolved.  Possibly Sundowners due to dementia.  UA negative for UTI  Head CT negative for anything acute  Check B12, folate, TSH, free T4, and vitamin-D levels      Chronic renal disease, stage IV  Creatine stable for now. BMP reviewed- noted Estimated Creatinine Clearance: 24.8 mL/min (A) (based on SCr of 2 mg/dL (H)). according to latest data. Based on current GFR, CKD stage is stage 3 - GFR 30-59.  Monitor UOP and serial BMP and adjust therapy as needed. Renally dose meds. Avoid nephrotoxic medications and procedures.       Dementia with behavioral disturbance  Patient with dementia with likely etiology of vascular dementia. Dementia is moderate. The patient does have signs of behavioral disturbance.  Not on chronic medications. Continue non-pharmacologic interventions to prevent delirium (No VS between 11PM-5AM,  activity during day, opening blinds, providing glasses/hearing aids, and up in chair during daytime). Will avoid narcotics and benzos unless absolutely necessary. PRN anti-psychotics are prescribed to avoid self harm behaviors.  ----------------------------------------------------------  Start Seroquel 25 mg daily PRN for agitation, psychosis      Depression  Patient has persistent depression which is unknown and is currently controlled. Will Continue anti-depressant medications. We will not consult psychiatry at this time. Continue to monitor closely and adjust plan of care as needed.        Essential hypertension  Chronic, uncontrolled. Latest blood pressure and vitals reviewed-     Temp:  [97.6 °F (36.4 °C)]   Pulse:  [69-76]   Resp:  [14-22]   BP: (137-179)/(65-81)   SpO2:  [96 %-100 %] .   Home meds for hypertension were reviewed and noted below.   Hypertension Medications               amLODIPine (NORVASC) 10 MG tablet Take 1 tablet (10 mg total) by mouth before evening meal.    carvediloL (COREG) 6.25 MG tablet Take 1 tablet (6.25 mg total) by mouth 2 (two) times daily.    hydrALAZINE (APRESOLINE) 100 MG tablet Take 1 tablet (100 mg total) by mouth every 8 (eight) hours.            While in the hospital, will manage blood pressure as follows; Continue home antihypertensive regimen    Will utilize p.r.n. blood pressure medication only if patient's blood pressure greater than 180/110 and he develops symptoms such as worsening chest pain or shortness of breath.        VTE Risk Mitigation (From admission, onward)           Ordered     IP VTE HIGH RISK PATIENT  Once         02/16/24 0750     Place sequential compression device  Until discontinued         02/16/24 0750     Reason for No Pharmacological VTE Prophylaxis  Once        Question:  Reasons:  Answer:  Patient is Ambulatory    02/16/24 0750                    Discharge Planning   JOANNE:      Code Status: Full Code   Is the patient medically ready for  discharge?:     Reason for patient still in hospital (select all that apply): Patient trending condition, Treatment, and Pending disposition                     Missy Salguero NP  Department of Hospital Medicine   [facility] - Emergency Dept

## 2024-02-16 NOTE — PT/OT/SLP EVAL
Physical Therapy Evaluation    Patient Name:  Lavon Brandon   MRN:  [id number]    Recommendations:     Discharge Recommendations: Moderate Intensity Therapy   Discharge Equipment Recommendations: none   Barriers to discharge:  Increased caregiver burden of care    Assessment:     Lavon Brandon is a 89 y.o. male admitted with a medical diagnosis of Dementia with behavioral disturbance.  He presents with the following impairments/functional limitations: weakness, impaired endurance, impaired self care skills, impaired functional mobility, gait instability, impaired balance, impaired cognition, decreased lower extremity function, decreased upper extremity function, decreased safety awareness, abnormal tone, impaired cardiopulmonary response to activity .  Pt  was asleep but easily awakened. He had  drainage of his left eye. Daughter present in room speculated that eye drainage was from pt's lack of sleep since this admit.  Pt was an  unreliable historian. He lives at home with one of his daughter's and required assistance with  ADL's. Just prior to this admit  pt was using W/C more than  RW.  Pt did not endorse behavior disturbance during eval. He was slow to respond to questions and had difficulty following simple commands.He had Parkinsons like movements.     Rehab Prognosis: Fair; patient would benefit from acute skilled PT services to address these deficits and reach maximum level of function.    Recent Surgery: * No surgery found *      Plan:     During this hospitalization, patient to be seen 6 x/week to address the identified rehab impairments via gait training, therapeutic activities, therapeutic exercises and progress toward the following goals:    Plan of Care Expires:  03/16/24    Subjective     Chief Complaint: sleepiness  Patient/Family Comments/goals:D/C to SNF  Pain/Comfort:  Pain Rating 1: 0/10    Patients cultural, spiritual, Restoration conflicts given the current situation:      Living  Environment:  Pt lives at home with his daughter  Prior to admission, patients level of function was minimally ambulatory with RW and Tera/Supervision.   Had increased use of W/C PTA. Equipment used at home: bedside commode, hospital bed, walker, rolling, cane, straight, wheelchair, shower chair.  DME owned (not currently used): none.  Upon discharge, patient will have assistance from his daughter/facility.    Objective:     Communicated with nurse prior to session.  Patient found supine with telemetry, blood pressure cuff  upon PT entry to room.    General Precautions: Standard,    Orthopedic Precautions:    Braces:    Respiratory Status: Room air    Exams:  Cognitive Exam:  Patient is oriented to Person and Place  RLE ROM: impaired due to increased tone  RLE Strength: 3/5   LLE ROM: decreased due to  increased tone  LLE Strength: 3/5    Functional Mobility:  Bed Mobility:     Supine to Sit: moderate assistance and of 2 persons  Sit to Supine: moderate assistance and of 2 persons  Transfers:     Sit to Stand:  moderate assistance and of 2 persons with rolling walker  Gait: took 6 steps with RW and Mod A x2  Balance: Min A for siting balance , Mod A for standing with RW      AM-PAC 6 CLICK MOBILITY  Total Score:12       Treatment & Education:  Pt was educated on safety, use of call light, PT DC recommendations. Functional mobility as indicated above    Patient left HOB elevated with all lines intact, call button in reach, and his daughter  present.    GOALS:   Multidisciplinary Problems       Physical Therapy Goals          Problem: Physical Therapy    Goal Priority Disciplines Outcome Goal Variances Interventions   Physical Therapy Goal     PT, PT/OT      Description: Goals to be met by: 3/16/2024     Patient will increase functional independence with mobility by performin. Supine to sit with MInimal Assistance  2. Sit to stand transfer with Minimal Assistance  3. Gait  x 50  feet with Contact Guard  Assistance using Rolling Walker.                          History:     Past Medical History:   Diagnosis Date    Bradyarrhythmia     CVA (cerebral infarction) 2006    Dementia     Depression     Hyperlipidemia     Hypertension     renal htn    Pulmonary embolism 2002    Seizure disorder        Past Surgical History:   Procedure Laterality Date    left foot  with crushed injry         Time Tracking:     PT Received On: 02/16/24  PT Start Time: 0941     PT Stop Time: 1007  PT Total Time (min): 26 min     Billable Minutes: Evaluation 13 minutes  and Gait Training 13 minutes      02/16/2024

## 2024-02-16 NOTE — PHARMACY MED REC
"Admission Medication History     The home medication history was taken by Kelya Kemp.    You may go to "Admission" then "Reconcile Home Medications" tabs to review and/or act upon these items.     The home medication list has been updated by the Pharmacy department.   Please read ALL comments highlighted in yellow.   Please address this information as you see fit.    Feel free to contact us if you have any questions or require assistance.          Medications listed below were obtained from: Patient/family and Analytic software- [employer]  No current facility-administered medications on file prior to encounter.     Current Outpatient Medications on File Prior to Encounter   Medication Sig Dispense Refill    amLODIPine (NORVASC) 10 MG tablet Take 1 tablet (10 mg total) by mouth before evening meal. 30 tablet 11    calcitRIOL (ROCALTROL) 0.25 MCG Cap Take 1 capsule (0.25 mcg total) by mouth once daily. 90 capsule 4    carvediloL (COREG) 6.25 MG tablet Take 1 tablet (6.25 mg total) by mouth 2 (two) times daily. (Patient taking differently: Take 3.125 mg by mouth 2 (two) times daily.) 60 tablet 11    citalopram (CELEXA) 20 MG tablet Take 1 tablet (20 mg total) by mouth once daily. 90 tablet 11    cyanocobalamin (VITAMIN B-12) 1000 MCG tablet Take 1 tablet (1,000 mcg total) by mouth once daily. 30 tablet 2    fluticasone propionate (FLONASE) 50 mcg/actuation nasal spray 2 sprays (100 mcg total) by Each Nostril route once daily. 18.2 mL 0    hydrALAZINE (APRESOLINE) 100 MG tablet Take 1 tablet (100 mg total) by mouth every 8 (eight) hours. 90 tablet 11    levETIRAcetam (KEPPRA) 750 MG Tab Take 1 tablet (750 mg total) by mouth 2 (two) times daily. 180 tablet 3    methocarbamoL (ROBAXIN) 500 MG Tab Take 500 mg by mouth 2 (two) times daily as needed.      aspirin 81 MG Chew Take 1 tablet (81 mg total) by mouth once daily. 90 tablet 0    docusate sodium (COLACE) 100 MG capsule Take 1 capsule (100 mg total) by mouth once " daily. 90 capsule 3    methyl salicylate-menthol 15-10% 15-10 % Crea Apply 1 application  topically 2 (two) times a day.         Potential issues to be addressed PRIOR TO DISCHARGE  Patient reported not taking the following medications: (aspirin 81 mg). These medications remain on the home medication list. Please address accordingly.   Patient requested refills for the following medications: (Methocarbamol 500)    Keyla Kemp  [id number]              .

## 2024-02-16 NOTE — ASSESSMENT & PLAN NOTE
Patient has persistent depression which is unknown and is currently controlled. Will Continue anti-depressant medications. We will not consult psychiatry at this time. Continue to monitor closely and adjust plan of care as needed.

## 2024-02-16 NOTE — ASSESSMENT & PLAN NOTE
Currently resolved.  Possibly Sundowners due to dementia.  UA negative for UTI  Head CT negative for anything acute  Check B12, folate, TSH, free T4, and vitamin-D levels

## 2024-02-16 NOTE — ED PROVIDER NOTES
"Encounter Date: 2/15/2024       History     Chief Complaint   Patient presents with    Hallucinations     Family states when pt doesn't sleep he begins to hallucinate, hx of seizures and family states when pt hallucinates its usually because he had a seizure    Hypertension     229/92 at 8pm, took carvedilol but pressure keeps spiking according to family       89-year-old male presented emergency department with history of Lewy body dementia, seizure disorder, prior CVA, CKD stage 4, hypertension, hyperlipidemia, PE, anemia, prior TBI in his obstructive sleep apnea who presents to the ER accompanied by his daughter for evaluation of difficulty sleeping over the past several days and visual hallucinations as well as elevated blood pressure 229/92 at 8:00 p.m. daughter reports that she is the main caretaker and she cares for father she has been very busy with work over the moderate grid holidays and when she was not home he was difficulty sleeping over the past several days he is become more confused seeing things that are not there.  She reports she was does not know if he was had a seizure but has been told that he was "silent seizures" and she was concerned that he could be having seizures resulting in the confusion.  No known falls.  She reports he has been taking most of his blood pressure medications but they are out of hydralazine but  blood pressures been elevated.  She also reports urine was dark in color.  No nausea vomiting no diarrhea patient denies any belly pain no URI symptoms chest pain or shortness of breath        Review of patient's allergies indicates:   Allergen Reactions    Ciprofloxacin (bulk) Swelling     Past Medical History:   Diagnosis Date    Bradyarrhythmia     CVA (cerebral infarction) 2006    Dementia     Depression     Hyperlipidemia     Hypertension     renal htn    Pulmonary embolism 2002    Seizure disorder      Past Surgical History:   Procedure Laterality Date    left foot  with " crushed injry       Family History   Problem Relation Age of Onset    Diabetes Mother     Cancer Neg Hx      Social History     Tobacco Use    Smoking status: Never    Smokeless tobacco: Never   Substance Use Topics    Alcohol use: No    Drug use: No     Review of Systems   Constitutional:  Negative for activity change, appetite change, chills, diaphoresis, fatigue and fever.   HENT:  Negative for congestion, postnasal drip and rhinorrhea.    Respiratory:  Negative for cough, chest tightness, shortness of breath and wheezing.    Cardiovascular:  Negative for chest pain and palpitations.   Gastrointestinal:  Negative for abdominal pain, constipation, diarrhea, nausea and vomiting.   Genitourinary:  Negative for dysuria, frequency and urgency.   Musculoskeletal:  Negative for neck pain and neck stiffness.   Neurological:  Negative for dizziness, weakness, light-headedness, numbness and headaches.   Psychiatric/Behavioral:  Positive for agitation, behavioral problems, confusion, decreased concentration, hallucinations and sleep disturbance. The patient is hyperactive.    All other systems reviewed and are negative.      Physical Exam     Initial Vitals [02/15/24 2100]   BP Pulse Resp Temp SpO2   137/69 69 16 97.6 °F (36.4 °C) 97 %      MAP       --         Physical Exam    Nursing note and vitals reviewed.  Constitutional: He appears well-developed and well-nourished. He is not diaphoretic. No distress.   HENT:   Head: Normocephalic and atraumatic.   Right Ear: External ear normal.   Left Ear: External ear normal.   Nose: Nose normal.   Mouth/Throat: Oropharynx is clear and moist.   Eyes: EOM are normal. Pupils are equal, round, and reactive to light. Left eye exhibits discharge.   Mild periorbital edema discharge from left conjunctiva and bilateral skull injection   Neck: Neck supple. No tracheal deviation present.   Normal range of motion.  Cardiovascular:  Normal rate, regular rhythm, normal heart sounds and intact  distal pulses.     Exam reveals no gallop and no friction rub.       No murmur heard.  178/79   Pulmonary/Chest: Breath sounds normal. No stridor. No respiratory distress. He has no wheezes. He has no rhonchi. He has no rales. He exhibits no tenderness.   Abdominal: Abdomen is soft. Bowel sounds are normal. He exhibits no distension and no mass. There is no abdominal tenderness. There is no rebound and no guarding.   Musculoskeletal:         General: No edema. Normal range of motion.      Cervical back: Normal range of motion and neck supple.     Neurological: He is alert. He has normal strength. No cranial nerve deficit or sensory deficit.   Skin: Skin is warm and dry. No rash noted. No erythema. No pallor.   Psychiatric: He has a normal mood and affect. Judgment and thought content normal.   Patient was having visual hallucinations of people in the room       ED Course   Procedures  Labs Reviewed   CBC W/ AUTO DIFFERENTIAL - Abnormal; Notable for the following components:       Result Value    RBC 3.45 (*)     Hemoglobin 10.3 (*)     Hematocrit 32.0 (*)     All other components within normal limits   COMPREHENSIVE METABOLIC PANEL - Abnormal; Notable for the following components:    Glucose 127 (*)     Creatinine 2.0 (*)     Alkaline Phosphatase 48 (*)     ALT 5 (*)     eGFR 30.9 (*)     Anion Gap 5 (*)     All other components within normal limits   URINALYSIS, REFLEX TO URINE CULTURE - Abnormal; Notable for the following components:    Color, UA Colorless (*)     All other components within normal limits    Narrative:     Specimen Source->Urine   DRUG SCREEN PANEL, URINE EMERGENCY - Abnormal; Notable for the following components:    Creatinine, Urine 21.9 (*)     All other components within normal limits    Narrative:     Specimen Source->Urine   LEVETIRACETAM  (KEPPRA) LEVEL   URINALYSIS, REFLEX TO URINE CULTURE   POCT GLUCOSE MONITORING CONTINUOUS          Imaging Results              CT head (if fall &  altered, LOC>2, on Warfarin or other anticoagulants) (Final result)  Result time 02/15/24 23:02:05      Final result by Stanley Castillo MD (02/15/24 23:02:05)                   Narrative:    EXAM DESCRIPTION:  CT HEAD WITHOUT CONTRAST  RadLex: CT HEAD WITHOUT IV CONTRAST    CLINICAL HISTORY:  89 years  Male;  Mental status change, unknown cause    TECHNOLOGIST NOTES:    COMPARISONS: None currently available    TECHNIQUE: Axial CT images were obtained from the skull base to vertex. This exam was performed according to our departmental dose-optimization program, which includes automated exposure control, adjustment of the mA and/or kV according to patient size and/or use of iterative reconstruction techniques.    FINDINGS:  No acute intracranial hemorrhage. No mass effect, midline shift or hydrocephalus. The gray-white matter differentiation is grossly unremarkable. No evidence of acute large territory infarct.   Within the broad range of normal, brain volume is age appropriate. Nonspecific low attenuation in the white matter bilaterally. This is most commonly related to age-indeterminate small vessel ischemic disease. The right maxillary, frontal and anterior ethmoid sinuses are opacified.. The mastoid air cells are clear. There may be an old lacunar infarct in the cedric.      IMPRESSION:  1.  No acute intracranial process is identified.  2.  Nonspecific low attenuation in the white matter bilaterally. This is most commonly related to age-indeterminate small vessel ischemic disease.  3.  If symptoms persist or further imaging is clinically indicated, consider follow-up MRI or repeat CT imaging.    Electronically signed by:  Stanley Castillo MD  02/15/2024 11:02 PM CST Workstation: [id number]                                     X-Ray Chest 1 View (Final result)  Result time 02/15/24 23:02:21      Final result by Stanley Castillo MD (02/15/24 23:02:21)                   Narrative:    EXAM DESCRIPTION:  XR CHEST 1 VIEW  RadLex: XR  "CHEST 1 VIEW    CLINICAL HISTORY:  89 years  Male;  altered mental statis    TECHNOLOGIST NOTES: AMS    TECHNIQUE: Frontal portable view of the chest    COMPARISON: 11/30/2023.    FINDINGS:    The cardiomediastinal contour is unremarkable   There is no focal area of consolidation. There is no evidence of acute pulmonary edema. There is no significant pleural effusion. There is no pneumothorax. No evidence of pulmonary fibrosis or honeycombing.Visualized osseous structures show no acute abnormality.    IMPRESSION:  No evidence of acute disease.    If clinically indicated, consider followup CT for more sensitive evaluation.    Electronically signed by:  Stanley Castillo MD  02/15/2024 11:02 PM CST Workstation: [id number]                                     Medications   hydrALAZINE injection 10 mg (10 mg Intravenous Given 2/16/24 0210)   melatonin tablet 6 mg (6 mg Oral Given 2/16/24 0235)     Medical Decision Making    89-year-old male presented emergency department with history of Lewy body dementia, seizure disorder, prior CVA, CKD stage 4, hypertension, hyperlipidemia, PE, anemia, prior TBI in his obstructive sleep apnea who presents to the ER accompanied by his daughter for evaluation of difficulty sleeping over the past several days and visual hallucinations as well as elevated blood pressure 229/92 at 8:00 p.m. daughter reports that she is the main caretaker and she cares for father she has been very busy with work over the moderate grid holidays and when she was not home he was difficulty sleeping over the past several days he is become more confused seeing things that are not there.  She reports she was does not know if he was had a seizure but has been told that he was "silent seizures" and she was concerned that he could be having seizures resulting in the confusion.  No known falls.  She reports he has been taking most of his blood pressure medications but they are out of hydralazine but  blood pressures been " elevated.  She also reports urine was dark in color.  No nausea vomiting no diarrhea patient denies any belly pain no URI symptoms chest pain or shortness of breath  On physical exam    MDM    Patient presents for emergent evaluation of acute visual hallucinations inability to sleep mild agitation elevated blood pressures possible seizures that poses a possible threat to life and/or bodily function.    Differential diagnosis includes but is not limited to CVA intracranial bleeding encephalopathy meningitis encephalitis hypertensive emergency delirium UTI sepsis  In the ED patient found to have acute delirium with insomnia history of Lewy body dementia with behavior disturbance  I ordered labs and personally reviewed them.  Labs significant for see below   I ordered X-rays and personally reviewed them and reviewed the radiologist interpretation.  Xray significant for IMPRESSION:   1. No acute intracranial process is identified.   2.  Nonspecific low attenuation in the white matter bilaterally. This is most commonly related to age-indeterminate small vessel ischemic disease.   3.  If symptoms persist or further imaging is clinically indicated, consider follow-up MRI or repeat CT imaging.       I ordered CT scan and personally reviewed it and reviewed the radiologist interpretation.  CT significant for IMPRESSION:   1. No acute intracranial process is identified.   2.  Nonspecific low attenuation in the white matter bilaterally. This is most commonly related to age-indeterminate small vessel ischemic disease.   3.  If symptoms persist or further imaging is clinically indicated, consider follow-up MRI or repeat CT imaging.       Admission MDM  I discussed the patient presentation labs, ekg, X-rays, CT findings with the Hospitalist   Patient was managed in the ED with IV 10 mg of hydralazine 6 mg of melatonin.   The response to treatment was good  Patient required emergent consultation to hospitalist for  admission.  Josselyn Martínez M.D.       Amount and/or Complexity of Data Reviewed  Labs: ordered.     Details: Normal UA  H&H 10.3 and 32  Creatinine 2  Radiology: ordered.    Risk  OTC drugs.  Prescription drug management.  Decision regarding hospitalization.                                      Clinical Impression:  Final diagnoses:  [R41.82] Altered mental status  [I10] Hypertension, unspecified type  [D64.9] Anemia, unspecified type  [N18.9] Chronic kidney disease, unspecified CKD stage  [R44.1] Visual hallucinations  [F05] Delirium due to multiple etiologies (Primary)  [F03.918] Dementia with behavioral disturbance (Chronic)          ED Disposition Condition    Admit Stable                Josselyn Martínez MD  02/16/24 0342

## 2024-02-16 NOTE — ASSESSMENT & PLAN NOTE
Creatine stable for now. BMP reviewed- noted Estimated Creatinine Clearance: 24.8 mL/min (A) (based on SCr of 2 mg/dL (H)). according to latest data. Based on current GFR, CKD stage is stage 3 - GFR 30-59.  Monitor UOP and serial BMP and adjust therapy as needed. Renally dose meds. Avoid nephrotoxic medications and procedures.

## 2024-02-16 NOTE — ASSESSMENT & PLAN NOTE
Patient with dementia with likely etiology of vascular dementia. Dementia is moderate. The patient does have signs of behavioral disturbance.  Not on chronic medications. Continue non-pharmacologic interventions to prevent delirium (No VS between 11PM-5AM, activity during day, opening blinds, providing glasses/hearing aids, and up in chair during daytime). Will avoid narcotics and benzos unless absolutely necessary. PRN anti-psychotics are prescribed to avoid self harm behaviors.  ----------------------------------------------------------  Start Seroquel 25 mg daily PRN for agitation, psychosis

## 2024-02-16 NOTE — ASSESSMENT & PLAN NOTE
His BP was high but I'm restarting all his home meds this morning     At his age I would be careful not to let it get too low.  Id keep him over 130-140 to ensure cerebral perfusion doesn't drop     I made no changes at this time

## 2024-02-16 NOTE — H&P
[facility] - Emergency Dept  Hospital Medicine  History & Physical    Patient Name: Lavon Brandon  MRN: [id number]  Patient Class: OP- Observation  Admission Date: 2/15/2024  Attending Physician: Andrez Babb MD  Primary Care Provider: Aristides Haynes MD         Patient information was obtained from patient and ER records.     Subjective:     Principal Problem:Dementia with behavioral disturbance    Chief Complaint:   Chief Complaint   Patient presents with    Hallucinations     Family states when pt doesn't sleep he begins to hallucinate, hx of seizures and family states when pt hallucinates its usually because he had a seizure    Hypertension     229/92 at 8pm, took carvedilol but pressure keeps spiking according to family        HPI: 89 BM with pmh of HTN, CKD , dementia lewy body type     He lives at home with daughter  Non smoker  Non drinker      She brought him in because she said he was having worsening hallucinations and agitation and confusion     In the ER his labs showed nothing new for him.    Same CKD .      No UTI was found.       BP was high but not dangerously high       I saw him and he was calm and talked to me appropriately         We decided to admit him to observation and watch him and make sure he was ok and that BP was controlled     Past Medical History:   Diagnosis Date    Bradyarrhythmia     CVA (cerebral infarction) 2006    Dementia     Depression     Hyperlipidemia     Hypertension     renal htn    Pulmonary embolism 2002    Seizure disorder        Past Surgical History:   Procedure Laterality Date    left foot  with crushed injry         Review of patient's allergies indicates:   Allergen Reactions    Ciprofloxacin (bulk) Swelling       No current facility-administered medications on file prior to encounter.     Current Outpatient Medications on File Prior to Encounter   Medication Sig    amLODIPine (NORVASC) 10 MG tablet Take 1 tablet (10 mg total) by mouth before  evening meal.    aspirin 81 MG Chew Take 1 tablet (81 mg total) by mouth once daily.    calcitRIOL (ROCALTROL) 0.25 MCG Cap Take 1 capsule (0.25 mcg total) by mouth once daily.    carvediloL (COREG) 6.25 MG tablet Take 1 tablet (6.25 mg total) by mouth 2 (two) times daily.    citalopram (CELEXA) 20 MG tablet Take 1 tablet (20 mg total) by mouth once daily.    cyanocobalamin (VITAMIN B-12) 1000 MCG tablet Take 1 tablet (1,000 mcg total) by mouth once daily.    docusate sodium (COLACE) 100 MG capsule Take 1 capsule (100 mg total) by mouth once daily.    fluticasone propionate (FLONASE) 50 mcg/actuation nasal spray 2 sprays (100 mcg total) by Each Nostril route once daily.    hydrALAZINE (APRESOLINE) 100 MG tablet Take 1 tablet (100 mg total) by mouth every 8 (eight) hours.    levETIRAcetam (KEPPRA) 750 MG Tab Take 1 tablet (750 mg total) by mouth 2 (two) times daily.    methyl salicylate-menthol 15-10% 15-10 % Crea Apply 1 application  topically 2 (two) times a day.     Family History       Problem Relation (Age of Onset)    Diabetes Mother          Tobacco Use    Smoking status: Never    Smokeless tobacco: Never   Substance and Sexual Activity    Alcohol use: No    Drug use: No    Sexual activity: Not on file     Review of Systems   All other systems reviewed and are negative.    Objective:     Vital Signs (Most Recent):  Temp: 97.6 °F (36.4 °C) (02/15/24 2100)  Pulse: 76 (02/16/24 0645)  Resp: (!) 22 (02/16/24 0645)  BP: (!) 170/81 (02/16/24 0645)  SpO2: 99 % (02/16/24 0500) Vital Signs (24h Range):  Temp:  [97.6 °F (36.4 °C)] 97.6 °F (36.4 °C)  Pulse:  [69-76] 76  Resp:  [14-22] 22  SpO2:  [96 %-100 %] 99 %  BP: (137-179)/(65-81) 170/81     Weight: 76.2 kg (168 lb)  Body mass index is 24.11 kg/m².     Physical Exam  Vitals and nursing note reviewed.   Constitutional:       Appearance: Normal appearance.   HENT:      Head: Normocephalic.      Nose: Nose normal.      Mouth/Throat:      Mouth: Mucous membranes are  dry.   Eyes:      Extraocular Movements: Extraocular movements intact.      Pupils: Pupils are equal, round, and reactive to light.   Cardiovascular:      Rate and Rhythm: Normal rate and regular rhythm.      Pulses: Normal pulses.      Heart sounds: Normal heart sounds.   Pulmonary:      Effort: Pulmonary effort is normal.      Breath sounds: Normal breath sounds.   Abdominal:      General: Abdomen is flat. Bowel sounds are normal.      Palpations: Abdomen is soft.   Musculoskeletal:         General: Normal range of motion.      Cervical back: Normal range of motion and neck supple.   Skin:     General: Skin is warm and dry.      Capillary Refill: Capillary refill takes less than 2 seconds.   Neurological:      General: No focal deficit present.      Mental Status: He is alert.      Comments: Not oriented.  Only to person    Psychiatric:         Mood and Affect: Mood normal.              CRANIAL NERVES     CN III, IV, VI   Pupils are equal, round, and reactive to light.       Significant Labs: All pertinent labs within the past 24 hours have been reviewed.  CBC:   Recent Labs   Lab 02/15/24  2215   WBC 7.07   HGB 10.3*   HCT 32.0*        CMP:   Recent Labs   Lab 02/15/24  2215      K 5.1      CO2 28   *   BUN 30   CREATININE 2.0*   CALCIUM 9.5   PROT 6.0   ALBUMIN 3.6   BILITOT 0.3   ALKPHOS 48*   AST 14   ALT 5*   ANIONGAP 5*       Significant Imaging: I have reviewed all pertinent imaging results/findings within the past 24 hours.  I have reviewed and interpreted all pertinent imaging results/findings within the past 24 hours.  Assessment/Plan:     * Dementia with behavioral disturbance  He has been fine all night     Calm and talks to me normally     I was going to check some labs like b12, folate tsh free t4     Looks like he was on low end of b12 in past and was put on oral B12     If still low I would give him B12 shot IM or SQ       Otherwise I'm resuming all his home meds    No  changes     Chronic renal disease, stage IV  Stable on labs still    Bun cr same as always      But his potassium is 5.1   its been creeping up       I'm  giving LOKELMA 10 gm once now and 40 mg PO lasix     He maybe benefit from some daily lasix to help remove excess water and Salt and potassium       Hypertension  His BP was high but I'm restarting all his home meds this morning     At his age I would be careful not to let it get too low.  Id keep him over 130-140 to ensure cerebral perfusion doesn't drop     I made no changes at this time       VTE Risk Mitigation (From admission, onward)           Ordered     IP VTE HIGH RISK PATIENT  Once         02/16/24 0750     Place sequential compression device  Until discontinued         02/16/24 0750     Reason for No Pharmacological VTE Prophylaxis  Once        Question:  Reasons:  Answer:  Patient is Ambulatory    02/16/24 0750                       On 02/16/2024, patient should be placed in hospital observation services under my care.             Andrez Babb MD  Department of Hospital Medicine  [facility] - Emergency Dept

## 2024-02-17 PROBLEM — E55.9 VITAMIN D DEFICIENCY: Status: ACTIVE | Noted: 2024-02-17

## 2024-02-17 LAB
ALBUMIN SERPL BCP-MCNC: 3.7 G/DL (ref 3.5–5.2)
ALP SERPL-CCNC: 53 U/L (ref 55–135)
ALT SERPL W/O P-5'-P-CCNC: 4 U/L (ref 10–44)
ANION GAP SERPL CALC-SCNC: 9 MMOL/L (ref 8–16)
AST SERPL-CCNC: 14 U/L (ref 10–40)
BASOPHILS # BLD AUTO: 0.05 K/UL (ref 0–0.2)
BASOPHILS NFR BLD: 0.7 % (ref 0–1.9)
BILIRUB SERPL-MCNC: 0.5 MG/DL (ref 0.1–1)
BUN SERPL-MCNC: 29 MG/DL (ref 10–30)
CALCIUM SERPL-MCNC: 9.9 MG/DL (ref 8.7–10.5)
CHLORIDE SERPL-SCNC: 103 MMOL/L (ref 95–110)
CO2 SERPL-SCNC: 26 MMOL/L (ref 23–29)
CREAT SERPL-MCNC: 2 MG/DL (ref 0.5–1.4)
DIFFERENTIAL METHOD BLD: ABNORMAL
EOSINOPHIL # BLD AUTO: 0.1 K/UL (ref 0–0.5)
EOSINOPHIL NFR BLD: 1.1 % (ref 0–8)
ERYTHROCYTE [DISTWIDTH] IN BLOOD BY AUTOMATED COUNT: 13.8 % (ref 11.5–14.5)
EST. GFR  (NO RACE VARIABLE): 30.9 ML/MIN/1.73 M^2
GLUCOSE SERPL-MCNC: 106 MG/DL (ref 70–110)
HCT VFR BLD AUTO: 36.5 % (ref 40–54)
HGB BLD-MCNC: 11.9 G/DL (ref 14–18)
IMM GRANULOCYTES # BLD AUTO: 0.01 K/UL (ref 0–0.04)
IMM GRANULOCYTES NFR BLD AUTO: 0.1 % (ref 0–0.5)
LYMPHOCYTES # BLD AUTO: 2.2 K/UL (ref 1–4.8)
LYMPHOCYTES NFR BLD: 30.9 % (ref 18–48)
MAGNESIUM SERPL-MCNC: 2 MG/DL (ref 1.6–2.6)
MCH RBC QN AUTO: 30.2 PG (ref 27–31)
MCHC RBC AUTO-ENTMCNC: 32.6 G/DL (ref 32–36)
MCV RBC AUTO: 93 FL (ref 82–98)
MONOCYTES # BLD AUTO: 0.7 K/UL (ref 0.3–1)
MONOCYTES NFR BLD: 9.9 % (ref 4–15)
NEUTROPHILS # BLD AUTO: 4 K/UL (ref 1.8–7.7)
NEUTROPHILS NFR BLD: 57.3 % (ref 38–73)
NRBC BLD-RTO: 0 /100 WBC
PLATELET # BLD AUTO: 184 K/UL (ref 150–450)
PMV BLD AUTO: 11 FL (ref 9.2–12.9)
POTASSIUM SERPL-SCNC: 4.6 MMOL/L (ref 3.5–5.1)
PROT SERPL-MCNC: 6 G/DL (ref 6–8.4)
RBC # BLD AUTO: 3.94 M/UL (ref 4.6–6.2)
SODIUM SERPL-SCNC: 138 MMOL/L (ref 136–145)
WBC # BLD AUTO: 7.05 K/UL (ref 3.9–12.7)

## 2024-02-17 PROCEDURE — 94761 N-INVAS EAR/PLS OXIMETRY MLT: CPT

## 2024-02-17 PROCEDURE — 36415 COLL VENOUS BLD VENIPUNCTURE: CPT | Performed by: INTERNAL MEDICINE

## 2024-02-17 PROCEDURE — G0378 HOSPITAL OBSERVATION PER HR: HCPCS

## 2024-02-17 PROCEDURE — 85025 COMPLETE CBC W/AUTO DIFF WBC: CPT | Performed by: NURSE PRACTITIONER

## 2024-02-17 PROCEDURE — 83735 ASSAY OF MAGNESIUM: CPT | Performed by: INTERNAL MEDICINE

## 2024-02-17 PROCEDURE — 99900035 HC TECH TIME PER 15 MIN (STAT)

## 2024-02-17 PROCEDURE — 80053 COMPREHEN METABOLIC PANEL: CPT | Performed by: NURSE PRACTITIONER

## 2024-02-17 PROCEDURE — 25000003 PHARM REV CODE 250: Performed by: INTERNAL MEDICINE

## 2024-02-17 PROCEDURE — 99900031 HC PATIENT EDUCATION (STAT)

## 2024-02-17 RX ORDER — HYDRALAZINE HYDROCHLORIDE 20 MG/ML
10 INJECTION INTRAMUSCULAR; INTRAVENOUS EVERY 6 HOURS PRN
Status: DISCONTINUED | OUTPATIENT
Start: 2024-02-17 | End: 2024-02-23 | Stop reason: HOSPADM

## 2024-02-17 RX ORDER — CARVEDILOL 12.5 MG/1
12.5 TABLET ORAL 2 TIMES DAILY
Status: DISCONTINUED | OUTPATIENT
Start: 2024-02-17 | End: 2024-02-20

## 2024-02-17 RX ADMIN — HYDRALAZINE HYDROCHLORIDE 100 MG: 25 TABLET ORAL at 10:02

## 2024-02-17 RX ADMIN — SENNOSIDES AND DOCUSATE SODIUM 1 TABLET: 8.6; 5 TABLET ORAL at 02:02

## 2024-02-17 RX ADMIN — ASPIRIN 81 MG 81 MG: 81 TABLET ORAL at 02:02

## 2024-02-17 RX ADMIN — LEVETIRACETAM 750 MG: 250 TABLET, FILM COATED ORAL at 02:02

## 2024-02-17 RX ADMIN — CYANOCOBALAMIN TAB 1000 MCG 1000 MCG: 1000 TAB at 02:02

## 2024-02-17 RX ADMIN — CALCITRIOL CAPSULES 0.25 MCG 0.25 MCG: 0.25 CAPSULE ORAL at 02:02

## 2024-02-17 RX ADMIN — HYDRALAZINE HYDROCHLORIDE 100 MG: 25 TABLET ORAL at 03:02

## 2024-02-17 RX ADMIN — CITALOPRAM HYDROBROMIDE 20 MG: 20 TABLET ORAL at 02:02

## 2024-02-17 RX ADMIN — HYDRALAZINE HYDROCHLORIDE 100 MG: 25 TABLET ORAL at 02:02

## 2024-02-17 RX ADMIN — FUROSEMIDE 40 MG: 40 TABLET ORAL at 02:02

## 2024-02-17 RX ADMIN — DOCUSATE SODIUM 100 MG: 100 CAPSULE, LIQUID FILLED ORAL at 02:02

## 2024-02-17 RX ADMIN — AMLODIPINE BESYLATE 10 MG: 5 TABLET ORAL at 05:02

## 2024-02-17 RX ADMIN — LEVETIRACETAM 750 MG: 250 TABLET, FILM COATED ORAL at 09:02

## 2024-02-17 NOTE — CARE UPDATE
02/17/24 0800   Patient Assessment/Suction   Level of Consciousness (AVPU) responds to voice   Respiratory Effort Normal;Unlabored   Expansion/Accessory Muscles/Retractions no use of accessory muscles;no retractions;expansion symmetric   All Lung Fields Breath Sounds clear;diminished   Rhythm/Pattern, Respiratory unlabored;depth regular;pattern regular   Cough Frequency no cough   PRE-TX-O2   Device (Oxygen Therapy) room air   SpO2 97 %   Pulse Oximetry Type Intermittent   $ Pulse Oximetry - Multiple Charge Pulse Oximetry - Multiple   Pulse 64   Resp 16   Aerosol Therapy   $ Aerosol Therapy Charges PRN treatment not required   Education   $ Education Bronchodilator;15 min

## 2024-02-17 NOTE — HOSPITAL COURSE
Mr. Brandon was admitted for acute delirium on lewy body dementia with insomnia, hallucinations, and agitation.  His delirium workup was negative.  Vitamin B1 is pending.  He was placed on low-dose Seroquel PRN which he received one time.  The delirium resolved.  A brain MRI was deferred since his symptoms were not persistent or worsening.  He was noted to have uncontrolled hypertension and his coreg was increased and then decreased due to bradycardia. HCTZ was added with better BP control. PT and OT were consulted. Accepted and transferred to [facility] for SNF. By time of discharge the patient was tolerating a regular diet with resolved admission symptoms. Patient seen and examined on day of discharge.    Physical exam on day of discharge:  Constitutional:       General: He is not in acute distress.  HENT:      Head: Normocephalic and atraumatic.   Cardiovascular:      Rate and Rhythm: Normal rate and regular rhythm.   Pulmonary:      Effort: Pulmonary effort is normal. No respiratory distress.   Skin:     General: Skin is warm and dry.   Neurological:      General: No focal deficit present.      Mental Status: He is alert. Mental status is at baseline. He is disoriented.   Psychiatric:         Mood and Affect: Affect normal.         Behavior: Behavior normal.

## 2024-02-17 NOTE — ASSESSMENT & PLAN NOTE
Creatine stable. BMP reviewed- noted Estimated Creatinine Clearance: 24.8 mL/min (A) (based on SCr of 2 mg/dL (H)). according to latest data. Based on current GFR, CKD stage is stage 3 - GFR 30-59.  Monitor UOP and serial BMP and adjust therapy as needed. Renally dose meds. Avoid nephrotoxic medications and procedures.

## 2024-02-17 NOTE — CARE UPDATE
02/16/24 1003   Patient Assessment/Suction   Level of Consciousness (AVPU) responds to voice   Respiratory Effort Normal;Unlabored   Expansion/Accessory Muscles/Retractions no use of accessory muscles;no retractions;expansion symmetric   All Lung Fields Breath Sounds diminished   Rhythm/Pattern, Respiratory unlabored;pattern regular;depth regular;no shortness of breath reported   Cough Frequency no cough   PRE-TX-O2   Device (Oxygen Therapy) room air   SpO2 97 %   Pulse 73   Resp 16   Positioning   Head of Bed (HOB) Positioning HOB elevated   Aerosol Therapy   $ Aerosol Therapy Charges PRN treatment not required

## 2024-02-17 NOTE — ASSESSMENT & PLAN NOTE
Chronic, uncontrolled. Latest blood pressure and vitals reviewed-     Temp:  [97.6 °F (36.4 °C)-98.1 °F (36.7 °C)]   Pulse:  [64-83]   Resp:  [14-23]   BP: (150-197)/(76-85)   SpO2:  [97 %-98 %] .   Home meds for hypertension were reviewed and noted below.   Hypertension Medications               amLODIPine (NORVASC) 10 MG tablet Take 1 tablet (10 mg total) by mouth before evening meal.    carvediloL (COREG) 6.25 MG tablet Take 1 tablet (6.25 mg total) by mouth 2 (two) times daily.    hydrALAZINE (APRESOLINE) 100 MG tablet Take 1 tablet (100 mg total) by mouth every 8 (eight) hours.            While in the hospital, will manage blood pressure as follows; Continue home antihypertensive regimen    Will utilize p.r.n. blood pressure medication only if patient's blood pressure greater than 180/110 and he develops symptoms such as worsening chest pain or shortness of breath.  -----------------------------------------------------------  Increase Coreg to 12.5 mg b.i.d.  Add IV hydralazine PRN  Monitor

## 2024-02-17 NOTE — ASSESSMENT & PLAN NOTE
Patient with dementia with likely etiology of vascular dementia. Dementia is moderate. The patient does have signs of behavioral disturbance.  Not on chronic medications. Continue non-pharmacologic interventions to prevent delirium (No VS between 11PM-5AM, activity during day, opening blinds, providing glasses/hearing aids, and up in chair during daytime). Will avoid narcotics and benzos unless absolutely necessary. PRN anti-psychotics are prescribed to avoid self harm behaviors.  ----------------------------------------------------------  Appears to be stable.   Continue Seroquel 25 mg daily PRN for agitation, psychosis

## 2024-02-17 NOTE — PLAN OF CARE
[facility]  Initial Discharge Assessment       Primary Care Provider: Aristides Haynes MD    Admission Diagnosis: Delirium due to multiple etiologies [F05]    Admission Date: 2/15/2024  Expected Discharge Date:     Social work intern completed discharge assessment with pt daughterGabriela. Pt lives with daughterGabriela. Demographics, PCP, and insurance verified with daughterGabriela. [facility] has been coming 2x per week. No dialysis. Pt completes ADLs with assistance.  Preferred pharmacy is [facility] on [address] in [place]. Pt daughter is asking for CPAP alternative equipment that would help with pt breathing prior to going to sleep.  Pt daughter states pt would not keep mask of CPAP machine on overnight. Pt to discharge either to a skilled nursing facility in [place] or home with home health via family transport. Pt daughter states it depends on state of pt at discharge and what she can handle. Pt daughter, Gabriela, states she does not want pt to return to skilled nursing in [place].  Pt last stay there was from 12/8 to 1/12/2024 before being discharged home with [facility].  Pt has no other needs to be addressed at this time.    Case mangement will follow up as needed.      Payor: [employer] MANAGED MEDICARE / Plan: [employer] MEDICARE PPO / Product Type: Medicare Advantage /     Extended Emergency Contact Information  Primary Emergency Contact: Gabriela Merino   [place]  Work Phone: [phone]  Mobile Phone: [phone]  Relation: Daughter  Preferred language: English   needed? No  Secondary Emergency Contact: Nilda Brandon  Mobile Phone: [phone]  Relation: Daughter  Preferred language: English   needed? No    Discharge Plan A: Rehab  Discharge Plan B: Home Health      [id number] [address] AT [facility] & [address] [address]  Phone:  [phone] Fax: [phone]    [facility]  for [facility] - [place], [place] - [address]  [address] [address]  Phone: [phone] Fax: [phone]    [id number] - [place] - [address]  [address]  [place] [address]  Phone: [phone] Fax: [phone]      Initial Assessment (most recent)       Adult Discharge Assessment - 02/17/24 1113          Discharge Assessment    Assessment Type Discharge Planning Assessment     Confirmed/corrected address, phone number and insurance Yes     Confirmed Demographics Correct on Facesheet     Source of Information family   Gabriela Merino (Daughter)  [phone] (Mobile)    If unable to respond/provide information was family/caregiver contacted? Yes     Contact Name/Number Gabriela Merino (Daughter)  [phone] (Mobile)     When was your last doctors appointment? 11/01/23     Reason For Admission Delirium     People in Home child(idalmis), adult   Gabriela Merino (Daughter)  [phone] (Mobile)    Facility Arrived From: Home     Do you expect to return to your current living situation? No   Pt daughter Gabriela is asking if rehab facility is an option or home with home health; pt daughter said it depends on pt state at discharge    Do you have help at home or someone to help you manage your care at home? Yes     Who are your caregiver(s) and their phone number(s)? Gabriela Merino (Daughter)  [phone] (Mobile)     Prior to hospitilization cognitive status: Alert/Oriented     Walking or Climbing Stairs Difficulty yes     Walking or Climbing Stairs ambulation difficulty, requires equipment;transferring difficulty, requires equipment     Dressing/Bathing Difficulty yes     Dressing/Bathing bathing difficulty, assistance 1 person;dressing difficulty, assistance 1 person     Home Accessibility wheelchair accessible     Home Layout Able to live on 1st floor     Equipment Currently Used  at Home bedside commode;shower chair;cane, straight;walker, standard;wheelchair     Do you currently have service(s) that help you manage your care at home? Yes     How Many hours does patient receive services --   2x per week, per pt rohan Ochoa    Name and Contact number of agency [facility]- [phone]     Is the pt/caregiver preference to resume services with current agency Yes     Do you take prescription medications? Yes     Do you have prescription coverage? Yes     Coverage [employer] MANAGED MEDICARE - [employer] MEDICARE PPO -     Do you have any problems affording any of your prescribed medications? No     Is the patient taking medications as prescribed? yes     Who is going to help you get home at discharge? Gabriela Merino (Daughter)  [phone] (Mobile)     How do you get to doctors appointments? family or friend will provide   Gabriela Merino (Daughter)  [phone] (Mobile)    Are you on dialysis? No     Do you take coumadin? No     Discharge Plan A Rehab     Discharge Plan B Home Health     DME Needed Upon Discharge  nebulizer   pt daughter requests equipment to assist breathing prior to pt going to sleep    Discharge Plan discussed with: Adult children   Gabriela Merino (Daughter)  [phone] (Mobile)       OTHER    Name(s) of People in Home Gabriela Merino (Daughter)  [phone] (Mobile)

## 2024-02-17 NOTE — PROGRESS NOTES
[facility] Medicine  Progress Note    Patient Name: Lavon Brandon  MRN: [id number]  Patient Class: OP- Observation   Admission Date: 2/15/2024  Length of Stay: 0 days  Attending Physician: Arcelia Powell MD  Primary Care Provider: Aristides Haynes MD        Subjective:     Principal Problem:Uncontrolled hypertension        HPI:  89 BM with pmh of HTN, CKD , dementia lewy body type     He lives at home with daughter  Non smoker  Non drinker      She brought him in because she said he was having worsening hallucinations and agitation and confusion     In the ER his labs showed nothing new for him.    Same CKD .      No UTI was found.       BP was high but not dangerously high       I saw him and he was calm and talked to me appropriately         We decided to admit him to observation and watch him and make sure he was ok and that BP was controlled     Overview/Hospital Course:  No notes on file    Interval History:  Lying in bed resting, easily awakens.  Pleasantly confused.  No overnight issues.    Review of Systems   Unable to perform ROS: Dementia     Objective:     Vital Signs (Most Recent):  Temp: 97.6 °F (36.4 °C) (02/17/24 0710)  Pulse: 64 (02/17/24 0800)  Resp: 16 (02/17/24 0800)  BP: (!) 178/81 (02/17/24 0710)  SpO2: 97 % (02/17/24 0800) Vital Signs (24h Range):  Temp:  [97.6 °F (36.4 °C)-98.1 °F (36.7 °C)] 97.6 °F (36.4 °C)  Pulse:  [64-83] 64  Resp:  [14-23] 16  SpO2:  [97 %-98 %] 97 %  BP: (150-197)/(76-85) 178/81     Weight: 80.8 kg (178 lb 2.1 oz)  Body mass index is 25.56 kg/m².    Intake/Output Summary (Last 24 hours) at 2/17/2024 1117  Last data filed at 2/17/2024 0959  Gross per 24 hour   Intake 0 ml   Output 2100 ml   Net -2100 ml         Physical Exam  Constitutional:       General: He is not in acute distress.  HENT:      Head: Normocephalic and atraumatic.      Nose: Nose normal. No congestion.      Mouth/Throat:      Mouth: Mucous membranes are moist.      Pharynx:  Oropharynx is clear.   Eyes:      Extraocular Movements: Extraocular movements intact.      Conjunctiva/sclera: Conjunctivae normal.      Pupils: Pupils are equal, round, and reactive to light.   Cardiovascular:      Rate and Rhythm: Normal rate and regular rhythm.      Pulses: Normal pulses.      Heart sounds: Normal heart sounds.   Pulmonary:      Effort: Pulmonary effort is normal. No respiratory distress.      Breath sounds: Normal breath sounds.   Abdominal:      General: Bowel sounds are normal. There is no distension.      Palpations: Abdomen is soft.      Tenderness: There is no abdominal tenderness.   Musculoskeletal:         General: Tenderness (Left shoulder pain with ROM) present.      Right shoulder: Normal.      Left shoulder: Tenderness present. Decreased range of motion.      Left elbow: Decreased range of motion. Tenderness present.      Cervical back: Normal range of motion and neck supple.      Right lower leg: Edema (Ankle and pedal edema) present.      Left lower leg: Edema (Ankle and pedal edema) present.      Comments: Unable to lift left arm.   Skin:     General: Skin is warm and dry.      Capillary Refill: Capillary refill takes less than 2 seconds.   Neurological:      General: No focal deficit present.      Mental Status: He is alert. He is disoriented and confused.      Cranial Nerves: No cranial nerve deficit.      Motor: Weakness present.      Comments: Left hand grasp weaker than right.  Unable to fully assess due to patient having limited ROM to left arm.   Psychiatric:         Attention and Perception: Attention normal.         Behavior: Behavior is cooperative.      Comments: Pleasant             Significant Labs: All pertinent labs within the past 24 hours have been reviewed.  CBC:   Recent Labs   Lab 02/15/24  2215 02/17/24  0419   WBC 7.07 7.05   HGB 10.3* 11.9*   HCT 32.0* 36.5*    184       CMP:   Recent Labs   Lab 02/15/24  2215 02/16/24  1217 02/17/24  0419     138 138   K 5.1 4.5 4.6    104 103   CO2 28 28 26   * 117* 106   BUN 30 27 29   CREATININE 2.0* 2.0* 2.0*   CALCIUM 9.5 9.8 9.9   PROT 6.0  --  6.0   ALBUMIN 3.6  --  3.7   BILITOT 0.3  --  0.5   ALKPHOS 48*  --  53*   AST 14  --  14   ALT 5*  --  4*   ANIONGAP 5* 6* 9       TSH:   Recent Labs   Lab 02/16/24  1217   TSH 3.356               Significant Imaging: I have reviewed all pertinent imaging results/findings within the past 24 hours.    Assessment/Plan:      * Uncontrolled hypertension  Chronic, uncontrolled. Latest blood pressure and vitals reviewed-     Temp:  [97.6 °F (36.4 °C)-98.1 °F (36.7 °C)]   Pulse:  [64-83]   Resp:  [14-23]   BP: (150-197)/(76-85)   SpO2:  [97 %-98 %] .   Home meds for hypertension were reviewed and noted below.   Hypertension Medications               amLODIPine (NORVASC) 10 MG tablet Take 1 tablet (10 mg total) by mouth before evening meal.    carvediloL (COREG) 6.25 MG tablet Take 1 tablet (6.25 mg total) by mouth 2 (two) times daily.    hydrALAZINE (APRESOLINE) 100 MG tablet Take 1 tablet (100 mg total) by mouth every 8 (eight) hours.            While in the hospital, will manage blood pressure as follows; Continue home antihypertensive regimen    Will utilize p.r.n. blood pressure medication only if patient's blood pressure greater than 180/110 and he develops symptoms such as worsening chest pain or shortness of breath.  -----------------------------------------------------------  Increase Coreg to 12.5 mg b.i.d.  Add IV hydralazine PRN  Monitor    Delirium  Resolved.  Possibly Sundowners or advancing dementia.  UA negative for UTI  Head CT negative for anything acute  B12, folate, TSH, free T4 are unremarkable.  B1 is pending.  Continue low-dose Seroquel PRN    Vitamin D deficiency  Very Mild  Defer management to his PCP when discharged      Physical debility  Patient with Acute on chronic debility due to age-related physical debility. Latest AMPAC and GEMS scores  have not been reviewed. . Plan includes progressive mobility protocol initated, PT/OT consulted, and fall precautions in place  -------------------------------------------------------------------------  Continue PT/OT efforts.    Chronic renal disease, stage IV  Creatine stable. BMP reviewed- noted Estimated Creatinine Clearance: 24.8 mL/min (A) (based on SCr of 2 mg/dL (H)). according to latest data. Based on current GFR, CKD stage is stage 3 - GFR 30-59.  Monitor UOP and serial BMP and adjust therapy as needed. Renally dose meds. Avoid nephrotoxic medications and procedures.       Dementia with behavioral disturbance  Patient with dementia with likely etiology of vascular dementia. Dementia is moderate. The patient does have signs of behavioral disturbance.  Not on chronic medications. Continue non-pharmacologic interventions to prevent delirium (No VS between 11PM-5AM, activity during day, opening blinds, providing glasses/hearing aids, and up in chair during daytime). Will avoid narcotics and benzos unless absolutely necessary. PRN anti-psychotics are prescribed to avoid self harm behaviors.  ----------------------------------------------------------  Appears to be stable.   Continue Seroquel 25 mg daily PRN for agitation, psychosis      Depression  Patient has persistent depression which is unknown and is currently controlled. Will Continue anti-depressant medications. We will not consult psychiatry at this time. Continue to monitor closely and adjust plan of care as needed.          VTE Risk Mitigation (From admission, onward)           Ordered     IP VTE HIGH RISK PATIENT  Once         02/16/24 0750     Place sequential compression device  Until discontinued         02/16/24 0750     Reason for No Pharmacological VTE Prophylaxis  Once        Question:  Reasons:  Answer:  Patient is Ambulatory    02/16/24 0750                    Discharge Planning   JOANNE:      Code Status: Full Code   Is the patient medically  ready for discharge?:     Reason for patient still in hospital (select all that apply): Patient trending condition, Treatment, and Pending disposition  Discharge Plan A: Rehab                  Missy Salguero NP  Department of Hospital Medicine   [facility]

## 2024-02-17 NOTE — SUBJECTIVE & OBJECTIVE
Interval History:  Lying in bed resting, easily awakens.  Pleasantly confused.  No overnight issues.    Review of Systems   Unable to perform ROS: Dementia     Objective:     Vital Signs (Most Recent):  Temp: 97.6 °F (36.4 °C) (02/17/24 0710)  Pulse: 64 (02/17/24 0800)  Resp: 16 (02/17/24 0800)  BP: (!) 178/81 (02/17/24 0710)  SpO2: 97 % (02/17/24 0800) Vital Signs (24h Range):  Temp:  [97.6 °F (36.4 °C)-98.1 °F (36.7 °C)] 97.6 °F (36.4 °C)  Pulse:  [64-83] 64  Resp:  [14-23] 16  SpO2:  [97 %-98 %] 97 %  BP: (150-197)/(76-85) 178/81     Weight: 80.8 kg (178 lb 2.1 oz)  Body mass index is 25.56 kg/m².    Intake/Output Summary (Last 24 hours) at 2/17/2024 1117  Last data filed at 2/17/2024 0959  Gross per 24 hour   Intake 0 ml   Output 2100 ml   Net -2100 ml         Physical Exam  Constitutional:       General: He is not in acute distress.  HENT:      Head: Normocephalic and atraumatic.      Nose: Nose normal. No congestion.      Mouth/Throat:      Mouth: Mucous membranes are moist.      Pharynx: Oropharynx is clear.   Eyes:      Extraocular Movements: Extraocular movements intact.      Conjunctiva/sclera: Conjunctivae normal.      Pupils: Pupils are equal, round, and reactive to light.   Cardiovascular:      Rate and Rhythm: Normal rate and regular rhythm.      Pulses: Normal pulses.      Heart sounds: Normal heart sounds.   Pulmonary:      Effort: Pulmonary effort is normal. No respiratory distress.      Breath sounds: Normal breath sounds.   Abdominal:      General: Bowel sounds are normal. There is no distension.      Palpations: Abdomen is soft.      Tenderness: There is no abdominal tenderness.   Musculoskeletal:         General: Tenderness (Left shoulder pain with ROM) present.      Right shoulder: Normal.      Left shoulder: Tenderness present. Decreased range of motion.      Left elbow: Decreased range of motion. Tenderness present.      Cervical back: Normal range of motion and neck supple.      Right lower  leg: Edema (Ankle and pedal edema) present.      Left lower leg: Edema (Ankle and pedal edema) present.      Comments: Unable to lift left arm.   Skin:     General: Skin is warm and dry.      Capillary Refill: Capillary refill takes less than 2 seconds.   Neurological:      General: No focal deficit present.      Mental Status: He is alert. He is disoriented and confused.      Cranial Nerves: No cranial nerve deficit.      Motor: Weakness present.      Comments: Left hand grasp weaker than right.  Unable to fully assess due to patient having limited ROM to left arm.   Psychiatric:         Attention and Perception: Attention normal.         Behavior: Behavior is cooperative.      Comments: Pleasant             Significant Labs: All pertinent labs within the past 24 hours have been reviewed.  CBC:   Recent Labs   Lab 02/15/24  2215 02/17/24  0419   WBC 7.07 7.05   HGB 10.3* 11.9*   HCT 32.0* 36.5*    184       CMP:   Recent Labs   Lab 02/15/24  2215 02/16/24  1217 02/17/24  0419    138 138   K 5.1 4.5 4.6    104 103   CO2 28 28 26   * 117* 106   BUN 30 27 29   CREATININE 2.0* 2.0* 2.0*   CALCIUM 9.5 9.8 9.9   PROT 6.0  --  6.0   ALBUMIN 3.6  --  3.7   BILITOT 0.3  --  0.5   ALKPHOS 48*  --  53*   AST 14  --  14   ALT 5*  --  4*   ANIONGAP 5* 6* 9       TSH:   Recent Labs   Lab 02/16/24  1217   TSH 3.356               Significant Imaging: I have reviewed all pertinent imaging results/findings within the past 24 hours.

## 2024-02-17 NOTE — ASSESSMENT & PLAN NOTE
Resolved.  Possibly Sundowners or advancing dementia.  UA negative for UTI  Head CT negative for anything acute  B12, folate, TSH, free T4 are unremarkable.  B1 is pending.  Continue low-dose Seroquel PRN

## 2024-02-17 NOTE — ASSESSMENT & PLAN NOTE
Patient with Acute on chronic debility due to age-related physical debility. Latest AMPA and GEMS scores have not been reviewed. . Plan includes progressive mobility protocol initated, PT/OT consulted, and fall precautions in place  -------------------------------------------------------------------------  Continue PT/OT efforts.

## 2024-02-18 PROBLEM — E86.0 DEHYDRATION: Status: ACTIVE | Noted: 2024-02-18

## 2024-02-18 LAB
ALBUMIN SERPL BCP-MCNC: 3.4 G/DL (ref 3.5–5.2)
ALP SERPL-CCNC: 44 U/L (ref 55–135)
ALT SERPL W/O P-5'-P-CCNC: 6 U/L (ref 10–44)
ANION GAP SERPL CALC-SCNC: 7 MMOL/L (ref 8–16)
AST SERPL-CCNC: 14 U/L (ref 10–40)
BASOPHILS # BLD AUTO: 0.04 K/UL (ref 0–0.2)
BASOPHILS NFR BLD: 0.6 % (ref 0–1.9)
BILIRUB SERPL-MCNC: 0.5 MG/DL (ref 0.1–1)
BUN SERPL-MCNC: 35 MG/DL (ref 10–30)
CALCIUM SERPL-MCNC: 9.4 MG/DL (ref 8.7–10.5)
CHLORIDE SERPL-SCNC: 102 MMOL/L (ref 95–110)
CO2 SERPL-SCNC: 29 MMOL/L (ref 23–29)
CREAT SERPL-MCNC: 2.3 MG/DL (ref 0.5–1.4)
DIFFERENTIAL METHOD BLD: ABNORMAL
EOSINOPHIL # BLD AUTO: 0.1 K/UL (ref 0–0.5)
EOSINOPHIL NFR BLD: 1.8 % (ref 0–8)
ERYTHROCYTE [DISTWIDTH] IN BLOOD BY AUTOMATED COUNT: 13.9 % (ref 11.5–14.5)
EST. GFR  (NO RACE VARIABLE): 26.2 ML/MIN/1.73 M^2
GLUCOSE SERPL-MCNC: 99 MG/DL (ref 70–110)
HCT VFR BLD AUTO: 32.7 % (ref 40–54)
HGB BLD-MCNC: 10.8 G/DL (ref 14–18)
IMM GRANULOCYTES # BLD AUTO: 0.02 K/UL (ref 0–0.04)
IMM GRANULOCYTES NFR BLD AUTO: 0.3 % (ref 0–0.5)
LYMPHOCYTES # BLD AUTO: 2 K/UL (ref 1–4.8)
LYMPHOCYTES NFR BLD: 29.2 % (ref 18–48)
MCH RBC QN AUTO: 30.2 PG (ref 27–31)
MCHC RBC AUTO-ENTMCNC: 33 G/DL (ref 32–36)
MCV RBC AUTO: 91 FL (ref 82–98)
MONOCYTES # BLD AUTO: 0.7 K/UL (ref 0.3–1)
MONOCYTES NFR BLD: 10.3 % (ref 4–15)
NEUTROPHILS # BLD AUTO: 3.9 K/UL (ref 1.8–7.7)
NEUTROPHILS NFR BLD: 57.8 % (ref 38–73)
NRBC BLD-RTO: 0 /100 WBC
PLATELET # BLD AUTO: 176 K/UL (ref 150–450)
PMV BLD AUTO: 11.1 FL (ref 9.2–12.9)
POTASSIUM SERPL-SCNC: 4.5 MMOL/L (ref 3.5–5.1)
PROT SERPL-MCNC: 5.4 G/DL (ref 6–8.4)
RBC # BLD AUTO: 3.58 M/UL (ref 4.6–6.2)
SODIUM SERPL-SCNC: 138 MMOL/L (ref 136–145)
WBC # BLD AUTO: 6.71 K/UL (ref 3.9–12.7)

## 2024-02-18 PROCEDURE — 25000003 PHARM REV CODE 250: Performed by: INTERNAL MEDICINE

## 2024-02-18 PROCEDURE — 99900035 HC TECH TIME PER 15 MIN (STAT)

## 2024-02-18 PROCEDURE — 21400001 HC TELEMETRY ROOM

## 2024-02-18 PROCEDURE — 80053 COMPREHEN METABOLIC PANEL: CPT | Performed by: NURSE PRACTITIONER

## 2024-02-18 PROCEDURE — 85025 COMPLETE CBC W/AUTO DIFF WBC: CPT | Performed by: NURSE PRACTITIONER

## 2024-02-18 PROCEDURE — 36415 COLL VENOUS BLD VENIPUNCTURE: CPT | Performed by: NURSE PRACTITIONER

## 2024-02-18 PROCEDURE — 94760 N-INVAS EAR/PLS OXIMETRY 1: CPT

## 2024-02-18 PROCEDURE — 25000003 PHARM REV CODE 250: Performed by: NURSE PRACTITIONER

## 2024-02-18 RX ORDER — SODIUM CHLORIDE 9 MG/ML
INJECTION, SOLUTION INTRAVENOUS CONTINUOUS
Status: DISCONTINUED | OUTPATIENT
Start: 2024-02-18 | End: 2024-02-20

## 2024-02-18 RX ADMIN — CARVEDILOL 12.5 MG: 12.5 TABLET, FILM COATED ORAL at 08:02

## 2024-02-18 RX ADMIN — CYANOCOBALAMIN TAB 1000 MCG 1000 MCG: 1000 TAB at 08:02

## 2024-02-18 RX ADMIN — CALCITRIOL CAPSULES 0.25 MCG 0.25 MCG: 0.25 CAPSULE ORAL at 08:02

## 2024-02-18 RX ADMIN — HYDRALAZINE HYDROCHLORIDE 100 MG: 25 TABLET ORAL at 08:02

## 2024-02-18 RX ADMIN — LEVETIRACETAM 750 MG: 250 TABLET, FILM COATED ORAL at 08:02

## 2024-02-18 RX ADMIN — ASPIRIN 81 MG 81 MG: 81 TABLET ORAL at 08:02

## 2024-02-18 RX ADMIN — HYDRALAZINE HYDROCHLORIDE 100 MG: 25 TABLET ORAL at 05:02

## 2024-02-18 RX ADMIN — SENNOSIDES AND DOCUSATE SODIUM 1 TABLET: 8.6; 5 TABLET ORAL at 08:02

## 2024-02-18 RX ADMIN — CITALOPRAM HYDROBROMIDE 20 MG: 20 TABLET ORAL at 08:02

## 2024-02-18 RX ADMIN — HYDRALAZINE HYDROCHLORIDE 100 MG: 25 TABLET ORAL at 02:02

## 2024-02-18 RX ADMIN — SODIUM CHLORIDE: 9 INJECTION, SOLUTION INTRAVENOUS at 08:02

## 2024-02-18 RX ADMIN — DOCUSATE SODIUM 100 MG: 100 CAPSULE, LIQUID FILLED ORAL at 08:02

## 2024-02-18 NOTE — SUBJECTIVE & OBJECTIVE
Interval History:  Lying in bed resting, easily awakens.  Oriented times 1-2, confused.  No overnight events.  Blood pressure better controlled.  Noted to have some mild dehydration and MATA on this morning's labs.    Review of Systems   Unable to perform ROS: Dementia     Objective:     Vital Signs (Most Recent):  Temp: 98.6 °F (37 °C) (02/18/24 0705)  Pulse: 66 (02/18/24 0705)  Resp: 16 (02/18/24 0705)  BP: (!) 170/74 (02/18/24 0705)  SpO2: 95 % (02/18/24 0705) Vital Signs (24h Range):  Temp:  [98.1 °F (36.7 °C)-98.7 °F (37.1 °C)] 98.6 °F (37 °C)  Pulse:  [55-66] 66  Resp:  [15-16] 16  SpO2:  [94 %-98 %] 95 %  BP: (125-210)/(58-91) 170/74     Weight: 80.8 kg (178 lb 2.1 oz)  Body mass index is 25.56 kg/m².    Intake/Output Summary (Last 24 hours) at 2/18/2024 1127  Last data filed at 2/18/2024 0834  Gross per 24 hour   Intake 560 ml   Output 400 ml   Net 160 ml         Physical Exam  Constitutional:       General: He is not in acute distress.  HENT:      Head: Normocephalic and atraumatic.      Mouth/Throat:      Mouth: Mucous membranes are dry.      Pharynx: Oropharynx is clear.   Cardiovascular:      Rate and Rhythm: Normal rate and regular rhythm.      Pulses: Normal pulses.      Heart sounds: Normal heart sounds.   Pulmonary:      Effort: Pulmonary effort is normal. No respiratory distress.      Breath sounds: Normal breath sounds.   Abdominal:      General: Bowel sounds are normal. There is no distension.      Palpations: Abdomen is soft.      Tenderness: There is no abdominal tenderness.   Musculoskeletal:      Cervical back: Normal range of motion and neck supple.      Right lower leg: Edema (Ankle/pedal) present.      Left lower leg: Edema (Ankle/pedal) present.      Comments: LROM to LUE   Skin:     General: Skin is warm and dry.      Capillary Refill: Capillary refill takes less than 2 seconds.   Neurological:      Mental Status: He is alert and easily aroused. He is disoriented and confused.      Motor:  Weakness (Generalized) present.             Significant Labs: All pertinent labs within the past 24 hours have been reviewed.  CBC:   Recent Labs   Lab 02/17/24  0419 02/18/24 0428   WBC 7.05 6.71   HGB 11.9* 10.8*   HCT 36.5* 32.7*    176     CMP:   Recent Labs   Lab 02/16/24  1217 02/17/24 0419 02/18/24 0428    138 138   K 4.5 4.6 4.5    103 102   CO2 28 26 29   * 106 99   BUN 27 29 35*   CREATININE 2.0* 2.0* 2.3*   CALCIUM 9.8 9.9 9.4   PROT  --  6.0 5.4*   ALBUMIN  --  3.7 3.4*   BILITOT  --  0.5 0.5   ALKPHOS  --  53* 44*   AST  --  14 14   ALT  --  4* 6*   ANIONGAP 6* 9 7*       Significant Imaging: I have reviewed all pertinent imaging results/findings within the past 24 hours.

## 2024-02-18 NOTE — ASSESSMENT & PLAN NOTE
Resolved.  Possibly Sundowners or advancing dementia.  Has not required any Seroquel PRN in 2 days.  UA negative for UTI  Head CT negative for anything acute  B12, folate, TSH, free T4 are unremarkable.  B1 is pending.  Continue low-dose Seroquel PRN

## 2024-02-18 NOTE — PROGRESS NOTES
[facility] Medicine  Progress Note    Patient Name: Lavon Brandon  MRN: [id number]  Patient Class: OP- Observation   Admission Date: 2/15/2024  Length of Stay: 0 days  Attending Physician: Arcelia Powell MD  Primary Care Provider: Aristides Haynes MD        Subjective:     Principal Problem:Uncontrolled hypertension        HPI:  89 BM with pmh of HTN, CKD , dementia lewy body type     He lives at home with daughter  Non smoker  Non drinker      She brought him in because she said he was having worsening hallucinations and agitation and confusion     In the ER his labs showed nothing new for him.    Same CKD .      No UTI was found.       BP was high but not dangerously high       I saw him and he was calm and talked to me appropriately         We decided to admit him to observation and watch him and make sure he was ok and that BP was controlled       Interval History:  Lying in bed resting, easily awakens.  Oriented times 1-2, confused.  No overnight events.  Blood pressure better controlled.  Noted to have some mild dehydration and MATA on this morning's labs.    Review of Systems   Unable to perform ROS: Dementia     Objective:     Vital Signs (Most Recent):  Temp: 98.6 °F (37 °C) (02/18/24 0705)  Pulse: 66 (02/18/24 0705)  Resp: 16 (02/18/24 0705)  BP: (!) 170/74 (02/18/24 0705)  SpO2: 95 % (02/18/24 0705) Vital Signs (24h Range):  Temp:  [98.1 °F (36.7 °C)-98.7 °F (37.1 °C)] 98.6 °F (37 °C)  Pulse:  [55-66] 66  Resp:  [15-16] 16  SpO2:  [94 %-98 %] 95 %  BP: (125-210)/(58-91) 170/74     Weight: 80.8 kg (178 lb 2.1 oz)  Body mass index is 25.56 kg/m².    Intake/Output Summary (Last 24 hours) at 2/18/2024 1127  Last data filed at 2/18/2024 0834  Gross per 24 hour   Intake 560 ml   Output 400 ml   Net 160 ml         Physical Exam  Constitutional:       General: He is not in acute distress.  HENT:      Head: Normocephalic and atraumatic.      Mouth/Throat:      Mouth: Mucous membranes  are dry.      Pharynx: Oropharynx is clear.   Cardiovascular:      Rate and Rhythm: Normal rate and regular rhythm.      Pulses: Normal pulses.      Heart sounds: Normal heart sounds.   Pulmonary:      Effort: Pulmonary effort is normal. No respiratory distress.      Breath sounds: Normal breath sounds.   Abdominal:      General: Bowel sounds are normal. There is no distension.      Palpations: Abdomen is soft.      Tenderness: There is no abdominal tenderness.   Musculoskeletal:      Cervical back: Normal range of motion and neck supple.      Right lower leg: Edema (Ankle/pedal) present.      Left lower leg: Edema (Ankle/pedal) present.      Comments: LROM to LUE   Skin:     General: Skin is warm and dry.      Capillary Refill: Capillary refill takes less than 2 seconds.   Neurological:      Mental Status: He is alert and easily aroused. He is disoriented and confused.      Motor: Weakness (Generalized) present.             Significant Labs: All pertinent labs within the past 24 hours have been reviewed.  CBC:   Recent Labs   Lab 02/17/24  0419 02/18/24  0428   WBC 7.05 6.71   HGB 11.9* 10.8*   HCT 36.5* 32.7*    176     CMP:   Recent Labs   Lab 02/16/24  1217 02/17/24  0419 02/18/24  0428    138 138   K 4.5 4.6 4.5    103 102   CO2 28 26 29   * 106 99   BUN 27 29 35*   CREATININE 2.0* 2.0* 2.3*   CALCIUM 9.8 9.9 9.4   PROT  --  6.0 5.4*   ALBUMIN  --  3.7 3.4*   BILITOT  --  0.5 0.5   ALKPHOS  --  53* 44*   AST  --  14 14   ALT  --  4* 6*   ANIONGAP 6* 9 7*       Significant Imaging: I have reviewed all pertinent imaging results/findings within the past 24 hours.    Assessment/Plan:      * Uncontrolled hypertension  Chronic, stable.  Has not required any IV hydralazine PRN.   Latest blood pressure and vitals reviewed-     Temp:  [98.1 °F (36.7 °C)-98.7 °F (37.1 °C)]   Pulse:  [55-66]   Resp:  [15-16]   BP: (125-210)/(58-91)   SpO2:  [94 %-98 %] .   Home meds for hypertension were reviewed  and noted below.   Hypertension Medications               amLODIPine (NORVASC) 10 MG tablet Take 1 tablet (10 mg total) by mouth before evening meal.    carvediloL (COREG) 6.25 MG tablet Take 1 tablet (6.25 mg total) by mouth 2 (two) times daily.    hydrALAZINE (APRESOLINE) 100 MG tablet Take 1 tablet (100 mg total) by mouth every 8 (eight) hours.            While in the hospital, will manage blood pressure as follows; Continue home antihypertensive regimen    Will utilize p.r.n. blood pressure medication only if patient's blood pressure greater than 180/110 and he develops symptoms such as worsening chest pain or shortness of breath.  -----------------------------------------------------------  Continue Coreg to 12.5 mg b.i.d.:  Dose increased 2/17  Continue IV hydralazine PRN  Monitor    Dehydration  With mild MATA  Start gentle IV fluid hydration  Hold Lasix  Push oral fluids  Monitor labs      Delirium  Resolved.  Possibly Sundowners or advancing dementia.  Has not required any Seroquel PRN in 2 days.  UA negative for UTI  Head CT negative for anything acute  B12, folate, TSH, free T4 are unremarkable.  B1 is pending.  Continue low-dose Seroquel PRN    Vitamin D deficiency  Very Mild  Defer management to his PCP when discharged      Physical debility  Patient with Acute on chronic debility due to age-related physical debility. Latest AMPAC and GEMS scores have not been reviewed. . Plan includes progressive mobility protocol initated, PT/OT consulted, and fall precautions in place  -------------------------------------------------------------------------  Continue PT/OT efforts.    Chronic renal disease, stage IV  Creatine mildly increased due to dehydration. BMP reviewed- noted Estimated Creatinine Clearance: 21.6 mL/min (A) (based on SCr of 2.3 mg/dL (H)). according to latest data. Based on current GFR, CKD stage is stage 3 - GFR 30-59.  Monitor UOP and serial BMP and adjust therapy as needed. Renally dose meds.  Avoid nephrotoxic medications and procedures.  -----------------------------------------------------------------  See dehydration A/P       Dementia with behavioral disturbance  Patient with dementia with likely etiology of vascular dementia. Dementia is moderate. The patient does have signs of behavioral disturbance.  Not on chronic medications. Continue non-pharmacologic interventions to prevent delirium (No VS between 11PM-5AM, activity during day, opening blinds, providing glasses/hearing aids, and up in chair during daytime). Will avoid narcotics and benzos unless absolutely necessary. PRN anti-psychotics are prescribed to avoid self harm behaviors.  ----------------------------------------------------------  Appears to be stable.   Continue Seroquel 25 mg daily PRN for agitation, psychosis      Depression  Patient has persistent depression which is unknown and is currently controlled. Will Continue anti-depressant medications. We will not consult psychiatry at this time. Continue to monitor closely and adjust plan of care as needed.          VTE Risk Mitigation (From admission, onward)           Ordered     IP VTE HIGH RISK PATIENT  Once         02/16/24 0750     Place sequential compression device  Until discontinued         02/16/24 0750     Reason for No Pharmacological VTE Prophylaxis  Once        Question:  Reasons:  Answer:  Patient is Ambulatory    02/16/24 0750                    Discharge Planning   JOANNE:      Code Status: Full Code   Is the patient medically ready for discharge?:     Reason for patient still in hospital (select all that apply): Patient trending condition, Laboratory test, Treatment, and Pending disposition  Discharge Plan A: Rehab                  Missy Salguero NP  Department of Hospital Medicine   [facility]

## 2024-02-18 NOTE — ASSESSMENT & PLAN NOTE
Creatine mildly increased due to dehydration. BMP reviewed- noted Estimated Creatinine Clearance: 21.6 mL/min (A) (based on SCr of 2.3 mg/dL (H)). according to latest data. Based on current GFR, CKD stage is stage 3 - GFR 30-59.  Monitor UOP and serial BMP and adjust therapy as needed. Renally dose meds. Avoid nephrotoxic medications and procedures.  -----------------------------------------------------------------  See dehydration A/P

## 2024-02-18 NOTE — ASSESSMENT & PLAN NOTE
Chronic, stable.  Has not required any IV hydralazine PRN.   Latest blood pressure and vitals reviewed-     Temp:  [98.1 °F (36.7 °C)-98.7 °F (37.1 °C)]   Pulse:  [55-66]   Resp:  [15-16]   BP: (125-210)/(58-91)   SpO2:  [94 %-98 %] .   Home meds for hypertension were reviewed and noted below.   Hypertension Medications               amLODIPine (NORVASC) 10 MG tablet Take 1 tablet (10 mg total) by mouth before evening meal.    carvediloL (COREG) 6.25 MG tablet Take 1 tablet (6.25 mg total) by mouth 2 (two) times daily.    hydrALAZINE (APRESOLINE) 100 MG tablet Take 1 tablet (100 mg total) by mouth every 8 (eight) hours.            While in the hospital, will manage blood pressure as follows; Continue home antihypertensive regimen    Will utilize p.r.n. blood pressure medication only if patient's blood pressure greater than 180/110 and he develops symptoms such as worsening chest pain or shortness of breath.  -----------------------------------------------------------  Continue Coreg to 12.5 mg b.i.d.:  Dose increased 2/17  Continue IV hydralazine PRN  Monitor

## 2024-02-19 LAB
ALBUMIN SERPL BCP-MCNC: 3.2 G/DL (ref 3.5–5.2)
ALP SERPL-CCNC: 39 U/L (ref 55–135)
ALT SERPL W/O P-5'-P-CCNC: 5 U/L (ref 10–44)
ANION GAP SERPL CALC-SCNC: 1 MMOL/L (ref 8–16)
AST SERPL-CCNC: 16 U/L (ref 10–40)
BASOPHILS # BLD AUTO: 0.03 K/UL (ref 0–0.2)
BASOPHILS NFR BLD: 0.4 % (ref 0–1.9)
BILIRUB SERPL-MCNC: 0.4 MG/DL (ref 0.1–1)
BUN SERPL-MCNC: 39 MG/DL (ref 10–30)
CALCIUM SERPL-MCNC: 9 MG/DL (ref 8.7–10.5)
CHLORIDE SERPL-SCNC: 106 MMOL/L (ref 95–110)
CO2 SERPL-SCNC: 28 MMOL/L (ref 23–29)
CREAT SERPL-MCNC: 2.2 MG/DL (ref 0.5–1.4)
DIFFERENTIAL METHOD BLD: ABNORMAL
EOSINOPHIL # BLD AUTO: 0.1 K/UL (ref 0–0.5)
EOSINOPHIL NFR BLD: 1.8 % (ref 0–8)
ERYTHROCYTE [DISTWIDTH] IN BLOOD BY AUTOMATED COUNT: 13.9 % (ref 11.5–14.5)
EST. GFR  (NO RACE VARIABLE): 27.6 ML/MIN/1.73 M^2
GLUCOSE SERPL-MCNC: 98 MG/DL (ref 70–110)
HCT VFR BLD AUTO: 31.3 % (ref 40–54)
HGB BLD-MCNC: 10.2 G/DL (ref 14–18)
IMM GRANULOCYTES # BLD AUTO: 0.03 K/UL (ref 0–0.04)
IMM GRANULOCYTES NFR BLD AUTO: 0.4 % (ref 0–0.5)
LEVETIRACETAM SERPL-MCNC: 55.5 UG/ML (ref 10–40)
LYMPHOCYTES # BLD AUTO: 2.1 K/UL (ref 1–4.8)
LYMPHOCYTES NFR BLD: 30.2 % (ref 18–48)
MAGNESIUM SERPL-MCNC: 1.9 MG/DL (ref 1.6–2.6)
MCH RBC QN AUTO: 29.8 PG (ref 27–31)
MCHC RBC AUTO-ENTMCNC: 32.6 G/DL (ref 32–36)
MCV RBC AUTO: 92 FL (ref 82–98)
MONOCYTES # BLD AUTO: 0.9 K/UL (ref 0.3–1)
MONOCYTES NFR BLD: 13 % (ref 4–15)
NEUTROPHILS # BLD AUTO: 3.8 K/UL (ref 1.8–7.7)
NEUTROPHILS NFR BLD: 54.2 % (ref 38–73)
NRBC BLD-RTO: 0 /100 WBC
PHOSPHATE SERPL-MCNC: 3.3 MG/DL (ref 2.7–4.5)
PLATELET # BLD AUTO: 160 K/UL (ref 150–450)
PMV BLD AUTO: 10.5 FL (ref 9.2–12.9)
POTASSIUM SERPL-SCNC: 4.7 MMOL/L (ref 3.5–5.1)
PROT SERPL-MCNC: 5.8 G/DL (ref 6–8.4)
RBC # BLD AUTO: 3.42 M/UL (ref 4.6–6.2)
SODIUM SERPL-SCNC: 135 MMOL/L (ref 136–145)
WBC # BLD AUTO: 7.06 K/UL (ref 3.9–12.7)

## 2024-02-19 PROCEDURE — 83735 ASSAY OF MAGNESIUM: CPT | Performed by: NURSE PRACTITIONER

## 2024-02-19 PROCEDURE — 99900035 HC TECH TIME PER 15 MIN (STAT)

## 2024-02-19 PROCEDURE — 85025 COMPLETE CBC W/AUTO DIFF WBC: CPT | Performed by: NURSE PRACTITIONER

## 2024-02-19 PROCEDURE — 12000002 HC ACUTE/MED SURGE SEMI-PRIVATE ROOM

## 2024-02-19 PROCEDURE — 94760 N-INVAS EAR/PLS OXIMETRY 1: CPT

## 2024-02-19 PROCEDURE — 94799 UNLISTED PULMONARY SVC/PX: CPT

## 2024-02-19 PROCEDURE — 80053 COMPREHEN METABOLIC PANEL: CPT | Performed by: NURSE PRACTITIONER

## 2024-02-19 PROCEDURE — 84100 ASSAY OF PHOSPHORUS: CPT | Performed by: NURSE PRACTITIONER

## 2024-02-19 PROCEDURE — 99900031 HC PATIENT EDUCATION (STAT)

## 2024-02-19 PROCEDURE — 25000003 PHARM REV CODE 250: Performed by: INTERNAL MEDICINE

## 2024-02-19 PROCEDURE — 97116 GAIT TRAINING THERAPY: CPT | Mod: CQ

## 2024-02-19 PROCEDURE — 25000003 PHARM REV CODE 250: Performed by: NURSE PRACTITIONER

## 2024-02-19 PROCEDURE — 94761 N-INVAS EAR/PLS OXIMETRY MLT: CPT

## 2024-02-19 PROCEDURE — 36415 COLL VENOUS BLD VENIPUNCTURE: CPT | Performed by: NURSE PRACTITIONER

## 2024-02-19 RX ADMIN — AMLODIPINE BESYLATE 10 MG: 5 TABLET ORAL at 05:02

## 2024-02-19 RX ADMIN — LEVETIRACETAM 750 MG: 250 TABLET, FILM COATED ORAL at 09:02

## 2024-02-19 RX ADMIN — CALCITRIOL CAPSULES 0.25 MCG 0.25 MCG: 0.25 CAPSULE ORAL at 08:02

## 2024-02-19 RX ADMIN — DOCUSATE SODIUM 100 MG: 100 CAPSULE, LIQUID FILLED ORAL at 08:02

## 2024-02-19 RX ADMIN — SENNOSIDES AND DOCUSATE SODIUM 1 TABLET: 8.6; 5 TABLET ORAL at 08:02

## 2024-02-19 RX ADMIN — CITALOPRAM HYDROBROMIDE 20 MG: 20 TABLET ORAL at 08:02

## 2024-02-19 RX ADMIN — CARVEDILOL 12.5 MG: 12.5 TABLET, FILM COATED ORAL at 09:02

## 2024-02-19 RX ADMIN — HYDRALAZINE HYDROCHLORIDE 100 MG: 25 TABLET ORAL at 02:02

## 2024-02-19 RX ADMIN — ASPIRIN 81 MG 81 MG: 81 TABLET ORAL at 08:02

## 2024-02-19 RX ADMIN — LEVETIRACETAM 750 MG: 250 TABLET, FILM COATED ORAL at 08:02

## 2024-02-19 RX ADMIN — SODIUM CHLORIDE: 9 INJECTION, SOLUTION INTRAVENOUS at 07:02

## 2024-02-19 RX ADMIN — CYANOCOBALAMIN TAB 1000 MCG 1000 MCG: 1000 TAB at 08:02

## 2024-02-19 RX ADMIN — HYDRALAZINE HYDROCHLORIDE 100 MG: 25 TABLET ORAL at 05:02

## 2024-02-19 RX ADMIN — HYDRALAZINE HYDROCHLORIDE 100 MG: 25 TABLET ORAL at 09:02

## 2024-02-19 NOTE — PLAN OF CARE
1415-[facility] called [facility] faxed to office of aging.    1311-Referrals for skilled placement sent via [employer]. Unable to call [facility] at this time as Office of Aging is Busy.       02/19/24 1311   Post-Acute Status   Post-Acute Authorization Placement   Post-Acute Placement Status Referrals Sent   Discharge Plan   Discharge Plan A Skilled Nursing Facility   Discharge Plan B Home Health

## 2024-02-19 NOTE — PT/OT/SLP PROGRESS
Physical Therapy Treatment    Patient Name:  Laovn Brandon   MRN:  [id number]    Recommendations:     Discharge Recommendations: Moderate Intensity Therapy  Discharge Equipment Recommendations: none  Barriers to discharge:  assist of two persons, decreased activity tolerance, decreased safety awareness, fall risk    Assessment:     Lavon Brandon is a 89 y.o. male admitted with a medical diagnosis of Uncontrolled hypertension.  He presents with the following impairments/functional limitations: weakness, impaired endurance, impaired self care skills, impaired functional mobility, gait instability, impaired balance, impaired cognition, decreased lower extremity function, decreased upper extremity function, decreased safety awareness, abnormal tone, impaired cardiopulmonary response to activity.    Pt with HOB elevated and agreeable to visit. Pt somewhat lethargic and falling asleep but more alert once seated EOB. Mod assist for supine to sit with verbal cuing for sequencing.    Pt required mod assist x 2 for sit to stand transfer with RW. Pt ambulated 30' with RW and mod assist x 1-2 persons for RW management and safety.    Pt required max assist for controlled descent into chair as pt tried to just fall into chair.  Chair alarm on and RN informed.    Rehab Prognosis: Fair; patient would benefit from acute skilled PT services to address these deficits and reach maximum level of function.    Recent Surgery: * No surgery found *      Plan:     During this hospitalization, patient to be seen 6 x/week to address the identified rehab impairments via gait training, therapeutic activities, therapeutic exercises and progress toward the following goals:    Plan of Care Expires:  03/16/24    Subjective     Chief Complaint: fatigue  Patient/Family Comments/goals: none verbalized  Pain/Comfort:  Pain Rating 1: 0/10      Objective:     Communicated with RN prior to session.  Patient found HOB elevated with telemetry, bed alarm  upon PT entry to room.     General Precautions: Standard,    Orthopedic Precautions: N/A  Braces: N/A  Respiratory Status: Room air     Functional Mobility:  Bed Mobility:     Supine to Sit: moderate assistance  Transfers:     Sit to Stand:  moderate assistance and of 2 persons with rolling walker  Gait: x 30' RW and modA x 1-2 for safety      AM-PAC 6 CLICK MOBILITY          Treatment & Education:  Pt educated on importance of time OOB, importance of intermittent mobility, safe techniques for transfers/ambulation, discharge recommendations/options, and use of call light for assistance and fall prevention.      Patient left up in chair with all lines intact, call button in reach, chair alarm on, and RN notified..    GOALS:   Multidisciplinary Problems       Physical Therapy Goals          Problem: Physical Therapy    Goal Priority Disciplines Outcome Goal Variances Interventions   Physical Therapy Goal     PT, PT/OT      Description: Goals to be met by: 3/16/2024     Patient will increase functional independence with mobility by performin. Supine to sit with MInimal Assistance  2. Sit to stand transfer with Minimal Assistance  3. Gait  x 50  feet with Contact Guard Assistance using Rolling Walker.                          Time Tracking:     PT Received On: 24  PT Start Time: 922     PT Stop Time: 934  PT Total Time (min): 12 min     Billable Minutes: Gait Training 12    Treatment Type: Treatment  PT/PTA: PTA     Number of PTA visits since last PT visit: 2024

## 2024-02-19 NOTE — PLAN OF CARE
Problem: Skin Injury Risk Increased  Goal: Skin Health and Integrity  Outcome: Ongoing, Progressing     Problem: Fall Injury Risk  Goal: Absence of Fall and Fall-Related Injury  Outcome: Ongoing, Progressing     Problem: Diabetes Comorbidity  Goal: Blood Glucose Level Within Targeted Range  Outcome: Ongoing, Progressing

## 2024-02-19 NOTE — NURSING TRANSFER
Nursing Transfer Note      2/19/2024   5:28 PM    Nurse giving handoff: EMANI Valdez  Nurse receiving handoff:jose a    Reason patient is being transferred: inpatient    Transfer To: 1108    Transfer via wheelchair    Transfer with cardiac monitoring    Transported by Carmen/ rito      Telemetry: Rhythm SR  Order for Tele Monitor? Yes          Patient belongings transferred with patient: No    Chart send with patient: Yes    Notified: daughter

## 2024-02-19 NOTE — ASSESSMENT & PLAN NOTE
With mild MATA/ creatinine improving  Continue gentle IV fluid hydration and pushing oral fluids  Continue holding Lasix  Monitor labs

## 2024-02-19 NOTE — CARE UPDATE
02/18/24 9029   Patient Assessment/Suction   Level of Consciousness (AVPU) alert   Respiratory Effort Normal;Unlabored   Expansion/Accessory Muscles/Retractions no use of accessory muscles;no retractions;expansion symmetric   All Lung Fields Breath Sounds diminished;clear;equal bilaterally   Rhythm/Pattern, Respiratory unlabored;pattern regular;shallow;no shortness of breath reported   Cough Frequency no cough   PRE-TX-O2   Device (Oxygen Therapy) room air   SpO2 98 %   Pulse Oximetry Type Intermittent   $ Pulse Oximetry - Single Charge Pulse Oximetry - Single   Pulse 65   Resp 16   Positioning   Head of Bed (HOB) Positioning HOB lowered   Aerosol Therapy   $ Aerosol Therapy Charges PRN treatment not required

## 2024-02-19 NOTE — ASSESSMENT & PLAN NOTE
Creatine mildly increased due to dehydration. BMP reviewed- noted Estimated Creatinine Clearance: 22.6 mL/min (A) (based on SCr of 2.2 mg/dL (H)). according to latest data. Based on current GFR, CKD stage is stage 3 - GFR 30-59.  Monitor UOP and serial BMP and adjust therapy as needed. Renally dose meds. Avoid nephrotoxic medications and procedures.  -----------------------------------------------------------------  See dehydration A/P

## 2024-02-19 NOTE — SUBJECTIVE & OBJECTIVE
Interval History:  Lying in bed, awake and alert.  Denies pain.  No overnight events.  No acute issues at this time.  Renal function slowly improving.  Discharge planning in progress:  Home vs SNF.    Review of Systems   Unable to perform ROS: Dementia     Objective:     Vital Signs (Most Recent):  Temp: 98.1 °F (36.7 °C) (02/19/24 0635)  Pulse: (!) 49 (02/19/24 0801)  Resp: 16 (02/19/24 0728)  BP: (!) 151/70 (02/19/24 0635)  SpO2: (!) 94 % (02/19/24 0728) Vital Signs (24h Range):  Temp:  [97.8 °F (36.6 °C)-98.8 °F (37.1 °C)] 98.1 °F (36.7 °C)  Pulse:  [49-65] 49  Resp:  [16] 16  SpO2:  [94 %-98 %] 94 %  BP: (118-160)/(59-70) 151/70     Weight: 80.8 kg (178 lb 2.1 oz)  Body mass index is 25.56 kg/m².    Intake/Output Summary (Last 24 hours) at 2/19/2024 1006  Last data filed at 2/19/2024 0956  Gross per 24 hour   Intake 820 ml   Output --   Net 820 ml         Physical Exam  Constitutional:       General: He is not in acute distress.  HENT:      Head: Normocephalic and atraumatic.      Mouth/Throat:      Mouth: Mucous membranes are moist.   Eyes:      Extraocular Movements: Extraocular movements intact.      Conjunctiva/sclera: Conjunctivae normal.      Pupils: Pupils are equal, round, and reactive to light.   Cardiovascular:      Rate and Rhythm: Normal rate and regular rhythm.   Pulmonary:      Effort: Pulmonary effort is normal. No respiratory distress.      Breath sounds: Normal breath sounds.   Abdominal:      General: Bowel sounds are normal. There is no distension.      Palpations: Abdomen is soft.      Tenderness: There is no abdominal tenderness.   Musculoskeletal:         General: Tenderness (Left arm) present.      Right lower leg: Edema (Ankle/pedal) present.      Left lower leg: Edema (Ankle/pedal) present.      Comments: LROM LUE   Skin:     General: Skin is warm and dry.      Capillary Refill: Capillary refill takes less than 2 seconds.   Neurological:      Mental Status: He is alert. Mental status is  at baseline. He is disoriented.   Psychiatric:         Attention and Perception: Attention normal.         Mood and Affect: Mood normal.         Behavior: Behavior is cooperative.             Significant Labs: All pertinent labs within the past 24 hours have been reviewed.  CBC:   Recent Labs   Lab 02/18/24 0428 02/19/24 0619   WBC 6.71 7.06   HGB 10.8* 10.2*   HCT 32.7* 31.3*    160     CMP:   Recent Labs   Lab 02/18/24 0428 02/19/24 0619    135*   K 4.5 4.7    106   CO2 29 28   GLU 99 98   BUN 35* 39*   CREATININE 2.3* 2.2*   CALCIUM 9.4 9.0   PROT 5.4* 5.8*   ALBUMIN 3.4* 3.2*   BILITOT 0.5 0.4   ALKPHOS 44* 39*   AST 14 16   ALT 6* 5*   ANIONGAP 7* 1*         Significant Imaging: I have reviewed all pertinent imaging results/findings within the past 24 hours.

## 2024-02-19 NOTE — PROGRESS NOTES
[facility] Medicine  Progress Note    Patient Name: Lavon Brandon  MRN: [id number]  Patient Class: IP- Inpatient   Admission Date: 2/15/2024  Length of Stay: 1 days  Attending Physician: Arcelia Powell MD  Primary Care Provider: Aristides Haynes MD        Subjective:     Principal Problem:Uncontrolled hypertension        HPI:  89 BM with pmh of HTN, CKD , dementia lewy body type     He lives at home with daughter  Non smoker  Non drinker      She brought him in because she said he was having worsening hallucinations and agitation and confusion     In the ER his labs showed nothing new for him.    Same CKD .      No UTI was found.       BP was high but not dangerously high       I saw him and he was calm and talked to me appropriately         We decided to admit him to observation and watch him and make sure he was ok and that BP was controlled     Overview/Hospital Course:  Mr. Brandon was admitted for acute delirium.  While here, his labs and vital signs were monitored.  His delirium workup was negative.  Vitamin B1 is pending.  He was placed on low-dose Seroquel PRN.  The delirium resolved.  The delirium may have been due to advancing dementia.  His confusion and mood did seem to wax and wane.  A brain MRI was deferred since his symptoms were not persistent or worsening.  He was noted to have worsening hypertension and he was placed on IV antihypertensive PRN and his Coreg was increased. PT and OT were consulted and evaluated him. SNF placement was recommended and discharge planning was started.      Interval History:  Lying in bed, awake and alert.  Denies pain.  No overnight events.  No acute issues at this time.  Renal function slowly improving.  Discharge planning in progress:  Home vs SNF.    Review of Systems   Unable to perform ROS: Dementia     Objective:     Vital Signs (Most Recent):  Temp: 98.1 °F (36.7 °C) (02/19/24 0635)  Pulse: (!) 49 (02/19/24 0801)  Resp: 16  (02/19/24 0728)  BP: (!) 151/70 (02/19/24 0635)  SpO2: (!) 94 % (02/19/24 0728) Vital Signs (24h Range):  Temp:  [97.8 °F (36.6 °C)-98.8 °F (37.1 °C)] 98.1 °F (36.7 °C)  Pulse:  [49-65] 49  Resp:  [16] 16  SpO2:  [94 %-98 %] 94 %  BP: (118-160)/(59-70) 151/70     Weight: 80.8 kg (178 lb 2.1 oz)  Body mass index is 25.56 kg/m².    Intake/Output Summary (Last 24 hours) at 2/19/2024 1006  Last data filed at 2/19/2024 0956  Gross per 24 hour   Intake 820 ml   Output --   Net 820 ml         Physical Exam  Constitutional:       General: He is not in acute distress.  HENT:      Head: Normocephalic and atraumatic.      Mouth/Throat:      Mouth: Mucous membranes are moist.   Eyes:      Extraocular Movements: Extraocular movements intact.      Conjunctiva/sclera: Conjunctivae normal.      Pupils: Pupils are equal, round, and reactive to light.   Cardiovascular:      Rate and Rhythm: Normal rate and regular rhythm.   Pulmonary:      Effort: Pulmonary effort is normal. No respiratory distress.      Breath sounds: Normal breath sounds.   Abdominal:      General: Bowel sounds are normal. There is no distension.      Palpations: Abdomen is soft.      Tenderness: There is no abdominal tenderness.   Musculoskeletal:         General: Tenderness (Left arm) present.      Right lower leg: Edema (Ankle/pedal) present.      Left lower leg: Edema (Ankle/pedal) present.      Comments: LROM LUE   Skin:     General: Skin is warm and dry.      Capillary Refill: Capillary refill takes less than 2 seconds.   Neurological:      Mental Status: He is alert. Mental status is at baseline. He is disoriented.   Psychiatric:         Attention and Perception: Attention normal.         Mood and Affect: Mood normal.         Behavior: Behavior is cooperative.             Significant Labs: All pertinent labs within the past 24 hours have been reviewed.  CBC:   Recent Labs   Lab 02/18/24  0428 02/19/24  0619   WBC 6.71 7.06   HGB 10.8* 10.2*   HCT 32.7* 31.3*     160     CMP:   Recent Labs   Lab 02/18/24  0428 02/19/24  0619    135*   K 4.5 4.7    106   CO2 29 28   GLU 99 98   BUN 35* 39*   CREATININE 2.3* 2.2*   CALCIUM 9.4 9.0   PROT 5.4* 5.8*   ALBUMIN 3.4* 3.2*   BILITOT 0.5 0.4   ALKPHOS 44* 39*   AST 14 16   ALT 6* 5*   ANIONGAP 7* 1*         Significant Imaging: I have reviewed all pertinent imaging results/findings within the past 24 hours.    Assessment/Plan:      * Uncontrolled hypertension  Chronic, improving.  Has not required any IV hydralazine PRN.   Latest blood pressure and vitals reviewed-     Temp:  [97.8 °F (36.6 °C)-98.8 °F (37.1 °C)]   Pulse:  [49-65]   Resp:  [16]   BP: (118-160)/(59-70)   SpO2:  [94 %-98 %] .   Home meds for hypertension were reviewed and noted below.   Hypertension Medications               amLODIPine (NORVASC) 10 MG tablet Take 1 tablet (10 mg total) by mouth before evening meal.    carvediloL (COREG) 6.25 MG tablet Take 1 tablet (6.25 mg total) by mouth 2 (two) times daily.    hydrALAZINE (APRESOLINE) 100 MG tablet Take 1 tablet (100 mg total) by mouth every 8 (eight) hours.            While in the hospital, will manage blood pressure as follows; Continue home antihypertensive regimen    Will utilize p.r.n. blood pressure medication only if patient's blood pressure greater than 180/110 and he develops symptoms such as worsening chest pain or shortness of breath.  -----------------------------------------------------------  Continue Coreg to 12.5 mg b.i.d.:  Dose increased 2/17  Continue IV hydralazine PRN  Monitor    Dehydration  With mild MATA/ creatinine improving  Continue gentle IV fluid hydration and pushing oral fluids  Continue holding Lasix  Monitor labs      Delirium  Resolved.  Possibly Sundowners or advancing dementia.  Has not required any Seroquel PRN in 2 days.  UA negative for UTI  Head CT negative for anything acute  B12, folate, TSH, free T4 are unremarkable.  B1 is pending.  Continue low-dose  Seroquel PRN    Vitamin D deficiency  Very Mild  Defer management to his PCP when discharged      Physical debility  Patient with Acute on chronic debility due to age-related physical debility. Latest [facility] and [facility] scores have not been reviewed. . Plan includes progressive mobility protocol initated, PT/OT consulted, and fall precautions in place  -------------------------------------------------------------------------  Continue PT/OT efforts.    Chronic renal disease, stage IV  Creatine mildly increased due to dehydration. BMP reviewed- noted Estimated Creatinine Clearance: 22.6 mL/min (A) (based on SCr of 2.2 mg/dL (H)). according to latest data. Based on current GFR, CKD stage is stage 3 - GFR 30-59.  Monitor UOP and serial BMP and adjust therapy as needed. Renally dose meds. Avoid nephrotoxic medications and procedures.  -----------------------------------------------------------------  See dehydration A/P       Dementia with behavioral disturbance  Patient with dementia with likely etiology of vascular dementia. Dementia is moderate. The patient does have signs of behavioral disturbance.  Not on chronic medications. Continue non-pharmacologic interventions to prevent delirium (No VS between 11PM-5AM, activity during day, opening blinds, providing glasses/hearing aids, and up in chair during daytime). Will avoid narcotics and benzos unless absolutely necessary. PRN anti-psychotics are prescribed to avoid self harm behaviors.  ----------------------------------------------------------  Appears to be stable.   Continue Seroquel 25 mg daily PRN for agitation, psychosis      Depression  Patient has persistent depression which is unknown and is currently controlled. Will Continue anti-depressant medications. We will not consult psychiatry at this time. Continue to monitor closely and adjust plan of care as needed.          VTE Risk Mitigation (From admission, onward)           Ordered     IP VTE HIGH RISK PATIENT   Once         02/16/24 0750     Place sequential compression device  Until discontinued         02/16/24 0750     Reason for No Pharmacological VTE Prophylaxis  Once        Question:  Reasons:  Answer:  Patient is Ambulatory    02/16/24 0750                    Discharge Planning   JOANNE: 2/21/2024     Code Status: Full Code   Is the patient medically ready for discharge?:     Reason for patient still in hospital (select all that apply): Patient trending condition, Treatment, and Pending disposition  Discharge Plan A: Rehab                  Missy Salguero NP  Department of Hospital Medicine   [facility]

## 2024-02-19 NOTE — ASSESSMENT & PLAN NOTE
Chronic, improving.  Has not required any IV hydralazine PRN.   Latest blood pressure and vitals reviewed-     Temp:  [97.8 °F (36.6 °C)-98.8 °F (37.1 °C)]   Pulse:  [49-65]   Resp:  [16]   BP: (118-160)/(59-70)   SpO2:  [94 %-98 %] .   Home meds for hypertension were reviewed and noted below.   Hypertension Medications               amLODIPine (NORVASC) 10 MG tablet Take 1 tablet (10 mg total) by mouth before evening meal.    carvediloL (COREG) 6.25 MG tablet Take 1 tablet (6.25 mg total) by mouth 2 (two) times daily.    hydrALAZINE (APRESOLINE) 100 MG tablet Take 1 tablet (100 mg total) by mouth every 8 (eight) hours.            While in the hospital, will manage blood pressure as follows; Continue home antihypertensive regimen    Will utilize p.r.n. blood pressure medication only if patient's blood pressure greater than 180/110 and he develops symptoms such as worsening chest pain or shortness of breath.  -----------------------------------------------------------  Continue Coreg to 12.5 mg b.i.d.:  Dose increased 2/17  Continue IV hydralazine PRN  Monitor

## 2024-02-20 PROBLEM — E86.0 DEHYDRATION: Status: RESOLVED | Noted: 2024-02-18 | Resolved: 2024-02-20

## 2024-02-20 LAB
ALBUMIN SERPL BCP-MCNC: 3.4 G/DL (ref 3.5–5.2)
ALP SERPL-CCNC: 44 U/L (ref 55–135)
ALT SERPL W/O P-5'-P-CCNC: 5 U/L (ref 10–44)
ANION GAP SERPL CALC-SCNC: 5 MMOL/L (ref 8–16)
AST SERPL-CCNC: 13 U/L (ref 10–40)
BASOPHILS # BLD AUTO: 0.03 K/UL (ref 0–0.2)
BASOPHILS NFR BLD: 0.5 % (ref 0–1.9)
BILIRUB SERPL-MCNC: 0.4 MG/DL (ref 0.1–1)
BUN SERPL-MCNC: 33 MG/DL (ref 10–30)
CALCIUM SERPL-MCNC: 9.1 MG/DL (ref 8.7–10.5)
CHLORIDE SERPL-SCNC: 103 MMOL/L (ref 95–110)
CO2 SERPL-SCNC: 28 MMOL/L (ref 23–29)
CREAT SERPL-MCNC: 1.9 MG/DL (ref 0.5–1.4)
DIFFERENTIAL METHOD BLD: ABNORMAL
EOSINOPHIL # BLD AUTO: 0.1 K/UL (ref 0–0.5)
EOSINOPHIL NFR BLD: 2.1 % (ref 0–8)
ERYTHROCYTE [DISTWIDTH] IN BLOOD BY AUTOMATED COUNT: 13.7 % (ref 11.5–14.5)
EST. GFR  (NO RACE VARIABLE): 32.9 ML/MIN/1.73 M^2
GLUCOSE SERPL-MCNC: 111 MG/DL (ref 70–110)
HCT VFR BLD AUTO: 34.1 % (ref 40–54)
HGB BLD-MCNC: 11 G/DL (ref 14–18)
IMM GRANULOCYTES # BLD AUTO: 0.02 K/UL (ref 0–0.04)
IMM GRANULOCYTES NFR BLD AUTO: 0.3 % (ref 0–0.5)
LYMPHOCYTES # BLD AUTO: 2 K/UL (ref 1–4.8)
LYMPHOCYTES NFR BLD: 31.7 % (ref 18–48)
MCH RBC QN AUTO: 30 PG (ref 27–31)
MCHC RBC AUTO-ENTMCNC: 32.3 G/DL (ref 32–36)
MCV RBC AUTO: 93 FL (ref 82–98)
MONOCYTES # BLD AUTO: 0.7 K/UL (ref 0.3–1)
MONOCYTES NFR BLD: 10.8 % (ref 4–15)
NEUTROPHILS # BLD AUTO: 3.5 K/UL (ref 1.8–7.7)
NEUTROPHILS NFR BLD: 54.6 % (ref 38–73)
NRBC BLD-RTO: 0 /100 WBC
PLATELET # BLD AUTO: 167 K/UL (ref 150–450)
PMV BLD AUTO: 10.5 FL (ref 9.2–12.9)
POTASSIUM SERPL-SCNC: 4.4 MMOL/L (ref 3.5–5.1)
PROT SERPL-MCNC: 6.2 G/DL (ref 6–8.4)
RBC # BLD AUTO: 3.67 M/UL (ref 4.6–6.2)
SODIUM SERPL-SCNC: 136 MMOL/L (ref 136–145)
WBC # BLD AUTO: 6.31 K/UL (ref 3.9–12.7)

## 2024-02-20 PROCEDURE — 94761 N-INVAS EAR/PLS OXIMETRY MLT: CPT

## 2024-02-20 PROCEDURE — 80053 COMPREHEN METABOLIC PANEL: CPT | Performed by: NURSE PRACTITIONER

## 2024-02-20 PROCEDURE — 99900035 HC TECH TIME PER 15 MIN (STAT)

## 2024-02-20 PROCEDURE — 85025 COMPLETE CBC W/AUTO DIFF WBC: CPT | Performed by: NURSE PRACTITIONER

## 2024-02-20 PROCEDURE — 97165 OT EVAL LOW COMPLEX 30 MIN: CPT

## 2024-02-20 PROCEDURE — 25000003 PHARM REV CODE 250: Performed by: NURSE PRACTITIONER

## 2024-02-20 PROCEDURE — 97116 GAIT TRAINING THERAPY: CPT | Mod: CQ

## 2024-02-20 PROCEDURE — 97535 SELF CARE MNGMENT TRAINING: CPT

## 2024-02-20 PROCEDURE — 12000002 HC ACUTE/MED SURGE SEMI-PRIVATE ROOM

## 2024-02-20 PROCEDURE — 25000003 PHARM REV CODE 250: Performed by: STUDENT IN AN ORGANIZED HEALTH CARE EDUCATION/TRAINING PROGRAM

## 2024-02-20 PROCEDURE — 25000003 PHARM REV CODE 250: Performed by: INTERNAL MEDICINE

## 2024-02-20 PROCEDURE — 99900031 HC PATIENT EDUCATION (STAT)

## 2024-02-20 PROCEDURE — 36415 COLL VENOUS BLD VENIPUNCTURE: CPT | Performed by: NURSE PRACTITIONER

## 2024-02-20 RX ORDER — HYDROCHLOROTHIAZIDE 12.5 MG/1
12.5 TABLET ORAL DAILY
Status: DISCONTINUED | OUTPATIENT
Start: 2024-02-20 | End: 2024-02-23 | Stop reason: HOSPADM

## 2024-02-20 RX ORDER — CARVEDILOL 6.25 MG/1
6.25 TABLET ORAL 2 TIMES DAILY
Status: DISCONTINUED | OUTPATIENT
Start: 2024-02-20 | End: 2024-02-23 | Stop reason: HOSPADM

## 2024-02-20 RX ADMIN — LEVETIRACETAM 750 MG: 250 TABLET, FILM COATED ORAL at 09:02

## 2024-02-20 RX ADMIN — HYDRALAZINE HYDROCHLORIDE 100 MG: 25 TABLET ORAL at 03:02

## 2024-02-20 RX ADMIN — CITALOPRAM HYDROBROMIDE 20 MG: 20 TABLET ORAL at 09:02

## 2024-02-20 RX ADMIN — HYDROCHLOROTHIAZIDE 12.5 MG: 12.5 TABLET ORAL at 09:02

## 2024-02-20 RX ADMIN — HYDRALAZINE HYDROCHLORIDE 100 MG: 25 TABLET ORAL at 09:02

## 2024-02-20 RX ADMIN — DOCUSATE SODIUM 100 MG: 100 CAPSULE, LIQUID FILLED ORAL at 09:02

## 2024-02-20 RX ADMIN — CYANOCOBALAMIN TAB 1000 MCG 1000 MCG: 1000 TAB at 09:02

## 2024-02-20 RX ADMIN — ASPIRIN 81 MG 81 MG: 81 TABLET ORAL at 09:02

## 2024-02-20 RX ADMIN — CALCITRIOL CAPSULES 0.25 MCG 0.25 MCG: 0.25 CAPSULE ORAL at 09:02

## 2024-02-20 RX ADMIN — HYDRALAZINE HYDROCHLORIDE 100 MG: 25 TABLET ORAL at 05:02

## 2024-02-20 RX ADMIN — SODIUM CHLORIDE: 9 INJECTION, SOLUTION INTRAVENOUS at 05:02

## 2024-02-20 RX ADMIN — SENNOSIDES AND DOCUSATE SODIUM 1 TABLET: 8.6; 5 TABLET ORAL at 09:02

## 2024-02-20 RX ADMIN — CARVEDILOL 6.25 MG: 6.25 TABLET, FILM COATED ORAL at 09:02

## 2024-02-20 NOTE — NURSING
Help tx pt back to be from chair. Pt needed 2 person assist to get back in bed. Pt having problems with moving his feet.  Pt arranged in bed. Call bell in reach, pt instructed to call if anything is needed.

## 2024-02-20 NOTE — CARE UPDATE
02/20/24 0831   Patient Assessment/Suction   Level of Consciousness (AVPU) alert   Respiratory Effort Normal;Unlabored   Expansion/Accessory Muscles/Retractions no retractions;no use of accessory muscles   All Lung Fields Breath Sounds clear;equal bilaterally   Rhythm/Pattern, Respiratory no shortness of breath reported;pattern regular;unlabored   Cough Frequency no cough   PRE-TX-O2   Device (Oxygen Therapy) room air   SpO2 97 %   Pulse Oximetry Type Intermittent   $ Pulse Oximetry - Multiple Charge Pulse Oximetry - Multiple   Pulse (!) 52   Resp 16   Aerosol Therapy   $ Aerosol Therapy Charges PRN treatment not required   Respiratory Treatment Status (SVN) PRN treatment not required   Education   $ Education 15 min;Other (see comment)

## 2024-02-20 NOTE — PT/OT/SLP PROGRESS
Physical Therapy Treatment    Patient Name:  Lavon Brandon   MRN:  [id number]    Recommendations:     Discharge Recommendations: Moderate Intensity Therapy  Discharge Equipment Recommendations: none  Barriers to discharge:  increased assist with mobility, decreased activity tolerance, decreased safety awareness, fall risk    Assessment:     Lavon Brandon is a 89 y.o. male admitted with a medical diagnosis of Uncontrolled hypertension.  He presents with the following impairments/functional limitations: weakness, impaired endurance, impaired self care skills, impaired functional mobility, gait instability, impaired balance, impaired cognition, decreased lower extremity function, decreased upper extremity function, decreased safety awareness, abnormal tone, impaired cardiopulmonary response to activity.    Pt up in chair with daughter present. Pt agreeable to visit. Therapist assisted pt with donning socks and shoes in preparation for ambulation.    Pt required mod assist for sit to stand transfer with moderate verbal cuing for sequencing and hand placement with therapist having to guide pt's hands to arms of chair as pt kept reaching for the RW.    Pt ambulated 70' with RW and min to mod assist for RW management due to decreased proximity and for obstacle navigation. Pt with decreased step length and decreased foot clearance.    Pt left up in chair with daughter and extender present to take vitals.    Rehab Prognosis: Fair; patient would benefit from acute skilled PT services to address these deficits and reach maximum level of function.    Recent Surgery: * No surgery found *      Plan:     During this hospitalization, patient to be seen 6 x/week to address the identified rehab impairments via gait training, therapeutic activities, therapeutic exercises and progress toward the following goals:    Plan of Care Expires:  03/16/24    Subjective     Chief Complaint: none verbalized  Patient/Family Comments/goals: to  get stronger  Pain/Comfort:  Pain Rating 1: 0/10      Objective:     Communicated with RN prior to session.  Patient found up in chair with telemetry, bed alarm upon PT entry to room.     General Precautions: Standard,    Orthopedic Precautions: N/A  Braces: N/A  Respiratory Status: Room air     Functional Mobility:  Transfers:     Sit to Stand:  moderate assistance with rolling walker  Gait: x 70' with RW and min-modA      AM-PAC 6 CLICK MOBILITY          Treatment & Education:  Pt educated on importance of time OOB, importance of intermittent mobility, safe techniques for transfers/ambulation, discharge recommendations/options, and use of call light for assistance and fall prevention.      Patient left up in chair with all lines intact, call button in reach, and daughter and extender present..    GOALS:   Multidisciplinary Problems       Physical Therapy Goals          Problem: Physical Therapy    Goal Priority Disciplines Outcome Goal Variances Interventions   Physical Therapy Goal     PT, PT/OT      Description: Goals to be met by: 3/16/2024     Patient will increase functional independence with mobility by performin. Supine to sit with MInimal Assistance  2. Sit to stand transfer with Minimal Assistance  3. Gait  x 50  feet with Contact Guard Assistance using Rolling Walker.                          Time Tracking:     PT Received On: 24  PT Start Time: 1113     PT Stop Time: 1127  PT Total Time (min): 14 min     Billable Minutes: Gait Training 14    Treatment Type: Treatment  PT/PTA: PTA     Number of PTA visits since last PT visit: 2     2024

## 2024-02-20 NOTE — PLAN OF CARE
CM s/w daughter Nilda regarding SNF placement. Family would prefer the [place] area if possible. DANIEL informed Nilda that pt was denied by:    [facility]    [facility] [place]    [facility] bed    DANIEL informed Nilda that [facility]. [facility] has submitted for auth. Nilda will talk to sister, Gabriela to decide which facility they prefer.DANIEL will continue to follow up.

## 2024-02-20 NOTE — ASSESSMENT & PLAN NOTE
Resolved. Attributed to his dementia.  Workup negative.  B1 is pending.  - Continue low-dose Seroquel PRN (has not been needing this)

## 2024-02-20 NOTE — NURSING
02/20/2024      Assumed care of patient.   Assessment and vital signs assessed.   Labs and meds reviewed, see flowsheets for more details.  Will continue to monitor this shift.      Pt awake in the bed, looking at tv, call bell in reach, bed in lowest position, no pain or concerns at this time.

## 2024-02-20 NOTE — ASSESSMENT & PLAN NOTE
Creatine stable at baseline. BMP reviewed- noted Estimated Creatinine Clearance: 26.1 mL/min (A) (based on SCr of 1.9 mg/dL (H)). according to latest data. Based on current GFR, CKD stage is stage 3 - GFR 30-59.  Monitor UOP and serial BMP and adjust therapy as needed. Renally dose meds. Avoid nephrotoxic medications and procedures.

## 2024-02-20 NOTE — CARE UPDATE
02/19/24 1950   Patient Assessment/Suction   Level of Consciousness (AVPU) alert   Respiratory Effort Normal;Unlabored   Expansion/Accessory Muscles/Retractions no use of accessory muscles   All Lung Fields Breath Sounds equal bilaterally;clear   Rhythm/Pattern, Respiratory unlabored   Cough Frequency no cough   PRE-TX-O2   Device (Oxygen Therapy) room air   SpO2 95 %   Pulse Oximetry Type Intermittent   $ Pulse Oximetry - Multiple Charge Pulse Oximetry - Multiple   Pulse (!) 57   Resp 16   Aerosol Therapy   $ Aerosol Therapy Charges PRN treatment not required   Education   $ Education Bronchodilator;15 min   Respiratory Evaluation   $ Care Plan Tech Time 15 min   $ Eval/Re-eval Charges Evaluation   Evaluation For New Orders   Admitting Diagnosis HTN   Home Oxygen   Has Home Oxygen? No   Home Aerosol, MDI, DPI, and Other Treatments/Therapies   Home Respiratory Therapy Per Patient/Review of Chart No

## 2024-02-20 NOTE — ASSESSMENT & PLAN NOTE
Chronic, improving.    - continue amlodipine, hydralazine  - decrease coreg back to home dose for bradycardia  - add hctz 12.5mg daily

## 2024-02-20 NOTE — PROGRESS NOTES
[facility] Medicine  Progress Note    Patient Name: Lavon Brandon  MRN: [id number]  Patient Class: IP- Inpatient   Admission Date: 2/15/2024  Length of Stay: 2 days  Attending Physician: Ezequiel Sales MD  Primary Care Provider: Aristides Haynes MD        Subjective:     Principal Problem:Uncontrolled hypertension        HPI:  89 BM with pmh of HTN, CKD , dementia lewy body type     He lives at home with daughter  Non smoker  Non drinker      She brought him in because she said he was having worsening hallucinations and agitation and confusion     In the ER his labs showed nothing new for him.    Same CKD .      No UTI was found.       BP was high but not dangerously high       I saw him and he was calm and talked to me appropriately         We decided to admit him to observation and watch him and make sure he was ok and that BP was controlled     Overview/Hospital Course:  Mr. Brandon was admitted for acute delirium on lewy body dementia with insomnia, hallucinations, and agitation.  His delirium workup was negative.  Vitamin B1 is pending.  He was placed on low-dose Seroquel PRN which he received one time.  The delirium resolved.  A brain MRI was deferred since his symptoms were not persistent or worsening.  He was noted to have uncontrolled hypertension and his coreg was increased and then decreased due to bradycardia. HCTZ was added. PT and OT were consulted. Planned for SNF placement.    Interval History: Patient seen and examined. DELANEY. Pending SNF. D/w daughter bedside.        Objective:     Vital Signs (Most Recent):  Temp: 97.5 °F (36.4 °C) (02/20/24 1126)  Pulse: (!) 51 (02/20/24 1126)  Resp: 17 (02/20/24 1126)  BP: (!) 159/55 (02/20/24 1126)  SpO2: 97 % (02/20/24 1126) Vital Signs (24h Range):  Temp:  [97.5 °F (36.4 °C)-98.4 °F (36.9 °C)] 97.5 °F (36.4 °C)  Pulse:  [50-61] 51  Resp:  [16-17] 17  SpO2:  [95 %-98 %] 97 %  BP: (148-177)/(51-62) 159/55     Weight: 80.8 kg (178 lb  2.1 oz)  Body mass index is 25.56 kg/m².    Intake/Output Summary (Last 24 hours) at 2/20/2024 1313  Last data filed at 2/20/2024 1131  Gross per 24 hour   Intake 1505 ml   Output 300 ml   Net 1205 ml         Physical Exam  Vitals reviewed.   Constitutional:       General: He is not in acute distress.  HENT:      Head: Normocephalic and atraumatic.   Cardiovascular:      Rate and Rhythm: Normal rate and regular rhythm.   Pulmonary:      Effort: Pulmonary effort is normal. No respiratory distress.   Skin:     General: Skin is warm and dry.   Neurological:      General: No focal deficit present.      Mental Status: He is alert. Mental status is at baseline. He is disoriented.   Psychiatric:         Mood and Affect: Affect normal.         Behavior: Behavior normal.             Significant Labs: All pertinent labs within the past 24 hours have been reviewed.    Significant Imaging: I have reviewed all pertinent imaging results/findings within the past 24 hours.    Assessment/Plan:      * Delirium  Resolved. Attributed to his dementia.  Workup negative.  B1 is pending.  - Continue low-dose Seroquel PRN (has not been needing this)    Physical debility  - PT/OT  - pending SNF placement    Vitamin D deficiency  Very Mild  Defer management to his PCP when discharged    Chronic renal disease, stage IV  Creatine stable at baseline. BMP reviewed- noted Estimated Creatinine Clearance: 26.1 mL/min (A) (based on SCr of 1.9 mg/dL (H)). according to latest data. Based on current GFR, CKD stage is stage 3 - GFR 30-59.  Monitor UOP and serial BMP and adjust therapy as needed. Renally dose meds. Avoid nephrotoxic medications and procedures.      Dementia with behavioral disturbance  Patient with dementia with likely etiology of lewy body dementia. Dementia is moderate. The patient does not currently have signs of behavioral disturbance.  Not on chronic medications. Continue non-pharmacologic interventions to prevent delirium (No VS  between 11PM-5AM, activity during day, opening blinds, providing glasses/hearing aids, and up in chair during daytime). Will avoid narcotics and benzos unless absolutely necessary. PRN anti-psychotics are prescribed to avoid self harm behaviors.    Depression  Patient has persistent depression which is unknown and is currently controlled. Will Continue anti-depressant medications. We will not consult psychiatry at this time. Continue to monitor closely and adjust plan of care as needed.    Uncontrolled hypertension  Chronic, improving.    - continue amlodipine, hydralazine  - decrease coreg back to home dose for bradycardia  - add hctz 12.5mg daily      VTE Risk Mitigation (From admission, onward)           Ordered     IP VTE HIGH RISK PATIENT  Once         02/16/24 0750     Place sequential compression device  Until discontinued         02/16/24 0750     Reason for No Pharmacological VTE Prophylaxis  Once        Question:  Reasons:  Answer:  Patient is Ambulatory    02/16/24 0750                    Discharge Planning   JOANNE: 2/21/2024     Code Status: Full Code   Is the patient medically ready for discharge?:     Reason for patient still in hospital (select all that apply): Pending disposition  Discharge Plan A: Skilled Nursing Facility   Discharge Delays: None known at this time              Ezequiel Sales MD  Department of Hospital Medicine   [facility]

## 2024-02-20 NOTE — PT/OT/SLP EVAL
Occupational Therapy   Evaluation    Name: Lavon Brandon  MRN: [id number]  Admitting Diagnosis: Delirium  Recent Surgery: * No surgery found *      Recommendations:     Discharge Recommendations: Moderate Intensity Therapy  Discharge Equipment Recommendations:  none  Barriers to discharge:   (requires assistance for safe completion of ADLs)    Assessment:     Lavon Brandon is a 89 y.o. male with a medical diagnosis of Delirium.  Performance deficits affecting function: weakness, impaired endurance, impaired self care skills, impaired functional mobility, gait instability, impaired balance, impaired cognition, decreased safety awareness.      Pt presented supine; he was oriented to himself and knew he was in a hospital; re-orientation provided.  Pt was assisted out of bed to the chair for seated ADLs.      Rehab Prognosis: Fair; patient would benefit from acute skilled OT services to address these deficits and reach maximum level of function.       Plan:     Patient to be seen 5 x/week to address the above listed problems via self-care/home management, therapeutic activities, therapeutic exercises, cognitive retraining  Plan of Care Expires:    Plan of Care Reviewed with: patient    Subjective     Chief Complaint: none stated  Patient/Family Comments/goals: none stated    Occupational Profile:  Living Environment: Lives with one of his daughters  Previous level of function: Required assistance with ADLs; using wheelchair more than walker prior to admit    Equipment Used at Home: bedside commode, walker, rolling, wheelchair, shower chair, hospital bed  Assistance upon Discharge: adult children     Pain/Comfort:  Pain Rating 1: 0/10  Pain Rating Post-Intervention 1: 0/10    Objective:     Communicated with: nurse prior to session.  Patient found supine with telemetry upon OT entry to room.    General Precautions: Standard, fall  Respiratory Status: Room air    Occupational Performance:    Bed Mobility:    Patient  completed Supine to Sit with stand by assistance    Functional Mobility/Transfers:  Patient completed Sit <> Stand Transfer with minimum assistance  with  rolling walker   Functional Mobility: 3 steps to chair Min A with RW    Activities of Daily Living:  Grooming: stand by assistance seated in bedside chair    Cognitive/Visual Perceptual:  Cognitive/Psychosocial Skills:     -       Oriented to: Person and Place   -       Follows Commands/attention:Follows one-step commands  -       Communication: clear/fluent  -       Memory: Impaired STM  -       Safety awareness/insight to disability: impaired     Physical Exam:  Upper Extremity Range of Motion:     -       Right Upper Extremity: WFL except limited shoulder flexion  -       Left Upper Extremity: WFL except limited shoulder flexion  Upper Extremity Strength:    -       Right Upper Extremity: WFL except at shoulder  -       Left Upper Extremity: WFL except at shoulder   Strength:    -       Right Upper Extremity: WFL  -       Left Upper Extremity: WFL  Fine Motor Coordination:    -       Intact  Gross motor coordination:   WFL    AMPAC 6 Click ADL:  AMPAC Total Score: 17    Treatment & Education:  Therapist provided facilitation and instruction of proper body mechanics and fall prevention strategies during tasks listed above.   Instructed patient to sit in bedside chair as tolerated daily to increase OOB/activity tolerance.   Instructed patient to use call light to have nursing staff assist with needs/transfers.   Discussed OT POC and answered all questions within OT scope of practice.    Patient left up in chair with all lines intact, call button in reach, and chair alarm on    GOALS:   Multidisciplinary Problems       Occupational Therapy Goals          Problem: Occupational Therapy    Goal Priority Disciplines Outcome Interventions   Occupational Therapy Goal     OT, PT/OT     Description: Goals to be met by: 3/20/24     Patient will increase functional  independence with ADLs by performing:    UE Dressing with Supervision.  LE Dressing with Minimal Assistance and Assistive Devices as needed.  Grooming while seated at sink with Supervision.  Toileting from toilet with Supervision for hygiene and clothing management.   Toilet transfer to toilet with Supervision.                         History:     Past Medical History:   Diagnosis Date    Bradyarrhythmia     CVA (cerebral infarction) 2006    Dementia     Depression     Hyperlipidemia     Hypertension     renal htn    Pulmonary embolism 2002    Seizure disorder          Past Surgical History:   Procedure Laterality Date    left foot  with crushed injry         Time Tracking:     OT Date of Treatment: 02/20/24  OT Start Time: 1008  OT Stop Time: 1034  OT Total Time (min): 26 min    Billable Minutes:Evaluation 14  Self Care/Home Management 12    2/20/2024

## 2024-02-20 NOTE — ASSESSMENT & PLAN NOTE
Patient with dementia with likely etiology of lewy body dementia. Dementia is moderate. The patient does not currently have signs of behavioral disturbance.  Not on chronic medications. Continue non-pharmacologic interventions to prevent delirium (No VS between 11PM-5AM, activity during day, opening blinds, providing glasses/hearing aids, and up in chair during daytime). Will avoid narcotics and benzos unless absolutely necessary. PRN anti-psychotics are prescribed to avoid self harm behaviors.

## 2024-02-20 NOTE — PLAN OF CARE
1630- SW spoke with Aydee ([facility]). Pt able to be accepted. At this time no other accepting facilities. Requested facility submit for auth.

## 2024-02-20 NOTE — SUBJECTIVE & OBJECTIVE
Interval History: Patient seen and examined. SONJAISABEL. Pending SNF. D/w daughter bedside.        Objective:     Vital Signs (Most Recent):  Temp: 97.5 °F (36.4 °C) (02/20/24 1126)  Pulse: (!) 51 (02/20/24 1126)  Resp: 17 (02/20/24 1126)  BP: (!) 159/55 (02/20/24 1126)  SpO2: 97 % (02/20/24 1126) Vital Signs (24h Range):  Temp:  [97.5 °F (36.4 °C)-98.4 °F (36.9 °C)] 97.5 °F (36.4 °C)  Pulse:  [50-61] 51  Resp:  [16-17] 17  SpO2:  [95 %-98 %] 97 %  BP: (148-177)/(51-62) 159/55     Weight: 80.8 kg (178 lb 2.1 oz)  Body mass index is 25.56 kg/m².    Intake/Output Summary (Last 24 hours) at 2/20/2024 1313  Last data filed at 2/20/2024 1131  Gross per 24 hour   Intake 1505 ml   Output 300 ml   Net 1205 ml         Physical Exam  Vitals reviewed.   Constitutional:       General: He is not in acute distress.  HENT:      Head: Normocephalic and atraumatic.   Cardiovascular:      Rate and Rhythm: Normal rate and regular rhythm.   Pulmonary:      Effort: Pulmonary effort is normal. No respiratory distress.   Skin:     General: Skin is warm and dry.   Neurological:      General: No focal deficit present.      Mental Status: He is alert. Mental status is at baseline. He is disoriented.   Psychiatric:         Mood and Affect: Affect normal.         Behavior: Behavior normal.             Significant Labs: All pertinent labs within the past 24 hours have been reviewed.    Significant Imaging: I have reviewed all pertinent imaging results/findings within the past 24 hours.

## 2024-02-20 NOTE — PLAN OF CARE
Problem: Physical Therapy  Goal: Physical Therapy Goal  Description: Goals to be met by: 3/16/2024     Patient will increase functional independence with mobility by performin. Supine to sit with MInimal Assistance  2. Sit to stand transfer with Minimal Assistance  3. Gait  x 50  feet with Contact Guard Assistance using Rolling Walker.     Outcome: Ongoing, Progressing

## 2024-02-21 LAB
ALBUMIN SERPL BCP-MCNC: 3.2 G/DL (ref 3.5–5.2)
ALP SERPL-CCNC: 39 U/L (ref 55–135)
ALT SERPL W/O P-5'-P-CCNC: 5 U/L (ref 10–44)
ANION GAP SERPL CALC-SCNC: 3 MMOL/L (ref 8–16)
AST SERPL-CCNC: 12 U/L (ref 10–40)
BASOPHILS # BLD AUTO: 0.02 K/UL (ref 0–0.2)
BASOPHILS NFR BLD: 0.3 % (ref 0–1.9)
BILIRUB SERPL-MCNC: 0.4 MG/DL (ref 0.1–1)
BUN SERPL-MCNC: 34 MG/DL (ref 10–30)
CALCIUM SERPL-MCNC: 9 MG/DL (ref 8.7–10.5)
CHLORIDE SERPL-SCNC: 105 MMOL/L (ref 95–110)
CO2 SERPL-SCNC: 30 MMOL/L (ref 23–29)
CREAT SERPL-MCNC: 1.9 MG/DL (ref 0.5–1.4)
DIFFERENTIAL METHOD BLD: ABNORMAL
EOSINOPHIL # BLD AUTO: 0.1 K/UL (ref 0–0.5)
EOSINOPHIL NFR BLD: 2.1 % (ref 0–8)
ERYTHROCYTE [DISTWIDTH] IN BLOOD BY AUTOMATED COUNT: 13.5 % (ref 11.5–14.5)
EST. GFR  (NO RACE VARIABLE): 32.9 ML/MIN/1.73 M^2
GLUCOSE SERPL-MCNC: 108 MG/DL (ref 70–110)
HCT VFR BLD AUTO: 30.8 % (ref 40–54)
HGB BLD-MCNC: 10 G/DL (ref 14–18)
IMM GRANULOCYTES # BLD AUTO: 0.02 K/UL (ref 0–0.04)
IMM GRANULOCYTES NFR BLD AUTO: 0.3 % (ref 0–0.5)
LYMPHOCYTES # BLD AUTO: 1.8 K/UL (ref 1–4.8)
LYMPHOCYTES NFR BLD: 30.2 % (ref 18–48)
MCH RBC QN AUTO: 29.8 PG (ref 27–31)
MCHC RBC AUTO-ENTMCNC: 32.5 G/DL (ref 32–36)
MCV RBC AUTO: 92 FL (ref 82–98)
MONOCYTES # BLD AUTO: 0.7 K/UL (ref 0.3–1)
MONOCYTES NFR BLD: 12.1 % (ref 4–15)
NEUTROPHILS # BLD AUTO: 3.4 K/UL (ref 1.8–7.7)
NEUTROPHILS NFR BLD: 55 % (ref 38–73)
NRBC BLD-RTO: 0 /100 WBC
PLATELET # BLD AUTO: 162 K/UL (ref 150–450)
PMV BLD AUTO: 11.1 FL (ref 9.2–12.9)
POTASSIUM SERPL-SCNC: 4.8 MMOL/L (ref 3.5–5.1)
PROT SERPL-MCNC: 5.7 G/DL (ref 6–8.4)
RBC # BLD AUTO: 3.36 M/UL (ref 4.6–6.2)
SODIUM SERPL-SCNC: 138 MMOL/L (ref 136–145)
WBC # BLD AUTO: 6.1 K/UL (ref 3.9–12.7)

## 2024-02-21 PROCEDURE — 25000003 PHARM REV CODE 250: Performed by: STUDENT IN AN ORGANIZED HEALTH CARE EDUCATION/TRAINING PROGRAM

## 2024-02-21 PROCEDURE — 12000002 HC ACUTE/MED SURGE SEMI-PRIVATE ROOM

## 2024-02-21 PROCEDURE — 97530 THERAPEUTIC ACTIVITIES: CPT | Mod: CQ

## 2024-02-21 PROCEDURE — 94761 N-INVAS EAR/PLS OXIMETRY MLT: CPT

## 2024-02-21 PROCEDURE — 80053 COMPREHEN METABOLIC PANEL: CPT | Performed by: NURSE PRACTITIONER

## 2024-02-21 PROCEDURE — 85025 COMPLETE CBC W/AUTO DIFF WBC: CPT | Performed by: NURSE PRACTITIONER

## 2024-02-21 PROCEDURE — 36415 COLL VENOUS BLD VENIPUNCTURE: CPT | Performed by: NURSE PRACTITIONER

## 2024-02-21 PROCEDURE — 25000003 PHARM REV CODE 250: Performed by: INTERNAL MEDICINE

## 2024-02-21 PROCEDURE — 97535 SELF CARE MNGMENT TRAINING: CPT

## 2024-02-21 PROCEDURE — 99900035 HC TECH TIME PER 15 MIN (STAT)

## 2024-02-21 PROCEDURE — 63600175 PHARM REV CODE 636 W HCPCS: Performed by: NURSE PRACTITIONER

## 2024-02-21 RX ADMIN — HYDRALAZINE HYDROCHLORIDE 100 MG: 25 TABLET ORAL at 09:02

## 2024-02-21 RX ADMIN — CALCITRIOL CAPSULES 0.25 MCG 0.25 MCG: 0.25 CAPSULE ORAL at 08:02

## 2024-02-21 RX ADMIN — HYDRALAZINE HYDROCHLORIDE 10 MG: 20 INJECTION INTRAMUSCULAR; INTRAVENOUS at 03:02

## 2024-02-21 RX ADMIN — ASPIRIN 81 MG 81 MG: 81 TABLET ORAL at 08:02

## 2024-02-21 RX ADMIN — CARVEDILOL 6.25 MG: 6.25 TABLET, FILM COATED ORAL at 09:02

## 2024-02-21 RX ADMIN — DOCUSATE SODIUM 100 MG: 100 CAPSULE, LIQUID FILLED ORAL at 08:02

## 2024-02-21 RX ADMIN — CYANOCOBALAMIN TAB 1000 MCG 1000 MCG: 1000 TAB at 08:02

## 2024-02-21 RX ADMIN — SENNOSIDES AND DOCUSATE SODIUM 1 TABLET: 8.6; 5 TABLET ORAL at 08:02

## 2024-02-21 RX ADMIN — LEVETIRACETAM 750 MG: 250 TABLET, FILM COATED ORAL at 08:02

## 2024-02-21 RX ADMIN — HYDROCHLOROTHIAZIDE 12.5 MG: 12.5 TABLET ORAL at 08:02

## 2024-02-21 RX ADMIN — CITALOPRAM HYDROBROMIDE 20 MG: 20 TABLET ORAL at 08:02

## 2024-02-21 RX ADMIN — LEVETIRACETAM 750 MG: 250 TABLET, FILM COATED ORAL at 09:02

## 2024-02-21 RX ADMIN — AMLODIPINE BESYLATE 10 MG: 5 TABLET ORAL at 06:02

## 2024-02-21 RX ADMIN — CARVEDILOL 6.25 MG: 6.25 TABLET, FILM COATED ORAL at 08:02

## 2024-02-21 RX ADMIN — HYDRALAZINE HYDROCHLORIDE 100 MG: 25 TABLET ORAL at 03:02

## 2024-02-21 NOTE — PT/OT/SLP PROGRESS
Occupational Therapy   Treatment    Name: Lavon Brandon  MRN: [id number]  Admitting Diagnosis:  Delirium       Recommendations:     Discharge Recommendations: Moderate Intensity Therapy  Discharge Equipment Recommendations:  none  Barriers to discharge:   requires assistance with transfers and self care     Assessment:     Lavon Brandon is a 89 y.o. male with a medical diagnosis of Delirium.  Performance deficits affecting function are weakness, impaired endurance, impaired self care skills, impaired functional mobility, decreased lower extremity function, decreased upper extremity function, decreased safety awareness. Patient was up in chair upon room entry.     Rehab Prognosis:  Fair; patient would benefit from acute skilled OT services to address these deficits and reach maximum level of function.       Plan:     Patient to be seen 5 x/week to address the above listed problems via self-care/home management, therapeutic activities, therapeutic exercises  Plan of Care Expires: 3/20/2024   Plan of Care Reviewed with: patient    Subjective     Chief Complaint: no complaints today   Patient/Family Comments/goals: did not indicate   Pain/Comfort:  Pain Rating 1: 0/10  Pain Rating Post-Intervention 1: 0/10    Objective:     Communicated with: nurse prior to session.  Patient found up in chair with telemetry upon OT entry to room.    General Precautions: Standard, fall    Orthopedic Precautions:N/A  Braces: N/A  Respiratory Status: Room air     Occupational Performance:      Activities of Daily Living:  Feeding:  minimum assistance self feeding   Grooming: minimum assistance for oral care and washing face while seated in chair  Lower Body Dressing: maximal assistance for donning socks     Treatment & Education:  Patient was educated on importance of activity and getting out of bed each day during hospitalization, safety during self care activities, discharge planning and reviewed use of call bell for assistance.      Patient left up in chair with all lines intact, call button in reach, and chair alarm on    GOALS:   Multidisciplinary Problems       Occupational Therapy Goals          Problem: Occupational Therapy    Goal Priority Disciplines Outcome Interventions   Occupational Therapy Goal     OT, PT/OT     Description: Goals to be met by: 3/20/24     Patient will increase functional independence with ADLs by performing:    UE Dressing with Supervision.  LE Dressing with Minimal Assistance and Assistive Devices as needed.  Grooming while seated at sink with Supervision.  Toileting from toilet with Supervision for hygiene and clothing management.   Toilet transfer to toilet with Supervision.                         Time Tracking:     OT Date of Treatment: 02/21/24  OT Start Time: 0947  OT Stop Time: 1010  OT Total Time (min): 23 min    Billable Minutes:Self Care/Home Management 23    OT/KIMMY: OT          2/21/2024

## 2024-02-21 NOTE — NURSING
02/21/2024      Assumed care of patient.   Assessment and vital signs assessed.   Labs and meds reviewed, see flowsheets for more details.  Will continue to monitor this shift.    Last documentation =  Temp: 98 °F (36.7 °C) (02/21/24 0720)  Pulse: (!) 51 (02/21/24 0720)  Resp: 19 (02/21/24 0720)  BP: (!) 160/56 (02/21/24 0720)  SpO2: 98 % (02/21/24 0720)       Pt awake in the bed, looking at tv, call bell in reach, bed in lowest position, no pain or concerns at this time.     Pt was able to assist himself getting to the head of the bed.

## 2024-02-21 NOTE — PT/OT/SLP PROGRESS
"Physical Therapy Treatment    Patient Name:  Lavon Brandon   MRN:  [id number]    Recommendations:     Discharge Recommendations: Moderate Intensity Therapy  Discharge Equipment Recommendations: none  Barriers to discharge:  decreased mobility from baseline    Assessment:     Lavon Brandon is a 89 y.o. male admitted with a medical diagnosis of Delirium.  He presents with the following impairments/functional limitations: weakness, impaired endurance, impaired functional mobility, gait instability, decreased safety awareness.    Nurse reports great difficulty assisting pt back to bed from chair yesterday, requiring max A of 2 persons.     Today's ambulation distance decreased in order to conserve energy and promote safer transfers throughout day.     Ambulated 12' with RW, Thompson and VC for improved step clearance. Performed seated leg exercises with good effort, and remained up in chair.     Rehab Prognosis: Good; patient would benefit from acute skilled PT services to address these deficits and reach maximum level of function.    Recent Surgery: * No surgery found *      Plan:     During this hospitalization, patient to be seen 6 x/week to address the identified rehab impairments via gait training, therapeutic activities, therapeutic exercises and progress toward the following goals:    Plan of Care Expires:  03/16/24    Subjective     Chief Complaint: "go easy on me. I'm 89"   Patient/Family Comments/goals: "I was injured in the [employer]."   Pain/Comfort:         Objective:     Communicated with nurse prior to session.  Patient found HOB elevated with telemetry, bed alarm, peripheral IV upon PT entry to room.     General Precautions: Standard, fall  Orthopedic Precautions: N/A  Braces: N/A  Respiratory Status: Room air     Functional Mobility:  Bed Mobility:     Supine to Sit: contact guard assistance  Transfers:     Sit to Stand:  minimum assistance with rolling walker  Bed to Chair: minimum assistance and VC for " improved gait mechanics with  rolling walker  using  Step Transfer  Gait: 12' with RW, Thompson. Displays decreased foot clearance during swing bilaterally and required VC for RW proximity to body, especially on turns       AM-PAC 6 CLICK MOBILITY          Treatment & Education:  -Pt educ: benefits ofparticipation with PT, OOB to chair during day, frequent mobility and leg exercises throughout day, nursing assist for transfers, call light   -Gait training  -Transfer training  -Seated EOB BLE therex w/ VC/TC for form and pace: LAQs 2x5, heel/toe raises    Patient left up in chair with all lines intact, call button in reach, chair alarm on, and nurse notified..    GOALS:   Multidisciplinary Problems       Physical Therapy Goals          Problem: Physical Therapy    Goal Priority Disciplines Outcome Goal Variances Interventions   Physical Therapy Goal     PT, PT/OT Ongoing, Progressing     Description: Goals to be met by: 3/16/2024     Patient will increase functional independence with mobility by performin. Supine to sit with MInimal Assistance  2. Sit to stand transfer with Minimal Assistance  3. Gait  x 50  feet with Contact Guard Assistance using Rolling Walker.                          Time Tracking:     PT Received On: 24  PT Start Time: 932     PT Stop Time: 947  PT Total Time (min): 15 min     Billable Minutes: Therapeutic Activity 15    Treatment Type: Treatment  PT/PTA: PTA     Number of PTA visits since last PT visit: 3     2024

## 2024-02-21 NOTE — CONSULTS
Dark discoloration and scarring.  Patient unable to tell me when he had injury to buttock, states it was when he was a kid, and kids play rough.  Staff using Mepilex for protection.  Left leg with a healing red scratch.  No needs at this time.

## 2024-02-21 NOTE — SUBJECTIVE & OBJECTIVE
Interval History: Patient seen and examined. DELANEY. Pending SNF vs return to nursing home. I was told there would be [id number] for SNF but never received a call.      Objective:     Vital Signs (Most Recent):  Temp: 98.7 °F (37.1 °C) (02/21/24 1623)  Pulse: 60 (02/21/24 1623)  Resp: 18 (02/21/24 1623)  BP: (!) 158/49 (02/21/24 1623)  SpO2: 96 % (02/21/24 1623) Vital Signs (24h Range):  Temp:  [97.8 °F (36.6 °C)-98.7 °F (37.1 °C)] 98.7 °F (37.1 °C)  Pulse:  [51-60] 60  Resp:  [14-19] 18  SpO2:  [96 %-98 %] 96 %  BP: (149-164)/(49-69) 158/49     Weight: 80.8 kg (178 lb 2.1 oz)  Body mass index is 25.56 kg/m².    Intake/Output Summary (Last 24 hours) at 2/21/2024 1738  Last data filed at 2/21/2024 0838  Gross per 24 hour   Intake 290 ml   Output 600 ml   Net -310 ml         Physical Exam  Vitals reviewed.   Constitutional:       General: He is not in acute distress.  HENT:      Head: Normocephalic and atraumatic.   Cardiovascular:      Rate and Rhythm: Normal rate and regular rhythm.   Pulmonary:      Effort: Pulmonary effort is normal. No respiratory distress.   Skin:     General: Skin is warm and dry.   Neurological:      General: No focal deficit present.      Mental Status: He is alert. Mental status is at baseline. He is disoriented.   Psychiatric:         Mood and Affect: Affect normal.         Behavior: Behavior normal.             Significant Labs: All pertinent labs within the past 24 hours have been reviewed.    Significant Imaging: I have reviewed all pertinent imaging results/findings within the past 24 hours.

## 2024-02-21 NOTE — PROGRESS NOTES
[facility] Medicine  Progress Note    Patient Name: Lavon Brandon  MRN: [id number]  Patient Class: IP- Inpatient   Admission Date: 2/15/2024  Length of Stay: 3 days  Attending Physician: Ezequiel Sales MD  Primary Care Provider: Aristides Haynes MD        Subjective:     Principal Problem:Delirium        HPI:  89 BM with pmh of HTN, CKD , dementia lewy body type     He lives at home with daughter  Non smoker  Non drinker      She brought him in because she said he was having worsening hallucinations and agitation and confusion     In the ER his labs showed nothing new for him.    Same CKD .      No UTI was found.       BP was high but not dangerously high       I saw him and he was calm and talked to me appropriately         We decided to admit him to observation and watch him and make sure he was ok and that BP was controlled     Overview/Hospital Course:  Mr. Brandon was admitted for acute delirium on lewy body dementia with insomnia, hallucinations, and agitation.  His delirium workup was negative.  Vitamin B1 is pending.  He was placed on low-dose Seroquel PRN which he received one time.  The delirium resolved.  A brain MRI was deferred since his symptoms were not persistent or worsening.  He was noted to have uncontrolled hypertension and his coreg was increased and then decreased due to bradycardia. HCTZ was added. PT and OT were consulted. Planned for SNF placement.    Interval History: Patient seen and examined. DELANEY. Pending SNF vs return to nursing home. I was told there would be [id number] for SNF but never received a call.      Objective:     Vital Signs (Most Recent):  Temp: 98.7 °F (37.1 °C) (02/21/24 1623)  Pulse: 60 (02/21/24 1623)  Resp: 18 (02/21/24 1623)  BP: (!) 158/49 (02/21/24 1623)  SpO2: 96 % (02/21/24 1623) Vital Signs (24h Range):  Temp:  [97.8 °F (36.6 °C)-98.7 °F (37.1 °C)] 98.7 °F (37.1 °C)  Pulse:  [51-60] 60  Resp:  [14-19] 18  SpO2:  [96 %-98 %] 96 %  BP:  (149-164)/(49-69) 158/49     Weight: 80.8 kg (178 lb 2.1 oz)  Body mass index is 25.56 kg/m².    Intake/Output Summary (Last 24 hours) at 2/21/2024 1738  Last data filed at 2/21/2024 0838  Gross per 24 hour   Intake 290 ml   Output 600 ml   Net -310 ml         Physical Exam  Vitals reviewed.   Constitutional:       General: He is not in acute distress.  HENT:      Head: Normocephalic and atraumatic.   Cardiovascular:      Rate and Rhythm: Normal rate and regular rhythm.   Pulmonary:      Effort: Pulmonary effort is normal. No respiratory distress.   Skin:     General: Skin is warm and dry.   Neurological:      General: No focal deficit present.      Mental Status: He is alert. Mental status is at baseline. He is disoriented.   Psychiatric:         Mood and Affect: Affect normal.         Behavior: Behavior normal.             Significant Labs: All pertinent labs within the past 24 hours have been reviewed.    Significant Imaging: I have reviewed all pertinent imaging results/findings within the past 24 hours.    Assessment/Plan:      * Delirium  Resolved. Attributed to his dementia.  Workup negative.  B1 is pending.  - Continue low-dose Seroquel PRN (has not been needing this)    Physical debility  - PT/OT  - pending SNF placement vs return to nursing home?    Vitamin D deficiency  Very Mild  Defer management to his PCP when discharged    Chronic renal disease, stage IV  Creatine stable at baseline. BMP reviewed- noted Estimated Creatinine Clearance: 26.1 mL/min (A) (based on SCr of 1.9 mg/dL (H)). according to latest data. Based on current GFR, CKD stage is stage 3 - GFR 30-59.  Monitor UOP and serial BMP and adjust therapy as needed. Renally dose meds. Avoid nephrotoxic medications and procedures.      Dementia with behavioral disturbance  Patient with dementia with likely etiology of lewy body dementia. Dementia is moderate. The patient does not currently have signs of behavioral disturbance.  Not on chronic  medications. Continue non-pharmacologic interventions to prevent delirium (No VS between 11PM-5AM, activity during day, opening blinds, providing glasses/hearing aids, and up in chair during daytime). Will avoid narcotics and benzos unless absolutely necessary. PRN anti-psychotics are prescribed to avoid self harm behaviors.    Depression  Patient has persistent depression which is unknown and is currently controlled. Will Continue anti-depressant medications. We will not consult psychiatry at this time. Continue to monitor closely and adjust plan of care as needed.    Uncontrolled hypertension  Chronic, improved  - continue amlodipine, hydralazine, coreg  - continue new hctz 12.5mg daily      VTE Risk Mitigation (From admission, onward)           Ordered     IP VTE HIGH RISK PATIENT  Once         02/16/24 0750     Place sequential compression device  Until discontinued         02/16/24 0750     Reason for No Pharmacological VTE Prophylaxis  Once        Question:  Reasons:  Answer:  Patient is Ambulatory    02/16/24 0750                    Discharge Planning   JOANNE: 2/22/2024     Code Status: Full Code   Is the patient medically ready for discharge?:     Reason for patient still in hospital (select all that apply): Pending disposition  Discharge Plan A: Skilled Nursing Facility   Discharge Delays: None known at this time              Ezequiel Sales MD  Department of Hospital Medicine   [facility]

## 2024-02-21 NOTE — PLAN OF CARE
Per Aydee ([facility]), [facility] offered [id number]. Chaparrita ([facility]) informed  Pt is accepted to return, if he chooses.     attempted to call Pt's daughters to update referrals; no answer.  left message for callback.       02/21/24 0956   Post-Acute Status   Post-Acute Authorization Placement   Post-Acute Placement Status Pending post-acute provider review/more information requested   Discharge Delays None known at this time   Discharge Plan   Discharge Plan A Skilled Nursing Facility   Discharge Plan B Skilled Nursing Facility

## 2024-02-21 NOTE — NURSING
Nurses Note -- 4 Eyes      2/20/2024   6:28 PM      Skin assessed during: Daily Assessment      [] No Altered Skin Integrity Present    []Prevention Measures Documented      [x] Yes- Altered Skin Integrity Present or Discovered   [] LDA Added if Not in Epic (Describe Wound)   [] New Altered Skin Integrity was Present on Admit and Documented in LDA   [x] Wound Image Taken    Wound Care Consulted? Yes  y  Attending Nurse:  Cynthia De Guzman RN/Staff Member:  NOE Chang

## 2024-02-21 NOTE — PLAN OF CARE
Problem: Adult Inpatient Plan of Care  Goal: Plan of Care Review  Outcome: Ongoing, Progressing  Goal: Patient-Specific Goal (Individualized)  Outcome: Ongoing, Progressing  Goal: Absence of Hospital-Acquired Illness or Injury  Outcome: Ongoing, Progressing  Goal: Optimal Comfort and Wellbeing  Outcome: Ongoing, Progressing  Goal: Readiness for Transition of Care  Outcome: Ongoing, Progressing     Problem: Skin Injury Risk Increased  Goal: Skin Health and Integrity  Outcome: Ongoing, Progressing     Problem: Fall Injury Risk  Goal: Absence of Fall and Fall-Related Injury  Outcome: Ongoing, Progressing     Problem: Diabetes Comorbidity  Goal: Blood Glucose Level Within Targeted Range  Outcome: Ongoing, Progressing     Problem: Impaired Wound Healing  Goal: Optimal Wound Healing  Outcome: Ongoing, Progressing

## 2024-02-22 LAB
GLUCOSE SERPL-MCNC: 119 MG/DL (ref 70–110)
OHS QRS DURATION: 110 MS
OHS QTC CALCULATION: 452 MS

## 2024-02-22 PROCEDURE — 25000003 PHARM REV CODE 250: Performed by: INTERNAL MEDICINE

## 2024-02-22 PROCEDURE — 99900035 HC TECH TIME PER 15 MIN (STAT)

## 2024-02-22 PROCEDURE — 97530 THERAPEUTIC ACTIVITIES: CPT

## 2024-02-22 PROCEDURE — 12000002 HC ACUTE/MED SURGE SEMI-PRIVATE ROOM

## 2024-02-22 PROCEDURE — 97116 GAIT TRAINING THERAPY: CPT

## 2024-02-22 PROCEDURE — 94761 N-INVAS EAR/PLS OXIMETRY MLT: CPT

## 2024-02-22 PROCEDURE — 97535 SELF CARE MNGMENT TRAINING: CPT

## 2024-02-22 PROCEDURE — 25000003 PHARM REV CODE 250: Performed by: STUDENT IN AN ORGANIZED HEALTH CARE EDUCATION/TRAINING PROGRAM

## 2024-02-22 RX ORDER — LEVETIRACETAM 500 MG/1
500 TABLET ORAL 2 TIMES DAILY
Status: DISCONTINUED | OUTPATIENT
Start: 2024-02-22 | End: 2024-02-23 | Stop reason: HOSPADM

## 2024-02-22 RX ADMIN — LEVETIRACETAM 500 MG: 500 TABLET, FILM COATED ORAL at 09:02

## 2024-02-22 RX ADMIN — CALCITRIOL CAPSULES 0.25 MCG 0.25 MCG: 0.25 CAPSULE ORAL at 09:02

## 2024-02-22 RX ADMIN — HYDROCHLOROTHIAZIDE 12.5 MG: 12.5 TABLET ORAL at 09:02

## 2024-02-22 RX ADMIN — DOCUSATE SODIUM 100 MG: 100 CAPSULE, LIQUID FILLED ORAL at 09:02

## 2024-02-22 RX ADMIN — CARVEDILOL 6.25 MG: 6.25 TABLET, FILM COATED ORAL at 09:02

## 2024-02-22 RX ADMIN — HYDRALAZINE HYDROCHLORIDE 100 MG: 25 TABLET ORAL at 09:02

## 2024-02-22 RX ADMIN — HYDRALAZINE HYDROCHLORIDE 100 MG: 25 TABLET ORAL at 03:02

## 2024-02-22 RX ADMIN — SENNOSIDES AND DOCUSATE SODIUM 1 TABLET: 8.6; 5 TABLET ORAL at 09:02

## 2024-02-22 RX ADMIN — CITALOPRAM HYDROBROMIDE 20 MG: 20 TABLET ORAL at 09:02

## 2024-02-22 RX ADMIN — CYANOCOBALAMIN TAB 1000 MCG 1000 MCG: 1000 TAB at 09:02

## 2024-02-22 RX ADMIN — HYDRALAZINE HYDROCHLORIDE 100 MG: 25 TABLET ORAL at 05:02

## 2024-02-22 RX ADMIN — ASPIRIN 81 MG 81 MG: 81 TABLET ORAL at 09:02

## 2024-02-22 NOTE — PLAN OF CARE
Per MD, [id number] completed and skilled-nursing admission authorization granted.  contacted Aydee ([facility]) to inform of authorization.     Per Aydee, Pt's daughter Gabriela Merino (Daughter) [phone] (Mobile))  can complete paperwork, but not until 1500 tomorrow 2/23/2024.      called Pt's daughter to inquire if she is agreeable to completing paperwork via email. Pt's daughter verbalized she is not confident in using email on her cell phone.  inquired if she could complete paperwork this day. Pt's daughter informed  she is currently in [place] and could not make it to [place] until tomorrow 2/23/2024 at 3-4pm.     inquired if Pt's sister (Nilda) could complete paperwork. Pt's daughter verbalized she is POA.     Addendum 1621 - Per Aydee, Pt can admit 2/23/2024 provided orders are sent early.       02/22/24 1530   Post-Acute Status   Post-Acute Authorization Placement   Post-Acute Placement Status Pending post-acute provider review/more information requested   Discharge Delays None known at this time   Discharge Plan   Discharge Plan A Skilled Nursing Facility   Discharge Plan B Skilled Nursing Facility

## 2024-02-22 NOTE — PLAN OF CARE
Peer to peer was completed. [employer] approved SNF daughter Gabriela is unable to sign consents until after 3 pm on 2/23 and does not have an email address. CM will follow up in am.       02/22/24 7991   Discharge Reassessment   Assessment Type Discharge Planning Reassessment   Did the patient's condition or plan change since previous assessment? No   Discharge Plan discussed with: Patient;Adult children   Discharge Plan A Skilled Nursing Facility   Discharge Plan B Skilled Nursing Facility   DME Needed Upon Discharge  none   Transition of Care Barriers None   Why the patient remains in the hospital Placement issues   Post-Acute Status   Post-Acute Authorization Placement   Coverage [employer]

## 2024-02-22 NOTE — PROGRESS NOTES
[facility] Medicine  Progress Note    Patient Name: Lavon Brandon  MRN: [id number]  Patient Class: IP- Inpatient   Admission Date: 2/15/2024  Length of Stay: 4 days  Attending Physician: Ezequiel Sales MD  Primary Care Provider: Aristides Haynes MD        Subjective:     Principal Problem:Delirium        HPI:  89 BM with pmh of HTN, CKD , dementia lewy body type     He lives at home with daughter  Non smoker  Non drinker      She brought him in because she said he was having worsening hallucinations and agitation and confusion     In the ER his labs showed nothing new for him.    Same CKD .      No UTI was found.       BP was high but not dangerously high       I saw him and he was calm and talked to me appropriately         We decided to admit him to observation and watch him and make sure he was ok and that BP was controlled     Overview/Hospital Course:  Mr. Brandon was admitted for acute delirium on lewy body dementia with insomnia, hallucinations, and agitation.  His delirium workup was negative.  Vitamin B1 is pending.  He was placed on low-dose Seroquel PRN which he received one time.  The delirium resolved.  A brain MRI was deferred since his symptoms were not persistent or worsening.  He was noted to have uncontrolled hypertension and his coreg was increased and then decreased due to bradycardia. HCTZ was added with better BP control. PT and OT were consulted. Planned for SNF placement.    Interval History: Patient seen and examined. DELANEY. After [id number] today he was approved for SNF.      Objective:     Vital Signs (Most Recent):  Temp: 97.8 °F (36.6 °C) (02/22/24 1134)  Pulse: (!) 57 (02/22/24 1134)  Resp: 18 (02/22/24 1134)  BP: 125/64 (02/22/24 1134)  SpO2: 97 % (02/22/24 1134) Vital Signs (24h Range):  Temp:  [97.8 °F (36.6 °C)-98.7 °F (37.1 °C)] 97.8 °F (36.6 °C)  Pulse:  [55-79] 57  Resp:  [17-18] 18  SpO2:  [95 %-98 %] 97 %  BP: (125-158)/(49-64) 125/64     Weight:  80.8 kg (178 lb 2.1 oz)  Body mass index is 25.56 kg/m².    Intake/Output Summary (Last 24 hours) at 2/22/2024 1524  Last data filed at 2/22/2024 1505  Gross per 24 hour   Intake 578 ml   Output 2000 ml   Net -1422 ml         Physical Exam  Vitals reviewed.   Constitutional:       General: He is not in acute distress.  HENT:      Head: Normocephalic and atraumatic.   Cardiovascular:      Rate and Rhythm: Normal rate and regular rhythm.   Pulmonary:      Effort: Pulmonary effort is normal. No respiratory distress.   Skin:     General: Skin is warm and dry.   Neurological:      General: No focal deficit present.      Mental Status: He is alert. Mental status is at baseline. He is disoriented.   Psychiatric:         Mood and Affect: Affect normal.         Behavior: Behavior normal.             Significant Labs: All pertinent labs within the past 24 hours have been reviewed.    Significant Imaging: I have reviewed all pertinent imaging results/findings within the past 24 hours.    Assessment/Plan:      * Delirium  Resolved. Attributed to his dementia.  Workup negative.  B1 is pending.  - Continue low-dose Seroquel PRN (has not been needing this)    Physical debility  - PT/OT  - pending SNF placement    Vitamin D deficiency  Very Mild  Defer management to his PCP when discharged    Chronic renal disease, stage IV  Creatine stable at baseline. BMP reviewed- noted Estimated Creatinine Clearance: 26.1 mL/min (A) (based on SCr of 1.9 mg/dL (H)). according to latest data. Based on current GFR, CKD stage is stage 3 - GFR 30-59.  Monitor UOP and serial BMP and adjust therapy as needed. Renally dose meds. Avoid nephrotoxic medications and procedures.      Dementia with behavioral disturbance  Patient with dementia with likely etiology of lewy body dementia. Dementia is moderate. The patient does not currently have signs of behavioral disturbance.  Not on chronic medications. Continue non-pharmacologic interventions to prevent  delirium (No VS between 11PM-5AM, activity during day, opening blinds, providing glasses/hearing aids, and up in chair during daytime). Will avoid narcotics and benzos unless absolutely necessary. PRN anti-psychotics are prescribed to avoid self harm behaviors.    Depression  Patient has persistent depression which is unknown and is currently controlled. Will Continue anti-depressant medications. We will not consult psychiatry at this time. Continue to monitor closely and adjust plan of care as needed.    Uncontrolled hypertension  Chronic, improved  - continue amlodipine, hydralazine, coreg  - continue new hctz 12.5mg daily      VTE Risk Mitigation (From admission, onward)           Ordered     IP VTE HIGH RISK PATIENT  Once         02/16/24 0750     Place sequential compression device  Until discontinued         02/16/24 0750     Reason for No Pharmacological VTE Prophylaxis  Once        Question:  Reasons:  Answer:  Patient is Ambulatory    02/16/24 0750                    Discharge Planning   JOANNE: 2/22/2024     Code Status: Full Code   Is the patient medically ready for discharge?:     Reason for patient still in hospital (select all that apply): Pending disposition  Discharge Plan A: Skilled Nursing Facility   Discharge Delays: None known at this time              Ezequiel Sales MD  Department of Hospital Medicine   [facility]

## 2024-02-22 NOTE — PT/OT/SLP PROGRESS
Occupational Therapy   Treatment    Name: Lavon Brandon  MRN: [id number]  Admitting Diagnosis:  Delirium       Recommendations:     Discharge Recommendations: Moderate Intensity Therapy  Discharge Equipment Recommendations:  none  Barriers to discharge:  requires assistance for safe transfers, decreased endurance    Assessment:     Lavon Brandon is a 89 y.o. male with a medical diagnosis of Delirium.  Performance deficits affecting function are weakness, impaired endurance, impaired self care skills, decreased safety awareness, decreased lower extremity function.     Rehab Prognosis:  Fair; patient would benefit from acute skilled OT services to address these deficits and reach maximum level of function.       Plan:     Patient to be seen 5 x/week to address the above listed problems via self-care/home management, therapeutic activities, therapeutic exercises  Plan of Care Expires:  3/20/2024  Plan of Care Reviewed with: patient    Subjective     Chief Complaint: did not indicate   Patient/Family Comments/goals: return home   Pain/Comfort:  Pain Rating 1: 0/10  Pain Rating Post-Intervention 1: 0/10    Objective:     Communicated with: nurse prior to session.  Patient found HOB elevated with telemetry, bed alarm, peripheral IV upon OT entry to room.    General Precautions: Standard, fall    Orthopedic Precautions:N/A  Braces: N/A  Respiratory Status: Room air     Occupational Performance:     Bed Mobility:    Patient completed Rolling/Turning to Left with  minimum assistance  Patient completed Rolling/Turning to Right with minimum assistance  Patient completed Scooting/Bridging with minimum assistance  Patient completed Supine to Sit with minimum assistance     Activities of Daily Living:  Grooming: stand by assistance/setup for washing face/hands and glasses while seated at edge of bed    Treatment & Education:  Patient was educated on importance of activity during hospitalization, discharge planning and reviewed  use of call bell for assistance.     Patient left HOB elevated with all lines intact, call button in reach, and bed alarm on    GOALS:   Multidisciplinary Problems       Occupational Therapy Goals          Problem: Occupational Therapy    Goal Priority Disciplines Outcome Interventions   Occupational Therapy Goal     OT, PT/OT Ongoing, Progressing    Description: Goals to be met by: 3/20/24     Patient will increase functional independence with ADLs by performing:    UE Dressing with Supervision.  LE Dressing with Minimal Assistance and Assistive Devices as needed.  Grooming while seated at sink with Supervision.  Toileting from toilet with Supervision for hygiene and clothing management.   Toilet transfer to toilet with Supervision.                         Time Tracking:     OT Date of Treatment: 02/22/24  OT Start Time: 1012  OT Stop Time: 1026  OT Total Time (min): 14 min    Billable Minutes:Self Care/Home Management 14    OT/KIMMY: OT          2/22/2024

## 2024-02-22 NOTE — PT/OT/SLP PROGRESS
Physical Therapy Treatment    Patient Name:  Lavon Brandon   MRN:  [id number]    Recommendations:     Discharge Recommendations: Moderate Intensity Therapy  Discharge Equipment Recommendations: none  Barriers to discharge:  increased caregiver burden of care.    Assessment:     Lavon Brandon is a 89 y.o. male admitted with a medical diagnosis of Delirium.  He presents with the following impairments/functional limitations: weakness, impaired endurance, impaired self care skills, impaired functional mobility, gait instability, impaired balance, impaired cardiopulmonary response to activity    Rehab Prognosis: Good; patient would benefit from acute skilled PT services to address these deficits and reach maximum level of function.    Recent Surgery: * No surgery found *      Plan:     During this hospitalization, patient to be seen 6 x/week to address the identified rehab impairments via gait training, therapeutic activities, therapeutic exercises and progress toward the following goals:    Plan of Care Expires:  03/16/24    Subjective     Chief Complaint: none reported   Patient/Family Comments/goals: Family's goal is for DC to SNF  Pain/Comfort:         Objective:     Communicated with nurse prior to session.  Patient found supine with telemetry, bed alarm, peripheral IV upon PT entry to room.     General Precautions: Standard, fall  Orthopedic Precautions: N/A  Braces: N/A  Respiratory Status: Room air     Functional Mobility:  Bed Mobility:     Supine to Sit: minimum assistance  Transfers:     Sit to Stand:  minimum assistance with rolling walker  Gait: 50 ft x2 RW and Min Ax2 required cueing for RW management. Took a brief standing rest  due to fatigue.      AM-PAC 6 CLICK MOBILITY          Treatment & Education:  Gait mobility as indicated above.Needed much cueing for  negotiating change of direction with RW for  t/f to chair.    Patient left up in chair with all lines intact, call button in reach, and his  daughter  present.. Daughter was instructed to get assistance for pt's return to bed when she left pt's room.    GOALS:   Multidisciplinary Problems       Physical Therapy Goals          Problem: Physical Therapy    Goal Priority Disciplines Outcome Goal Variances Interventions   Physical Therapy Goal     PT, PT/OT Ongoing, Progressing     Description: Goals to be met by: 3/16/2024     Patient will increase functional independence with mobility by performin. Supine to sit with MInimal Assistance  2. Sit to stand transfer with Minimal Assistance  3. Gait  x 50  feet with Contact Guard Assistance using Rolling Walker.                          Time Tracking:     PT Received On: 24  PT Start Time: 1104     PT Stop Time: 1127  PT Total Time (min): 23 min     Billable Minutes: Gait Training 13 minutes  and Therapeutic Activity 10 ,minutes    Treatment Type: Treatment  PT/PTA: PT     Number of PTA visits since last PT visit: 3     2024

## 2024-02-22 NOTE — NURSING
Nurses Note -- 4 Eyes      2/21/2024   6:46 PM      Skin assessed during: Admit      [] No Altered Skin Integrity Present    []Prevention Measures Documented      [x] Yes- Altered Skin Integrity Present or Discovered   [] LDA Added if Not in Epic (Describe Wound)   [] New Altered Skin Integrity was Present on Admit and Documented in LDA   [x] Wound Image Taken    Wound Care Consulted? Yes    Attending Nurse:  Rey De Guzman RN/Staff Member:  luis fernando

## 2024-02-22 NOTE — SUBJECTIVE & OBJECTIVE
Interval History: Patient seen and examined. DELANEY. After [id number] today he was approved for SNF.      Objective:     Vital Signs (Most Recent):  Temp: 97.8 °F (36.6 °C) (02/22/24 1134)  Pulse: (!) 57 (02/22/24 1134)  Resp: 18 (02/22/24 1134)  BP: 125/64 (02/22/24 1134)  SpO2: 97 % (02/22/24 1134) Vital Signs (24h Range):  Temp:  [97.8 °F (36.6 °C)-98.7 °F (37.1 °C)] 97.8 °F (36.6 °C)  Pulse:  [55-79] 57  Resp:  [17-18] 18  SpO2:  [95 %-98 %] 97 %  BP: (125-158)/(49-64) 125/64     Weight: 80.8 kg (178 lb 2.1 oz)  Body mass index is 25.56 kg/m².    Intake/Output Summary (Last 24 hours) at 2/22/2024 1524  Last data filed at 2/22/2024 1505  Gross per 24 hour   Intake 578 ml   Output 2000 ml   Net -1422 ml         Physical Exam  Vitals reviewed.   Constitutional:       General: He is not in acute distress.  HENT:      Head: Normocephalic and atraumatic.   Cardiovascular:      Rate and Rhythm: Normal rate and regular rhythm.   Pulmonary:      Effort: Pulmonary effort is normal. No respiratory distress.   Skin:     General: Skin is warm and dry.   Neurological:      General: No focal deficit present.      Mental Status: He is alert. Mental status is at baseline. He is disoriented.   Psychiatric:         Mood and Affect: Affect normal.         Behavior: Behavior normal.             Significant Labs: All pertinent labs within the past 24 hours have been reviewed.    Significant Imaging: I have reviewed all pertinent imaging results/findings within the past 24 hours.

## 2024-02-22 NOTE — PLAN OF CARE
Problem: Occupational Therapy  Goal: Occupational Therapy Goal  Description: Goals to be met by: 3/20/24     Patient will increase functional independence with ADLs by performing:    UE Dressing with Supervision.  LE Dressing with Minimal Assistance and Assistive Devices as needed.  Grooming while seated at sink with Supervision.  Toileting from toilet with Supervision for hygiene and clothing management.   Toilet transfer to toilet with Supervision.    Outcome: Ongoing, Progressing

## 2024-02-23 VITALS
BODY MASS INDEX: 25.5 KG/M2 | HEIGHT: 70 IN | RESPIRATION RATE: 17 BRPM | SYSTOLIC BLOOD PRESSURE: 147 MMHG | OXYGEN SATURATION: 96 % | WEIGHT: 178.13 LBS | HEART RATE: 60 BPM | DIASTOLIC BLOOD PRESSURE: 64 MMHG | TEMPERATURE: 98 F

## 2024-02-23 LAB — GLUCOSE SERPL-MCNC: 95 MG/DL (ref 70–110)

## 2024-02-23 PROCEDURE — 25000003 PHARM REV CODE 250: Performed by: STUDENT IN AN ORGANIZED HEALTH CARE EDUCATION/TRAINING PROGRAM

## 2024-02-23 PROCEDURE — 25000003 PHARM REV CODE 250: Performed by: INTERNAL MEDICINE

## 2024-02-23 PROCEDURE — 97116 GAIT TRAINING THERAPY: CPT

## 2024-02-23 RX ORDER — LEVETIRACETAM 500 MG/1
500 TABLET ORAL 2 TIMES DAILY
Start: 2024-02-23

## 2024-02-23 RX ORDER — HYDROCHLOROTHIAZIDE 12.5 MG/1
12.5 TABLET ORAL DAILY
Qty: 30 TABLET | Refills: 11
Start: 2024-02-23 | End: 2024-05-06 | Stop reason: SDUPTHER

## 2024-02-23 RX ADMIN — DOCUSATE SODIUM 100 MG: 100 CAPSULE, LIQUID FILLED ORAL at 09:02

## 2024-02-23 RX ADMIN — ASPIRIN 81 MG 81 MG: 81 TABLET ORAL at 09:02

## 2024-02-23 RX ADMIN — CARVEDILOL 6.25 MG: 6.25 TABLET, FILM COATED ORAL at 09:02

## 2024-02-23 RX ADMIN — LEVETIRACETAM 500 MG: 500 TABLET, FILM COATED ORAL at 09:02

## 2024-02-23 RX ADMIN — CYANOCOBALAMIN TAB 1000 MCG 1000 MCG: 1000 TAB at 09:02

## 2024-02-23 RX ADMIN — HYDRALAZINE HYDROCHLORIDE 100 MG: 25 TABLET ORAL at 06:02

## 2024-02-23 RX ADMIN — HYDROCHLOROTHIAZIDE 12.5 MG: 12.5 TABLET ORAL at 09:02

## 2024-02-23 RX ADMIN — CALCITRIOL CAPSULES 0.25 MCG 0.25 MCG: 0.25 CAPSULE ORAL at 09:02

## 2024-02-23 RX ADMIN — CITALOPRAM HYDROBROMIDE 20 MG: 20 TABLET ORAL at 09:02

## 2024-02-23 RX ADMIN — SENNOSIDES AND DOCUSATE SODIUM 1 TABLET: 8.6; 5 TABLET ORAL at 09:02

## 2024-02-23 NOTE — PT/OT/SLP PROGRESS
Physical Therapy Treatment    Patient Name:  Lavon Brandon   MRN:  [id number]    Recommendations:     Discharge Recommendations: Moderate Intensity Therapy  Discharge Equipment Recommendations: none  Barriers to discharge: Decreased caregiver support    Assessment:     Lavon Brandon is a 89 y.o. male admitted with a medical diagnosis of Delirium.  He presents with the following impairments/functional limitations: weakness, impaired endurance, impaired self care skills, impaired functional mobility, gait instability, impaired balance, impaired cardiopulmonary response to activity .    Rehab Prognosis: Good; patient would benefit from acute skilled PT services to address these deficits and reach maximum level of function.    Recent Surgery: * No surgery found *      Plan:     During this hospitalization, patient to be seen 6 x/week to address the identified rehab impairments via gait training, therapeutic activities, therapeutic exercises and progress toward the following goals:    Plan of Care Expires:  03/16/24    Subjective     Chief Complaint: sleepy  Patient/Family Comments/goals: Did not state  Pain/Comfort:  Pain Rating 1: 0/10      Objective:     Communicated with nurse prior to session.  Patient found supine with telemetry, bed alarm, peripheral IV upon PT entry to room.     General Precautions: Standard, fall  Orthopedic Precautions: N/A  Braces: N/A  Respiratory Status: Room air     Functional Mobility:  Bed Mobility:     Supine to Sit: moderate assistance  Sit to Supine: minimum assistance  Transfers:     Sit to Stand:  minimum assistance with rolling walker  Gait: 60 ft x2  RW and Min A   requiring much cueing for increased NICHOL and safety with changing directions with RW      AM-PAC 6 CLICK MOBILITY          Treatment & Education:  Functional mobility and gait training as indicated above    Patient left supine with all lines intact, call button in reach, and bed alarm on..    GOALS:   Multidisciplinary  Problems       Physical Therapy Goals          Problem: Physical Therapy    Goal Priority Disciplines Outcome Goal Variances Interventions   Physical Therapy Goal     PT, PT/OT Ongoing, Progressing     Description: Goals to be met by: 3/16/2024     Patient will increase functional independence with mobility by performin. Supine to sit with MInimal Assistance  2. Sit to stand transfer with Minimal Assistance  3. Gait  x 50  feet with Contact Guard Assistance using Rolling Walker.                          Time Tracking:     PT Received On: 24  PT Start Time: 1043     PT Stop Time: 1100  PT Total Time (min): 17 min     Billable Minutes: Gait Training 17 minutes    Treatment Type: Treatment  PT/PTA: PT     Number of PTA visits since last PT visit: 3     2024

## 2024-02-23 NOTE — PROGRESS NOTES
Patient cleared for discharge from case management standpoint.    Social work intern forwarded report information to NOE Mabry [phone] station 2. Pt to discharge to [facility] via facility transportation.     Chart and discharge orders reviewed.  Patient discharged [facility] with no further case management needs.        02/23/24 1405   Final Note   Assessment Type Final Discharge Note   Anticipated Discharge Disposition SNF   Hospital Resources/Appts/Education Provided Provided patient/caregiver with written discharge plan information;Post-Acute resouces added to AVS   Post-Acute Status   Post-Acute Authorization Placement   Post-Acute Placement Status Set-up Complete/Auth obtained   Coverage Payor: [employer] MANAGED MEDICARE - [employer] MEDICARE PPO   Discharge Delays None known at this time

## 2024-02-23 NOTE — DISCHARGE SUMMARY
[facility] Medicine  Discharge Summary      Patient Name: Lavon Brandon  MRN: [id number]  MIMI: [id number]  Patient Class: IP- Inpatient  Admission Date: 2/15/2024  Hospital Length of Stay: 5 days  Discharge Date and Time: 2/23/2024  4:00 PM  Attending Physician: Ezequiel Sales MD   Discharging Provider: Ezequiel Sales MD  Primary Care Provider: Aristides Haynes MD    Primary Care Team: Networked reference to record PCT     HPI:   89 BM with pmh of HTN, CKD , dementia lewy body type     He lives at home with daughter  Non smoker  Non drinker      She brought him in because she said he was having worsening hallucinations and agitation and confusion     In the ER his labs showed nothing new for him.    Same CKD .      No UTI was found.       BP was high but not dangerously high       I saw him and he was calm and talked to me appropriately         We decided to admit him to observation and watch him and make sure he was ok and that BP was controlled     * No surgery found *      Hospital Course:   Mr. Brandon was admitted for acute delirium on lewy body dementia with insomnia, hallucinations, and agitation.  His delirium workup was negative.  Vitamin B1 is pending.  He was placed on low-dose Seroquel PRN which he received one time.  The delirium resolved.  A brain MRI was deferred since his symptoms were not persistent or worsening.  He was noted to have uncontrolled hypertension and his coreg was increased and then decreased due to bradycardia. HCTZ was added with better BP control. PT and OT were consulted. Accepted and transferred to [facility] for SNF. By time of discharge the patient was tolerating a regular diet with resolved admission symptoms. Patient seen and examined on day of discharge.    Physical exam on day of discharge:  Constitutional:       General: He is not in acute distress.  HENT:      Head: Normocephalic and atraumatic.   Cardiovascular:      Rate and Rhythm: Normal  rate and regular rhythm.   Pulmonary:      Effort: Pulmonary effort is normal. No respiratory distress.   Skin:     General: Skin is warm and dry.   Neurological:      General: No focal deficit present.      Mental Status: He is alert. Mental status is at baseline. He is disoriented.   Psychiatric:         Mood and Affect: Affect normal.         Behavior: Behavior normal.        Goals of Care Treatment Preferences:  Code Status: Full Code          What is most important right now is to focus on remaining as independent as possible.  Accordingly, we have decided that the best plan to meet the patient's goals includes continuing with treatment.      Consults:   Consults (From admission, onward)          Status Ordering Provider     Inpatient consult to   Once        Provider:  (Not yet assigned)    МАРИНА Lowe            No new Assessment & Plan notes have been filed under this hospital service since the last note was generated.  Service: Hospital Medicine    Final Active Diagnoses:    Diagnosis Date Noted POA    PRINCIPAL PROBLEM:  Delirium [R41.0] 02/16/2024 Yes    Physical debility [R53.81] 10/14/2022 Yes    Vitamin D deficiency [E55.9] 02/17/2024 Yes    Chronic renal disease, stage IV [N18.4] 05/06/2019 Yes     Chronic    Uncontrolled hypertension [I10]  Yes    Dementia with behavioral disturbance [F03.918]  Yes     Chronic    Depression [F32.A]  Yes     Chronic      Problems Resolved During this Admission:    Diagnosis Date Noted Date Resolved POA    Dehydration [E86.0] 02/18/2024 02/20/2024 No       Discharged Condition: good    Disposition: Skilled Nursing Facility    Follow Up:   Follow-up Information       provider at SNF. Go today.                           Patient Instructions:      Diet Cardiac     Notify your health care provider if you experience any of the following:  temperature >100.4     Notify your health care provider if you experience any of the following:  persistent  nausea and vomiting or diarrhea     Notify your health care provider if you experience any of the following:  severe uncontrolled pain     Notify your health care provider if you experience any of the following:  redness, tenderness, or signs of infection (pain, swelling, redness, odor or green/yellow discharge around incision site)     Notify your health care provider if you experience any of the following:  difficulty breathing or increased cough     Notify your health care provider if you experience any of the following:  severe persistent headache     Notify your health care provider if you experience any of the following:  worsening rash     Notify your health care provider if you experience any of the following:  persistent dizziness, light-headedness, or visual disturbances     Notify your health care provider if you experience any of the following:  increased confusion or weakness     Activity as tolerated       Significant Diagnostic Studies:     Lab Results   Component Value Date    WBC 6.10 02/21/2024    HGB 10.0 (L) 02/21/2024    HCT 30.8 (L) 02/21/2024    MCV 92 02/21/2024     02/21/2024       BMP  Lab Results   Component Value Date     02/21/2024    K 4.8 02/21/2024     02/21/2024    CO2 30 (H) 02/21/2024    BUN 34 (H) 02/21/2024    CREATININE 1.9 (H) 02/21/2024    CALCIUM 9.0 02/21/2024    ANIONGAP 3 (L) 02/21/2024    EGFRNORACEVR 32.9 (A) 02/21/2024     Imaging Results              CT head (if fall & altered, LOC>2, on Warfarin or other anticoagulants) (Final result)  Result time 02/15/24 23:02:05      Final result by Stanley Castillo MD (02/15/24 23:02:05)                   Narrative:    EXAM DESCRIPTION:  CT HEAD WITHOUT CONTRAST  RadLex: CT HEAD WITHOUT IV CONTRAST    CLINICAL HISTORY:  89 years  Male;  Mental status change, unknown cause    TECHNOLOGIST NOTES:    COMPARISONS: None currently available    TECHNIQUE: Axial CT images were obtained from the skull base to vertex. This  exam was performed according to our departmental dose-optimization program, which includes automated exposure control, adjustment of the mA and/or kV according to patient size and/or use of iterative reconstruction techniques.    FINDINGS:  No acute intracranial hemorrhage. No mass effect, midline shift or hydrocephalus. The gray-white matter differentiation is grossly unremarkable. No evidence of acute large territory infarct.   Within the broad range of normal, brain volume is age appropriate. Nonspecific low attenuation in the white matter bilaterally. This is most commonly related to age-indeterminate small vessel ischemic disease. The right maxillary, frontal and anterior ethmoid sinuses are opacified.. The mastoid air cells are clear. There may be an old lacunar infarct in the cedric.      IMPRESSION:  1.  No acute intracranial process is identified.  2.  Nonspecific low attenuation in the white matter bilaterally. This is most commonly related to age-indeterminate small vessel ischemic disease.  3.  If symptoms persist or further imaging is clinically indicated, consider follow-up MRI or repeat CT imaging.    Electronically signed by:  Stanley Castillo MD  02/15/2024 11:02 PM CST Workstation: [id number]                                     X-Ray Chest 1 View (Final result)  Result time 02/15/24 23:02:21      Final result by Stanley Castillo MD (02/15/24 23:02:21)                   Narrative:    EXAM DESCRIPTION:  XR CHEST 1 VIEW  RadLex: XR CHEST 1 VIEW    CLINICAL HISTORY:  89 years  Male;  altered mental statis    TECHNOLOGIST NOTES: AMS    TECHNIQUE: Frontal portable view of the chest    COMPARISON: 11/30/2023.    FINDINGS:    The cardiomediastinal contour is unremarkable   There is no focal area of consolidation. There is no evidence of acute pulmonary edema. There is no significant pleural effusion. There is no pneumothorax. No evidence of pulmonary fibrosis or honeycombing.Visualized osseous structures show no acute  abnormality.    IMPRESSION:  No evidence of acute disease.    If clinically indicated, consider followup CT for more sensitive evaluation.    Electronically signed by:  Stanley Castillo MD  02/15/2024 11:02 PM CST Workstation: [id number]                                      Pending Diagnostic Studies:       Procedure Component Value Units Date/Time    Vitamin B1 [[id number]] Collected: 02/16/24 1217    Order Status: Sent Lab Status: In process Updated: 02/16/24 1224    Specimen: Blood            Medications:  Reconciled Home Medications:      Medication List        START taking these medications      hydroCHLOROthiazide 12.5 MG Tab  Commonly known as: HYDRODIURIL  Take 1 tablet (12.5 mg total) by mouth once daily.            CHANGE how you take these medications      carvediloL 6.25 MG tablet  Commonly known as: COREG  Take 1 tablet (6.25 mg total) by mouth 2 (two) times daily.  What changed: how much to take     levETIRAcetam 500 MG Tab  Commonly known as: KEPPRA  Take 1 tablet (500 mg total) by mouth 2 (two) times daily.  What changed:   medication strength  how much to take            CONTINUE taking these medications      amLODIPine 10 MG tablet  Commonly known as: NORVASC  Take 1 tablet (10 mg total) by mouth before evening meal.     aspirin 81 MG Chew  Take 1 tablet (81 mg total) by mouth once daily.     calcitRIOL 0.25 MCG Cap  Commonly known as: ROCALTROL  Take 1 capsule (0.25 mcg total) by mouth once daily.     citalopram 20 MG tablet  Commonly known as: CeleXA  Take 1 tablet (20 mg total) by mouth once daily.     cyanocobalamin 1000 MCG tablet  Commonly known as: VITAMIN B-12  Take 1 tablet (1,000 mcg total) by mouth once daily.     docusate sodium 100 MG capsule  Commonly known as: COLACE  Take 1 capsule (100 mg total) by mouth once daily.     fluticasone propionate 50 mcg/actuation nasal spray  Commonly known as: FLONASE  2 sprays (100 mcg total) by Each Nostril route once daily.     hydrALAZINE 100 MG  tablet  Commonly known as: APRESOLINE  Take 1 tablet (100 mg total) by mouth every 8 (eight) hours.            STOP taking these medications      methocarbamoL 500 MG Tab  Commonly known as: ROBAXIN     methyl salicylate-menthol 15-10% 15-10 % Crea              Indwelling Lines/Drains at time of discharge:   Lines/Drains/Airways       None                   Time spent on the discharge of patient: 38 minutes         Ezequiel Sales MD  Department of Hospital Medicine  [facility]

## 2024-02-23 NOTE — PT/OT/SLP PROGRESS
Occupational Therapy      Patient Name:  Lavon Brandon   MRN:  [id number]    Patient not seen today secondary to  (pt discharged prior to OT visit.). Will follow-up no.    2/23/2024

## 2024-02-23 NOTE — PLAN OF CARE
[facility] can admit this day, 2/23/2024 pending Pt's daughters completing admission paperwork.  called Pt's daughter Nilda Brandon (Daughter [phone]) to inquire about an email address to complete admission paperwork. Email - [email or web address].     forwarded email and Nilda's phone number to Aydee.       02/23/24 1124   Post-Acute Status   Post-Acute Authorization Placement   Post-Acute Placement Status Pending post-acute provider review/more information requested   Discharge Delays None known at this time   Discharge Plan   Discharge Plan A Skilled Nursing Facility   Discharge Plan B Skilled Nursing Facility

## 2024-02-23 NOTE — DISCHARGE INSTRUCTIONS
[facility]  Facility Transfer Orders        Admit to: SNF    Diagnoses:   Active Hospital Problems    Diagnosis  POA    *Delirium [R41.0]  Yes    Physical debility [R53.81]  Yes     Priority: 1 - High    Vitamin D deficiency [E55.9]  Yes    Chronic renal disease, stage IV [N18.4]  Yes     Chronic    Uncontrolled hypertension [I10]  Yes     renal htn      Dementia with behavioral disturbance [F03.918]  Yes     Chronic    Depression [F32.A]  Yes     Chronic      Resolved Hospital Problems    Diagnosis Date Resolved POA    Dehydration [E86.0] 02/20/2024 No     Allergies:   Review of patient's allergies indicates:   Allergen Reactions    Ciprofloxacin (bulk) Swelling       Code Status: full    Vitals: Routine       Diet: cardiac diet    Activity: Activity as tolerated    Nursing Precautions: Fall    Bed/Surface: routine    Consults: PT to evaluate and treat and OT to evaluate and treat    Oxygen: room air    Dialysis: Patient is not on dialysis.     Labs: n/a      Pending Diagnostic Studies:       Procedure Component Value Units Date/Time    Vitamin B1 [[id number]] Collected: 02/16/24 1217    Order Status: Sent Lab Status: In process Updated: 02/16/24 1224    Specimen: Blood           Imaging: n/a    Miscellaneous Care:   N/a    IV Access: n/a     Medications: Discontinue all previous medication orders, if any. See new list below.  Current Discharge Medication List        START taking these medications    Details   hydroCHLOROthiazide (HYDRODIURIL) 12.5 MG Tab Take 1 tablet (12.5 mg total) by mouth once daily.  Qty: 30 tablet, Refills: 11    Comments: .           CONTINUE these medications which have CHANGED    Details   levETIRAcetam (KEPPRA) 500 MG Tab Take 1 tablet (500 mg total) by mouth 2 (two) times daily.    Comments: Please consider 90 day supplies to promote better adherence  Associated Diagnoses: Seizure disorder           CONTINUE these medications which have NOT CHANGED    Details   amLODIPine  (NORVASC) 10 MG tablet Take 1 tablet (10 mg total) by mouth before evening meal.  Qty: 30 tablet, Refills: 11    Comments: .      calcitRIOL (ROCALTROL) 0.25 MCG Cap Take 1 capsule (0.25 mcg total) by mouth once daily.  Qty: 90 capsule, Refills: 4      carvediloL (COREG) 6.25 MG tablet Take 1 tablet (6.25 mg total) by mouth 2 (two) times daily.  Qty: 60 tablet, Refills: 11    Comments: .      citalopram (CELEXA) 20 MG tablet Take 1 tablet (20 mg total) by mouth once daily.  Qty: 90 tablet, Refills: 11      cyanocobalamin (VITAMIN B-12) 1000 MCG tablet Take 1 tablet (1,000 mcg total) by mouth once daily.  Qty: 30 tablet, Refills: 2      fluticasone propionate (FLONASE) 50 mcg/actuation nasal spray 2 sprays (100 mcg total) by Each Nostril route once daily.  Qty: 18.2 mL, Refills: 0      hydrALAZINE (APRESOLINE) 100 MG tablet Take 1 tablet (100 mg total) by mouth every 8 (eight) hours.  Qty: 90 tablet, Refills: 11    Comments: .      aspirin 81 MG Chew Take 1 tablet (81 mg total) by mouth once daily.  Qty: 90 tablet, Refills: 0      docusate sodium (COLACE) 100 MG capsule Take 1 capsule (100 mg total) by mouth once daily.  Qty: 90 capsule, Refills: 3           STOP taking these medications       methocarbamoL (ROBAXIN) 500 MG Tab Comments:   Reason for Stopping:         methyl salicylate-menthol 15-10% 15-10 % Crea Comments:   Reason for Stopping:             Follow up:    Follow-up Information       provider at [facility]. Go today.                               Immunizations Administered as of 2/23/2024       No immunizations on file.                  Ezequiel Sales MD

## 2024-02-23 NOTE — NURSING
Report called to nurse Aranda at [facility]. Facility transportation on the way. Pt. Finish his lunch tray.bed alarm ON. Monitoring for right now.

## 2024-02-24 LAB — VIT B1 BLD-SCNC: 82.8 NMOL/L (ref 66.5–200)

## 2024-03-13 ENCOUNTER — DOCUMENT SCAN (OUTPATIENT)
Dept: HOME HEALTH SERVICES | Facility: HOSPITAL | Age: 89
End: 2024-03-13
Payer: MEDICARE

## 2024-03-22 ENCOUNTER — TELEPHONE (OUTPATIENT)
Dept: PRIMARY CARE CLINIC | Facility: CLINIC | Age: 89
End: 2024-03-22
Payer: MEDICARE

## 2024-03-22 NOTE — TELEPHONE ENCOUNTER
Patient's daughter (Gabriela) reports patient was discharged today from skilled nursing facility.  Needs order for physical therapy (possibly through home health).  Writer advised of need for appointment related to recent discharge from skilled nursing facility.  Mrs. Ochoa states it is difficult to get patient out to appointment.  States had difficult time getting him home today.  Requesting to know if a virtual visit can be performed.  Please advise. Thank you.

## 2024-03-22 NOTE — TELEPHONE ENCOUNTER
Izzy Cordero [facility] Staff     ----- Message from Izzy Cordero sent at 3/22/2024  1:50 PM CDT -----  Regarding: discharge follow up  Contact: rohan welch  Type:  Sooner Appointment Request    Caller is requesting a sooner appointment.  Caller declined first available appointment listed below.  Caller will not accept being placed on the waitlist and is requesting a message be sent to doctor.    Name of Caller:  rohan welch   When is the first available appointment?    Symptoms:  discharge from skill nursing order for  physical therapy needed   Would the patient rather a call back or a response via [facility]?   [facility] Call Back Number:  [phone]  Additional Information:  call to advise        1

## 2024-03-22 NOTE — TELEPHONE ENCOUNTER
Call placed to patient's daughter (Gabriela) for notification.  Virtual appointment scheduled for the date of 3-27-24.  Mrs. Ochoa agreed to virtual appointment date & time.

## 2024-03-27 ENCOUNTER — OFFICE VISIT (OUTPATIENT)
Dept: PRIMARY CARE CLINIC | Facility: CLINIC | Age: 89
End: 2024-03-27
Payer: MEDICARE

## 2024-03-27 DIAGNOSIS — I50.20 SYSTOLIC CONGESTIVE HEART FAILURE, UNSPECIFIED HF CHRONICITY: ICD-10-CM

## 2024-03-27 DIAGNOSIS — S06.9X0D TRAUMATIC BRAIN INJURY, WITHOUT LOSS OF CONSCIOUSNESS, SUBSEQUENT ENCOUNTER: ICD-10-CM

## 2024-03-27 DIAGNOSIS — M19.90 OSTEOARTHRITIS, UNSPECIFIED OSTEOARTHRITIS TYPE, UNSPECIFIED SITE: ICD-10-CM

## 2024-03-27 DIAGNOSIS — R53.81 PHYSICAL DEBILITY: ICD-10-CM

## 2024-03-27 DIAGNOSIS — D69.6 THROMBOCYTOPENIA: ICD-10-CM

## 2024-03-27 DIAGNOSIS — F03.918 DEMENTIA WITH BEHAVIORAL DISTURBANCE: Primary | Chronic | ICD-10-CM

## 2024-03-27 DIAGNOSIS — I10 UNCONTROLLED HYPERTENSION: ICD-10-CM

## 2024-03-27 DIAGNOSIS — N25.81 SECONDARY HYPERPARATHYROIDISM OF RENAL ORIGIN: ICD-10-CM

## 2024-03-27 DIAGNOSIS — G47.00 INSOMNIA, UNSPECIFIED TYPE: ICD-10-CM

## 2024-03-27 DIAGNOSIS — R29.6 FREQUENT FALLS: ICD-10-CM

## 2024-03-27 DIAGNOSIS — R44.1 VISUAL HALLUCINATIONS: ICD-10-CM

## 2024-03-27 DIAGNOSIS — F33.8 OTHER RECURRENT DEPRESSIVE DISORDERS: ICD-10-CM

## 2024-03-27 DIAGNOSIS — E44.0 MODERATE MALNUTRITION: ICD-10-CM

## 2024-03-27 DIAGNOSIS — I26.99 PULMONARY EMBOLISM, UNSPECIFIED CHRONICITY, UNSPECIFIED PULMONARY EMBOLISM TYPE, UNSPECIFIED WHETHER ACUTE COR PULMONALE PRESENT: ICD-10-CM

## 2024-03-27 DIAGNOSIS — E11.49 DM (DIABETES MELLITUS), TYPE 2 WITH NEUROLOGICAL COMPLICATIONS: ICD-10-CM

## 2024-03-27 PROCEDURE — 99214 OFFICE O/P EST MOD 30 MIN: CPT | Mod: 95,,, | Performed by: NURSE PRACTITIONER

## 2024-03-27 RX ORDER — QUETIAPINE FUMARATE 25 MG/1
25 TABLET, FILM COATED ORAL NIGHTLY
Qty: 90 TABLET | Refills: 3 | Status: SHIPPED | OUTPATIENT
Start: 2024-03-27 | End: 2025-03-27

## 2024-03-27 NOTE — PROGRESS NOTES
Subjective:       Patient ID: Lavon Brnadon is a 89 y.o. male.    Chief Complaint: No chief complaint on file.    HPI      The patient location is: [place], [place]  The chief complaint leading to consultation is: follow up    Visit type: audiovisual    Face to Face time with patient: 30 minutes of total time spent on the encounter, which includes face to face time and non-face to face time preparing to see the patient (eg, review of tests), Obtaining and/or reviewing separately obtained history, Documenting clinical information in the electronic or other health record, Independently interpreting results (not separately reported) and communicating results to the patient/family/caregiver, or Care coordination (not separately reported).         Each patient to whom he or she provides medical services by telemedicine is:  (1) informed of the relationship between the physician and patient and the respective role of any other health care provider with respect to management of the patient; and (2) notified that he or she may decline to receive medical services by telemedicine and may withdraw from such care at any time.    Notes:    admission Date: 2/15/2024  Hospital Length of Stay: 5 days  Discharge Date and Time: 2/23/2024  4:00 PM        Patient presents today via tele with daughter.  Patient had a recent hospital stay for worsening hallucinations and agitation and confusion . He has an history lewy body dementia . His delirium workup was negative. Vitamin B1 is pending. He was placed on low-dose Seroquel PRN which he received one time. The delirium resolved. A brain MRI was deferred since his symptoms were not persistent or worsening. He was noted to have uncontrolled hypertension and his coreg was increased and then decreased due to bradycardia. HCTZ was added with better BP control. PT and OT were consulted. Accepted and transferred to [facility] for SNF-was dischage one week ago    Daughter reports high home BP  readings SBP-150-160  Labs reviewed: stable chronic kidney    CT Head IMPRESSION:  1.  No acute intracranial process is identified.  2.  Nonspecific low attenuation in the white matter bilaterally. This is most commonly related to age-indeterminate small vessel ischemic disease.  3.  If symptoms persist or further imaging is clinically indicated, consider follow-up MRI or repeat CT imaging.  Past Medical History:   Diagnosis Date    Bradyarrhythmia     CVA (cerebral infarction) 2006    Dementia     Depression     Hyperlipidemia     Hypertension     renal htn    Pulmonary embolism 2002    Seizure disorder        Review of patient's allergies indicates:   Allergen Reactions    Ciprofloxacin (bulk) Swelling         Current Outpatient Medications:     amLODIPine (NORVASC) 10 MG tablet, Take 1 tablet (10 mg total) by mouth before evening meal., Disp: 30 tablet, Rfl: 11    aspirin 81 MG Chew, Take 1 tablet (81 mg total) by mouth once daily., Disp: 90 tablet, Rfl: 0    calcitRIOL (ROCALTROL) 0.25 MCG Cap, Take 1 capsule (0.25 mcg total) by mouth once daily., Disp: 90 capsule, Rfl: 4    carvediloL (COREG) 6.25 MG tablet, Take 1 tablet (6.25 mg total) by mouth 2 (two) times daily., Disp: 60 tablet, Rfl: 11    citalopram (CELEXA) 20 MG tablet, Take 1 tablet (20 mg total) by mouth once daily., Disp: 90 tablet, Rfl: 11    docusate sodium (COLACE) 100 MG capsule, Take 1 capsule (100 mg total) by mouth once daily., Disp: 90 capsule, Rfl: 3    fluticasone propionate (FLONASE) 50 mcg/actuation nasal spray, 2 sprays (100 mcg total) by Each Nostril route once daily., Disp: 18.2 mL, Rfl: 0    hydrALAZINE (APRESOLINE) 100 MG tablet, Take 1 tablet (100 mg total) by mouth every 8 (eight) hours., Disp: 90 tablet, Rfl: 11    hydroCHLOROthiazide (HYDRODIURIL) 12.5 MG Tab, Take 1 tablet (12.5 mg total) by mouth once daily., Disp: 30 tablet, Rfl: 11    levETIRAcetam (KEPPRA) 500 MG Tab, Take 1 tablet (500 mg total) by mouth 2 (two) times  daily., Disp: , Rfl:     losartan (COZAAR) 25 MG tablet, Take 1 tablet (25 mg total) by mouth once daily., Disp: 90 tablet, Rfl: 3    QUEtiapine (SEROQUEL) 25 MG Tab, Take 1 tablet (25 mg total) by mouth every evening., Disp: 90 tablet, Rfl: 3    Review of Systems   Constitutional:  Negative for unexpected weight change.   HENT:  Negative for trouble swallowing.    Eyes:  Negative for visual disturbance.   Respiratory:  Negative for shortness of breath.    Cardiovascular:  Negative for chest pain, palpitations and leg swelling.   Gastrointestinal:  Negative for blood in stool.   Genitourinary:  Negative for hematuria.   Skin:  Negative for rash.   Allergic/Immunologic: Negative for immunocompromised state.   Neurological:  Positive for weakness. Negative for headaches.   Hematological:  Does not bruise/bleed easily.   Psychiatric/Behavioral:  Positive for confusion. Negative for agitation. The patient is not nervous/anxious.        Objective:      There were no vitals taken for this visit.  Physical Exam  Constitutional:       Appearance: He is well-developed.   Pulmonary:      Effort: Pulmonary effort is normal.   Neurological:      Mental Status: He is alert and oriented to person, place, and time.   Psychiatric:         Behavior: Behavior normal.         Thought Content: Thought content normal.         Judgment: Judgment normal.      Comments: dementia         Assessment:       1. Dementia with behavioral disturbance    2. Visual hallucinations    3. Insomnia, unspecified type    4. Traumatic brain injury, without loss of consciousness, subsequent encounter    5. Uncontrolled hypertension    6. Systolic congestive heart failure, unspecified HF chronicity    7. Moderate malnutrition    8. Secondary hyperparathyroidism of renal origin    9. Thrombocytopenia    10. DM (diabetes mellitus), type 2 with neurological complications    11. Other recurrent depressive disorders    12. Pulmonary embolism, unspecified  chronicity, unspecified pulmonary embolism type, unspecified whether acute cor pulmonale present        Plan:       Dementia with behavioral disturbance  -     SUBSEQUENT HOME HEALTH ORDERS    Visual hallucinations  -     QUEtiapine (SEROQUEL) 25 MG Tab; Take 1 tablet (25 mg total) by mouth every evening.  Dispense: 90 tablet; Refill: 3  One month follow up  Insomnia, unspecified type  -     QUEtiapine (SEROQUEL) 25 MG Tab; Take 1 tablet (25 mg total) by mouth every evening.  Dispense: 90 tablet; Refill: 3  One month follow up  Traumatic brain injury, without loss of consciousness, subsequent encounter  -     SUBSEQUENT HOME HEALTH ORDERS  Stable, on Keppra  Uncontrolled hypertension  -     losartan (COZAAR) 25 MG tablet; Take 1 tablet (25 mg total) by mouth once daily.  Dispense: 90 tablet; Refill: 3  Low sodium diet  2 weeks BP reading  Systolic congestive heart failure, unspecified HF chronicity  Stable, followed by Cardiology  Moderate malnutrition  Stable, ensure 3 times daily  Secondary hyperparathyroidism of renal origin  Stable, continue  management  Thrombocytopenia  Stable, continue management  DM (diabetes mellitus), type 2 with neurological complications  Stable, continue management  Other recurrent depressive disorders  Stable, continue management  Pulmonary embolism, unspecified chronicity, unspecified pulmonary embolism type, unspecified whether acute cor pulmonale present  Stable, continue management  CKD (chronic kidney disease) stage 4, GFR 15-29 ml/min   Stable, followed by Neph-  Last visit 8/1/23      Physical debility   SUBSEQUENT HOME HEALTH ORDERS  Frequent falls   SUBSEQUENT HOME HEALTH ORDERS  Osteoarthritis, unspecified osteoarthritis type, unspecified site   SUBSEQUENT HOME HEALTH ORDERS    Patient readiness: acceptance and barriers:none    During the course of the visit the patient was educated and counseled about the following:     Hypertension:   Dietary sodium  restriction.  Regular aerobic exercise.    Goals: Hypertension: Reduce Blood Pressure    Did patient meet goals/outcomes: No    The following self management tools provided: blood pressure log    Patient Instructions (the written plan) was given to the patient/family.     Time spent with patient: 30 minutes    Barriers to medications present (no )    Adverse reactions to current medications (no)    Over the counter medications reviewed (Yes)

## 2024-03-28 ENCOUNTER — PATIENT MESSAGE (OUTPATIENT)
Dept: ADMINISTRATIVE | Facility: HOSPITAL | Age: 89
End: 2024-03-28
Payer: MEDICARE

## 2024-03-28 ENCOUNTER — PATIENT MESSAGE (OUTPATIENT)
Dept: FAMILY MEDICINE | Facility: CLINIC | Age: 89
End: 2024-03-28
Payer: MEDICARE

## 2024-03-28 RX ORDER — LOSARTAN POTASSIUM 25 MG/1
25 TABLET ORAL DAILY
Qty: 90 TABLET | Refills: 3 | Status: SHIPPED | OUTPATIENT
Start: 2024-03-28 | End: 2024-05-08

## 2024-03-29 ENCOUNTER — PATIENT MESSAGE (OUTPATIENT)
Dept: PRIMARY CARE CLINIC | Facility: CLINIC | Age: 89
End: 2024-03-29
Payer: MEDICARE

## 2024-04-01 ENCOUNTER — PATIENT OUTREACH (OUTPATIENT)
Dept: ADMINISTRATIVE | Facility: HOSPITAL | Age: 89
End: 2024-04-01
Payer: MEDICARE

## 2024-04-01 NOTE — PROGRESS NOTES
Population Health Chart Review & Patient Outreach Details      Additional [facility] Notes:               Updates Requested / Reviewed:      Updated Care Coordination Note         Health Maintenance Topics Overdue:      VBHM Score: 2     Urine Screening  Eye Exam    Tetanus Vaccine  Shingles/Zoster Vaccine  RSV Vaccine                  Health Maintenance Topic(s) Outreach Outcomes & Actions Taken:    Lab(s) - Outreach Outcomes & Actions Taken  : Overdue Lab(s) Ordered and Portal message sent.

## 2024-04-02 PROBLEM — I50.20 SYSTOLIC CONGESTIVE HEART FAILURE, UNSPECIFIED HF CHRONICITY: Status: ACTIVE | Noted: 2024-04-02

## 2024-04-05 ENCOUNTER — TELEPHONE (OUTPATIENT)
Dept: PRIMARY CARE CLINIC | Facility: CLINIC | Age: 89
End: 2024-04-05
Payer: MEDICARE

## 2024-04-05 DIAGNOSIS — R54 FRAIL ELDERLY: Primary | ICD-10-CM

## 2024-04-05 DIAGNOSIS — R41.0 DELIRIUM: ICD-10-CM

## 2024-04-05 DIAGNOSIS — M19.90 OSTEOARTHRITIS, UNSPECIFIED OSTEOARTHRITIS TYPE, UNSPECIFIED SITE: ICD-10-CM

## 2024-04-05 DIAGNOSIS — R44.1 VISUAL HALLUCINATIONS: ICD-10-CM

## 2024-04-05 DIAGNOSIS — G47.00 INSOMNIA, UNSPECIFIED TYPE: ICD-10-CM

## 2024-04-05 RX ORDER — LORAZEPAM 1 MG/1
1 TABLET ORAL NIGHTLY
Qty: 30 TABLET | Refills: 2 | Status: SHIPPED | OUTPATIENT
Start: 2024-04-05

## 2024-04-05 NOTE — TELEPHONE ENCOUNTER
Spoke to pharmacy via phone medication Seroquel is 100 per month(insurance will not cover) patient daughter is requesting alternative please advise

## 2024-04-05 NOTE — TELEPHONE ENCOUNTER
----- Message from Marquita Dietrich sent at 4/5/2024 12:38 PM CDT -----  Regarding: advice  Type:  Needs Medical Advice    Who Called: rohan welch     Best Call Back Number: [phone]      Additional Information: yuri wants a call back to discuss getting pt therapy.   please call to discuss.

## 2024-04-05 NOTE — TELEPHONE ENCOUNTER
Seroquel has been discontinued.  Use lorazepam instead.  This is a narcotic.  It may cause addition.  It was for restlessness hallucination.  This will be safe for his heart but may have breakthrough/ mood swings. Advised to call back directly if there are further questions, or if these symptoms fail to improve as anticipated or worsen.

## 2024-04-09 NOTE — TELEPHONE ENCOUNTER
Order faxed to [facility].  Call placed to patient's daughter (Gabriela) for notification. Mrs. Ochoa verbalized understanding.

## 2024-04-10 ENCOUNTER — TELEPHONE (OUTPATIENT)
Dept: PRIMARY CARE CLINIC | Facility: CLINIC | Age: 89
End: 2024-04-10
Payer: MEDICARE

## 2024-04-10 DIAGNOSIS — R29.6 FREQUENT FALLS: ICD-10-CM

## 2024-04-10 DIAGNOSIS — R53.81 PHYSICAL DEBILITY: ICD-10-CM

## 2024-04-10 DIAGNOSIS — M19.90 OSTEOARTHRITIS, UNSPECIFIED OSTEOARTHRITIS TYPE, UNSPECIFIED SITE: Primary | ICD-10-CM

## 2024-04-10 NOTE — TELEPHONE ENCOUNTER
Renate Ochoa [facility] Staff     ----- Message from eRnate Ochoa sent at 4/10/2024  7:30 AM CDT -----  Contact: Home Health  Type: Needs Medical Advice    Who Called:  Shaina   What is this regarding?:  Reina Meek  They can't see the pt until changes are made. It has a diagnosis of osteoartitis but it's not on the clinic notes. She needs it to match. They discharged the pt 02/29.  Best Call Back Number:  [phone]  Additional Information:  Please call back at the phone number listed above to advise. Thank you!

## 2024-04-11 ENCOUNTER — TELEPHONE (OUTPATIENT)
Dept: PRIMARY CARE CLINIC | Facility: CLINIC | Age: 89
End: 2024-04-11
Payer: MEDICARE

## 2024-04-11 NOTE — TELEPHONE ENCOUNTER
Reina with [facility] advised they received home health orders for patient.  Orders list the following diagnoses: frail elderly, osteoarthritis, and delirium.  Mrs. Huang states diagnosis of frail elderly and delirium does not qualify for insurance to cover.  The diagnosis of osteoarthritis does qualify, but the clinical notes attached to visit on 3-27-24 does not list this diagnosis.  Requesting to know if this can be added to clinical notes diagnosis.  Mrs. Huang also states the order received does not specify what disciplines are needed (such as nursing, physical therapy, or occupational therapy).  This will need to be added to the order.  Please advise.  Thank you.

## 2024-04-11 NOTE — TELEPHONE ENCOUNTER
Jesenia Aguilera [facility] Staff     ----- Message from Jesenia Aguilera sent at 4/11/2024  8:47 AM CDT -----  Regarding: Needs Medical Notes/Order edits  Contact: patient's daughter at [phone]  Type: Needs Medical Notes/Order edits  Who Called:  patient's daughter at [phone]    Additional Information: patient is needing the notes for the home health order, including a nurse. Daughter also states that the diagnostic code is incorrect for insurance. Please call and advise. Thank you
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
## 2024-04-12 ENCOUNTER — TELEPHONE (OUTPATIENT)
Dept: FAMILY MEDICINE | Facility: CLINIC | Age: 89
End: 2024-04-12
Payer: MEDICARE

## 2024-04-12 DIAGNOSIS — R29.6 FREQUENT FALLS: ICD-10-CM

## 2024-04-12 DIAGNOSIS — R53.81 PHYSICAL DECONDITIONING: Primary | ICD-10-CM

## 2024-04-12 DIAGNOSIS — M19.90 CHRONIC ARTHRITIS: Chronic | ICD-10-CM

## 2024-04-12 NOTE — TELEPHONE ENCOUNTER
Call placed to Reina with [facility] for notification.  Copy of updated clinical notes that list osteoarthritis and copy of home health orders faxed to Brett Reina at fax number [phone].

## 2024-04-12 NOTE — TELEPHONE ENCOUNTER
----- Message from Sharmin Martin sent at 4/12/2024  2:36 PM CDT -----  Contact: Gabriela  Type:  Needs Medical Advice    Who Called: Daughter Gabriela  Symptoms (please be specific): checking the status of pts home health orders request.   Would the patient rather a call back or a response via [facility]? call  Best Call Back Number: [phone] (home)     Additional Information: please advise and thank you.

## 2024-04-15 PROCEDURE — G0180 MD CERTIFICATION HHA PATIENT: HCPCS | Mod: ,,, | Performed by: NURSE PRACTITIONER

## 2024-04-25 ENCOUNTER — PATIENT MESSAGE (OUTPATIENT)
Dept: PRIMARY CARE CLINIC | Facility: CLINIC | Age: 89
End: 2024-04-25
Payer: MEDICARE

## 2024-04-25 ENCOUNTER — TELEPHONE (OUTPATIENT)
Dept: FAMILY MEDICINE | Facility: CLINIC | Age: 89
End: 2024-04-25
Payer: MEDICARE

## 2024-04-25 DIAGNOSIS — R53.81 PHYSICAL DECONDITIONING: ICD-10-CM

## 2024-04-25 DIAGNOSIS — I69.319 CVA, OLD, COGNITIVE DEFICITS: ICD-10-CM

## 2024-04-25 DIAGNOSIS — F03.918 DEMENTIA WITH BEHAVIORAL DISTURBANCE: Primary | ICD-10-CM

## 2024-04-25 DIAGNOSIS — R29.6 FREQUENT FALLS: ICD-10-CM

## 2024-04-25 NOTE — TELEPHONE ENCOUNTER
Anabel Rojas [facility] Staff     ----- Message from Anabel Rojas sent at 4/25/2024 10:47 AM CDT -----  Type:  Needs Medical Advice    Who Called: enrique stanley   Best Call Back Number: [phone]   Additional Information:  Requesting call back  heart rate 48... spoke with case sp stanley . Requesting orders for home health to come to do a evaluation on the pt     please advise thank you

## 2024-04-25 NOTE — TELEPHONE ENCOUNTER
Ada with [facility] reports patient's pulse at today's visit was 48.  Mrs. Caballero states patient's daughter has been monitoring patient's pulse and provided the following pulse readings:    4/22  Pulse--56  4/23  Pulse--49, 50, 48  4/24  Pulse--47, 45    Mrs. Caballero states she spoke with patient's home health  and was advised patient is currently only receiving physical therapy services from home health.   recommends order placement for home health nurse to evaluate patient.  Please advise if orders can be placed.  Thank you.

## 2024-04-25 NOTE — TELEPHONE ENCOUNTER
Patients daughter expressed understanding. She would like Mr Doll to have a nurse come out to the house.

## 2024-04-25 NOTE — TELEPHONE ENCOUNTER
Please call to get BP readings  Tell daughter to hold his carvedilol (beta blocker) which can cause low heart rate  Continue BP and pulse readings then send readings to me on Monday

## 2024-04-26 ENCOUNTER — PATIENT MESSAGE (OUTPATIENT)
Dept: PRIMARY CARE CLINIC | Facility: CLINIC | Age: 89
End: 2024-04-26
Payer: MEDICARE

## 2024-04-26 NOTE — TELEPHONE ENCOUNTER
Noted-if pulse continues to be 55 or less with stopping the carvedilol-patient needs to go to ER!

## 2024-04-30 ENCOUNTER — CARE AT HOME (OUTPATIENT)
Dept: HOME HEALTH SERVICES | Facility: CLINIC | Age: 89
End: 2024-04-30
Payer: MEDICARE

## 2024-04-30 DIAGNOSIS — I10 HYPERTENSION, UNSPECIFIED TYPE: ICD-10-CM

## 2024-04-30 DIAGNOSIS — I10 UNCONTROLLED HYPERTENSION: ICD-10-CM

## 2024-04-30 DIAGNOSIS — D63.1 ANEMIA OF CHRONIC RENAL FAILURE, STAGE 4 (SEVERE): Chronic | ICD-10-CM

## 2024-04-30 DIAGNOSIS — N18.4 ANEMIA OF CHRONIC RENAL FAILURE, STAGE 4 (SEVERE): Chronic | ICD-10-CM

## 2024-04-30 DIAGNOSIS — R29.6 FREQUENT FALLS: ICD-10-CM

## 2024-04-30 DIAGNOSIS — R53.81 PHYSICAL DECONDITIONING: ICD-10-CM

## 2024-04-30 DIAGNOSIS — F03.918 DEMENTIA WITH BEHAVIORAL DISTURBANCE: ICD-10-CM

## 2024-04-30 DIAGNOSIS — R00.1 BRADYCARDIA: Primary | ICD-10-CM

## 2024-04-30 PROCEDURE — 1126F AMNT PAIN NOTED NONE PRSNT: CPT | Mod: CPTII,S$GLB,, | Performed by: NURSE PRACTITIONER

## 2024-04-30 PROCEDURE — 1159F MED LIST DOCD IN RCRD: CPT | Mod: CPTII,S$GLB,, | Performed by: NURSE PRACTITIONER

## 2024-04-30 PROCEDURE — 3288F FALL RISK ASSESSMENT DOCD: CPT | Mod: CPTII,S$GLB,, | Performed by: NURSE PRACTITIONER

## 2024-04-30 PROCEDURE — 1160F RVW MEDS BY RX/DR IN RCRD: CPT | Mod: CPTII,S$GLB,, | Performed by: NURSE PRACTITIONER

## 2024-04-30 PROCEDURE — 1157F ADVNC CARE PLAN IN RCRD: CPT | Mod: CPTII,S$GLB,, | Performed by: NURSE PRACTITIONER

## 2024-04-30 PROCEDURE — 99350 HOME/RES VST EST HIGH MDM 60: CPT | Mod: S$GLB,,, | Performed by: NURSE PRACTITIONER

## 2024-04-30 PROCEDURE — 1101F PT FALLS ASSESS-DOCD LE1/YR: CPT | Mod: CPTII,S$GLB,, | Performed by: NURSE PRACTITIONER

## 2024-05-01 VITALS
OXYGEN SATURATION: 99 % | TEMPERATURE: 99 F | DIASTOLIC BLOOD PRESSURE: 84 MMHG | SYSTOLIC BLOOD PRESSURE: 148 MMHG | RESPIRATION RATE: 18 BRPM | HEART RATE: 58 BPM

## 2024-05-01 RX ORDER — HYDRALAZINE HYDROCHLORIDE 100 MG/1
100 TABLET, FILM COATED ORAL EVERY 8 HOURS
Qty: 90 TABLET | Refills: 11 | Status: SHIPPED | OUTPATIENT
Start: 2024-05-01 | End: 2025-05-01

## 2024-05-01 NOTE — PATIENT INSTRUCTIONS
Instructions:  - Continue all medications, treatments and therapies as ordered.  - Follow all instructions, recommendations as discussed.  - Maintain Safety Precautions at all times.  - Attend all medical appointments as scheduled.  - For worsening symptoms: call Primary Care Physician or Nurse Practitioner.  - For emergencies, call 911 or immediately report to the nearest emergency room.

## 2024-05-01 NOTE — PROGRESS NOTES
"[facility]  Medical Chronic Care Home Visit    Encounter Provider: Rebeca Marques   PCP: Aristides Haynes MD  Consult Requested By: Jillian Patton    HISTORY OF PRESENT ILLNESS      Patient ID: Lavon Brandon is a 89 y.o. male is being seen in the home due to physical debility that presents a taxing effort to leave the home, to mitigate high risk of hospital readmission and/or due to the limited availability of reliable or safe options for transportation to the point of access to the provider. Prior to treatment on this visit the chart was reviewed and patient verbal consent was obtained.    Chronic medical conditions synopsis:  Lavon Brandon is a 89 yr old male with medical conditions consisting of hx of CVA, hypertension, bradycardia, CHF, seizure disorder, Lewy Body Dementia, CKD, depression, TBI.     Patient had a hospitalization 2/15/24-2/23/24 for uncontrolled hypertension, CKD IV, hallucinations/delerium related to dementia. He was then sent to rehab in [place] 2/23/24-3/22/24. Since returning to his daughter's home in [place], his blood pressure has been elevated and heart rate has been low. Daughter, Gabriela, reached out to patient's PCP and carvedilol was discontinued. [facility] but [facility]. [facility] and [facility] assistance daily.     Gabriela reports patient has not seen a Cardiologist "in years". She has great difficulty getting him out of the home. Patient requires maximum assistance with all ADLs. Has had frequent falls out of wheelchair and with attempting to walk. Very frail and weak.     When heart rate is low it is reported that he does not appear symptomatic.     History of TBI while in the . Seizure disorder appears stable, daughter not able to tell when he is having a seizure. Patient has been calm and cooperative over last several weeks. Daughter reports she stopped giving patient seroquel because it " "made him too sleepy and lorazepam cost over $170 so she didn't pick it up.    No distress noted. [facility] NP will follow patient every 8-12 weeks and prn due to difficulty leaving home. Program contact information provided to patient and left in home.     DECISION MAKING TODAY       Assessment & Plan:  1. Bradycardia  Assessment & Plan:  Present since rehab discharge 3/22/24. Appears asymptomatic.  Carvedilol recently discontinued by PCP. Still with bradycardia today.  Cardiology consult placed.  Add [facility] for monitoring, education.  Monitor.    Orders:  -     Ambulatory referral/consult to Cardiology; Future; Expected date: 05/08/2024  -     SUBSEQUENT HOME HEALTH ORDERS    2. Dementia with behavioral disturbance  Assessment & Plan:  Recent behavioral disturbances currently controlled.  Has sitter services daily. Not taking seroquel (made him "too drowsy"), not taking lorazepam "was too expensive from the pharmacy".  Follow up with PCP.  Monitor.     Orders:  -     Ambulatory referral/consult to [facility] - Medical    3. Frequent falls  Assessment & Plan:  Chronic, related to dementia, weakness and frailty.  Fall precautions and safety advised.   Continue  PT/OT.  Monitor.    Orders:  -     [facility]    4. Physical deconditioning  Assessment & Plan:  Chronic, related to dementia, weakness and frailty.  Fall precautions and safety advised.   Continue  PT/OT.  Monitor.    Orders:  -     Ambulatory referral/consult to [facility] - Medical    5. Hypertension, unspecified type  -     hydrALAZINE (APRESOLINE) 100 MG tablet; Take 1 tablet (100 mg total) by mouth every 8 (eight) hours.  Dispense: 90 tablet; Refill: 11  -     SUBSEQUENT HOME HEALTH ORDERS    6. Uncontrolled hypertension  Overview:  renal htn    Assessment & Plan:  Chronic, improved  Continue amlodipine, hydralazine, HCTZ, (nor currently taking " losartan-misunderstanding)  Follow up with Cardiology.  Follow low sodium diet.  Monitor BP- SNV added for monitoring, education.       7. Anemia of chronic renal failure, stage 4 (severe)  Assessment & Plan:  Chronic issue.  Avoid nephrotoxic meds/procedures.  Keep BP controlled.  Follow up with PCP and/or Nephrology.               ENVIRONMENT OF CARE      Family and/or Caregiver present at visit?  Yes  Name of Caregiver: daughter, Gabriela  History provided by: caregiver    4Ms for Medical Decision-Making in Older Adults    Last Completed EAWV: None    MOBILITY:  Get Up and Go:       No data to display              Activities of Daily Living:       No data to display              Whisper Test:       No data to display              Disability Status:       No data to display              Nutrition Screening:       No data to display             Screening Score: 0-7 Malnourished, 8-11 At Risk, 12-14 Normal    MENTATION:   Depression Patient Health Questionnaire:      11/1/2023     2:45 PM   Depression Patient Health Questionnaire   Over the last two weeks how often have you been bothered by little interest or pleasure in doing things Not at all   Over the last two weeks how often have you been bothered by feeling down, depressed or hopeless Not at all   PHQ-2 Total Score 0     Has Dementia Dx: Yes    Cognitive Function Screening:       No data to display              Cognitive Function Screening Total - Less than 4 = Abnormal,  Greater than or equal to 4 = Normal    MEDICATIONS:  High Risk Medications:  Total Active Medications: 4  citalopram - 20 MG  levETIRAcetam Tab - 500 MG  LORazepam - 1 MG  QUEtiapine Tab - 25 MG    WHAT MATTERS MOST:  Advance Care Planning   ACP Status:   Patient has had an ACP conversation  Living Will: No  Power of : Yes  LaPOST: No    What is most important right now is to focus on remaining as independent as possible    Accordingly, we have decided that the best plan to meet the  patient's goals includes continuing with treatment      What matters most to patient today is: patient not able to state           Is patient hospice appropriate: No  (If needed, use PPS <30 or FAST score >7)  Was referral to hospice placed: No    Impression upon entering the home:  Physical Dwelling: single family home   Appearance of home environment: cleaniness: clean, walking pathways: clear, lighting: adequate, and home structure: sound structure  Functional Status: max assistance  Mobility: chair bound  Nutritional access: adequate intake and access  Home Health: Yes, HH Agency [facility]    DME/Supplies: wheelchair and bedside commode     Diagnostic tests reviewed/disposition: No diagnosic tests pending after this hospitalization.  Disease/illness education:  hypertension, bradycardia  Establishment or re-establishment of referral orders for community resources: No other necessary community resources.   Discussion with other health care providers: No discussion with other health care providers necessary.   Does patient have a PCP at [facility]? Yes   Repatriation plan with PCP? [facility] reason: mobility   Does patient have an ostomy (ileostomy, colostomy, suprapubic catheter, nephrostomy tube, tracheostomy, PEG tube, pleurex catheter, cholecystostomy, etc)? No  Were BPAs reviewed? Yes    Social History     Socioeconomic History    Marital status:    Tobacco Use    Smoking status: Never    Smokeless tobacco: Never   Substance and Sexual Activity    Alcohol use: No    Drug use: No     Social Determinants of Health     Financial Resource Strain: Low Risk  (2/20/2024)    Overall Financial Resource Strain (CARDIA)     Difficulty of Paying Living Expenses: Not hard at all   Food Insecurity: No Food Insecurity (2/20/2024)    Hunger Vital Sign     Worried About Running Out of Food in the Last Year: Never true     Ran Out of Food in the Last Year: Never true   Transportation Needs: Patient Unable To Answer (2/20/2024)     PRAPARE - Transportation     Lack of Transportation (Medical): Patient unable to answer     Lack of Transportation (Non-Medical): Patient unable to answer   Physical Activity: Unknown (10/15/2022)    Exercise Vital Sign     Days of Exercise per Week: Patient declined     Minutes of Exercise per Session: Patient declined   Stress: Stress Concern Present (2/20/2024)    [employer] - Occupational Stress Questionnaire     Feeling of Stress : Rather much   Housing Stability: Unknown (2/20/2024)    Housing Stability Vital Sign     Unable to Pay for Housing in the Last Year: Patient unable to answer     Number of Places Lived in the Last Year: 1     Unstable Housing in the Last Year: No         OBJECTIVE:     Vital Signs:  Vitals:    04/30/24 1000   BP: (!) 148/84   Pulse: (!) 58   Resp: 18   Temp: 98.6 °F (37 °C)       ROS assisted by daughter  Review of Systems   Constitutional:  Positive for activity change and fatigue. Negative for appetite change, fever and unexpected weight change.   HENT: Negative.     Eyes: Negative.    Respiratory: Negative.     Cardiovascular: Negative.    Gastrointestinal: Negative.    Endocrine: Negative.    Genitourinary: Negative.    Musculoskeletal:  Positive for gait problem.   Skin: Negative.    Neurological:  Positive for weakness.   Hematological: Negative.    Psychiatric/Behavioral:  Positive for confusion.        Physical Exam:  Physical Exam  Constitutional:       General: He is not in acute distress.     Appearance: He is normal weight. He is ill-appearing.   HENT:      Head: Normocephalic and atraumatic.      Nose: Nose normal.      Mouth/Throat:      Mouth: Mucous membranes are dry.      Pharynx: Oropharynx is clear.   Eyes:      Extraocular Movements: Extraocular movements intact.      Conjunctiva/sclera: Conjunctivae normal.      Pupils: Pupils are equal, round, and reactive to light.   Cardiovascular:      Rate and Rhythm: Regular rhythm.  Bradycardia present.      Pulses: Normal pulses.      Heart sounds: Normal heart sounds.   Pulmonary:      Effort: Pulmonary effort is normal.      Breath sounds: Normal breath sounds.   Abdominal:      General: Bowel sounds are normal. There is no distension.      Palpations: Abdomen is soft. There is mass.      Tenderness: There is no abdominal tenderness.   Musculoskeletal:      Cervical back: Normal range of motion and neck supple.   Skin:     General: Skin is warm and dry.      Capillary Refill: Capillary refill takes 2 to 3 seconds.   Neurological:      Mental Status: He is alert.      Motor: Weakness present.   Psychiatric:      Comments: Minimal interaction  Fed self breakfast without difficulty during visit  Follows simple commands  Oriented to person only  Calm, cooperative         INSTRUCTIONS FOR PATIENT:     Scheduled Follow-up, Appts Reviewed with Modifications if Needed: Yes  Future Appointments   Date Time Provider Department Center   5/23/2024 10:00 AM Amandeep Hoffmann MD [facility] CARDIO O at [facility]   5/30/2024  8:00 AM Rebeca Marques NP [address]         Current Outpatient Medications:     amLODIPine (NORVASC) 10 MG tablet, Take 1 tablet (10 mg total) by mouth before evening meal., Disp: 30 tablet, Rfl: 11    calcitRIOL (ROCALTROL) 0.25 MCG Cap, Take 1 capsule (0.25 mcg total) by mouth once daily., Disp: 90 capsule, Rfl: 4    carvediloL (COREG) 6.25 MG tablet, Take 1 tablet (6.25 mg total) by mouth 2 (two) times daily., Disp: 60 tablet, Rfl: 11    citalopram (CELEXA) 20 MG tablet, Take 1 tablet (20 mg total) by mouth once daily., Disp: 90 tablet, Rfl: 11    docusate sodium (COLACE) 100 MG capsule, Take 1 capsule (100 mg total) by mouth once daily., Disp: 90 capsule, Rfl: 3    fluticasone propionate (FLONASE) 50 mcg/actuation nasal spray, 2 sprays (100 mcg total) by Each Nostril route once daily., Disp: 18.2 mL, Rfl: 0    hydroCHLOROthiazide (HYDRODIURIL) 12.5 MG Tab, Take 1 tablet  (12.5 mg total) by mouth once daily., Disp: 30 tablet, Rfl: 11    levETIRAcetam (KEPPRA) 500 MG Tab, Take 1 tablet (500 mg total) by mouth 2 (two) times daily., Disp: , Rfl:     LORazepam (ATIVAN) 1 MG tablet, Take 1 tablet (1 mg total) by mouth every evening., Disp: 30 tablet, Rfl: 2    aspirin 81 MG Chew, Take 1 tablet (81 mg total) by mouth once daily. (Patient not taking: Reported on 5/1/2024), Disp: 90 tablet, Rfl: 0    hydrALAZINE (APRESOLINE) 100 MG tablet, Take 1 tablet (100 mg total) by mouth every 8 (eight) hours., Disp: 90 tablet, Rfl: 11    losartan (COZAAR) 25 MG tablet, Take 1 tablet (25 mg total) by mouth once daily. (Patient not taking: Reported on 5/3/2024), Disp: 90 tablet, Rfl: 3    QUEtiapine (SEROQUEL) 25 MG Tab, Take 1 tablet (25 mg total) by mouth every evening. (Patient not taking: Reported on 5/3/2024), Disp: 90 tablet, Rfl: 3    Medication Reconciliation:  Were medications changed during this appointment? Yes  Were medications in the home? Yes  Is the patient taking the medications as directed? Yes  Does the patient/caregiver understand the medications and changes? Yes  Does updated med list accurately reflects meds patient is currently taking? Yes    Signature: Rebeca Marques NP

## 2024-05-03 ENCOUNTER — TELEPHONE (OUTPATIENT)
Dept: HOME HEALTH SERVICES | Facility: CLINIC | Age: 89
End: 2024-05-03
Payer: MEDICARE

## 2024-05-03 NOTE — ASSESSMENT & PLAN NOTE
"Recent behavioral disturbances currently controlled.  Has sitter services daily. Not taking seroquel (made him "too drowsy"), not taking lorazepam "was too expensive from the pharmacy".  Follow up with PCP.  Monitor.   "

## 2024-05-03 NOTE — ASSESSMENT & PLAN NOTE
Chronic, related to dementia, weakness and frailty.  Fall precautions and safety advised.   Continue HH PT/OT.  Monitor.

## 2024-05-03 NOTE — ASSESSMENT & PLAN NOTE
Chronic issue.  Avoid nephrotoxic meds/procedures.  Keep BP controlled.  Follow up with PCP and/or Nephrology.

## 2024-05-03 NOTE — ASSESSMENT & PLAN NOTE
Present since rehab discharge 3/22/24. Appears asymptomatic.  Carvedilol recently discontinued by PCP. Still with bradycardia today.  Cardiology consult placed.  Add [facility] for monitoring, education.  Monitor.

## 2024-05-03 NOTE — TELEPHONE ENCOUNTER
Faxed orders to [facility] for add on nursing to home health services per NP request.  Fax confirmation received.

## 2024-05-03 NOTE — TELEPHONE ENCOUNTER
[facility] who states [facility] performed a home health visit with patient.  Mrs. Godinez states patient's daughter advised Seroquel is causing increased drowsiness.  Also states patient's daughter indicates cost of Ativan is $150.  Prescription for Ativan noted to have been picked up on 4-11-24 (per  report).  Mrs. Godinez states patient did not have either medication in the home at time of visit.  Will send follow up message to Mrs. Patton  for notification.

## 2024-05-03 NOTE — TELEPHONE ENCOUNTER
Marquita Dietrich Staff     ----- Message from Marquita Dietrich sent at 5/3/2024  4:04 PM CDT -----  Regarding: advice  Type:  Needs Medical Advice    Who Called: dio with     Best Call Back Number: [phone]    Additional Information: óscar st that pt  daughter st that SEROQUEL makes pt sleepy & she stop giving it to him. Also wants to discuss pt getting on something different from ATIVAN, because that cost like $150. please call to discuss.

## 2024-05-03 NOTE — ASSESSMENT & PLAN NOTE
Chronic, improved  Continue amlodipine, hydralazine, HCTZ, (nor currently taking losartan-misunderstanding)  Follow up with Cardiology.  Follow low sodium diet.  Monitor BP-HH SNV added for monitoring, education.

## 2024-05-04 ENCOUNTER — EXTERNAL HOME HEALTH (OUTPATIENT)
Dept: HOME HEALTH SERVICES | Facility: HOSPITAL | Age: 89
End: 2024-05-04
Payer: MEDICARE

## 2024-05-06 ENCOUNTER — PATIENT MESSAGE (OUTPATIENT)
Dept: PRIMARY CARE CLINIC | Facility: CLINIC | Age: 89
End: 2024-05-06
Payer: MEDICARE

## 2024-05-06 DIAGNOSIS — I10 UNCONTROLLED HYPERTENSION: Primary | ICD-10-CM

## 2024-05-06 RX ORDER — HYDROCHLOROTHIAZIDE 12.5 MG/1
12.5 TABLET ORAL DAILY
Qty: 90 TABLET | Refills: 3 | Status: SHIPPED | OUTPATIENT
Start: 2024-05-06 | End: 2025-05-06

## 2024-05-06 NOTE — TELEPHONE ENCOUNTER
Please see attached blood pressure and pulse log.  Please also see additional message from today regarding medication questions.  Thank you.

## 2024-05-06 NOTE — TELEPHONE ENCOUNTER
--Call placed to patient's daughter (Gabriela) confirmed patient is taking Lorazepam (Ativan).  States this medication seems to be working, patient is not having any issues with this medication.     --Patient's daughter (Gabriela) states patient takes Quetiapine as needed.  States patient has not needed this medication lately and therefore she has not given him this medication in a while.    --Patient's daughter (Gabriela) states patient needs refill of Hydrochlorothiazide.  [facility]    --Patient's daughter (Gabriela) states she will be sending a My Chart message with patient's blood pressure/pulse log today.    --Patient's daughter (Gabrieal) is requesting to confirm what medications patient should be taking for blood pressure.  Mrs. Ochoa states she was told to hold Carvedilol due to patient's pulse.  Mrs. Ochoa would like to confirm what Mrs. Patton would like for patient to be taking for blood pressure as she does not want to give patient more medication than what he should be taking.          Please advise. Thank you.

## 2024-05-06 NOTE — TELEPHONE ENCOUNTER
Please ask daughter did the patient start taking the ativan-it shows that the medication was picked up on 4/5/24 30 tabs.

## 2024-05-08 RX ORDER — LOSARTAN POTASSIUM 50 MG/1
50 TABLET ORAL DAILY
Qty: 90 TABLET | Refills: 3 | Status: SHIPPED | OUTPATIENT
Start: 2024-05-08 | End: 2025-05-08

## 2024-05-08 NOTE — TELEPHONE ENCOUNTER
I do not want the patient taking the HCTZ due to his CKD. Most of His bp reading are elevated I will increase the losartan to 50 mg daily-continue BP and pulse log

## 2024-05-09 ENCOUNTER — TELEPHONE (OUTPATIENT)
Dept: PRIMARY CARE CLINIC | Facility: CLINIC | Age: 89
End: 2024-05-09
Payer: MEDICARE

## 2024-05-09 NOTE — TELEPHONE ENCOUNTER
Josselyn Middleton [facility] Staff     ----- Message from Josselyn Middleton sent at 5/9/2024  3:49 PM CDT -----  Contact: Sofia with [address]  Type:  Needs Medical Advice    Who Called: Sofia with [address]    Would the patient rather a call back or a response via [facility]? Call back    Best Call Back Number:  for  nurse    Additional Information: caretaker says pt is not wanting get out of bed for the last few day, a lot more weakness, and not ambulating well.    Also reported episode where pt got out of bed (not sure how as bed has rails) in middle of night and was found on couch confused.    Please call to advise  Thanks

## 2024-05-09 NOTE — TELEPHONE ENCOUNTER
"Please see attached message below from home health nurse below.  Call placed to Gretchen with [facility] who states patient's daughter advised she "started giving patient Ativan at night before bed" over the weekend.  States now patient is very fatigued and not wanted to get out of bed.  States daughter has held Ativan since Saturday.  States patient only giving patient Seroquel as needed.  Please advise.   "

## 2024-05-10 ENCOUNTER — PATIENT MESSAGE (OUTPATIENT)
Dept: FAMILY MEDICINE | Facility: CLINIC | Age: 89
End: 2024-05-10
Payer: MEDICARE

## 2024-05-10 NOTE — TELEPHONE ENCOUNTER
Noted-patient has a appointment with Cardiology on 5/23/24 for the Bradycardia. It's very important that the patient attends this appointment.

## 2024-05-10 NOTE — TELEPHONE ENCOUNTER
Spoke to Gretchen with HH informed and verbalizes understanding. She advised pt daughter has not been giving the patient Seroquel at all. Advised  nurse I would contact pt daughter and advise    Spoke to Gabriela pt daughter who states she will stop giving Ativan and try 1/2 tablet of Seroquel at bedtime. Pt daughter states she was concerned of low heart rate states his HR was dropping in the high 50's but has since increased. Daughter advised she does not have his readings with her currently but states it has been above 60.    Daughter states she will try half tab 12.5mg Seroquel and stop Ativan to see how that affects the pt. Daughter also states pt has not been having any hallucinations.

## 2024-05-10 NOTE — TELEPHONE ENCOUNTER
Please inform daughter to stop the ativan-only give half (12.5 mg) of the Seroquel-does the Seroquel help with hallucinations?

## 2024-05-15 ENCOUNTER — PATIENT MESSAGE (OUTPATIENT)
Dept: PRIMARY CARE CLINIC | Facility: CLINIC | Age: 89
End: 2024-05-15
Payer: MEDICARE

## 2024-05-15 ENCOUNTER — TELEPHONE (OUTPATIENT)
Dept: PRIMARY CARE CLINIC | Facility: CLINIC | Age: 89
End: 2024-05-15
Payer: MEDICARE

## 2024-05-15 ENCOUNTER — OFFICE VISIT (OUTPATIENT)
Dept: PRIMARY CARE CLINIC | Facility: CLINIC | Age: 89
End: 2024-05-15
Payer: MEDICARE

## 2024-05-15 DIAGNOSIS — F03.918 DEMENTIA WITH BEHAVIORAL DISTURBANCE: ICD-10-CM

## 2024-05-15 DIAGNOSIS — R53.81 PHYSICAL DECONDITIONING: Primary | ICD-10-CM

## 2024-05-15 DIAGNOSIS — M19.90 CHRONIC ARTHRITIS: Chronic | ICD-10-CM

## 2024-05-15 DIAGNOSIS — I10 UNCONTROLLED HYPERTENSION: Primary | ICD-10-CM

## 2024-05-15 DIAGNOSIS — G47.00 INSOMNIA, UNSPECIFIED TYPE: ICD-10-CM

## 2024-05-15 PROCEDURE — 1160F RVW MEDS BY RX/DR IN RCRD: CPT | Mod: CPTII,95,, | Performed by: NURSE PRACTITIONER

## 2024-05-15 PROCEDURE — 99442 PR PHYSICIAN TELEPHONE EVALUATION 11-20 MIN: CPT | Mod: 95,,, | Performed by: NURSE PRACTITIONER

## 2024-05-15 PROCEDURE — 1159F MED LIST DOCD IN RCRD: CPT | Mod: CPTII,95,, | Performed by: NURSE PRACTITIONER

## 2024-05-15 PROCEDURE — 1157F ADVNC CARE PLAN IN RCRD: CPT | Mod: CPTII,95,, | Performed by: NURSE PRACTITIONER

## 2024-05-15 NOTE — TELEPHONE ENCOUNTER
Ayden Sevilla [facility] Staff     ----- Message from Ayden Sevilla sent at 5/15/2024  8:37 AM CDT -----  Contact: óscar  Type: Needs Medical Advice  Who Called:  [facility]scar [facility]  Best Call Back Number: [phone]  Additional Information: Óscar is calling the office pt daughter found message in portal from 05/08 with medication instruction havent seen since yesterday have been taking hydrALAZINE (APRESOLINE) 100 MG tablet this whole time and just saw yesterday they didn't want pt to be taking it, also was not taking losartan at all in message said to increase it.

## 2024-05-15 NOTE — PROGRESS NOTES
Subjective:       Patient ID: Lavon Brandon is a 89 y.o. male.    Chief Complaint: No chief complaint on file.    HPI      The patient location is: ***  The chief complaint leading to consultation is: ***    Visit type: {TELE AUDIOVISUAL:65298}    Face to Face time with patient: ***  *** minutes of total time spent on the encounter, which includes face to face time and non-face to face time preparing to see the patient (eg, review of tests), Obtaining and/or reviewing separately obtained history, Documenting clinical information in the electronic or other health record, Independently interpreting results (not separately reported) and communicating results to the patient/family/caregiver, or Care coordination (not separately reported).         Each patient to whom he or she provides medical services by telemedicine is:  (1) informed of the relationship between the physician and patient and the respective role of any other health care provider with respect to management of the patient; and (2) notified that he or she may decline to receive medical services by telemedicine and may withdraw from such care at any time.    Notes:    Past Medical History:   Diagnosis Date    Bradyarrhythmia     CVA (cerebral infarction) 2006    Dementia     Depression     Hyperlipidemia     Hypertension     renal htn    Pulmonary embolism 2002    Seizure disorder        Review of patient's allergies indicates:   Allergen Reactions    Ciprofloxacin (bulk) Swelling         Current Outpatient Medications:     amLODIPine (NORVASC) 10 MG tablet, Take 1 tablet (10 mg total) by mouth before evening meal., Disp: 30 tablet, Rfl: 11    aspirin 81 MG Chew, Take 1 tablet (81 mg total) by mouth once daily., Disp: 90 tablet, Rfl: 0    calcitRIOL (ROCALTROL) 0.25 MCG Cap, Take 1 capsule (0.25 mcg total) by mouth once daily., Disp: 90 capsule, Rfl: 4    fluticasone propionate (FLONASE) 50 mcg/actuation nasal spray, 2 sprays (100 mcg total) by Each  Nostril route once daily., Disp: 18.2 mL, Rfl: 0    hydrALAZINE (APRESOLINE) 100 MG tablet, Take 1 tablet (100 mg total) by mouth every 8 (eight) hours., Disp: 90 tablet, Rfl: 11    levETIRAcetam (KEPPRA) 500 MG Tab, Take 1 tablet (500 mg total) by mouth 2 (two) times daily., Disp: , Rfl:     QUEtiapine (SEROQUEL) 25 MG Tab, Take 1 tablet (25 mg total) by mouth every evening. (Patient taking differently: Take 12.5 mg by mouth every evening. Half tab), Disp: 90 tablet, Rfl: 3    carvediloL (COREG) 6.25 MG tablet, Take 1 tablet (6.25 mg total) by mouth 2 (two) times daily. (Patient not taking: Reported on 5/15/2024), Disp: 60 tablet, Rfl: 11    citalopram (CELEXA) 20 MG tablet, Take 1 tablet (20 mg total) by mouth once daily. (Patient not taking: Reported on 5/15/2024), Disp: 90 tablet, Rfl: 11    docusate sodium (COLACE) 100 MG capsule, Take 1 capsule (100 mg total) by mouth once daily. (Patient not taking: Reported on 5/15/2024), Disp: 90 capsule, Rfl: 3    hydroCHLOROthiazide (HYDRODIURIL) 12.5 MG Tab, Take 1 tablet (12.5 mg total) by mouth once daily. (Patient not taking: Reported on 5/15/2024), Disp: 90 tablet, Rfl: 3    LORazepam (ATIVAN) 1 MG tablet, Take 1 tablet (1 mg total) by mouth every evening. (Patient not taking: Reported on 5/15/2024), Disp: 30 tablet, Rfl: 2    losartan (COZAAR) 50 MG tablet, Take 1 tablet (50 mg total) by mouth once daily. (Patient not taking: Reported on 5/15/2024), Disp: 90 tablet, Rfl: 3    Review of Systems    Objective:      There were no vitals taken for this visit.  Physical Exam    Assessment:       No diagnosis found.    Plan:       There are no diagnoses linked to this encounter.      Patient readiness: {RESPONSES; READINESS:80095} and barriers:{BARRIERS TO CHANGE:52614}    During the course of the visit the patient was educated and counseled about the following:     {[facility] PLAN:68479}    Goals: { [facility]M GOALS:43791}    Did patient meet goals/outcomes: {YES  (DEF)/NO:49846:o}    The following self management tools provided: {[facility] SELF MANAGEMENT TOOL:91382}    Patient Instructions (the written plan) was given to the patient/family.     Time spent with patient: {15 30 45 55 MINUTES:28848}    Barriers to medications present ({YES NO:92755} )    Adverse reactions to current medications ({YES NO:04755})    Over the counter medications reviewed ({YES NO:58051})

## 2024-05-15 NOTE — PROGRESS NOTES
Subjective:       Patient ID: Lavon Brandon is a 89 y.o. male.    Chief Complaint: No chief complaint on file.    HPI      The patient location is: [place], [place]  The chief complaint leading to consultation is: medication clarification     Visit type: audio only    Face to Face time with patient: 20 minutes of total time spent on the encounter, which includes face to face time and non-face to face time preparing to see the patient (eg, review of tests), Obtaining and/or reviewing separately obtained history, Documenting clinical information in the electronic or other health record, Independently interpreting results (not separately reported) and communicating results to the patient/family/caregiver, or Care coordination (not separately reported).         Each patient to whom he or she provides medical services by telemedicine is:  (1) informed of the relationship between the physician and patient and the respective role of any other health care provider with respect to management of the patient; and (2) notified that he or she may decline to receive medical services by telemedicine and may withdraw from such care at any time.    Notes:      I spoke with daughter Gabriela via [employer] on my phone due to [facility] was down. She could not turn her camera on-audio only. She reports patient's BP today is 110/60, 80.  She was still giving the patient Seroquel 25mg-patient sleep all night but woke up drowsy- she stop ativan  I informed Gabriela only give the patient 12.5 (1/2 tab) of the Seroquel.  Past Medical History:   Diagnosis Date    Bradyarrhythmia     CVA (cerebral infarction) 2006    Dementia     Depression     Hyperlipidemia     Hypertension     renal htn    Pulmonary embolism 2002    Seizure disorder        Review of patient's allergies indicates:   Allergen Reactions    Ciprofloxacin (bulk) Swelling         Current Outpatient Medications:     amLODIPine (NORVASC) 10 MG tablet, Take 1 tablet (10 mg total) by mouth  before evening meal., Disp: 30 tablet, Rfl: 11    aspirin 81 MG Chew, Take 1 tablet (81 mg total) by mouth once daily., Disp: 90 tablet, Rfl: 0    calcitRIOL (ROCALTROL) 0.25 MCG Cap, Take 1 capsule (0.25 mcg total) by mouth once daily., Disp: 90 capsule, Rfl: 4    fluticasone propionate (FLONASE) 50 mcg/actuation nasal spray, 2 sprays (100 mcg total) by Each Nostril route once daily., Disp: 18.2 mL, Rfl: 0    hydrALAZINE (APRESOLINE) 100 MG tablet, Take 1 tablet (100 mg total) by mouth every 8 (eight) hours., Disp: 90 tablet, Rfl: 11    levETIRAcetam (KEPPRA) 500 MG Tab, Take 1 tablet (500 mg total) by mouth 2 (two) times daily., Disp: , Rfl:     QUEtiapine (SEROQUEL) 25 MG Tab, Take 1 tablet (25 mg total) by mouth every evening. (Patient taking differently: Take 12.5 mg by mouth every evening. Half tab), Disp: 90 tablet, Rfl: 3    carvediloL (COREG) 6.25 MG tablet, Take 1 tablet (6.25 mg total) by mouth 2 (two) times daily. (Patient not taking: Reported on 5/15/2024), Disp: 60 tablet, Rfl: 11    citalopram (CELEXA) 20 MG tablet, Take 1 tablet (20 mg total) by mouth once daily. (Patient not taking: Reported on 5/15/2024), Disp: 90 tablet, Rfl: 11    docusate sodium (COLACE) 100 MG capsule, Take 1 capsule (100 mg total) by mouth once daily. (Patient not taking: Reported on 5/15/2024), Disp: 90 capsule, Rfl: 3    hydroCHLOROthiazide (HYDRODIURIL) 12.5 MG Tab, Take 1 tablet (12.5 mg total) by mouth once daily. (Patient not taking: Reported on 5/15/2024), Disp: 90 tablet, Rfl: 3    LORazepam (ATIVAN) 1 MG tablet, Take 1 tablet (1 mg total) by mouth every evening. (Patient not taking: Reported on 5/15/2024), Disp: 30 tablet, Rfl: 2    losartan (COZAAR) 50 MG tablet, Take 1 tablet (50 mg total) by mouth once daily. (Patient not taking: Reported on 5/15/2024), Disp: 90 tablet, Rfl: 3    Review of Systems    Objective:      There were no vitals taken for this visit.  Physical Exam    Assessment:       No diagnosis found.     Plan:       There are no diagnoses linked to this encounter.      Patient readiness: {RESPONSES; READINESS:76190} and barriers:{BARRIERS TO CHANGE:77555}    During the course of the visit the patient was educated and counseled about the following:     {[facility] PLAN:85626}    Goals: {[facility] GOALS:99120}    Did patient meet goals/outcomes: {YES (DEF)/NO:09858:o}    The following self management tools provided: {[facility] SELF MANAGEMENT TOOL:30643}    Patient Instructions (the written plan) was given to the patient/family.     Time spent with patient: {15 30 45 55 MINUTES:07744}    Barriers to medications present ({YES NO:93650} )    Adverse reactions to current medications ({YES NO:86199})    Over the counter medications reviewed ({YES NO:10097})

## 2024-05-15 NOTE — TELEPHONE ENCOUNTER
Appointment scheduled.         Appointment Information   Name: MANSOOR SEGAL MRN: [id number]   Date: 5/15/2024 Status: [facility]   Appt Time: 2:30 PM Length: 30   Visit Type: ESTABLISHED PATIENT - VIRTUAL [2083] Copay: $0.00   Provider: Jillian Patton NP Dept: [facility], Family Medicine       Dept Address: [address] [place] [id number]       Dept Phone Number: [phone]   Referring Provider:   HILDA: [id number]   Appt Phone:     Notes: discuss medications, bp, pulse   Made On: 5/15/2024 10:17 AM By: PAULA REYES [[id number]] (ES)       Follow up call placed to [facility] with [employer] [facility] for notification.

## 2024-05-15 NOTE — TELEPHONE ENCOUNTER
Home health nurse (Gretchen) states a message was sent via [facility] advising for patient to stop taking HCTZ due to CKD and to increase Losartan to 50 mg daily.  Will insert message below for reference    Sanaz Hawley LPN   to Lavon Brandon   KM      5/8/24 11:11 AM  Per Jillian Patton NP:     I do not want the patient taking the HCTZ due to his CKD. Most of His bp reading are elevated I will increase the losartan to 50 mg daily-continue BP and pulse log        Last read by Lavon Brandon at 12:57 PM on 5/14/2024.        Mrs. Godinez states patient's daughter did not view this message until 5-14-24.  Mrs. Godinez also advised patient has not been taking Losartan as patient's daughter did not know he was supposed to be taking this medication.  Upon viewing portal message on 5-14-24, patient's daughter has stopped giving patient HCTZ and has started giving Losartan 25 mg as patient was not previously taking Losartan.  Will send follow up message to  Abdi for notification.  Please advise if you would like to schedule a virtual appointment to discuss current medication therapy.  Mrs. Godinez (home health nurse) advised patient's daughter is confused about what medications patient should be taking and what the medications are for.  Thank you.

## 2024-05-16 NOTE — TELEPHONE ENCOUNTER
Notification provided via e-mail due to this being initial point of contact regarding this matter.

## 2024-05-16 NOTE — TELEPHONE ENCOUNTER
She can give OTC  tylenol arthritis for the back pain.   The carvedilol, hydrochlorothiazide ant Lorazepam are discontinued.  [facility] home [facility] order placed  Continue BP and pulse home readings

## 2024-05-17 NOTE — TELEPHONE ENCOUNTER
Patient's daughter (Gabriela)  noted to have not yet viewed [facility] reply.  Call placed to Mrs. Ochoa at this time for notification of information in attached message from Mrs. Patton.  Mrs. Ochoa verbalized understanding.  Mrs. Ochoa requested to send a follow up message to Mrs. Patton for notification that patient's feet have started to swell again despite elevating his legs.  Mrs. Ochoa also states patient is sleeping better.  Message forwarded to Mrs. Patton as per request.

## 2024-05-20 NOTE — PROGRESS NOTES
Subjective:       Patient ID: Lavon Brandon is a 89 y.o. male.    Chief Complaint: No chief complaint on file.    HPI    The patient location is: [place], [place]  The chief complaint leading to consultation is: follow up    Visit type: audio only    Face to Face time with patient: 20 minutes of total time spent on the encounter, which includes face to face time and non-face to face time preparing to see the patient (eg, review of tests), Obtaining and/or reviewing separately obtained history, Documenting clinical information in the electronic or other health record, Independently interpreting results (not separately reported) and communicating results to the patient/family/caregiver, or Care coordination (not separately reported).         Each patient to whom he or she provides medical services by telemedicine is:  (1) informed of the relationship between the physician and patient and the respective role of any other health care provider with respect to management of the patient; and (2) notified that he or she may decline to receive medical services by telemedicine and may withdraw from such care at any time.    Notes:          I spoke with daughter Gabriela via [employer] on my phone due to [facility] was down. She could not turn her camera on-audio only. She reports patient's BP today is 110/60, 80.  She was still giving the patient Seroquel 25mg-patient sleep all night but woke up drowsy- she stop ativan  I informed Gabriela only give the patient 12.5 (1/2 tab) of the Seroquel.  Past Medical History:   Diagnosis Date    Bradyarrhythmia     CVA (cerebral infarction) 2006    Dementia     Depression     Hyperlipidemia     Hypertension     renal htn    Pulmonary embolism 2002    Seizure disorder        Review of patient's allergies indicates:   Allergen Reactions    Ciprofloxacin (bulk) Swelling         Current Outpatient Medications:     amLODIPine (NORVASC) 10 MG tablet, Take 1 tablet (10 mg total) by mouth before evening  meal., Disp: 30 tablet, Rfl: 11    aspirin 81 MG Chew, Take 1 tablet (81 mg total) by mouth once daily., Disp: 90 tablet, Rfl: 0    calcitRIOL (ROCALTROL) 0.25 MCG Cap, Take 1 capsule (0.25 mcg total) by mouth once daily., Disp: 90 capsule, Rfl: 4    fluticasone propionate (FLONASE) 50 mcg/actuation nasal spray, 2 sprays (100 mcg total) by Each Nostril route once daily., Disp: 18.2 mL, Rfl: 0    hydrALAZINE (APRESOLINE) 100 MG tablet, Take 1 tablet (100 mg total) by mouth every 8 (eight) hours., Disp: 90 tablet, Rfl: 11    levETIRAcetam (KEPPRA) 500 MG Tab, Take 1 tablet (500 mg total) by mouth 2 (two) times daily., Disp: , Rfl:     QUEtiapine (SEROQUEL) 25 MG Tab, Take 1 tablet (25 mg total) by mouth every evening. (Patient taking differently: Take 12.5 mg by mouth every evening. Half tab), Disp: 90 tablet, Rfl: 3    carvediloL (COREG) 6.25 MG tablet, Take 1 tablet (6.25 mg total) by mouth 2 (two) times daily. (Patient not taking: Reported on 5/15/2024), Disp: 60 tablet, Rfl: 11    citalopram (CELEXA) 20 MG tablet, Take 1 tablet (20 mg total) by mouth once daily. (Patient not taking: Reported on 5/15/2024), Disp: 90 tablet, Rfl: 11    docusate sodium (COLACE) 100 MG capsule, Take 1 capsule (100 mg total) by mouth once daily. (Patient not taking: Reported on 5/15/2024), Disp: 90 capsule, Rfl: 3    hydroCHLOROthiazide (HYDRODIURIL) 12.5 MG Tab, Take 1 tablet (12.5 mg total) by mouth once daily. (Patient not taking: Reported on 5/15/2024), Disp: 90 tablet, Rfl: 3    LORazepam (ATIVAN) 1 MG tablet, Take 1 tablet (1 mg total) by mouth every evening. (Patient not taking: Reported on 5/15/2024), Disp: 30 tablet, Rfl: 2    losartan (COZAAR) 50 MG tablet, Take 1 tablet (50 mg total) by mouth once daily. (Patient not taking: Reported on 5/15/2024), Disp: 90 tablet, Rfl: 3    Review of Systems   Constitutional:  Negative for unexpected weight change.   HENT:  Negative for trouble swallowing.    Eyes:  Negative for visual  disturbance.   Respiratory:  Negative for shortness of breath.    Cardiovascular:  Negative for chest pain, palpitations and leg swelling.   Gastrointestinal:  Negative for blood in stool.   Genitourinary:  Negative for hematuria.   Skin:  Negative for rash.   Allergic/Immunologic: Negative for immunocompromised state.   Neurological:  Negative for headaches.   Hematological:  Does not bruise/bleed easily.   Psychiatric/Behavioral:  Negative for agitation. The patient is not nervous/anxious.        Objective:      There were no vitals taken for this visit.  Physical Exam  Vitals reviewed: audio only.         Assessment:       1. Uncontrolled hypertension    2. Insomnia, unspecified type    3. Dementia with behavioral disturbance        Plan:       Uncontrolled hypertension  Stable, continue management  Insomnia, unspecified type  Stable, continue management  Dementia with behavioral disturbance  Stable, continue management        Patient readiness: acceptance and barriers:none    During the course of the visit the patient was educated and counseled about the following:     Hypertension:   Dietary sodium restriction.    Goals: Hypertension: Reduce Blood Pressure    Did patient meet goals/outcomes: Yes    The following self management tools provided: blood pressure log    Patient Instructions (the written plan) was given to the patient/family.     Time spent with patient: 30 minutes    Barriers to medications present (no )    Adverse reactions to current medications (no)    Over the counter medications reviewed (Yes)

## 2024-05-23 ENCOUNTER — OFFICE VISIT (OUTPATIENT)
Dept: CARDIOLOGY | Facility: CLINIC | Age: 89
End: 2024-05-23
Payer: MEDICARE

## 2024-05-23 VITALS
SYSTOLIC BLOOD PRESSURE: 116 MMHG | DIASTOLIC BLOOD PRESSURE: 50 MMHG | BODY MASS INDEX: 25.56 KG/M2 | HEIGHT: 70 IN | HEART RATE: 50 BPM

## 2024-05-23 DIAGNOSIS — R00.1 ASYMPTOMATIC BRADYCARDIA: Primary | ICD-10-CM

## 2024-05-23 DIAGNOSIS — I10 PRIMARY HYPERTENSION: ICD-10-CM

## 2024-05-23 DIAGNOSIS — I34.0 NONRHEUMATIC MITRAL VALVE REGURGITATION: ICD-10-CM

## 2024-05-23 PROBLEM — I50.20 SYSTOLIC CONGESTIVE HEART FAILURE, UNSPECIFIED HF CHRONICITY: Status: RESOLVED | Noted: 2024-04-02 | Resolved: 2024-05-23

## 2024-05-23 PROCEDURE — 3288F FALL RISK ASSESSMENT DOCD: CPT | Mod: CPTII,S$GLB,, | Performed by: STUDENT IN AN ORGANIZED HEALTH CARE EDUCATION/TRAINING PROGRAM

## 2024-05-23 PROCEDURE — 1126F AMNT PAIN NOTED NONE PRSNT: CPT | Mod: CPTII,S$GLB,, | Performed by: STUDENT IN AN ORGANIZED HEALTH CARE EDUCATION/TRAINING PROGRAM

## 2024-05-23 PROCEDURE — 99999 PR PBB SHADOW E&M-EST. PATIENT-LVL IV: CPT | Mod: PBBFAC,,, | Performed by: STUDENT IN AN ORGANIZED HEALTH CARE EDUCATION/TRAINING PROGRAM

## 2024-05-23 PROCEDURE — 1101F PT FALLS ASSESS-DOCD LE1/YR: CPT | Mod: CPTII,S$GLB,, | Performed by: STUDENT IN AN ORGANIZED HEALTH CARE EDUCATION/TRAINING PROGRAM

## 2024-05-23 PROCEDURE — 1160F RVW MEDS BY RX/DR IN RCRD: CPT | Mod: CPTII,S$GLB,, | Performed by: STUDENT IN AN ORGANIZED HEALTH CARE EDUCATION/TRAINING PROGRAM

## 2024-05-23 PROCEDURE — 99214 OFFICE O/P EST MOD 30 MIN: CPT | Mod: S$GLB,,, | Performed by: STUDENT IN AN ORGANIZED HEALTH CARE EDUCATION/TRAINING PROGRAM

## 2024-05-23 PROCEDURE — 1157F ADVNC CARE PLAN IN RCRD: CPT | Mod: CPTII,S$GLB,, | Performed by: STUDENT IN AN ORGANIZED HEALTH CARE EDUCATION/TRAINING PROGRAM

## 2024-05-23 PROCEDURE — 1159F MED LIST DOCD IN RCRD: CPT | Mod: CPTII,S$GLB,, | Performed by: STUDENT IN AN ORGANIZED HEALTH CARE EDUCATION/TRAINING PROGRAM

## 2024-05-23 NOTE — PROGRESS NOTES
Patient ID:  Lavon Brandon  89 y.o.  male      Assessment:       1. Asymptomatic bradycardia    2. Primary hypertension    3. Nonrheumatic mitral valve regurgitation          Plan:     He has asymptomatic sinus bradycardia.  He has chronotropic competence as his heart rate goes up to 70s with activity.  Agree with discontinuing Coreg.  Avoid any heart rate lowering medications such as BB/non-dihydropyridine CCB in the future.  He had 5 runs of atrial tachycardia with the longest one lasting only 7 beats on his event monitor in November 2023.  Okay to hold off on a beta-blocker from this standpoint as the episodes were brief and non-sustained.  Continue amlodipine 10 mg daily and hydralazine 100 mg t.i.d. for hypertension.  Consider adding losartan back if needed and monitor renal function periodically.  Follow-up with nephrology for CKD.  Obtain TTE to evaluate heart function and structure.  Last echo was in 2021.  LVEF preserved and mild-to-moderate mitral regurgitation    Subjective:     Chief Complaint   Patient presents with    Hypertension    Bradycardia       HPI:  Lavon Brandon is a 89 y.o. male with history of asymptomatic sinus bradycardia, hypertension, advanced dementia, CVA,  DM type 2 and CKD stage 4.  He is here to reestablish care.  He has seen Dr. Villa in the past.  At home his heart rate has been ranging from 50s to 70s SBP has been ranging from 110s to 170s.  He has no dizziness, syncope or near-syncope.  No chest pain, shortness of breath, orthopnea, PND or swelling of extremities.  His PCP recently discontinued his Coreg because of the bradycardia.  Also discontinued hydrochlorothiazide and losartan gives of his history of CKD.    PREVIOUS CARDIAC TESTING/PROCEDURES HISTORY:  Most Recent Echocardiogram Results  Results for orders placed in visit on 01/06/22    Echo Saline Bubble? Yes    Interpretation Summary  · There is no evidence of intracardiac shunting.  · The left ventricle is normal  in size with mild concentric hypertrophy and normal systolic function.  · The estimated ejection fraction is 65%.  · Normal left ventricular diastolic function.  · Mild-to-moderate mitral regurgitation.  · Normal central venous pressure (3 mmHg).  · The estimated PA systolic pressure is 31 mmHg.      Most Recent EKG Results  Results for orders placed or performed during the hospital encounter of 02/15/24   EKG 12-lead    Collection Time: 02/15/24  9:47 PM   Result Value Ref Range    QRS Duration 110 ms    [facility] QTC Calculation 452 ms    Narrative    Test Reason : R41.82,    Vent. Rate : 060 BPM     Atrial Rate : 060 BPM     P-R Int : 226 ms          QRS Dur : 110 ms      QT Int : 452 ms       P-R-T Axes : 048 -18 049 degrees     QTc Int : 452 ms    Sinus rhythm with 1st degree A-V block  Otherwise normal ECG  When compared with ECG of 30-NOV-2023 02:37,  No significant change was found  Confirmed by Mark OWEN, Chris Wood ([id number]) on 2/22/2024 1:32:56 PM    Referred By: [email or web address]   SELF           Confirmed By:Chris Flores MD       The ASCVD Risk score (Pool PASTOR, et al., 2019) failed to calculate for the following reasons:    The 2019 ASCVD risk score is only valid for ages 40 to 79    The patient has a prior MI or stroke diagnosis    Review of patient's allergies indicates:   Allergen Reactions    Ciprofloxacin (bulk) Swelling       Past Medical History:   Diagnosis Date    Bradyarrhythmia     CVA (cerebral infarction) 2006    Dementia     Depression     Hyperlipidemia     Hypertension     renal htn    Pulmonary embolism 2002    Seizure disorder      Past Surgical History:   Procedure Laterality Date    left foot  with crushed injry       Social History     Tobacco Use    Smoking status: Never    Smokeless tobacco: Never   Substance Use Topics    Alcohol use: No    Drug use: No          REVIEW OF SYSTEMS  CONSTITUTIONAL: No chills, no fatigue, no fever.   EYES: No double vision, no blurred vision  NEURO: No  "headaches, no dizziness  RESPIRATORY: No cough, no wheezing.    CARDIOVASCULAR: See HPI   GI: No abdominal pain, no melena, no diarrhea, no nausea or vomiting.   : No  dysuria and frequency, no hematuria  SKIN: no bruising, no discoloration  ENDOCRINE: no polyphagia, no heat intolerance, no cold intolerance  PSYCHIATRIC: no depression, no anxiety, no memory loss  MUSCULOSKELETAL: no  neck pain, no muscle weakness,no back pain          Objective:        Vitals:    05/23/24 1017   BP: (!) 116/50   Pulse: (!) 50       PHYSICAL EXAM  CONSTITUTIONAL: In no apparent distress  HEENT: Normocephalic. Pupils normal and conjunctivae normal. No pallor  NECK: No JVD  LUNGS: B/L air entry to the lungs, clear to auscultation. No rales, wheezing or rhonchi.  HEART: Normal rate and regular rhythm. Normal S1 and S2.  No murmur   ABDOMEN: soft, non-tender; bowel sounds normal  EXTREMITIES: No edema. Good palpable distal pulses.  NEURO: AAO X 3, no gross sensory or motor deficits  SKIN:  No rash  Psych:  Normal affect    Lab Results   Component Value Date    WBC 6.10 02/21/2024    HGB 10.0 (L) 02/21/2024    HCT 30.8 (L) 02/21/2024     02/21/2024    CHOL 109 (L) 11/08/2023    TRIG 34 11/08/2023    HDL 49 11/08/2023    ALT 5 (L) 02/21/2024    AST 12 02/21/2024     02/21/2024    K 4.8 02/21/2024     02/21/2024    CREATININE 1.9 (H) 02/21/2024    BUN 34 (H) 02/21/2024    CO2 30 (H) 02/21/2024    TSH 3.356 02/16/2024    PSA 1.5 01/07/2010    INR 0.9 11/30/2023    HGBA1C 5.2 11/08/2023        @  Lab Results   Component Value Date    CHOL 109 (L) 11/08/2023    CHOL 95 (L) 04/12/2023    CHOL 185 11/02/2021     Lab Results   Component Value Date    HDL 49 11/08/2023    HDL 48 04/12/2023    HDL 53 11/02/2021     Lab Results   Component Value Date    LDLCALC 53.2 (L) 11/08/2023    LDLCALC 40.2 (L) 04/12/2023    LDLCALC 119.4 11/02/2021     No results found for: "DLDL"  Lab Results   Component Value Date    TRIG 34 11/08/2023 "    TRIG 34 04/12/2023    TRIG 63 11/02/2021       f1   Lab Results   Component Value Date    CHOLHDL 45.0 11/08/2023    CHOLHDL 50.5 (H) 04/12/2023    CHOLHDL 28.6 11/02/2021            Current Outpatient Medications   Medication Instructions    amLODIPine (NORVASC) 10 mg, Oral, Daily before evening meal    aspirin 81 mg, Oral, Daily    calcitRIOL (ROCALTROL) 0.25 mcg, Oral, Daily    citalopram (CELEXA) 20 mg, Oral, Daily    docusate sodium (COLACE) 100 mg, Oral, Daily    fluticasone propionate (FLONASE) 100 mcg, Each Nostril, Daily    hydrALAZINE (APRESOLINE) 100 mg, Oral, Every 8 hours    hydroCHLOROthiazide (HYDRODIURIL) 12.5 mg, Oral, Daily    levETIRAcetam (KEPPRA) 500 mg, Oral, 2 times daily    LORazepam (ATIVAN) 1 mg, Oral, Nightly    losartan (COZAAR) 50 mg, Oral, Daily    QUEtiapine (SEROQUEL) 25 mg, Oral, Nightly       Medication List with Changes/Refills   Current Medications    AMLODIPINE (NORVASC) 10 MG TABLET    Take 1 tablet (10 mg total) by mouth before evening meal.    ASPIRIN 81 MG CHEW    Take 1 tablet (81 mg total) by mouth once daily.    CALCITRIOL (ROCALTROL) 0.25 MCG CAP    Take 1 capsule (0.25 mcg total) by mouth once daily.    CITALOPRAM (CELEXA) 20 MG TABLET    Take 1 tablet (20 mg total) by mouth once daily.    DOCUSATE SODIUM (COLACE) 100 MG CAPSULE    Take 1 capsule (100 mg total) by mouth once daily.    FLUTICASONE PROPIONATE (FLONASE) 50 MCG/ACTUATION NASAL SPRAY    2 sprays (100 mcg total) by Each Nostril route once daily.    HYDRALAZINE (APRESOLINE) 100 MG TABLET    Take 1 tablet (100 mg total) by mouth every 8 (eight) hours.    HYDROCHLOROTHIAZIDE (HYDRODIURIL) 12.5 MG TAB    Take 1 tablet (12.5 mg total) by mouth once daily.    LEVETIRACETAM (KEPPRA) 500 MG TAB    Take 1 tablet (500 mg total) by mouth 2 (two) times daily.    LORAZEPAM (ATIVAN) 1 MG TABLET    Take 1 tablet (1 mg total) by mouth every evening.    LOSARTAN (COZAAR) 50 MG TABLET    Take 1 tablet (50 mg total) by mouth  once daily.    QUETIAPINE (SEROQUEL) 25 MG TAB    Take 1 tablet (25 mg total) by mouth every evening.   Discontinued Medications    CARVEDILOL (COREG) 6.25 MG TABLET    Take 1 tablet (6.25 mg total) by mouth 2 (two) times daily.               All pertinent labs, imaging, and EKGs reviewed.  Patient's most recent EKG tracing was personally interpreted by this provider.    Problem List Items Addressed This Visit          Cardiac/Vascular    Bradycardia - Primary     Other Visit Diagnoses       Primary hypertension        Relevant Orders    Echo    Nonrheumatic mitral valve regurgitation                Follow up in about 8 weeks (around 7/18/2024).

## 2024-05-27 ENCOUNTER — TELEPHONE (OUTPATIENT)
Dept: CARDIOLOGY | Facility: HOSPITAL | Age: 89
End: 2024-05-27

## 2024-05-28 ENCOUNTER — CLINICAL SUPPORT (OUTPATIENT)
Dept: CARDIOLOGY | Facility: HOSPITAL | Age: 89
End: 2024-05-28
Attending: STUDENT IN AN ORGANIZED HEALTH CARE EDUCATION/TRAINING PROGRAM
Payer: MEDICARE

## 2024-05-28 VITALS — WEIGHT: 178 LBS | HEIGHT: 70 IN | BODY MASS INDEX: 25.48 KG/M2

## 2024-05-28 DIAGNOSIS — I10 PRIMARY HYPERTENSION: ICD-10-CM

## 2024-05-28 PROCEDURE — 93306 TTE W/DOPPLER COMPLETE: CPT

## 2024-05-28 PROCEDURE — 93306 TTE W/DOPPLER COMPLETE: CPT | Mod: 26,,, | Performed by: INTERNAL MEDICINE

## 2024-05-29 ENCOUNTER — TELEPHONE (OUTPATIENT)
Dept: HOME HEALTH SERVICES | Facility: CLINIC | Age: 89
End: 2024-05-29
Payer: MEDICARE

## 2024-05-29 ENCOUNTER — DOCUMENT SCAN (OUTPATIENT)
Dept: HOME HEALTH SERVICES | Facility: HOSPITAL | Age: 89
End: 2024-05-29
Payer: MEDICARE

## 2024-05-29 LAB
AORTIC ROOT ANNULUS: 3.6 CM
AORTIC VALVE CUSP SEPERATION: 2.2 CM
ASCENDING AORTA: 3.3 CM
AV INDEX (PROSTH): 0.73
AV MEAN GRADIENT: 4 MMHG
AV PEAK GRADIENT: 7 MMHG
AV VALVE AREA BY VELOCITY RATIO: 3.14 CM²
AV VALVE AREA: 3.3 CM²
AV VELOCITY RATIO: 0.69
BSA FOR ECHO PROCEDURE: 2 M2
CV ECHO LV RWT: 0.47 CM
DOP CALC AO PEAK VEL: 1.34 M/S
DOP CALC AO VTI: 27.4 CM
DOP CALC LVOT AREA: 4.5 CM2
DOP CALC LVOT DIAMETER: 2.4 CM
DOP CALC LVOT PEAK VEL: 0.93 M/S
DOP CALC LVOT STROKE VOLUME: 90.43 CM3
DOP CALC MV VTI: 29.6 CM
DOP CALCLVOT PEAK VEL VTI: 20 CM
E WAVE DECELERATION TIME: 273 MSEC
E/A RATIO: 0.88
E/E' RATIO: 6.77 M/S
ECHO LV POSTERIOR WALL: 1.1 CM (ref 0.6–1.1)
FRACTIONAL SHORTENING: 28 % (ref 28–44)
INTERVENTRICULAR SEPTUM: 1.3 CM (ref 0.6–1.1)
LEFT INTERNAL DIMENSION IN SYSTOLE: 3.4 CM (ref 2.1–4)
LEFT VENTRICLE DIASTOLIC VOLUME INDEX: 51.26 ML/M2
LEFT VENTRICLE DIASTOLIC VOLUME: 102 ML
LEFT VENTRICLE MASS INDEX: 107 G/M2
LEFT VENTRICLE SYSTOLIC VOLUME INDEX: 23.8 ML/M2
LEFT VENTRICLE SYSTOLIC VOLUME: 47.4 ML
LEFT VENTRICULAR INTERNAL DIMENSION IN DIASTOLE: 4.7 CM (ref 3.5–6)
LEFT VENTRICULAR MASS: 212 G
LV LATERAL E/E' RATIO: 6.29 M/S
LV SEPTAL E/E' RATIO: 7.33 M/S
LVOT MG: 2 MMHG
LVOT MV: 0.64 CM/S
MV MEAN GRADIENT: 2 MMHG
MV PEAK A VEL: 1 M/S
MV PEAK E VEL: 0.88 M/S
MV PEAK GRADIENT: 5 MMHG
MV STENOSIS PRESSURE HALF TIME: 80 MS
MV VALVE AREA BY CONTINUITY EQUATION: 3.06 CM2
MV VALVE AREA P 1/2 METHOD: 2.75 CM2
OHS LV EJECTION FRACTION SIMPSONS BIPLANE MOD: 55 %
PISA TR MAX VEL: 2.73 M/S
PV MV: 0.86 M/S
PV PEAK GRADIENT: 6 MMHG
PV PEAK VELOCITY: 1.25 M/S
RV TISSUE DOPPLER FREE WALL SYSTOLIC VELOCITY 1 (APICAL 4 CHAMBER VIEW): 14.8 CM/S
SINUS: 3.6 CM
STJ: 2.9 CM
TDI LATERAL: 0.14 M/S
TDI SEPTAL: 0.12 M/S
TDI: 0.13 M/S
TR MAX PG: 30 MMHG
TRICUSPID ANNULAR PLANE SYSTOLIC EXCURSION: 1.91 CM
Z-SCORE OF LEFT VENTRICULAR DIMENSION IN END DIASTOLE: -2.05
Z-SCORE OF LEFT VENTRICULAR DIMENSION IN END SYSTOLE: -0.33

## 2024-05-29 NOTE — TELEPHONE ENCOUNTER
patient's daughter, Gabriela, requests appt be changed to Tues June 4 for 1030 am. They have an appt 5/30/24, date of already scheduled [facility] visit

## 2024-06-04 ENCOUNTER — CARE AT HOME (OUTPATIENT)
Dept: HOME HEALTH SERVICES | Facility: CLINIC | Age: 89
End: 2024-06-04
Payer: MEDICARE

## 2024-06-04 VITALS
TEMPERATURE: 99 F | SYSTOLIC BLOOD PRESSURE: 142 MMHG | HEART RATE: 60 BPM | OXYGEN SATURATION: 99 % | DIASTOLIC BLOOD PRESSURE: 74 MMHG | RESPIRATION RATE: 20 BRPM

## 2024-06-04 DIAGNOSIS — R00.1 BRADYCARDIA: Primary | ICD-10-CM

## 2024-06-04 DIAGNOSIS — R53.81 PHYSICAL DECONDITIONING: ICD-10-CM

## 2024-06-04 DIAGNOSIS — I10 PRIMARY HYPERTENSION: ICD-10-CM

## 2024-06-04 PROCEDURE — 1160F RVW MEDS BY RX/DR IN RCRD: CPT | Mod: CPTII,S$GLB,, | Performed by: NURSE PRACTITIONER

## 2024-06-04 PROCEDURE — 1157F ADVNC CARE PLAN IN RCRD: CPT | Mod: CPTII,S$GLB,, | Performed by: NURSE PRACTITIONER

## 2024-06-04 PROCEDURE — 1126F AMNT PAIN NOTED NONE PRSNT: CPT | Mod: CPTII,S$GLB,, | Performed by: NURSE PRACTITIONER

## 2024-06-04 PROCEDURE — 1101F PT FALLS ASSESS-DOCD LE1/YR: CPT | Mod: CPTII,S$GLB,, | Performed by: NURSE PRACTITIONER

## 2024-06-04 PROCEDURE — 99348 HOME/RES VST EST LOW MDM 30: CPT | Mod: S$GLB,,, | Performed by: NURSE PRACTITIONER

## 2024-06-04 PROCEDURE — 3288F FALL RISK ASSESSMENT DOCD: CPT | Mod: CPTII,S$GLB,, | Performed by: NURSE PRACTITIONER

## 2024-06-04 PROCEDURE — 1159F MED LIST DOCD IN RCRD: CPT | Mod: CPTII,S$GLB,, | Performed by: NURSE PRACTITIONER

## 2024-06-05 NOTE — ASSESSMENT & PLAN NOTE
Chronic, improved  Continue amlodipine, hydralazine, HCTZ  Followed by Cardiology.  Follow low sodium diet.  Monitor BP-HH SNV for monitoring, education.

## 2024-06-05 NOTE — ASSESSMENT & PLAN NOTE
Chronic, related to dementia, weakness and frailty. Improving some with PT/OT.  Fall precautions and safety advised.   Continue HH PT/OT.  Monitor.

## 2024-06-05 NOTE — PROGRESS NOTES
[facility]  Medical Chronic Care Home Visit    Encounter Provider: Rebeca Marques   PCP: Aristides Haynes MD  Consult Requested By: No ref. provider found    HISTORY OF PRESENT ILLNESS      Patient ID: Lavon Brandon is a 89 y.o. male is being seen in the home due to physical debility that presents a taxing effort to leave the home, to mitigate high risk of hospital readmission and/or due to the limited availability of reliable or safe options for transportation to the point of access to the provider. Prior to treatment on this visit the chart was reviewed and patient verbal consent was obtained.    Chronic medical conditions synopsis:  Lavon Brandon is a 89 yr old male with medical conditions consisting of hx of CVA, hypertension, bradycardia, CHF, seizure disorder, Lewy Body Dementia, CKD, depression, TBI.     Patient has [facility] SN/PT/OT. [facility] and has [facility] sitter assistance daily.      Patient seen by Cardiology last week and no changes in medications reported by caregiver. Bradycardia has improved, rate today is 60. Sitter states when heart rate is low occasionally he does not appear symptomatic. Patient still requires maximum assistance with all ADLs but is walking a little more with assistance from a person and walker. Still appears weak and frail, no recent falls reported.     No distress noted. [facility] contact information provided to patient and left in home.      DECISION MAKING TODAY       Assessment & Plan:  1. Bradycardia  Assessment & Plan:  Chronic, improved.  Sitter reports when heart rate is in the 50's occasionally patient appears asymptomatic.  Seen by Dr. Hoffmann 5/23/24.  Continue Affymax  SN for monitoring, education.  Monitor.      2. Primary hypertension  Overview:  renal htn    Assessment & Plan:  Chronic, improved  Continue amlodipine, hydralazine, HCTZ  Followed by Cardiology.  Follow low sodium diet.  Monitor  BP- SNV for monitoring, education.       3. Physical deconditioning  Assessment & Plan:  Chronic, related to dementia, weakness and frailty. Improving some with PT/OT.  Fall precautions and safety advised.   Continue HH PT/OT.  Monitor.             ENVIRONMENT OF CARE      Family and/or Caregiver present at visit?  Yes  Name of Caregiver: private caregiver present, daughter Gabriela not present  History provided by: caregiver    4Ms for Medical Decision-Making in Older Adults    Last Completed EAWV: None    MOBILITY:  Get Up and Go:       No data to display              Activities of Daily Living:       No data to display              Whisper Test:       No data to display              Disability Status:       No data to display              Nutrition Screening:       No data to display             Screening Score: 0-7 Malnourished, 8-11 At Risk, 12-14 Normal    MENTATION:   Depression Patient Health Questionnaire:      11/1/2023     2:45 PM   Depression Patient Health Questionnaire   Over the last two weeks how often have you been bothered by little interest or pleasure in doing things Not at all   Over the last two weeks how often have you been bothered by feeling down, depressed or hopeless Not at all   PHQ-2 Total Score 0     Has Dementia Dx: Yes    Cognitive Function Screening:       No data to display              Cognitive Function Screening Total - Less than 4 = Abnormal,  Greater than or equal to 4 = Normal    MEDICATIONS:  High Risk Medications:  Total Active Medications: 4  citalopram - 20 MG  levETIRAcetam Tab - 500 MG  LORazepam - 1 MG  QUEtiapine Tab - 25 MG    WHAT MATTERS MOST:  Advance Care Planning   ACP Status:   Patient has had an ACP conversation  Living Will: No  Power of : Yes  [place]: No    What is most important right now is to focus on remaining as independent as possible    Accordingly, we have decided that the best plan to meet the patient's goals includes continuing with  treatment      What matters most to patient today is: patient not able to state           Is patient hospice appropriate: No  (If needed, use PPS <30 or FAST score >7)  Was referral to hospice placed: No    Impression upon entering the home:  Physical Dwelling: single family home   Appearance of home environment: cleaniness: clean, walking pathways: clear, lighting: adequate, and home structure: sound structure  Functional Status: max assistance  Mobility: ambulatory with device  Nutritional access: adequate intake and access  Home Health: Yes, HH Agency [facility]    DME/Supplies:  wheelchair and bedside commode       Diagnostic tests reviewed/disposition: No diagnosic tests pending after this hospitalization.  Disease/illness education:  bradycardia, hypertension  Establishment or re-establishment of referral orders for community resources: No other necessary community resources.   Discussion with other health care providers: No discussion with other health care providers necessary.   Does patient have a PCP at [facility]? Yes   Repatriation plan with PCP? follow-up with PCP within 30d   Does patient have an ostomy (ileostomy, colostomy, suprapubic catheter, nephrostomy tube, tracheostomy, PEG tube, pleurex catheter, cholecystostomy, etc)? No  Were BPAs reviewed? Yes    Social History     Socioeconomic History    Marital status:    Tobacco Use    Smoking status: Never    Smokeless tobacco: Never   Substance and Sexual Activity    Alcohol use: No    Drug use: No     Social Determinants of Health     Financial Resource Strain: Low Risk  (2/20/2024)    Overall Financial Resource Strain (CARDIA)     Difficulty of Paying Living Expenses: Not hard at all   Food Insecurity: No Food Insecurity (2/20/2024)    Hunger Vital Sign     Worried About Running Out of Food in the Last Year: Never true     Ran Out of Food in the Last Year: Never true   Transportation Needs: Patient Unable To Answer (2/20/2024)    PRAPARE -  Transportation     Lack of Transportation (Medical): Patient unable to answer     Lack of Transportation (Non-Medical): Patient unable to answer   Physical Activity: Unknown (10/15/2022)    Exercise Vital Sign     Days of Exercise per Week: Patient declined     Minutes of Exercise per Session: Patient declined   Stress: Stress Concern Present (2/20/2024)    [employer] - Occupational Stress Questionnaire     Feeling of Stress : Rather much   Housing Stability: Unknown (2/20/2024)    Housing Stability Vital Sign     Unable to Pay for Housing in the Last Year: Patient unable to answer     Number of Places Lived in the Last Year: 1     Unstable Housing in the Last Year: No         OBJECTIVE:     Vital Signs:  Vitals:    06/04/24 1030   BP: (!) 142/74   Pulse: 60   Resp: 20   Temp: 98.7 °F (37.1 °C)       ROS assisted by private sitter  Review of Systems   Constitutional:  Positive for activity change and fatigue. Negative for appetite change, fever and unexpected weight change.   HENT: Negative.     Eyes: Negative.    Respiratory: Negative.     Cardiovascular: Negative.    Gastrointestinal: Negative.    Endocrine: Negative.    Genitourinary: Negative.    Musculoskeletal:  Positive for gait problem.   Skin: Negative.    Neurological:  Positive for weakness.   Hematological: Negative.    Psychiatric/Behavioral:  Positive for confusion.      Physical Exam:  Physical Exam  Constitutional:       General: He is not in acute distress.     Appearance: He is normal weight. He is ill-appearing.   HENT:      Head: Normocephalic and atraumatic.      Nose: Nose normal.      Mouth/Throat:      Mouth: Mucous membranes are dry.      Pharynx: Oropharynx is clear.   Eyes:      Extraocular Movements: Extraocular movements intact.      Conjunctiva/sclera: Conjunctivae normal.      Pupils: Pupils are equal, round, and reactive to light.   Cardiovascular:      Rate and Rhythm: Regular rhythm. No bradycardia  today.     Pulses: Normal pulses.      Heart sounds: Normal heart sounds.   Pulmonary:      Effort: Pulmonary effort is normal.      Breath sounds: Normal breath sounds.   Abdominal:      General: Bowel sounds are normal. There is no distension.      Palpations: Abdomen is soft.      Tenderness: There is no abdominal tenderness.   Musculoskeletal:      Cervical back: Normal range of motion and neck supple.   Skin:     General: Skin is warm and dry.      Capillary Refill: Capillary refill takes 2 to 3 seconds.   Neurological:      Mental Status: He is alert.      Motor: Weakness present.   Psychiatric:      Comments: Minimal interaction  Fed self breakfast without difficulty during visit  Follows simple commands  Oriented to person only  Calm, cooperative        INSTRUCTIONS FOR PATIENT:     Scheduled Follow-up, Appts Reviewed with Modifications if Needed: Yes  Future Appointments   Date Time Provider Department Center   7/9/2024 11:40 AM Amandeep Hoffmann [place] [facility]         Current Outpatient Medications:     amLODIPine (NORVASC) 10 MG tablet, Take 1 tablet (10 mg total) by mouth before evening meal., Disp: 30 tablet, Rfl: 11    calcitRIOL (ROCALTROL) 0.25 MCG Cap, Take 1 capsule (0.25 mcg total) by mouth once daily., Disp: 90 capsule, Rfl: 4    citalopram (CELEXA) 20 MG tablet, Take 1 tablet (20 mg total) by mouth once daily., Disp: 90 tablet, Rfl: 11    fluticasone propionate (FLONASE) 50 mcg/actuation nasal spray, 2 sprays (100 mcg total) by Each Nostril route once daily., Disp: 18.2 mL, Rfl: 0    hydrALAZINE (APRESOLINE) 100 MG tablet, Take 1 tablet (100 mg total) by mouth every 8 (eight) hours., Disp: 90 tablet, Rfl: 11    levETIRAcetam (KEPPRA) 500 MG Tab, Take 1 tablet (500 mg total) by mouth 2 (two) times daily., Disp: , Rfl:     aspirin 81 MG Chew, Take 1 tablet (81 mg total) by mouth once daily. (Patient not taking: Reported on 6/4/2024), Disp: 90 tablet, Rfl: 0    docusate sodium  (COLACE) 100 MG capsule, Take 1 capsule (100 mg total) by mouth once daily. (Patient not taking: Reported on 5/15/2024), Disp: 90 capsule, Rfl: 3    hydroCHLOROthiazide (HYDRODIURIL) 12.5 MG Tab, Take 1 tablet (12.5 mg total) by mouth once daily. (Patient not taking: Reported on 5/15/2024), Disp: 90 tablet, Rfl: 3    LORazepam (ATIVAN) 1 MG tablet, Take 1 tablet (1 mg total) by mouth every evening. (Patient not taking: Reported on 5/15/2024), Disp: 30 tablet, Rfl: 2    losartan (COZAAR) 50 MG tablet, Take 1 tablet (50 mg total) by mouth once daily. (Patient not taking: Reported on 5/15/2024), Disp: 90 tablet, Rfl: 3    QUEtiapine (SEROQUEL) 25 MG Tab, Take 1 tablet (25 mg total) by mouth every evening. (Patient not taking: Reported on 6/4/2024), Disp: 90 tablet, Rfl: 3    Medication Reconciliation:  Were medications changed during this appointment? No  Were medications in the home? Yes  Is the patient taking the medications as directed? Yes  Does the patient/caregiver understand the medications and changes? Yes  Does updated med list accurately reflects meds patient is currently taking? Yes    I spent a total of 24 minutes on the day of the visit.This includes face to face time and non-face to face time preparing to see the patient (eg, review of tests), obtaining and/or reviewing separately obtained history, documenting clinical information in the electronic or other health record, independently interpreting results and communicating results to the patient/family/caregiver, or care coordinator.        Signature: Rebeca Marques NP

## 2024-06-05 NOTE — ASSESSMENT & PLAN NOTE
Chronic, improved.  Sitter reports when heart rate is in the 50's occasionally patient appears asymptomatic.  Seen by Dr. Hoffmann 5/23/24.  Continue [facility] for monitoring, education.  Monitor.

## 2024-06-22 ENCOUNTER — HOSPITAL ENCOUNTER (INPATIENT)
Facility: HOSPITAL | Age: 89
LOS: 7 days | Discharge: NURSING, REHAB OR LONG-TERM CARE | DRG: 690 | End: 2024-07-01
Attending: EMERGENCY MEDICINE | Admitting: STUDENT IN AN ORGANIZED HEALTH CARE EDUCATION/TRAINING PROGRAM
Payer: MEDICARE

## 2024-06-22 DIAGNOSIS — R00.0 TACHYCARDIA: ICD-10-CM

## 2024-06-22 DIAGNOSIS — R07.9 CHEST PAIN: ICD-10-CM

## 2024-06-22 DIAGNOSIS — G93.40 ACUTE ENCEPHALOPATHY: Primary | ICD-10-CM

## 2024-06-22 LAB
ALBUMIN SERPL BCP-MCNC: 4 G/DL (ref 3.5–5.2)
ALP SERPL-CCNC: 44 U/L (ref 55–135)
ALT SERPL W/O P-5'-P-CCNC: 7 U/L (ref 10–44)
AMMONIA PLAS-SCNC: 23 UMOL/L (ref 10–50)
ANION GAP SERPL CALC-SCNC: 11 MMOL/L (ref 8–16)
AST SERPL-CCNC: 19 U/L (ref 10–40)
BASOPHILS # BLD AUTO: 0.01 K/UL (ref 0–0.2)
BASOPHILS NFR BLD: 0.1 % (ref 0–1.9)
BILIRUB SERPL-MCNC: 0.4 MG/DL (ref 0.1–1)
BUN SERPL-MCNC: 42 MG/DL (ref 10–30)
CALCIUM SERPL-MCNC: 10.3 MG/DL (ref 8.7–10.5)
CHLORIDE SERPL-SCNC: 103 MMOL/L (ref 95–110)
CK SERPL-CCNC: 314 U/L (ref 20–200)
CO2 SERPL-SCNC: 25 MMOL/L (ref 23–29)
CREAT SERPL-MCNC: 2.9 MG/DL (ref 0.5–1.4)
DIFFERENTIAL METHOD BLD: ABNORMAL
EOSINOPHIL # BLD AUTO: 0 K/UL (ref 0–0.5)
EOSINOPHIL NFR BLD: 0 % (ref 0–8)
ERYTHROCYTE [DISTWIDTH] IN BLOOD BY AUTOMATED COUNT: 14.7 % (ref 11.5–14.5)
EST. GFR  (NO RACE VARIABLE): 19.8 ML/MIN/1.73 M^2
GLUCOSE SERPL-MCNC: 164 MG/DL (ref 70–110)
HCT VFR BLD AUTO: 28.5 % (ref 40–54)
HGB BLD-MCNC: 9.2 G/DL (ref 14–18)
IMM GRANULOCYTES # BLD AUTO: 0.04 K/UL (ref 0–0.04)
IMM GRANULOCYTES NFR BLD AUTO: 0.4 % (ref 0–0.5)
LDH SERPL L TO P-CCNC: 3.98 MMOL/L (ref 0.5–2.2)
LYMPHOCYTES # BLD AUTO: 1.6 K/UL (ref 1–4.8)
LYMPHOCYTES NFR BLD: 15.5 % (ref 18–48)
MCH RBC QN AUTO: 29.2 PG (ref 27–31)
MCHC RBC AUTO-ENTMCNC: 32.3 G/DL (ref 32–36)
MCV RBC AUTO: 91 FL (ref 82–98)
MONOCYTES # BLD AUTO: 1.2 K/UL (ref 0.3–1)
MONOCYTES NFR BLD: 11.5 % (ref 4–15)
NEUTROPHILS # BLD AUTO: 7.5 K/UL (ref 1.8–7.7)
NEUTROPHILS NFR BLD: 72.5 % (ref 38–73)
NRBC BLD-RTO: 0 /100 WBC
PLATELET # BLD AUTO: 172 K/UL (ref 150–450)
PMV BLD AUTO: 10.9 FL (ref 9.2–12.9)
POTASSIUM SERPL-SCNC: 4.6 MMOL/L (ref 3.5–5.1)
PROT SERPL-MCNC: 7.1 G/DL (ref 6–8.4)
RBC # BLD AUTO: 3.15 M/UL (ref 4.6–6.2)
SAMPLE: ABNORMAL
SODIUM SERPL-SCNC: 139 MMOL/L (ref 136–145)
WBC # BLD AUTO: 10.29 K/UL (ref 3.9–12.7)

## 2024-06-22 PROCEDURE — 80053 COMPREHEN METABOLIC PANEL: CPT | Performed by: NURSE PRACTITIONER

## 2024-06-22 PROCEDURE — 93010 ELECTROCARDIOGRAM REPORT: CPT | Mod: ,,, | Performed by: INTERNAL MEDICINE

## 2024-06-22 PROCEDURE — 85025 COMPLETE CBC W/AUTO DIFF WBC: CPT | Performed by: NURSE PRACTITIONER

## 2024-06-22 PROCEDURE — 82140 ASSAY OF AMMONIA: CPT | Performed by: EMERGENCY MEDICINE

## 2024-06-22 PROCEDURE — 93005 ELECTROCARDIOGRAM TRACING: CPT | Performed by: INTERNAL MEDICINE

## 2024-06-22 PROCEDURE — 87040 BLOOD CULTURE FOR BACTERIA: CPT | Performed by: NURSE PRACTITIONER

## 2024-06-22 PROCEDURE — 36415 COLL VENOUS BLD VENIPUNCTURE: CPT | Performed by: NURSE PRACTITIONER

## 2024-06-22 PROCEDURE — 25000003 PHARM REV CODE 250: Performed by: EMERGENCY MEDICINE

## 2024-06-22 PROCEDURE — 99285 EMERGENCY DEPT VISIT HI MDM: CPT | Mod: 25

## 2024-06-22 PROCEDURE — 82550 ASSAY OF CK (CPK): CPT | Performed by: EMERGENCY MEDICINE

## 2024-06-22 PROCEDURE — 84443 ASSAY THYROID STIM HORMONE: CPT | Performed by: NURSE PRACTITIONER

## 2024-06-22 RX ADMIN — SODIUM CHLORIDE 1000 ML: 0.9 INJECTION, SOLUTION INTRAVENOUS at 11:06

## 2024-06-23 PROBLEM — Z71.89 ACP (ADVANCE CARE PLANNING): Status: ACTIVE | Noted: 2024-06-23

## 2024-06-23 PROBLEM — R41.82 ALTERED MENTAL STATUS: Status: ACTIVE | Noted: 2024-06-23

## 2024-06-23 PROBLEM — G40.909 SEIZURE DISORDER: Status: ACTIVE | Noted: 2024-06-23

## 2024-06-23 PROBLEM — N17.9 AKI (ACUTE KIDNEY INJURY): Status: ACTIVE | Noted: 2024-06-23

## 2024-06-23 LAB
ALBUMIN SERPL BCP-MCNC: 3.5 G/DL (ref 3.5–5.2)
ALP SERPL-CCNC: 40 U/L (ref 55–135)
ALT SERPL W/O P-5'-P-CCNC: 7 U/L (ref 10–44)
ANION GAP SERPL CALC-SCNC: 6 MMOL/L (ref 8–16)
AST SERPL-CCNC: 20 U/L (ref 10–40)
BACTERIA #/AREA URNS HPF: ABNORMAL /HPF
BASOPHILS # BLD AUTO: 0.04 K/UL (ref 0–0.2)
BASOPHILS NFR BLD: 0.4 % (ref 0–1.9)
BILIRUB SERPL-MCNC: 0.4 MG/DL (ref 0.1–1)
BILIRUB UR QL STRIP: NEGATIVE
BUN SERPL-MCNC: 41 MG/DL (ref 10–30)
CALCIUM SERPL-MCNC: 9.8 MG/DL (ref 8.7–10.5)
CHLORIDE SERPL-SCNC: 105 MMOL/L (ref 95–110)
CK SERPL-CCNC: 343 U/L (ref 20–200)
CLARITY UR: CLEAR
CO2 SERPL-SCNC: 28 MMOL/L (ref 23–29)
COLOR UR: YELLOW
CREAT SERPL-MCNC: 2.6 MG/DL (ref 0.5–1.4)
DIFFERENTIAL METHOD BLD: ABNORMAL
EOSINOPHIL # BLD AUTO: 0 K/UL (ref 0–0.5)
EOSINOPHIL NFR BLD: 0.2 % (ref 0–8)
ERYTHROCYTE [DISTWIDTH] IN BLOOD BY AUTOMATED COUNT: 14.7 % (ref 11.5–14.5)
EST. GFR  (NO RACE VARIABLE): 22.6 ML/MIN/1.73 M^2
ESTIMATED AVG GLUCOSE: 100 MG/DL (ref 68–131)
FERRITIN SERPL-MCNC: 120.5 NG/ML (ref 20–300)
FOLATE SERPL-MCNC: 12.2 NG/ML (ref 4–24)
GLUCOSE SERPL-MCNC: 123 MG/DL (ref 70–110)
GLUCOSE SERPL-MCNC: 125 MG/DL (ref 70–110)
GLUCOSE SERPL-MCNC: 140 MG/DL (ref 70–110)
GLUCOSE UR QL STRIP: NEGATIVE
HBA1C MFR BLD: 5.1 % (ref 4.5–6.2)
HCT VFR BLD AUTO: 27.8 % (ref 40–54)
HGB BLD-MCNC: 9.2 G/DL (ref 14–18)
HGB UR QL STRIP: NEGATIVE
IMM GRANULOCYTES # BLD AUTO: 0.03 K/UL (ref 0–0.04)
IMM GRANULOCYTES NFR BLD AUTO: 0.3 % (ref 0–0.5)
IRON SERPL-MCNC: 19 UG/DL (ref 45–160)
KETONES UR QL STRIP: NEGATIVE
LACTATE SERPL-SCNC: 1.1 MMOL/L (ref 0.5–1.9)
LEUKOCYTE ESTERASE UR QL STRIP: ABNORMAL
LIPASE SERPL-CCNC: 28 U/L (ref 4–60)
LYMPHOCYTES # BLD AUTO: 2.3 K/UL (ref 1–4.8)
LYMPHOCYTES NFR BLD: 24.6 % (ref 18–48)
MAGNESIUM SERPL-MCNC: 1.9 MG/DL (ref 1.6–2.6)
MCH RBC QN AUTO: 29.7 PG (ref 27–31)
MCHC RBC AUTO-ENTMCNC: 33.1 G/DL (ref 32–36)
MCV RBC AUTO: 90 FL (ref 82–98)
MICROSCOPIC COMMENT: ABNORMAL
MONOCYTES # BLD AUTO: 1.1 K/UL (ref 0.3–1)
MONOCYTES NFR BLD: 11.2 % (ref 4–15)
NEUTROPHILS # BLD AUTO: 6 K/UL (ref 1.8–7.7)
NEUTROPHILS NFR BLD: 63.3 % (ref 38–73)
NITRITE UR QL STRIP: NEGATIVE
NRBC BLD-RTO: 0 /100 WBC
PH UR STRIP: 6 [PH] (ref 5–8)
PHOSPHATE SERPL-MCNC: 3 MG/DL (ref 2.7–4.5)
PLATELET # BLD AUTO: 162 K/UL (ref 150–450)
PMV BLD AUTO: 10.2 FL (ref 9.2–12.9)
POTASSIUM SERPL-SCNC: 4.4 MMOL/L (ref 3.5–5.1)
PROT SERPL-MCNC: 6.3 G/DL (ref 6–8.4)
PROT UR QL STRIP: ABNORMAL
RBC # BLD AUTO: 3.1 M/UL (ref 4.6–6.2)
RBC #/AREA URNS HPF: 2 /HPF (ref 0–4)
SATURATED IRON: 10 % (ref 20–50)
SODIUM SERPL-SCNC: 139 MMOL/L (ref 136–145)
SP GR UR STRIP: 1.01 (ref 1–1.03)
TOTAL IRON BINDING CAPACITY: 197 UG/DL (ref 250–450)
TRANSFERRIN SERPL-MCNC: 141 MG/DL (ref 200–375)
TSH SERPL DL<=0.005 MIU/L-ACNC: 2.38 UIU/ML (ref 0.34–5.6)
URN SPEC COLLECT METH UR: ABNORMAL
UROBILINOGEN UR STRIP-ACNC: NEGATIVE EU/DL
VIT B12 SERPL-MCNC: >1500 PG/ML (ref 210–950)
WBC # BLD AUTO: 9.48 K/UL (ref 3.9–12.7)
WBC #/AREA URNS HPF: 14 /HPF (ref 0–5)

## 2024-06-23 PROCEDURE — 83540 ASSAY OF IRON: CPT | Performed by: STUDENT IN AN ORGANIZED HEALTH CARE EDUCATION/TRAINING PROGRAM

## 2024-06-23 PROCEDURE — 96372 THER/PROPH/DIAG INJ SC/IM: CPT | Performed by: STUDENT IN AN ORGANIZED HEALTH CARE EDUCATION/TRAINING PROGRAM

## 2024-06-23 PROCEDURE — 96375 TX/PRO/DX INJ NEW DRUG ADDON: CPT

## 2024-06-23 PROCEDURE — 63600175 PHARM REV CODE 636 W HCPCS: Performed by: STUDENT IN AN ORGANIZED HEALTH CARE EDUCATION/TRAINING PROGRAM

## 2024-06-23 PROCEDURE — 36415 COLL VENOUS BLD VENIPUNCTURE: CPT | Performed by: NURSE PRACTITIONER

## 2024-06-23 PROCEDURE — 96365 THER/PROPH/DIAG IV INF INIT: CPT

## 2024-06-23 PROCEDURE — G0378 HOSPITAL OBSERVATION PER HR: HCPCS

## 2024-06-23 PROCEDURE — 81001 URINALYSIS AUTO W/SCOPE: CPT | Performed by: NURSE PRACTITIONER

## 2024-06-23 PROCEDURE — 82728 ASSAY OF FERRITIN: CPT | Performed by: STUDENT IN AN ORGANIZED HEALTH CARE EDUCATION/TRAINING PROGRAM

## 2024-06-23 PROCEDURE — 80053 COMPREHEN METABOLIC PANEL: CPT | Performed by: STUDENT IN AN ORGANIZED HEALTH CARE EDUCATION/TRAINING PROGRAM

## 2024-06-23 PROCEDURE — 82607 VITAMIN B-12: CPT | Performed by: STUDENT IN AN ORGANIZED HEALTH CARE EDUCATION/TRAINING PROGRAM

## 2024-06-23 PROCEDURE — 83735 ASSAY OF MAGNESIUM: CPT | Performed by: STUDENT IN AN ORGANIZED HEALTH CARE EDUCATION/TRAINING PROGRAM

## 2024-06-23 PROCEDURE — 85025 COMPLETE CBC W/AUTO DIFF WBC: CPT | Performed by: STUDENT IN AN ORGANIZED HEALTH CARE EDUCATION/TRAINING PROGRAM

## 2024-06-23 PROCEDURE — 25000003 PHARM REV CODE 250: Performed by: STUDENT IN AN ORGANIZED HEALTH CARE EDUCATION/TRAINING PROGRAM

## 2024-06-23 PROCEDURE — 87086 URINE CULTURE/COLONY COUNT: CPT | Performed by: NURSE PRACTITIONER

## 2024-06-23 PROCEDURE — 84100 ASSAY OF PHOSPHORUS: CPT | Performed by: STUDENT IN AN ORGANIZED HEALTH CARE EDUCATION/TRAINING PROGRAM

## 2024-06-23 PROCEDURE — 83605 ASSAY OF LACTIC ACID: CPT | Performed by: NURSE PRACTITIONER

## 2024-06-23 PROCEDURE — 63600175 PHARM REV CODE 636 W HCPCS: Performed by: INTERNAL MEDICINE

## 2024-06-23 PROCEDURE — 82550 ASSAY OF CK (CPK): CPT | Performed by: STUDENT IN AN ORGANIZED HEALTH CARE EDUCATION/TRAINING PROGRAM

## 2024-06-23 PROCEDURE — 82746 ASSAY OF FOLIC ACID SERUM: CPT | Performed by: STUDENT IN AN ORGANIZED HEALTH CARE EDUCATION/TRAINING PROGRAM

## 2024-06-23 PROCEDURE — 83036 HEMOGLOBIN GLYCOSYLATED A1C: CPT | Performed by: STUDENT IN AN ORGANIZED HEALTH CARE EDUCATION/TRAINING PROGRAM

## 2024-06-23 PROCEDURE — 96361 HYDRATE IV INFUSION ADD-ON: CPT

## 2024-06-23 PROCEDURE — 87186 SC STD MICRODIL/AGAR DIL: CPT | Performed by: NURSE PRACTITIONER

## 2024-06-23 PROCEDURE — 25000003 PHARM REV CODE 250: Performed by: INTERNAL MEDICINE

## 2024-06-23 PROCEDURE — 83690 ASSAY OF LIPASE: CPT | Performed by: STUDENT IN AN ORGANIZED HEALTH CARE EDUCATION/TRAINING PROGRAM

## 2024-06-23 RX ORDER — ONDANSETRON HYDROCHLORIDE 2 MG/ML
4 INJECTION, SOLUTION INTRAVENOUS EVERY 8 HOURS PRN
Status: DISCONTINUED | OUTPATIENT
Start: 2024-06-23 | End: 2024-07-01 | Stop reason: HOSPADM

## 2024-06-23 RX ORDER — POLYETHYLENE GLYCOL 3350 17 G/17G
238 POWDER, FOR SOLUTION ORAL ONCE
Status: DISCONTINUED | OUTPATIENT
Start: 2024-06-23 | End: 2024-06-29

## 2024-06-23 RX ORDER — IBUPROFEN 200 MG
24 TABLET ORAL
Status: DISCONTINUED | OUTPATIENT
Start: 2024-06-23 | End: 2024-07-01 | Stop reason: HOSPADM

## 2024-06-23 RX ORDER — HYDRALAZINE HYDROCHLORIDE 20 MG/ML
10 INJECTION INTRAMUSCULAR; INTRAVENOUS EVERY 6 HOURS PRN
Status: DISCONTINUED | OUTPATIENT
Start: 2024-06-23 | End: 2024-07-01 | Stop reason: HOSPADM

## 2024-06-23 RX ORDER — TALC
6 POWDER (GRAM) TOPICAL NIGHTLY PRN
Status: DISCONTINUED | OUTPATIENT
Start: 2024-06-23 | End: 2024-07-01 | Stop reason: HOSPADM

## 2024-06-23 RX ORDER — LEVETIRACETAM 5 MG/ML
500 INJECTION INTRAVASCULAR 2 TIMES DAILY
Status: DISCONTINUED | OUTPATIENT
Start: 2024-06-23 | End: 2024-06-29

## 2024-06-23 RX ORDER — GLUCAGON 1 MG
1 KIT INJECTION
Status: DISCONTINUED | OUTPATIENT
Start: 2024-06-23 | End: 2024-06-30

## 2024-06-23 RX ORDER — POLYETHYLENE GLYCOL 3350 17 G/17G
17 POWDER, FOR SOLUTION ORAL DAILY
Status: DISCONTINUED | OUTPATIENT
Start: 2024-06-23 | End: 2024-06-29

## 2024-06-23 RX ORDER — TALC
6 POWDER (GRAM) TOPICAL ONCE
Status: COMPLETED | OUTPATIENT
Start: 2024-06-23 | End: 2024-06-23

## 2024-06-23 RX ORDER — SODIUM CHLORIDE 0.9 % (FLUSH) 0.9 %
10 SYRINGE (ML) INJECTION EVERY 12 HOURS PRN
Status: DISCONTINUED | OUTPATIENT
Start: 2024-06-23 | End: 2024-07-01 | Stop reason: HOSPADM

## 2024-06-23 RX ORDER — HEPARIN SODIUM 5000 [USP'U]/ML
5000 INJECTION, SOLUTION INTRAVENOUS; SUBCUTANEOUS EVERY 8 HOURS
Status: DISCONTINUED | OUTPATIENT
Start: 2024-06-23 | End: 2024-07-01 | Stop reason: HOSPADM

## 2024-06-23 RX ORDER — ACETAMINOPHEN 650 MG/1
650 SUPPOSITORY RECTAL EVERY 6 HOURS PRN
Status: DISCONTINUED | OUTPATIENT
Start: 2024-06-23 | End: 2024-07-01 | Stop reason: HOSPADM

## 2024-06-23 RX ORDER — IBUPROFEN 200 MG
16 TABLET ORAL
Status: DISCONTINUED | OUTPATIENT
Start: 2024-06-23 | End: 2024-06-30

## 2024-06-23 RX ORDER — LEVETIRACETAM 500 MG/1
500 TABLET ORAL 2 TIMES DAILY
Status: DISCONTINUED | OUTPATIENT
Start: 2024-06-23 | End: 2024-06-23

## 2024-06-23 RX ORDER — AMOXICILLIN 250 MG
1 CAPSULE ORAL DAILY
Status: DISCONTINUED | OUTPATIENT
Start: 2024-06-23 | End: 2024-06-29

## 2024-06-23 RX ORDER — NALOXONE HCL 0.4 MG/ML
0.02 VIAL (ML) INJECTION
Status: DISCONTINUED | OUTPATIENT
Start: 2024-06-23 | End: 2024-07-01 | Stop reason: HOSPADM

## 2024-06-23 RX ORDER — SODIUM CHLORIDE, SODIUM LACTATE, POTASSIUM CHLORIDE, CALCIUM CHLORIDE 600; 310; 30; 20 MG/100ML; MG/100ML; MG/100ML; MG/100ML
INJECTION, SOLUTION INTRAVENOUS CONTINUOUS
Status: ACTIVE | OUTPATIENT
Start: 2024-06-23 | End: 2024-06-24

## 2024-06-23 RX ORDER — LEVETIRACETAM 5 MG/ML
500 INJECTION INTRAVASCULAR
Status: COMPLETED | OUTPATIENT
Start: 2024-06-23 | End: 2024-06-23

## 2024-06-23 RX ORDER — INSULIN ASPART 100 [IU]/ML
0-5 INJECTION, SOLUTION INTRAVENOUS; SUBCUTANEOUS
Status: DISCONTINUED | OUTPATIENT
Start: 2024-06-23 | End: 2024-06-29

## 2024-06-23 RX ADMIN — HEPARIN SODIUM 5000 UNITS: 5000 INJECTION, SOLUTION INTRAVENOUS; SUBCUTANEOUS at 02:06

## 2024-06-23 RX ADMIN — LEVETIRACETAM 500 MG: 5 INJECTION INTRAVENOUS at 10:06

## 2024-06-23 RX ADMIN — LEVETIRACETAM INJECTION 500 MG: 5 INJECTION INTRAVENOUS at 05:06

## 2024-06-23 RX ADMIN — THIAMINE HYDROCHLORIDE 250 MG: 100 INJECTION, SOLUTION INTRAMUSCULAR; INTRAVENOUS at 05:06

## 2024-06-23 RX ADMIN — Medication 6 MG: at 02:06

## 2024-06-23 RX ADMIN — PIPERACILLIN SODIUM AND TAZOBACTAM SODIUM 3.38 G: 3; .375 INJECTION, POWDER, LYOPHILIZED, FOR SOLUTION INTRAVENOUS at 06:06

## 2024-06-23 RX ADMIN — HEPARIN SODIUM 5000 UNITS: 5000 INJECTION, SOLUTION INTRAVENOUS; SUBCUTANEOUS at 10:06

## 2024-06-23 RX ADMIN — PIPERACILLIN SODIUM AND TAZOBACTAM SODIUM 3.38 G: 3; .375 INJECTION, POWDER, LYOPHILIZED, FOR SOLUTION INTRAVENOUS at 05:06

## 2024-06-23 RX ADMIN — HYDRALAZINE HYDROCHLORIDE 10 MG: 20 INJECTION, SOLUTION INTRAMUSCULAR; INTRAVENOUS at 05:06

## 2024-06-23 RX ADMIN — SODIUM CHLORIDE, POTASSIUM CHLORIDE, SODIUM LACTATE AND CALCIUM CHLORIDE: 600; 310; 30; 20 INJECTION, SOLUTION INTRAVENOUS at 02:06

## 2024-06-23 RX ADMIN — HEPARIN SODIUM 5000 UNITS: 5000 INJECTION, SOLUTION INTRAVENOUS; SUBCUTANEOUS at 05:06

## 2024-06-23 NOTE — PLAN OF CARE
[facility] - Emergency Dept  Initial Discharge Assessment       Primary Care Provider: Aristides Haynes MD    Admission Diagnosis: Tachycardia [R00.0]    Admission Date: 6/22/2024  Expected Discharge Date: 6/24/2024    Transition of Care Barriers: None    Spoke with patient's daughter Gabriela Brandon by phone to complete assessment. Gabriela Brandon/daughter/POA [phone] has POA. Patient has living will. POA and living will are on file. No HH, HD or Coumadin. See DME. Patient's daughter will bring patient home when she is discharged. No needs have been identified at this time.    Payor: [employer] MANAGED MEDICARE / Plan: [employer] MEDICARE PPO / Product Type: Medicare Advantage /     Extended Emergency Contact Information  Primary Emergency Contact: Gordon ManishGabriela  Address: [id number] [place] [address]           [place] [address] [place]  Work Phone: [phone]  Mobile Phone: [phone]  Relation: Daughter  Preferred language: English   needed? No  Secondary Emergency Contact: Nilda Brandon  Mobile Phone: [phone]  Relation: Daughter  Preferred language: English   needed? No    Discharge Plan A: Home with family  Discharge Plan B: Home with family      [id number]  [place] - [address] AT [facility]  [address] [facility] [address]  Phone: [phone] Fax: [phone]    [facility] [address] [place] [address]  [address] [place] [address]  Phone: [phone] Fax: [phone]    [facility] - [place] - [address]  [address] [facility] [facility].  [facility] [address]  Phone: [phone] Fax: [phone]      Initial Assessment (most recent)       Adult Discharge Assessment - 06/23/24 1610          Discharge Assessment    Assessment Type Discharge Planning Assessment     Confirmed/corrected address, phone number and insurance Yes      Confirmed Demographics Correct on Facesheet     Source of Information family     When was your last doctors appointment? --   two weeks ago    Does patient/caregiver understand observation status Yes     Communicated JOANNE with patient/caregiver Yes     Reason For Admission Altered Mental Status     People in Home child(idalmis), adult     Facility Arrived From: home     Do you expect to return to your current living situation? Yes     Do you have help at home or someone to help you manage your care at home? Yes     Who are your caregiver(s) and their phone number(s)? Gabriela Hammond Roma/daughter/ALBERT [phone]     Prior to hospitilization cognitive status: Unable to Assess     Current cognitive status: Unable to Assess     Walking or Climbing Stairs Difficulty yes     Walking or Climbing Stairs ambulation difficulty, requires equipment;stair climbing difficulty, requires equipment     Mobility Management wheelchair, standard walker     Dressing/Bathing Difficulty yes     Dressing/Bathing bathing difficulty, requires equipment     Dressing/Bathing Management bath bench, grab bars     Home Accessibility wheelchair accessible     Home Layout Able to live on 1st floor     Equipment Currently Used at Home bath bench;walker, standard;wheelchair     Readmission within 30 days? No     Patient currently being followed by outpatient case management? No     Do you take prescription medications? Yes     Do you have prescription coverage? Yes     Coverage [employer] Medicare     Is the patient taking medications as prescribed? yes     Who is going to help you get home at discharge? Gabriela Hammond Roma/daughter/ALBERT [phone]     How do you get to doctors appointments? family or friend will provide     Are you on dialysis? No     Do you take coumadin? No     Discharge Plan A Home with family     Discharge Plan B Home with family     DME Needed Upon Discharge  none     Discharge Plan discussed with: POA;Adult children      Name(s) and Number(s) Gabriela Hammond Roma/daughter/POA [phone]     Transition of Care Barriers None

## 2024-06-23 NOTE — ED PROVIDER NOTES
Encounter Date: 6/22/2024       History     Chief Complaint   Patient presents with    Altered Mental Status     Altered mental status x 2 day with decreased appetite.      89-year-old male with Lewy body dementia presents for altered mental status.  No family are here at this time.  All history from staff at talked with daughter earlier.  Apparently he was out in the heat a few days ago and now is more confused than usual.  This is all of the history that I have.    The history is limited by the condition of the patient and the absence of a caregiver.     Review of patient's allergies indicates:   Allergen Reactions    Ciprofloxacin (bulk) Swelling     Past Medical History:   Diagnosis Date    Bradyarrhythmia     CVA (cerebral infarction) 2006    Dementia     Depression     Hyperlipidemia     Hypertension     renal htn    Pulmonary embolism 2002    Seizure disorder      Past Surgical History:   Procedure Laterality Date    left foot  with crushed injry       Family History   Problem Relation Name Age of Onset    Diabetes Mother      Cancer Neg Hx       Social History     Tobacco Use    Smoking status: Never    Smokeless tobacco: Never   Substance Use Topics    Alcohol use: No    Drug use: No     Review of Systems   Unable to perform ROS: Dementia       Physical Exam     Initial Vitals [06/22/24 2208]   BP Pulse Resp Temp SpO2   121/78 (!) 124 18 97.8 °F (36.6 °C) 97 %      MAP       --         Physical Exam    Nursing note and vitals reviewed.  Constitutional: He appears well-developed and well-nourished. He is not diaphoretic.  Non-toxic appearance. He does not have a sickly appearance. He does not appear ill. No distress.   HENT:   Head: Normocephalic and atraumatic.   Halitosis, poor dentition   Eyes: EOM are normal.   Neck: Neck supple.   Normal range of motion.  Cardiovascular:  Normal rate, regular rhythm and normal heart sounds.     Exam reveals no gallop and no friction rub.       No murmur  heard.  Pulmonary/Chest: Breath sounds normal. No respiratory distress. He has no wheezes. He has no rhonchi. He has no rales.   Abdominal: Abdomen is soft. He exhibits no distension. There is no abdominal tenderness.   Musculoskeletal:         General: Normal range of motion.      Cervical back: Normal range of motion and neck supple. No rigidity. Normal range of motion.     Neurological: He is alert. He is disoriented.   Oriented only to person not year or events   Skin: Skin is warm and dry. No rash noted.         ED Course   Procedures  Labs Reviewed   CBC W/ AUTO DIFFERENTIAL - Abnormal; Notable for the following components:       Result Value    RBC 3.15 (*)     Hemoglobin 9.2 (*)     Hematocrit 28.5 (*)     RDW 14.7 (*)     Mono # 1.2 (*)     Lymph % 15.5 (*)     All other components within normal limits   COMPREHENSIVE METABOLIC PANEL - Abnormal; Notable for the following components:    Glucose 164 (*)     BUN 42 (*)     Creatinine 2.9 (*)     Alkaline Phosphatase 44 (*)     ALT 7 (*)     eGFR 19.8 (*)     All other components within normal limits   CK - Abnormal; Notable for the following components:     (*)     All other components within normal limits   ISTAT LACTATE - Abnormal; Notable for the following components:    POC Lactate 3.98 (*)     All other components within normal limits   CULTURE, BLOOD   AMMONIA   TSH   TSH   URINALYSIS, REFLEX TO URINE CULTURE   LIPASE   HEMOGLOBIN A1C   LACTIC ACID, PLASMA   COMPREHENSIVE METABOLIC PANEL   MAGNESIUM   PHOSPHORUS   CK   CBC W/ AUTO DIFFERENTIAL   VITAMIN B12   FOLATE   IRON AND TIBC   FERRITIN   POCT LACTATE   POCT GLUCOSE MONITORING CONTINUOUS          Imaging Results              CT Abdomen Pelvis  Without Contrast (In process)                      CT Head Without Contrast (Final result)  Result time 06/22/24 23:42:16      Final result by Carolina Padgett MD (06/22/24 23:42:16)                   Impression:      No acute  abnormality.      Electronically signed by: Carolina Padgett  Date:    06/22/2024  Time:    23:42               Narrative:    EXAMINATION:  CT HEAD WITHOUT CONTRAST    CLINICAL HISTORY:  Mental status change, unknown cause;    TECHNIQUE:  Low dose axial CT images obtained throughout the head without intravenous contrast. Sagittal and coronal reconstructions were performed.    COMPARISON:  02/15/2024    FINDINGS:  Intracranial compartment:    Ventricles and sulci are normal in size for age without evidence of hydrocephalus. No extra-axial blood or fluid collections.    Mild to moderate chronic microvascular ischemia.  No parenchymal mass, hemorrhage, edema or major vascular distribution infarct.    Skull/extracranial contents (limited evaluation): No fracture. Severe right maxillary sinus disease with erosive changes in the lateral wall maxillary sinus.  Severe right frontoethmoidal sinusitis.  Mastoid air cells demonstrate small effusions bilaterally.                                       X-Ray Chest AP Portable (Final result)  Result time 06/22/24 22:48:49      Final result by Carolina Padgett MD (06/22/24 22:48:49)                   Impression:      No acute findings.      Electronically signed by: Carolina Padgett  Date:    06/22/2024  Time:    22:48               Narrative:    EXAMINATION:  XR CHEST AP PORTABLE    CLINICAL HISTORY:  Sepsis;    TECHNIQUE:  Single frontal view of the chest was performed.    COMPARISON:  02/15/2024    FINDINGS:  Low lung volumes.  Skin fold on the right.  Small calcified nodule right mid lung.  Lungs are clear. No focal consolidation. No pleural effusion. No pneumothorax. Normal heart size.                                       Medications   levETIRAcetam tablet 500 mg (has no administration in time range)   sodium chloride 0.9% flush 10 mL (has no administration in time range)   naloxone 0.4 mg/mL injection 0.02 mg (has no administration in time range)   glucose  chewable tablet 16 g (has no administration in time range)   glucose chewable tablet 24 g (has no administration in time range)   dextrose 50% injection 12.5 g (has no administration in time range)   dextrose 50% injection 25 g (has no administration in time range)   glucagon (human recombinant) injection 1 mg (has no administration in time range)   heparin (porcine) injection 5,000 Units (has no administration in time range)   ondansetron injection 4 mg (has no administration in time range)   lactated ringers infusion ( Intravenous New Bag 6/23/24 0240)   melatonin tablet 6 mg (has no administration in time range)   insulin aspart U-100 pen 0-5 Units (has no administration in time range)   sodium chloride 0.9% bolus 1,000 mL 1,000 mL (0 mLs Intravenous Stopped 6/23/24 0052)   melatonin tablet 6 mg (6 mg Oral Given 6/23/24 0240)     Medical Decision Making  89-year-old male with Lewy body dementia presents for altered mental status.  Family told nursing staff he was more altered than usual after being outside several days ago.  I did call his daughter for further history.  She states he does not really want to eat or drink now.  Creatinine elevated from baseline.  He will be admitted to Hospital Medicine.  Sepsis orders were placed in triage but I see no evidence of sepsis.  Dr Tong to admit.    Amount and/or Complexity of Data Reviewed  Independent Historian:      Details: Daughter  External Data Reviewed: labs and notes.  Labs: ordered.  Radiology: ordered. Decision-making details documented in ED Course.  ECG/medicine tests: ordered and independent interpretation performed. Decision-making details documented in ED Course.    Risk  Decision regarding hospitalization.               ED Course as of 06/23/24 0306   Sat Jun 22, 2024 2222 BP: 121/78 [EF]   2222 Temp: 97.8 °F (36.6 °C) [EF]   2222 Temp Source: Oral [EF]   2222 Pulse(!): 124 [EF]   2222 Resp: 18 [EF]   2222 SpO2: 97 % [EF]   2235 Sepsis orders  placed in triage but I doubt sepsis, he is apparently altered and has multiple admissions in the past for altered mental status [EF]   8 I spoke with the patient's daughter Gabriela with whom he lives.  He does have dementia several days ago he was outside and he threw up 1 time.  He has not thrown up since then.  But since that episode he has had decreased p.o. intake and he really does not want to eat or drink.  He is more confused than usual he does not recognize family now and he was up all night talking to his wife who is .  Daughter states in the past when he has had these episodes he has been dehydrated.  Numerous admissions in the past for what sounds like similar in where events. [EF]   2250 Sinus tachycardia 116 beats per minute normal axis no ST elevation or depression or T-wave inversion independently interpreted [EF]   2251 X-Ray Chest AP Portable [EF]   2351 CT Head Without Contrast [EF]   Sun 2024   0304 Creatinine(!): 2.9 [EF]      ED Course User Index  [EF] Praful Huston MD                           Clinical Impression:  Final diagnoses:  [R00.0] Tachycardia          ED Disposition Condition    Observation                 Praful Huston MD  24 [phone]

## 2024-06-23 NOTE — ED NOTES
Pt laying in bed, in lowest and locked position with rails up x2. Pt continuing to talk incoherently constantly and unable to follow commands.

## 2024-06-23 NOTE — ASSESSMENT & PLAN NOTE
"Patient's FSGs are controlled on current medication regimen.  Last A1c reviewed-   Lab Results   Component Value Date    HGBA1C 5.2 11/08/2023     Most recent fingerstick glucose reviewed- No results for input(s): "POCTGLUCOSE" in the last 24 hours.  Current correctional scale  Low  Maintain anti-hyperglycemic dose as follows-   Antihyperglycemics (From admission, onward)      None          Hold Oral hypoglycemics while patient is in the hospital.  "

## 2024-06-23 NOTE — NURSING
Nurses Note -- 4 Eyes      6/23/2024   6:40 PM      Skin assessed during: Admit      [] No Altered Skin Integrity Present    []Prevention Measures Documented      [x] Yes- Altered Skin Integrity Present or Discovered   [x] LDA Added if Not in Epic (Describe Wound)   [x] New Altered Skin Integrity was Present on Admit and Documented in LDA   [x] Wound Image Taken    Wound Care Consulted? Yes    Attending Nurse:  Morelia POWELL RN    Second RN/Staff Member:   NOE Rasheed

## 2024-06-23 NOTE — H&P
[facility] - Emergency Dept  Hospital Medicine  History & Physical    Patient Name: Lavon Brandon  MRN: [id number]  Patient Class: OP- Observation  Admission Date: 6/22/2024  Attending Physician: Antonio Tong MD  Primary Care Provider: Aristides Haynes MD         Patient information was obtained from ER records and daughter .     Subjective:     Principal Problem:Altered mental status    Chief Complaint:   Chief Complaint   Patient presents with    Altered Mental Status     Altered mental status x 2 day with decreased appetite.         HPI: 89-year-old man with history of Lewy body dementia, seizure disorder and hypertension that presents to the ED for evaluation of altered mental status.  Daughter discloses patient has not been sleeping for more than 24 hours, talking back to time, decreased appetite, and vomited 2 days ago.  Daughter discloses no fever or chills but patient was in the sun 2 days ago.  On presentation patient was afebrile but tachycardic to 124.  Labs remarkable for normocytic anemia, azotemia, elevated CPK and lactic acid.  CT head showed no acute abnormality.  Chest x-ray showed no infiltrate but there were evidence of distended bowel.  Patient received 1 L of IV fluid in the ED. hospital medicine consulted for admission for altered mental status.    Past Medical History:   Diagnosis Date    Bradyarrhythmia     CVA (cerebral infarction) 2006    Dementia     Depression     Hyperlipidemia     Hypertension     renal htn    Pulmonary embolism 2002    Seizure disorder        Past Surgical History:   Procedure Laterality Date    left foot  with crushed injry         Review of patient's allergies indicates:   Allergen Reactions    Ciprofloxacin (bulk) Swelling       No current facility-administered medications on file prior to encounter.     Current Outpatient Medications on File Prior to Encounter   Medication Sig    amLODIPine (NORVASC) 10 MG tablet Take 1 tablet (10 mg total) by  mouth before evening meal.    calcitRIOL (ROCALTROL) 0.25 MCG Cap Take 1 capsule (0.25 mcg total) by mouth once daily.    citalopram (CELEXA) 20 MG tablet Take 1 tablet (20 mg total) by mouth once daily.    fluticasone propionate (FLONASE) 50 mcg/actuation nasal spray 2 sprays (100 mcg total) by Each Nostril route once daily.    hydrALAZINE (APRESOLINE) 100 MG tablet Take 1 tablet (100 mg total) by mouth every 8 (eight) hours.    levETIRAcetam (KEPPRA) 500 MG Tab Take 1 tablet (500 mg total) by mouth 2 (two) times daily.    [DISCONTINUED] aspirin 81 MG Chew Take 1 tablet (81 mg total) by mouth once daily. (Patient not taking: Reported on 6/4/2024)    [DISCONTINUED] docusate sodium (COLACE) 100 MG capsule Take 1 capsule (100 mg total) by mouth once daily. (Patient not taking: Reported on 5/15/2024)    [DISCONTINUED] hydroCHLOROthiazide (HYDRODIURIL) 12.5 MG Tab Take 1 tablet (12.5 mg total) by mouth once daily. (Patient not taking: Reported on 5/15/2024)    [DISCONTINUED] LORazepam (ATIVAN) 1 MG tablet Take 1 tablet (1 mg total) by mouth every evening. (Patient not taking: Reported on 5/15/2024)    [DISCONTINUED] losartan (COZAAR) 50 MG tablet Take 1 tablet (50 mg total) by mouth once daily. (Patient not taking: Reported on 5/15/2024)    [DISCONTINUED] QUEtiapine (SEROQUEL) 25 MG Tab Take 1 tablet (25 mg total) by mouth every evening. (Patient not taking: Reported on 6/4/2024)     Family History       Problem Relation (Age of Onset)    Diabetes Mother          Tobacco Use    Smoking status: Never    Smokeless tobacco: Never   Substance and Sexual Activity    Alcohol use: No    Drug use: No    Sexual activity: Not on file     Review of Systems   Reason unable to perform ROS: Altered mental status.     Objective:     Vital Signs (Most Recent):  Temp: 97.8 °F (36.6 °C) (06/22/24 2208)  Pulse: (!) 124 (06/22/24 2208)  Resp: 18 (06/22/24 2208)  BP: 121/78 (06/22/24 2208)  SpO2: 97 % (06/22/24 2208) Vital Signs (24h  Range):  Temp:  [97.8 °F (36.6 °C)] 97.8 °F (36.6 °C)  Pulse:  [124] 124  Resp:  [18] 18  SpO2:  [97 %] 97 %  BP: (121)/(78) 121/78     Weight: 80.7 kg (178 lb)  Body mass index is 25.54 kg/m².     Physical Exam  Vitals reviewed.   Constitutional:       General: He is not in acute distress.     Appearance: He is ill-appearing.   HENT:      Head: Normocephalic and atraumatic.   Eyes:      Conjunctiva/sclera: Conjunctivae normal.   Cardiovascular:      Rate and Rhythm: Regular rhythm. Tachycardia present.      Pulses: Normal pulses.      Heart sounds: No murmur heard.  Pulmonary:      Effort: Pulmonary effort is normal. No respiratory distress.      Breath sounds: Normal breath sounds. No wheezing or rhonchi.   Abdominal:      General: There is no distension.      Palpations: Abdomen is soft.      Tenderness: There is no abdominal tenderness.   Musculoskeletal:      Right lower leg: No edema.      Left lower leg: No edema.   Neurological:      General: No focal deficit present.      Mental Status: He is alert. He is disoriented.   Psychiatric:      Comments: No agitation noted                Significant Labs: All pertinent labs within the past 24 hours have been reviewed.  Recent Lab Results         06/22/24  2248   06/22/24  2227        Albumin 4.0         ALP 44         ALT 7         Ammonia 23         Anion Gap 11         AST 19         Baso # 0.01         Basophil % 0.1         BILIRUBIN TOTAL 0.4  Comment: For infants and newborns, interpretation of results should be based  on gestational age, weight and in agreement with clinical  observations.    Premature Infant recommended reference ranges:  Up to 24 hours.............<8.0 mg/dL  Up to 48 hours............<12.0 mg/dL  3-5 days..................<15.0 mg/dL  6-29 days.................<15.0 mg/dL           BUN 42         Calcium 10.3         Chloride 103         CO2 25                  Creatinine 2.9         Differential Method Automated         eGFR  19.8         Eos # 0.0         Eos % 0.0         Glucose 164         Gran # (ANC) 7.5         Gran % 72.5         Hematocrit 28.5         Hemoglobin 9.2         Immature Grans (Abs) 0.04  Comment: Mild elevation in immature granulocytes is non specific and   can be seen in a variety of conditions including stress response,   acute inflammation, trauma and pregnancy. Correlation with other   laboratory and clinical findings is essential.           Immature Granulocytes 0.4         Lymph # 1.6         Lymph % 15.5         MCH 29.2         MCHC 32.3         MCV 91         Mono # 1.2         Mono % 11.5         MPV 10.9         nRBC 0         Platelet Count 172         POC Lactate   3.98       Potassium 4.6         PROTEIN TOTAL 7.1         RBC 3.15         RDW 14.7         Sample   VENOUS       Sodium 139         WBC 10.29                 Significant Imaging: I have reviewed all pertinent imaging results/findings within the past 24 hours.    CLINICAL HISTORY:  Mental status change, unknown cause;     TECHNIQUE:  Low dose axial CT images obtained throughout the head without intravenous contrast. Sagittal and coronal reconstructions were performed.     COMPARISON:  02/15/2024     FINDINGS:  Intracranial compartment:     Ventricles and sulci are normal in size for age without evidence of hydrocephalus. No extra-axial blood or fluid collections.     Mild to moderate chronic microvascular ischemia.  No parenchymal mass, hemorrhage, edema or major vascular distribution infarct.     Skull/extracranial contents (limited evaluation): No fracture. Severe right maxillary sinus disease with erosive changes in the lateral wall maxillary sinus.  Severe right frontoethmoidal sinusitis.  Mastoid air cells demonstrate small effusions bilaterally.     Impression:     No acute abnormality.        Electronically signed by:Carolina Padgett  Date:                                            06/22/2024  Time:                                 "           23:42           Exam Ended: 06/22/24 23:38 CDT Last Resulted: 06/22/24 23:42 CDT             TECHNIQUE:  Single frontal view of the chest was performed.     COMPARISON:  02/15/2024     FINDINGS:  Low lung volumes.  Skin fold on the right.  Small calcified nodule right mid lung.  Lungs are clear. No focal consolidation. No pleural effusion. No pneumothorax. Normal heart size.     Impression:     No acute findings.        Electronically signed by:Carolina Padgett  Date:                                            06/22/2024  Time:                                           22:48           Exam Ended: 06/22/24 22:30 CDT Last Resulted: 06/22/24 22:48 CDT           Assessment/Plan:     * Altered mental status  Unclear etiology  ? Infection (UTI) vs worsening underlying dementia  Check urinalysis with reflex culture to rule out UTI  Check TSH  Follow blood culture  CT chest abdomen and pelvis    ACP (advance care planning)  Discussed with daughter Gabriela.  Patient is DNI/DNR      Seizure disorder  Continue home dose of Keppra      MATA (acute kidney injury)  MATA on CKD  Baseline creatinine of 1.9  Likely due to dehydration  CT chest abdomen and pelvis to rule out obstructive uropathy  Renal dose medication  Avoid nephrotoxic medication  Continue IVF  Monitor renal function    Type 2 diabetes mellitus without complication, without long-term current use of insulin  Patient's FSGs are controlled on current medication regimen.  Last A1c reviewed-   Lab Results   Component Value Date    HGBA1C 5.2 11/08/2023     Most recent fingerstick glucose reviewed- No results for input(s): "POCTGLUCOSE" in the last 24 hours.  Current correctional scale  Low  Maintain anti-hyperglycemic dose as follows-   Antihyperglycemics (From admission, onward)      None          Hold Oral hypoglycemics while patient is in the hospital.    Dementia with behavioral disturbance  History of Lewy body dementia  Continue supportive care  Fall " precautions  Melatonin at night for sleep      VTE Risk Mitigation (From admission, onward)           Ordered     heparin (porcine) injection 5,000 Units  Every 8 hours         06/23/24 0030     IP VTE HIGH RISK PATIENT  Once         06/23/24 0030     Place sequential compression device  Until discontinued         06/23/24 0030                       On 06/23/2024, patient should be placed in hospital observation services under my care.    The CT of the abdomen and pelvis showed Moderate stool burden in the colon and rectum with presacral edema.  Suggest correlation for constipation or stercoral colitis. Bilateral simple and mildly complex renal cysts.  Cystic lesions not well evaluated on this poor quality study.  Suggest renal ultrasound follow-up to exclude solid component.    Concern about stercoral colitis discussed with the GI on, Dr. Earl with recommendations to start bowel regimen, empiric antibiotic coverage and will see patient. Patient started on bowel regimen for constipation along with zosyn. Patient has no made urine since presentation, will straight catheter the patient.  US ordered to further evaluation the kidney.       UA positive for infection  Likely UTI on the background of constipation  Continue antibiotics as above and management of constipation    Antonio Tong MD  Department of Hospital Medicine  [facility] - Emergency Dept

## 2024-06-23 NOTE — ASSESSMENT & PLAN NOTE
History of Lewy body dementia  Continue supportive care  Fall precautions  Melatonin at night for sleep

## 2024-06-23 NOTE — PLAN OF CARE
06/23/24 1610   BULLOCK Message   Medicare Outpatient and Observation Notification regarding financial responsibility Given to patient/caregiver;Explained to patient/caregiver   Date BULLOCK was signed 06/23/24   Time BULLOCK was signed 1600

## 2024-06-23 NOTE — ASSESSMENT & PLAN NOTE
MATA on CKD  Baseline creatinine of 1.9  Likely due to dehydration  CT chest abdomen and pelvis to rule out obstructive uropathy  Renal dose medication  Avoid nephrotoxic medication  Continue IVF  Monitor renal function

## 2024-06-23 NOTE — FIRST PROVIDER EVALUATION
Medical screening examination initiated.  I have conducted a focused provider triage encounter, findings are as follows:    Brief history of present illness:  Daughter reports confusion since yesterday. Had been outside sitting in the heat two days ago, hasn't wanted to eat, has been combative. Confused on where he is and who family members are    Vitals:    06/22/24 2208   BP: 121/78   BP Location: Left arm   Patient Position: Sitting   Pulse: (!) 124   Resp: 18   Temp: 97.8 °F (36.6 °C)   TempSrc: Oral   SpO2: 97%   Weight: 80.7 kg (178 lb)       Pertinent physical exam:  Confused but no distress    Brief workup plan:  Septic workup    Preliminary workup initiated; this workup will be continued and followed by the physician or advanced practice provider that is assigned to the patient when roomed.

## 2024-06-23 NOTE — SUBJECTIVE & OBJECTIVE
Past Medical History:   Diagnosis Date    Bradyarrhythmia     CVA (cerebral infarction) 2006    Dementia     Depression     Hyperlipidemia     Hypertension     renal htn    Pulmonary embolism 2002    Seizure disorder        Past Surgical History:   Procedure Laterality Date    left foot  with crushed injry         Review of patient's allergies indicates:   Allergen Reactions    Ciprofloxacin (bulk) Swelling       No current facility-administered medications on file prior to encounter.     Current Outpatient Medications on File Prior to Encounter   Medication Sig    amLODIPine (NORVASC) 10 MG tablet Take 1 tablet (10 mg total) by mouth before evening meal.    calcitRIOL (ROCALTROL) 0.25 MCG Cap Take 1 capsule (0.25 mcg total) by mouth once daily.    citalopram (CELEXA) 20 MG tablet Take 1 tablet (20 mg total) by mouth once daily.    fluticasone propionate (FLONASE) 50 mcg/actuation nasal spray 2 sprays (100 mcg total) by Each Nostril route once daily.    hydrALAZINE (APRESOLINE) 100 MG tablet Take 1 tablet (100 mg total) by mouth every 8 (eight) hours.    levETIRAcetam (KEPPRA) 500 MG Tab Take 1 tablet (500 mg total) by mouth 2 (two) times daily.    [DISCONTINUED] aspirin 81 MG Chew Take 1 tablet (81 mg total) by mouth once daily. (Patient not taking: Reported on 6/4/2024)    [DISCONTINUED] docusate sodium (COLACE) 100 MG capsule Take 1 capsule (100 mg total) by mouth once daily. (Patient not taking: Reported on 5/15/2024)    [DISCONTINUED] hydroCHLOROthiazide (HYDRODIURIL) 12.5 MG Tab Take 1 tablet (12.5 mg total) by mouth once daily. (Patient not taking: Reported on 5/15/2024)    [DISCONTINUED] LORazepam (ATIVAN) 1 MG tablet Take 1 tablet (1 mg total) by mouth every evening. (Patient not taking: Reported on 5/15/2024)    [DISCONTINUED] losartan (COZAAR) 50 MG tablet Take 1 tablet (50 mg total) by mouth once daily. (Patient not taking: Reported on 5/15/2024)    [DISCONTINUED] QUEtiapine (SEROQUEL) 25 MG Tab Take 1  tablet (25 mg total) by mouth every evening. (Patient not taking: Reported on 6/4/2024)     Family History       Problem Relation (Age of Onset)    Diabetes Mother          Tobacco Use    Smoking status: Never    Smokeless tobacco: Never   Substance and Sexual Activity    Alcohol use: No    Drug use: No    Sexual activity: Not on file     Review of Systems   Reason unable to perform ROS: Altered mental status.     Objective:     Vital Signs (Most Recent):  Temp: 97.8 °F (36.6 °C) (06/22/24 2208)  Pulse: (!) 124 (06/22/24 2208)  Resp: 18 (06/22/24 2208)  BP: 121/78 (06/22/24 2208)  SpO2: 97 % (06/22/24 2208) Vital Signs (24h Range):  Temp:  [97.8 °F (36.6 °C)] 97.8 °F (36.6 °C)  Pulse:  [124] 124  Resp:  [18] 18  SpO2:  [97 %] 97 %  BP: (121)/(78) 121/78     Weight: 80.7 kg (178 lb)  Body mass index is 25.54 kg/m².     Physical Exam  Vitals reviewed.   Constitutional:       General: He is not in acute distress.     Appearance: He is ill-appearing.   HENT:      Head: Normocephalic and atraumatic.   Eyes:      Conjunctiva/sclera: Conjunctivae normal.   Cardiovascular:      Rate and Rhythm: Regular rhythm. Tachycardia present.      Pulses: Normal pulses.      Heart sounds: No murmur heard.  Pulmonary:      Effort: Pulmonary effort is normal. No respiratory distress.      Breath sounds: Normal breath sounds. No wheezing or rhonchi.   Abdominal:      General: There is no distension.      Palpations: Abdomen is soft.      Tenderness: There is no abdominal tenderness.   Musculoskeletal:      Right lower leg: No edema.      Left lower leg: No edema.   Neurological:      General: No focal deficit present.      Mental Status: He is alert. He is disoriented.   Psychiatric:      Comments: No agitation noted                Significant Labs: All pertinent labs within the past 24 hours have been reviewed.  Recent Lab Results         06/22/24 2248 06/22/24 2227        Albumin 4.0         ALP 44         ALT 7         Ammonia 23          Anion Gap 11         AST 19         Baso # 0.01         Basophil % 0.1         BILIRUBIN TOTAL 0.4  Comment: For infants and newborns, interpretation of results should be based  on gestational age, weight and in agreement with clinical  observations.    Premature Infant recommended reference ranges:  Up to 24 hours.............<8.0 mg/dL  Up to 48 hours............<12.0 mg/dL  3-5 days..................<15.0 mg/dL  6-29 days.................<15.0 mg/dL           BUN 42         Calcium 10.3         Chloride 103         CO2 25                  Creatinine 2.9         Differential Method Automated         eGFR 19.8         Eos # 0.0         Eos % 0.0         Glucose 164         Gran # (ANC) 7.5         Gran % 72.5         Hematocrit 28.5         Hemoglobin 9.2         Immature Grans (Abs) 0.04  Comment: Mild elevation in immature granulocytes is non specific and   can be seen in a variety of conditions including stress response,   acute inflammation, trauma and pregnancy. Correlation with other   laboratory and clinical findings is essential.           Immature Granulocytes 0.4         Lymph # 1.6         Lymph % 15.5         MCH 29.2         MCHC 32.3         MCV 91         Mono # 1.2         Mono % 11.5         MPV 10.9         nRBC 0         Platelet Count 172         POC Lactate   3.98       Potassium 4.6         PROTEIN TOTAL 7.1         RBC 3.15         RDW 14.7         Sample   VENOUS       Sodium 139         WBC 10.29                 Significant Imaging: I have reviewed all pertinent imaging results/findings within the past 24 hours.    CLINICAL HISTORY:  Mental status change, unknown cause;     TECHNIQUE:  Low dose axial CT images obtained throughout the head without intravenous contrast. Sagittal and coronal reconstructions were performed.     COMPARISON:  02/15/2024     FINDINGS:  Intracranial compartment:     Ventricles and sulci are normal in size for age without evidence of hydrocephalus. No  extra-axial blood or fluid collections.     Mild to moderate chronic microvascular ischemia.  No parenchymal mass, hemorrhage, edema or major vascular distribution infarct.     Skull/extracranial contents (limited evaluation): No fracture. Severe right maxillary sinus disease with erosive changes in the lateral wall maxillary sinus.  Severe right frontoethmoidal sinusitis.  Mastoid air cells demonstrate small effusions bilaterally.     Impression:     No acute abnormality.        Electronically signed by:Carolina Padgett  Date:                                            06/22/2024  Time:                                           23:42           Exam Ended: 06/22/24 23:38 CDT Last Resulted: 06/22/24 23:42 CDT             TECHNIQUE:  Single frontal view of the chest was performed.     COMPARISON:  02/15/2024     FINDINGS:  Low lung volumes.  Skin fold on the right.  Small calcified nodule right mid lung.  Lungs are clear. No focal consolidation. No pleural effusion. No pneumothorax. Normal heart size.     Impression:     No acute findings.        Electronically signed by:Carolina Padgett  Date:                                            06/22/2024  Time:                                           22:48           Exam Ended: 06/22/24 22:30 CDT Last Resulted: 06/22/24 22:48 CDT

## 2024-06-23 NOTE — HPI
89 year old man with history of Lewy body dementia, seizure disorder, DM, anemia, and hypertension that presents to the ED for evaluation of altered mental status.  Daughter discloses patient has not been sleeping for more than 24 hours, talking back to time, decreased appetite, and vomited 2 days ago.  Daughter discloses no fever or chills but patient was in the sun 2 days ago.  On presentation patient was afebrile but tachycardic to 124.  Labs remarkable for normocytic anemia, azotemia, elevated CPK and lactic acid.  CT head showed no acute abnormality.  Chest x-ray showed no infiltrate but there were evidence of distended bowel.  Patient received 1 L of IV fluid in the ED. hospital medicine consulted for admission for altered mental status.

## 2024-06-24 LAB
ALBUMIN SERPL BCP-MCNC: 3.3 G/DL (ref 3.5–5.2)
ALP SERPL-CCNC: 38 U/L (ref 55–135)
ALT SERPL W/O P-5'-P-CCNC: 7 U/L (ref 10–44)
ANION GAP SERPL CALC-SCNC: 4 MMOL/L (ref 8–16)
AST SERPL-CCNC: 24 U/L (ref 10–40)
BASOPHILS # BLD AUTO: 0.05 K/UL (ref 0–0.2)
BASOPHILS NFR BLD: 0.8 % (ref 0–1.9)
BILIRUB SERPL-MCNC: 0.6 MG/DL (ref 0.1–1)
BUN SERPL-MCNC: 38 MG/DL (ref 10–30)
CALCIUM SERPL-MCNC: 9.6 MG/DL (ref 8.7–10.5)
CHLORIDE SERPL-SCNC: 107 MMOL/L (ref 95–110)
CO2 SERPL-SCNC: 29 MMOL/L (ref 23–29)
CREAT SERPL-MCNC: 2.4 MG/DL (ref 0.5–1.4)
DIFFERENTIAL METHOD BLD: ABNORMAL
EOSINOPHIL # BLD AUTO: 0.1 K/UL (ref 0–0.5)
EOSINOPHIL NFR BLD: 1.4 % (ref 0–8)
ERYTHROCYTE [DISTWIDTH] IN BLOOD BY AUTOMATED COUNT: 14.7 % (ref 11.5–14.5)
EST. GFR  (NO RACE VARIABLE): 24.9 ML/MIN/1.73 M^2
GLUCOSE SERPL-MCNC: 118 MG/DL (ref 70–110)
GLUCOSE SERPL-MCNC: 87 MG/DL (ref 70–110)
GLUCOSE SERPL-MCNC: 89 MG/DL (ref 70–110)
GLUCOSE SERPL-MCNC: 89 MG/DL (ref 70–110)
GLUCOSE SERPL-MCNC: 93 MG/DL (ref 70–110)
HCT VFR BLD AUTO: 28.5 % (ref 40–54)
HGB BLD-MCNC: 9.2 G/DL (ref 14–18)
IMM GRANULOCYTES # BLD AUTO: 0.01 K/UL (ref 0–0.04)
IMM GRANULOCYTES NFR BLD AUTO: 0.2 % (ref 0–0.5)
LYMPHOCYTES # BLD AUTO: 1.8 K/UL (ref 1–4.8)
LYMPHOCYTES NFR BLD: 28.8 % (ref 18–48)
MAGNESIUM SERPL-MCNC: 2 MG/DL (ref 1.6–2.6)
MCH RBC QN AUTO: 29.2 PG (ref 27–31)
MCHC RBC AUTO-ENTMCNC: 32.3 G/DL (ref 32–36)
MCV RBC AUTO: 91 FL (ref 82–98)
MONOCYTES # BLD AUTO: 0.7 K/UL (ref 0.3–1)
MONOCYTES NFR BLD: 11 % (ref 4–15)
NEUTROPHILS # BLD AUTO: 3.6 K/UL (ref 1.8–7.7)
NEUTROPHILS NFR BLD: 57.8 % (ref 38–73)
NRBC BLD-RTO: 0 /100 WBC
PHOSPHATE SERPL-MCNC: 3.8 MG/DL (ref 2.7–4.5)
PLATELET # BLD AUTO: 140 K/UL (ref 150–450)
PMV BLD AUTO: 10.9 FL (ref 9.2–12.9)
POTASSIUM SERPL-SCNC: 4.6 MMOL/L (ref 3.5–5.1)
PROT SERPL-MCNC: 5.9 G/DL (ref 6–8.4)
RBC # BLD AUTO: 3.15 M/UL (ref 4.6–6.2)
SODIUM SERPL-SCNC: 140 MMOL/L (ref 136–145)
WBC # BLD AUTO: 6.26 K/UL (ref 3.9–12.7)

## 2024-06-24 PROCEDURE — 85025 COMPLETE CBC W/AUTO DIFF WBC: CPT | Performed by: STUDENT IN AN ORGANIZED HEALTH CARE EDUCATION/TRAINING PROGRAM

## 2024-06-24 PROCEDURE — 92610 EVALUATE SWALLOWING FUNCTION: CPT

## 2024-06-24 PROCEDURE — 63600175 PHARM REV CODE 636 W HCPCS: Performed by: STUDENT IN AN ORGANIZED HEALTH CARE EDUCATION/TRAINING PROGRAM

## 2024-06-24 PROCEDURE — 80053 COMPREHEN METABOLIC PANEL: CPT | Performed by: STUDENT IN AN ORGANIZED HEALTH CARE EDUCATION/TRAINING PROGRAM

## 2024-06-24 PROCEDURE — 25000003 PHARM REV CODE 250: Performed by: INTERNAL MEDICINE

## 2024-06-24 PROCEDURE — 83735 ASSAY OF MAGNESIUM: CPT | Performed by: STUDENT IN AN ORGANIZED HEALTH CARE EDUCATION/TRAINING PROGRAM

## 2024-06-24 PROCEDURE — 63600175 PHARM REV CODE 636 W HCPCS: Performed by: INTERNAL MEDICINE

## 2024-06-24 PROCEDURE — 25000003 PHARM REV CODE 250: Performed by: STUDENT IN AN ORGANIZED HEALTH CARE EDUCATION/TRAINING PROGRAM

## 2024-06-24 PROCEDURE — 84100 ASSAY OF PHOSPHORUS: CPT | Performed by: STUDENT IN AN ORGANIZED HEALTH CARE EDUCATION/TRAINING PROGRAM

## 2024-06-24 PROCEDURE — 36415 COLL VENOUS BLD VENIPUNCTURE: CPT | Performed by: STUDENT IN AN ORGANIZED HEALTH CARE EDUCATION/TRAINING PROGRAM

## 2024-06-24 PROCEDURE — 12000002 HC ACUTE/MED SURGE SEMI-PRIVATE ROOM

## 2024-06-24 PROCEDURE — 96372 THER/PROPH/DIAG INJ SC/IM: CPT | Performed by: STUDENT IN AN ORGANIZED HEALTH CARE EDUCATION/TRAINING PROGRAM

## 2024-06-24 RX ORDER — DEXTROMETHORPHAN POLISTIREX 30 MG/5 ML
1 SUSPENSION, EXTENDED RELEASE 12 HR ORAL EVERY 12 HOURS
Status: DISCONTINUED | OUTPATIENT
Start: 2024-06-24 | End: 2024-06-29

## 2024-06-24 RX ADMIN — MINERAL OIL 1 ENEMA: 100 ENEMA RECTAL at 09:06

## 2024-06-24 RX ADMIN — HYDRALAZINE HYDROCHLORIDE 10 MG: 20 INJECTION, SOLUTION INTRAMUSCULAR; INTRAVENOUS at 04:06

## 2024-06-24 RX ADMIN — LEVETIRACETAM 500 MG: 5 INJECTION INTRAVENOUS at 09:06

## 2024-06-24 RX ADMIN — HEPARIN SODIUM 5000 UNITS: 5000 INJECTION, SOLUTION INTRAVENOUS; SUBCUTANEOUS at 02:06

## 2024-06-24 RX ADMIN — HEPARIN SODIUM 5000 UNITS: 5000 INJECTION, SOLUTION INTRAVENOUS; SUBCUTANEOUS at 05:06

## 2024-06-24 RX ADMIN — MINERAL OIL 1 ENEMA: 100 ENEMA RECTAL at 10:06

## 2024-06-24 RX ADMIN — PIPERACILLIN SODIUM AND TAZOBACTAM SODIUM 3.38 G: 3; .375 INJECTION, POWDER, LYOPHILIZED, FOR SOLUTION INTRAVENOUS at 06:06

## 2024-06-24 RX ADMIN — THIAMINE HYDROCHLORIDE 250 MG: 100 INJECTION, SOLUTION INTRAMUSCULAR; INTRAVENOUS at 09:06

## 2024-06-24 RX ADMIN — HEPARIN SODIUM 5000 UNITS: 5000 INJECTION, SOLUTION INTRAVENOUS; SUBCUTANEOUS at 09:06

## 2024-06-24 RX ADMIN — PIPERACILLIN SODIUM AND TAZOBACTAM SODIUM 3.38 G: 3; .375 INJECTION, POWDER, LYOPHILIZED, FOR SOLUTION INTRAVENOUS at 05:06

## 2024-06-24 RX ADMIN — LEVETIRACETAM 500 MG: 5 INJECTION INTRAVENOUS at 08:06

## 2024-06-24 NOTE — PROGRESS NOTES
[facility]  Progress Note    Patient Name: Lavon Brandon  MRN: [id number]  Patient Class: IP- Inpatient   Admission Date: 6/22/2024  Length of Stay: 0 days  Attending Physician: Maik Medina MD  Primary Care Provider: Aristides Haynes MD        Subjective:     Principal Problem:Altered mental status        HPI:  89-year-old man with history of Lewy body dementia, seizure disorder and hypertension that presents to the ED for evaluation of altered mental status.  Daughter discloses patient has not been sleeping for more than 24 hours, talking back to time, decreased appetite, and vomited 2 days ago.  Daughter discloses no fever or chills but patient was in the sun 2 days ago.  On presentation patient was afebrile but tachycardic to 124.  Labs remarkable for normocytic anemia, azotemia, elevated CPK and lactic acid.  CT head showed no acute abnormality.  Chest x-ray showed no infiltrate but there were evidence of distended bowel.  Patient received 1 L of IV fluid in the ED. hospital medicine consulted for admission for altered mental status.    Overview/Hospital Course:  No notes on file    Interval History:  Patient was seen and examined follow-up visit during multidisciplinary rounds.  No acute events reported overnight, is mostly sleeping this morning.  Vitals are stable, he is afebrile.    Review of Systems   Unable to perform ROS: Dementia (Altered mental status)     Objective:     Vital Signs (Most Recent):  Temp: 97.8 °F (36.6 °C) (06/24/24 1150)  Pulse: (!) 43 (06/24/24 1150)  Resp: 19 (06/24/24 1150)  BP: (!) 110/55 (06/24/24 1150)  SpO2: 95 % (06/24/24 1150) Vital Signs (24h Range):  Temp:  [97.2 °F (36.2 °C)-100.4 °F (38 °C)] 97.8 °F (36.6 °C)  Pulse:  [] 43  Resp:  [18-23] 19  SpO2:  [94 %-98 %] 95 %  BP: (110-207)/(55-96) 110/55     Weight: 70.8 kg (156 lb 1.6 oz)  Body mass index is 22.4 kg/m².    Intake/Output Summary (Last 24 hours) at 6/24/2024 1346  Last data  filed at 6/24/2024 0300  Gross per 24 hour   Intake 608.95 ml   Output 750 ml   Net -141.05 ml         Physical Exam  Vitals reviewed.   Constitutional:       General: He is not in acute distress.     Appearance: He is ill-appearing.   HENT:      Head: Normocephalic and atraumatic.      Nose: Nose normal.      Mouth/Throat:      Mouth: Mucous membranes are moist.      Pharynx: Oropharynx is clear.   Eyes:      Conjunctiva/sclera: Conjunctivae normal.   Cardiovascular:      Rate and Rhythm: Regular rhythm. Tachycardia present.      Pulses: Normal pulses.      Heart sounds: No murmur heard.  Pulmonary:      Effort: Pulmonary effort is normal. No respiratory distress.      Breath sounds: Normal breath sounds. No wheezing or rhonchi.   Abdominal:      General: There is no distension.      Palpations: Abdomen is soft.      Tenderness: There is no abdominal tenderness.   Musculoskeletal:      Right lower leg: No edema.      Left lower leg: No edema.   Neurological:      General: No focal deficit present.      Mental Status: He is alert. He is disoriented.   Psychiatric:      Comments: No agitation noted             Significant Labs: All pertinent labs within the past 24 hours have been reviewed.  BMP:   Recent Labs   Lab 06/24/24  0425   GLU 87      K 4.6      CO2 29   BUN 38*   CREATININE 2.4*   CALCIUM 9.6   MG 2.0     CBC:   Recent Labs   Lab 06/22/24  2248 06/23/24  0458 06/24/24  0425   WBC 10.29 9.48 6.26   HGB 9.2* 9.2* 9.2*   HCT 28.5* 27.8* 28.5*    162 140*       Significant Imaging: I have reviewed all pertinent imaging results/findings within the past 24 hours.    Assessment/Plan:      * Altered mental status  Unclear etiology  ? Infection (UTI) vs worsening underlying dementia  Check urinalysis with reflex culture to rule out UTI  Check TSH  Follow blood culture  CT chest abdomen and pelvis    ACP (advance care planning)  Discussed with daughter Gabriela.  Patient is DNI/DNR      Seizure  "disorder  Continue home dose of Keppra      MATA (acute kidney injury)  MATA on CKD  Baseline creatinine of 1.9  Likely due to dehydration  CT chest abdomen and pelvis to rule out obstructive uropathy  Renal dose medication  Avoid nephrotoxic medication  Continue IVF  Monitor renal function    Type 2 diabetes mellitus without complication, without long-term current use of insulin  Patient's FSGs are controlled on current medication regimen.  Last A1c reviewed-   Lab Results   Component Value Date    HGBA1C 5.2 11/08/2023     Most recent fingerstick glucose reviewed- No results for input(s): "POCTGLUCOSE" in the last 24 hours.  Current correctional scale  Low  Maintain anti-hyperglycemic dose as follows-   Antihyperglycemics (From admission, onward)      None          Hold Oral hypoglycemics while patient is in the hospital.    Dementia with behavioral disturbance  History of Lewy body dementia  Continue supportive care  Fall precautions  Melatonin at night for sleep      VTE Risk Mitigation (From admission, onward)           Ordered     heparin (porcine) injection 5,000 Units  Every 8 hours         06/23/24 0030     IP VTE HIGH RISK PATIENT  Once         06/23/24 0030     Place sequential compression device  Until discontinued         06/23/24 0030                    Discharge Planning   JOANNE: 6/25/2024     Code Status: DNR   Is the patient medically ready for discharge?:     Reason for patient still in hospital (select all that apply): Patient trending condition  Discharge Plan A: Home with family                  Maik Medina MD  Department of Hospital Medicine   [facility]    "

## 2024-06-24 NOTE — SUBJECTIVE & OBJECTIVE
Interval History:  Patient was seen and examined follow-up visit during multidisciplinary rounds.  No acute events reported overnight, is mostly sleeping this morning.  Vitals are stable, he is afebrile.    Review of Systems   Unable to perform ROS: Dementia (Altered mental status)     Objective:     Vital Signs (Most Recent):  Temp: 97.8 °F (36.6 °C) (06/24/24 1150)  Pulse: (!) 43 (06/24/24 1150)  Resp: 19 (06/24/24 1150)  BP: (!) 110/55 (06/24/24 1150)  SpO2: 95 % (06/24/24 1150) Vital Signs (24h Range):  Temp:  [97.2 °F (36.2 °C)-100.4 °F (38 °C)] 97.8 °F (36.6 °C)  Pulse:  [] 43  Resp:  [18-23] 19  SpO2:  [94 %-98 %] 95 %  BP: (110-207)/(55-96) 110/55     Weight: 70.8 kg (156 lb 1.6 oz)  Body mass index is 22.4 kg/m².    Intake/Output Summary (Last 24 hours) at 6/24/2024 1346  Last data filed at 6/24/2024 0300  Gross per 24 hour   Intake 608.95 ml   Output 750 ml   Net -141.05 ml         Physical Exam  Vitals reviewed.   Constitutional:       General: He is not in acute distress.     Appearance: He is ill-appearing.   HENT:      Head: Normocephalic and atraumatic.      Nose: Nose normal.      Mouth/Throat:      Mouth: Mucous membranes are moist.      Pharynx: Oropharynx is clear.   Eyes:      Conjunctiva/sclera: Conjunctivae normal.   Cardiovascular:      Rate and Rhythm: Regular rhythm. Tachycardia present.      Pulses: Normal pulses.      Heart sounds: No murmur heard.  Pulmonary:      Effort: Pulmonary effort is normal. No respiratory distress.      Breath sounds: Normal breath sounds. No wheezing or rhonchi.   Abdominal:      General: There is no distension.      Palpations: Abdomen is soft.      Tenderness: There is no abdominal tenderness.   Musculoskeletal:      Right lower leg: No edema.      Left lower leg: No edema.   Neurological:      General: No focal deficit present.      Mental Status: He is alert. He is disoriented.   Psychiatric:      Comments: No agitation noted             Significant  Labs: All pertinent labs within the past 24 hours have been reviewed.  BMP:   Recent Labs   Lab 06/24/24  0425   GLU 87      K 4.6      CO2 29   BUN 38*   CREATININE 2.4*   CALCIUM 9.6   MG 2.0     CBC:   Recent Labs   Lab 06/22/24  2248 06/23/24  0458 06/24/24  0425   WBC 10.29 9.48 6.26   HGB 9.2* 9.2* 9.2*   HCT 28.5* 27.8* 28.5*    162 140*       Significant Imaging: I have reviewed all pertinent imaging results/findings within the past 24 hours.

## 2024-06-24 NOTE — ACP (ADVANCE CARE PLANNING)
[facility]  Palliative Care   Social Work Visit      Patient Name: Lavon Brandon  MRN: [id number]  Palliative Care Provider:   Primary Care Physician: Aristides Haynes MD  Principal Problem:Altered mental status    [facility]  Palliative Care   Social Work Visit      Patient Name: Lavon Brandon  MRN: [id number]  Palliative Care Provider:   Primary Care Physician: Aristides Haynes MD  Principal Problem:Altered mental status    PC SW spoke to pts daughter Gabriela and arranged a PC family meeting tomorrow at 1 pm with Dr. Nesbitt. PC team following for supportive needs.     ASHLEY Cifuentes     PC SW spoke to PTs daughter Nilda at [phone] and I updated her on pts new consult and the role of Palliative care. She expressed an understanding and stated that the pt is currently living with Gabriela and has Dementia . At the time of admit he was not in his right mind but has moments of clarity. She is going to take her lunch break tomorrow around 1 pm in order to be present for the family meeting. PC SW will remain available for supportive care.     ASHLEY Cifuentes

## 2024-06-24 NOTE — PT/OT/SLP EVAL
"Speech Language Pathology Evaluation  Bedside Swallow    Patient Name:  Lavon Brandon   MRN:  [id number]  Admitting Diagnosis: Altered mental status    Recommendations:                 General Recommendations:  Follow-up not indicated  Diet recommendations:  Soft & Bite Sized Diet - IDDSI Level 6, Thin liquids - IDDSI Level 0   Aspiration Precautions: 1 bite/sip at a time, Assistance with meals, Feed only when awake/alert, HOB to 90 degrees, Meds crushed in puree, and Small bites/sips   General Precautions: Standard, aspiration  Communication strategies:  none    Assessment:     Lavon Brandon is a 89 y.o. male with an SLP diagnosis of  cognitive based dysphagia .  He presents with decreased alertness and ability to follow directives, impacting safety w/ PO intake. He tolerated all trialed textures, w/ prolonged mastication during complex texture trials. Rec IDDSI 6- soft and bite sized w/ thin liquids; advanced as tolerated. Rec meds crushed in puree and assistance w/ meals; only feed when pt is awake and alert. SLP to sign off.    History:   Per H&P:  "HPI: 89-year-old man with history of Lewy body dementia, seizure disorder and hypertension that presents to the ED for evaluation of altered mental status.  Daughter discloses patient has not been sleeping for more than 24 hours, talking back to time, decreased appetite, and vomited 2 days ago.  Daughter discloses no fever or chills but patient was in the sun 2 days ago.  On presentation patient was afebrile but tachycardic to 124.  Labs remarkable for normocytic anemia, azotemia, elevated CPK and lactic acid.  CT head showed no acute abnormality.  Chest x-ray showed no infiltrate but there were evidence of distended bowel.  Patient received 1 L of IV fluid in the ED. hospital medicine consulted for admission for altered mental status."    Past Medical History:   Diagnosis Date    Bradyarrhythmia     CVA (cerebral infarction) 2006    Dementia     Depression     " Hyperlipidemia     Hypertension     renal htn    Pulmonary embolism 2002    Seizure disorder        Past Surgical History:   Procedure Laterality Date    left foot  with crushed injry         Social History: Patient lives with his daughter.    Prior Intubation HX:  none this admit    Modified Barium Swallow: none found in epic or reported by pt    Imaging:   Imaging Results              US Retroperitoneal Complete (Final result)  Result time 06/23/24 07:27:05      Final result by Az Sethi MD (06/23/24 07:27:05)                   Impression:      1. Limited examination as above.  2. Bilateral simple renal cysts.  While a simple cyst is noted at the upper pole of the right kidney, there is no sonographic correlate for a 2nd indeterminate upper pole right renal mass demonstrated on CT.  Contrast enhanced CT is recommended if clinically feasible.  Otherwise, renal ultrasound could be repeated when clinically appropriate.      Electronically signed by: Az Sethi  Date:    06/23/2024  Time:    07:27               Narrative:    EXAMINATION:  US RETROPERITONEAL COMPLETE    CLINICAL HISTORY:  MATA;    COMPARISON:  CT abdomen and pelvis June 23, 2024    FINDINGS:  Sonographic assessment targeted to the kidneys and urinary bladder was performed.  Exam is very limited secondary to altered mental status and combativeness.    The right kidney measures 6.3 cm in length.  An upper pole cyst measures approximately 1.9 cm.  There is no sonographic correlate for a 2nd indeterminate upper pole right renal mass.  There is no hydronephrosis.    The left kidney is poorly visualized.  7.4 cm simple cyst is noted at the left renal fossa compatible with simple renal cyst.  Renal length is estimated at 8.7 cm, with no hydronephrosis.    Urinary bladder is unremarkable.                                       CT Abdomen Pelvis  Without Contrast (Final result)  Result time 06/23/24 04:42:45      Final result by Aidan Chapa  MD ROSSY (06/23/24 04:42:45)                   Impression:      Overall limited quality noncontrast study with severe streak artifact related to poor patient positioning.  Further evaluation/follow-up as warranted clinically in this patient with reported nausea and vomiting.    No small bowel obstruction.    Moderate stool burden in the colon and rectum with presacral edema.  Suggest correlation for constipation or stercoral colitis.    Anasarca.    Bilateral simple and mildly complex renal cysts.  Cystic lesions not well evaluated on this poor quality study.  Suggest renal ultrasound follow-up to exclude solid component.    Other findings discussed in the body of the report.      Electronically signed by: Aidan Chapa MD  Date:    06/23/2024  Time:    04:42               Narrative:    EXAMINATION:  CT ABDOMEN PELVIS WITHOUT CONTRAST    CLINICAL HISTORY:  Nausea/vomiting;    TECHNIQUE:  Low dose axial images, sagittal and coronal reformations were obtained from the lung bases to the pubic symphysis. Oral and IV contrast not administered.    COMPARISON:  None.    FINDINGS:  Lower chest: Heart is not enlarged.  There is bibasilar subsegmental atelectasis or scarring.    Abdomen:    Evaluation of the solid abdominal organs and bowel is limited in the absence of IV contrast.  Exam quality also limited by severe diffuse streak artifact related to the patient's arms overlying the field of view.    Liver is poorly evaluated but demonstrates no obvious acute abnormality allowing for severe artifact limitations and absence of IV contrast.  Gallbladder is poorly evaluated but demonstrates no obvious calcified stones.    Spleen demonstrates multiple calcifications and is not significantly enlarged.  No obvious acute abnormality identified in the adrenal glands or pancreas allowing for significant limitations.    Simple and mildly complex cysts in the left kidney, measuring up to 7 cm in size.  There is a 2.3 cm hyperdense  cyst in the right kidney apparently measuring 105 HU, though this may be spuriously elevated due to severe artifact.  There is a questionable additional smaller cyst in the upper pole of the right kidney (series 3, image 62).  Consider renal ultrasound follow-up to exclude solid component.  No gross hydronephrosis.  No sizable renal stones.    No small bowel obstruction.  Evaluation for inflammatory changes limited by significant artifact, motion, and absence of IV contrast.  Moderate stool burden in the colon.  Rectum is distended with solid stool and there is presacral edema.    No gross pneumoperitoneum.    Abdominal aorta is normal in caliber with moderate calcific atherosclerosis.    Pelvis: No obvious focal abnormality in the urinary bladder.  Prostate is mildly enlarged.  Rectum is distended with solid stool.  There is presacral edema.    Bones and soft tissues: Multilevel degenerative changes in the spine.  Diffuse body wall edema.  Fat and fluid containing inguinal hernias, left side larger than right.                                       CT Head Without Contrast (Final result)  Result time 06/22/24 23:42:16      Final result by Carolina Padgett MD (06/22/24 23:42:16)                   Impression:      No acute abnormality.      Electronically signed by: Carolina Padgett  Date:    06/22/2024  Time:    23:42               Narrative:    EXAMINATION:  CT HEAD WITHOUT CONTRAST    CLINICAL HISTORY:  Mental status change, unknown cause;    TECHNIQUE:  Low dose axial CT images obtained throughout the head without intravenous contrast. Sagittal and coronal reconstructions were performed.    COMPARISON:  02/15/2024    FINDINGS:  Intracranial compartment:    Ventricles and sulci are normal in size for age without evidence of hydrocephalus. No extra-axial blood or fluid collections.    Mild to moderate chronic microvascular ischemia.  No parenchymal mass, hemorrhage, edema or major vascular distribution  infarct.    Skull/extracranial contents (limited evaluation): No fracture. Severe right maxillary sinus disease with erosive changes in the lateral wall maxillary sinus.  Severe right frontoethmoidal sinusitis.  Mastoid air cells demonstrate small effusions bilaterally.                                       X-Ray Chest AP Portable (Final result)  Result time 06/22/24 22:48:49      Final result by Carolina Padgett MD (06/22/24 22:48:49)                   Impression:      No acute findings.      Electronically signed by: Carolina Padgett  Date:    06/22/2024  Time:    22:48               Narrative:    EXAMINATION:  XR CHEST AP PORTABLE    CLINICAL HISTORY:  Sepsis;    TECHNIQUE:  Single frontal view of the chest was performed.    COMPARISON:  02/15/2024    FINDINGS:  Low lung volumes.  Skin fold on the right.  Small calcified nodule right mid lung.  Lungs are clear. No focal consolidation. No pleural effusion. No pneumothorax. Normal heart size.                                      Prior diet: regular, thin at home, per pt's report; clear liquid at time of CSE.    Occupation/hobbies/homemaking: deferred.    Subjective     Pt asleep in bed upon entering. During initial visit, pt was unable to maintain alertness or follow directives. Second visit, pt was able to respond to simple commands and questions; eye closed for majority of CSE, though able to participate.  Patient goals: none stated     Pain/Comfort:       Respiratory Status: Room air    Objective:     Oral Musculature Evaluation  Oral Musculature: general weakness  Dentition: scattered dentition  Secretion Management: adequate  Mucosal Quality: adequate  Mandibular Strength and Mobility: WFL  Oral Labial Strength and Mobility: impaired retraction, impaired pursing, functional seal  Lingual Strength and Mobility: impaired anterior elevation, functional lateral movement, functional protrusion (strength not assessed)  Velar Elevation: WFL  Buccal Strength  and Mobility: WFL  Volitional Cough: elicited; functional, though weak  Volitional Swallow: laryngeal movement palpated  Voice Prior to PO Intake: clear    Bedside Swallow Eval:   Consistencies Assessed:  Thin liquids water via cup and straw  Puree tsp bites applesauce  Mixed consistencies tsp bites diced peaches in thin juice  Solids cracker      Oral Phase:   Prolonged mastication    Pharyngeal Phase:   no overt clinical signs/symptoms of aspiration  no overt clinical signs/symptoms of pharyngeal dysphagia    Compensatory Strategies  None    Treatment: Pt educated re purpose of evaluation, role of SLP, results of evaluation, and recs. Pt expressed understanding of recs and reporting he was hungry. Recs shared w/ nursing and MD.      Goals:   Multidisciplinary Problems       SLP Goals       Not on file                    Plan:     Patient to be seen:      Plan of Care expires:     Plan of Care reviewed with:  patient, other (see comments) (nursing, MD)   SLP Follow-Up:  No       Discharge recommendations:  No Therapy Indicated   Barriers to Discharge:  None    Time Tracking:     SLP Treatment Date:   06/24/24  Speech Start Time:  1230  Speech Stop Time:  1242     Speech Total Time (min):  12 min    Billable Minutes: Eval Swallow and Oral Function 12 min    06/24/2024

## 2024-06-24 NOTE — PLAN OF CARE
Problem: Adult Inpatient Plan of Care  Goal: Plan of Care Review  Outcome: Progressing  Goal: Patient-Specific Goal (Individualized)  Outcome: Progressing  Goal: Absence of Hospital-Acquired Illness or Injury  Outcome: Progressing  Goal: Optimal Comfort and Wellbeing  Outcome: Progressing  Goal: Readiness for Transition of Care  Outcome: Progressing     Problem: Infection  Goal: Absence of Infection Signs and Symptoms  Outcome: Progressing     Problem: Wound  Goal: Optimal Coping  Outcome: Progressing  Goal: Optimal Functional Ability  Outcome: Progressing  Goal: Absence of Infection Signs and Symptoms  Outcome: Progressing  Goal: Improved Oral Intake  Outcome: Progressing  Goal: Optimal Pain Control and Function  Outcome: Progressing  Goal: Skin Health and Integrity  Outcome: Progressing  Goal: Optimal Wound Healing  Outcome: Progressing     Problem: Acute Kidney Injury/Impairment  Goal: Fluid and Electrolyte Balance  Outcome: Progressing  Goal: Improved Oral Intake  Outcome: Progressing  Goal: Effective Renal Function  Outcome: Progressing     Problem: Diabetes Comorbidity  Goal: Blood Glucose Level Within Targeted Range  Outcome: Progressing     Problem: Fall Injury Risk  Goal: Absence of Fall and Fall-Related Injury  Outcome: Progressing     Problem: Skin Injury Risk Increased  Goal: Skin Health and Integrity  Outcome: Progressing     Problem: Coping Ineffective  Goal: Effective Coping  Outcome: Progressing

## 2024-06-25 PROBLEM — Z71.89 GOALS OF CARE, COUNSELING/DISCUSSION: Status: ACTIVE | Noted: 2024-06-25

## 2024-06-25 LAB
ALBUMIN SERPL BCP-MCNC: 3.1 G/DL (ref 3.5–5.2)
ALP SERPL-CCNC: 34 U/L (ref 55–135)
ALT SERPL W/O P-5'-P-CCNC: 7 U/L (ref 10–44)
ANION GAP SERPL CALC-SCNC: 4 MMOL/L (ref 8–16)
AST SERPL-CCNC: 23 U/L (ref 10–40)
BACTERIA UR CULT: ABNORMAL
BASOPHILS # BLD AUTO: 0.03 K/UL (ref 0–0.2)
BASOPHILS NFR BLD: 0.4 % (ref 0–1.9)
BILIRUB SERPL-MCNC: 0.7 MG/DL (ref 0.1–1)
BUN SERPL-MCNC: 34 MG/DL (ref 10–30)
CALCIUM SERPL-MCNC: 9.1 MG/DL (ref 8.7–10.5)
CHLORIDE SERPL-SCNC: 107 MMOL/L (ref 95–110)
CO2 SERPL-SCNC: 28 MMOL/L (ref 23–29)
CREAT SERPL-MCNC: 2.2 MG/DL (ref 0.5–1.4)
DIFFERENTIAL METHOD BLD: ABNORMAL
EOSINOPHIL # BLD AUTO: 0.2 K/UL (ref 0–0.5)
EOSINOPHIL NFR BLD: 2.7 % (ref 0–8)
ERYTHROCYTE [DISTWIDTH] IN BLOOD BY AUTOMATED COUNT: 14.5 % (ref 11.5–14.5)
EST. GFR  (NO RACE VARIABLE): 27.6 ML/MIN/1.73 M^2
GLUCOSE SERPL-MCNC: 122 MG/DL (ref 70–110)
GLUCOSE SERPL-MCNC: 129 MG/DL (ref 70–110)
GLUCOSE SERPL-MCNC: 78 MG/DL (ref 70–110)
GLUCOSE SERPL-MCNC: 82 MG/DL (ref 70–110)
GLUCOSE SERPL-MCNC: 97 MG/DL (ref 70–110)
HCT VFR BLD AUTO: 28.7 % (ref 40–54)
HGB BLD-MCNC: 9.3 G/DL (ref 14–18)
IMM GRANULOCYTES # BLD AUTO: 0.02 K/UL (ref 0–0.04)
IMM GRANULOCYTES NFR BLD AUTO: 0.3 % (ref 0–0.5)
LYMPHOCYTES # BLD AUTO: 1.7 K/UL (ref 1–4.8)
LYMPHOCYTES NFR BLD: 24.5 % (ref 18–48)
MAGNESIUM SERPL-MCNC: 1.9 MG/DL (ref 1.6–2.6)
MCH RBC QN AUTO: 29.9 PG (ref 27–31)
MCHC RBC AUTO-ENTMCNC: 32.4 G/DL (ref 32–36)
MCV RBC AUTO: 92 FL (ref 82–98)
MONOCYTES # BLD AUTO: 0.5 K/UL (ref 0.3–1)
MONOCYTES NFR BLD: 6.8 % (ref 4–15)
NEUTROPHILS # BLD AUTO: 4.6 K/UL (ref 1.8–7.7)
NEUTROPHILS NFR BLD: 65.3 % (ref 38–73)
NRBC BLD-RTO: 0 /100 WBC
PHOSPHATE SERPL-MCNC: 3.3 MG/DL (ref 2.7–4.5)
PLATELET # BLD AUTO: 132 K/UL (ref 150–450)
PMV BLD AUTO: 10.7 FL (ref 9.2–12.9)
POTASSIUM SERPL-SCNC: 4.3 MMOL/L (ref 3.5–5.1)
PROT SERPL-MCNC: 5.5 G/DL (ref 6–8.4)
RBC # BLD AUTO: 3.11 M/UL (ref 4.6–6.2)
SODIUM SERPL-SCNC: 139 MMOL/L (ref 136–145)
WBC # BLD AUTO: 7.09 K/UL (ref 3.9–12.7)

## 2024-06-25 PROCEDURE — 83735 ASSAY OF MAGNESIUM: CPT | Performed by: STUDENT IN AN ORGANIZED HEALTH CARE EDUCATION/TRAINING PROGRAM

## 2024-06-25 PROCEDURE — 25000003 PHARM REV CODE 250: Performed by: STUDENT IN AN ORGANIZED HEALTH CARE EDUCATION/TRAINING PROGRAM

## 2024-06-25 PROCEDURE — 36415 COLL VENOUS BLD VENIPUNCTURE: CPT | Performed by: STUDENT IN AN ORGANIZED HEALTH CARE EDUCATION/TRAINING PROGRAM

## 2024-06-25 PROCEDURE — 84100 ASSAY OF PHOSPHORUS: CPT | Performed by: STUDENT IN AN ORGANIZED HEALTH CARE EDUCATION/TRAINING PROGRAM

## 2024-06-25 PROCEDURE — 99223 1ST HOSP IP/OBS HIGH 75: CPT | Mod: ,,, | Performed by: INTERNAL MEDICINE

## 2024-06-25 PROCEDURE — 25000003 PHARM REV CODE 250: Performed by: INTERNAL MEDICINE

## 2024-06-25 PROCEDURE — 85025 COMPLETE CBC W/AUTO DIFF WBC: CPT | Performed by: STUDENT IN AN ORGANIZED HEALTH CARE EDUCATION/TRAINING PROGRAM

## 2024-06-25 PROCEDURE — 63600175 PHARM REV CODE 636 W HCPCS: Performed by: INTERNAL MEDICINE

## 2024-06-25 PROCEDURE — 80053 COMPREHEN METABOLIC PANEL: CPT | Performed by: STUDENT IN AN ORGANIZED HEALTH CARE EDUCATION/TRAINING PROGRAM

## 2024-06-25 PROCEDURE — 63600175 PHARM REV CODE 636 W HCPCS: Performed by: STUDENT IN AN ORGANIZED HEALTH CARE EDUCATION/TRAINING PROGRAM

## 2024-06-25 PROCEDURE — 12000002 HC ACUTE/MED SURGE SEMI-PRIVATE ROOM

## 2024-06-25 RX ADMIN — HEPARIN SODIUM 5000 UNITS: 5000 INJECTION, SOLUTION INTRAVENOUS; SUBCUTANEOUS at 02:06

## 2024-06-25 RX ADMIN — MINERAL OIL 1 ENEMA: 100 ENEMA RECTAL at 09:06

## 2024-06-25 RX ADMIN — HEPARIN SODIUM 5000 UNITS: 5000 INJECTION, SOLUTION INTRAVENOUS; SUBCUTANEOUS at 05:06

## 2024-06-25 RX ADMIN — SENNOSIDES AND DOCUSATE SODIUM 1 TABLET: 50; 8.6 TABLET ORAL at 09:06

## 2024-06-25 RX ADMIN — PIPERACILLIN SODIUM AND TAZOBACTAM SODIUM 3.38 G: 3; .375 INJECTION, POWDER, LYOPHILIZED, FOR SOLUTION INTRAVENOUS at 05:06

## 2024-06-25 RX ADMIN — MINERAL OIL 1 ENEMA: 100 ENEMA RECTAL at 08:06

## 2024-06-25 RX ADMIN — POLYETHYLENE GLYCOL 3350 17 G: 17 POWDER, FOR SOLUTION ORAL at 09:06

## 2024-06-25 RX ADMIN — LEVETIRACETAM 500 MG: 5 INJECTION INTRAVENOUS at 08:06

## 2024-06-25 RX ADMIN — THIAMINE HYDROCHLORIDE 250 MG: 100 INJECTION, SOLUTION INTRAMUSCULAR; INTRAVENOUS at 09:06

## 2024-06-25 RX ADMIN — HEPARIN SODIUM 5000 UNITS: 5000 INJECTION, SOLUTION INTRAVENOUS; SUBCUTANEOUS at 08:06

## 2024-06-25 RX ADMIN — LEVETIRACETAM 500 MG: 5 INJECTION INTRAVENOUS at 09:06

## 2024-06-25 NOTE — PROGRESS NOTES
Patient Name: Lavon Brandon  Patient MRN: [id number]  Patient : 1932    Admit Date: 2024  Service date: 2024    Reason for Consult: constipation    PCP: Aristides Haynes MD    S- pt awake, answering questions.  Slightly confused. Denies pain. Had disimpaction and enemas.  .     Inpatient Medications:   heparin (porcine)  5,000 Units Subcutaneous Q8H    levETIRAcetam (Keppra) IV (PEDS and ADULTS)  500 mg Intravenous BID    mineral oil  1 enema Rectal Q12H    piperacillin-tazobactam (Zosyn) IV (PEDS and ADULTS) (extended infusion is not appropriate)  3.375 g Intravenous Q12H    polyethylene glycol  17 g Oral Daily    polyethylene glycol  238 g Oral Once    senna-docusate 8.6-50 mg  1 tablet Oral Daily    thiamine (B-1) 250 mg in D5W 100 mL IVPB  250 mg Intravenous Daily       Current Facility-Administered Medications:     acetaminophen, 650 mg, Rectal, Q6H PRN    dextrose 50%, 12.5 g, Intravenous, PRN    dextrose 50%, 25 g, Intravenous, PRN    glucagon (human recombinant), 1 mg, Intramuscular, PRN    glucose, 16 g, Oral, PRN    glucose, 24 g, Oral, PRN    hydrALAZINE, 10 mg, Intravenous, Q6H PRN    insulin aspart U-100, 0-5 Units, Subcutaneous, QID (AC + HS) PRN    melatonin, 6 mg, Oral, Nightly PRN    naloxone, 0.02 mg, Intravenous, PRN    ondansetron, 4 mg, Intravenous, Q8H PRN    sodium chloride 0.9%, 10 mL, Intravenous, Q12H PRN    Review of Systems:  A 10 point review of systems was performed and was normal, except as mentioned in the HPI, including constitutional, HEENT, heme, lymph, cardiovascular, respiratory, gastrointestinal, genitourinary, neurologic, endocrine, psychiatric and musculoskeletal.      OBJECTIVE:    Physical Exam:  24 Hour Vital Sign Ranges: Temp:  [97.2 °F (36.2 °C)-97.8 °F (36.6 °C)] 97.7 °F (36.5 °C)  Pulse:  [43-75] 74  Resp:  [17-19] 17  SpO2:  [95 %-98 %] 96 %  BP: (110-170)/(55-78) 138/69  Most recent vitals: /69   Pulse 74   Temp 97.7 °F (36.5 °C)   Resp 17    "Ht 5' 10" (1.778 m)   Wt 70.8 kg (156 lb 1.6 oz)   SpO2 96%   BMI 22.40 kg/m²    GEN: well-developed, well-nourished, awake and alert, non-toxic appearing adult, eldelrly  HEENT: PERRL, sclera anicteric, oral mucosa pink and moist without lesion  NECK: trachea midline; Good ROM  CV: regular rate and rhythm, no murmurs or gallops  RESP: clear to auscultation bilaterally, no wheezes, rhonci or rales  ABD: soft, non-tender, non-distended, normal bowel sounds       Labs:   Recent Labs     06/22/24 2248 06/23/24 0458 06/24/24  0425   WBC 10.29 9.48 6.26   MCV 91 90 91    162 140*     Recent Labs     06/22/24 2248 06/23/24 0458 06/24/24  0425    139 140   K 4.6 4.4 4.6    105 107   CO2 25 28 29   BUN 42* 41* 38*   * 123* 87     No results for input(s): "ALB" in the last 72 hours.    Invalid input(s): "ALKP", "SGOT", "SGPT", "TBIL", "DBIL", "TPRO"  No results for input(s): "PT", "INR", "PTT" in the last 72 hours.      Radiology Review:  X-Ray Abdomen AP 1 View   Final Result      Large volume of stool in gas throughout the colon with a nonobstructive bowel gas pattern.         Electronically signed by: Chuy Rogers   Date:    06/24/2024   Time:    07:16      US Retroperitoneal Complete   Final Result      1. Limited examination as above.   2. Bilateral simple renal cysts.  While a simple cyst is noted at the upper pole of the right kidney, there is no sonographic correlate for a 2nd indeterminate upper pole right renal mass demonstrated on CT.  Contrast enhanced CT is recommended if clinically feasible.  Otherwise, renal ultrasound could be repeated when clinically appropriate.         Electronically signed by: Az Sethi   Date:    06/23/2024   Time:    07:27      CT Abdomen Pelvis  Without Contrast   Final Result      Overall limited quality noncontrast study with severe streak artifact related to poor patient positioning.  Further evaluation/follow-up as warranted clinically in " this patient with reported nausea and vomiting.      No small bowel obstruction.      Moderate stool burden in the colon and rectum with presacral edema.  Suggest correlation for constipation or stercoral colitis.      Anasarca.      Bilateral simple and mildly complex renal cysts.  Cystic lesions not well evaluated on this poor quality study.  Suggest renal ultrasound follow-up to exclude solid component.      Other findings discussed in the body of the report.         Electronically signed by: Aidan Chapa MD   Date:    06/23/2024   Time:    04:42      CT Head Without Contrast   Final Result      No acute abnormality.         Electronically signed by: Carolina Padgett   Date:    06/22/2024   Time:    23:42      X-Ray Chest AP Portable   Final Result      No acute findings.         Electronically signed by: Carolina Padgett   Date:    06/22/2024   Time:    22:48            IMPRESSION / RECOMMENDATIONS:  Constipation  Impaction  -post disimpaction  -post enemas  -recommend enemas in AM and start bowel regimen when tolerating po (miralax 2 caps with 12 oz water daily)       Elroy Saab  6/24/2024  9:14 PM

## 2024-06-25 NOTE — PLAN OF CARE
Pc Sw met with pt and son Raphael at bedside along with BORIS Barentt . Pt is a retired  and has 2 sons, 1 daughter and several grandchildren . He enjoys shooting and outdoor activities since penitentiary . Pt is a Oriental orthodox man and does not have any fear or worries . Pt understands his medical issues and wants to go home in order to  be more in control of his life . PC team spoke to patient and son on detail regarding hospice care . Pt is in agreement . He loves alone but has the resources to hire assistance for 24/7 care . Pc team will update discharge planners . Pts gloria Lim is returning to [place] in the next 30 minutes . PC SW will remain available for supportive needs .

## 2024-06-25 NOTE — PROGRESS NOTES
[facility] Medicine  Progress Note    Patient Name: Lavon Brandon  MRN: [id number]  Patient Class: IP- Inpatient   Admission Date: 6/22/2024  Length of Stay: 1 days  Attending Physician: Maik Medina MD  Primary Care Provider: Aristides Haynes MD        Subjective:     Principal Problem:Altered mental status        HPI:  89-year-old man with history of Lewy body dementia, seizure disorder and hypertension that presents to the ED for evaluation of altered mental status.  Daughter discloses patient has not been sleeping for more than 24 hours, talking back to time, decreased appetite, and vomited 2 days ago.  Daughter discloses no fever or chills but patient was in the sun 2 days ago.  On presentation patient was afebrile but tachycardic to 124.  Labs remarkable for normocytic anemia, azotemia, elevated CPK and lactic acid.  CT head showed no acute abnormality.  Chest x-ray showed no infiltrate but there were evidence of distended bowel.  Patient received 1 L of IV fluid in the ED. hospital medicine consulted for admission for altered mental status.    Overview/Hospital Course:  No notes on file    Interval History:  Patient was seen and examined follow-up visit during multidisciplinary rounds.  He is resting in bed, no distress.  Patient is afebrile hemodynamically stable.  No issues reported overnight.  Patient family will have palliative care meeting today to determine the goals of care.    Review of Systems   Unable to perform ROS: Dementia (Altered mental status)     Objective:     Vital Signs (Most Recent):  Temp: 97.5 °F (36.4 °C) (06/25/24 1215)  Pulse: (!) 44 (06/25/24 1215)  Resp: 17 (06/25/24 1215)  BP: 129/72 (06/25/24 1215)  SpO2: 97 % (06/25/24 1215) Vital Signs (24h Range):  Temp:  [97.5 °F (36.4 °C)-98.6 °F (37 °C)] 97.5 °F (36.4 °C)  Pulse:  [44-76] 44  Resp:  [17-19] 17  SpO2:  [94 %-98 %] 97 %  BP: (128-150)/(63-77) 129/72     Weight: 70.8 kg (156 lb 1.6 oz)  Body mass  index is 22.4 kg/m².    Intake/Output Summary (Last 24 hours) at 6/25/2024 1407  Last data filed at 6/25/2024 1216  Gross per 24 hour   Intake --   Output 1900 ml   Net -1900 ml         Physical Exam  Vitals reviewed.   Constitutional:       General: He is not in acute distress.     Appearance: He is ill-appearing.   HENT:      Head: Normocephalic and atraumatic.      Nose: Nose normal.      Mouth/Throat:      Mouth: Mucous membranes are moist.      Pharynx: Oropharynx is clear.   Eyes:      Conjunctiva/sclera: Conjunctivae normal.   Cardiovascular:      Rate and Rhythm: Regular rhythm. Tachycardia present.      Pulses: Normal pulses.      Heart sounds: No murmur heard.  Pulmonary:      Effort: Pulmonary effort is normal. No respiratory distress.      Breath sounds: Normal breath sounds. No wheezing or rhonchi.   Abdominal:      General: There is no distension.      Palpations: Abdomen is soft.      Tenderness: There is no abdominal tenderness.   Musculoskeletal:      Right lower leg: No edema.      Left lower leg: No edema.   Neurological:      General: No focal deficit present.      Mental Status: He is alert. He is disoriented.   Psychiatric:      Comments: No agitation noted             Significant Labs: All pertinent labs within the past 24 hours have been reviewed.  BMP:   Recent Labs   Lab 06/25/24  0538   GLU 78      K 4.3      CO2 28   BUN 34*   CREATININE 2.2*   CALCIUM 9.1   MG 1.9     CBC:   Recent Labs   Lab 06/24/24  0425 06/25/24  0538   WBC 6.26 7.09   HGB 9.2* 9.3*   HCT 28.5* 28.7*   * 132*       Significant Imaging: I have reviewed all pertinent imaging results/findings within the past 24 hours.    Assessment/Plan:      * Altered mental status  Unclear etiology  ? Infection (UTI) vs worsening underlying dementia  Check urinalysis with reflex culture to rule out UTI  Check TSH  Follow blood culture  CT chest abdomen and pelvis    ACP (advance care planning)  Discussed with  "daughter Gabriela.  Patient is DNI/DNR      Seizure disorder  Continue home dose of Keppra      MATA (acute kidney injury)  MATA on CKD  Baseline creatinine of 1.9  Likely due to dehydration  CT chest abdomen and pelvis to rule out obstructive uropathy  Renal dose medication  Avoid nephrotoxic medication  Continue IVF  Monitor renal function    Type 2 diabetes mellitus without complication, without long-term current use of insulin  Patient's FSGs are controlled on current medication regimen.  Last A1c reviewed-   Lab Results   Component Value Date    HGBA1C 5.2 11/08/2023     Most recent fingerstick glucose reviewed- No results for input(s): "POCTGLUCOSE" in the last 24 hours.  Current correctional scale  Low  Maintain anti-hyperglycemic dose as follows-   Antihyperglycemics (From admission, onward)      None          Hold Oral hypoglycemics while patient is in the hospital.    Dementia with behavioral disturbance  History of Lewy body dementia  Continue supportive care  Fall precautions  Melatonin at night for sleep      VTE Risk Mitigation (From admission, onward)           Ordered     heparin (porcine) injection 5,000 Units  Every 8 hours         06/23/24 0030     IP VTE HIGH RISK PATIENT  Once         06/23/24 0030     Place sequential compression device  Until discontinued         06/23/24 0030                    Discharge Planning   JOANNE: 6/26/2024     Code Status: DNR   Is the patient medically ready for discharge?:     Reason for patient still in hospital (select all that apply): Patient trending condition  Discharge Plan A: Home with family                  Maik Medina MD  Department of Hospital Medicine   [facility]    "

## 2024-06-25 NOTE — ACP (ADVANCE CARE PLANNING)
[facility]  Palliative Care   Social Work Visit      Patient Name: Lavon Brandon  MRN: [id number]  Palliative Care Provider:   Primary Care Physician: Aristides Haynes MD Pricipal Problem:Altered mental status    PC SW met with pt, and his two daughters Gabriela and Nilda along with Dr. Nesbitt. The pt knew his age but did not know where he is. He stated that he is retired from the [employer] . He is of the [employer]. He has three daugthers and is a . He currently lives with his oldest daughter Gabriela. According to Gabriela he moved in with her full time after his wife passed two years ago. He receives VA sitter services from [facility] and he is 40% service connected . He is able to get in and out of her vehicle to go to doctors appts with the assistance of a home walker. Gabriela stated that he has moments of confusion with his dementia and stated that it is getting increasingly more difficult to care for him at home. We discussed several options such as VA services, nursing home option and hospice are. Both her and her sister would like the pt to be evaluated for home hospice services. PC SW will remain available for supportive needs.     Tania Sharp, [facility]

## 2024-06-25 NOTE — SUBJECTIVE & OBJECTIVE
Interval History:  Patient was seen and examined follow-up visit during multidisciplinary rounds.  He is resting in bed, no distress.  Patient is afebrile hemodynamically stable.  No issues reported overnight.  Patient family will have palliative care meeting today to determine the goals of care.    Review of Systems   Unable to perform ROS: Dementia (Altered mental status)     Objective:     Vital Signs (Most Recent):  Temp: 97.5 °F (36.4 °C) (06/25/24 1215)  Pulse: (!) 44 (06/25/24 1215)  Resp: 17 (06/25/24 1215)  BP: 129/72 (06/25/24 1215)  SpO2: 97 % (06/25/24 1215) Vital Signs (24h Range):  Temp:  [97.5 °F (36.4 °C)-98.6 °F (37 °C)] 97.5 °F (36.4 °C)  Pulse:  [44-76] 44  Resp:  [17-19] 17  SpO2:  [94 %-98 %] 97 %  BP: (128-150)/(63-77) 129/72     Weight: 70.8 kg (156 lb 1.6 oz)  Body mass index is 22.4 kg/m².    Intake/Output Summary (Last 24 hours) at 6/25/2024 1407  Last data filed at 6/25/2024 1216  Gross per 24 hour   Intake --   Output 1900 ml   Net -1900 ml         Physical Exam  Vitals reviewed.   Constitutional:       General: He is not in acute distress.     Appearance: He is ill-appearing.   HENT:      Head: Normocephalic and atraumatic.      Nose: Nose normal.      Mouth/Throat:      Mouth: Mucous membranes are moist.      Pharynx: Oropharynx is clear.   Eyes:      Conjunctiva/sclera: Conjunctivae normal.   Cardiovascular:      Rate and Rhythm: Regular rhythm. Tachycardia present.      Pulses: Normal pulses.      Heart sounds: No murmur heard.  Pulmonary:      Effort: Pulmonary effort is normal. No respiratory distress.      Breath sounds: Normal breath sounds. No wheezing or rhonchi.   Abdominal:      General: There is no distension.      Palpations: Abdomen is soft.      Tenderness: There is no abdominal tenderness.   Musculoskeletal:      Right lower leg: No edema.      Left lower leg: No edema.   Neurological:      General: No focal deficit present.      Mental Status: He is alert. He is  disoriented.   Psychiatric:      Comments: No agitation noted             Significant Labs: All pertinent labs within the past 24 hours have been reviewed.  BMP:   Recent Labs   Lab 06/25/24  0538   GLU 78      K 4.3      CO2 28   BUN 34*   CREATININE 2.2*   CALCIUM 9.1   MG 1.9     CBC:   Recent Labs   Lab 06/24/24  0425 06/25/24  0538   WBC 6.26 7.09   HGB 9.2* 9.3*   HCT 28.5* 28.7*   * 132*       Significant Imaging: I have reviewed all pertinent imaging results/findings within the past 24 hours.

## 2024-06-25 NOTE — PLAN OF CARE
SW attempted to meet with patient and patient's family at bedside to discuss hospice.  Family not available at this time.  SW left voice message for return call from family.      DOM sent patient information to [facility] via [employer] system for review.  DOM spoke to Morelia with [facility] [phone] who stated she spoke with patient's daughter and meeting scheduled for 10:00am tomorrow.       06/25/24 1604   Post-Acute Status   Post-Acute Authorization Hospice   Hospice Status Referrals Sent   Patient choice form signed by patient/caregiver List with quality metrics by geographic area provided

## 2024-06-26 LAB
GLUCOSE SERPL-MCNC: 111 MG/DL (ref 70–110)
GLUCOSE SERPL-MCNC: 122 MG/DL (ref 70–110)
GLUCOSE SERPL-MCNC: 163 MG/DL (ref 70–110)

## 2024-06-26 PROCEDURE — 63600175 PHARM REV CODE 636 W HCPCS: Performed by: STUDENT IN AN ORGANIZED HEALTH CARE EDUCATION/TRAINING PROGRAM

## 2024-06-26 PROCEDURE — 25000003 PHARM REV CODE 250: Performed by: INTERNAL MEDICINE

## 2024-06-26 PROCEDURE — 25000003 PHARM REV CODE 250: Performed by: STUDENT IN AN ORGANIZED HEALTH CARE EDUCATION/TRAINING PROGRAM

## 2024-06-26 PROCEDURE — 12000002 HC ACUTE/MED SURGE SEMI-PRIVATE ROOM

## 2024-06-26 PROCEDURE — 63600175 PHARM REV CODE 636 W HCPCS: Performed by: INTERNAL MEDICINE

## 2024-06-26 RX ADMIN — HEPARIN SODIUM 5000 UNITS: 5000 INJECTION, SOLUTION INTRAVENOUS; SUBCUTANEOUS at 04:06

## 2024-06-26 RX ADMIN — LEVETIRACETAM 500 MG: 5 INJECTION INTRAVENOUS at 08:06

## 2024-06-26 RX ADMIN — HEPARIN SODIUM 5000 UNITS: 5000 INJECTION, SOLUTION INTRAVENOUS; SUBCUTANEOUS at 11:06

## 2024-06-26 RX ADMIN — HYDRALAZINE HYDROCHLORIDE 10 MG: 20 INJECTION, SOLUTION INTRAMUSCULAR; INTRAVENOUS at 01:06

## 2024-06-26 RX ADMIN — POLYETHYLENE GLYCOL 3350 17 G: 17 POWDER, FOR SOLUTION ORAL at 09:06

## 2024-06-26 RX ADMIN — MINERAL OIL 1 ENEMA: 100 ENEMA RECTAL at 09:06

## 2024-06-26 RX ADMIN — PIPERACILLIN SODIUM AND TAZOBACTAM SODIUM 3.38 G: 3; .375 INJECTION, POWDER, LYOPHILIZED, FOR SOLUTION INTRAVENOUS at 04:06

## 2024-06-26 RX ADMIN — HEPARIN SODIUM 5000 UNITS: 5000 INJECTION, SOLUTION INTRAVENOUS; SUBCUTANEOUS at 03:06

## 2024-06-26 RX ADMIN — SENNOSIDES AND DOCUSATE SODIUM 1 TABLET: 50; 8.6 TABLET ORAL at 09:06

## 2024-06-26 NOTE — SUBJECTIVE & OBJECTIVE
Interval History:  Patient was seen and examined follow-up visit.  He is more talkative today, slept well through the night, ate breakfast, vitals stable he is afebrile.  Family had a meeting with case management today and hospice, decided to proceed with the skilled nursing facility placement, physical therapy consulted.    Review of Systems   Unable to perform ROS: Dementia (Altered mental status)     Objective:     Vital Signs (Most Recent):  Temp: 97.2 °F (36.2 °C) (06/26/24 1221)  Pulse: (!) 51 (06/26/24 1221)  Resp: 18 (06/26/24 1221)  BP: (!) 159/66 (06/26/24 1221)  SpO2: 97 % (06/26/24 1221) Vital Signs (24h Range):  Temp:  [97.2 °F (36.2 °C)-98.5 °F (36.9 °C)] 97.2 °F (36.2 °C)  Pulse:  [45-60] 51  Resp:  [18] 18  SpO2:  [93 %-97 %] 97 %  BP: (130-177)/(59-73) 159/66     Weight: 70.8 kg (156 lb 1.6 oz)  Body mass index is 22.4 kg/m².    Intake/Output Summary (Last 24 hours) at 6/26/2024 1536  Last data filed at 6/26/2024 0913  Gross per 24 hour   Intake 240 ml   Output 2000 ml   Net -1760 ml         Physical Exam  Vitals reviewed.   Constitutional:       General: He is not in acute distress.     Appearance: He is ill-appearing.   HENT:      Head: Normocephalic and atraumatic.      Nose: Nose normal.      Mouth/Throat:      Mouth: Mucous membranes are moist.      Pharynx: Oropharynx is clear.   Eyes:      Conjunctiva/sclera: Conjunctivae normal.   Cardiovascular:      Rate and Rhythm: Regular rhythm. Tachycardia present.      Pulses: Normal pulses.      Heart sounds: No murmur heard.  Pulmonary:      Effort: Pulmonary effort is normal. No respiratory distress.      Breath sounds: Normal breath sounds. No wheezing or rhonchi.   Abdominal:      General: There is no distension.      Palpations: Abdomen is soft.      Tenderness: There is no abdominal tenderness.   Musculoskeletal:      Right lower leg: No edema.      Left lower leg: No edema.   Neurological:      General: No focal deficit present.      Mental  Status: He is alert. He is disoriented.   Psychiatric:      Comments: No agitation noted             Significant Labs: All pertinent labs within the past 24 hours have been reviewed.  BMP:   Recent Labs   Lab 06/25/24  0538   GLU 78      K 4.3      CO2 28   BUN 34*   CREATININE 2.2*   CALCIUM 9.1   MG 1.9     CBC:   Recent Labs   Lab 06/25/24  0538   WBC 7.09   HGB 9.3*   HCT 28.7*   *       Significant Imaging: I have reviewed all pertinent imaging results/findings within the past 24 hours.

## 2024-06-26 NOTE — HPI
"Per admit H&P: "89-year-old man with history of Lewy body dementia, seizure disorder and hypertension that presents to the ED for evaluation of altered mental status.  Daughter discloses patient has not been sleeping for more than 24 hours, talking back to time, decreased appetite, and vomited 2 days ago.  Daughter discloses no fever or chills but patient was in the sun 2 days ago.  On presentation patient was afebrile but tachycardic to 124.  Labs remarkable for normocytic anemia, azotemia, elevated CPK and lactic acid.  CT head showed no acute abnormality.  Chest x-ray showed no infiltrate but there were evidence of distended bowel.  Patient received 1 L of IV fluid in the ED. hospital medicine consulted for admission for altered mental status."    Workup to this point has revealed UTI and constipation.  His constipation is resolving.  Infection seems resolving as well.  Encephalopathy is improving.  Given his age and comorbidities he continues to carry a guarded prognosis.  I have been asked to assist with goals of care.  "

## 2024-06-26 NOTE — ASSESSMENT & PLAN NOTE
I reviewed the patient's chart and discussed the case with the patient's team.      I examined Lavon Brandon at bedside.  The patient lacks capacity for complex medical decision-making.  I spoke with him and 2 of his daughters at bedside.    I introduced myself and my role as palliative care physician. They were agreeable to speaking.    We discussed the patient's medical illness, prognosis, and values in detail.  Below is a brief summary of our discussion.    They understand that he was admitted and being treated for a UTI that is led to encephalopathy above his baseline.  At this point his UTI is resolving.    We discussed his prognosis.  Best case scenario he will likely need some rehab prior to returning home from the hospital.  If he can regain his function, avoid hospitalization, and maintain nutrition he could potentially live many more good months or years.  Alternatively he is 89 years old with dementia and has experienced 2 hospitalizations within the last 4 months; he was certainly at risk of complications that could become life-threatening and I would not be surprised if he had a complication coming year that resulted in death.  They understood.  They were not at all surprised by this news.      We discussed his values.  Even at his best, he was not living a good quality of life at home.  They do not believe he was willing to to continue with hospitalization, go through his stent a rehab, etc..  He has made mention multiple occasions that he does not fear dying.  He does not want to be a burden to his family.  His caren case him strength.    Spoke back and forth.  If he was not living a reasonable quality of life at baseline I explained that hospice would be a very reasonable way to care for him going forward.  I spoke in detail about hospice and the philosophy of hospice care.  It is my opinion that he qualifies for hospice care.  They understand and are hopeful that he qualifies for hospice care as  well.  If so, they desire to pursue hospice care at discharge.    We discuss disposition.  At this point, he requires more care than his daughter can provide at home.  Unless insurance where the [facility] we will provide more sitter/home care at home, they are most interested in pursuing hospice at a nursing facility.    I updated his care team on the above.    I felt we had a very open and honest conversation.  His daughter is asking very appropriate and patient centered questions.  I am very confident that they will make wise decisions as his condition changes.      I appreciate being involved.  Hope I have been helpful.

## 2024-06-26 NOTE — PLAN OF CARE
[facility]  Discharge Reassessment    Primary Care Provider: Aristides Haynes MD    Expected Discharge Date: 6/27/2024    Per Morelia with [facility], patient's daughter would like patient to have BM and PT/OT to evaluate patient prior to making a decision on hospice.  SW met with patient and patient's daughter Gabriela at bedside to discuss discharge planning needs.  Daughter with concerns patient has not got out of bed since admission and no BM.  Daughter with questions regarding nursing home placement. SW discussed that per [place], patient will receive something for BM and PT/OT has been ordered.  SW explained that nursing home placement can start at facility and can be completed from home.  Daughter with questions regarding nursing home placement on prior admission.  SW explained that patient discharge SNF and that process is different than NS placement.  SW expressed that if patient does not participate with PT/OT, patient to discharge home with continued services or hospice.  Daughter verbalized understanding.  SW left daughter with VA information to contact.      Reassessment (most recent)       Discharge Reassessment - 06/26/24 7517          Discharge Reassessment    Assessment Type Discharge Planning Reassessment     Did the patient's condition or plan change since previous assessment? Yes     Discharge Plan discussed with: Patient;Adult children     Communicated JOANNE with patient/caregiver Yes     Discharge Plan A Hospice/home     Discharge Plan B Home Health     DME Needed Upon Discharge  none     Transition of Care Barriers None     Why the patient remains in the hospital Requires continued medical care        Post-Acute Status    Post-Acute Authorization Hospice;Home Health     Home Health Status Pending medical clearance/testing     Hospice Status Pending medical clearance/testing     Discharge Delays Patient and Family Barriers

## 2024-06-26 NOTE — CONSULTS
[facility]  Palliative Medicine  Consult Note    Patient Name: Lavon Brandon  MRN: [id number]  Admission Date: 6/22/2024  Hospital Length of Stay: 1 days  Code Status: DNR   Attending Provider: Maik Medina MD  Consulting Provider: Chris Nesbitt MD  Primary Care Physician: Aristides Haynes MD  Principal Problem:Altered mental status    Patient information was obtained from patient, relative(s), past medical records, and primary team.      Inpatient consult to Palliative Care  Consult performed by: Chris Nesbitt MD  Consult ordered by: Maik Medina MD        Assessment/Plan:     Palliative Care  Goals of care, counseling/discussion  I reviewed the patient's chart and discussed the case with the patient's team.      I examined Lavon Brandon at bedside.  The patient lacks capacity for complex medical decision-making.  I spoke with him and 2 of his daughters at bedside.    I introduced myself and my role as palliative care physician. They were agreeable to speaking.    We discussed the patient's medical illness, prognosis, and values in detail.  Below is a brief summary of our discussion.    They understand that he was admitted and being treated for a UTI that is led to encephalopathy above his baseline.  At this point his UTI is resolving.    We discussed his prognosis.  Best case scenario he will likely need some rehab prior to returning home from the hospital.  If he can regain his function, avoid hospitalization, and maintain nutrition he could potentially live many more good months or years.  Alternatively he is 89 years old with dementia and has experienced 2 hospitalizations within the last 4 months; he was certainly at risk of complications that could become life-threatening and I would not be surprised if he had a complication coming year that resulted in death.  They understood.  They were not at all surprised by this news.      We discussed his values.  Even at his best, he  "was not living a good quality of life at home.  They do not believe he was willing to to continue with hospitalization, go through his stent a rehab, etc..  He has made mention multiple occasions that he does not fear dying.  He does not want to be a burden to his family.  His caren case him strength.    Spoke back and forth.  If he was not living a reasonable quality of life at baseline I explained that hospice would be a very reasonable way to care for him going forward.  I spoke in detail about hospice and the philosophy of hospice care.  It is my opinion that he qualifies for hospice care.  They understand and are hopeful that he qualifies for hospice care as well.  If so, they desire to pursue hospice care at discharge.    We discuss disposition.  At this point, he requires more care than his daughter can provide at home.  Unless insurance where [facility] we will provide more sitter/home care at home, they are most interested in pursuing hospice at a nursing facility.    I updated his care team on the above.    I felt we had a very open and honest conversation.  His daughter is asking very appropriate and patient centered questions.  I am very confident that they will make wise decisions as his condition changes.      I appreciate being involved.  Hope I have been helpful.            Thank you for your consult. I will follow-up with patient. Please contact us if you have any additional questions.    Subjective:     HPI:   Per admit H&P: "89-year-old man with history of Lewy body dementia, seizure disorder and hypertension that presents to the ED for evaluation of altered mental status.  Daughter discloses patient has not been sleeping for more than 24 hours, talking back to time, decreased appetite, and vomited 2 days ago.  Daughter discloses no fever or chills but patient was in the sun 2 days ago.  On presentation patient was afebrile but tachycardic to 124.  Labs remarkable for normocytic anemia, azotemia, " "elevated CPK and lactic acid.  CT head showed no acute abnormality.  Chest x-ray showed no infiltrate but there were evidence of distended bowel.  Patient received 1 L of IV fluid in the ED. hospital medicine consulted for admission for altered mental status."    Workup to this point has revealed UTI and constipation.  His constipation is resolving.  Infection seems resolving as well.  Encephalopathy is improving.  Given his age and comorbidities he continues to carry a guarded prognosis.  I have been asked to assist with goals of care.    Hospital Course:  No notes on file    Interval History: See HPI    Past Medical History:   Diagnosis Date    Bradyarrhythmia     CVA (cerebral infarction) 2006    Dementia     Depression     Hyperlipidemia     Hypertension     renal htn    Pulmonary embolism 2002    Seizure disorder        Past Surgical History:   Procedure Laterality Date    left foot  with crushed injry         Review of patient's allergies indicates:   Allergen Reactions    Ciprofloxacin (bulk) Swelling       Medications:  Continuous Infusions:  Scheduled Meds:   heparin (porcine)  5,000 Units Subcutaneous Q8H    levETIRAcetam (Keppra) IV (PEDS and ADULTS)  500 mg Intravenous BID    mineral oil  1 enema Rectal Q12H    piperacillin-tazobactam (Zosyn) IV (PEDS and ADULTS) (extended infusion is not appropriate)  3.375 g Intravenous Q12H    polyethylene glycol  17 g Oral Daily    polyethylene glycol  238 g Oral Once    senna-docusate 8.6-50 mg  1 tablet Oral Daily     PRN Meds:  Current Facility-Administered Medications:     acetaminophen, 650 mg, Rectal, Q6H PRN    dextrose 50%, 12.5 g, Intravenous, PRN    dextrose 50%, 25 g, Intravenous, PRN    glucagon (human recombinant), 1 mg, Intramuscular, PRN    glucose, 16 g, Oral, PRN    glucose, 24 g, Oral, PRN    hydrALAZINE, 10 mg, Intravenous, Q6H PRN    insulin aspart U-100, 0-5 Units, Subcutaneous, QID (AC + HS) PRN    melatonin, 6 mg, Oral, Nightly PRN    naloxone, " 0.02 mg, Intravenous, PRN    ondansetron, 4 mg, Intravenous, Q8H PRN    sodium chloride 0.9%, 10 mL, Intravenous, Q12H PRN    Family History       Problem Relation (Age of Onset)    Diabetes Mother          Tobacco Use    Smoking status: Never    Smokeless tobacco: Never   Substance and Sexual Activity    Alcohol use: No    Drug use: No    Sexual activity: Not on file       Review of Systems  Objective:     Vital Signs (Most Recent):  Temp: 98.5 °F (36.9 °C) (06/25/24 1915)  Pulse: (!) 45 (06/25/24 1915)  Resp: 18 (06/25/24 1915)  BP: 135/71 (06/25/24 1915)  SpO2: 95 % (06/25/24 1915) Vital Signs (24h Range):  Temp:  [97.5 °F (36.4 °C)-98.6 °F (37 °C)] 98.5 °F (36.9 °C)  Pulse:  [44-74] 45  Resp:  [17-19] 18  SpO2:  [94 %-98 %] 95 %  BP: (128-150)/(59-77) 135/71     Weight: 70.8 kg (156 lb 1.6 oz)  Body mass index is 22.4 kg/m².       Physical Exam  Vitals reviewed.   Constitutional:       General: He is not in acute distress.     Appearance: He is ill-appearing.   HENT:      Head: Normocephalic and atraumatic.      Right Ear: External ear normal.      Left Ear: External ear normal.      Nose: Nose normal. No congestion.      Mouth/Throat:      Mouth: Mucous membranes are moist.      Pharynx: No oropharyngeal exudate.   Eyes:      General:         Right eye: No discharge.         Left eye: No discharge.      Extraocular Movements: Extraocular movements intact.   Cardiovascular:      Rate and Rhythm: Normal rate.   Pulmonary:      Effort: Pulmonary effort is normal. No respiratory distress.   Abdominal:      General: There is no distension.      Palpations: Abdomen is soft.      Tenderness: There is no abdominal tenderness.   Skin:     General: Skin is warm.   Neurological:      Mental Status: He is alert. Mental status is at baseline.            Review of Symptoms      Symptom Assessment (ESAS 0-10 Scale)  Unable to complete assessment due to Dementia         Pain Assessment in Advanced Demential Scale (PAINAD)    Breathing - Independent of vocalization:  0  Negative vocalization:  0  Facial expression:  0  Body language:  0  Consolability:  0  Total:  0    Performance Status:  50    Living Arrangements:  Lives with family and Lives in home    Psychosocial/Cultural:   See Palliative Psychosocial Note: No  **Primary  to Follow**  Palliative Care  Consult: No        Advance Care Planning  Advance Directives:   Do Not Resuscitate Status: Yes    Medical Power of : Yes      Decision Making:  Family answered questions and Patient unable to communicate due to disease severity/cognitive impairment  Goals of Care: The family endorses that what is most important right now is to focus on comfort and QOL     Accordingly, we have decided that the best plan to meet the patient's goals includes enrolling in hospice care           Significant Labs: BMP:   Recent Labs   Lab 06/25/24  0538   GLU 78      K 4.3      CO2 28   BUN 34*   CREATININE 2.2*   CALCIUM 9.1   MG 1.9     CBC:   Recent Labs   Lab 06/24/24  0425 06/25/24  0538   WBC 6.26 7.09   HGB 9.2* 9.3*   HCT 28.5* 28.7*   * 132*     CBC:   Recent Labs   Lab 06/25/24  0538   WBC 7.09   HGB 9.3*   HCT 28.7*   MCV 92   *     BMP:  Recent Labs   Lab 06/25/24  0538   GLU 78      K 4.3      CO2 28   BUN 34*   CREATININE 2.2*   CALCIUM 9.1   MG 1.9     LFT:  Lab Results   Component Value Date    AST 23 06/25/2024    ALKPHOS 34 (L) 06/25/2024    BILITOT 0.7 06/25/2024     Albumin:   Albumin   Date Value Ref Range Status   06/25/2024 3.1 (L) 3.5 - 5.2 g/dL Final     Protein:   Total Protein   Date Value Ref Range Status   06/25/2024 5.5 (L) 6.0 - 8.4 g/dL Final     Lactic acid:   Lab Results   Component Value Date    LACTATE 1.1 06/23/2024    LACTATE 2.3 (HH) 11/30/2023       Significant Imaging: I have reviewed all pertinent imaging results/findings within the past 24 hours.      I spent a total of 100 minutes on the day  of the visit. This includes face to face time in discussion of goals of care, symptom assessment, coordination of care and emotional support.  This also includes non-face to face time preparing to see the patient (eg, review of tests/imaging), obtaining and/or reviewing separately obtained history, documenting clinical information in the electronic or other health record, independently interpreting results and communicating results to the patient/family/caregiver, or care coordinator.    Chris Nesbitt MD  Palliative Medicine  [facility]

## 2024-06-26 NOTE — ACP (ADVANCE CARE PLANNING)
[facility]  Palliative Care   Social Work Visit      Patient Name: Lavon Brandon  MRN: [id number]  Palliative Care Provider:   Primary Care Physician: Aristides Haynes MD  Principal Problem:Altered mental status      PC SW let pts daughter Gabriela resources at bedside for additional sitter support from Home instead and Comfort care at bedside. I also left the family information for the [facility] vet attend program which is a program that provides non-medical home care services to Veterans and their widows through the lifetime [facility] benefit Aid and Attendance.     Tania Sharp, LCSW-BACS

## 2024-06-26 NOTE — PROGRESS NOTES
[facility] Medicine  Progress Note    Patient Name: Lavon Brandon  MRN: [id number]  Patient Class: IP- Inpatient   Admission Date: 6/22/2024  Length of Stay: 2 days  Attending Physician: Maik Medina MD  Primary Care Provider: Aristides Haynes MD        Subjective:     Principal Problem:Altered mental status        HPI:  89-year-old man with history of Lewy body dementia, seizure disorder and hypertension that presents to the ED for evaluation of altered mental status.  Daughter discloses patient has not been sleeping for more than 24 hours, talking back to time, decreased appetite, and vomited 2 days ago.  Daughter discloses no fever or chills but patient was in the sun 2 days ago.  On presentation patient was afebrile but tachycardic to 124.  Labs remarkable for normocytic anemia, azotemia, elevated CPK and lactic acid.  CT head showed no acute abnormality.  Chest x-ray showed no infiltrate but there were evidence of distended bowel.  Patient received 1 L of IV fluid in the ED. hospital medicine consulted for admission for altered mental status.    Overview/Hospital Course:  No notes on file    Interval History:  Patient was seen and examined follow-up visit.  He is more talkative today, slept well through the night, ate breakfast, vitals stable he is afebrile.  Family had a meeting with case management today and hospice, decided to proceed with the skilled nursing facility placement, physical therapy consulted.    Review of Systems   Unable to perform ROS: Dementia (Altered mental status)     Objective:     Vital Signs (Most Recent):  Temp: 97.2 °F (36.2 °C) (06/26/24 1221)  Pulse: (!) 51 (06/26/24 1221)  Resp: 18 (06/26/24 1221)  BP: (!) 159/66 (06/26/24 1221)  SpO2: 97 % (06/26/24 1221) Vital Signs (24h Range):  Temp:  [97.2 °F (36.2 °C)-98.5 °F (36.9 °C)] 97.2 °F (36.2 °C)  Pulse:  [45-60] 51  Resp:  [18] 18  SpO2:  [93 %-97 %] 97 %  BP: (130-177)/(59-73) 159/66     Weight: 70.8 kg  (156 lb 1.6 oz)  Body mass index is 22.4 kg/m².    Intake/Output Summary (Last 24 hours) at 6/26/2024 1536  Last data filed at 6/26/2024 0913  Gross per 24 hour   Intake 240 ml   Output 2000 ml   Net -1760 ml         Physical Exam  Vitals reviewed.   Constitutional:       General: He is not in acute distress.     Appearance: He is ill-appearing.   HENT:      Head: Normocephalic and atraumatic.      Nose: Nose normal.      Mouth/Throat:      Mouth: Mucous membranes are moist.      Pharynx: Oropharynx is clear.   Eyes:      Conjunctiva/sclera: Conjunctivae normal.   Cardiovascular:      Rate and Rhythm: Regular rhythm. Tachycardia present.      Pulses: Normal pulses.      Heart sounds: No murmur heard.  Pulmonary:      Effort: Pulmonary effort is normal. No respiratory distress.      Breath sounds: Normal breath sounds. No wheezing or rhonchi.   Abdominal:      General: There is no distension.      Palpations: Abdomen is soft.      Tenderness: There is no abdominal tenderness.   Musculoskeletal:      Right lower leg: No edema.      Left lower leg: No edema.   Neurological:      General: No focal deficit present.      Mental Status: He is alert. He is disoriented.   Psychiatric:      Comments: No agitation noted             Significant Labs: All pertinent labs within the past 24 hours have been reviewed.  BMP:   Recent Labs   Lab 06/25/24  0538   GLU 78      K 4.3      CO2 28   BUN 34*   CREATININE 2.2*   CALCIUM 9.1   MG 1.9     CBC:   Recent Labs   Lab 06/25/24  0538   WBC 7.09   HGB 9.3*   HCT 28.7*   *       Significant Imaging: I have reviewed all pertinent imaging results/findings within the past 24 hours.    Assessment/Plan:      * Altered mental status  Unclear etiology  ? Infection (UTI) vs worsening underlying dementia  Check urinalysis with reflex culture to rule out UTI  Check TSH  Follow blood culture  CT chest abdomen and pelvis    ACP (advance care planning)  Discussed with daughter  "Gabriela.  Patient is DNI/DNR      Seizure disorder  Continue home dose of Keppra      MATA (acute kidney injury)  MATA on CKD  Baseline creatinine of 1.9  Likely due to dehydration  CT chest abdomen and pelvis to rule out obstructive uropathy  Renal dose medication  Avoid nephrotoxic medication  Continue IVF  Monitor renal function    Type 2 diabetes mellitus without complication, without long-term current use of insulin  Patient's FSGs are controlled on current medication regimen.  Last A1c reviewed-   Lab Results   Component Value Date    HGBA1C 5.2 11/08/2023     Most recent fingerstick glucose reviewed- No results for input(s): "POCTGLUCOSE" in the last 24 hours.  Current correctional scale  Low  Maintain anti-hyperglycemic dose as follows-   Antihyperglycemics (From admission, onward)      None          Hold Oral hypoglycemics while patient is in the hospital.    Dementia with behavioral disturbance  History of Lewy body dementia  Continue supportive care  Fall precautions  Melatonin at night for sleep      VTE Risk Mitigation (From admission, onward)           Ordered     heparin (porcine) injection 5,000 Units  Every 8 hours         06/23/24 0030     IP VTE HIGH RISK PATIENT  Once         06/23/24 0030     Place sequential compression device  Until discontinued         06/23/24 0030                    Discharge Planning   JOANNE: 6/27/2024     Code Status: DNR   Is the patient medically ready for discharge?:     Reason for patient still in hospital (select all that apply): Patient trending condition  Discharge Plan A: Hospice/home   Discharge Delays: (!) Patient and Family Barriers              Maik Medina MD  Department of Hospital Medicine   [facility]    "

## 2024-06-26 NOTE — NURSING
Nurses Note -- 4 Eyes      6/26/2024   12:05 PM      Skin assessed during: Daily Assessment      [x] No Altered Skin Integrity Present    [x]Prevention Measures Documented      [] Yes- Altered Skin Integrity Present or Discovered   [] LDA Added if Not in Epic (Describe Wound)   [] New Altered Skin Integrity was Present on Admit and Documented in LDA   [x] Wound Image Taken    Wound Care Consulted? No    Attending Nurse:  Sophy De Guzman RN/Staff Member:   Ne        Old healed scars from previous wounds

## 2024-06-26 NOTE — SUBJECTIVE & OBJECTIVE
Interval History: See HPI    Past Medical History:   Diagnosis Date    Bradyarrhythmia     CVA (cerebral infarction) 2006    Dementia     Depression     Hyperlipidemia     Hypertension     renal htn    Pulmonary embolism 2002    Seizure disorder        Past Surgical History:   Procedure Laterality Date    left foot  with crushed injry         Review of patient's allergies indicates:   Allergen Reactions    Ciprofloxacin (bulk) Swelling       Medications:  Continuous Infusions:  Scheduled Meds:   heparin (porcine)  5,000 Units Subcutaneous Q8H    levETIRAcetam (Keppra) IV (PEDS and ADULTS)  500 mg Intravenous BID    mineral oil  1 enema Rectal Q12H    piperacillin-tazobactam (Zosyn) IV (PEDS and ADULTS) (extended infusion is not appropriate)  3.375 g Intravenous Q12H    polyethylene glycol  17 g Oral Daily    polyethylene glycol  238 g Oral Once    senna-docusate 8.6-50 mg  1 tablet Oral Daily     PRN Meds:  Current Facility-Administered Medications:     acetaminophen, 650 mg, Rectal, Q6H PRN    dextrose 50%, 12.5 g, Intravenous, PRN    dextrose 50%, 25 g, Intravenous, PRN    glucagon (human recombinant), 1 mg, Intramuscular, PRN    glucose, 16 g, Oral, PRN    glucose, 24 g, Oral, PRN    hydrALAZINE, 10 mg, Intravenous, Q6H PRN    insulin aspart U-100, 0-5 Units, Subcutaneous, QID (AC + HS) PRN    melatonin, 6 mg, Oral, Nightly PRN    naloxone, 0.02 mg, Intravenous, PRN    ondansetron, 4 mg, Intravenous, Q8H PRN    sodium chloride 0.9%, 10 mL, Intravenous, Q12H PRN    Family History       Problem Relation (Age of Onset)    Diabetes Mother          Tobacco Use    Smoking status: Never    Smokeless tobacco: Never   Substance and Sexual Activity    Alcohol use: No    Drug use: No    Sexual activity: Not on file       Review of Systems  Objective:     Vital Signs (Most Recent):  Temp: 98.5 °F (36.9 °C) (06/25/24 1915)  Pulse: (!) 45 (06/25/24 1915)  Resp: 18 (06/25/24 1915)  BP: 135/71 (06/25/24 1915)  SpO2: 95 %  (06/25/24 1915) Vital Signs (24h Range):  Temp:  [97.5 °F (36.4 °C)-98.6 °F (37 °C)] 98.5 °F (36.9 °C)  Pulse:  [44-74] 45  Resp:  [17-19] 18  SpO2:  [94 %-98 %] 95 %  BP: (128-150)/(59-77) 135/71     Weight: 70.8 kg (156 lb 1.6 oz)  Body mass index is 22.4 kg/m².       Physical Exam  Vitals reviewed.   Constitutional:       General: He is not in acute distress.     Appearance: He is ill-appearing.   HENT:      Head: Normocephalic and atraumatic.      Right Ear: External ear normal.      Left Ear: External ear normal.      Nose: Nose normal. No congestion.      Mouth/Throat:      Mouth: Mucous membranes are moist.      Pharynx: No oropharyngeal exudate.   Eyes:      General:         Right eye: No discharge.         Left eye: No discharge.      Extraocular Movements: Extraocular movements intact.   Cardiovascular:      Rate and Rhythm: Normal rate.   Pulmonary:      Effort: Pulmonary effort is normal. No respiratory distress.   Abdominal:      General: There is no distension.      Palpations: Abdomen is soft.      Tenderness: There is no abdominal tenderness.   Skin:     General: Skin is warm.   Neurological:      Mental Status: He is alert. Mental status is at baseline.            Review of Symptoms      Symptom Assessment (ESAS 0-10 Scale)  Unable to complete assessment due to Dementia         Pain Assessment in Advanced Demential Scale (PAINAD)   Breathing - Independent of vocalization:  0  Negative vocalization:  0  Facial expression:  0  Body language:  0  Consolability:  0  Total:  0    Performance Status:  50    Living Arrangements:  Lives with family and Lives in home    Psychosocial/Cultural:   See Palliative Psychosocial Note: No  **Primary  to Follow**  Palliative Care  Consult: No        Advance Care Planning   Advance Directives:   Do Not Resuscitate Status: Yes    Medical Power of : Yes      Decision Making:  Family answered questions and Patient unable to communicate  due to disease severity/cognitive impairment  Goals of Care: The family endorses that what is most important right now is to focus on comfort and QOL     Accordingly, we have decided that the best plan to meet the patient's goals includes enrolling in hospice care           Significant Labs: BMP:   Recent Labs   Lab 06/25/24  0538   GLU 78      K 4.3      CO2 28   BUN 34*   CREATININE 2.2*   CALCIUM 9.1   MG 1.9     CBC:   Recent Labs   Lab 06/24/24  0425 06/25/24  0538   WBC 6.26 7.09   HGB 9.2* 9.3*   HCT 28.5* 28.7*   * 132*     CBC:   Recent Labs   Lab 06/25/24  0538   WBC 7.09   HGB 9.3*   HCT 28.7*   MCV 92   *     BMP:  Recent Labs   Lab 06/25/24  0538   GLU 78      K 4.3      CO2 28   BUN 34*   CREATININE 2.2*   CALCIUM 9.1   MG 1.9     LFT:  Lab Results   Component Value Date    AST 23 06/25/2024    ALKPHOS 34 (L) 06/25/2024    BILITOT 0.7 06/25/2024     Albumin:   Albumin   Date Value Ref Range Status   06/25/2024 3.1 (L) 3.5 - 5.2 g/dL Final     Protein:   Total Protein   Date Value Ref Range Status   06/25/2024 5.5 (L) 6.0 - 8.4 g/dL Final     Lactic acid:   Lab Results   Component Value Date    LACTATE 1.1 06/23/2024    LACTATE 2.3 (HH) 11/30/2023       Significant Imaging: I have reviewed all pertinent imaging results/findings within the past 24 hours.

## 2024-06-27 LAB
GLUCOSE SERPL-MCNC: 100 MG/DL (ref 70–110)
GLUCOSE SERPL-MCNC: 116 MG/DL (ref 70–110)
GLUCOSE SERPL-MCNC: 120 MG/DL (ref 70–110)
GLUCOSE SERPL-MCNC: 92 MG/DL (ref 70–110)

## 2024-06-27 PROCEDURE — 97162 PT EVAL MOD COMPLEX 30 MIN: CPT

## 2024-06-27 PROCEDURE — 25000003 PHARM REV CODE 250: Performed by: STUDENT IN AN ORGANIZED HEALTH CARE EDUCATION/TRAINING PROGRAM

## 2024-06-27 PROCEDURE — 99233 SBSQ HOSP IP/OBS HIGH 50: CPT | Mod: ,,, | Performed by: INTERNAL MEDICINE

## 2024-06-27 PROCEDURE — 97166 OT EVAL MOD COMPLEX 45 MIN: CPT

## 2024-06-27 PROCEDURE — 12000002 HC ACUTE/MED SURGE SEMI-PRIVATE ROOM

## 2024-06-27 PROCEDURE — 97116 GAIT TRAINING THERAPY: CPT

## 2024-06-27 PROCEDURE — 63600175 PHARM REV CODE 636 W HCPCS: Performed by: STUDENT IN AN ORGANIZED HEALTH CARE EDUCATION/TRAINING PROGRAM

## 2024-06-27 PROCEDURE — 97535 SELF CARE MNGMENT TRAINING: CPT

## 2024-06-27 PROCEDURE — 63600175 PHARM REV CODE 636 W HCPCS: Performed by: INTERNAL MEDICINE

## 2024-06-27 RX ADMIN — LEVETIRACETAM 500 MG: 5 INJECTION INTRAVENOUS at 08:06

## 2024-06-27 RX ADMIN — HEPARIN SODIUM 5000 UNITS: 5000 INJECTION, SOLUTION INTRAVENOUS; SUBCUTANEOUS at 06:06

## 2024-06-27 RX ADMIN — HEPARIN SODIUM 5000 UNITS: 5000 INJECTION, SOLUTION INTRAVENOUS; SUBCUTANEOUS at 02:06

## 2024-06-27 RX ADMIN — POLYETHYLENE GLYCOL 3350 17 G: 17 POWDER, FOR SOLUTION ORAL at 09:06

## 2024-06-27 RX ADMIN — PIPERACILLIN SODIUM AND TAZOBACTAM SODIUM 3.38 G: 3; .375 INJECTION, POWDER, LYOPHILIZED, FOR SOLUTION INTRAVENOUS at 05:06

## 2024-06-27 RX ADMIN — LEVETIRACETAM 500 MG: 5 INJECTION INTRAVENOUS at 09:06

## 2024-06-27 RX ADMIN — PIPERACILLIN SODIUM AND TAZOBACTAM SODIUM 3.38 G: 3; .375 INJECTION, POWDER, LYOPHILIZED, FOR SOLUTION INTRAVENOUS at 06:06

## 2024-06-27 RX ADMIN — SENNOSIDES AND DOCUSATE SODIUM 1 TABLET: 50; 8.6 TABLET ORAL at 09:06

## 2024-06-27 RX ADMIN — HEPARIN SODIUM 5000 UNITS: 5000 INJECTION, SOLUTION INTRAVENOUS; SUBCUTANEOUS at 09:06

## 2024-06-27 NOTE — PROGRESS NOTES
[facility]  Palliative Medicine  Progress Note    Patient Name: Lavon Brandon  MRN: [id number]  Admission Date: 6/22/2024  Hospital Length of Stay: 3 days  Code Status: DNR   Attending Provider: Maik Medina MD  Consulting Provider: Chris Nesbitt MD  Primary Care Physician: Aristides Haynes MD  Principal Problem:Altered mental status    Patient information was obtained from patient, relative(s), past medical records, and primary team.      Assessment/Plan:     Palliative Care  Goals of care, counseling/discussion  I reviewed the patient's chart and discussed the case with the patient's team.      I examined Lavon Brandon at bedside.  The patient lacks capacity for complex medical decision-making.  I spoke with him and his daughter, Gabriela, at bedside.      She was accurately able to recall our previous conversation.  She was spoken with the hospice agency.  She was still contemplating hospice versus usual care.      We again discussed the paths forward.   If he was living a good quality of life at baseline, then discharge to SNF would be the best next steps.  If he was living a poor quality of life at baseline, then hospice is the best next step.   She understood.  She has some hesitation with hospice after speaking with the hospice representative.      Ultimately, I recommended they pursue SNF  given the concerns with hospice. I also recommended viewing these interventions as a trial.  If he was making progress towards a reasonable quality of life, great.  If at any time becomes apparent that he was not getting back to a reasonable quality of life then hospice is always an option.  They understood and agree with this recommendation.    I appreciate being involved.  Hope I have been helpful.            I will sign off. Please contact us if you have any additional questions.    Subjective:     Chief Complaint:   Chief Complaint   Patient presents with    Altered Mental Status     Altered mental  "status x 2 day with decreased appetite.        HPI:   Per admit H&P: "89-year-old man with history of Lewy body dementia, seizure disorder and hypertension that presents to the ED for evaluation of altered mental status.  Daughter discloses patient has not been sleeping for more than 24 hours, talking back to time, decreased appetite, and vomited 2 days ago.  Daughter discloses no fever or chills but patient was in the sun 2 days ago.  On presentation patient was afebrile but tachycardic to 124.  Labs remarkable for normocytic anemia, azotemia, elevated CPK and lactic acid.  CT head showed no acute abnormality.  Chest x-ray showed no infiltrate but there were evidence of distended bowel.  Patient received 1 L of IV fluid in the ED. hospital medicine consulted for admission for altered mental status."    Workup to this point has revealed UTI and constipation.  His constipation is resolving.  Infection seems resolving as well.  Encephalopathy is improving.  Given his age and comorbidities he continues to carry a guarded prognosis.  I have been asked to assist with goals of care.    Hospital Course:  No notes on file    Interval History:   Continues to improve.  Sitting in a chair this morning.  He was without complaints.  Per his daughter his mentation is near baseline.    Past Medical History:   Diagnosis Date    Bradyarrhythmia     CVA (cerebral infarction) 2006    Dementia     Depression     Hyperlipidemia     Hypertension     renal htn    Pulmonary embolism 2002    Seizure disorder        Past Surgical History:   Procedure Laterality Date    left foot  with crushed injry         Review of patient's allergies indicates:   Allergen Reactions    Ciprofloxacin (bulk) Swelling       Medications:  Continuous Infusions:  Scheduled Meds:   heparin (porcine)  5,000 Units Subcutaneous Q8H    levETIRAcetam (Keppra) IV (PEDS and ADULTS)  500 mg Intravenous BID    mineral oil  1 enema Rectal Q12H    piperacillin-tazobactam " (Zosyn) IV (PEDS and ADULTS) (extended infusion is not appropriate)  3.375 g Intravenous Q12H    polyethylene glycol  17 g Oral Daily    polyethylene glycol  238 g Oral Once    senna-docusate 8.6-50 mg  1 tablet Oral Daily     PRN Meds:  Current Facility-Administered Medications:     acetaminophen, 650 mg, Rectal, Q6H PRN    dextrose 50%, 12.5 g, Intravenous, PRN    dextrose 50%, 25 g, Intravenous, PRN    glucagon (human recombinant), 1 mg, Intramuscular, PRN    glucose, 16 g, Oral, PRN    glucose, 24 g, Oral, PRN    hydrALAZINE, 10 mg, Intravenous, Q6H PRN    insulin aspart U-100, 0-5 Units, Subcutaneous, QID (AC + HS) PRN    melatonin, 6 mg, Oral, Nightly PRN    naloxone, 0.02 mg, Intravenous, PRN    ondansetron, 4 mg, Intravenous, Q8H PRN    sodium chloride 0.9%, 10 mL, Intravenous, Q12H PRN    Family History       Problem Relation (Age of Onset)    Diabetes Mother          Tobacco Use    Smoking status: Never    Smokeless tobacco: Never   Substance and Sexual Activity    Alcohol use: No    Drug use: No    Sexual activity: Not on file       Review of Systems  Objective:     Vital Signs (Most Recent):  Temp: 97.7 °F (36.5 °C) (06/27/24 1130)  Pulse: (!) 56 (06/27/24 1130)  Resp: 18 (06/27/24 1130)  BP: (!) 155/67 (06/27/24 1130)  SpO2: 97 % (06/27/24 1130) Vital Signs (24h Range):  Temp:  [97.5 °F (36.4 °C)-98.4 °F (36.9 °C)] 97.7 °F (36.5 °C)  Pulse:  [46-62] 56  Resp:  [18-19] 18  SpO2:  [96 %-99 %] 97 %  BP: (131-180)/(54-79) 155/67     Weight: 70.8 kg (156 lb 1.6 oz)  Body mass index is 22.4 kg/m².       Physical Exam  Vitals reviewed.   Constitutional:       General: He is not in acute distress.     Appearance: He is ill-appearing.   HENT:      Head: Normocephalic and atraumatic.      Right Ear: External ear normal.      Left Ear: External ear normal.      Nose: Nose normal. No congestion.      Mouth/Throat:      Mouth: Mucous membranes are moist.      Pharynx: No oropharyngeal exudate.   Eyes:      General:   "       Right eye: No discharge.         Left eye: No discharge.      Extraocular Movements: Extraocular movements intact.   Cardiovascular:      Rate and Rhythm: Normal rate.   Pulmonary:      Effort: Pulmonary effort is normal. No respiratory distress.   Abdominal:      General: There is no distension.      Palpations: Abdomen is soft.      Tenderness: There is no abdominal tenderness.   Skin:     General: Skin is warm.   Neurological:      Mental Status: He is alert. Mental status is at baseline.            Review of Symptoms      Symptom Assessment (ESAS 0-10 Scale)  Pain:  0  Dyspnea:  0  Anxiety:  0  Nausea:  0  Depression:  0  Anorexia:  0  Fatigue:  0  Insomnia:  0  Restlessness:  0  Agitation:  0  Unable to complete assessment due to Dementia         Pain Assessment in Advanced Demential Scale (PAINAD)   Breathing - Independent of vocalization:  0  Negative vocalization:  0  Facial expression:  0  Body language:  0  Consolability:  0  Total:  0    Performance Status:  50    Living Arrangements:  Lives with family and Lives in home    Psychosocial/Cultural:   See Palliative Psychosocial Note: No  **Primary  to Follow**  Palliative Care  Consult: No        Advance Care Planning  Advance Directives:   Do Not Resuscitate Status: Yes    Medical Power of : Yes      Decision Making:  Family answered questions and Patient unable to communicate due to disease severity/cognitive impairment  Goals of Care: The family endorses that what is most important right now is to focus on comfort and QOL     Accordingly, we have decided that the best plan to meet the patient's goals includes enrolling in hospice care           Significant Labs: BMP:   No results for input(s): "GLU", "NA", "K", "CL", "CO2", "BUN", "CREATININE", "CALCIUM", "MG" in the last 48 hours.    CBC:   No results for input(s): "WBC", "HGB", "HCT", "PLT" in the last 48 hours.    CBC:   Recent Labs   Lab 06/25/24  0538   WBC " "7.09   HGB 9.3*   HCT 28.7*   MCV 92   *     BMP:  No results for input(s): "GLU", "NA", "K", "CL", "CO2", "BUN", "CREATININE", "CALCIUM", "MG" in the last 24 hours.    LFT:  Lab Results   Component Value Date    AST 23 06/25/2024    ALKPHOS 34 (L) 06/25/2024    BILITOT 0.7 06/25/2024     Albumin:   Albumin   Date Value Ref Range Status   06/25/2024 3.1 (L) 3.5 - 5.2 g/dL Final     Protein:   Total Protein   Date Value Ref Range Status   06/25/2024 5.5 (L) 6.0 - 8.4 g/dL Final     Lactic acid:   Lab Results   Component Value Date    LACTATE 1.1 06/23/2024    LACTATE 2.3 (HH) 11/30/2023       Significant Imaging: I have reviewed all pertinent imaging results/findings within the past 24 hours.    > 55 min visit spent in chart review, face to face discussion of goals of care,  symptom assessment, coordination of care and emotional support.    Chris Nesbitt MD  Palliative Medicine  [facility]                "

## 2024-06-27 NOTE — PT/OT/SLP EVAL
Physical Therapy Evaluation    Patient Name:  Lavon Brandon   MRN:  [id number]    Recommendations:     Discharge Recommendations: Moderate Intensity Therapy   Discharge Equipment Recommendations: none   Barriers to discharge:  medical status    Assessment:     Lavon Brandon is a 89 y.o. male admitted with a medical diagnosis of Altered mental status.  He presents with the following impairments/functional limitations: weakness, impaired endurance, impaired self care skills, impaired functional mobility, gait instability, impaired balance, impaired cognition, decreased upper extremity function, decreased lower extremity function, impaired cardiopulmonary response to activity Pt found up in chair, has recently finished working with OT and agreeable to working with PT. Pt A & O x  3 and has the following co-morbidities: lewey body dementia, seizure disorder, HTN, MATA and DM type II.  Pt tolerated session fair and required modA transfers, Thompson gait x15' with use of RW for safe mobilization during session today. Pt would benefit from acute PT during hospitalization to increase strength, endurance and safety with mobility and would benefit from moderate intensity therapy prior to discharge home. .    Rehab Prognosis: Fair; patient would benefit from acute skilled PT services to address these deficits and reach maximum level of function.    Recent Surgery: * No surgery found *      Plan:     During this hospitalization, patient to be seen 5 x/week to address the identified rehab impairments via gait training, therapeutic activities, therapeutic exercises and progress toward the following goals:    Plan of Care Expires:       Subjective     Chief Complaint: weakness  Patient/Family Comments/goals: get stronger  Pain/Comfort:  Pain Rating 1: 0/10    Patients cultural, spiritual, [employer] conflicts given the current situation:      Living Environment:  Pt lives with daughter, has sitter 13 hours/week who is present during  assessment today.  Prior to admission, patients level of function was needed some assist and supervision for mobility and ADLs.  Equipment used at home: walker, rolling, shower chair, hospital bed, bedside commode.  DME owned (not currently used): none.  Upon discharge, patient will have assistance from family and caregiver.    Objective:     Communicated with RNLisa prior to session.  Patient found up in chair with peripheral IV, chair check, telemetry  upon PT entry to room.    General Precautions: Standard, fall  Orthopedic Precautions:    Braces:    Respiratory Status: Room air    Exams:  Cognitive Exam:  Patient is oriented to Person and Place  Postural Exam:  Patient presented with the following abnormalities:    -       Rounded shoulders  -       Forward head  RLE Strength: 4/5  LLE Strength: 4/5    Functional Mobility:  Transfers:     Sit to Stand:  moderate assistance and maximal assistance with rolling walker  Gait: x15' w/Thompson and use of RW, Vcs for sequencing, and safety, tactile postural cues      AM-PAC 6 CLICK MOBILITY  Total Score:14       Treatment & Education:  Pt was educated on the following: call light use, importance of OOB activity and functional mobility to negate the negative effects of prolonged bed rest during this hospitalization, safe transfers/ambulation and discharge planning recommendations/options.     Patient left up in chair with all lines intact, call button in reach, chair alarm on, RN notified, and multiple family members and caregiver  present.    GOALS:   Multidisciplinary Problems       Physical Therapy Goals          Problem: Physical Therapy    Goal Priority Disciplines Outcome Goal Variances Interventions   Physical Therapy Goal     PT, PT/OT Progressing     Description: 1. Supine to sit with Stand-by Assistance  2. Sit to stand transfer with Stand-by Assistance  3.. Bed to chair transfer with Supervision using Rolling Walker  4. Gait  x 100 feet with Minimal  Assistance using Rolling Walker.                          History:     Past Medical History:   Diagnosis Date    Bradyarrhythmia     CVA (cerebral infarction) 2006    Dementia     Depression     Hyperlipidemia     Hypertension     renal htn    Pulmonary embolism 2002    Seizure disorder        Past Surgical History:   Procedure Laterality Date    left foot  with crushed injry         Time Tracking:     PT Received On: 06/27/24  PT Start Time: 1031     PT Stop Time: 1054  PT Total Time (min): 23 min     Billable Minutes: Evaluation 10 and Gait Training 13      06/27/2024

## 2024-06-27 NOTE — ASSESSMENT & PLAN NOTE
I reviewed the patient's chart and discussed the case with the patient's team.      I examined Lavon Brandon at bedside.  The patient lacks capacity for complex medical decision-making.  I spoke with him and his daughter, Gabriela, at bedside.      She was accurately able to recall our previous conversation.  She was spoken with the hospice agency.  She was still contemplating hospice versus usual care.      We again discussed the paths forward.   If he was living a good quality of life at baseline, then discharge to SNF would be the best next steps.  If he was living a poor quality of life at baseline, then hospice is the best next step.   She understood.  She has some hesitation with hospice after speaking with the hospice representative.      Ultimately, I recommended they pursue SNF  given the concerns with hospice. I also recommended viewing these interventions as a trial.  If he was making progress towards a reasonable quality of life, great.  If at any time becomes apparent that he was not getting back to a reasonable quality of life then hospice is always an option.  They understood and agree with this recommendation.    I appreciate being involved.  Hope I have been helpful.

## 2024-06-27 NOTE — PROGRESS NOTES
[facility]  Progress Note    Patient Name: Lavon Brandon  MRN: [id number]  Patient Class: IP- Inpatient   Admission Date: 6/22/2024  Length of Stay: 3 days  Attending Physician: Maik Medina MD  Primary Care Provider: Aristides Haynes MD        Subjective:     Principal Problem:Altered mental status        HPI:  89-year-old man with history of Lewy body dementia, seizure disorder and hypertension that presents to the ED for evaluation of altered mental status.  Daughter discloses patient has not been sleeping for more than 24 hours, talking back to time, decreased appetite, and vomited 2 days ago.  Daughter discloses no fever or chills but patient was in the sun 2 days ago.  On presentation patient was afebrile but tachycardic to 124.  Labs remarkable for normocytic anemia, azotemia, elevated CPK and lactic acid.  CT head showed no acute abnormality.  Chest x-ray showed no infiltrate but there were evidence of distended bowel.  Patient received 1 L of IV fluid in the ED. hospital medicine consulted for admission for altered mental status.    Overview/Hospital Course:  No notes on file    Interval History:  Patient was seen and examined follow-up during rounds.  He is sitting up in chair, conversing with staff, physical therapy is in the room to work with patient and provide evaluation for skilled nursing placement.  No acute events, vitals stable, patient is afebrile, tolerating diet.    Review of Systems   Unable to perform ROS: Dementia (Altered mental status)     Objective:     Vital Signs (Most Recent):  Temp: 97.7 °F (36.5 °C) (06/27/24 1130)  Pulse: (!) 56 (06/27/24 1130)  Resp: 18 (06/27/24 1130)  BP: (!) 155/67 (06/27/24 1130)  SpO2: 97 % (06/27/24 1130) Vital Signs (24h Range):  Temp:  [97.5 °F (36.4 °C)-98.4 °F (36.9 °C)] 97.7 °F (36.5 °C)  Pulse:  [46-62] 56  Resp:  [18-19] 18  SpO2:  [96 %-99 %] 97 %  BP: (131-180)/(54-79) 155/67     Weight: 70.8 kg (156 lb 1.6  "oz)  Body mass index is 22.4 kg/m².    Intake/Output Summary (Last 24 hours) at 6/27/2024 1311  Last data filed at 6/27/2024 0952  Gross per 24 hour   Intake 1560 ml   Output 900 ml   Net 660 ml         Physical Exam  Vitals reviewed.   Constitutional:       General: He is not in acute distress.     Appearance: He is ill-appearing.   HENT:      Head: Normocephalic and atraumatic.      Nose: Nose normal.      Mouth/Throat:      Mouth: Mucous membranes are moist.      Pharynx: Oropharynx is clear.   Eyes:      Conjunctiva/sclera: Conjunctivae normal.   Cardiovascular:      Rate and Rhythm: Regular rhythm. Tachycardia present.      Pulses: Normal pulses.      Heart sounds: No murmur heard.  Pulmonary:      Effort: Pulmonary effort is normal. No respiratory distress.      Breath sounds: Normal breath sounds. No wheezing or rhonchi.   Abdominal:      General: There is no distension.      Palpations: Abdomen is soft.      Tenderness: There is no abdominal tenderness.   Musculoskeletal:      Right lower leg: No edema.      Left lower leg: No edema.   Neurological:      General: No focal deficit present.      Mental Status: He is alert. He is disoriented.   Psychiatric:      Comments: No agitation noted             Significant Labs: All pertinent labs within the past 24 hours have been reviewed.  BMP: No results for input(s): "GLU", "NA", "K", "CL", "CO2", "BUN", "CREATININE", "CALCIUM", "MG" in the last 48 hours.  CBC: No results for input(s): "WBC", "HGB", "HCT", "PLT" in the last 48 hours.    Significant Imaging: I have reviewed all pertinent imaging results/findings within the past 24 hours.    Assessment/Plan:      * Altered mental status  Unclear etiology  ? Infection (UTI) vs worsening underlying dementia  Check urinalysis with reflex culture to rule out UTI  Check TSH  Follow blood culture  CT chest abdomen and pelvis    ACP (advance care planning)  Discussed with daughter Gabriela.  Patient is DNI/DNR      Seizure " "disorder  Continue home dose of Keppra      MATA (acute kidney injury)  MATA on CKD  Baseline creatinine of 1.9  Likely due to dehydration  CT chest abdomen and pelvis to rule out obstructive uropathy  Renal dose medication  Avoid nephrotoxic medication  Continue IVF  Monitor renal function    Type 2 diabetes mellitus without complication, without long-term current use of insulin  Patient's FSGs are controlled on current medication regimen.  Last A1c reviewed-   Lab Results   Component Value Date    HGBA1C 5.2 11/08/2023     Most recent fingerstick glucose reviewed- No results for input(s): "POCTGLUCOSE" in the last 24 hours.  Current correctional scale  Low  Maintain anti-hyperglycemic dose as follows-   Antihyperglycemics (From admission, onward)      None          Hold Oral hypoglycemics while patient is in the hospital.    Dementia with behavioral disturbance  History of Lewy body dementia  Continue supportive care  Fall precautions  Melatonin at night for sleep      VTE Risk Mitigation (From admission, onward)           Ordered     heparin (porcine) injection 5,000 Units  Every 8 hours         06/23/24 0030     IP VTE HIGH RISK PATIENT  Once         06/23/24 0030     Place sequential compression device  Until discontinued         06/23/24 0030                    Discharge Planning   JOANNE: 6/27/2024     Code Status: DNR   Is the patient medically ready for discharge?:     Reason for patient still in hospital (select all that apply): Patient trending condition  Discharge Plan A: Hospice/home   Discharge Delays: (!) Patient and Family Barriers              Maik Medina MD  Department of Hospital Medicine   [facility] hospital    "

## 2024-06-27 NOTE — PT/OT/SLP EVAL
Occupational Therapy   Evaluation    Name: Lavon Brandon  MRN: [id number]  Admitting Diagnosis: Altered mental status  Recent Surgery: * No surgery found *      Recommendations:     Discharge Recommendations: Moderate Intensity Therapy  Discharge Equipment Recommendations:  none  Barriers to discharge:  None    Assessment:     Lavon Brandon is a 89 y.o. male with a medical diagnosis of Altered mental status.  Patient alert and oriented to person only. Patient participatory and able to follow commands well. Patient sat EOB requiring Min A. Noted good static sitting balance at EOB. Patient stood and transferred requiring Max A using RW. Patient able to perform grooming tasks in sitting with SBA.  Performance deficits affecting function: weakness, impaired endurance, impaired self care skills, impaired functional mobility, gait instability, impaired balance, impaired cognition, decreased upper extremity function, decreased lower extremity function, decreased safety awareness, decreased ROM.      Rehab Prognosis: Fair; patient would benefit from acute skilled OT services to address these deficits and reach maximum level of function.       Plan:     Patient to be seen 3 x/week to address the above listed problems via self-care/home management, therapeutic activities, therapeutic exercises  Plan of Care Expires: 07/25/24  Plan of Care Reviewed with: patient, caregiver    Subjective     Chief Complaint: none  Patient/Family Comments/goals: none    Occupational Profile:  Living Environment: Patient lives with daughter.   Previous level of function: Patient required assist with dressing/bathing/toileting tasks. Patient was able to feed himself and groom in sitting position. Patient was ambulatory short distances at home with RW.   Equipment Used at Home: walker, rolling, shower chair, hospital bed, bedside commode  Assistance upon Discharge: Patient will receive assistance from family and caregiver (who provides 13 hrs/wk  of care at home)    Pain/Comfort:  Pain Rating 1: 0/10  Pain Rating Post-Intervention 1: 0/10    Patients cultural, spiritual, Synagogue conflicts given the current situation: no    Objective:     Communicated with: nurse Gonzalez prior to session.  Patient found HOB elevated with peripheral IV, Condom Catheter upon OT entry to room.    General Precautions: Standard, fall  Orthopedic Precautions: N/A  Braces: N/A  Respiratory Status: Room air    Occupational Performance:    Bed Mobility:    Patient completed Scooting/Bridging with minimum assistance  Patient completed Supine to Sit with minimum assistance    Functional Mobility/Transfers:  Patient completed Sit <> Stand Transfer with minimum assistance and moderate assistance  with  rolling walker   Patient completed Bed <> Chair Transfer using Stand Pivot technique with moderate assistance and maximal assistance with rolling walker  Functional Mobility: Noted unsteadiness with initial standing, exhibiting posterior leaning. Patient required Mod A to control descent into chair.    Activities of Daily Living:  Grooming: stand by assistance with all grooming tasks in chair  Lower Body Dressing: maximal assistance to don/doff socks while supine in bed  Toileting: dependence with condom cath and diaper in place    Cognitive/Visual Perceptual:  Cognitive/Psychosocial Skills:     -       Oriented to: name only   -       Follows Commands/attention:Follows one-step commands  -       Communication: clear/fluent  -       Safety awareness/insight to disability: impaired   -       Mood/Affect/Coping skills/emotional control: Cooperative  Visual/Perceptual:      -Intact     Physical Exam:  Postural examination/scapula alignment:    -       Rounded shoulders  -       Forward head  Upper Extremity Range of Motion:     -       Right Upper Extremity: WFL  -       Left Upper Extremity: WFL  Upper Extremity Strength:    -       Right Upper Extremity: WFL  -       Left Upper Extremity:  WFL   Strength:    -       Right Upper Extremity: WFL  -       Left Upper Extremity: WFL  Fine Motor Coordination:    -       Intact  Gross motor coordination:   WFL in BUE    AMPAC 6 Click ADL:  AMPAC Total Score: 15    Treatment & Education:  OT ed pt on OT role & POC as well as discharge recommendations.  OT ed patient on safety with walker use for functional mobility with cues for hand placement & sequencing.   Patient instructed on use of call light and importance of sitting in chair rather than bed to improve overall health.    Patient left up in chair with all lines intact, call button in reach, chair alarm on, and caregiver present    GOALS:   Multidisciplinary Problems       Occupational Therapy Goals          Problem: Occupational Therapy    Goal Priority Disciplines Outcome Interventions   Occupational Therapy Goal     OT, PT/OT Progressing    Description: Goals to be met by: 7/25/2024     Patient will increase functional independence with ADLs by performing:    Grooming while seated with Supervision.  Supine to sit with Supervision.  Toilet transfer to toilet with Contact Guard Assistance.  Upper extremity exercise program, with assistance as needed.                         History:     Past Medical History:   Diagnosis Date    Bradyarrhythmia     CVA (cerebral infarction) 2006    Dementia     Depression     Hyperlipidemia     Hypertension     renal htn    Pulmonary embolism 2002    Seizure disorder          Past Surgical History:   Procedure Laterality Date    left foot  with crushed injry         Time Tracking:     OT Date of Treatment: 06/27/24  OT Start Time: 0955  OT Stop Time: 1020  OT Total Time (min): 25 min    Billable Minutes:Evaluation 10  Self Care/Home Management 15    6/27/2024

## 2024-06-27 NOTE — PLAN OF CARE
DOM received call from More[facility] with Hospice who requested update.  DOM informed patient is pending PT/OT recommendations prior to patient's family making decision.       06/27/24 1309   Post-Acute Status   Post-Acute Authorization Placement;Hospice   Post-Acute Placement Status Pending medical clearance/testing   Hospice Status Pending medical clearance/testing

## 2024-06-27 NOTE — SUBJECTIVE & OBJECTIVE
Interval History:  Patient was seen and examined follow-up during rounds.  He is sitting up in chair, conversing with staff, physical therapy is in the room to work with patient and provide evaluation for skilled nursing placement.  No acute events, vitals stable, patient is afebrile, tolerating diet.    Review of Systems   Unable to perform ROS: Dementia (Altered mental status)     Objective:     Vital Signs (Most Recent):  Temp: 97.7 °F (36.5 °C) (06/27/24 1130)  Pulse: (!) 56 (06/27/24 1130)  Resp: 18 (06/27/24 1130)  BP: (!) 155/67 (06/27/24 1130)  SpO2: 97 % (06/27/24 1130) Vital Signs (24h Range):  Temp:  [97.5 °F (36.4 °C)-98.4 °F (36.9 °C)] 97.7 °F (36.5 °C)  Pulse:  [46-62] 56  Resp:  [18-19] 18  SpO2:  [96 %-99 %] 97 %  BP: (131-180)/(54-79) 155/67     Weight: 70.8 kg (156 lb 1.6 oz)  Body mass index is 22.4 kg/m².    Intake/Output Summary (Last 24 hours) at 6/27/2024 1311  Last data filed at 6/27/2024 0952  Gross per 24 hour   Intake 1560 ml   Output 900 ml   Net 660 ml         Physical Exam  Vitals reviewed.   Constitutional:       General: He is not in acute distress.     Appearance: He is ill-appearing.   HENT:      Head: Normocephalic and atraumatic.      Nose: Nose normal.      Mouth/Throat:      Mouth: Mucous membranes are moist.      Pharynx: Oropharynx is clear.   Eyes:      Conjunctiva/sclera: Conjunctivae normal.   Cardiovascular:      Rate and Rhythm: Regular rhythm. Tachycardia present.      Pulses: Normal pulses.      Heart sounds: No murmur heard.  Pulmonary:      Effort: Pulmonary effort is normal. No respiratory distress.      Breath sounds: Normal breath sounds. No wheezing or rhonchi.   Abdominal:      General: There is no distension.      Palpations: Abdomen is soft.      Tenderness: There is no abdominal tenderness.   Musculoskeletal:      Right lower leg: No edema.      Left lower leg: No edema.   Neurological:      General: No focal deficit present.      Mental Status: He is alert.  "He is disoriented.   Psychiatric:      Comments: No agitation noted             Significant Labs: All pertinent labs within the past 24 hours have been reviewed.  BMP: No results for input(s): "GLU", "NA", "K", "CL", "CO2", "BUN", "CREATININE", "CALCIUM", "MG" in the last 48 hours.  CBC: No results for input(s): "WBC", "HGB", "HCT", "PLT" in the last 48 hours.    Significant Imaging: I have reviewed all pertinent imaging results/findings within the past 24 hours.  "

## 2024-06-27 NOTE — SUBJECTIVE & OBJECTIVE
Interval History:   Continues to improve.  Sitting in a chair this morning.  He was without complaints.  Per his daughter his mentation is near baseline.    Past Medical History:   Diagnosis Date    Bradyarrhythmia     CVA (cerebral infarction) 2006    Dementia     Depression     Hyperlipidemia     Hypertension     renal htn    Pulmonary embolism 2002    Seizure disorder        Past Surgical History:   Procedure Laterality Date    left foot  with crushed injry         Review of patient's allergies indicates:   Allergen Reactions    Ciprofloxacin (bulk) Swelling       Medications:  Continuous Infusions:  Scheduled Meds:   heparin (porcine)  5,000 Units Subcutaneous Q8H    levETIRAcetam (Keppra) IV (PEDS and ADULTS)  500 mg Intravenous BID    mineral oil  1 enema Rectal Q12H    piperacillin-tazobactam (Zosyn) IV (PEDS and ADULTS) (extended infusion is not appropriate)  3.375 g Intravenous Q12H    polyethylene glycol  17 g Oral Daily    polyethylene glycol  238 g Oral Once    senna-docusate 8.6-50 mg  1 tablet Oral Daily     PRN Meds:  Current Facility-Administered Medications:     acetaminophen, 650 mg, Rectal, Q6H PRN    dextrose 50%, 12.5 g, Intravenous, PRN    dextrose 50%, 25 g, Intravenous, PRN    glucagon (human recombinant), 1 mg, Intramuscular, PRN    glucose, 16 g, Oral, PRN    glucose, 24 g, Oral, PRN    hydrALAZINE, 10 mg, Intravenous, Q6H PRN    insulin aspart U-100, 0-5 Units, Subcutaneous, QID (AC + HS) PRN    melatonin, 6 mg, Oral, Nightly PRN    naloxone, 0.02 mg, Intravenous, PRN    ondansetron, 4 mg, Intravenous, Q8H PRN    sodium chloride 0.9%, 10 mL, Intravenous, Q12H PRN    Family History       Problem Relation (Age of Onset)    Diabetes Mother          Tobacco Use    Smoking status: Never    Smokeless tobacco: Never   Substance and Sexual Activity    Alcohol use: No    Drug use: No    Sexual activity: Not on file       Review of Systems  Objective:     Vital Signs (Most Recent):  Temp: 97.7 °F  (36.5 °C) (06/27/24 1130)  Pulse: (!) 56 (06/27/24 1130)  Resp: 18 (06/27/24 1130)  BP: (!) 155/67 (06/27/24 1130)  SpO2: 97 % (06/27/24 1130) Vital Signs (24h Range):  Temp:  [97.5 °F (36.4 °C)-98.4 °F (36.9 °C)] 97.7 °F (36.5 °C)  Pulse:  [46-62] 56  Resp:  [18-19] 18  SpO2:  [96 %-99 %] 97 %  BP: (131-180)/(54-79) 155/67     Weight: 70.8 kg (156 lb 1.6 oz)  Body mass index is 22.4 kg/m².       Physical Exam  Vitals reviewed.   Constitutional:       General: He is not in acute distress.     Appearance: He is ill-appearing.   HENT:      Head: Normocephalic and atraumatic.      Right Ear: External ear normal.      Left Ear: External ear normal.      Nose: Nose normal. No congestion.      Mouth/Throat:      Mouth: Mucous membranes are moist.      Pharynx: No oropharyngeal exudate.   Eyes:      General:         Right eye: No discharge.         Left eye: No discharge.      Extraocular Movements: Extraocular movements intact.   Cardiovascular:      Rate and Rhythm: Normal rate.   Pulmonary:      Effort: Pulmonary effort is normal. No respiratory distress.   Abdominal:      General: There is no distension.      Palpations: Abdomen is soft.      Tenderness: There is no abdominal tenderness.   Skin:     General: Skin is warm.   Neurological:      Mental Status: He is alert. Mental status is at baseline.            Review of Symptoms      Symptom Assessment (ESAS 0-10 Scale)  Pain:  0  Dyspnea:  0  Anxiety:  0  Nausea:  0  Depression:  0  Anorexia:  0  Fatigue:  0  Insomnia:  0  Restlessness:  0  Agitation:  0  Unable to complete assessment due to Dementia         Pain Assessment in Advanced Demential Scale (PAINAD)   Breathing - Independent of vocalization:  0  Negative vocalization:  0  Facial expression:  0  Body language:  0  Consolability:  0  Total:  0    Performance Status:  50    Living Arrangements:  Lives with family and Lives in home    Psychosocial/Cultural:   See Palliative Psychosocial Note: No  **Primary  " to Follow**  Palliative Care  Consult: No        Advance Care Planning   Advance Directives:   Do Not Resuscitate Status: Yes    Medical Power of : Yes      Decision Making:  Family answered questions and Patient unable to communicate due to disease severity/cognitive impairment  Goals of Care: The family endorses that what is most important right now is to focus on comfort and QOL     Accordingly, we have decided that the best plan to meet the patient's goals includes enrolling in hospice care           Significant Labs: BMP:   No results for input(s): "GLU", "NA", "K", "CL", "CO2", "BUN", "CREATININE", "CALCIUM", "MG" in the last 48 hours.    CBC:   No results for input(s): "WBC", "HGB", "HCT", "PLT" in the last 48 hours.    CBC:   Recent Labs   Lab 06/25/24  0538   WBC 7.09   HGB 9.3*   HCT 28.7*   MCV 92   *     BMP:  No results for input(s): "GLU", "NA", "K", "CL", "CO2", "BUN", "CREATININE", "CALCIUM", "MG" in the last 24 hours.    LFT:  Lab Results   Component Value Date    AST 23 06/25/2024    ALKPHOS 34 (L) 06/25/2024    BILITOT 0.7 06/25/2024     Albumin:   Albumin   Date Value Ref Range Status   06/25/2024 3.1 (L) 3.5 - 5.2 g/dL Final     Protein:   Total Protein   Date Value Ref Range Status   06/25/2024 5.5 (L) 6.0 - 8.4 g/dL Final     Lactic acid:   Lab Results   Component Value Date    LACTATE 1.1 06/23/2024    LACTATE 2.3 (HH) 11/30/2023       Significant Imaging: I have reviewed all pertinent imaging results/findings within the past 24 hours.    "

## 2024-06-27 NOTE — PLAN OF CARE
DOM met with patient and patient's daughter Gabriela Hammond at bedside regarding discharge planning needs.  Patient and daughter in agreement with SNF placement [facility], [facility], and 3rd choice [facility].  DOM sent patient information to listed facilities via [employer] system.    LOCET called in to [facility] [phone] and [facility] faxed to [phone] for state approval. Fax confirmation received.        06/27/24 1421   Post-Acute Status   Post-Acute Authorization Placement   Post-Acute Placement Status Referrals Sent   Hospice Status Discharge Plan Changed   Patient choice form signed by patient/caregiver List with quality metrics by geographic area provided   Discharge Plan   Discharge Plan A Skilled Nursing Facility   Discharge Plan B Home Health

## 2024-06-27 NOTE — PLAN OF CARE
Problem: Occupational Therapy  Goal: Occupational Therapy Goal  Description: Goals to be met by: 7/25/2024     Patient will increase functional independence with ADLs by performing:    Grooming while seated with Supervision.  Supine to sit with Supervision.  Toilet transfer to toilet with Contact Guard Assistance.  Upper extremity exercise program, with assistance as needed.    Outcome: Progressing

## 2024-06-28 LAB
BACTERIA BLD CULT: NORMAL
GLUCOSE SERPL-MCNC: 106 MG/DL (ref 70–110)
GLUCOSE SERPL-MCNC: 114 MG/DL (ref 70–110)
GLUCOSE SERPL-MCNC: 135 MG/DL (ref 70–110)
GLUCOSE SERPL-MCNC: 156 MG/DL (ref 70–110)

## 2024-06-28 PROCEDURE — 25000003 PHARM REV CODE 250: Performed by: STUDENT IN AN ORGANIZED HEALTH CARE EDUCATION/TRAINING PROGRAM

## 2024-06-28 PROCEDURE — 25000003 PHARM REV CODE 250: Performed by: INTERNAL MEDICINE

## 2024-06-28 PROCEDURE — 63600175 PHARM REV CODE 636 W HCPCS: Performed by: INTERNAL MEDICINE

## 2024-06-28 PROCEDURE — 12000002 HC ACUTE/MED SURGE SEMI-PRIVATE ROOM

## 2024-06-28 PROCEDURE — 63600175 PHARM REV CODE 636 W HCPCS: Performed by: STUDENT IN AN ORGANIZED HEALTH CARE EDUCATION/TRAINING PROGRAM

## 2024-06-28 PROCEDURE — 97530 THERAPEUTIC ACTIVITIES: CPT | Mod: CQ

## 2024-06-28 RX ADMIN — HEPARIN SODIUM 5000 UNITS: 5000 INJECTION, SOLUTION INTRAVENOUS; SUBCUTANEOUS at 06:06

## 2024-06-28 RX ADMIN — LEVETIRACETAM 500 MG: 5 INJECTION INTRAVENOUS at 08:06

## 2024-06-28 RX ADMIN — Medication 6 MG: at 08:06

## 2024-06-28 RX ADMIN — MINERAL OIL 1 ENEMA: 100 ENEMA RECTAL at 09:06

## 2024-06-28 RX ADMIN — HEPARIN SODIUM 5000 UNITS: 5000 INJECTION, SOLUTION INTRAVENOUS; SUBCUTANEOUS at 01:06

## 2024-06-28 RX ADMIN — HEPARIN SODIUM 5000 UNITS: 5000 INJECTION, SOLUTION INTRAVENOUS; SUBCUTANEOUS at 08:06

## 2024-06-28 RX ADMIN — POLYETHYLENE GLYCOL 3350 17 G: 17 POWDER, FOR SOLUTION ORAL at 09:06

## 2024-06-28 RX ADMIN — LEVETIRACETAM 500 MG: 5 INJECTION INTRAVENOUS at 09:06

## 2024-06-28 RX ADMIN — PIPERACILLIN SODIUM AND TAZOBACTAM SODIUM 3.38 G: 3; .375 INJECTION, POWDER, LYOPHILIZED, FOR SOLUTION INTRAVENOUS at 04:06

## 2024-06-28 RX ADMIN — SENNOSIDES AND DOCUSATE SODIUM 1 TABLET: 50; 8.6 TABLET ORAL at 09:06

## 2024-06-28 RX ADMIN — PIPERACILLIN SODIUM AND TAZOBACTAM SODIUM 3.38 G: 3; .375 INJECTION, POWDER, LYOPHILIZED, FOR SOLUTION INTRAVENOUS at 06:06

## 2024-06-28 NOTE — PT/OT/SLP PROGRESS
Occupational Therapy      Patient Name:  Lavon Brandon   MRN:  [id number]    Patient not seen today secondary to  (pt unavailable x 2 attempts: first attempt eating lunch and second attempt, working with PT.). Will follow-up next service date.    6/28/2024

## 2024-06-28 NOTE — PT/OT/SLP PROGRESS
Physical Therapy Treatment    Patient Name:  Lavon Brandon   MRN:  [id number]    Recommendations:     Discharge Recommendations: Moderate Intensity Therapy  Discharge Equipment Recommendations: none  Barriers to discharge:  Medical status    Assessment:     Lavon Brandon is a 89 y.o. male admitted with a medical diagnosis of Altered mental status.  He presents with the following impairments/functional limitations: weakness, impaired endurance, impaired self care skills, impaired functional mobility, gait instability, impaired balance, decreased safety awareness, impaired cardiopulmonary response to activity . Pt with family and sitter at bed side. Pt with both hands closed reporting that he had pills in his hands. He was reassured by the sitter that there were no pills in his hands. Daughter reported this is how he gets from time to time. Pt mobilized to eob requiring mod a followed by sit to stand t/f to rw. Due to BM, the aid asked to return the pt to bed so the aids could perform hygiene.     Rehab Prognosis: Fair; patient would benefit from acute skilled PT services to address these deficits and reach maximum level of function.    Recent Surgery: * No surgery found *      Plan:     During this hospitalization, patient to be seen 5 x/week to address the identified rehab impairments via gait training, therapeutic activities, therapeutic exercises and progress toward the following goals:    Plan of Care Expires:       Subjective     Chief Complaint: None stated  Patient/Family Comments/goals: Pt's daughter requested information on pt's d/c.  Pain/Comfort:  Pain Rating 1: 0/10  Pain Rating Post-Intervention 1: 0/10      Objective:     Communicated with RN prior to session.  Patient found HOB elevated with bed alarm, peripheral IV, santo catheter, telemetry upon PT entry to room.     General Precautions: Standard, fall  Orthopedic Precautions:    Braces:    Respiratory Status: Room air     Functional  Mobility:  Bed Mobility:     Supine to Sit: moderate assistance  Sit to Supine: moderate assistance  Transfers:     Sit to Stand:  moderate assistance with rolling walker      AM-PAC 6 CLICK MOBILITY  Turning over in bed (including adjusting bedclothes, sheets and blankets)?: 2  Sitting down on and standing up from a chair with arms (e.g., wheelchair, bedside commode, etc.): 2  Moving from lying on back to sitting on the side of the bed?: 2  Moving to and from a bed to a chair (including a wheelchair)?: 2  Need to walk in hospital room?: 2  Climbing 3-5 steps with a railing?: 1  Basic Mobility Total Score: 11       Treatment & Education:  Pt was educated on the following: importance of OOB activity and functional mobility to negate the negative effects of prolonged bed rest during this hospitalization, safe transfers/ambulation and discharge planning recommendations/options.     Patient left HOB elevated with all lines intact, call button in reach, and family present..    GOALS:   Multidisciplinary Problems       Physical Therapy Goals          Problem: Physical Therapy    Goal Priority Disciplines Outcome Goal Variances Interventions   Physical Therapy Goal     PT, PT/OT Progressing     Description: 1. Supine to sit with Stand-by Assistance  2. Sit to stand transfer with Stand-by Assistance  3.. Bed to chair transfer with Supervision using Rolling Walker  4. Gait  x 100 feet with Minimal Assistance using Rolling Walker.                          Time Tracking:     PT Received On: 06/28/24  PT Start Time: 1456     PT Stop Time: 1507  PT Total Time (min): 11 min     Billable Minutes: Therapeutic Activity 11    Treatment Type: Treatment  PT/PTA: PTA     Number of PTA visits since last PT visit: 1 06/28/2024

## 2024-06-28 NOTE — SUBJECTIVE & OBJECTIVE
Interval History:  Patient was seen and examined follow-up, no issues overnight, tolerates diet, bowels are working, vitals stable continues to participate with therapies.    Review of Systems   Unable to perform ROS: Dementia (Altered mental status)     Objective:     Vital Signs (Most Recent):  Temp: 97.5 °F (36.4 °C) (06/28/24 0730)  Pulse: 71 (06/28/24 0730)  Resp: 18 (06/28/24 0730)  BP: (!) 180/78 (06/28/24 0730)  SpO2: 98 % (06/28/24 0730) Vital Signs (24h Range):  Temp:  [97.5 °F (36.4 °C)-98.4 °F (36.9 °C)] 97.5 °F (36.4 °C)  Pulse:  [56-71] 71  Resp:  [17-18] 18  SpO2:  [96 %-98 %] 98 %  BP: (138-180)/(67-78) 180/78     Weight: 70.8 kg (156 lb 1.6 oz)  Body mass index is 22.4 kg/m².    Intake/Output Summary (Last 24 hours) at 6/28/2024 0948  Last data filed at 6/28/2024 0406  Gross per 24 hour   Intake 480 ml   Output 1900 ml   Net -1420 ml         Physical Exam  Vitals reviewed.   Constitutional:       General: He is not in acute distress.     Appearance: He is ill-appearing.   HENT:      Head: Normocephalic and atraumatic.      Nose: Nose normal.      Mouth/Throat:      Mouth: Mucous membranes are moist.      Pharynx: Oropharynx is clear.   Eyes:      Conjunctiva/sclera: Conjunctivae normal.   Cardiovascular:      Rate and Rhythm: Regular rhythm. Tachycardia present.      Pulses: Normal pulses.      Heart sounds: No murmur heard.  Pulmonary:      Effort: Pulmonary effort is normal. No respiratory distress.      Breath sounds: Normal breath sounds. No wheezing or rhonchi.   Abdominal:      General: There is no distension.      Palpations: Abdomen is soft.      Tenderness: There is no abdominal tenderness.   Musculoskeletal:      Right lower leg: No edema.      Left lower leg: No edema.   Neurological:      General: No focal deficit present.      Mental Status: He is alert. He is disoriented.   Psychiatric:      Comments: No agitation noted             Significant Labs: All pertinent labs within the past  "24 hours have been reviewed.  BMP: No results for input(s): "GLU", "NA", "K", "CL", "CO2", "BUN", "CREATININE", "CALCIUM", "MG" in the last 48 hours.  CBC: No results for input(s): "WBC", "HGB", "HCT", "PLT" in the last 48 hours.    Significant Imaging: I have reviewed all pertinent imaging results/findings within the past 24 hours.  "

## 2024-06-28 NOTE — HOSPITAL COURSE
Lavon Brandon is a 89 year old male with a past medical history of dementia, HTN, CKD, anemia and seizure disorder who presented with acute encephalopathy secondary to UTI for which he completed a course of Zosyn. [facility] was consulted; the family was not agreeable to hospice at this time. PT/OT was consulted given debility. The patient was discharged to SNF 7/1.

## 2024-06-28 NOTE — PROGRESS NOTES
[facility]  Progress Note    Patient Name: Lavon Brandon  MRN: [id number]  Patient Class: IP- Inpatient   Admission Date: 6/22/2024  Length of Stay: 4 days  Attending Physician: Maik Medina MD  Primary Care Provider: Aristides Haynes MD        Subjective:     Principal Problem:Altered mental status        HPI:  89-year-old man with history of Lewy body dementia, seizure disorder and hypertension that presents to the ED for evaluation of altered mental status.  Daughter discloses patient has not been sleeping for more than 24 hours, talking back to time, decreased appetite, and vomited 2 days ago.  Daughter discloses no fever or chills but patient was in the sun 2 days ago.  On presentation patient was afebrile but tachycardic to 124.  Labs remarkable for normocytic anemia, azotemia, elevated CPK and lactic acid.  CT head showed no acute abnormality.  Chest x-ray showed no infiltrate but there were evidence of distended bowel.  Patient received 1 L of IV fluid in the ED. hospital medicine consulted for admission for altered mental status.    Overview/Hospital Course:  89-year-old gentleman was admitted to the hospital with encephalopathy.  He was found to have urinary tract infection and acute renal failure, started on hydration and IV antibiotics and had decent improvement with that.  Patient is very debilitated and has a history of Diane Gehrig dementia.  During the course of this hospitalization he was assessed and family met with palliative care team.  Family entertained hospice at home and met with hospice company however decided to proceed with skilled nursing facility placement.  PTOT is working with placement, awaiting authorization from insurance.  Today we received message from skilled nursing rehab that he is accepted by [facility].    Interval History:  Patient was seen and examined follow-up, no issues overnight, tolerates diet, bowels are working, vitals stable  "continues to participate with therapies.    Review of Systems   Unable to perform ROS: Dementia (Altered mental status)     Objective:     Vital Signs (Most Recent):  Temp: 97.5 °F (36.4 °C) (06/28/24 0730)  Pulse: 71 (06/28/24 0730)  Resp: 18 (06/28/24 0730)  BP: (!) 180/78 (06/28/24 0730)  SpO2: 98 % (06/28/24 0730) Vital Signs (24h Range):  Temp:  [97.5 °F (36.4 °C)-98.4 °F (36.9 °C)] 97.5 °F (36.4 °C)  Pulse:  [56-71] 71  Resp:  [17-18] 18  SpO2:  [96 %-98 %] 98 %  BP: (138-180)/(67-78) 180/78     Weight: 70.8 kg (156 lb 1.6 oz)  Body mass index is 22.4 kg/m².    Intake/Output Summary (Last 24 hours) at 6/28/2024 0948  Last data filed at 6/28/2024 0406  Gross per 24 hour   Intake 480 ml   Output 1900 ml   Net -1420 ml         Physical Exam  Vitals reviewed.   Constitutional:       General: He is not in acute distress.     Appearance: He is ill-appearing.   HENT:      Head: Normocephalic and atraumatic.      Nose: Nose normal.      Mouth/Throat:      Mouth: Mucous membranes are moist.      Pharynx: Oropharynx is clear.   Eyes:      Conjunctiva/sclera: Conjunctivae normal.   Cardiovascular:      Rate and Rhythm: Regular rhythm. Tachycardia present.      Pulses: Normal pulses.      Heart sounds: No murmur heard.  Pulmonary:      Effort: Pulmonary effort is normal. No respiratory distress.      Breath sounds: Normal breath sounds. No wheezing or rhonchi.   Abdominal:      General: There is no distension.      Palpations: Abdomen is soft.      Tenderness: There is no abdominal tenderness.   Musculoskeletal:      Right lower leg: No edema.      Left lower leg: No edema.   Neurological:      General: No focal deficit present.      Mental Status: He is alert. He is disoriented.   Psychiatric:      Comments: No agitation noted             Significant Labs: All pertinent labs within the past 24 hours have been reviewed.  BMP: No results for input(s): "GLU", "NA", "K", "CL", "CO2", "BUN", "CREATININE", "CALCIUM", "MG" in " "the last 48 hours.  CBC: No results for input(s): "WBC", "HGB", "HCT", "PLT" in the last 48 hours.    Significant Imaging: I have reviewed all pertinent imaging results/findings within the past 24 hours.    Assessment/Plan:      * Altered mental status  Unclear etiology  ? Infection (UTI) vs worsening underlying dementia  Check urinalysis with reflex culture to rule out UTI  Check TSH  Follow blood culture  CT chest abdomen and pelvis    ACP (advance care planning)  Discussed with daughter Gabriela.  Patient is DNI/DNR      Seizure disorder  Continue home dose of Keppra      MATA (acute kidney injury)  MATA on CKD  Baseline creatinine of 1.9  Likely due to dehydration  CT chest abdomen and pelvis to rule out obstructive uropathy  Renal dose medication  Avoid nephrotoxic medication  Continue IVF  Monitor renal function    Type 2 diabetes mellitus without complication, without long-term current use of insulin  Patient's FSGs are controlled on current medication regimen.  Last A1c reviewed-   Lab Results   Component Value Date    HGBA1C 5.2 11/08/2023     Most recent fingerstick glucose reviewed- No results for input(s): "POCTGLUCOSE" in the last 24 hours.  Current correctional scale  Low  Maintain anti-hyperglycemic dose as follows-   Antihyperglycemics (From admission, onward)      None          Hold Oral hypoglycemics while patient is in the hospital.    Dementia with behavioral disturbance  History of Lewy body dementia  Continue supportive care  Fall precautions  Melatonin at night for sleep      VTE Risk Mitigation (From admission, onward)           Ordered     heparin (porcine) injection 5,000 Units  Every 8 hours         06/23/24 0030     IP VTE HIGH RISK PATIENT  Once         06/23/24 0030     Place sequential compression device  Until discontinued         06/23/24 0030                    Discharge Planning   JOANNE: 7/1/2024     Code Status: DNR   Is the patient medically ready for discharge?:     Reason for patient " still in hospital (select all that apply): Patient trending condition  Discharge Plan A: Skilled Nursing Facility   Discharge Delays: (!) Patient and Family Barriers              Maik Medina MD  Department of Hospital Medicine   [facility]

## 2024-06-28 NOTE — PLAN OF CARE
Patient accepted by [facility] via [employer] system.  [place] state approval obtained.       06/28/24 0933   Post-Acute Status   Post-Acute Authorization Placement   Post-Acute Placement Status Pending payor review/awaiting authorization (if required)

## 2024-06-29 PROBLEM — R00.1 BRADYCARDIA: Status: RESOLVED | Noted: 2023-04-12 | Resolved: 2024-06-29

## 2024-06-29 PROBLEM — R41.0 DELIRIUM: Status: RESOLVED | Noted: 2024-02-16 | Resolved: 2024-06-29

## 2024-06-29 PROBLEM — G93.40 ACUTE ENCEPHALOPATHY: Status: ACTIVE | Noted: 2024-06-23

## 2024-06-29 PROBLEM — N18.9 CKD (CHRONIC KIDNEY DISEASE): Status: ACTIVE | Noted: 2024-06-23

## 2024-06-29 PROBLEM — U07.1 COVID-19: Status: RESOLVED | Noted: 2022-10-14 | Resolved: 2024-06-29

## 2024-06-29 LAB — GLUCOSE SERPL-MCNC: 111 MG/DL (ref 70–110)

## 2024-06-29 PROCEDURE — 63600175 PHARM REV CODE 636 W HCPCS: Performed by: STUDENT IN AN ORGANIZED HEALTH CARE EDUCATION/TRAINING PROGRAM

## 2024-06-29 PROCEDURE — 12000002 HC ACUTE/MED SURGE SEMI-PRIVATE ROOM

## 2024-06-29 PROCEDURE — 63600175 PHARM REV CODE 636 W HCPCS: Performed by: INTERNAL MEDICINE

## 2024-06-29 PROCEDURE — 25000003 PHARM REV CODE 250: Performed by: STUDENT IN AN ORGANIZED HEALTH CARE EDUCATION/TRAINING PROGRAM

## 2024-06-29 RX ORDER — POLYETHYLENE GLYCOL 3350 17 G/17G
17 POWDER, FOR SOLUTION ORAL 2 TIMES DAILY
Status: DISCONTINUED | OUTPATIENT
Start: 2024-06-29 | End: 2024-07-01 | Stop reason: HOSPADM

## 2024-06-29 RX ORDER — CALCITRIOL 0.25 UG/1
0.25 CAPSULE ORAL DAILY
Status: DISCONTINUED | OUTPATIENT
Start: 2024-06-29 | End: 2024-07-01 | Stop reason: HOSPADM

## 2024-06-29 RX ORDER — HYDRALAZINE HYDROCHLORIDE 25 MG/1
100 TABLET, FILM COATED ORAL EVERY 8 HOURS
Status: DISCONTINUED | OUTPATIENT
Start: 2024-06-29 | End: 2024-07-01 | Stop reason: HOSPADM

## 2024-06-29 RX ORDER — CITALOPRAM 20 MG/1
20 TABLET, FILM COATED ORAL DAILY
Status: DISCONTINUED | OUTPATIENT
Start: 2024-06-29 | End: 2024-07-01 | Stop reason: HOSPADM

## 2024-06-29 RX ORDER — AMOXICILLIN 250 MG
1 CAPSULE ORAL 2 TIMES DAILY
Status: DISCONTINUED | OUTPATIENT
Start: 2024-06-29 | End: 2024-07-01 | Stop reason: HOSPADM

## 2024-06-29 RX ORDER — LEVETIRACETAM 500 MG/1
500 TABLET ORAL 2 TIMES DAILY
Status: DISCONTINUED | OUTPATIENT
Start: 2024-06-29 | End: 2024-07-01 | Stop reason: HOSPADM

## 2024-06-29 RX ORDER — AMLODIPINE BESYLATE 5 MG/1
10 TABLET ORAL DAILY
Status: DISCONTINUED | OUTPATIENT
Start: 2024-06-29 | End: 2024-07-01 | Stop reason: HOSPADM

## 2024-06-29 RX ADMIN — SENNOSIDES AND DOCUSATE SODIUM 1 TABLET: 50; 8.6 TABLET ORAL at 10:06

## 2024-06-29 RX ADMIN — HEPARIN SODIUM 5000 UNITS: 5000 INJECTION, SOLUTION INTRAVENOUS; SUBCUTANEOUS at 08:06

## 2024-06-29 RX ADMIN — HYDRALAZINE HYDROCHLORIDE 100 MG: 25 TABLET ORAL at 08:06

## 2024-06-29 RX ADMIN — PIPERACILLIN SODIUM AND TAZOBACTAM SODIUM 3.38 G: 3; .375 INJECTION, POWDER, LYOPHILIZED, FOR SOLUTION INTRAVENOUS at 04:06

## 2024-06-29 RX ADMIN — HYDRALAZINE HYDROCHLORIDE 10 MG: 20 INJECTION, SOLUTION INTRAMUSCULAR; INTRAVENOUS at 05:06

## 2024-06-29 RX ADMIN — LEVETIRACETAM 500 MG: 500 TABLET, FILM COATED ORAL at 10:06

## 2024-06-29 RX ADMIN — CITALOPRAM HYDROBROMIDE 20 MG: 20 TABLET ORAL at 10:06

## 2024-06-29 RX ADMIN — POLYETHYLENE GLYCOL 3350 17 G: 17 POWDER, FOR SOLUTION ORAL at 08:06

## 2024-06-29 RX ADMIN — HEPARIN SODIUM 5000 UNITS: 5000 INJECTION, SOLUTION INTRAVENOUS; SUBCUTANEOUS at 01:06

## 2024-06-29 RX ADMIN — HEPARIN SODIUM 5000 UNITS: 5000 INJECTION, SOLUTION INTRAVENOUS; SUBCUTANEOUS at 04:06

## 2024-06-29 RX ADMIN — POLYETHYLENE GLYCOL 3350 17 G: 17 POWDER, FOR SOLUTION ORAL at 10:06

## 2024-06-29 RX ADMIN — AMLODIPINE BESYLATE 10 MG: 5 TABLET ORAL at 10:06

## 2024-06-29 RX ADMIN — SENNOSIDES AND DOCUSATE SODIUM 1 TABLET: 50; 8.6 TABLET ORAL at 08:06

## 2024-06-29 RX ADMIN — Medication 6 MG: at 08:06

## 2024-06-29 RX ADMIN — HYDRALAZINE HYDROCHLORIDE 100 MG: 25 TABLET ORAL at 01:06

## 2024-06-29 RX ADMIN — CALCITRIOL CAPSULES 0.25 MCG 0.25 MCG: 0.25 CAPSULE ORAL at 10:06

## 2024-06-29 RX ADMIN — LEVETIRACETAM 500 MG: 500 TABLET, FILM COATED ORAL at 08:06

## 2024-06-29 NOTE — SUBJECTIVE & OBJECTIVE
"Interval History: see "Hospital Course"    Review of Systems   Unable to perform ROS: Dementia     Objective:     Vital Signs (Most Recent):  Temp: 98.6 °F (37 °C) (06/29/24 1134)  Pulse: 66 (06/29/24 1134)  Resp: 18 (06/29/24 1134)  BP: (!) 153/65 (06/29/24 1344)  SpO2: 97 % (06/29/24 1134) Vital Signs (24h Range):  Temp:  [97.6 °F (36.4 °C)-98.6 °F (37 °C)] 98.6 °F (37 °C)  Pulse:  [] 66  Resp:  [16-18] 18  SpO2:  [93 %-98 %] 97 %  BP: (152-184)/(65-79) 153/65     Weight: 70.8 kg (156 lb 1.6 oz)  Body mass index is 22.4 kg/m².    Intake/Output Summary (Last 24 hours) at 6/29/2024 1448  Last data filed at 6/29/2024 1308  Gross per 24 hour   Intake 600 ml   Output 1000 ml   Net -400 ml         Physical Exam  Vitals and nursing note reviewed.   Constitutional:       General: He is not in acute distress.  HENT:      Head: Normocephalic and atraumatic.      Right Ear: External ear normal.      Left Ear: External ear normal.      Nose: Nose normal.      Mouth/Throat:      Mouth: Mucous membranes are moist.      Pharynx: Oropharynx is clear.   Eyes:      Extraocular Movements: Extraocular movements intact.      Conjunctiva/sclera: Conjunctivae normal.   Cardiovascular:      Rate and Rhythm: Normal rate and regular rhythm.      Pulses: Normal pulses.      Heart sounds: Normal heart sounds.   Pulmonary:      Effort: Pulmonary effort is normal.      Breath sounds: Normal breath sounds.   Abdominal:      General: Bowel sounds are normal. There is no distension.      Palpations: Abdomen is soft.      Tenderness: There is no abdominal tenderness.   Musculoskeletal:         General: Normal range of motion.      Cervical back: Normal range of motion and neck supple.   Skin:     General: Skin is warm and dry.   Neurological:      Mental Status: He is disoriented.             Significant Labs: All pertinent labs within the past 24 hours have been reviewed.    Significant Imaging: I have reviewed all pertinent imaging " results/findings within the past 24 hours.

## 2024-06-29 NOTE — PROGRESS NOTES
"[facility]  Progress Note    Patient Name: Lavon Brandon  MRN: [id number]  Patient Class: IP- Inpatient   Admission Date: 6/22/2024  Length of Stay: 5 days  Attending Physician: Bonifacio Lambert MD  Primary Care Provider: Aristides Haynes MD        Subjective:     Principal Problem:Acute encephalopathy        HPI:  89 year old man with history of Lewy body dementia, seizure disorder, DM, anemia, and hypertension that presents to the ED for evaluation of altered mental status.  Daughter discloses patient has not been sleeping for more than 24 hours, talking back to time, decreased appetite, and vomited 2 days ago.  Daughter discloses no fever or chills but patient was in the sun 2 days ago.  On presentation patient was afebrile but tachycardic to 124.  Labs remarkable for normocytic anemia, azotemia, elevated CPK and lactic acid.  CT head showed no acute abnormality.  Chest x-ray showed no infiltrate but there were evidence of distended bowel.  Patient received 1 L of IV fluid in the ED. hospital medicine consulted for admission for altered mental status.    Overview/Hospital Course:  Lavon Brandon is a 89 year old male with a past medical history of dementia, HTN, CKD, anemia and seizure disorder who presented with acute encephalopathy secondary to UTI for which he completed a course of Zosyn. Pallaitive Care was consulted; the family was not agreeable to hospice at this time. PT/OT was consulted given debility. The patient is pending discharge to SNF at this time.     Interval History: see "Hospital Course"    Review of Systems   Unable to perform ROS: Dementia     Objective:     Vital Signs (Most Recent):  Temp: 98.6 °F (37 °C) (06/29/24 1134)  Pulse: 66 (06/29/24 1134)  Resp: 18 (06/29/24 1134)  BP: (!) 153/65 (06/29/24 1344)  SpO2: 97 % (06/29/24 1134) Vital Signs (24h Range):  Temp:  [97.6 °F (36.4 °C)-98.6 °F (37 °C)] 98.6 °F (37 °C)  Pulse:  [] 66  Resp:  [16-18] " 18  SpO2:  [93 %-98 %] 97 %  BP: (152-184)/(65-79) 153/65     Weight: 70.8 kg (156 lb 1.6 oz)  Body mass index is 22.4 kg/m².    Intake/Output Summary (Last 24 hours) at 6/29/2024 1448  Last data filed at 6/29/2024 1308  Gross per 24 hour   Intake 600 ml   Output 1000 ml   Net -400 ml         Physical Exam  Vitals and nursing note reviewed.   Constitutional:       General: He is not in acute distress.  HENT:      Head: Normocephalic and atraumatic.      Right Ear: External ear normal.      Left Ear: External ear normal.      Nose: Nose normal.      Mouth/Throat:      Mouth: Mucous membranes are moist.      Pharynx: Oropharynx is clear.   Eyes:      Extraocular Movements: Extraocular movements intact.      Conjunctiva/sclera: Conjunctivae normal.   Cardiovascular:      Rate and Rhythm: Normal rate and regular rhythm.      Pulses: Normal pulses.      Heart sounds: Normal heart sounds.   Pulmonary:      Effort: Pulmonary effort is normal.      Breath sounds: Normal breath sounds.   Abdominal:      General: Bowel sounds are normal. There is no distension.      Palpations: Abdomen is soft.      Tenderness: There is no abdominal tenderness.   Musculoskeletal:         General: Normal range of motion.      Cervical back: Normal range of motion and neck supple.   Skin:     General: Skin is warm and dry.   Neurological:      Mental Status: He is disoriented.             Significant Labs: All pertinent labs within the past 24 hours have been reviewed.    Significant Imaging: I have reviewed all pertinent imaging results/findings within the past 24 hours.    Assessment/Plan:      * Acute encephalopathy  In setting of UTI with dementia.  -S/p Zosyn  -Delirium precautions  -Avoid physical and chemical restraints  -Fall and aspiration precautions    Goals of care, counseling/discussion  -DNR    ACP (advance care planning)  -DNR    Seizure disorder  -Continue home dose of Keppra      CKD (chronic kidney disease)  Stable,  -Renally  dose medications  -Avoid nephrotoxic agents    Anemia of chronic renal failure, stage 4 (severe)  Stable.  -Trend Hgb with CBC    Dementia with behavioral disturbance  History of Lewy body dementia.  -Delirium precautions  -Fall and aspiration precautions  -Avoid physical and chemical restraints    Depression  -Citalopram    Primary hypertension  Chronic, controlled. Latest blood pressure and vitals reviewed-     Temp:  [97.6 °F (36.4 °C)-98.6 °F (37 °C)]   Pulse:  []   Resp:  [16-18]   BP: (152-184)/(65-79)   SpO2:  [93 %-98 %] .   Home meds for hypertension were reviewed and noted below.   Hypertension Medications               amLODIPine (NORVASC) 10 MG tablet Take 1 tablet (10 mg total) by mouth before evening meal.    hydrALAZINE (APRESOLINE) 100 MG tablet Take 1 tablet (100 mg total) by mouth every 8 (eight) hours.            While in the hospital, will manage blood pressure as follows; Continue home antihypertensive regimen    Will utilize p.r.n. blood pressure medication only if patient's blood pressure greater than 180/110 and he develops symptoms such as worsening chest pain or shortness of breath.      VTE Risk Mitigation (From admission, onward)           Ordered     heparin (porcine) injection 5,000 Units  Every 8 hours         06/23/24 0030     IP VTE HIGH RISK PATIENT  Once         06/23/24 0030     Place sequential compression device  Until discontinued         06/23/24 0030                    Discharge Planning   JOANNE: 7/1/2024     Code Status: DNR   Is the patient medically ready for discharge?:     Reason for patient still in hospital (select all that apply): Pending disposition  Discharge Plan A: Skilled Nursing Facility   Discharge Delays: (!) Patient and Family Barriers              Bonifacio Lambert MD  Department of Hospital Medicine   [facility]

## 2024-06-29 NOTE — ASSESSMENT & PLAN NOTE
In setting of UTI with dementia.  -S/p Zosyn  -Delirium precautions  -Avoid physical and chemical restraints  -Fall and aspiration precautions

## 2024-06-29 NOTE — ASSESSMENT & PLAN NOTE
History of Lewy body dementia.  -Delirium precautions  -Fall and aspiration precautions  -Avoid physical and chemical restraints

## 2024-06-29 NOTE — ASSESSMENT & PLAN NOTE
"Patient's FSGs are controlled on current medication regimen.  Last A1c reviewed-   Lab Results   Component Value Date    HGBA1C 5.1 06/23/2024     Most recent fingerstick glucose reviewed- No results for input(s): "POCTGLUCOSE" in the last 24 hours.  Current correctional scale  Low  Maintain anti-hyperglycemic dose as follows-   Antihyperglycemics (From admission, onward)    None        Hold Oral hypoglycemics while patient is in the hospital.  "

## 2024-06-29 NOTE — ASSESSMENT & PLAN NOTE
Chronic, controlled. Latest blood pressure and vitals reviewed-     Temp:  [97.6 °F (36.4 °C)-98.6 °F (37 °C)]   Pulse:  []   Resp:  [16-18]   BP: (152-184)/(65-79)   SpO2:  [93 %-98 %] .   Home meds for hypertension were reviewed and noted below.   Hypertension Medications               amLODIPine (NORVASC) 10 MG tablet Take 1 tablet (10 mg total) by mouth before evening meal.    hydrALAZINE (APRESOLINE) 100 MG tablet Take 1 tablet (100 mg total) by mouth every 8 (eight) hours.            While in the hospital, will manage blood pressure as follows; Continue home antihypertensive regimen    Will utilize p.r.n. blood pressure medication only if patient's blood pressure greater than 180/110 and he develops symptoms such as worsening chest pain or shortness of breath.

## 2024-06-30 LAB
ALBUMIN SERPL BCP-MCNC: 3.4 G/DL (ref 3.5–5.2)
ALP SERPL-CCNC: 36 U/L (ref 55–135)
ALT SERPL W/O P-5'-P-CCNC: 24 U/L (ref 10–44)
ANION GAP SERPL CALC-SCNC: 7 MMOL/L (ref 8–16)
AST SERPL-CCNC: 24 U/L (ref 10–40)
BASOPHILS # BLD AUTO: 0.04 K/UL (ref 0–0.2)
BASOPHILS NFR BLD: 0.7 % (ref 0–1.9)
BILIRUB SERPL-MCNC: 0.4 MG/DL (ref 0.1–1)
BUN SERPL-MCNC: 17 MG/DL (ref 10–30)
CALCIUM SERPL-MCNC: 8.8 MG/DL (ref 8.7–10.5)
CHLORIDE SERPL-SCNC: 111 MMOL/L (ref 95–110)
CO2 SERPL-SCNC: 24 MMOL/L (ref 23–29)
CREAT SERPL-MCNC: 1.9 MG/DL (ref 0.5–1.4)
DIFFERENTIAL METHOD BLD: ABNORMAL
EOSINOPHIL # BLD AUTO: 0.2 K/UL (ref 0–0.5)
EOSINOPHIL NFR BLD: 3.6 % (ref 0–8)
ERYTHROCYTE [DISTWIDTH] IN BLOOD BY AUTOMATED COUNT: 14.8 % (ref 11.5–14.5)
EST. GFR  (NO RACE VARIABLE): 32.9 ML/MIN/1.73 M^2
GLUCOSE SERPL-MCNC: 84 MG/DL (ref 70–110)
HCT VFR BLD AUTO: 33.7 % (ref 40–54)
HGB BLD-MCNC: 10.7 G/DL (ref 14–18)
IMM GRANULOCYTES # BLD AUTO: 0.03 K/UL (ref 0–0.04)
IMM GRANULOCYTES NFR BLD AUTO: 0.5 % (ref 0–0.5)
LYMPHOCYTES # BLD AUTO: 2 K/UL (ref 1–4.8)
LYMPHOCYTES NFR BLD: 33.6 % (ref 18–48)
MAGNESIUM SERPL-MCNC: 2.1 MG/DL (ref 1.6–2.6)
MCH RBC QN AUTO: 29 PG (ref 27–31)
MCHC RBC AUTO-ENTMCNC: 31.8 G/DL (ref 32–36)
MCV RBC AUTO: 91 FL (ref 82–98)
MONOCYTES # BLD AUTO: 0.6 K/UL (ref 0.3–1)
MONOCYTES NFR BLD: 10.1 % (ref 4–15)
NEUTROPHILS # BLD AUTO: 3 K/UL (ref 1.8–7.7)
NEUTROPHILS NFR BLD: 51.5 % (ref 38–73)
NRBC BLD-RTO: 0 /100 WBC
OHS QRS DURATION: 98 MS
OHS QTC CALCULATION: 419 MS
PHOSPHATE SERPL-MCNC: 3 MG/DL (ref 2.7–4.5)
PLATELET # BLD AUTO: 200 K/UL (ref 150–450)
PMV BLD AUTO: 10.5 FL (ref 9.2–12.9)
POTASSIUM SERPL-SCNC: 4.5 MMOL/L (ref 3.5–5.1)
PROT SERPL-MCNC: 6.3 G/DL (ref 6–8.4)
RBC # BLD AUTO: 3.69 M/UL (ref 4.6–6.2)
SODIUM SERPL-SCNC: 142 MMOL/L (ref 136–145)
WBC # BLD AUTO: 5.83 K/UL (ref 3.9–12.7)

## 2024-06-30 PROCEDURE — 12000002 HC ACUTE/MED SURGE SEMI-PRIVATE ROOM

## 2024-06-30 PROCEDURE — 85025 COMPLETE CBC W/AUTO DIFF WBC: CPT | Performed by: STUDENT IN AN ORGANIZED HEALTH CARE EDUCATION/TRAINING PROGRAM

## 2024-06-30 PROCEDURE — 63600175 PHARM REV CODE 636 W HCPCS: Performed by: INTERNAL MEDICINE

## 2024-06-30 PROCEDURE — 25000003 PHARM REV CODE 250: Performed by: STUDENT IN AN ORGANIZED HEALTH CARE EDUCATION/TRAINING PROGRAM

## 2024-06-30 PROCEDURE — 63600175 PHARM REV CODE 636 W HCPCS: Performed by: STUDENT IN AN ORGANIZED HEALTH CARE EDUCATION/TRAINING PROGRAM

## 2024-06-30 PROCEDURE — 80053 COMPREHEN METABOLIC PANEL: CPT | Performed by: STUDENT IN AN ORGANIZED HEALTH CARE EDUCATION/TRAINING PROGRAM

## 2024-06-30 PROCEDURE — 83735 ASSAY OF MAGNESIUM: CPT | Performed by: STUDENT IN AN ORGANIZED HEALTH CARE EDUCATION/TRAINING PROGRAM

## 2024-06-30 PROCEDURE — 84100 ASSAY OF PHOSPHORUS: CPT | Performed by: STUDENT IN AN ORGANIZED HEALTH CARE EDUCATION/TRAINING PROGRAM

## 2024-06-30 PROCEDURE — 36415 COLL VENOUS BLD VENIPUNCTURE: CPT | Performed by: STUDENT IN AN ORGANIZED HEALTH CARE EDUCATION/TRAINING PROGRAM

## 2024-06-30 RX ADMIN — HYDRALAZINE HYDROCHLORIDE 100 MG: 25 TABLET ORAL at 09:06

## 2024-06-30 RX ADMIN — HEPARIN SODIUM 5000 UNITS: 5000 INJECTION, SOLUTION INTRAVENOUS; SUBCUTANEOUS at 09:06

## 2024-06-30 RX ADMIN — SENNOSIDES AND DOCUSATE SODIUM 1 TABLET: 50; 8.6 TABLET ORAL at 09:06

## 2024-06-30 RX ADMIN — CITALOPRAM HYDROBROMIDE 20 MG: 20 TABLET ORAL at 10:06

## 2024-06-30 RX ADMIN — LEVETIRACETAM 500 MG: 500 TABLET, FILM COATED ORAL at 09:06

## 2024-06-30 RX ADMIN — HYDRALAZINE HYDROCHLORIDE 100 MG: 25 TABLET ORAL at 03:06

## 2024-06-30 RX ADMIN — LEVETIRACETAM 500 MG: 500 TABLET, FILM COATED ORAL at 10:06

## 2024-06-30 RX ADMIN — CALCITRIOL CAPSULES 0.25 MCG 0.25 MCG: 0.25 CAPSULE ORAL at 10:06

## 2024-06-30 RX ADMIN — SENNOSIDES AND DOCUSATE SODIUM 1 TABLET: 50; 8.6 TABLET ORAL at 10:06

## 2024-06-30 RX ADMIN — HEPARIN SODIUM 5000 UNITS: 5000 INJECTION, SOLUTION INTRAVENOUS; SUBCUTANEOUS at 05:06

## 2024-06-30 RX ADMIN — AMLODIPINE BESYLATE 10 MG: 5 TABLET ORAL at 10:06

## 2024-06-30 RX ADMIN — POLYETHYLENE GLYCOL 3350 17 G: 17 POWDER, FOR SOLUTION ORAL at 09:06

## 2024-06-30 RX ADMIN — HYDRALAZINE HYDROCHLORIDE 100 MG: 25 TABLET ORAL at 05:06

## 2024-06-30 RX ADMIN — HYDRALAZINE HYDROCHLORIDE 10 MG: 20 INJECTION, SOLUTION INTRAMUSCULAR; INTRAVENOUS at 08:06

## 2024-06-30 RX ADMIN — HEPARIN SODIUM 5000 UNITS: 5000 INJECTION, SOLUTION INTRAVENOUS; SUBCUTANEOUS at 03:06

## 2024-06-30 RX ADMIN — POLYETHYLENE GLYCOL 3350 17 G: 17 POWDER, FOR SOLUTION ORAL at 10:06

## 2024-06-30 NOTE — PROGRESS NOTES
"[facility]  Progress Note    Patient Name: Lavon Brandon  MRN: [id number]  Patient Class: IP- Inpatient   Admission Date: 6/22/2024  Length of Stay: 6 days  Attending Physician: Bonifacio Lambert MD  Primary Care Provider: Aristides Haynes MD        Subjective:     Principal Problem:Acute encephalopathy        HPI:  89 year old man with history of Lewy body dementia, seizure disorder, DM, anemia, and hypertension that presents to the ED for evaluation of altered mental status.  Daughter discloses patient has not been sleeping for more than 24 hours, talking back to time, decreased appetite, and vomited 2 days ago.  Daughter discloses no fever or chills but patient was in the sun 2 days ago.  On presentation patient was afebrile but tachycardic to 124.  Labs remarkable for normocytic anemia, azotemia, elevated CPK and lactic acid.  CT head showed no acute abnormality.  Chest x-ray showed no infiltrate but there were evidence of distended bowel.  Patient received 1 L of IV fluid in the ED. hospital medicine consulted for admission for altered mental status.    Overview/Hospital Course:  Lavon Brandon is a 89 year old male with a past medical history of dementia, HTN, CKD, anemia and seizure disorder who presented with acute encephalopathy secondary to UTI for which he completed a course of Zosyn. Pallaitive Care was consulted; the family was not agreeable to hospice at this time. PT/OT was consulted given debility. The patient is pending discharge to SNF at this time.     Interval History: see "Hospital Course"    Review of Systems   Unable to perform ROS: Dementia     Objective:     Vital Signs (Most Recent):  Temp: 98.1 °F (36.7 °C) (06/30/24 0346)  Pulse: 76 (06/30/24 0346)  Resp: 19 (06/30/24 0346)  BP: 118/76 (06/30/24 0346)  SpO2: 99 % (06/30/24 0346) Vital Signs (24h Range):  Temp:  [98.1 °F (36.7 °C)-98.8 °F (37.1 °C)] 98.1 °F (36.7 °C)  Pulse:  [] 76  Resp:  [18-20] " 19  SpO2:  [97 %-99 %] 99 %  BP: (110-155)/(60-76) 118/76     Weight: 70.8 kg (156 lb 1.6 oz)  Body mass index is 22.4 kg/m².    Intake/Output Summary (Last 24 hours) at 6/30/2024 0718  Last data filed at 6/30/2024 0346  Gross per 24 hour   Intake 600 ml   Output 1200 ml   Net -600 ml         Physical Exam  Vitals and nursing note reviewed.   Constitutional:       General: He is not in acute distress.  HENT:      Head: Normocephalic and atraumatic.      Right Ear: External ear normal.      Left Ear: External ear normal.      Nose: Nose normal.      Mouth/Throat:      Mouth: Mucous membranes are moist.      Pharynx: Oropharynx is clear.   Eyes:      Extraocular Movements: Extraocular movements intact.      Conjunctiva/sclera: Conjunctivae normal.   Cardiovascular:      Rate and Rhythm: Normal rate and regular rhythm.      Pulses: Normal pulses.      Heart sounds: Normal heart sounds.   Pulmonary:      Effort: Pulmonary effort is normal.      Breath sounds: Normal breath sounds.   Abdominal:      General: Bowel sounds are normal. There is no distension.      Palpations: Abdomen is soft.      Tenderness: There is no abdominal tenderness.   Musculoskeletal:         General: Normal range of motion.      Cervical back: Normal range of motion and neck supple.   Skin:     General: Skin is warm and dry.   Neurological:      Mental Status: He is disoriented.             Significant Labs: All pertinent labs within the past 24 hours have been reviewed.    Significant Imaging: I have reviewed all pertinent imaging results/findings within the past 24 hours.    Assessment/Plan:      * Acute encephalopathy  In setting of UTI with dementia. Patient with likely chronic component as well.  -S/p Zosyn  -Delirium precautions  -Avoid physical and chemical restraints  -Fall and aspiration precautions    Goals of care, counseling/discussion  -DNR    ACP (advance care planning)  -DNR    Seizure disorder  -Continue home dose of  Keppra      CKD (chronic kidney disease)  Stable,  -Renally dose medications  -Avoid nephrotoxic agents    Anemia of chronic renal failure, stage 4 (severe)  Stable.  -Trend Hgb with CBC    Dementia with behavioral disturbance  History of Lewy body dementia.  -Delirium precautions  -Fall and aspiration precautions  -Avoid physical and chemical restraints    Depression  -Citalopram    Primary hypertension  Chronic, controlled. Latest blood pressure and vitals reviewed-     Temp:  [98.1 °F (36.7 °C)-98.8 °F (37.1 °C)]   Pulse:  []   Resp:  [18-20]   BP: (110-155)/(60-76)   SpO2:  [97 %-99 %] .   Home meds for hypertension were reviewed and noted below.   Hypertension Medications               amLODIPine (NORVASC) 10 MG tablet Take 1 tablet (10 mg total) by mouth before evening meal.    hydrALAZINE (APRESOLINE) 100 MG tablet Take 1 tablet (100 mg total) by mouth every 8 (eight) hours.            While in the hospital, will manage blood pressure as follows; Continue home antihypertensive regimen    Will utilize p.r.n. blood pressure medication only if patient's blood pressure greater than 180/110 and he develops symptoms such as worsening chest pain or shortness of breath.      VTE Risk Mitigation (From admission, onward)           Ordered     heparin (porcine) injection 5,000 Units  Every 8 hours         06/23/24 0030     IP VTE HIGH RISK PATIENT  Once         06/23/24 0030     Place sequential compression device  Until discontinued         06/23/24 0030                    Discharge Planning   JOANNE: 7/1/2024     Code Status: DNR   Is the patient medically ready for discharge?:     Reason for patient still in hospital (select all that apply): Pending disposition  Discharge Plan A: Skilled Nursing Facility   Discharge Delays: (!) Patient and Family Barriers              Bonifacio Lambert MD  Department of Hospital Medicine   [facility]

## 2024-06-30 NOTE — SUBJECTIVE & OBJECTIVE
"Interval History: see "Hospital Course"    Review of Systems   Unable to perform ROS: Dementia     Objective:     Vital Signs (Most Recent):  Temp: 98.1 °F (36.7 °C) (06/30/24 0346)  Pulse: 76 (06/30/24 0346)  Resp: 19 (06/30/24 0346)  BP: 118/76 (06/30/24 0346)  SpO2: 99 % (06/30/24 0346) Vital Signs (24h Range):  Temp:  [98.1 °F (36.7 °C)-98.8 °F (37.1 °C)] 98.1 °F (36.7 °C)  Pulse:  [] 76  Resp:  [18-20] 19  SpO2:  [97 %-99 %] 99 %  BP: (110-155)/(60-76) 118/76     Weight: 70.8 kg (156 lb 1.6 oz)  Body mass index is 22.4 kg/m².    Intake/Output Summary (Last 24 hours) at 6/30/2024 0718  Last data filed at 6/30/2024 0346  Gross per 24 hour   Intake 600 ml   Output 1200 ml   Net -600 ml         Physical Exam  Vitals and nursing note reviewed.   Constitutional:       General: He is not in acute distress.  HENT:      Head: Normocephalic and atraumatic.      Right Ear: External ear normal.      Left Ear: External ear normal.      Nose: Nose normal.      Mouth/Throat:      Mouth: Mucous membranes are moist.      Pharynx: Oropharynx is clear.   Eyes:      Extraocular Movements: Extraocular movements intact.      Conjunctiva/sclera: Conjunctivae normal.   Cardiovascular:      Rate and Rhythm: Normal rate and regular rhythm.      Pulses: Normal pulses.      Heart sounds: Normal heart sounds.   Pulmonary:      Effort: Pulmonary effort is normal.      Breath sounds: Normal breath sounds.   Abdominal:      General: Bowel sounds are normal. There is no distension.      Palpations: Abdomen is soft.      Tenderness: There is no abdominal tenderness.   Musculoskeletal:         General: Normal range of motion.      Cervical back: Normal range of motion and neck supple.   Skin:     General: Skin is warm and dry.   Neurological:      Mental Status: He is disoriented.             Significant Labs: All pertinent labs within the past 24 hours have been reviewed.    Significant Imaging: I have reviewed all pertinent imaging " results/findings within the past 24 hours.

## 2024-06-30 NOTE — PLAN OF CARE
Problem: Pain Acute  Goal: Optimal Pain Control and Function  Outcome: Progressing     Problem: Fall Injury Risk  Goal: Absence of Fall and Fall-Related Injury  Outcome: Progressing

## 2024-06-30 NOTE — ASSESSMENT & PLAN NOTE
In setting of UTI with dementia. Patient with likely chronic component as well.  -S/p Zosyn  -Delirium precautions  -Avoid physical and chemical restraints  -Fall and aspiration precautions

## 2024-06-30 NOTE — ASSESSMENT & PLAN NOTE
Chronic, controlled. Latest blood pressure and vitals reviewed-     Temp:  [98.1 °F (36.7 °C)-98.8 °F (37.1 °C)]   Pulse:  []   Resp:  [18-20]   BP: (110-155)/(60-76)   SpO2:  [97 %-99 %] .   Home meds for hypertension were reviewed and noted below.   Hypertension Medications               amLODIPine (NORVASC) 10 MG tablet Take 1 tablet (10 mg total) by mouth before evening meal.    hydrALAZINE (APRESOLINE) 100 MG tablet Take 1 tablet (100 mg total) by mouth every 8 (eight) hours.            While in the hospital, will manage blood pressure as follows; Continue home antihypertensive regimen    Will utilize p.r.n. blood pressure medication only if patient's blood pressure greater than 180/110 and he develops symptoms such as worsening chest pain or shortness of breath.

## 2024-07-01 VITALS
HEIGHT: 70 IN | TEMPERATURE: 98 F | BODY MASS INDEX: 22.35 KG/M2 | HEART RATE: 62 BPM | OXYGEN SATURATION: 98 % | SYSTOLIC BLOOD PRESSURE: 142 MMHG | WEIGHT: 156.13 LBS | DIASTOLIC BLOOD PRESSURE: 58 MMHG | RESPIRATION RATE: 18 BRPM

## 2024-07-01 PROBLEM — G93.40 ACUTE ENCEPHALOPATHY: Status: RESOLVED | Noted: 2024-06-23 | Resolved: 2024-07-01

## 2024-07-01 PROCEDURE — 86580 TB INTRADERMAL TEST: CPT | Performed by: STUDENT IN AN ORGANIZED HEALTH CARE EDUCATION/TRAINING PROGRAM

## 2024-07-01 PROCEDURE — 25000003 PHARM REV CODE 250: Performed by: STUDENT IN AN ORGANIZED HEALTH CARE EDUCATION/TRAINING PROGRAM

## 2024-07-01 PROCEDURE — 63600175 PHARM REV CODE 636 W HCPCS: Performed by: STUDENT IN AN ORGANIZED HEALTH CARE EDUCATION/TRAINING PROGRAM

## 2024-07-01 PROCEDURE — 97530 THERAPEUTIC ACTIVITIES: CPT

## 2024-07-01 PROCEDURE — 30200315 PPD INTRADERMAL TEST REV CODE 302: Performed by: STUDENT IN AN ORGANIZED HEALTH CARE EDUCATION/TRAINING PROGRAM

## 2024-07-01 PROCEDURE — 97116 GAIT TRAINING THERAPY: CPT | Mod: CQ

## 2024-07-01 RX ADMIN — HYDRALAZINE HYDROCHLORIDE 100 MG: 25 TABLET ORAL at 05:07

## 2024-07-01 RX ADMIN — TUBERCULIN PURIFIED PROTEIN DERIVATIVE 5 UNITS: 5 INJECTION, SOLUTION INTRADERMAL at 01:07

## 2024-07-01 RX ADMIN — AMLODIPINE BESYLATE 10 MG: 5 TABLET ORAL at 09:07

## 2024-07-01 RX ADMIN — SENNOSIDES AND DOCUSATE SODIUM 1 TABLET: 50; 8.6 TABLET ORAL at 09:07

## 2024-07-01 RX ADMIN — HEPARIN SODIUM 5000 UNITS: 5000 INJECTION, SOLUTION INTRAVENOUS; SUBCUTANEOUS at 01:07

## 2024-07-01 RX ADMIN — HYDRALAZINE HYDROCHLORIDE 100 MG: 25 TABLET ORAL at 01:07

## 2024-07-01 RX ADMIN — CALCITRIOL CAPSULES 0.25 MCG 0.25 MCG: 0.25 CAPSULE ORAL at 09:07

## 2024-07-01 RX ADMIN — CITALOPRAM HYDROBROMIDE 20 MG: 20 TABLET ORAL at 09:07

## 2024-07-01 RX ADMIN — HEPARIN SODIUM 5000 UNITS: 5000 INJECTION, SOLUTION INTRAVENOUS; SUBCUTANEOUS at 05:07

## 2024-07-01 RX ADMIN — LEVETIRACETAM 500 MG: 500 TABLET, FILM COATED ORAL at 09:07

## 2024-07-01 NOTE — PT/OT/SLP PROGRESS
Physical Therapy Treatment    Patient Name:  Lavon Brandon   MRN:  [id number]    Recommendations:     Discharge Recommendations: Moderate Intensity Therapy  Discharge Equipment Recommendations: none  Barriers to discharge:  weakness, impaired endurance, impaired self care skills, impaired functional mobility, decreased safety awareness, impaired activity tolerance.     Assessment:     Lavon Brandon is a 89 y.o. male admitted with a medical diagnosis of Dementia with behavioral disturbance.  He presents with the following impairments/functional limitations: weakness, impaired endurance, impaired self care skills, impaired functional mobility, gait instability, impaired balance, decreased safety awareness, impaired cardiopulmonary response to activity.    Pt found resting with HOB elevated, agreeable to skilled physical therapy today after encouragement.     He required modA to transfer from supine to sitting EOB. Once EOB, he required modA to scoot to place feet on floor and Thompson to maintain upright sitting balance.     He transferred from sitting to standing with modA and RW requiring verbal and tactile cues to shift weight forward, maintain upright standing posture, and for proper AD placement.     He ambulated 15ft with RW and Thompson. He required assistance with AD placement during directional changes as well as verbal cues throughout regarding postural awareness, widening NICHOL, and foot clearance to improve safety and independence.     He sat in his bed side chair requiring verbal and tactile cues for hand placement for slow controlled sit. Pt left sitting up in chair, chair alarm on, call light within reach, all needs met, and RN notified.     Rehab Prognosis: Fair; patient would benefit from acute skilled PT services to address these deficits and reach maximum level of function.    Recent Surgery: * No surgery found *      Plan:     During this hospitalization, patient to be seen 5 x/week to address the  identified rehab impairments via gait training, therapeutic activities, therapeutic exercises and progress toward the following goals:    Plan of Care Expires:       Subjective     Chief Complaint: none  Patient/Family Comments/goals: to get better  Pain/Comfort:  Pain Rating 1: 0/10      Objective:     Communicated with RN prior to session.  Patient found HOB elevated with bed alarm, peripheral IV, santo catheter, telemetry upon PT entry to room.     General Precautions: Standard, fall  Orthopedic Precautions:    Braces:    Respiratory Status: Room air     Functional Mobility:  Bed Mobility:     Supine to Sit: moderate assistance  Transfers:     Sit to Stand:  moderate assistance with rolling walker  Gait: 15ft with RW and Thompson.       AM-PAC 6 CLICK MOBILITY          Treatment & Education:  Pt educated on importance of time OOB, importance of intermittent mobility, safe techniques for transfers/ambulation, discharge recommendations/options, and use of call light for assistance and fall prevention.      Patient left up in chair with all lines intact, call button in reach, chair alarm on, and RN notified..    GOALS:   Multidisciplinary Problems       Physical Therapy Goals          Problem: Physical Therapy    Goal Priority Disciplines Outcome Goal Variances Interventions   Physical Therapy Goal     PT, PT/OT Progressing     Description: 1. Supine to sit with Stand-by Assistance  2. Sit to stand transfer with Stand-by Assistance  3.. Bed to chair transfer with Supervision using Rolling Walker  4. Gait  x 100 feet with Minimal Assistance using Rolling Walker.                          Time Tracking:     PT Received On: 07/01/24  PT Start Time: 1153     PT Stop Time: 1203  PT Total Time (min): 10 min     Billable Minutes: Gait Training 10    Treatment Type: Treatment  PT/PTA: PTA     Number of PTA visits since last PT visit: 2     07/01/2024

## 2024-07-01 NOTE — ASSESSMENT & PLAN NOTE
Chronic, controlled. Latest blood pressure and vitals reviewed-     Temp:  [97.5 °F (36.4 °C)-98.9 °F (37.2 °C)]   Pulse:  [60-76]   Resp:  [18-20]   BP: (133-165)/(66-81)   SpO2:  [97 %-99 %] .   Home meds for hypertension were reviewed and noted below.   Hypertension Medications               amLODIPine (NORVASC) 10 MG tablet Take 1 tablet (10 mg total) by mouth before evening meal.    hydrALAZINE (APRESOLINE) 100 MG tablet Take 1 tablet (100 mg total) by mouth every 8 (eight) hours.            While in the hospital, will manage blood pressure as follows; Continue home antihypertensive regimen    Will utilize p.r.n. blood pressure medication only if patient's blood pressure greater than 180/110 and he develops symptoms such as worsening chest pain or shortness of breath.   no

## 2024-07-01 NOTE — PLAN OF CARE
DOM reached out to Aydee to check auth status for SNF. Per Aydee she didn't get a response in careport stating if she could submit for auth and will submit today. DOM updated Pt daughter Gabriela Merino (Daughter) [phone] that [facility] is the only accepting facility. Pt daughter voice understand but would prefer Pt to be placed in [place]. Pt to work on getting Pt moved to [place] if bed becomes available while at [facility].          07/01/24 0849   Post-Acute Status   Post-Acute Authorization Placement   Post-Acute Placement Status Pending payor review/awaiting authorization (if required)   Discharge Plan   Discharge Plan A Skilled Nursing Facility   Discharge Plan B Skilled Nursing Facility

## 2024-07-01 NOTE — NURSING
Report called to [facility] rehab at [phone]. Report called to station 2 and given to  SAUL Mayer.

## 2024-07-01 NOTE — PLAN OF CARE
Problem: Pain Acute  Goal: Optimal Pain Control and Function  Outcome: Met     Problem: Fall Injury Risk  Goal: Absence of Fall and Fall-Related Injury  Outcome: Met

## 2024-07-01 NOTE — PT/OT/SLP PROGRESS
"Occupational Therapy   Treatment    Name: Lavon Brandon  MRN: [id number]  Admitting Diagnosis:  Dementia with behavioral disturbance       Recommendations:     Discharge Recommendations: Moderate Intensity Therapy  Discharge Equipment Recommendations:  none  Barriers to discharge:  None    Assessment:     Lavon Brandon is a 89 y.o. male with a medical diagnosis of Dementia with behavioral disturbance.  He presents with needing MOD A to transfer from the chair to the bed using RW and v/c. He needed MOD A and v/c to scoot to the guard rails of the bed while seated and MIN A to transfer from EOB to supine with v/c. Performance deficits affecting function are weakness, impaired endurance, impaired self care skills, impaired functional mobility, gait instability, impaired balance, impaired cognition, decreased upper extremity function, decreased lower extremity function, decreased safety awareness, decreased ROM.     Rehab Prognosis:  Good; patient would benefit from acute skilled OT services to address these deficits and reach maximum level of function.       Plan:     Patient to be seen 3 x/week to address the above listed problems via self-care/home management, therapeutic activities, therapeutic exercises  Plan of Care Expires: 07/25/24  Plan of Care Reviewed with: patient, caregiver    Subjective     Chief Complaint: " I want to lay down"  Patient/Family Comments/goals: none  Pain/Comfort:  Pain Rating 1: 0/10  Pain Rating Post-Intervention 1: 0/10    Objective:     Communicated with: Nurse prior to session.  Patient found up in chair with bed alarm, Condom Catheter, peripheral IV upon OT entry to room.    General Precautions: Standard, fall    Orthopedic Precautions:N/A  Braces: N/A  Respiratory Status: Room air     Occupational Performance:     Bed Mobility:    Patient completed Rolling/Turning to Left with  minimum assistance  Patient completed Scooting/Bridging with moderate assistance  Patient completed Sit " to Supine with minimum assistance     Functional Mobility/Transfers:  Patient completed Sit <> Stand Transfer with moderate assistance  with  rolling walker   Patient completed Bed <> Chair Transfer using Step Transfer technique with moderate assistance with rolling walker    AMPAC 6 Click ADL:      Treatment & Education:  Pt was found sitting in the chair with chair alarm on and sitter in room.  He needed MOD A to transfer from the chair to the bed using RW and v/c. He needed MOD A and v/c to scoot to the guard rails of the bed while seated and MIN A to transfer from EOB to supine with v/c. Pt was educated on the role of occupational therapy and the importance of completing ADLs and functional mobility.     Patient left supine with all lines intact, call button in reach, and bed alarm on    GOALS:   Multidisciplinary Problems       Occupational Therapy Goals          Problem: Occupational Therapy    Goal Priority Disciplines Outcome Interventions   Occupational Therapy Goal     OT, PT/OT Progressing    Description: Goals to be met by: 7/25/2024     Patient will increase functional independence with ADLs by performing:    Grooming while seated with Supervision.  Supine to sit with Supervision.  Toilet transfer to toilet with Contact Guard Assistance.  Upper extremity exercise program, with assistance as needed.                         Time Tracking:     OT Date of Treatment: 07/01/24  OT Start Time: 1240  OT Stop Time: 1255  OT Total Time (min): 15 min    Billable Minutes:Therapeutic Activity 15    OT/KIMMY: OT          7/1/2024

## 2024-07-01 NOTE — ASSESSMENT & PLAN NOTE
Chronic, controlled. Latest blood pressure and vitals reviewed-     Temp:  [97.5 °F (36.4 °C)-98.9 °F (37.2 °C)]   Pulse:  [60-76]   Resp:  [17-20]   BP: (133-180)/(71-81)   SpO2:  [97 %-99 %] .   Home meds for hypertension were reviewed and noted below.   Hypertension Medications               amLODIPine (NORVASC) 10 MG tablet Take 1 tablet (10 mg total) by mouth before evening meal.    hydrALAZINE (APRESOLINE) 100 MG tablet Take 1 tablet (100 mg total) by mouth every 8 (eight) hours.            While in the hospital, will manage blood pressure as follows; Continue home antihypertensive regimen    Will utilize p.r.n. blood pressure medication only if patient's blood pressure greater than 180/110 and he develops symptoms such as worsening chest pain or shortness of breath.

## 2024-07-01 NOTE — PROGRESS NOTES
"[facility]  Progress Note    Patient Name: Lavon Brandon  MRN: [id number]  Patient Class: IP- Inpatient   Admission Date: 6/22/2024  Length of Stay: 7 days  Attending Physician: Bonifacio Lambert MD  Primary Care Provider: Aristides Haynes MD        Subjective:     Principal Problem:Dementia with behavioral disturbance        HPI:  89 year old man with history of Lewy body dementia, seizure disorder, DM, anemia, and hypertension that presents to the ED for evaluation of altered mental status.  Daughter discloses patient has not been sleeping for more than 24 hours, talking back to time, decreased appetite, and vomited 2 days ago.  Daughter discloses no fever or chills but patient was in the sun 2 days ago.  On presentation patient was afebrile but tachycardic to 124.  Labs remarkable for normocytic anemia, azotemia, elevated CPK and lactic acid.  CT head showed no acute abnormality.  Chest x-ray showed no infiltrate but there were evidence of distended bowel.  Patient received 1 L of IV fluid in the ED. hospital medicine consulted for admission for altered mental status.    Overview/Hospital Course:  Lavon Brandon is a 89 year old male with a past medical history of dementia, HTN, CKD, anemia and seizure disorder who presented with acute encephalopathy secondary to UTI for which he completed a course of Zosyn. [facility] was consulted; the family was not agreeable to hospice at this time. PT/OT was consulted given debility. The patient is pending discharge to SNF at this time.     Interval History: see "Hospital Course"    Review of Systems   Unable to perform ROS: Dementia     Objective:     Vital Signs (Most Recent):  Temp: 98.9 °F (37.2 °C) (07/01/24 0704)  Pulse: 61 (07/01/24 0704)  Resp: 18 (07/01/24 0704)  BP: (!) 165/72 (07/01/24 0704)  SpO2: 97 % (07/01/24 0704) Vital Signs (24h Range):  Temp:  [97.5 °F (36.4 °C)-98.9 °F (37.2 °C)] 98.9 °F (37.2 °C)  Pulse:  [60-76] " 61  Resp:  [17-20] 18  SpO2:  [97 %-99 %] 97 %  BP: (133-180)/(71-81) 165/72     Weight: 70.8 kg (156 lb 1.6 oz)  Body mass index is 22.4 kg/m².    Intake/Output Summary (Last 24 hours) at 7/1/2024 0832  Last data filed at 7/1/2024 0830  Gross per 24 hour   Intake 720 ml   Output 650 ml   Net 70 ml         Physical Exam  Vitals and nursing note reviewed.   Constitutional:       General: He is not in acute distress.  HENT:      Head: Normocephalic and atraumatic.      Right Ear: External ear normal.      Left Ear: External ear normal.      Nose: Nose normal.      Mouth/Throat:      Mouth: Mucous membranes are moist.      Pharynx: Oropharynx is clear.   Eyes:      Extraocular Movements: Extraocular movements intact.      Conjunctiva/sclera: Conjunctivae normal.   Cardiovascular:      Rate and Rhythm: Normal rate and regular rhythm.      Pulses: Normal pulses.      Heart sounds: Normal heart sounds.   Pulmonary:      Effort: Pulmonary effort is normal.      Breath sounds: Normal breath sounds.   Abdominal:      General: Bowel sounds are normal. There is no distension.      Palpations: Abdomen is soft.      Tenderness: There is no abdominal tenderness.   Musculoskeletal:         General: Normal range of motion.      Cervical back: Normal range of motion and neck supple.   Skin:     General: Skin is warm and dry.   Neurological:      Mental Status: He is disoriented.             Significant Labs: All pertinent labs within the past 24 hours have been reviewed.    Significant Imaging: I have reviewed all pertinent imaging results/findings within the past 24 hours.    Assessment/Plan:      * Dementia with behavioral disturbance  History of Lewy body dementia.  -Delirium precautions  -Fall and aspiration precautions  -Avoid physical and chemical restraints    Goals of care, counseling/discussion  -DNR    ACP (advance care planning)  -DNR    Seizure disorder  -Continue home dose of Keppra      CKD (chronic kidney  disease)  Stable,  -Renally dose medications  -Avoid nephrotoxic agents    Anemia of chronic renal failure, stage 4 (severe)  Stable.  -Trend Hgb with CBC    Depression  -Citalopram    Primary hypertension  Chronic, controlled. Latest blood pressure and vitals reviewed-     Temp:  [97.5 °F (36.4 °C)-98.9 °F (37.2 °C)]   Pulse:  [60-76]   Resp:  [17-20]   BP: (133-180)/(71-81)   SpO2:  [97 %-99 %] .   Home meds for hypertension were reviewed and noted below.   Hypertension Medications               amLODIPine (NORVASC) 10 MG tablet Take 1 tablet (10 mg total) by mouth before evening meal.    hydrALAZINE (APRESOLINE) 100 MG tablet Take 1 tablet (100 mg total) by mouth every 8 (eight) hours.            While in the hospital, will manage blood pressure as follows; Continue home antihypertensive regimen    Will utilize p.r.n. blood pressure medication only if patient's blood pressure greater than 180/110 and he develops symptoms such as worsening chest pain or shortness of breath.      VTE Risk Mitigation (From admission, onward)           Ordered     heparin (porcine) injection 5,000 Units  Every 8 hours         06/23/24 0030     IP VTE HIGH RISK PATIENT  Once         06/23/24 0030     Place sequential compression device  Until discontinued         06/23/24 0030                    Discharge Planning   JOANNE: 7/1/2024     Code Status: DNR   Is the patient medically ready for discharge?:     Reason for patient still in hospital (select all that apply): Pending disposition  Discharge Plan A: Skilled Nursing Facility   Discharge Delays: (!) Patient and Family Barriers              Bonifacio Lambert MD  Department of Hospital Medicine   [facility]

## 2024-07-01 NOTE — PLAN OF CARE
[facility]       07/01/24 1305   Post-Acute Status   Post-Acute Authorization Placement   Post-Acute Placement Status Set-up Complete/Auth obtained

## 2024-07-01 NOTE — PLAN OF CARE
Charts and orders reviewed. Pt to discharge to SNF at [facility].  informed NOE Cleary that report can be called to [phone] station 2 facility will provide transport . Pt has no other needs to be addressed by case management. Pt cleared to discharge from case management standpoint.     07/01/24 1545   Final Note   Assessment Type Final Discharge Note   Anticipated Discharge Disposition SNF   Post-Acute Status   Post-Acute Placement Status Set-up Complete/Auth obtained   Discharge Delays None known at this time

## 2024-07-01 NOTE — DISCHARGE SUMMARY
[facility] Medicine  Discharge Summary      Patient Name: Lavon Brandon  MRN: [id number]  MIMI: [id number]  Patient Class: IP- Inpatient  Admission Date: 6/22/2024  Hospital Length of Stay: 7 days  Discharge Date and Time: No discharge date for patient encounter.  Attending Physician: Bonifacio Lambert MD   Discharging Provider: Bonifacio Lambert MD  Primary Care Provider: Aristides Haynes MD    Primary Care Team: Networked reference to record PCT     HPI:   89 year old man with history of Lewy body dementia, seizure disorder, DM, anemia, and hypertension that presents to the ED for evaluation of altered mental status.  Daughter discloses patient has not been sleeping for more than 24 hours, talking back to time, decreased appetite, and vomited 2 days ago.  Daughter discloses no fever or chills but patient was in the sun 2 days ago.  On presentation patient was afebrile but tachycardic to 124.  Labs remarkable for normocytic anemia, azotemia, elevated CPK and lactic acid.  CT head showed no acute abnormality.  Chest x-ray showed no infiltrate but there were evidence of distended bowel.  Patient received 1 L of IV fluid in the ED. hospital medicine consulted for admission for altered mental status.    * No surgery found *      Hospital Course:   Lavon Brandon is a 89 year old male with a past medical history of dementia, HTN, CKD, anemia and seizure disorder who presented with acute encephalopathy secondary to UTI for which he completed a course of Zosyn. Pallaitive Care was consulted; the family was not agreeable to hospice at this time. PT/OT was consulted given debility. The patient was discharged to SNF 7/1.     Goals of Care Treatment Preferences:  Code Status: DNR          What is most important right now is to focus on comfort and QOL .  Accordingly, we have decided that the best plan to meet the patient's goals includes enrolling in hospice care.      Consults:   Consults (From  admission, onward)          Status Ordering Provider     Inpatient consult to Palliative Care  Once        Provider:  Chris Nesbitt MD    Completed EVGENY RIVER     Inpatient consult to Gastroenterology  Once        Provider:  Caryn Earl MD    Completed NATHANAEL CHAPIN            Neuro  * Dementia with behavioral disturbance  History of Lewy body dementia.  -Delirium precautions  -Fall and aspiration precautions  -Avoid physical and chemical restraints    Seizure disorder  -Continue home dose of Keppra      Cardiac/Vascular  Primary hypertension  Chronic, controlled. Latest blood pressure and vitals reviewed-     Temp:  [97.5 °F (36.4 °C)-98.9 °F (37.2 °C)]   Pulse:  [60-76]   Resp:  [18-20]   BP: (133-165)/(66-81)   SpO2:  [97 %-99 %] .   Home meds for hypertension were reviewed and noted below.   Hypertension Medications               amLODIPine (NORVASC) 10 MG tablet Take 1 tablet (10 mg total) by mouth before evening meal.    hydrALAZINE (APRESOLINE) 100 MG tablet Take 1 tablet (100 mg total) by mouth every 8 (eight) hours.            While in the hospital, will manage blood pressure as follows; Continue home antihypertensive regimen    Will utilize p.r.n. blood pressure medication only if patient's blood pressure greater than 180/110 and he develops symptoms such as worsening chest pain or shortness of breath.    Renal/  CKD (chronic kidney disease)  Stable,  -Renally dose medications  -Avoid nephrotoxic agents    Oncology  Anemia of chronic renal failure, stage 4 (severe)  Stable.  -Trend Hgb with CBC    Other  Goals of care, counseling/discussion  -DNR    ACP (advance care planning)  -DNR    Depression  -Citalopram      Final Active Diagnoses:    Diagnosis Date Noted POA    PRINCIPAL PROBLEM:  Dementia with behavioral disturbance [F03.918]  Yes     Chronic    Goals of care, counseling/discussion [Z71.89] 06/25/2024 Not Applicable    CKD (chronic kidney disease) [N18.9] 06/23/2024 Yes     Seizure disorder [G40.909] 06/23/2024 Yes    ACP (advance care planning) [Z71.89] 06/23/2024 Not Applicable    Anemia of chronic renal failure, stage 4 (severe) [N18.4, D63.1] 05/06/2019 Yes     Chronic    Depression [F32.A]  Yes     Chronic    Primary hypertension [I10]  Yes      Problems Resolved During this Admission:    Diagnosis Date Noted Date Resolved POA    Acute encephalopathy [G93.40] 06/23/2024 07/01/2024 Yes       Discharged Condition: stable    Disposition: Skilled Nursing Facility    Follow Up:    Patient Instructions:      Diet Adult Regular     Notify your health care provider if you experience any of the following:  increased confusion or weakness     Notify your health care provider if you experience any of the following:  persistent dizziness, light-headedness, or visual disturbances     Notify your health care provider if you experience any of the following:  severe persistent headache     Notify your health care provider if you experience any of the following:  difficulty breathing or increased cough     Notify your health care provider if you experience any of the following:  severe uncontrolled pain     Notify your health care provider if you experience any of the following:  persistent nausea and vomiting or diarrhea     Notify your health care provider if you experience any of the following:  temperature >100.4     Activity as tolerated       Significant Diagnostic Studies: Labs: All labs within the past 24 hours have been reviewed    Pending Diagnostic Studies:       None           Medications:  Reconciled Home Medications:      Medication List        CONTINUE taking these medications      amLODIPine 10 MG tablet  Commonly known as: NORVASC  Take 1 tablet (10 mg total) by mouth before evening meal.     calcitRIOL 0.25 MCG Cap  Commonly known as: ROCALTROL  Take 1 capsule (0.25 mcg total) by mouth once daily.     citalopram 20 MG tablet  Commonly known as: CeleXA  Take 1 tablet (20 mg total)  by mouth once daily.     fluticasone propionate 50 mcg/actuation nasal spray  Commonly known as: FLONASE  2 sprays (100 mcg total) by Each Nostril route once daily.     hydrALAZINE 100 MG tablet  Commonly known as: APRESOLINE  Take 1 tablet (100 mg total) by mouth every 8 (eight) hours.     levETIRAcetam 500 MG Tab  Commonly known as: KEPPRA  Take 1 tablet (500 mg total) by mouth 2 (two) times daily.              Indwelling Lines/Drains at time of discharge:   Lines/Drains/Airways       None                   Time spent on the discharge of patient: 32 minutes         Bonifacio Lambert MD  Department of Hospital Medicine  [facility]

## 2024-07-01 NOTE — DISCHARGE INSTRUCTIONS
[facility]  Facility Transfer Orders        Admit to: snf    Diagnoses:   Active Hospital Problems    Diagnosis  POA    *Dementia with behavioral disturbance [F03.918]  Yes     Priority: 1 - High     Chronic    Goals of care, counseling/discussion [Z71.89]  Not Applicable    CKD (chronic kidney disease) [N18.9]  Yes    Seizure disorder [G40.909]  Yes    ACP (advance care planning) [Z71.89]  Not Applicable    Anemia of chronic renal failure, stage 4 (severe) [N18.4, D63.1]  Yes     Chronic    Depression [F32.A]  Yes     Chronic    Primary hypertension [I10]  Yes     renal htn        Resolved Hospital Problems    Diagnosis Date Resolved POA    Acute encephalopathy [G93.40] 07/01/2024 Yes     Priority: 1 - High     Allergies:   Review of patient's allergies indicates:   Allergen Reactions    Ciprofloxacin (bulk) Swelling       Code Status: dnr    Vitals: Routine       Diet: regular diet    Activity: Activity as tolerated    Nursing Precautions: Aspiration , Fall, Seizure, and Pressure ulcer prevention    Bed/Surface: Low Air Loss    Consults: PT to evaluate and treat- 7 times a week, OT to evaluate and treat- 7 times a week, and Nutrition to evaluate and recommend diet    Oxygen: room air    Dialysis: Patient is not on dialysis.     Miscellaneous Care:   Routine Skin for Bedridden Patients:  Apply moisture barrier cream to all       Medications: Discontinue all previous medication orders, if any. See new list below.  Current Discharge Medication List        CONTINUE these medications which have NOT CHANGED    Details   amLODIPine (NORVASC) 10 MG tablet Take 1 tablet (10 mg total) by mouth before evening meal.  Qty: 30 tablet, Refills: 11    Comments: .      calcitRIOL (ROCALTROL) 0.25 MCG Cap Take 1 capsule (0.25 mcg total) by mouth once daily.  Qty: 90 capsule, Refills: 4      citalopram (CELEXA) 20 MG tablet Take 1 tablet (20 mg total) by mouth once daily.  Qty: 90 tablet, Refills: 11      fluticasone  propionate (FLONASE) 50 mcg/actuation nasal spray 2 sprays (100 mcg total) by Each Nostril route once daily.  Qty: 18.2 mL, Refills: 0      hydrALAZINE (APRESOLINE) 100 MG tablet Take 1 tablet (100 mg total) by mouth every 8 (eight) hours.  Qty: 90 tablet, Refills: 11    Comments: .  Associated Diagnoses: Hypertension, unspecified type      levETIRAcetam (KEPPRA) 500 MG Tab Take 1 tablet (500 mg total) by mouth 2 (two) times daily.    Comments: Please consider 90 day supplies to promote better adherence  Associated Diagnoses: Seizure disorder           Follow up:       Immunizations Administered as of 7/1/2024       No immunizations on file.                Some patients may experience side effects after vaccination.  These may include fever, headache, muscle or joint aches.  Most symptoms resolve with 24-48 hours and do not require urgent medical evaluation unless they persist for more than 72 hours or symptoms are concerning for an unrelated medical condition.          Bonifacio Lambert MD

## 2024-07-02 NOTE — NURSING
Pt. discharged to Rehab via Rehab transport van. Pt in stable condition. AVS reviewed and pt and daughter verbalized understanding. Discharge instructions and personal belongings sent with patient. All need met at time of discharge.

## 2024-07-14 ENCOUNTER — PATIENT MESSAGE (OUTPATIENT)
Dept: PRIMARY CARE CLINIC | Facility: CLINIC | Age: 89
End: 2024-07-14
Payer: MEDICARE

## 2024-07-14 ENCOUNTER — PATIENT MESSAGE (OUTPATIENT)
Dept: CARDIOLOGY | Facility: CLINIC | Age: 89
End: 2024-07-14
Payer: MEDICARE

## 2024-07-25 ENCOUNTER — PATIENT MESSAGE (OUTPATIENT)
Dept: PRIMARY CARE CLINIC | Facility: CLINIC | Age: 89
End: 2024-07-25
Payer: MEDICARE

## 2024-07-25 DIAGNOSIS — R53.81 PHYSICAL DECONDITIONING: ICD-10-CM

## 2024-07-25 DIAGNOSIS — G93.40 ACUTE ENCEPHALOPATHY: ICD-10-CM

## 2024-07-25 DIAGNOSIS — I50.20 SYSTOLIC CONGESTIVE HEART FAILURE, UNSPECIFIED HF CHRONICITY: ICD-10-CM

## 2024-07-25 DIAGNOSIS — R29.6 FREQUENT FALLS: ICD-10-CM

## 2024-07-30 ENCOUNTER — PATIENT MESSAGE (OUTPATIENT)
Dept: PRIMARY CARE CLINIC | Facility: CLINIC | Age: 89
End: 2024-07-30
Payer: MEDICARE

## 2024-07-30 ENCOUNTER — TELEPHONE (OUTPATIENT)
Dept: FAMILY MEDICINE | Facility: CLINIC | Age: 89
End: 2024-07-30
Payer: MEDICARE

## 2024-07-30 NOTE — TELEPHONE ENCOUNTER
----- Message from Memo Whitney sent at 7/30/2024 12:58 PM CDT -----  Type:  Needs Medical Advice    Who Called: [facility]/ [place]    Would the patient rather a call back or a response via [facility]? Call back    Best Call Back Number: [phone]    Additional Information:  Needs more information about referral.  Please advise -- Thank you

## 2024-07-30 NOTE — TELEPHONE ENCOUNTER
[facility] requesting clarification on home care start date as well as what services home health is to provide.  Orders with clarification to be faxed to [phone].  NP to advise home health notified of status.

## 2024-08-05 ENCOUNTER — PATIENT MESSAGE (OUTPATIENT)
Dept: PRIMARY CARE CLINIC | Facility: CLINIC | Age: 89
End: 2024-08-05
Payer: MEDICARE

## 2024-08-05 DIAGNOSIS — E11.49 DM (DIABETES MELLITUS), TYPE 2 WITH NEUROLOGICAL COMPLICATIONS: ICD-10-CM

## 2024-08-05 DIAGNOSIS — A41.9 SEPSIS, DUE TO UNSPECIFIED ORGANISM, UNSPECIFIED WHETHER ACUTE ORGAN DYSFUNCTION PRESENT: ICD-10-CM

## 2024-08-05 DIAGNOSIS — F03.918 DEMENTIA WITH BEHAVIORAL DISTURBANCE: ICD-10-CM

## 2024-08-05 DIAGNOSIS — N18.32 STAGE 3B CHRONIC KIDNEY DISEASE: Primary | ICD-10-CM

## 2024-08-06 ENCOUNTER — PATIENT MESSAGE (OUTPATIENT)
Dept: CARDIOLOGY | Facility: CLINIC | Age: 89
End: 2024-08-06
Payer: MEDICARE

## 2024-08-07 ENCOUNTER — OFFICE VISIT (OUTPATIENT)
Dept: CARDIOLOGY | Facility: CLINIC | Age: 89
End: 2024-08-07
Payer: MEDICARE

## 2024-08-07 VITALS
HEIGHT: 70 IN | BODY MASS INDEX: 22.4 KG/M2 | DIASTOLIC BLOOD PRESSURE: 62 MMHG | OXYGEN SATURATION: 98 % | SYSTOLIC BLOOD PRESSURE: 122 MMHG | HEART RATE: 65 BPM

## 2024-08-07 DIAGNOSIS — Z13.6 ENCOUNTER FOR SCREENING FOR CARDIOVASCULAR DISORDERS: ICD-10-CM

## 2024-08-07 DIAGNOSIS — I10 PRIMARY HYPERTENSION: Primary | ICD-10-CM

## 2024-08-07 PROCEDURE — 1126F AMNT PAIN NOTED NONE PRSNT: CPT | Mod: CPTII,S$GLB,, | Performed by: STUDENT IN AN ORGANIZED HEALTH CARE EDUCATION/TRAINING PROGRAM

## 2024-08-07 PROCEDURE — 99999 PR PBB SHADOW E&M-EST. PATIENT-LVL III: CPT | Mod: PBBFAC,,, | Performed by: STUDENT IN AN ORGANIZED HEALTH CARE EDUCATION/TRAINING PROGRAM

## 2024-08-07 PROCEDURE — 1160F RVW MEDS BY RX/DR IN RCRD: CPT | Mod: CPTII,S$GLB,, | Performed by: STUDENT IN AN ORGANIZED HEALTH CARE EDUCATION/TRAINING PROGRAM

## 2024-08-07 PROCEDURE — 3288F FALL RISK ASSESSMENT DOCD: CPT | Mod: CPTII,S$GLB,, | Performed by: STUDENT IN AN ORGANIZED HEALTH CARE EDUCATION/TRAINING PROGRAM

## 2024-08-07 PROCEDURE — 1159F MED LIST DOCD IN RCRD: CPT | Mod: CPTII,S$GLB,, | Performed by: STUDENT IN AN ORGANIZED HEALTH CARE EDUCATION/TRAINING PROGRAM

## 2024-08-07 PROCEDURE — 1100F PTFALLS ASSESS-DOCD GE2>/YR: CPT | Mod: CPTII,S$GLB,, | Performed by: STUDENT IN AN ORGANIZED HEALTH CARE EDUCATION/TRAINING PROGRAM

## 2024-08-07 PROCEDURE — 1157F ADVNC CARE PLAN IN RCRD: CPT | Mod: CPTII,S$GLB,, | Performed by: STUDENT IN AN ORGANIZED HEALTH CARE EDUCATION/TRAINING PROGRAM

## 2024-08-07 PROCEDURE — 99214 OFFICE O/P EST MOD 30 MIN: CPT | Mod: S$GLB,,, | Performed by: STUDENT IN AN ORGANIZED HEALTH CARE EDUCATION/TRAINING PROGRAM

## 2024-08-07 RX ORDER — PNV NO.95/FERROUS FUM/FOLIC AC 28MG-0.8MG
100 TABLET ORAL DAILY
COMMUNITY

## 2024-08-21 DIAGNOSIS — G40.909 SEIZURE DISORDER: ICD-10-CM

## 2024-08-21 RX ORDER — CALCITRIOL 0.25 UG/1
CAPSULE ORAL
Qty: 90 CAPSULE | Refills: 4 | Status: SHIPPED | OUTPATIENT
Start: 2024-08-21

## 2024-08-21 NOTE — TELEPHONE ENCOUNTER
Spoke with pts daughter and she stated that before nursing home he was taking 750 mg and in the nursing home they had him on 500mg. She isnt sure what he should be on but he is out of this medication.

## 2024-08-21 NOTE — TELEPHONE ENCOUNTER
Refill Routing Note   Medication(s) are not appropriate for processing by [facility] for the following reason(s):        Outside of protocol    [facility] action(s):  Route               Appointments  past 12m or future 3m with PCP    Date Provider   Last Visit   6/23/2023 Aristides Haynes MD   Next Visit   Visit date not found Aristides Haynes MD   ED visits in past 90 days: 0        Note composed:4:28 AM 08/21/2024

## 2024-08-22 RX ORDER — LEVETIRACETAM 500 MG/1
500 TABLET ORAL 2 TIMES DAILY
Start: 2024-08-22

## 2024-08-27 ENCOUNTER — OFFICE VISIT (OUTPATIENT)
Dept: PRIMARY CARE CLINIC | Facility: CLINIC | Age: 89
End: 2024-08-27
Payer: MEDICARE

## 2024-08-27 VITALS
DIASTOLIC BLOOD PRESSURE: 62 MMHG | WEIGHT: 157 LBS | SYSTOLIC BLOOD PRESSURE: 118 MMHG | OXYGEN SATURATION: 97 % | BODY MASS INDEX: 22.48 KG/M2 | HEART RATE: 66 BPM | HEIGHT: 70 IN

## 2024-08-27 DIAGNOSIS — N18.32 STAGE 3B CHRONIC KIDNEY DISEASE: ICD-10-CM

## 2024-08-27 DIAGNOSIS — I50.20 SYSTOLIC CONGESTIVE HEART FAILURE, UNSPECIFIED HF CHRONICITY: ICD-10-CM

## 2024-08-27 DIAGNOSIS — G40.909 SEIZURE DISORDER: ICD-10-CM

## 2024-08-27 DIAGNOSIS — I10 PRIMARY HYPERTENSION: Primary | ICD-10-CM

## 2024-08-27 DIAGNOSIS — N18.4 CHRONIC RENAL DISEASE, STAGE IV: Chronic | ICD-10-CM

## 2024-08-27 DIAGNOSIS — R53.81 PHYSICAL DECONDITIONING: ICD-10-CM

## 2024-08-27 DIAGNOSIS — F03.918 DEMENTIA WITH BEHAVIORAL DISTURBANCE: ICD-10-CM

## 2024-08-27 PROCEDURE — 1125F AMNT PAIN NOTED PAIN PRSNT: CPT | Mod: CPTII,S$GLB,, | Performed by: NURSE PRACTITIONER

## 2024-08-27 PROCEDURE — 1157F ADVNC CARE PLAN IN RCRD: CPT | Mod: CPTII,S$GLB,, | Performed by: NURSE PRACTITIONER

## 2024-08-27 PROCEDURE — 1160F RVW MEDS BY RX/DR IN RCRD: CPT | Mod: CPTII,S$GLB,, | Performed by: NURSE PRACTITIONER

## 2024-08-27 PROCEDURE — 1101F PT FALLS ASSESS-DOCD LE1/YR: CPT | Mod: CPTII,S$GLB,, | Performed by: NURSE PRACTITIONER

## 2024-08-27 PROCEDURE — 99999 PR PBB SHADOW E&M-EST. PATIENT-LVL III: CPT | Mod: PBBFAC,,, | Performed by: NURSE PRACTITIONER

## 2024-08-27 PROCEDURE — 1159F MED LIST DOCD IN RCRD: CPT | Mod: CPTII,S$GLB,, | Performed by: NURSE PRACTITIONER

## 2024-08-27 PROCEDURE — 3288F FALL RISK ASSESSMENT DOCD: CPT | Mod: CPTII,S$GLB,, | Performed by: NURSE PRACTITIONER

## 2024-08-27 PROCEDURE — 99214 OFFICE O/P EST MOD 30 MIN: CPT | Mod: S$GLB,,, | Performed by: NURSE PRACTITIONER

## 2024-08-27 RX ORDER — CITALOPRAM 20 MG/1
20 TABLET, FILM COATED ORAL DAILY
Qty: 90 TABLET | Refills: 11 | Status: SHIPPED | OUTPATIENT
Start: 2024-08-27

## 2024-08-27 RX ORDER — LEVETIRACETAM 500 MG/1
500 TABLET ORAL 2 TIMES DAILY
Start: 2024-08-27 | End: 2024-08-29 | Stop reason: SDUPTHER
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
## 2024-08-28 DIAGNOSIS — G40.909 SEIZURE DISORDER: ICD-10-CM

## 2024-08-28 DIAGNOSIS — N30.00 ACUTE CYSTITIS WITHOUT HEMATURIA: Primary | ICD-10-CM

## 2024-08-28 NOTE — TELEPHONE ENCOUNTER
Please inform patient that I received the lab and urine results via faxed form [employer]- urine culture results show a UTI-is he still taking the antibiotic. The labs are stable.

## 2024-08-29 RX ORDER — LEVETIRACETAM 500 MG/1
500 TABLET ORAL 2 TIMES DAILY
Start: 2024-08-29

## 2024-08-29 NOTE — TELEPHONE ENCOUNTER
Spoke to patient daughter via phone. She states patient did complete antibiotics. She wants to know if we should check to see if the UTI is cleared up. She is also inquiring about medication KEPPRA. It appears medication was sent as no print.

## 2024-08-30 ENCOUNTER — PATIENT MESSAGE (OUTPATIENT)
Dept: PRIMARY CARE CLINIC | Facility: CLINIC | Age: 89
End: 2024-08-30
Payer: MEDICARE

## 2024-08-30 DIAGNOSIS — G40.909 SEIZURE DISORDER: Primary | ICD-10-CM

## 2024-08-30 RX ORDER — LEVETIRACETAM 500 MG/1
500 TABLET ORAL 2 TIMES DAILY
Qty: 180 TABLET | Refills: 3 | Status: SHIPPED | OUTPATIENT
Start: 2024-08-30 | End: 2025-08-30

## 2024-08-30 NOTE — TELEPHONE ENCOUNTER
Keppra sent- the urine culture and lab results faxed to me from [employer] was from 8/8/24 when the patient was at [facility]. The labs and urine ordered   is scheduled for 11/12/24-patient needs to submitted a urine to the clinic if he wants to be tested for a UTi

## 2024-08-31 ENCOUNTER — EXTERNAL HOME HEALTH (OUTPATIENT)
Dept: HOME HEALTH SERVICES | Facility: HOSPITAL | Age: 89
End: 2024-08-31
Payer: MEDICARE

## 2024-09-03 ENCOUNTER — PATIENT MESSAGE (OUTPATIENT)
Dept: PRIMARY CARE CLINIC | Facility: CLINIC | Age: 89
End: 2024-09-03
Payer: MEDICARE

## 2024-09-03 DIAGNOSIS — N30.00 ACUTE CYSTITIS WITHOUT HEMATURIA: Primary | ICD-10-CM

## 2024-09-03 NOTE — TELEPHONE ENCOUNTER
The urine culture that Gabriela is talking about was when he was at [facility], the only way to see if he has another UTI is to submit another urine    Please do at home COVID test to rule out COVID infection-for the cough

## 2024-09-05 ENCOUNTER — CLINICAL SUPPORT (OUTPATIENT)
Dept: PRIMARY CARE CLINIC | Facility: CLINIC | Age: 89
End: 2024-09-05
Payer: MEDICARE

## 2024-09-05 DIAGNOSIS — R05.1 ACUTE COUGH: Primary | ICD-10-CM

## 2024-09-05 DIAGNOSIS — N30.00 ACUTE CYSTITIS WITHOUT HEMATURIA: ICD-10-CM

## 2024-09-05 RX ORDER — AMOXICILLIN AND CLAVULANATE POTASSIUM 875; 125 MG/1; MG/1
1 TABLET, FILM COATED ORAL EVERY 12 HOURS
Qty: 14 TABLET | Refills: 0 | Status: SHIPPED | OUTPATIENT
Start: 2024-09-05 | End: 2024-09-12

## 2024-09-05 RX ORDER — METHYLPREDNISOLONE 4 MG/1
TABLET ORAL
Qty: 1 EACH | Refills: 0 | Status: SHIPPED | OUTPATIENT
Start: 2024-09-05 | End: 2024-09-26

## 2024-09-06 ENCOUNTER — LAB VISIT (OUTPATIENT)
Dept: LAB | Facility: HOSPITAL | Age: 89
End: 2024-09-06
Attending: NURSE PRACTITIONER
Payer: MEDICARE

## 2024-09-06 ENCOUNTER — PATIENT MESSAGE (OUTPATIENT)
Dept: PRIMARY CARE CLINIC | Facility: CLINIC | Age: 89
End: 2024-09-06
Payer: MEDICARE

## 2024-09-06 DIAGNOSIS — N30.00 ACUTE CYSTITIS WITHOUT HEMATURIA: ICD-10-CM

## 2024-09-06 LAB
CTP QC/QA: YES
SARS-COV-2 RDRP RESP QL NAA+PROBE: NEGATIVE

## 2024-09-06 PROCEDURE — 87086 URINE CULTURE/COLONY COUNT: CPT | Performed by: NURSE PRACTITIONER

## 2024-09-06 PROCEDURE — 81001 URINALYSIS AUTO W/SCOPE: CPT | Performed by: NURSE PRACTITIONER

## 2024-09-06 PROCEDURE — 87088 URINE BACTERIA CULTURE: CPT | Performed by: NURSE PRACTITIONER

## 2024-09-06 PROCEDURE — 87186 SC STD MICRODIL/AGAR DIL: CPT | Mod: 59 | Performed by: NURSE PRACTITIONER

## 2024-09-06 NOTE — PROGRESS NOTES
Patient seen today for COVID swab per provider request. Swab obtain and ran per protocol. Patient tolerated well.

## 2024-09-07 LAB
BACTERIA #/AREA URNS AUTO: ABNORMAL /HPF
BILIRUB UR QL STRIP: NEGATIVE
CLARITY UR REFRACT.AUTO: CLEAR
COLOR UR AUTO: YELLOW
GLUCOSE UR QL STRIP: NEGATIVE
HGB UR QL STRIP: NEGATIVE
KETONES UR QL STRIP: NEGATIVE
LEUKOCYTE ESTERASE UR QL STRIP: ABNORMAL
MICROSCOPIC COMMENT: ABNORMAL
NITRITE UR QL STRIP: NEGATIVE
PH UR STRIP: 6 [PH] (ref 5–8)
PROT UR QL STRIP: NEGATIVE
RBC #/AREA URNS AUTO: 1 /HPF (ref 0–4)
SP GR UR STRIP: 1.02 (ref 1–1.03)
SQUAMOUS #/AREA URNS AUTO: 1 /HPF
URN SPEC COLLECT METH UR: ABNORMAL
WBC #/AREA URNS AUTO: 46 /HPF (ref 0–5)

## 2024-09-09 LAB
BACTERIA UR CULT: ABNORMAL
BACTERIA UR CULT: ABNORMAL

## 2024-09-12 ENCOUNTER — PATIENT MESSAGE (OUTPATIENT)
Dept: PRIMARY CARE CLINIC | Facility: CLINIC | Age: 89
End: 2024-09-12
Payer: MEDICARE

## 2024-09-13 ENCOUNTER — HOSPITAL ENCOUNTER (INPATIENT)
Facility: HOSPITAL | Age: 89
LOS: 12 days | Discharge: NURSING, REHAB OR LONG-TERM CARE | DRG: 689 | End: 2024-09-26
Attending: STUDENT IN AN ORGANIZED HEALTH CARE EDUCATION/TRAINING PROGRAM | Admitting: FAMILY MEDICINE
Payer: OTHER GOVERNMENT

## 2024-09-13 DIAGNOSIS — R44.3 HALLUCINATIONS: ICD-10-CM

## 2024-09-13 DIAGNOSIS — N18.9 CHRONIC KIDNEY DISEASE, UNSPECIFIED CKD STAGE: ICD-10-CM

## 2024-09-13 DIAGNOSIS — R41.82 ALTERED MENTAL STATUS, UNSPECIFIED ALTERED MENTAL STATUS TYPE: Primary | ICD-10-CM

## 2024-09-13 DIAGNOSIS — R53.81 DEBILITY: ICD-10-CM

## 2024-09-13 DIAGNOSIS — G93.41 ENCEPHALOPATHY, METABOLIC: ICD-10-CM

## 2024-09-13 DIAGNOSIS — G40.909 SEIZURE DISORDER: ICD-10-CM

## 2024-09-13 DIAGNOSIS — A41.9 SEVERE SEPSIS: ICD-10-CM

## 2024-09-13 DIAGNOSIS — R65.20 SEVERE SEPSIS: ICD-10-CM

## 2024-09-13 DIAGNOSIS — N30.00 ACUTE CYSTITIS WITHOUT HEMATURIA: ICD-10-CM

## 2024-09-13 DIAGNOSIS — I10 HYPERTENSION: ICD-10-CM

## 2024-09-13 PROBLEM — J90 PLEURAL EFFUSION ON LEFT: Status: ACTIVE | Noted: 2024-09-13

## 2024-09-13 PROBLEM — N39.0 UTI (URINARY TRACT INFECTION): Status: ACTIVE | Noted: 2024-09-13

## 2024-09-13 LAB
ALBUMIN SERPL BCP-MCNC: 4.1 G/DL (ref 3.5–5.2)
ALP SERPL-CCNC: 46 U/L (ref 55–135)
ALT SERPL W/O P-5'-P-CCNC: 9 U/L (ref 10–44)
ANION GAP SERPL CALC-SCNC: 8 MMOL/L (ref 8–16)
AST SERPL-CCNC: 16 U/L (ref 10–40)
BASOPHILS # BLD AUTO: 0.02 K/UL (ref 0–0.2)
BASOPHILS NFR BLD: 0.2 % (ref 0–1.9)
BILIRUB SERPL-MCNC: 0.5 MG/DL (ref 0.1–1)
BILIRUB UR QL STRIP: NEGATIVE
BNP SERPL-MCNC: 140 PG/ML (ref 0–99)
BUN SERPL-MCNC: 34 MG/DL (ref 10–30)
CALCIUM SERPL-MCNC: 10.3 MG/DL (ref 8.7–10.5)
CHLORIDE SERPL-SCNC: 104 MMOL/L (ref 95–110)
CLARITY UR: CLEAR
CO2 SERPL-SCNC: 28 MMOL/L (ref 23–29)
COLOR UR: YELLOW
CREAT SERPL-MCNC: 2 MG/DL (ref 0.5–1.4)
DIFFERENTIAL METHOD BLD: ABNORMAL
EOSINOPHIL # BLD AUTO: 0 K/UL (ref 0–0.5)
EOSINOPHIL NFR BLD: 0 % (ref 0–8)
ERYTHROCYTE [DISTWIDTH] IN BLOOD BY AUTOMATED COUNT: 14.4 % (ref 11.5–14.5)
EST. GFR  (NO RACE VARIABLE): 30.9 ML/MIN/1.73 M^2
GLUCOSE SERPL-MCNC: 111 MG/DL (ref 70–110)
GLUCOSE UR QL STRIP: NEGATIVE
HCT VFR BLD AUTO: 36.6 % (ref 40–54)
HGB BLD-MCNC: 12.1 G/DL (ref 14–18)
HGB UR QL STRIP: NEGATIVE
IMM GRANULOCYTES # BLD AUTO: 0.03 K/UL (ref 0–0.04)
IMM GRANULOCYTES NFR BLD AUTO: 0.3 % (ref 0–0.5)
KETONES UR QL STRIP: NEGATIVE
LACTATE SERPL-SCNC: 0.9 MMOL/L (ref 0.5–1.9)
LDH SERPL L TO P-CCNC: 2.39 MMOL/L (ref 0.5–2.2)
LEUKOCYTE ESTERASE UR QL STRIP: NEGATIVE
LYMPHOCYTES # BLD AUTO: 2.1 K/UL (ref 1–4.8)
LYMPHOCYTES NFR BLD: 22.3 % (ref 18–48)
MAGNESIUM SERPL-MCNC: 2.1 MG/DL (ref 1.6–2.6)
MCH RBC QN AUTO: 30 PG (ref 27–31)
MCHC RBC AUTO-ENTMCNC: 33.1 G/DL (ref 32–36)
MCV RBC AUTO: 91 FL (ref 82–98)
MONOCYTES # BLD AUTO: 0.8 K/UL (ref 0.3–1)
MONOCYTES NFR BLD: 7.9 % (ref 4–15)
NEUTROPHILS # BLD AUTO: 6.6 K/UL (ref 1.8–7.7)
NEUTROPHILS NFR BLD: 69.3 % (ref 38–73)
NITRITE UR QL STRIP: NEGATIVE
NRBC BLD-RTO: 0 /100 WBC
PH UR STRIP: 6 [PH] (ref 5–8)
PHOSPHATE SERPL-MCNC: 2.8 MG/DL (ref 2.7–4.5)
PLATELET # BLD AUTO: 186 K/UL (ref 150–450)
PMV BLD AUTO: 10.5 FL (ref 9.2–12.9)
POTASSIUM SERPL-SCNC: 4.6 MMOL/L (ref 3.5–5.1)
PROCALCITONIN SERPL IA-MCNC: 0.06 NG/ML (ref 0–0.5)
PROT SERPL-MCNC: 7.3 G/DL (ref 6–8.4)
PROT UR QL STRIP: ABNORMAL
RBC # BLD AUTO: 4.03 M/UL (ref 4.6–6.2)
SAMPLE: ABNORMAL
SODIUM SERPL-SCNC: 140 MMOL/L (ref 136–145)
SP GR UR STRIP: 1.01 (ref 1–1.03)
TROPONIN I SERPL HS-MCNC: 48.5 PG/ML (ref 0–14.9)
URN SPEC COLLECT METH UR: ABNORMAL
UROBILINOGEN UR STRIP-ACNC: NEGATIVE EU/DL
WBC # BLD AUTO: 9.56 K/UL (ref 3.9–12.7)

## 2024-09-13 PROCEDURE — G0378 HOSPITAL OBSERVATION PER HR: HCPCS

## 2024-09-13 PROCEDURE — 63600175 PHARM REV CODE 636 W HCPCS: Performed by: INTERNAL MEDICINE

## 2024-09-13 PROCEDURE — 25000003 PHARM REV CODE 250: Performed by: NURSE PRACTITIONER

## 2024-09-13 PROCEDURE — 83605 ASSAY OF LACTIC ACID: CPT

## 2024-09-13 PROCEDURE — 93005 ELECTROCARDIOGRAM TRACING: CPT | Performed by: INTERNAL MEDICINE

## 2024-09-13 PROCEDURE — 84145 PROCALCITONIN (PCT): CPT

## 2024-09-13 PROCEDURE — 84100 ASSAY OF PHOSPHORUS: CPT

## 2024-09-13 PROCEDURE — 85025 COMPLETE CBC W/AUTO DIFF WBC: CPT

## 2024-09-13 PROCEDURE — 96372 THER/PROPH/DIAG INJ SC/IM: CPT | Performed by: INTERNAL MEDICINE

## 2024-09-13 PROCEDURE — 96361 HYDRATE IV INFUSION ADD-ON: CPT

## 2024-09-13 PROCEDURE — 83880 ASSAY OF NATRIURETIC PEPTIDE: CPT

## 2024-09-13 PROCEDURE — 94761 N-INVAS EAR/PLS OXIMETRY MLT: CPT

## 2024-09-13 PROCEDURE — 84484 ASSAY OF TROPONIN QUANT: CPT

## 2024-09-13 PROCEDURE — 87040 BLOOD CULTURE FOR BACTERIA: CPT

## 2024-09-13 PROCEDURE — 93010 ELECTROCARDIOGRAM REPORT: CPT | Mod: ,,, | Performed by: INTERNAL MEDICINE

## 2024-09-13 PROCEDURE — 81003 URINALYSIS AUTO W/O SCOPE: CPT

## 2024-09-13 PROCEDURE — 25000003 PHARM REV CODE 250: Performed by: STUDENT IN AN ORGANIZED HEALTH CARE EDUCATION/TRAINING PROGRAM

## 2024-09-13 PROCEDURE — 99285 EMERGENCY DEPT VISIT HI MDM: CPT | Mod: 25

## 2024-09-13 PROCEDURE — 80053 COMPREHEN METABOLIC PANEL: CPT

## 2024-09-13 PROCEDURE — 96365 THER/PROPH/DIAG IV INF INIT: CPT

## 2024-09-13 PROCEDURE — 63600175 PHARM REV CODE 636 W HCPCS: Performed by: STUDENT IN AN ORGANIZED HEALTH CARE EDUCATION/TRAINING PROGRAM

## 2024-09-13 PROCEDURE — 36415 COLL VENOUS BLD VENIPUNCTURE: CPT

## 2024-09-13 PROCEDURE — 83735 ASSAY OF MAGNESIUM: CPT

## 2024-09-13 PROCEDURE — 51701 INSERT BLADDER CATHETER: CPT

## 2024-09-13 RX ORDER — SODIUM,POTASSIUM PHOSPHATES 280-250MG
2 POWDER IN PACKET (EA) ORAL
Status: DISCONTINUED | OUTPATIENT
Start: 2024-09-13 | End: 2024-09-26 | Stop reason: HOSPADM

## 2024-09-13 RX ORDER — LANOLIN ALCOHOL/MO/W.PET/CERES
800 CREAM (GRAM) TOPICAL
Status: DISCONTINUED | OUTPATIENT
Start: 2024-09-13 | End: 2024-09-26 | Stop reason: HOSPADM

## 2024-09-13 RX ORDER — SODIUM CHLORIDE 0.9 % (FLUSH) 0.9 %
10 SYRINGE (ML) INJECTION
Status: DISCONTINUED | OUTPATIENT
Start: 2024-09-13 | End: 2024-09-26 | Stop reason: HOSPADM

## 2024-09-13 RX ORDER — PNV NO.95/FERROUS FUM/FOLIC AC 28MG-0.8MG
100 TABLET ORAL DAILY
Status: DISCONTINUED | OUTPATIENT
Start: 2024-09-14 | End: 2024-09-13

## 2024-09-13 RX ORDER — AMLODIPINE BESYLATE 5 MG/1
10 TABLET ORAL
Status: COMPLETED | OUTPATIENT
Start: 2024-09-13 | End: 2024-09-13

## 2024-09-13 RX ORDER — FAMOTIDINE 20 MG/1
20 TABLET, FILM COATED ORAL DAILY
Status: DISCONTINUED | OUTPATIENT
Start: 2024-09-14 | End: 2024-09-26 | Stop reason: HOSPADM

## 2024-09-13 RX ORDER — LEVETIRACETAM 500 MG/1
500 TABLET ORAL 2 TIMES DAILY
Status: DISCONTINUED | OUTPATIENT
Start: 2024-09-13 | End: 2024-09-16

## 2024-09-13 RX ORDER — OLANZAPINE 5 MG/1
5 TABLET, ORALLY DISINTEGRATING ORAL ONCE
Status: COMPLETED | OUTPATIENT
Start: 2024-09-13 | End: 2024-09-13

## 2024-09-13 RX ORDER — HYDRALAZINE HYDROCHLORIDE 10 MG/1
10 TABLET, FILM COATED ORAL EVERY 8 HOURS
Status: DISCONTINUED | OUTPATIENT
Start: 2024-09-13 | End: 2024-09-26 | Stop reason: HOSPADM

## 2024-09-13 RX ORDER — ENOXAPARIN SODIUM 100 MG/ML
30 INJECTION SUBCUTANEOUS
Status: DISCONTINUED | OUTPATIENT
Start: 2024-09-13 | End: 2024-09-26 | Stop reason: HOSPADM

## 2024-09-13 RX ORDER — FUROSEMIDE 10 MG/ML
20 INJECTION INTRAMUSCULAR; INTRAVENOUS ONCE
Status: DISCONTINUED | OUTPATIENT
Start: 2024-09-13 | End: 2024-09-13

## 2024-09-13 RX ORDER — LANOLIN ALCOHOL/MO/W.PET/CERES
1000 CREAM (GRAM) TOPICAL DAILY
Status: DISCONTINUED | OUTPATIENT
Start: 2024-09-14 | End: 2024-09-26 | Stop reason: HOSPADM

## 2024-09-13 RX ORDER — FLUTICASONE PROPIONATE 50 MCG
2 SPRAY, SUSPENSION (ML) NASAL DAILY
Status: DISCONTINUED | OUTPATIENT
Start: 2024-09-14 | End: 2024-09-26 | Stop reason: HOSPADM

## 2024-09-13 RX ORDER — ONDANSETRON HYDROCHLORIDE 2 MG/ML
4 INJECTION, SOLUTION INTRAVENOUS EVERY 6 HOURS PRN
Status: DISCONTINUED | OUTPATIENT
Start: 2024-09-13 | End: 2024-09-26 | Stop reason: HOSPADM

## 2024-09-13 RX ORDER — CITALOPRAM 20 MG/1
20 TABLET, FILM COATED ORAL DAILY
Status: DISCONTINUED | OUTPATIENT
Start: 2024-09-14 | End: 2024-09-13

## 2024-09-13 RX ORDER — AMLODIPINE BESYLATE 5 MG/1
10 TABLET ORAL
Status: DISCONTINUED | OUTPATIENT
Start: 2024-09-14 | End: 2024-09-26 | Stop reason: HOSPADM

## 2024-09-13 RX ORDER — ACETAMINOPHEN 325 MG/1
650 TABLET ORAL EVERY 4 HOURS PRN
Status: DISCONTINUED | OUTPATIENT
Start: 2024-09-13 | End: 2024-09-26 | Stop reason: HOSPADM

## 2024-09-13 RX ADMIN — HYDRALAZINE HYDROCHLORIDE 10 MG: 10 TABLET, FILM COATED ORAL at 10:09

## 2024-09-13 RX ADMIN — SODIUM CHLORIDE 250 ML: 9 INJECTION, SOLUTION INTRAVENOUS at 01:09

## 2024-09-13 RX ADMIN — ACETAMINOPHEN 650 MG: 325 TABLET ORAL at 08:09

## 2024-09-13 RX ADMIN — ENOXAPARIN SODIUM 30 MG: 30 INJECTION SUBCUTANEOUS at 08:09

## 2024-09-13 RX ADMIN — LEVETIRACETAM 500 MG: 500 TABLET, FILM COATED ORAL at 08:09

## 2024-09-13 RX ADMIN — OLANZAPINE 5 MG: 5 TABLET, ORALLY DISINTEGRATING ORAL at 06:09

## 2024-09-13 RX ADMIN — CEFTRIAXONE SODIUM 1 G: 1 INJECTION, POWDER, FOR SOLUTION INTRAMUSCULAR; INTRAVENOUS at 01:09

## 2024-09-13 RX ADMIN — AMLODIPINE BESYLATE 10 MG: 5 TABLET ORAL at 04:09

## 2024-09-13 NOTE — HPI
89 year old male with a past medical history of hypertension, hyperlipidemia, CVA, PE, Seizure, UTI and dementia who presents to the emergency room with reports of abdominal and back pain and hallucinations. Also decreased bowel movements over last 2-4 days. He was recently diagnosed with UTI and started on augmentin. Per urine culture sensitivity, organism is resistant to Augmentin, sensitive to Rocephin.  Hypertensive in the ER at 212/94, reports did not take his home medication this morning.  WBC 9.56, mild MATA with serum creatinine 2.0, BUN 34, UA negative for leukocytes and nitrites.  Blood culture sent, lactic 2.39.  CT abdomen with trace left pleural effusion, large left renal cyst, CT head no acute findings, chest x-ray Blunting of the left costophrenic angle. IV rocephin given in ER with Hydralazine, amlodipine,  ml.  Admit to hospital medicine for further medical management.  The patient did have some urinary retention, a Draper catheter was placed with adequate urine output.  EEG pending.  Urine culture with no growth to date.  Patient's mental status continued to slowly improve.  He has tolerated p.o. diet.  PT and OT consulted for evaluation
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
## 2024-09-13 NOTE — TELEPHONE ENCOUNTER
Please if patient that the urine culture shows a small amount of bacteria in his urine-since he is symptomatic -the culture shows the antibiotics that will treat the UTI are given IV infusion. The other antibiotic is contraindicated due to his chronic kidney disease.

## 2024-09-13 NOTE — PROGRESS NOTES
Call placed to pts daughter regarding urine an labs, Stated that they were going to the ER advised by nurse.

## 2024-09-13 NOTE — SUBJECTIVE & OBJECTIVE
Past Medical History:   Diagnosis Date    Bradyarrhythmia     CVA (cerebral infarction) 2006    Dementia     Depression     Hyperlipidemia     Hypertension     renal htn    Pulmonary embolism 2002    Seizure disorder        Past Surgical History:   Procedure Laterality Date    left foot  with crushed injry         Review of patient's allergies indicates:   Allergen Reactions    Ciprofloxacin (bulk) Swelling       No current facility-administered medications on file prior to encounter.     Current Outpatient Medications on File Prior to Encounter   Medication Sig    amLODIPine (NORVASC) 10 MG tablet Take 1 tablet (10 mg total) by mouth before evening meal.    amoxicillin-clavulanate 875-125mg (AUGMENTIN) 875-125 mg per tablet Take 1 tablet by mouth every 12 (twelve) hours. for 7 days    calcitRIOL (ROCALTROL) 0.25 MCG Cap TAKE 1 CAPSULE(0.25 MCG) BY MOUTH EVERY DAY (Patient taking differently: Take 0.25 mcg by mouth once daily.)    citalopram (CELEXA) 20 MG tablet Take 1 tablet (20 mg total) by mouth once daily.    cyanocobalamin (VITAMIN B-12) 100 MCG tablet Take 100 mcg by mouth once daily.    fluticasone propionate (FLONASE) 50 mcg/actuation nasal spray 2 sprays (100 mcg total) by Each Nostril route once daily.    hydrALAZINE (APRESOLINE) 100 MG tablet Take 1 tablet (100 mg total) by mouth every 8 (eight) hours.    levETIRAcetam (KEPPRA) 500 MG Tab Take 1 tablet (500 mg total) by mouth 2 (two) times daily.    methylPREDNISolone (MEDROL DOSEPACK) 4 mg tablet use as directed (Patient taking differently: Take 4 mg by mouth once daily. use as directed)    [DISCONTINUED] levETIRAcetam (KEPPRA) 500 MG Tab Take 1 tablet (500 mg total) by mouth 2 (two) times daily.     Family History       Problem Relation (Age of Onset)    Diabetes Mother          Tobacco Use    Smoking status: Never    Smokeless tobacco: Never   Substance and Sexual Activity    Alcohol use: No    Drug use: No    Sexual activity: Not on file     Review  of Systems   Unable to perform ROS: Mental status change     Objective:     Vital Signs (Most Recent):  Temp: 99.2 °F (37.3 °C) (09/13/24 1035)  Pulse: 93 (09/13/24 1035)  Resp: 18 (09/13/24 1035)  BP: (!) 150/81 (09/13/24 1035)  SpO2: 98 % (09/13/24 1035) Vital Signs (24h Range):  Temp:  [99.2 °F (37.3 °C)] 99.2 °F (37.3 °C)  Pulse:  [93] 93  Resp:  [18] 18  SpO2:  [98 %] 98 %  BP: (150)/(81) 150/81     Weight: 71.2 kg (157 lb)  Body mass index is 22.53 kg/m².     Physical Exam  Constitutional:       General: He is not in acute distress.     Appearance: Normal appearance.   HENT:      Head: Normocephalic and atraumatic.      Mouth/Throat:      Mouth: Mucous membranes are moist.   Eyes:      Extraocular Movements: Extraocular movements intact.      Pupils: Pupils are equal, round, and reactive to light.   Cardiovascular:      Rate and Rhythm: Normal rate and regular rhythm.      Pulses: Normal pulses.      Heart sounds: Normal heart sounds. No murmur heard.  Pulmonary:      Effort: Pulmonary effort is normal. No respiratory distress.      Breath sounds: Normal breath sounds. No wheezing.   Abdominal:      General: Bowel sounds are normal. There is no distension.      Palpations: Abdomen is soft.      Tenderness: There is no abdominal tenderness.   Musculoskeletal:         General: Normal range of motion.      Cervical back: Normal range of motion and neck supple.   Skin:     General: Skin is warm and dry.   Neurological:      Mental Status: He is alert.      GCS: GCS eye subscore is 4. GCS verbal subscore is 4. GCS motor subscore is 6.   Psychiatric:         Mood and Affect: Mood normal.         Behavior: Behavior is cooperative.         Cognition and Memory: Cognition is impaired. Memory is impaired.              CRANIAL NERVES     CN III, IV, VI   Pupils are equal, round, and reactive to light.       Significant Labs: All pertinent labs within the past 24 hours have been reviewed.  Recent Lab Results          09/13/24  1437   09/13/24  1114   09/13/24  1112        Albumin   4.1         ALP   46         ALT   9         Anion Gap   8         Appearance, UA Clear           AST   16         Baso #   0.02         Basophil %   0.2         Bilirubin (UA) Negative           BILIRUBIN TOTAL   0.5  Comment: For infants and newborns, interpretation of results should be based  on gestational age, weight and in agreement with clinical  observations.    Premature Infant recommended reference ranges:  Up to 24 hours.............<8.0 mg/dL  Up to 48 hours............<12.0 mg/dL  3-5 days..................<15.0 mg/dL  6-29 days.................<15.0 mg/dL           BNP   140  Comment: Values of less than 100 pg/ml are consistent with non-CHF populations.         BUN   34         Calcium   10.3         Chloride   104         CO2   28         Color, UA Yellow           Creatinine   2.0         Differential Method   Automated         eGFR   30.9         Eos #   0.0         Eos %   0.0         Glucose   111         Glucose, UA Negative           Gran # (ANC)   6.6         Gran %   69.3         Hematocrit   36.6         Hemoglobin   12.1         Immature Grans (Abs)   0.03  Comment: Mild elevation in immature granulocytes is non specific and   can be seen in a variety of conditions including stress response,   acute inflammation, trauma and pregnancy. Correlation with other   laboratory and clinical findings is essential.           Immature Granulocytes   0.3         Ketones, UA Negative           Leukocyte Esterase, UA Negative           Lymph #   2.1         Lymph %   22.3         Magnesium    2.1         MCH   30.0         MCHC   33.1         MCV   91         Mono #   0.8         Mono %   7.9         MPV   10.5         NITRITE UA Negative           nRBC   0         Blood, UA Negative           pH, UA 6.0           Phosphorus Level   2.8         Platelet Count   186         POC Lactate     2.39       Potassium   4.6         PROTEIN TOTAL    7.3         Protein, UA Trace  Comment: Recommend a 24 hour urine protein or a urine   protein/creatinine ratio if globulin induced proteinuria is  clinically suspected.             RBC   4.03         RDW   14.4         Sample     VENOUS       Sodium   140         Spec Grav UA 1.010           Specimen UA Urine, Clean Catch           Troponin I High Sensitivity   48.5  Comment: Troponin results differ between methods. Do not use   results between Troponin methods interchangeably.    Alkaline Phospatase levels above 400 U/L may   cause false positive results.    Access hsTnI should not be used for patients taking   Asfotase ebony (Strensiq).           UROBILINOGEN UA Negative           WBC   9.56                 Significant Imaging: I have reviewed all pertinent imaging results/findings within the past 24 hours.  Imaging Results              CT Head Without Contrast (Final result)  Result time 09/13/24 13:35:00      Final result by Az Sethi MD (09/13/24 13:35:00)                   Impression:      1. Chronic findings as above.  No acute abnormalities or detrimental changes when compared to June 2024..      Electronically signed by: Az Sethi  Date:    09/13/2024  Time:    13:35               Narrative:    EXAMINATION:  CT HEAD WITHOUT CONTRAST    CLINICAL HISTORY:  Mental status change, unknown cause;.    TECHNIQUE:  Thin section axial images were obtained.  No contrast was administered.  Image quality is mildly degraded by motion artifact.    COMPARISON:  June 2024    FINDINGS:  There is no evidence of intracranial mass, hemorrhage, or midline shift.  Ventricles and sulci are mildly prominent.  There are no pathologic extra-axial fluid collections.    There is no CT evidence of acute ischemic change.  Changes of moderate chronic microvascular white matter disease are noted, similar to the prior study.  Tiny chronic lacunar infarct is noted at the caudate nucleus on the left.    Cerebellum is  atrophic.  Hypodensity centrally within the cedric is unchanged and likely on the basis of chronic small vessel ischemic change.    The calvarium is intact.  Near complete opacification of the right maxillary sinus is compatible with chronic sinusitis, similar to the prior study.  There is a trace amount of mastoid air cell fluid bilaterally, also unchanged.                                       CT Abdomen Pelvis  Without Contrast (Final result)  Result time 09/13/24 13:41:48      Final result by Prateek Troy DO (09/13/24 13:41:48)                   Impression:      1. No acute intra-abdominal abnormality observed.  2. Trace left pleural effusion.  3. Large left renal cyst is unchanged from previous exam measuring 6.7 cm.  4. Bilateral fat filled inguinal hernias.  Small amount of fluid in the right inguinal canal is unchanged from prior exam.      Electronically signed by: Prateek Troy  Date:    09/13/2024  Time:    13:41               Narrative:      CMS MANDATED QUALITY DATA - CT RADIATION - 436    All CT scans at this facility utilize dose modulation, iterative reconstruction, and/or weight based dosing when appropriate to reduce radiation dose to as low as reasonably achievable.    EXAMINATION:  CT ABDOMEN PELVIS WITHOUT CONTRAST    CLINICAL HISTORY:  Abdominal pain, acute, nonlocalized;    TECHNIQUE:  CT abdomen and pelvis without IV or oral contrast.    COMPARISON:  CT abdomen pelvis 06/23/2024.    FINDINGS:  There is subsegmental atelectasis of the lung bases.  Trace left pleural effusion.    Heart size is normal.    Beam hardening artifacts limits evaluation of the abdomen.    There are calcified granulomas noted scattered throughout the liver.  No gross hepatic lesions otherwise identified.  The gallbladder is not identified.  The pancreas and adrenal glands unremarkable.  There are numerous calcified granulomas of the spleen.  The spleen is normal size.    The kidneys are normal size.  Large left  renal cyst measuring 6.7 cm is again noted with no significant change from previous exam.  There is no hydronephrosis or nephrolithiasis bilaterally.  There is poor visualization of the ureters due to beam hardening artifact.  The bladder is fluid filled with no focal wall abnormalities.  Small amount of fluid noted in the right inguinal canal, unchanged from previous exam.  Fat filled bilateral inguinal hernia is demonstrated.    Prostate gland and seminal vesicles are unremarkable.  Pelvic phleboliths noted.    The large and small bowel normal caliber.  The appendix is normal.  There is no bowel wall thickening or inflammatory changes.  The stomach is decompressed and grossly unremarkable.    Ventral abdominal wall is unremarkable.    There are degenerative changes of the spine, pelvis and hips.  No acute osseous abnormality noted.                                       X-Ray Chest 1 View (Final result)  Result time 09/13/24 11:21:37      Final result by Prateek Troy DO (09/13/24 11:21:37)                   Impression:      Blunting of the left costophrenic angle could reflect small pleural effusion, atelectasis or infiltrate.      Electronically signed by: Prateek Troy  Date:    09/13/2024  Time:    11:21               Narrative:    EXAMINATION:  XR CHEST 1 VIEW    CLINICAL HISTORY:  Sepsis;    FINDINGS:  Portable chest with comparison chest x-ray 06/22/2024.  Normal cardiomediastinal silhouette.There is blunting of the left costophrenic angle.  The lungs are otherwise clear.  Calcified granuloma of the right lung again noted.  Pulmonary vasculature is normal. No acute osseous abnormality.

## 2024-09-13 NOTE — ASSESSMENT & PLAN NOTE
Patient found to have  trace  pleural effusion on imaging. I have personally reviewed and interpreted the following imaging: Xray. A thoracentesis was deferred. Most likely etiology includes  CDK st III . Management to include Diuresis

## 2024-09-13 NOTE — ASSESSMENT & PLAN NOTE
Creatine stable for now. BMP reviewed- noted Estimated Creatinine Clearance: 24.2 mL/min (A) (based on SCr of 2 mg/dL (H)). according to latest data. Based on current GFR, CKD stage is stage 3 - GFR 30-59.  Monitor UOP and serial BMP and adjust therapy as needed. Renally dose meds. Avoid nephrotoxic medications and procedures.

## 2024-09-13 NOTE — ASSESSMENT & PLAN NOTE
This patient does have evidence of infective focus  My overall impression is sepsis.  Source: Urinary Tract  Antibiotics given-   Antibiotics (72h ago, onward)      Start     Stop Route Frequency Ordered    09/14/24 1300  cefTRIAXone (ROCEPHIN) 1 g in dextrose 5 % 100 mL IVPB (ready to mix)         -- IV Every 24 hours (non-standard times) 09/13/24 1455          Latest lactate reviewed-  Recent Labs   Lab 09/13/24  1112   POCLAC 2.39*     Organ dysfunction indicated by Acute kidney injury and Encephalopathy    Fluid challenge Not needed - patient is not hypotensive      Post- resuscitation assessment No - Post resuscitation assessment not needed       Will Not start Pressors- Levophed for MAP of 65  Source control achieved by:   Blood cultures, urine cultures sent  Rocephin IV given  1st Lactate 2.37, repeat Lactic acid ordered  Procalcitonin ordered

## 2024-09-13 NOTE — NURSING
Nurses Note -- 4 Eyes      9/13/2024   6:35 PM      Skin assessed during: Admit      [] No Altered Skin Integrity Present    []Prevention Measures Documented      [x] Yes- Altered Skin Integrity Present or Discovered   [x] LDA Added if Not in Epic (Describe Wound)   [x] New Altered Skin Integrity was Present on Admit and Documented in LDA   [x] Wound Image Taken    Wound Care Consulted? Yes    Attending Nurse:  Kat De Guzman RN/Staff Member:   Del
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
## 2024-09-13 NOTE — TELEPHONE ENCOUNTER
Called and spoke to patient daughter via phone. Advised patient should be taken to ED to receive IV antibiotics. Daughter verbalizes understanding.

## 2024-09-14 PROBLEM — R33.9 URINARY RETENTION: Status: ACTIVE | Noted: 2024-09-14

## 2024-09-14 LAB
ANION GAP SERPL CALC-SCNC: 11 MMOL/L (ref 8–16)
BASOPHILS # BLD AUTO: 0.05 K/UL (ref 0–0.2)
BASOPHILS NFR BLD: 0.5 % (ref 0–1.9)
BUN SERPL-MCNC: 32 MG/DL (ref 10–30)
CALCIUM SERPL-MCNC: 10.1 MG/DL (ref 8.7–10.5)
CHLORIDE SERPL-SCNC: 106 MMOL/L (ref 95–110)
CO2 SERPL-SCNC: 25 MMOL/L (ref 23–29)
CREAT SERPL-MCNC: 1.8 MG/DL (ref 0.5–1.4)
DIFFERENTIAL METHOD BLD: ABNORMAL
EOSINOPHIL # BLD AUTO: 0 K/UL (ref 0–0.5)
EOSINOPHIL NFR BLD: 0.4 % (ref 0–8)
ERYTHROCYTE [DISTWIDTH] IN BLOOD BY AUTOMATED COUNT: 14.5 % (ref 11.5–14.5)
EST. GFR  (NO RACE VARIABLE): 35.1 ML/MIN/1.73 M^2
GLUCOSE SERPL-MCNC: 107 MG/DL (ref 70–110)
HCT VFR BLD AUTO: 38.8 % (ref 40–54)
HGB BLD-MCNC: 12.6 G/DL (ref 14–18)
IMM GRANULOCYTES # BLD AUTO: 0.03 K/UL (ref 0–0.04)
IMM GRANULOCYTES NFR BLD AUTO: 0.3 % (ref 0–0.5)
LACTATE SERPL-SCNC: 1.8 MMOL/L (ref 0.5–1.9)
LYMPHOCYTES # BLD AUTO: 2.5 K/UL (ref 1–4.8)
LYMPHOCYTES NFR BLD: 26.5 % (ref 18–48)
MCH RBC QN AUTO: 29.4 PG (ref 27–31)
MCHC RBC AUTO-ENTMCNC: 32.5 G/DL (ref 32–36)
MCV RBC AUTO: 90 FL (ref 82–98)
MONOCYTES # BLD AUTO: 0.9 K/UL (ref 0.3–1)
MONOCYTES NFR BLD: 9.2 % (ref 4–15)
NEUTROPHILS # BLD AUTO: 5.9 K/UL (ref 1.8–7.7)
NEUTROPHILS NFR BLD: 63.1 % (ref 38–73)
NRBC BLD-RTO: 0 /100 WBC
PLATELET # BLD AUTO: 187 K/UL (ref 150–450)
PMV BLD AUTO: 10.8 FL (ref 9.2–12.9)
POTASSIUM SERPL-SCNC: 4.6 MMOL/L (ref 3.5–5.1)
RBC # BLD AUTO: 4.29 M/UL (ref 4.6–6.2)
SODIUM SERPL-SCNC: 142 MMOL/L (ref 136–145)
WBC # BLD AUTO: 9.32 K/UL (ref 3.9–12.7)

## 2024-09-14 PROCEDURE — 63600175 PHARM REV CODE 636 W HCPCS: Performed by: INTERNAL MEDICINE

## 2024-09-14 PROCEDURE — 80048 BASIC METABOLIC PNL TOTAL CA: CPT | Performed by: NURSE PRACTITIONER

## 2024-09-14 PROCEDURE — 36415 COLL VENOUS BLD VENIPUNCTURE: CPT | Performed by: NURSE PRACTITIONER

## 2024-09-14 PROCEDURE — 12000002 HC ACUTE/MED SURGE SEMI-PRIVATE ROOM

## 2024-09-14 PROCEDURE — 25000003 PHARM REV CODE 250: Performed by: INTERNAL MEDICINE

## 2024-09-14 PROCEDURE — 87086 URINE CULTURE/COLONY COUNT: CPT | Performed by: INTERNAL MEDICINE

## 2024-09-14 PROCEDURE — 83605 ASSAY OF LACTIC ACID: CPT | Performed by: NURSE PRACTITIONER

## 2024-09-14 PROCEDURE — 51798 US URINE CAPACITY MEASURE: CPT

## 2024-09-14 PROCEDURE — 63600175 PHARM REV CODE 636 W HCPCS: Performed by: NURSE PRACTITIONER

## 2024-09-14 PROCEDURE — 25000003 PHARM REV CODE 250: Performed by: NURSE PRACTITIONER

## 2024-09-14 PROCEDURE — 97167 OT EVAL HIGH COMPLEX 60 MIN: CPT

## 2024-09-14 PROCEDURE — 25000242 PHARM REV CODE 250 ALT 637 W/ HCPCS: Performed by: NURSE PRACTITIONER

## 2024-09-14 PROCEDURE — 97161 PT EVAL LOW COMPLEX 20 MIN: CPT

## 2024-09-14 PROCEDURE — 25000003 PHARM REV CODE 250: Performed by: STUDENT IN AN ORGANIZED HEALTH CARE EDUCATION/TRAINING PROGRAM

## 2024-09-14 PROCEDURE — 97530 THERAPEUTIC ACTIVITIES: CPT

## 2024-09-14 PROCEDURE — 85025 COMPLETE CBC W/AUTO DIFF WBC: CPT | Performed by: NURSE PRACTITIONER

## 2024-09-14 RX ORDER — TAMSULOSIN HYDROCHLORIDE 0.4 MG/1
0.4 CAPSULE ORAL DAILY
Status: DISCONTINUED | OUTPATIENT
Start: 2024-09-14 | End: 2024-09-26 | Stop reason: HOSPADM

## 2024-09-14 RX ORDER — MUPIROCIN 20 MG/G
OINTMENT TOPICAL 2 TIMES DAILY
Status: DISCONTINUED | OUTPATIENT
Start: 2024-09-15 | End: 2024-09-14

## 2024-09-14 RX ORDER — POLYETHYLENE GLYCOL 3350 17 G/17G
17 POWDER, FOR SOLUTION ORAL ONCE
Status: COMPLETED | OUTPATIENT
Start: 2024-09-15 | End: 2024-09-15

## 2024-09-14 RX ADMIN — FAMOTIDINE 20 MG: 20 TABLET ORAL at 08:09

## 2024-09-14 RX ADMIN — HYDRALAZINE HYDROCHLORIDE 10 MG: 10 TABLET, FILM COATED ORAL at 02:09

## 2024-09-14 RX ADMIN — LEVETIRACETAM 500 MG: 500 TABLET, FILM COATED ORAL at 08:09

## 2024-09-14 RX ADMIN — ENOXAPARIN SODIUM 30 MG: 30 INJECTION SUBCUTANEOUS at 08:09

## 2024-09-14 RX ADMIN — HYDRALAZINE HYDROCHLORIDE 10 MG: 10 TABLET, FILM COATED ORAL at 05:09

## 2024-09-14 RX ADMIN — CEFTRIAXONE SODIUM 1 G: 1 INJECTION, POWDER, FOR SOLUTION INTRAMUSCULAR; INTRAVENOUS at 02:09

## 2024-09-14 RX ADMIN — FLUTICASONE PROPIONATE 100 MCG: 50 SPRAY, METERED NASAL at 08:09

## 2024-09-14 RX ADMIN — CYANOCOBALAMIN TAB 1000 MCG 1000 MCG: 1000 TAB at 08:09

## 2024-09-14 RX ADMIN — AMLODIPINE BESYLATE 10 MG: 5 TABLET ORAL at 05:09

## 2024-09-14 RX ADMIN — HYDRALAZINE HYDROCHLORIDE 10 MG: 10 TABLET, FILM COATED ORAL at 11:09

## 2024-09-14 RX ADMIN — TAMSULOSIN HYDROCHLORIDE 0.4 MG: 0.4 CAPSULE ORAL at 02:09

## 2024-09-14 NOTE — ASSESSMENT & PLAN NOTE
History of dementia  Likely related to UTI, continue rocephin  Monitor neuro check  Check keppra level  Eeg  Check vitamin levels

## 2024-09-14 NOTE — PLAN OF CARE
Problem: Infection  Goal: Absence of Infection Signs and Symptoms  Outcome: Progressing     Problem: Adult Inpatient Plan of Care  Goal: Plan of Care Review  Outcome: Progressing  Goal: Patient-Specific Goal (Individualized)  Outcome: Progressing  Goal: Absence of Hospital-Acquired Illness or Injury  Outcome: Progressing  Goal: Optimal Comfort and Wellbeing  Outcome: Progressing  Goal: Readiness for Transition of Care  Outcome: Progressing     Problem: Sepsis/Septic Shock  Goal: Optimal Coping  Outcome: Progressing  Goal: Absence of Bleeding  Outcome: Progressing  Goal: Blood Glucose Level Within Targeted Range  Outcome: Progressing  Goal: Absence of Infection Signs and Symptoms  Outcome: Progressing  Goal: Optimal Nutrition Intake  Outcome: Progressing     Problem: Wound  Goal: Optimal Coping  Outcome: Progressing  Goal: Optimal Functional Ability  Outcome: Progressing  Goal: Absence of Infection Signs and Symptoms  Outcome: Progressing  Goal: Improved Oral Intake  Outcome: Progressing  Goal: Optimal Pain Control and Function  Outcome: Progressing  Goal: Skin Health and Integrity  Outcome: Progressing  Goal: Optimal Wound Healing  Outcome: Progressing     Problem: Skin Injury Risk Increased  Goal: Skin Health and Integrity  Outcome: Progressing

## 2024-09-14 NOTE — HOSPITAL COURSE
89 year old hospitalized for UTI, AMS, severe sepsis. In Er, Lactate 2.39, BUN 3, Sr Cr 2, recently diagnosed with UTI, treated with oral Augmentin. However, the urine sensitivity was resistant to Augmentin, sensitive to Rocephin. Therefore, rocephin was started in the ER and continued during admission. Patient was afebrile, no leukocytosis.  Lactic acid improved with IV antibiotics and fluid resuscitation.  Renal function stable at 2.1, this is the patient's baseline renal function per historical records.  Repeat urine cultures continued to show no growth to date however patient maintained on Rocephin 1 g daily.  Since patient was being treated for UTI prior to admission, the urine culture may have been affected.  Patient continued to have waxing and waning mental status.  Neurology was consulted.  CT head was negative for acute findings.  An EEG was obtained which was negative for epileptiform activity.  Patient's Keppra dose was decreased per Neurology recs.  Patient's mental status continued to improve to baseline.  PT and OT were consulted for evaluation with recommendations for moderate intensity therapy made.  Wound care was consulted for treatment of deep tissue injury to sacrum. CM was consulted for SNF placement.  He was accepted to SNF at [facility] where they will continue wound care and PT/OT.  Discharge plan was discussed with patient's daughter and she was in agreement.  He will follow up with his PCP in about a week.

## 2024-09-14 NOTE — SUBJECTIVE & OBJECTIVE
Interval History: seen and examined. Confused.     Review of Systems   Unable to perform ROS: Dementia     Objective:     Vital Signs (Most Recent):  Temp: 97.4 °F (36.3 °C) (09/14/24 1100)  Pulse: 94 (09/14/24 1100)  Resp: 18 (09/14/24 1100)  BP: (!) 177/86 (notified nurse) (09/14/24 1100)  SpO2: 100 % (09/14/24 1100) Vital Signs (24h Range):  Temp:  [97.4 °F (36.3 °C)-97.9 °F (36.6 °C)] 97.4 °F (36.3 °C)  Pulse:  [] 94  Resp:  [11-20] 18  SpO2:  [97 %-100 %] 100 %  BP: (144-208)/(69-98) 177/86     Weight: 66 kg (145 lb 8.1 oz)  Body mass index is 20.88 kg/m².    Intake/Output Summary (Last 24 hours) at 9/14/2024 1346  Last data filed at 9/14/2024 1236  Gross per 24 hour   Intake 480 ml   Output --   Net 480 ml         Physical Exam  Vitals reviewed.   Constitutional:       General: He is not in acute distress.     Appearance: Normal appearance.   HENT:      Head: Normocephalic and atraumatic.   Eyes:      Extraocular Movements: Extraocular movements intact.      Pupils: Pupils are equal, round, and reactive to light.   Pulmonary:      Effort: Pulmonary effort is normal.   Abdominal:      General: Bowel sounds are normal.      Palpations: Abdomen is soft.   Musculoskeletal:      Cervical back: Normal range of motion and neck supple.   Skin:     General: Skin is warm and dry.      Capillary Refill: Capillary refill takes less than 2 seconds.   Neurological:      General: No focal deficit present.      Mental Status: He is alert.   Psychiatric:         Cognition and Memory: Cognition is impaired. Memory is impaired.             Significant Labs: seen All pertinent labs within the past 24 hours have been reviewed.  BMP:   Recent Labs   Lab 09/13/24  1114 09/14/24  0523   * 107    142   K 4.6 4.6    106   CO2 28 25   BUN 34* 32*   CREATININE 2.0* 1.8*   CALCIUM 10.3 10.1   MG 2.1  --      CBC:   Recent Labs   Lab 09/13/24  1114 09/14/24  0523   WBC 9.56 9.32   HGB 12.1* 12.6*   HCT 36.6* 38.8*  "   187     CMP:   Recent Labs   Lab 09/13/24  1114 09/14/24  0523    142   K 4.6 4.6    106   CO2 28 25   * 107   BUN 34* 32*   CREATININE 2.0* 1.8*   CALCIUM 10.3 10.1   PROT 7.3  --    ALBUMIN 4.1  --    BILITOT 0.5  --    ALKPHOS 46*  --    AST 16  --    ALT 9*  --    ANIONGAP 8 11     Coagulation: No results for input(s): "PT", "INR", "APTT" in the last 48 hours.  Magnesium:   Recent Labs   Lab 09/13/24  1114   MG 2.1     Urine Culture: No results for input(s): "LABURIN" in the last 48 hours.    Significant Imaging: I have reviewed all pertinent imaging results/findings within the past 24 hours.    "

## 2024-09-14 NOTE — PLAN OF CARE
Problem: Physical Therapy  Goal: Physical Therapy Goal  Description: Goals to be met by: 10/14/24     Patient will increase functional independence with mobility by performin. Supine to sit with MInimal Assistance  2. Sit to supine with MInimal Assistance  3. Sit to stand transfer with Minimal Assistance  4. Bed to chair transfer with Minimal Assistance using Rolling Walker  5. Gait  2 x 25 feet with Minimal Assistance using Rolling Walker.     Outcome: Progressing

## 2024-09-14 NOTE — NURSING
Patient arrived to unit aao x 2 in stable condition accompanied by daughter no c/o pain or discomfort when asked oriented patient and daughter to room and staff bruises noted to right lower arm mepliex applied to sacral and heels for protection woundcare consulted photos taken voices no concerns at this time bed in lowest position personal items and call light in reach bed alarm on daughter at bedside

## 2024-09-14 NOTE — PLAN OF CARE
[facility]  Initial Discharge Assessment       Primary Care Provider: Aristides Haynes MD    Admission Diagnosis: Altered mental status, unspecified altered mental status type [R41.82]    Admission Date: 9/13/2024  Expected Discharge Date:      met with Pt at bedside to complete discharge assessment. Pt was unable to be assessed due to delirium.SW met with next of kin Pt daughter, Gabriela Leyva. Pt AAOx4s. Demographics, PCP, and insurance verified. Pt receives home health care through [facility] and would like to resume services at D/C. No dialysis. Pt family reports ability to complete ADLs without assistance. Pt has a walker and wheelchair that he uses but is not fully dependent on.  Pt family verbalized plan to discharge home continuing home health or skilled nursing facility. Pt family reports needing a new mattress for the hospital bed. Pt has no other needs to be addressed at this time.     Transition of Care Barriers: None    Payor: [employer] / Plan: [facility] OPTUM / Product Type: Government /     Extended Emergency Contact Information  Primary Emergency Contact: Gabriela Merino  Address: [address] [place] [address] [id number] [place] [address]  Work Phone: [phone]  Mobile Phone: [phone]  Relation: Daughter  Preferred language: English   needed? No  Secondary Emergency Contact: Nilda Leyva  Mobile Phone: [phone]  Relation: Daughter  Preferred language: English   needed? No    Discharge Plan A: Home Health  Discharge Plan B: Skilled Nursing Facility, Home with family      [id number] [address] [facility] [address] & [address] [address]  Phone: [phone] Fax: [phone]    [facility] [place], [place] - [address]  [address] [facility] [address]  Phone: [phone] Fax: [phone]    [id number]  [place] - [address]  [address] [facility] [place] [address]  Phone: [phone] Fax: [phone]      Initial Assessment (most recent)       Adult Discharge Assessment - 09/13/24 2014          Discharge Assessment    Assessment Type Discharge Planning Assessment     Confirmed/corrected address, phone number and insurance Yes     Confirmed Demographics Correct on Facesheet     Source of Information family     If unable to respond/provide information was family/caregiver contacted? Yes     Contact Name/Number Daugther: Gabriela Leyva [phone]     When was your last doctors appointment? --   Pt reports last PCP appointment was last month    Communicated JOANNE with patient/caregiver Date not available/Unable to determine     Reason For Admission Severe sepsis     People in Home child(idalmis), adult     Facility Arrived From: Home     Do you expect to return to your current living situation? No     Do you have help at home or someone to help you manage your care at home? Yes     Who are your caregiver(s) and their phone number(s)? Daughter: Gabriela Leyva [phone] and Nilda Leyva [phone]     Prior to hospitilization cognitive status: Alert/Oriented     Current cognitive status: Alert/Oriented     Walking or Climbing Stairs Difficulty no     Dressing/Bathing Difficulty no     Home Layout Able to live on 1st floor     Equipment Currently Used at Home none     Readmission within 30 days? No     Patient currently being followed by outpatient case management? No     Do you currently have service(s) that help you manage your care at home? Yes     How Many hours does patient receive services --   Once a week    Name and Contact number of agency [facility] [phone]     Is the pt/caregiver preference to resume services with current agency Yes     Do you take prescription medications? Yes     Do you have prescription coverage? Yes     Coverage Payor: [employer] - [facility] OPTUM -     Do you have any problems affording any of your prescribed medications? No     Is the patient taking medications as prescribed? yes     Who is going to help you get home at discharge? Daughter: Gabriela Leyva [phone]     How do you get to doctors appointments? family or friend will provide     Are you on dialysis? No     Do you take coumadin? No     Discharge Plan A Home Health     Discharge Plan B Skilled Nursing Facility;Home with family     DME Needed Upon Discharge  none     Discharge Plan discussed with: Patient;Adult children     Transition of Care Barriers None        Physical Activity    On average, how many days per week do you engage in moderate to strenuous exercise (like a brisk walk)? 0 days     On average, how many minutes do you engage in exercise at this level? 0 min        Financial Resource Strain    How hard is it for you to pay for the very basics like food, housing, medical care, and heating? Not hard at all        Housing Stability    In the last 12 months, was there a time when you were not able to pay the mortgage or rent on time? No     At any time in the past 12 months, were you homeless or living in a shelter (including now)? No        Transportation Needs    Has the lack of transportation kept you from medical appointments, meetings, work or from getting things needed for daily living? No        Food Insecurity    Within the past 12 months, you worried that your food would run out before you got the money to buy more. Never true     Within the past 12 months, the food you bought just didn't last and you didn't have money to get more. Never true        Stress    Do you feel stress - tense, restless, nervous, or anxious, or unable to sleep at night because your mind is troubled all the time - these days? Not at all        Social Isolation    How often do you feel lonely or isolated from those around you?  Never        Alcohol Use    Q1: How often do  you have a drink containing alcohol? Never     Q2: How many drinks containing alcohol do you have on a typical day when you are drinking? Patient does not drink     Q3: How often do you have six or more drinks on one occasion? Never        Utilities    In the past 12 months has the electric, gas, oil, or water company threatened to shut off services in your home? No        Health Literacy    How often do you need to have someone help you when you read instructions, pamphlets, or other written material from your doctor or pharmacy? Never

## 2024-09-14 NOTE — PLAN OF CARE
Lavon Brandon has warranted treatment spanning two or more midnights of hospital level care for the management of  Sepsis/UTI/MATA and Metabolic encephalopathy . He continues to require IV antibiotics, daily labs, further testing/imaging, monitoring of vital signs, medication adjustments, and further evaluation by consultants. His condition is also complicated by the following comorbidities: Hypertension and CVA,Seizure and Dementia .

## 2024-09-14 NOTE — PT/OT/SLP EVAL
"Physical Therapy Evaluation    Patient Name:  Lavon Brandon   MRN:  [id number]    Recommendations:     Discharge Recommendations: Low Intensity Therapy , 24/7 assistance  Discharge Equipment Recommendations: none   Barriers to discharge: Decreased caregiver support, current behavior disturbance    Assessment:     Lavon Brandon is a 89 y.o. male admitted with a medical diagnosis of Severe sepsis.  He presents with the following impairments/functional limitations: weakness, impaired self care skills, impaired functional mobility, gait instability, impaired balance, impaired cognition, decreased coordination, decreased safety awareness Pt found lying in bed, reaching out for something and talking nonsense, keeping eyes closed. Hallucinating.Daughter entered room moments later and provides some history. Pt identifies daughter as a "lady friend" but does not open his eyes. Only follows 1 step commands with repeated cues,verbal and tactile. Attempted standing with RW x 3 trials, max A x 2. Pt in retropulsion and unable to achieve upright, postural stability. Returned to bed after sitting EOB x 12 minutes. Daughter reports that patient is able to walk in home with RW and SBA but requires min A with ADL's and bed mobility. She also reports that once UTI resolves, pt returns to baseline. He will benefit from ongoing PT to facilitate improved safety and mobility as cognition improves to baseline dementia.    Rehab Prognosis: Fair; patient would benefit from acute skilled PT services to address these deficits and reach maximum level of function.    Recent Surgery: * No surgery found *      Plan:     During this hospitalization, patient to be seen 5 x/week to address the identified rehab impairments via gait training, therapeutic activities, therapeutic exercises and progress toward the following goals:    Plan of Care Expires:  10/14/24    Subjective     Chief Complaint: "let me get that stuff"  Patient/Family " "Comments/goals: return to baseline  Pain/Comfort:  Pain Rating 1: 0/10  Pain Rating Post-Intervention 1: 0/10    Patients cultural, spiritual, Roman Catholic conflicts given the current situation:      Living Environment:  Lives with daughter in [facility],has a sitter  Prior to admission, patients level of function was min A.  Equipment used at home: walker, rolling, wheelchair, bedside commode, hospital bed.  DME owned (not currently used): none.  Upon discharge, patient will have assistance from family.    Objective:     Communicated with nurse prior to session.  Patient found HOB elevated with bed alarm, peripheral IV  upon PT entry to room.    General Precautions: Standard, fall, seizure (dementia)  Orthopedic Precautions:N/A   Braces: N/A  Respiratory Status: Room air    Exams:  Pt not following complex commands. Strength appears within functional limits  Cognitive Exam:  Patient is oriented to Person  goes by "Mr. Sevilla"    Functional Mobility:  Bed Mobility:     Supine to Sit: dependence and of 2 persons  Sit to Supine: maximal assistance  Transfers:     Sit to Stand:  maximal assistance and of 2 persons with rolling walker and unable to achieve upright postural stability; demonstrates retropulsion, unable to right  Balance: fair sitting balance, poor standing balance with UE support on AD, keeping eyes closed      AM-PAC 6 CLICK MOBILITY  Total Score:9       Treatment & Education:  Pt and daughter educated in PT roles and goals, Dc recommendations, fall prevention    Patient left HOB elevated with all lines intact, call button in reach, bed alarm on, nurse notified, and daughter present.    GOALS:   Multidisciplinary Problems       Physical Therapy Goals          Problem: Physical Therapy    Goal Priority Disciplines Outcome Goal Variances Interventions   Physical Therapy Goal     PT, PT/OT Progressing     Description: Goals to be met by: 10/14/24     Patient will increase functional independence with mobility by " performin. Supine to sit with MInimal Assistance  2. Sit to supine with MInimal Assistance  3. Sit to stand transfer with Minimal Assistance  4. Bed to chair transfer with Minimal Assistance using Rolling Walker  5. Gait  2 x 25 feet with Minimal Assistance using Rolling Walker.                          History:     Past Medical History:   Diagnosis Date    Bradyarrhythmia     CVA (cerebral infarction)     Dementia     Depression     Hyperlipidemia     Hypertension     renal htn    Pulmonary embolism     Seizure disorder        Past Surgical History:   Procedure Laterality Date    left foot  with crushed injry         Time Tracking:     PT Received On: 24  PT Start Time: 1038     PT Stop Time: 1054  PT Total Time (min): 16 min     Billable Minutes: Evaluation 8 and Therapeutic Activity 8      2024

## 2024-09-14 NOTE — ASSESSMENT & PLAN NOTE
December 29, 2020      Joshua Polk, DO  [address]  [place] [address]           [facility]  [address]  [place] [address]  Phone: [phone]          Patient: Alli Lezama   MR Number: [id number]   YOB: 1981   Date of Visit: 12/29/2020       Dear Dr. Joshua Polk:    Thank you for referring Alli Lezaam to me for evaluation. Attached you will find relevant portions of my assessment and plan of care.    If you have questions, please do not hesitate to call me. I look forward to following Alli Lezama along with you.    Sincerely,    Rosalind Mancia MD    Enclosure  CC:  No Recipients    If you would like to receive this communication electronically, please contact [email or web address] or [phone] to request more information on [employer] Link access.    For providers and/or their staff who would like to refer a patient to [facility], please contact us through our one-stop-shop provider referral line, Felipe Montes De Oca, at [phone].    If you feel you have received this communication in error or would no longer like to receive these types of communications, please e-mail [email or web address]          Patient with Acute debility due to age-related physical debility. Latest AMPAC and GEMS scores have not been reviewed. Evaluation for etiology is underway. Plan includes PT/OT consulted and fall precautions in place.

## 2024-09-14 NOTE — PT/OT/SLP EVAL
Occupational Therapy   Evaluation    Name: Lavon Brandon  MRN: [id number]  Admitting Diagnosis: Severe sepsis  Recent Surgery: * No surgery found *      Recommendations:     Discharge Recommendations: No Therapy Indicated  Discharge Equipment Recommendations:  other (see comments) (Unknown due to patient's being poor historian and inability to understand simple one step instructions.)  Barriers to discharge:  None    Assessment:     Lavon Brandon is a 89 y.o. male with a medical diagnosis of Severe sepsis.  He presents with impaired cognition/dementia. Patient has performance deficits, but near baseline.     Rehab Prognosis: Poor; patient would benefit from acute skilled OT services to address these deficits and reach maximum level of function.       Plan:     Patient to be seen for one visit; evaluation only  Plan of Care Expires: 09/14/24  Plan of Care Reviewed with: patient, daughter    Subjective     Chief Complaint: Impaired Cognition/AMS    Occupational Profile:  Living Environment: Patient lives with daughter whom is his primary caregiver   Previous level of function: Maximum Assistance/Dependent with ADLs   Roles and Routines: N/A  Equipment Used at Home: walker, rolling  Assistance upon Discharge: Daughter     Pain/Comfort:  Pain Rating 1: 0/10      Objective:     Communicated with: RN prior to session.  Patient found supine with peripheral IV upon OT entry to room.    General Precautions: Standard, fall  Orthopedic Precautions: N/A  Braces: N/A  Respiratory Status: Room air    Occupational Performance:    Bed Mobility:    Patient completed Rolling/Turning to Left with  total assistance  Patient completed Rolling/Turning to Right with total assistance  Patient completed Scooting/Bridging with total assistance  Patient completed Supine to Sit with unable to get patient to follow verbal or tactile cues to perform task. Patient hallucinating speaking to a friend (no one else was in room), and asking  therapist why would he do that.   Patient completed Sit to Supine with unable to get patient to follow verbal or tactile cues to perform task.     Functional Mobility/Transfers:  Unable to assess transfers due to impaired cognition, safety, and ability to follow instructions or cues  Functional Mobility: Non-ambulatory at assessement    Activities of Daily Living:  Grooming: total assistance with attempt being made supine in bed  Upper Body Dressing: total assistance with attempt being made supine in bed    Cognitive/Visual Perceptual:  Cognitive/Psychosocial Skills:     -       Oriented to: Unable to recall name except for once   -       Follows Commands/attention:Inattentive  -       Communication: clear when barely speaking directly to clinician, he was also speaking  to people who weren't actually in the room.   -       Memory: Impaired STM, Impaired LTM, and Poor immediate recall  -       Safety awareness/insight to disability: impaired   -       Mood/Affect/Coping skills/emotional control: Withdrawn    Physical Exam:  Balance:    -       Unable to assess due to safety concerns  Upper Extremity Range of Motion:     -       Right Upper Extremity: Unable to assess due to  being unable to follow simple instructions, and resistant with passive range of motion  -       Left Upper Extremity: Unable to assess due to  being unable to follow simple instructions, and resistance with passive range of motion  Upper Extremity Strength:    -       Right Upper Extremity: Unable to assess due to impaired cognition  -       Left Upper Extremity: Unable to assess due to impaired cognition    AMPAC 6 Click ADL:  AMPAC Total Score: 7    Treatment & Education:  Caregiver education provided via phone on veterans current functional level, and participation level during initial evaluation.     Patient left supine with call button in reach    GOALS:   Multidisciplinary Problems       Occupational Therapy Goals       Not  on file                    History:     Past Medical History:   Diagnosis Date    Bradyarrhythmia     CVA (cerebral infarction) 2006    Dementia     Depression     Hyperlipidemia     Hypertension     renal htn    Pulmonary embolism 2002    Seizure disorder          Past Surgical History:   Procedure Laterality Date    left foot  with crushed injry         Time Tracking:     OT Date of Treatment: 09/14/24  OT Start Time: 0956  OT Stop Time: 1009  OT Total Time (min): 13 min    Billable Minutes:Evaluation 13    9/14/2024

## 2024-09-14 NOTE — PROGRESS NOTES
[facility]  Progress Note    Patient Name: Lavon Brandon  MRN: [id number]  Patient Class: IP- Inpatient   Admission Date: 9/13/2024  Length of Stay: 0 days  Attending Physician: Jeri Cervantes DO  Primary Care Provider: Aristides Haynes MD        Subjective:     Principal Problem:Severe sepsis        HPI:  89 year old male with a past medical history of hypertension, hyperlipidemia, CVA, PE, Seizure, UTI and dementia who presents to the emergency room with reports of abdominal and back pain and hallucinations. Also decreased bowel movements over last 2-4 days. He was recently diagnosed with UTI and started on augmentin. Per urine culture sensitivity, organism is resistant to Augmentin, sensitive to Rocephin.  Hypertensive in the ER at 212/94, reports did not take his home medication this morning.  WBC 9.56, mild MATA with serum creatinine 2.0, BUN 34, UA negative for leukocytes and nitrites.  Blood culture sent, lactic 2.39.  CT abdomen with trace left pleural effusion, large left renal cyst, CT head no acute findings, chest x-ray Blunting of the left costophrenic angle. IV rocephin given in ER with Hydralazine, amlodipine,  ml.  Admit to hospital medicine for further medical management    Overview/Hospital Course:  89 year old hospitalized for UTI, AMS, severe sepsis. In Er, Lactate 2.39, BUN 3, Sr Cr 2, recently diagnosed with UTI, treated with oral Augmentin. Urine sensitivity resistant to Augmentin, sensitive to Rocephin. Rocephin started in ER. Afebrile, no leukocytosis. Overnight, lactic acid and sr cr improving, pending urine and blood cultures.     Interval History: seen and examined. Confused.     Review of Systems   Unable to perform ROS: Dementia     Objective:     Vital Signs (Most Recent):  Temp: 97.4 °F (36.3 °C) (09/14/24 1100)  Pulse: 94 (09/14/24 1100)  Resp: 18 (09/14/24 1100)  BP: (!) 177/86 (notified nurse) (09/14/24 1100)  SpO2: 100 % (09/14/24 1100) Vital  Signs (24h Range):  Temp:  [97.4 °F (36.3 °C)-97.9 °F (36.6 °C)] 97.4 °F (36.3 °C)  Pulse:  [] 94  Resp:  [11-20] 18  SpO2:  [97 %-100 %] 100 %  BP: (144-208)/(69-98) 177/86     Weight: 66 kg (145 lb 8.1 oz)  Body mass index is 20.88 kg/m².    Intake/Output Summary (Last 24 hours) at 9/14/2024 1346  Last data filed at 9/14/2024 1236  Gross per 24 hour   Intake 480 ml   Output --   Net 480 ml         Physical Exam  Vitals reviewed.   Constitutional:       General: He is not in acute distress.     Appearance: Normal appearance.   HENT:      Head: Normocephalic and atraumatic.   Eyes:      Extraocular Movements: Extraocular movements intact.      Pupils: Pupils are equal, round, and reactive to light.   Pulmonary:      Effort: Pulmonary effort is normal.   Abdominal:      General: Bowel sounds are normal.      Palpations: Abdomen is soft.   Musculoskeletal:      Cervical back: Normal range of motion and neck supple.   Skin:     General: Skin is warm and dry.      Capillary Refill: Capillary refill takes less than 2 seconds.   Neurological:      General: No focal deficit present.      Mental Status: He is alert.   Psychiatric:         Cognition and Memory: Cognition is impaired. Memory is impaired.             Significant Labs: seen All pertinent labs within the past 24 hours have been reviewed.  BMP:   Recent Labs   Lab 09/13/24 1114 09/14/24  0523   * 107    142   K 4.6 4.6    106   CO2 28 25   BUN 34* 32*   CREATININE 2.0* 1.8*   CALCIUM 10.3 10.1   MG 2.1  --      CBC:   Recent Labs   Lab 09/13/24 1114 09/14/24  0523   WBC 9.56 9.32   HGB 12.1* 12.6*   HCT 36.6* 38.8*    187     CMP:   Recent Labs   Lab 09/13/24 1114 09/14/24  0523    142   K 4.6 4.6    106   CO2 28 25   * 107   BUN 34* 32*   CREATININE 2.0* 1.8*   CALCIUM 10.3 10.1   PROT 7.3  --    ALBUMIN 4.1  --    BILITOT 0.5  --    ALKPHOS 46*  --    AST 16  --    ALT 9*  --    ANIONGAP 8 11  "    Coagulation: No results for input(s): "PT", "INR", "APTT" in the last 48 hours.  Magnesium:   Recent Labs   Lab 09/13/24  1114   MG 2.1     Urine Culture: No results for input(s): "LABURIN" in the last 48 hours.    Significant Imaging: I have reviewed all pertinent imaging results/findings within the past 24 hours.      Assessment/Plan:      * Severe sepsis  This patient does have evidence of infective focus  My overall impression is sepsis.  Source: Urinary Tract  Antibiotics given-   Antibiotics (72h ago, onward)      Start     Stop Route Frequency Ordered    09/14/24 1300  cefTRIAXone (ROCEPHIN) 1 g in dextrose 5 % 100 mL IVPB (ready to mix)         -- IV Every 24 hours (non-standard times) 09/13/24 1455          Latest lactate reviewed-  Recent Labs   Lab 09/13/24  1112   POCLAC 2.39*     Organ dysfunction indicated by Acute kidney injury and Encephalopathy    Fluid challenge Not needed - patient is not hypotensive      Post- resuscitation assessment No - Post resuscitation assessment not needed       Will Not start Pressors- Levophed for MAP of 65  Source control achieved by:   Blood cultures, urine cultures sent  Rocephin IV given  1st Lactate 2.37, repeat Lactic acid ordered  Procalcitonin ordered      UTI (urinary tract infection)  Urine culture sensitive to rocephin.   Start rocephin        Encephalopathy, metabolic  History of dementia  Likely related to UTI, continue rocephin  Monitor neuro check  Check keppra level  Eeg  Check vitamin levels    Pleural effusion on left  Patient found to have  trace  pleural effusion on imaging. I have personally reviewed and interpreted the following imaging: Xray. A thoracentesis was deferred. Most likely etiology includes  CDK st III . Management to include Diuresis      Urinary retention   ml  Place santo cath   Start flomax        Seizure disorder  Continue keppra  Keppra level  EEG ordered      Debility  Patient with Acute debility due to age-related " physical debility. Latest AMPAC and GEMS scores have not been reviewed. Evaluation for etiology is underway. Plan includes PT/OT consulted and fall precautions in place.    Chronic renal disease, stage IV  Creatine stable for now. BMP reviewed- noted Estimated Creatinine Clearance: 24.2 mL/min (A) (based on SCr of 2 mg/dL (H)). according to latest data. Based on current GFR, CKD stage is stage 3 - GFR 30-59.  Monitor UOP and serial BMP and adjust therapy as needed. Renally dose meds. Avoid nephrotoxic medications and procedures.      VTE Risk Mitigation (From admission, onward)           Ordered     enoxaparin injection 30 mg  Every 24 hours (non-standard times)         09/13/24 1848     IP VTE HIGH RISK PATIENT  Once         09/13/24 1455     Place sequential compression device  Until discontinued         09/13/24 1455                    Discharge Planning   JOANNE: 9/18/2024     Code Status: DNR   Is the patient medically ready for discharge?:     Reason for patient still in hospital (select all that apply): Treatment  Discharge Plan A: Home Health                  Mirtha Keys NP  Department of Hospital Medicine   [facility]

## 2024-09-15 LAB
25(OH)D3+25(OH)D2 SERPL-MCNC: 29 NG/ML (ref 30–96)
ANION GAP SERPL CALC-SCNC: 7 MMOL/L (ref 8–16)
BASOPHILS # BLD AUTO: 0.06 K/UL (ref 0–0.2)
BASOPHILS NFR BLD: 1 % (ref 0–1.9)
BUN SERPL-MCNC: 32 MG/DL (ref 10–30)
CALCIUM SERPL-MCNC: 9.6 MG/DL (ref 8.7–10.5)
CHLORIDE SERPL-SCNC: 107 MMOL/L (ref 95–110)
CO2 SERPL-SCNC: 28 MMOL/L (ref 23–29)
CREAT SERPL-MCNC: 1.9 MG/DL (ref 0.5–1.4)
DIFFERENTIAL METHOD BLD: ABNORMAL
EOSINOPHIL # BLD AUTO: 0.2 K/UL (ref 0–0.5)
EOSINOPHIL NFR BLD: 3.9 % (ref 0–8)
ERYTHROCYTE [DISTWIDTH] IN BLOOD BY AUTOMATED COUNT: 14.6 % (ref 11.5–14.5)
EST. GFR  (NO RACE VARIABLE): 32.9 ML/MIN/1.73 M^2
GLUCOSE SERPL-MCNC: 75 MG/DL (ref 70–110)
HCT VFR BLD AUTO: 33.6 % (ref 40–54)
HGB BLD-MCNC: 10.9 G/DL (ref 14–18)
IMM GRANULOCYTES # BLD AUTO: 0.01 K/UL (ref 0–0.04)
IMM GRANULOCYTES NFR BLD AUTO: 0.2 % (ref 0–0.5)
LYMPHOCYTES # BLD AUTO: 2.3 K/UL (ref 1–4.8)
LYMPHOCYTES NFR BLD: 36.9 % (ref 18–48)
MCH RBC QN AUTO: 29.5 PG (ref 27–31)
MCHC RBC AUTO-ENTMCNC: 32.4 G/DL (ref 32–36)
MCV RBC AUTO: 91 FL (ref 82–98)
MONOCYTES # BLD AUTO: 0.5 K/UL (ref 0.3–1)
MONOCYTES NFR BLD: 8.2 % (ref 4–15)
NEUTROPHILS # BLD AUTO: 3.1 K/UL (ref 1.8–7.7)
NEUTROPHILS NFR BLD: 49.8 % (ref 38–73)
NRBC BLD-RTO: 0 /100 WBC
PLATELET # BLD AUTO: 155 K/UL (ref 150–450)
PMV BLD AUTO: 10.5 FL (ref 9.2–12.9)
POTASSIUM SERPL-SCNC: 4.6 MMOL/L (ref 3.5–5.1)
RBC # BLD AUTO: 3.69 M/UL (ref 4.6–6.2)
SODIUM SERPL-SCNC: 142 MMOL/L (ref 136–145)
VIT B12 SERPL-MCNC: 1402 PG/ML (ref 210–950)
WBC # BLD AUTO: 6.12 K/UL (ref 3.9–12.7)

## 2024-09-15 PROCEDURE — 82607 VITAMIN B-12: CPT | Performed by: NURSE PRACTITIONER

## 2024-09-15 PROCEDURE — 80048 BASIC METABOLIC PNL TOTAL CA: CPT | Performed by: NURSE PRACTITIONER

## 2024-09-15 PROCEDURE — 85025 COMPLETE CBC W/AUTO DIFF WBC: CPT | Performed by: NURSE PRACTITIONER

## 2024-09-15 PROCEDURE — 25000003 PHARM REV CODE 250: Performed by: INTERNAL MEDICINE

## 2024-09-15 PROCEDURE — 82306 VITAMIN D 25 HYDROXY: CPT | Performed by: NURSE PRACTITIONER

## 2024-09-15 PROCEDURE — 80177 DRUG SCRN QUAN LEVETIRACETAM: CPT | Performed by: NURSE PRACTITIONER

## 2024-09-15 PROCEDURE — 12000002 HC ACUTE/MED SURGE SEMI-PRIVATE ROOM

## 2024-09-15 PROCEDURE — 84425 ASSAY OF VITAMIN B-1: CPT | Performed by: NURSE PRACTITIONER

## 2024-09-15 PROCEDURE — 84207 ASSAY OF VITAMIN B-6: CPT | Performed by: NURSE PRACTITIONER

## 2024-09-15 PROCEDURE — 25000003 PHARM REV CODE 250: Performed by: NURSE PRACTITIONER

## 2024-09-15 PROCEDURE — 25000003 PHARM REV CODE 250: Performed by: STUDENT IN AN ORGANIZED HEALTH CARE EDUCATION/TRAINING PROGRAM

## 2024-09-15 PROCEDURE — 63600175 PHARM REV CODE 636 W HCPCS: Performed by: INTERNAL MEDICINE

## 2024-09-15 PROCEDURE — 63600175 PHARM REV CODE 636 W HCPCS: Performed by: NURSE PRACTITIONER

## 2024-09-15 RX ORDER — BISACODYL 10 MG/1
10 SUPPOSITORY RECTAL DAILY PRN
Status: DISCONTINUED | OUTPATIENT
Start: 2024-09-15 | End: 2024-09-26 | Stop reason: HOSPADM

## 2024-09-15 RX ADMIN — AMLODIPINE BESYLATE 10 MG: 5 TABLET ORAL at 05:09

## 2024-09-15 RX ADMIN — HYDRALAZINE HYDROCHLORIDE 10 MG: 10 TABLET, FILM COATED ORAL at 03:09

## 2024-09-15 RX ADMIN — CEFTRIAXONE SODIUM 1 G: 1 INJECTION, POWDER, FOR SOLUTION INTRAMUSCULAR; INTRAVENOUS at 01:09

## 2024-09-15 RX ADMIN — TAMSULOSIN HYDROCHLORIDE 0.4 MG: 0.4 CAPSULE ORAL at 08:09

## 2024-09-15 RX ADMIN — HYDRALAZINE HYDROCHLORIDE 10 MG: 10 TABLET, FILM COATED ORAL at 05:09

## 2024-09-15 RX ADMIN — FAMOTIDINE 20 MG: 20 TABLET ORAL at 08:09

## 2024-09-15 RX ADMIN — FLUTICASONE PROPIONATE 100 MCG: 50 SPRAY, METERED NASAL at 08:09

## 2024-09-15 RX ADMIN — CYANOCOBALAMIN TAB 1000 MCG 1000 MCG: 1000 TAB at 08:09

## 2024-09-15 RX ADMIN — ENOXAPARIN SODIUM 30 MG: 30 INJECTION SUBCUTANEOUS at 08:09

## 2024-09-15 RX ADMIN — POLYETHYLENE GLYCOL 3350 17 G: 17 POWDER, FOR SOLUTION ORAL at 05:09

## 2024-09-15 RX ADMIN — LEVETIRACETAM 500 MG: 500 TABLET, FILM COATED ORAL at 08:09

## 2024-09-15 RX ADMIN — LEVETIRACETAM 500 MG: 500 TABLET, FILM COATED ORAL at 09:09

## 2024-09-15 RX ADMIN — HYDRALAZINE HYDROCHLORIDE 10 MG: 10 TABLET, FILM COATED ORAL at 09:09

## 2024-09-15 NOTE — SUBJECTIVE & OBJECTIVE
Interval History:  Patient is sitting up in bed, he is oriented to self.  He is confused to place and time.  Patient with improved appetite this a.m..  He is more awake and alert per staff.  Draper placed for urinary retention.  Maintained on IV Rocephin for urinary retention.  Culture no growth to date.  EEG pending for today.    Review of Systems   Unable to perform ROS: Dementia     Objective:     Vital Signs (Most Recent):  Temp: 97.6 °F (36.4 °C) (09/15/24 0747)  Pulse: 82 (09/15/24 0747)  Resp: 18 (09/15/24 0747)  BP: (!) 166/76 (09/15/24 0747)  SpO2: 96 % (09/15/24 0747) Vital Signs (24h Range):  Temp:  [96.8 °F (36 °C)-97.8 °F (36.6 °C)] 97.6 °F (36.4 °C)  Pulse:  [] 82  Resp:  [17-18] 18  SpO2:  [96 %-100 %] 96 %  BP: (119-177)/() 166/76     Weight: 66 kg (145 lb 8.1 oz)  Body mass index is 20.88 kg/m².    Intake/Output Summary (Last 24 hours) at 9/15/2024 1016  Last data filed at 9/15/2024 0943  Gross per 24 hour   Intake 580 ml   Output 1050 ml   Net -470 ml         Physical Exam  Vitals reviewed.   Constitutional:       General: He is not in acute distress.     Appearance: He is ill-appearing. He is not toxic-appearing.   HENT:      Head: Normocephalic.      Nose: Nose normal.      Mouth/Throat:      Mouth: Mucous membranes are moist.      Pharynx: Oropharynx is clear.   Eyes:      Extraocular Movements: Extraocular movements intact.      Conjunctiva/sclera: Conjunctivae normal.   Cardiovascular:      Rate and Rhythm: Normal rate and regular rhythm.      Pulses: Normal pulses.      Heart sounds: Normal heart sounds.   Pulmonary:      Effort: Pulmonary effort is normal.      Breath sounds: Normal breath sounds.   Abdominal:      General: Bowel sounds are normal. There is no distension.      Tenderness: There is no abdominal tenderness. There is no guarding.   Genitourinary:     Comments: Draper in place, clear yellow urine  Skin:     General: Skin is warm and dry.      Capillary Refill:  Capillary refill takes less than 2 seconds.   Neurological:      General: No focal deficit present.      Mental Status: He is alert. Mental status is at baseline. He is disoriented.             Significant Labs: All pertinent labs within the past 24 hours have been reviewed.  Recent Lab Results         09/15/24  0548   09/14/24  1353        Anion Gap 7         Baso # 0.06         Basophil % 1.0         BUN 32         Calcium 9.6         Chloride 107         CO2 28         Creatinine 1.9         Differential Method Automated         eGFR 32.9         Eos # 0.2         Eos % 3.9         Glucose 75         Gran # (ANC) 3.1         Gran % 49.8         Hematocrit 33.6         Hemoglobin 10.9         Immature Grans (Abs) 0.01  Comment: Mild elevation in immature granulocytes is non specific and   can be seen in a variety of conditions including stress response,   acute inflammation, trauma and pregnancy. Correlation with other   laboratory and clinical findings is essential.           Immature Granulocytes 0.2         Lymph # 2.3         Lymph % 36.9         MCH 29.5         MCHC 32.4         MCV 91         Mono # 0.5         Mono % 8.2         MPV 10.5         nRBC 0         Platelet Count 155         Potassium 4.6         RBC 3.69         RDW 14.6         Sodium 142         Urine Culture   No growth to date  [P]       Vitamin D 29  Comment: Vitamin D deficiency.........<10 ng/mL                              Vitamin D insufficiency......10-29 ng/mL       Vitamin D sufficiency........> or equal to 30 ng/mL  Vitamin D toxicity............>100 ng/mL           Vitamin B12 1402         WBC 6.12                  [P] - Preliminary Result               Significant Imaging: I have reviewed all pertinent imaging results/findings within the past 24 hours.

## 2024-09-15 NOTE — ASSESSMENT & PLAN NOTE
Patient with Acute debility due to age-related physical debility. Latest AMPAC and GEMS scores have not been reviewed. Evaluation for etiology is underway. Plan includes PT/OT consulted and fall precautions in place.

## 2024-09-15 NOTE — PLAN OF CARE
Problem: Infection  Goal: Absence of Infection Signs and Symptoms  Outcome: Progressing     Problem: Adult Inpatient Plan of Care  Goal: Plan of Care Review  Outcome: Progressing  Goal: Patient-Specific Goal (Individualized)  Outcome: Progressing  Goal: Absence of Hospital-Acquired Illness or Injury  Outcome: Progressing  Goal: Optimal Comfort and Wellbeing  Outcome: Progressing  Goal: Readiness for Transition of Care  Outcome: Progressing     Problem: Sepsis/Septic Shock  Goal: Optimal Coping  Outcome: Progressing  Goal: Absence of Bleeding  Outcome: Progressing  Goal: Blood Glucose Level Within Targeted Range  Outcome: Progressing  Goal: Absence of Infection Signs and Symptoms  Outcome: Progressing  Goal: Optimal Nutrition Intake  Outcome: Progressing     Problem: Wound  Goal: Optimal Coping  Outcome: Progressing  Goal: Optimal Functional Ability  Outcome: Progressing  Goal: Absence of Infection Signs and Symptoms  Outcome: Progressing  Goal: Improved Oral Intake  Outcome: Progressing  Goal: Optimal Pain Control and Function  Outcome: Progressing  Goal: Skin Health and Integrity  Outcome: Progressing  Goal: Optimal Wound Healing  Outcome: Progressing     Problem: Skin Injury Risk Increased  Goal: Skin Health and Integrity  Outcome: Progressing     Problem: Fall Injury Risk  Goal: Absence of Fall and Fall-Related Injury  Outcome: Progressing

## 2024-09-15 NOTE — PROGRESS NOTES
[facility]  Progress Note    Patient Name: Lavon Brandon  MRN: [id number]  Patient Class: IP- Inpatient   Admission Date: 9/13/2024  Length of Stay: 1 days  Attending Physician: Jeri Cervantes DO  Primary Care Provider: Aristides Haynes MD        Subjective:     Principal Problem:Severe sepsis        HPI:  89 year old male with a past medical history of hypertension, hyperlipidemia, CVA, PE, Seizure, UTI and dementia who presents to the emergency room with reports of abdominal and back pain and hallucinations. Also decreased bowel movements over last 2-4 days. He was recently diagnosed with UTI and started on augmentin. Per urine culture sensitivity, organism is resistant to Augmentin, sensitive to Rocephin.  Hypertensive in the ER at 212/94, reports did not take his home medication this morning.  WBC 9.56, mild MATA with serum creatinine 2.0, BUN 34, UA negative for leukocytes and nitrites.  Blood culture sent, lactic 2.39.  CT abdomen with trace left pleural effusion, large left renal cyst, CT head no acute findings, chest x-ray Blunting of the left costophrenic angle. IV rocephin given in ER with Hydralazine, amlodipine,  ml.  Admit to hospital medicine for further medical management.  The patient did have some urinary retention, a Draper catheter was placed with adequate urine output.  EEG pending.  Urine culture with no growth to date.  Patient's mental status continued to slowly improve.  He has tolerated p.o. diet.  PT and OT consulted for evaluation    Overview/Hospital Course:  89 year old hospitalized for UTI, AMS, severe sepsis. In Er, Lactate 2.39, BUN 3, Sr Cr 2, recently diagnosed with UTI, treated with oral Augmentin. Urine sensitivity resistant to Augmentin, sensitive to Rocephin. Rocephin started in ER. Afebrile, no leukocytosis. Overnight, lactic acid and sr cr improving, pending urine and blood cultures.     Interval History:  Patient is sitting up in bed, he is  oriented to self.  He is confused to place and time.  Patient with improved appetite this a.m..  He is more awake and alert per staff.  Draper placed for urinary retention.  Maintained on IV Rocephin for urinary retention.  Culture no growth to date.  EEG pending for today.    Review of Systems   Unable to perform ROS: Dementia     Objective:     Vital Signs (Most Recent):  Temp: 97.6 °F (36.4 °C) (09/15/24 0747)  Pulse: 82 (09/15/24 0747)  Resp: 18 (09/15/24 0747)  BP: (!) 166/76 (09/15/24 0747)  SpO2: 96 % (09/15/24 0747) Vital Signs (24h Range):  Temp:  [96.8 °F (36 °C)-97.8 °F (36.6 °C)] 97.6 °F (36.4 °C)  Pulse:  [] 82  Resp:  [17-18] 18  SpO2:  [96 %-100 %] 96 %  BP: (119-177)/() 166/76     Weight: 66 kg (145 lb 8.1 oz)  Body mass index is 20.88 kg/m².    Intake/Output Summary (Last 24 hours) at 9/15/2024 1016  Last data filed at 9/15/2024 0943  Gross per 24 hour   Intake 580 ml   Output 1050 ml   Net -470 ml         Physical Exam  Vitals reviewed.   Constitutional:       General: He is not in acute distress.     Appearance: He is ill-appearing. He is not toxic-appearing.   HENT:      Head: Normocephalic.      Nose: Nose normal.      Mouth/Throat:      Mouth: Mucous membranes are moist.      Pharynx: Oropharynx is clear.   Eyes:      Extraocular Movements: Extraocular movements intact.      Conjunctiva/sclera: Conjunctivae normal.   Cardiovascular:      Rate and Rhythm: Normal rate and regular rhythm.      Pulses: Normal pulses.      Heart sounds: Normal heart sounds.   Pulmonary:      Effort: Pulmonary effort is normal.      Breath sounds: Normal breath sounds.   Abdominal:      General: Bowel sounds are normal. There is no distension.      Tenderness: There is no abdominal tenderness. There is no guarding.   Genitourinary:     Comments: Draper in place, clear yellow urine  Skin:     General: Skin is warm and dry.      Capillary Refill: Capillary refill takes less than 2 seconds.   Neurological:       General: No focal deficit present.      Mental Status: He is alert. Mental status is at baseline. He is disoriented.             Significant Labs: All pertinent labs within the past 24 hours have been reviewed.  Recent Lab Results         09/15/24  0548   09/14/24  1353        Anion Gap 7         Baso # 0.06         Basophil % 1.0         BUN 32         Calcium 9.6         Chloride 107         CO2 28         Creatinine 1.9         Differential Method Automated         eGFR 32.9         Eos # 0.2         Eos % 3.9         Glucose 75         Gran # (ANC) 3.1         Gran % 49.8         Hematocrit 33.6         Hemoglobin 10.9         Immature Grans (Abs) 0.01  Comment: Mild elevation in immature granulocytes is non specific and   can be seen in a variety of conditions including stress response,   acute inflammation, trauma and pregnancy. Correlation with other   laboratory and clinical findings is essential.           Immature Granulocytes 0.2         Lymph # 2.3         Lymph % 36.9         MCH 29.5         MCHC 32.4         MCV 91         Mono # 0.5         Mono % 8.2         MPV 10.5         nRBC 0         Platelet Count 155         Potassium 4.6         RBC 3.69         RDW 14.6         Sodium 142         Urine Culture   No growth to date  [P]       Vitamin D 29  Comment: Vitamin D deficiency.........<10 ng/mL                              Vitamin D insufficiency......10-29 ng/mL       Vitamin D sufficiency........> or equal to 30 ng/mL  Vitamin D toxicity............>100 ng/mL           Vitamin B12 1402         WBC 6.12                  [P] - Preliminary Result               Significant Imaging: I have reviewed all pertinent imaging results/findings within the past 24 hours.    Assessment/Plan:      * Severe sepsis  This patient does have evidence of infective focus  My overall impression is sepsis.  Source: Urinary Tract  Antibiotics given-   Antibiotics (72h ago, onward)      Start     Stop Route Frequency  Ordered    09/14/24 1300  cefTRIAXone (ROCEPHIN) 1 g in dextrose 5 % 100 mL IVPB (ready to mix)         -- IV Every 24 hours (non-standard times) 09/13/24 1455          Latest lactate reviewed-  Recent Labs   Lab 09/13/24  1112 09/13/24  1611 09/14/24  0729   LACTATE  --    < > 1.8   POCLAC 2.39*  --   --     < > = values in this interval not displayed.       Organ dysfunction indicated by Acute kidney injury and Encephalopathy    Fluid challenge Not needed - patient is not hypotensive      Post- resuscitation assessment No - Post resuscitation assessment not needed       Will Not start Pressors- Levophed for MAP of 65  Source control achieved by:   Blood cultures, urine cultures sent  Rocephin IV given  1st Lactate 2.37, repeat Lactic acid ordered  Procalcitonin ordered      Urinary retention   ml  Place santo cath   Start flomax        Pleural effusion on left  Patient found to have  trace  pleural effusion on imaging. I have personally reviewed and interpreted the following imaging: Xray. A thoracentesis was deferred. Most likely etiology includes  CDK st III . Management to include Diuresis      Encephalopathy, metabolic  Slowly improving   History of dementia  Likely related to UTI, continue rocephin  Monitor neuro check  Check keppra level  Eeg  Vitamin D 29, Vitamin B12 1400, vitamin b6, b1 pending     UTI (urinary tract infection)  Urine culture NGTD  Continue Rocephin 1 gm daily         Seizure disorder  Continue keppra  Keppra level  EEG ordered      Debility  Patient with Acute debility due to age-related physical debility. Latest AMPAC and GEMS scores have not been reviewed. Evaluation for etiology is underway. Plan includes PT/OT consulted and fall precautions in place.    Chronic renal disease, stage IV  Creatine stable for now. BMP reviewed- noted Estimated Creatinine Clearance: 23.6 mL/min (A) (based on SCr of 1.9 mg/dL (H)). according to latest data. Based on current GFR, CKD stage is stage 3  - GFR 30-59.  Monitor UOP and serial BMP and adjust therapy as needed. Renally dose meds. Avoid nephrotoxic medications and procedures.      VTE Risk Mitigation (From admission, onward)           Ordered     enoxaparin injection 30 mg  Every 24 hours (non-standard times)         09/13/24 1848     IP VTE HIGH RISK PATIENT  Once         09/13/24 1455     Place sequential compression device  Until discontinued         09/13/24 1455                    Discharge Planning   JOANEN: 9/18/2024     Code Status: DNR   Is the patient medically ready for discharge?:     Reason for patient still in hospital (select all that apply): Treatment, PT / OT recommendations, and Pending disposition -SNF                   VANE Bran  Department of Hospital Medicine   [facility]

## 2024-09-15 NOTE — ASSESSMENT & PLAN NOTE
Creatine stable for now. BMP reviewed- noted Estimated Creatinine Clearance: 23.6 mL/min (A) (based on SCr of 1.9 mg/dL (H)). according to latest data. Based on current GFR, CKD stage is stage 3 - GFR 30-59.  Monitor UOP and serial BMP and adjust therapy as needed. Renally dose meds. Avoid nephrotoxic medications and procedures.

## 2024-09-15 NOTE — ASSESSMENT & PLAN NOTE
This patient does have evidence of infective focus  My overall impression is sepsis.  Source: Urinary Tract  Antibiotics given-   Antibiotics (72h ago, onward)    Start     Stop Route Frequency Ordered    09/14/24 1300  cefTRIAXone (ROCEPHIN) 1 g in dextrose 5 % 100 mL IVPB (ready to mix)         -- IV Every 24 hours (non-standard times) 09/13/24 1455        Latest lactate reviewed-  Recent Labs   Lab 09/13/24  1112 09/13/24  1611 09/14/24  0729   LACTATE  --    < > 1.8   POCLAC 2.39*  --   --     < > = values in this interval not displayed.       Organ dysfunction indicated by Acute kidney injury and Encephalopathy    Fluid challenge Not needed - patient is not hypotensive      Post- resuscitation assessment No - Post resuscitation assessment not needed       Will Not start Pressors- Levophed for MAP of 65  Source control achieved by:   Blood cultures, urine cultures sent  Rocephin IV given  1st Lactate 2.37, repeat Lactic acid ordered  Procalcitonin ordered

## 2024-09-15 NOTE — ASSESSMENT & PLAN NOTE
Slowly improving   History of dementia  Likely related to UTI, continue rocephin  Monitor neuro check  Check keppra level  Eeg  Vitamin D 29, Vitamin B12 1400, vitamin b6, b1 pending

## 2024-09-16 LAB
AMMONIA PLAS-SCNC: 25 UMOL/L (ref 10–50)
ANION GAP SERPL CALC-SCNC: 8 MMOL/L (ref 8–16)
BASOPHILS # BLD AUTO: 0.02 K/UL (ref 0–0.2)
BASOPHILS NFR BLD: 0.3 % (ref 0–1.9)
BUN SERPL-MCNC: 37 MG/DL (ref 10–30)
CALCIUM SERPL-MCNC: 9.4 MG/DL (ref 8.7–10.5)
CHLORIDE SERPL-SCNC: 105 MMOL/L (ref 95–110)
CO2 SERPL-SCNC: 27 MMOL/L (ref 23–29)
CREAT SERPL-MCNC: 2.1 MG/DL (ref 0.5–1.4)
DIFFERENTIAL METHOD BLD: ABNORMAL
EOSINOPHIL # BLD AUTO: 0.1 K/UL (ref 0–0.5)
EOSINOPHIL NFR BLD: 1.4 % (ref 0–8)
ERYTHROCYTE [DISTWIDTH] IN BLOOD BY AUTOMATED COUNT: 14.4 % (ref 11.5–14.5)
EST. GFR  (NO RACE VARIABLE): 29.2 ML/MIN/1.73 M^2
GLUCOSE SERPL-MCNC: 115 MG/DL (ref 70–110)
HCT VFR BLD AUTO: 33.9 % (ref 40–54)
HGB BLD-MCNC: 11 G/DL (ref 14–18)
IMM GRANULOCYTES # BLD AUTO: 0.03 K/UL (ref 0–0.04)
IMM GRANULOCYTES NFR BLD AUTO: 0.5 % (ref 0–0.5)
LYMPHOCYTES # BLD AUTO: 1.3 K/UL (ref 1–4.8)
LYMPHOCYTES NFR BLD: 22.8 % (ref 18–48)
MCH RBC QN AUTO: 29.7 PG (ref 27–31)
MCHC RBC AUTO-ENTMCNC: 32.4 G/DL (ref 32–36)
MCV RBC AUTO: 92 FL (ref 82–98)
MONOCYTES # BLD AUTO: 0.5 K/UL (ref 0.3–1)
MONOCYTES NFR BLD: 8.1 % (ref 4–15)
NEUTROPHILS # BLD AUTO: 3.9 K/UL (ref 1.8–7.7)
NEUTROPHILS NFR BLD: 66.9 % (ref 38–73)
NRBC BLD-RTO: 0 /100 WBC
PLATELET # BLD AUTO: 154 K/UL (ref 150–450)
PMV BLD AUTO: 10.9 FL (ref 9.2–12.9)
POCT GLUCOSE: 112 MG/DL (ref 70–110)
POTASSIUM SERPL-SCNC: 4.7 MMOL/L (ref 3.5–5.1)
RBC # BLD AUTO: 3.7 M/UL (ref 4.6–6.2)
SODIUM SERPL-SCNC: 140 MMOL/L (ref 136–145)
TSH SERPL DL<=0.005 MIU/L-ACNC: 1.93 UIU/ML (ref 0.34–5.6)
WBC # BLD AUTO: 5.79 K/UL (ref 3.9–12.7)

## 2024-09-16 PROCEDURE — 36415 COLL VENOUS BLD VENIPUNCTURE: CPT | Performed by: NURSE PRACTITIONER

## 2024-09-16 PROCEDURE — 25000003 PHARM REV CODE 250: Performed by: STUDENT IN AN ORGANIZED HEALTH CARE EDUCATION/TRAINING PROGRAM

## 2024-09-16 PROCEDURE — 80048 BASIC METABOLIC PNL TOTAL CA: CPT | Performed by: NURSE PRACTITIONER

## 2024-09-16 PROCEDURE — 95819 EEG AWAKE AND ASLEEP: CPT

## 2024-09-16 PROCEDURE — 63600175 PHARM REV CODE 636 W HCPCS: Performed by: NURSE PRACTITIONER

## 2024-09-16 PROCEDURE — 97530 THERAPEUTIC ACTIVITIES: CPT | Mod: CQ

## 2024-09-16 PROCEDURE — 63600175 PHARM REV CODE 636 W HCPCS: Performed by: INTERNAL MEDICINE

## 2024-09-16 PROCEDURE — 84443 ASSAY THYROID STIM HORMONE: CPT | Performed by: NURSE PRACTITIONER

## 2024-09-16 PROCEDURE — 25000003 PHARM REV CODE 250: Performed by: NURSE PRACTITIONER

## 2024-09-16 PROCEDURE — 82140 ASSAY OF AMMONIA: CPT | Performed by: NURSE PRACTITIONER

## 2024-09-16 PROCEDURE — 12000002 HC ACUTE/MED SURGE SEMI-PRIVATE ROOM

## 2024-09-16 PROCEDURE — 85025 COMPLETE CBC W/AUTO DIFF WBC: CPT | Performed by: NURSE PRACTITIONER

## 2024-09-16 RX ORDER — LACTULOSE 10 G/15ML
30 SOLUTION ORAL 3 TIMES DAILY
Status: DISCONTINUED | OUTPATIENT
Start: 2024-09-16 | End: 2024-09-16

## 2024-09-16 RX ORDER — LEVETIRACETAM 5 MG/ML
500 INJECTION INTRAVASCULAR EVERY 12 HOURS
Status: DISCONTINUED | OUTPATIENT
Start: 2024-09-16 | End: 2024-09-17

## 2024-09-16 RX ADMIN — BISACODYL 10 MG: 10 SUPPOSITORY RECTAL at 04:09

## 2024-09-16 RX ADMIN — LEVETIRACETAM INJECTION 500 MG: 5 INJECTION INTRAVENOUS at 12:09

## 2024-09-16 RX ADMIN — LEVETIRACETAM INJECTION 500 MG: 5 INJECTION INTRAVENOUS at 08:09

## 2024-09-16 RX ADMIN — FLUTICASONE PROPIONATE 100 MCG: 50 SPRAY, METERED NASAL at 12:09

## 2024-09-16 RX ADMIN — CEFTRIAXONE SODIUM 1 G: 1 INJECTION, POWDER, FOR SOLUTION INTRAMUSCULAR; INTRAVENOUS at 01:09

## 2024-09-16 RX ADMIN — ENOXAPARIN SODIUM 30 MG: 30 INJECTION SUBCUTANEOUS at 08:09

## 2024-09-16 RX ADMIN — HYDRALAZINE HYDROCHLORIDE 10 MG: 10 TABLET, FILM COATED ORAL at 07:09

## 2024-09-16 NOTE — PROGRESS NOTES
[facility] Medicine  Progress Note    Patient Name: Lavon Brandon  MRN: [id number]  Patient Class: IP- Inpatient   Admission Date: 9/13/2024  Length of Stay: 2 days  Attending Physician: Tatyana Neal MD  Primary Care Provider: Aristides Haynes MD        Subjective:     Principal Problem:Severe sepsis        HPI:  89 year old male with a past medical history of hypertension, hyperlipidemia, CVA, PE, Seizure, UTI and dementia who presents to the emergency room with reports of abdominal and back pain and hallucinations. Also decreased bowel movements over last 2-4 days. He was recently diagnosed with UTI and started on augmentin. Per urine culture sensitivity, organism is resistant to Augmentin, sensitive to Rocephin.  Hypertensive in the ER at 212/94, reports did not take his home medication this morning.  WBC 9.56, mild MATA with serum creatinine 2.0, BUN 34, UA negative for leukocytes and nitrites.  Blood culture sent, lactic 2.39.  CT abdomen with trace left pleural effusion, large left renal cyst, CT head no acute findings, chest x-ray Blunting of the left costophrenic angle. IV rocephin given in ER with Hydralazine, amlodipine,  ml.  Admit to hospital medicine for further medical management.  The patient did have some urinary retention, a Draper catheter was placed with adequate urine output.  EEG pending.  Urine culture with no growth to date.  Patient's mental status continued to slowly improve.  He has tolerated p.o. diet.  PT and OT consulted for evaluation    Overview/Hospital Course:  89 year old hospitalized for UTI, AMS, severe sepsis. In Er, Lactate 2.39, BUN 3, Sr Cr 2, recently diagnosed with UTI, treated with oral Augmentin. Urine sensitivity resistant to Augmentin, sensitive to Rocephin. Rocephin started in ER. Afebrile, no leukocytosis.  Lactic acid improved with IV antibiotics and fluid resuscitation.  Renal function stable at 2.1, this is the patient's baseline renal  function per historical records.  Urine cultures continued to show no growth to date however patient maintained on Rocephin 1 g daily.  Continued to have waxing and waning mental status.    Interval History:  Patient lying in bed.  He is more lethargic today.  He does open eyes to voice and touch.  He does not have any focal deficits he is generally weak.  Family is at bedside.  She states that this is how he behaves after he has a seizure typically.  EEG is still pending, Neurology consulted for evaluation.    The patient is also constipated despite Dulcolax suppository.  KUB ordered and soapsuds enema ordered.    Review of Systems   Unable to perform ROS: Dementia     Objective:     Vital Signs (Most Recent):  Temp: 97.6 °F (36.4 °C) (09/16/24 0719)  Pulse: 84 (09/16/24 0719)  Resp: 18 (09/16/24 0719)  BP: 129/60 (09/16/24 0719)  SpO2: 97 % (09/16/24 0719) Vital Signs (24h Range):  Temp:  [97.6 °F (36.4 °C)-98.2 °F (36.8 °C)] 97.6 °F (36.4 °C)  Pulse:  [72-86] 84  Resp:  [17-18] 18  SpO2:  [97 %-100 %] 97 %  BP: (129-176)/(60-82) 129/60     Weight: 66 kg (145 lb 8.1 oz)  Body mass index is 20.88 kg/m².    Intake/Output Summary (Last 24 hours) at 9/16/2024 1120  Last data filed at 9/16/2024 0418  Gross per 24 hour   Intake 340 ml   Output 1050 ml   Net -710 ml         Physical Exam  Vitals reviewed.   Constitutional:       General: He is not in acute distress.     Appearance: He is ill-appearing. He is not toxic-appearing.   HENT:      Head: Normocephalic.      Nose: Nose normal.      Mouth/Throat:      Mouth: Mucous membranes are moist.      Pharynx: Oropharynx is clear.   Eyes:      Extraocular Movements: Extraocular movements intact.      Conjunctiva/sclera: Conjunctivae normal.   Cardiovascular:      Rate and Rhythm: Normal rate and regular rhythm.      Pulses: Normal pulses.      Heart sounds: Normal heart sounds.   Pulmonary:      Effort: Pulmonary effort is normal.      Breath sounds: Normal breath sounds.    Abdominal:      General: Bowel sounds are normal. There is no distension.      Tenderness: There is no abdominal tenderness. There is no guarding.   Genitourinary:     Comments: Draper in place, clear yellow urine  Skin:     General: Skin is warm and dry.      Capillary Refill: Capillary refill takes less than 2 seconds.   Neurological:      General: No focal deficit present.      Mental Status: Mental status is at baseline. He is disoriented.             Significant Labs: All pertinent labs within the past 24 hours have been reviewed.  Recent Lab Results         09/16/24  0926   09/16/24  0518        Anion Gap   8       Baso #   0.02       Basophil %   0.3       BUN   37       Calcium   9.4       Chloride   105       CO2   27       Creatinine   2.1       Differential Method   Automated       eGFR   29.2       Eos #   0.1       Eos %   1.4       Glucose   115       Gran # (ANC)   3.9       Gran %   66.9       Hematocrit   33.9       Hemoglobin   11.0       Immature Grans (Abs)   0.03  Comment: Mild elevation in immature granulocytes is non specific and   can be seen in a variety of conditions including stress response,   acute inflammation, trauma and pregnancy. Correlation with other   laboratory and clinical findings is essential.         Immature Granulocytes   0.5       Lymph #   1.3       Lymph %   22.8       MCH   29.7       MCHC   32.4       MCV   92       Mono #   0.5       Mono %   8.1       MPV   10.9       nRBC   0       Platelet Count   154       POCT Glucose 112         Potassium   4.7       RBC   3.70       RDW   14.4       Sodium   140       WBC   5.79               Significant Imaging: I have reviewed all pertinent imaging results/findings within the past 24 hours.    Assessment/Plan:      * Severe sepsis  This patient does have evidence of infective focus  My overall impression is sepsis.  Source: Urinary Tract  Antibiotics given-   Antibiotics (72h ago, onward)      Start     Stop Route Frequency  Ordered    09/14/24 1300  cefTRIAXone (ROCEPHIN) 1 g in dextrose 5 % 100 mL IVPB (ready to mix)         -- IV Every 24 hours (non-standard times) 09/13/24 1455          Latest lactate reviewed-  Recent Labs   Lab 09/14/24  0729   LACTATE 1.8       Organ dysfunction indicated by Acute kidney injury and Encephalopathy    Fluid challenge Not needed - patient is not hypotensive      Post- resuscitation assessment No - Post resuscitation assessment not needed       Will Not start Pressors- Levophed for MAP of 65  Source control achieved by:   Blood cultures, urine cultures sent  Rocephin IV given  1st Lactate 2.37, repeat Lactic acid ordered  Procalcitonin ordered      Urinary retention   ml  Place santo cath   Start flomax        Pleural effusion on left  Patient found to have  trace  pleural effusion on imaging. I have personally reviewed and interpreted the following imaging: Xray. A thoracentesis was deferred. Most likely etiology includes  CDK st III . Management to include Diuresis      Encephalopathy, metabolic  Slowly improving   History of dementia  Likely related to UTI, continue rocephin  Monitor neuro check  Check keppra level, pending  EEG pending  Neurology consulted  Check ammonia  Vitamin D 29, Vitamin B12 1400, vitamin b6, b1 pending     UTI (urinary tract infection)  Urine culture NGTD  Continue Rocephin 1 gm daily         Seizure disorder  Continue keppra, change to IV as patient refusing p.o. medications  Keppra level pending  EEG ordered   neurology consulted      Debility  Patient with Acute debility due to age-related physical debility. Latest AMPAC and GEMS scores have not been reviewed. Evaluation for etiology is underway. Plan includes PT/OT consulted and fall precautions in place.    Chronic renal disease, stage IV  Creatine stable for now. BMP reviewed- noted Estimated Creatinine Clearance: 21.4 mL/min (A) (based on SCr of 2.1 mg/dL (H)). according to latest data. Based on current GFR,  CKD stage is stage 3 - GFR 30-59.  Monitor UOP and serial BMP and adjust therapy as needed. Renally dose meds. Avoid nephrotoxic medications and procedures.    Constipation    Patient's last bowel movement 09/09/2024  Stool softeners, MiraLax, Dulcolax suppository started with no improvement  KUB with stool burden noted  We will start lactulose enemas and tap water enemas at this time.  Continue p.o. laxatives as patient can tolerate      VTE Risk Mitigation (From admission, onward)           Ordered     enoxaparin injection 30 mg  Every 24 hours (non-standard times)         09/13/24 1848     IP VTE HIGH RISK PATIENT  Once         09/13/24 1455     Place sequential compression device  Until discontinued         09/13/24 1455                    Discharge Planning   JOANNE: 9/18/2024     Code Status: DNR   Is the patient medically ready for discharge?:     Reason for patient still in hospital (select all that apply): Patient trending condition, Treatment, Consult recommendations, PT / OT recommendations, and Pending disposition  Discharge Plan A: Home Health                  VANE Bran  Department of Hospital Medicine   Formerly [facility]

## 2024-09-16 NOTE — PT/OT/SLP PROGRESS
Physical Therapy Treatment    Patient Name:  Lavon Brandon   MRN:  [id number]    Recommendations:     Discharge Recommendations: Low Intensity Therapy  Discharge Equipment Recommendations: none  Barriers to discharge:  Decreased functional mobility, medical status    Assessment:     Lavon Brandon is a 89 y.o. male admitted with a medical diagnosis of Severe sepsis.  He presents with the following impairments/functional limitations: weakness, impaired endurance, impaired self care skills, impaired functional mobility, gait instability, impaired balance, impaired cognition, decreased upper extremity function, decreased lower extremity function, decreased safety awareness, impaired cardiopulmonary response to activity . Pt seen sleeping with HOB elevated, but easily awakened. Mobilized to eob requiring max a. Static sitting balanced increased with time eob. Sit to stand trials completed requiring max a with rw, pt with posterior lean and narrowed NICHOL; unable to correct with cues and facilitation of weight shift. Pt returned to bed secondary to having a BM while standing. PCT and RN notified.     Rehab Prognosis: Fair; patient would benefit from acute skilled PT services to address these deficits and reach maximum level of function.    Recent Surgery: * No surgery found *      Plan:     During this hospitalization, patient to be seen 5 x/week to address the identified rehab impairments via gait training, therapeutic activities, therapeutic exercises and progress toward the following goals:    Plan of Care Expires:  10/14/24    Subjective     Chief Complaint: None stated  Patient/Family Comments/goals: Pt is more alert in the afternoon.   Pain/Comfort:  Pain Rating 1: 0/10  Pain Rating Post-Intervention 1: 0/10      Objective:     Communicated with RN prior to session.  Patient found HOB elevated with bed alarm, santo catheter, peripheral IV upon PT entry to room.     General Precautions: Standard, fall, seizure  (dementia)  Orthopedic Precautions: N/A  Braces: N/A  Respiratory Status: Room air     Functional Mobility:  Bed Mobility:     Supine to Sit: maximal assistance  Sit to Supine: maximal assistance  Transfers:     Sit to Stand:  maximal assistance and of 2 persons with rolling walker      AM-PAC 6 CLICK MOBILITY          Treatment & Education:  Pt was educated on the following: call light use, importance of OOB activity and functional mobility to negate the negative effects of prolonged bed rest during this hospitalization, safe transfers/ambulation and discharge planning recommendations/options.     Patient left HOB elevated with all lines intact, call button in reach, bed alarm on, and PCT notified..    GOALS:   Multidisciplinary Problems       Physical Therapy Goals          Problem: Physical Therapy    Goal Priority Disciplines Outcome Goal Variances Interventions   Physical Therapy Goal     PT, PT/OT Progressing     Description: Goals to be met by: 10/14/24     Patient will increase functional independence with mobility by performin. Supine to sit with MInimal Assistance  2. Sit to supine with MInimal Assistance  3. Sit to stand transfer with Minimal Assistance  4. Bed to chair transfer with Minimal Assistance using Rolling Walker  5. Gait  2 x 25 feet with Minimal Assistance using Rolling Walker.                          Time Tracking:     PT Received On: 24  PT Start Time: 1407     PT Stop Time: 1430  PT Total Time (min): 23 min     Billable Minutes: Therapeutic Activity 23    Treatment Type: Treatment  PT/PTA: PTA     Number of PTA visits since last PT visit: 2024

## 2024-09-16 NOTE — ASSESSMENT & PLAN NOTE
Continue keppra, change to IV as patient refusing p.o. medications  Keppra level pending  EEG ordered   neurology consulted

## 2024-09-16 NOTE — ASSESSMENT & PLAN NOTE
This patient does have evidence of infective focus  My overall impression is sepsis.  Source: Urinary Tract  Antibiotics given-   Antibiotics (72h ago, onward)    Start     Stop Route Frequency Ordered    09/14/24 1300  cefTRIAXone (ROCEPHIN) 1 g in dextrose 5 % 100 mL IVPB (ready to mix)         -- IV Every 24 hours (non-standard times) 09/13/24 1455        Latest lactate reviewed-  Recent Labs   Lab 09/14/24  0729   LACTATE 1.8       Organ dysfunction indicated by Acute kidney injury and Encephalopathy    Fluid challenge Not needed - patient is not hypotensive      Post- resuscitation assessment No - Post resuscitation assessment not needed       Will Not start Pressors- Levophed for MAP of 65  Source control achieved by:   Blood cultures, urine cultures sent  Rocephin IV given  1st Lactate 2.37, repeat Lactic acid ordered  Procalcitonin ordered

## 2024-09-16 NOTE — ASSESSMENT & PLAN NOTE
Slowly improving   History of dementia  Likely related to UTI, continue rocephin  Monitor neuro check  Check keppra level, pending  EEG pending  Neurology consulted  Check ammonia  Vitamin D 29, Vitamin B12 1400, vitamin b6, b1 pending

## 2024-09-16 NOTE — NURSING
1330: Pt is having a decrease in urine output bladder scanned pt >550 showed on bladder scan.   16 fr. Rdaper placed per Np order(Mirtha).     1345: Draper placed balloon inflated... pt tolerated well. Immediate urine return of 350

## 2024-09-16 NOTE — SUBJECTIVE & OBJECTIVE
Interval History:  Patient lying in bed.  He is more lethargic today.  He does open eyes to voice and touch.  He does not have any focal deficits he is generally weak.  Family is at bedside.  She states that this is how he behaves after he has a seizure typically.  EEG is still pending, Neurology consulted for evaluation.    The patient is also constipated despite Dulcolax suppository.  KUB ordered and soapsuds enema ordered.    Review of Systems   Unable to perform ROS: Dementia     Objective:     Vital Signs (Most Recent):  Temp: 97.6 °F (36.4 °C) (09/16/24 0719)  Pulse: 84 (09/16/24 0719)  Resp: 18 (09/16/24 0719)  BP: 129/60 (09/16/24 0719)  SpO2: 97 % (09/16/24 0719) Vital Signs (24h Range):  Temp:  [97.6 °F (36.4 °C)-98.2 °F (36.8 °C)] 97.6 °F (36.4 °C)  Pulse:  [72-86] 84  Resp:  [17-18] 18  SpO2:  [97 %-100 %] 97 %  BP: (129-176)/(60-82) 129/60     Weight: 66 kg (145 lb 8.1 oz)  Body mass index is 20.88 kg/m².    Intake/Output Summary (Last 24 hours) at 9/16/2024 1120  Last data filed at 9/16/2024 0418  Gross per 24 hour   Intake 340 ml   Output 1050 ml   Net -710 ml         Physical Exam  Vitals reviewed.   Constitutional:       General: He is not in acute distress.     Appearance: He is ill-appearing. He is not toxic-appearing.   HENT:      Head: Normocephalic.      Nose: Nose normal.      Mouth/Throat:      Mouth: Mucous membranes are moist.      Pharynx: Oropharynx is clear.   Eyes:      Extraocular Movements: Extraocular movements intact.      Conjunctiva/sclera: Conjunctivae normal.   Cardiovascular:      Rate and Rhythm: Normal rate and regular rhythm.      Pulses: Normal pulses.      Heart sounds: Normal heart sounds.   Pulmonary:      Effort: Pulmonary effort is normal.      Breath sounds: Normal breath sounds.   Abdominal:      General: Bowel sounds are normal. There is no distension.      Tenderness: There is no abdominal tenderness. There is no guarding.   Genitourinary:     Comments: Bnony in  place, clear yellow urine  Skin:     General: Skin is warm and dry.      Capillary Refill: Capillary refill takes less than 2 seconds.   Neurological:      General: No focal deficit present.      Mental Status: Mental status is at baseline. He is disoriented.             Significant Labs: All pertinent labs within the past 24 hours have been reviewed.  Recent Lab Results         09/16/24  0926   09/16/24  0518        Anion Gap   8       Baso #   0.02       Basophil %   0.3       BUN   37       Calcium   9.4       Chloride   105       CO2   27       Creatinine   2.1       Differential Method   Automated       eGFR   29.2       Eos #   0.1       Eos %   1.4       Glucose   115       Gran # (ANC)   3.9       Gran %   66.9       Hematocrit   33.9       Hemoglobin   11.0       Immature Grans (Abs)   0.03  Comment: Mild elevation in immature granulocytes is non specific and   can be seen in a variety of conditions including stress response,   acute inflammation, trauma and pregnancy. Correlation with other   laboratory and clinical findings is essential.         Immature Granulocytes   0.5       Lymph #   1.3       Lymph %   22.8       MCH   29.7       MCHC   32.4       MCV   92       Mono #   0.5       Mono %   8.1       MPV   10.9       nRBC   0       Platelet Count   154       POCT Glucose 112         Potassium   4.7       RBC   3.70       RDW   14.4       Sodium   140       WBC   5.79               Significant Imaging: I have reviewed all pertinent imaging results/findings within the past 24 hours.

## 2024-09-16 NOTE — ASSESSMENT & PLAN NOTE
Creatine stable for now. BMP reviewed- noted Estimated Creatinine Clearance: 21.4 mL/min (A) (based on SCr of 2.1 mg/dL (H)). according to latest data. Based on current GFR, CKD stage is stage 3 - GFR 30-59.  Monitor UOP and serial BMP and adjust therapy as needed. Renally dose meds. Avoid nephrotoxic medications and procedures.

## 2024-09-16 NOTE — ASSESSMENT & PLAN NOTE
Patient's last bowel movement 09/09/2024  Stool softeners, MiraLax, Dulcolax suppository started with no improvement  KUB with stool burden noted  We will start lactulose enemas and tap water enemas at this time.  Continue p.o. laxatives as patient can tolerate
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
## 2024-09-17 LAB
ANION GAP SERPL CALC-SCNC: 5 MMOL/L (ref 8–16)
BACTERIA UR CULT: NO GROWTH
BASOPHILS # BLD AUTO: 0.02 K/UL (ref 0–0.2)
BASOPHILS NFR BLD: 0.3 % (ref 0–1.9)
BUN SERPL-MCNC: 32 MG/DL (ref 10–30)
CALCIUM SERPL-MCNC: 9.3 MG/DL (ref 8.7–10.5)
CHLORIDE SERPL-SCNC: 108 MMOL/L (ref 95–110)
CO2 SERPL-SCNC: 29 MMOL/L (ref 23–29)
CREAT SERPL-MCNC: 2 MG/DL (ref 0.5–1.4)
DIFFERENTIAL METHOD BLD: ABNORMAL
EOSINOPHIL # BLD AUTO: 0.1 K/UL (ref 0–0.5)
EOSINOPHIL NFR BLD: 1.9 % (ref 0–8)
ERYTHROCYTE [DISTWIDTH] IN BLOOD BY AUTOMATED COUNT: 14.6 % (ref 11.5–14.5)
EST. GFR  (NO RACE VARIABLE): 30.9 ML/MIN/1.73 M^2
GLUCOSE SERPL-MCNC: 96 MG/DL (ref 70–110)
HCT VFR BLD AUTO: 32.3 % (ref 40–54)
HGB BLD-MCNC: 10.6 G/DL (ref 14–18)
IMM GRANULOCYTES # BLD AUTO: 0.02 K/UL (ref 0–0.04)
IMM GRANULOCYTES NFR BLD AUTO: 0.3 % (ref 0–0.5)
LEVETIRACETAM SERPL-MCNC: 27.4 UG/ML (ref 10–40)
LYMPHOCYTES # BLD AUTO: 1.8 K/UL (ref 1–4.8)
LYMPHOCYTES NFR BLD: 24.2 % (ref 18–48)
MCH RBC QN AUTO: 30.4 PG (ref 27–31)
MCHC RBC AUTO-ENTMCNC: 32.8 G/DL (ref 32–36)
MCV RBC AUTO: 93 FL (ref 82–98)
MONOCYTES # BLD AUTO: 0.6 K/UL (ref 0.3–1)
MONOCYTES NFR BLD: 7.6 % (ref 4–15)
NEUTROPHILS # BLD AUTO: 4.7 K/UL (ref 1.8–7.7)
NEUTROPHILS NFR BLD: 65.7 % (ref 38–73)
NRBC BLD-RTO: 0 /100 WBC
PLATELET # BLD AUTO: 148 K/UL (ref 150–450)
PMV BLD AUTO: 11.1 FL (ref 9.2–12.9)
POTASSIUM SERPL-SCNC: 4.5 MMOL/L (ref 3.5–5.1)
RBC # BLD AUTO: 3.49 M/UL (ref 4.6–6.2)
SODIUM SERPL-SCNC: 142 MMOL/L (ref 136–145)
WBC # BLD AUTO: 7.22 K/UL (ref 3.9–12.7)

## 2024-09-17 PROCEDURE — 25000003 PHARM REV CODE 250: Performed by: STUDENT IN AN ORGANIZED HEALTH CARE EDUCATION/TRAINING PROGRAM

## 2024-09-17 PROCEDURE — 51798 US URINE CAPACITY MEASURE: CPT

## 2024-09-17 PROCEDURE — 63600175 PHARM REV CODE 636 W HCPCS: Performed by: NURSE PRACTITIONER

## 2024-09-17 PROCEDURE — 30200315 PPD INTRADERMAL TEST REV CODE 302: Performed by: NURSE PRACTITIONER

## 2024-09-17 PROCEDURE — 86580 TB INTRADERMAL TEST: CPT | Performed by: NURSE PRACTITIONER

## 2024-09-17 PROCEDURE — 97530 THERAPEUTIC ACTIVITIES: CPT | Mod: CQ

## 2024-09-17 PROCEDURE — 12000002 HC ACUTE/MED SURGE SEMI-PRIVATE ROOM

## 2024-09-17 PROCEDURE — 25000003 PHARM REV CODE 250: Performed by: INTERNAL MEDICINE

## 2024-09-17 PROCEDURE — 25000003 PHARM REV CODE 250: Performed by: NURSE PRACTITIONER

## 2024-09-17 PROCEDURE — 36415 COLL VENOUS BLD VENIPUNCTURE: CPT | Performed by: NURSE PRACTITIONER

## 2024-09-17 PROCEDURE — 85025 COMPLETE CBC W/AUTO DIFF WBC: CPT | Performed by: NURSE PRACTITIONER

## 2024-09-17 PROCEDURE — 80048 BASIC METABOLIC PNL TOTAL CA: CPT | Performed by: NURSE PRACTITIONER

## 2024-09-17 PROCEDURE — 63600175 PHARM REV CODE 636 W HCPCS: Performed by: INTERNAL MEDICINE

## 2024-09-17 RX ORDER — LEVETIRACETAM 500 MG/1
500 TABLET ORAL 2 TIMES DAILY
Status: DISCONTINUED | OUTPATIENT
Start: 2024-09-17 | End: 2024-09-17

## 2024-09-17 RX ORDER — LACOSAMIDE 50 MG/1
50 TABLET ORAL EVERY 12 HOURS
Status: DISCONTINUED | OUTPATIENT
Start: 2024-09-17 | End: 2024-09-17

## 2024-09-17 RX ORDER — LEVETIRACETAM 250 MG/1
250 TABLET ORAL 2 TIMES DAILY
Status: DISCONTINUED | OUTPATIENT
Start: 2024-09-17 | End: 2024-09-26 | Stop reason: HOSPADM

## 2024-09-17 RX ADMIN — HYDRALAZINE HYDROCHLORIDE 10 MG: 10 TABLET, FILM COATED ORAL at 01:09

## 2024-09-17 RX ADMIN — HYDRALAZINE HYDROCHLORIDE 10 MG: 10 TABLET, FILM COATED ORAL at 09:09

## 2024-09-17 RX ADMIN — LEVETIRACETAM INJECTION 500 MG: 5 INJECTION INTRAVENOUS at 08:09

## 2024-09-17 RX ADMIN — LEVETIRACETAM 250 MG: 250 TABLET, FILM COATED ORAL at 08:09

## 2024-09-17 RX ADMIN — TUBERCULIN PURIFIED PROTEIN DERIVATIVE 5 UNITS: 5 INJECTION, SOLUTION INTRADERMAL at 10:09

## 2024-09-17 RX ADMIN — FLUTICASONE PROPIONATE 100 MCG: 50 SPRAY, METERED NASAL at 08:09

## 2024-09-17 RX ADMIN — CEFTRIAXONE SODIUM 1 G: 1 INJECTION, POWDER, FOR SOLUTION INTRAMUSCULAR; INTRAVENOUS at 12:09

## 2024-09-17 RX ADMIN — TAMSULOSIN HYDROCHLORIDE 0.4 MG: 0.4 CAPSULE ORAL at 08:09

## 2024-09-17 RX ADMIN — FAMOTIDINE 20 MG: 20 TABLET ORAL at 08:09

## 2024-09-17 RX ADMIN — CYANOCOBALAMIN TAB 1000 MCG 1000 MCG: 1000 TAB at 08:09

## 2024-09-17 RX ADMIN — ENOXAPARIN SODIUM 30 MG: 30 INJECTION SUBCUTANEOUS at 08:09

## 2024-09-17 RX ADMIN — HYDRALAZINE HYDROCHLORIDE 10 MG: 10 TABLET, FILM COATED ORAL at 05:09

## 2024-09-17 RX ADMIN — AMLODIPINE BESYLATE 10 MG: 5 TABLET ORAL at 04:09

## 2024-09-17 NOTE — SUBJECTIVE & OBJECTIVE
Interval History:  Patient is sitting up in bed.  No complaints awake and oriented to person this a.m..  Neurology evaluated patient EEG without seizure activity.  Metabolic encephalopathy likely due to UTI and hypertensive encephalopathy.  Patient had bowel movement yesterday with laxatives and enema.  Creatinine stable this morning.    Review of Systems   Unable to perform ROS: Dementia     Objective:     Vital Signs (Most Recent):  Temp: 97.9 °F (36.6 °C) (09/17/24 1121)  Pulse: 70 (09/17/24 1121)  Resp: 19 (09/17/24 1121)  BP: (!) 148/65 (09/17/24 1121)  SpO2: 99 % (09/17/24 1121) Vital Signs (24h Range):  Temp:  [97.4 °F (36.3 °C)-98.4 °F (36.9 °C)] 97.9 °F (36.6 °C)  Pulse:  [68-76] 70  Resp:  [18-19] 19  SpO2:  [95 %-99 %] 99 %  BP: (132-170)/(61-74) 148/65     Weight: 66 kg (145 lb 8.1 oz)  Body mass index is 20.88 kg/m².    Intake/Output Summary (Last 24 hours) at 9/17/2024 1246  Last data filed at 9/17/2024 0909  Gross per 24 hour   Intake --   Output 551 ml   Net -551 ml         Physical Exam  Vitals reviewed.   Constitutional:       General: He is not in acute distress.     Appearance: He is ill-appearing. He is not toxic-appearing.      Comments: awake   HENT:      Head: Normocephalic.      Nose: Nose normal.      Mouth/Throat:      Mouth: Mucous membranes are moist.      Pharynx: Oropharynx is clear.   Eyes:      Extraocular Movements: Extraocular movements intact.      Conjunctiva/sclera: Conjunctivae normal.   Cardiovascular:      Rate and Rhythm: Normal rate and regular rhythm.      Pulses: Normal pulses.      Heart sounds: Normal heart sounds.   Pulmonary:      Effort: Pulmonary effort is normal.      Breath sounds: Normal breath sounds.   Abdominal:      General: Bowel sounds are normal. There is no distension.      Tenderness: There is no abdominal tenderness. There is no guarding.   Genitourinary:     Comments: Draper in place, clear yellow urine  Skin:     General: Skin is warm and dry.       Capillary Refill: Capillary refill takes less than 2 seconds.   Neurological:      General: No focal deficit present.      Mental Status: Mental status is at baseline. He is disoriented.             Significant Labs: All pertinent labs within the past 24 hours have been reviewed.  Recent Lab Results         09/17/24  0553        Anion Gap 5       Baso # 0.02       Basophil % 0.3       BUN 32       Calcium 9.3       Chloride 108       CO2 29       Creatinine 2.0       Differential Method Automated       eGFR 30.9       Eos # 0.1       Eos % 1.9       Glucose 96       Gran # (ANC) 4.7       Gran % 65.7       Hematocrit 32.3       Hemoglobin 10.6       Immature Grans (Abs) 0.02  Comment: Mild elevation in immature granulocytes is non specific and   can be seen in a variety of conditions including stress response,   acute inflammation, trauma and pregnancy. Correlation with other   laboratory and clinical findings is essential.         Immature Granulocytes 0.3       Lymph # 1.8       Lymph % 24.2       MCH 30.4       MCHC 32.8       MCV 93       Mono # 0.6       Mono % 7.6       MPV 11.1       nRBC 0       Platelet Count 148       Potassium 4.5       RBC 3.49       RDW 14.6       Sodium 142       WBC 7.22               Significant Imaging: I have reviewed all pertinent imaging results/findings within the past 24 hours.
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
## 2024-09-17 NOTE — PROGRESS NOTES
[facility]  Progress Note    Patient Name: Lavon Brandon  MRN: [id number]  Patient Class: IP- Inpatient   Admission Date: 9/13/2024  Length of Stay: 3 days  Attending Physician: Tatyana Neal MD  Primary Care Provider: Aristides Haynes MD        Subjective:     Principal Problem:Severe sepsis        HPI:  89 year old male with a past medical history of hypertension, hyperlipidemia, CVA, PE, Seizure, UTI and dementia who presents to the emergency room with reports of abdominal and back pain and hallucinations. Also decreased bowel movements over last 2-4 days. He was recently diagnosed with UTI and started on augmentin. Per urine culture sensitivity, organism is resistant to Augmentin, sensitive to Rocephin.  Hypertensive in the ER at 212/94, reports did not take his home medication this morning.  WBC 9.56, mild MATA with serum creatinine 2.0, BUN 34, UA negative for leukocytes and nitrites.  Blood culture sent, lactic 2.39.  CT abdomen with trace left pleural effusion, large left renal cyst, CT head no acute findings, chest x-ray Blunting of the left costophrenic angle. IV rocephin given in ER with Hydralazine, amlodipine,  ml.  Admit to hospital medicine for further medical management.  The patient did have some urinary retention, a Draper catheter was placed with adequate urine output.  EEG pending.  Urine culture with no growth to date.  Patient's mental status continued to slowly improve.  He has tolerated p.o. diet.  PT and OT consulted for evaluation    Overview/Hospital Course:  89 year old hospitalized for UTI, AMS, severe sepsis. In Er, Lactate 2.39, BUN 3, Sr Cr 2, recently diagnosed with UTI, treated with oral Augmentin. Urine sensitivity resistant to Augmentin, sensitive to Rocephin. Rocephin started in ER. Afebrile, no leukocytosis.  Lactic acid improved with IV antibiotics and fluid resuscitation.  Renal function stable at 2.1, this is the patient's baseline renal  function per historical records.  Urine cultures continued to show no growth to date however patient maintained on Rocephin 1 g daily.  Continued to have waxing and waning mental status.  Neurology consulted.  EEG obtained without epileptiform activity.  Mental status continue to improve.  PT and OT consulted for further evaluation for possible skilled nursing placement.    Interval History:  Patient is sitting up in bed.  No complaints awake and oriented to person this a.m..  Neurology evaluated patient EEG without seizure activity.  Metabolic encephalopathy likely due to UTI and hypertensive encephalopathy.  Patient had bowel movement yesterday with laxatives and enema.  Creatinine stable this morning.    Review of Systems   Unable to perform ROS: Dementia     Objective:     Vital Signs (Most Recent):  Temp: 97.9 °F (36.6 °C) (09/17/24 1121)  Pulse: 70 (09/17/24 1121)  Resp: 19 (09/17/24 1121)  BP: (!) 148/65 (09/17/24 1121)  SpO2: 99 % (09/17/24 1121) Vital Signs (24h Range):  Temp:  [97.4 °F (36.3 °C)-98.4 °F (36.9 °C)] 97.9 °F (36.6 °C)  Pulse:  [68-76] 70  Resp:  [18-19] 19  SpO2:  [95 %-99 %] 99 %  BP: (132-170)/(61-74) 148/65     Weight: 66 kg (145 lb 8.1 oz)  Body mass index is 20.88 kg/m².    Intake/Output Summary (Last 24 hours) at 9/17/2024 1246  Last data filed at 9/17/2024 0909  Gross per 24 hour   Intake --   Output 551 ml   Net -551 ml         Physical Exam  Vitals reviewed.   Constitutional:       General: He is not in acute distress.     Appearance: He is ill-appearing. He is not toxic-appearing.      Comments: awake   HENT:      Head: Normocephalic.      Nose: Nose normal.      Mouth/Throat:      Mouth: Mucous membranes are moist.      Pharynx: Oropharynx is clear.   Eyes:      Extraocular Movements: Extraocular movements intact.      Conjunctiva/sclera: Conjunctivae normal.   Cardiovascular:      Rate and Rhythm: Normal rate and regular rhythm.      Pulses: Normal pulses.      Heart sounds:  Normal heart sounds.   Pulmonary:      Effort: Pulmonary effort is normal.      Breath sounds: Normal breath sounds.   Abdominal:      General: Bowel sounds are normal. There is no distension.      Tenderness: There is no abdominal tenderness. There is no guarding.   Genitourinary:     Comments: Draper in place, clear yellow urine  Skin:     General: Skin is warm and dry.      Capillary Refill: Capillary refill takes less than 2 seconds.   Neurological:      General: No focal deficit present.      Mental Status: Mental status is at baseline. He is disoriented.             Significant Labs: All pertinent labs within the past 24 hours have been reviewed.  Recent Lab Results         09/17/24  0553        Anion Gap 5       Baso # 0.02       Basophil % 0.3       BUN 32       Calcium 9.3       Chloride 108       CO2 29       Creatinine 2.0       Differential Method Automated       eGFR 30.9       Eos # 0.1       Eos % 1.9       Glucose 96       Gran # (ANC) 4.7       Gran % 65.7       Hematocrit 32.3       Hemoglobin 10.6       Immature Grans (Abs) 0.02  Comment: Mild elevation in immature granulocytes is non specific and   can be seen in a variety of conditions including stress response,   acute inflammation, trauma and pregnancy. Correlation with other   laboratory and clinical findings is essential.         Immature Granulocytes 0.3       Lymph # 1.8       Lymph % 24.2       MCH 30.4       MCHC 32.8       MCV 93       Mono # 0.6       Mono % 7.6       MPV 11.1       nRBC 0       Platelet Count 148       Potassium 4.5       RBC 3.49       RDW 14.6       Sodium 142       WBC 7.22               Significant Imaging: I have reviewed all pertinent imaging results/findings within the past 24 hours.    Assessment/Plan:      * Severe sepsis  This patient does have evidence of infective focus  My overall impression is sepsis.  Source: Urinary Tract  Antibiotics given-   Antibiotics (72h ago, onward)      Start     Stop Route  Frequency Ordered    09/14/24 1300  cefTRIAXone (ROCEPHIN) 1 g in dextrose 5 % 100 mL IVPB (ready to mix)         -- IV Every 24 hours (non-standard times) 09/13/24 1455          Latest lactate reviewed-  Recent Labs   Lab 09/14/24  0729   LACTATE 1.8       Organ dysfunction indicated by Acute kidney injury and Encephalopathy    Fluid challenge Not needed - patient is not hypotensive      Post- resuscitation assessment No - Post resuscitation assessment not needed       Will Not start Pressors- Levophed for MAP of 65  Source control achieved by:   Blood cultures, urine cultures sent  Rocephin IV given  1st Lactate 2.37, repeat Lactic acid ordered  Procalcitonin ordered      Urinary retention   ml  Place santo cath   Start flomax        Pleural effusion on left  Patient found to have  trace  pleural effusion on imaging. I have personally reviewed and interpreted the following imaging: Xray. A thoracentesis was deferred. Most likely etiology includes  CDK st III . Management to include Diuresis      Encephalopathy, metabolic  Slowly improving   History of dementia  Likely related to UTI, continue rocephin  Monitor neuro check  Check keppra level, pending  EEG pending  Neurology consulted  Check ammonia  Vitamin D 29, Vitamin B12 1400, vitamin b6, b1 pending     UTI (urinary tract infection)  Urine culture NGTD  Continue Rocephin 1 gm daily         Seizure disorder  Continue keppra 500 mg twice daily  Keppra level pending  EEG without epileptiform seizure activity noted  neurology consulted      Debility  Patient with Acute debility due to age-related physical debility. Latest AMPAC and GEMS scores have not been reviewed. Evaluation for etiology is underway. Plan includes PT/OT consulted and fall precautions in place.    Chronic renal disease, stage IV  Creatine stable for now. BMP reviewed- noted Estimated Creatinine Clearance: 22.5 mL/min (A) (based on SCr of 2 mg/dL (H)). according to latest data. Based on  current GFR, CKD stage is stage 3 - GFR 30-59.  Monitor UOP and serial BMP and adjust therapy as needed. Renally dose meds. Avoid nephrotoxic medications and procedures.    Constipation  Resolved  Continue stool softeners      VTE Risk Mitigation (From admission, onward)           Ordered     enoxaparin injection 30 mg  Every 24 hours (non-standard times)         09/13/24 1848     IP VTE HIGH RISK PATIENT  Once         09/13/24 1455     Place sequential compression device  Until discontinued         09/13/24 1455                    Discharge Planning   JOANNE:      Code Status: DNR   Is the patient medically ready for discharge?:     Reason for patient still in hospital (select all that apply): Patient trending condition and PT / OT recommendations  Discharge Plan A: Home Health                  VANE Bran  Department of Hospital Medicine   [facility]

## 2024-09-17 NOTE — PROCEDURES
EEG REPORT    NAME: Lavon Brandon  : 1932  MRN: [id number]    DATE of EE2024    CLINICAL INDICATION: This is a 89 y.o. male with history of hypertension, hyperlipidemia, CVA, PE, Seizure, UTI and dementia who presents to the emergency room with reports of abdominal and back pain and hallucinations being evaluated for encephalopathy.    MEDICATIONS:    amLODIPine  10 mg Oral Daily before evening meal    cefTRIAXone (Rocephin) IV (PEDS and ADULTS)  1 g Intravenous Q24H    cyanocobalamin  1,000 mcg Oral Daily    enoxparin  30 mg Subcutaneous Q24H    famotidine  20 mg Oral Daily    fluticasone propionate  2 spray Each Nostril Daily    hydrALAZINE  10 mg Oral Q8H    lactulose 10 gram/15 mL 200 g  200 g Rectal TID    levETIRAcetam (Keppra) IV (PEDS and ADULTS)  500 mg Intravenous Q12H    tamsulosin  0.4 mg Oral Daily         EEG DESCRIPTION:  A 16-channel EEG was performed with electrode placement in 10/[employer] System. Longitudinal bipolar and referential montages were utilized in analysis.    This is a technically adequate study with minor muscle and motion artifacts.    The dominant posterior background rhythm was a well modulated, symmetric, synchronous, reactive, 5-6 Hz rhythm.    The record was reactive to eye opening and closure. Anterior derivations showed the expected admixture of low-voltage beta and theta frequencies as well as intermittent eye blink artifact.    Sleep architecture was not seen    Photic stimulation, performed elicited no driving response. Hyperventilation  was not performed.    No epileptiform discharges were recorded. No electrographic or electroclinical seizures were recorded.    DIAGNOSIS: This is an abnormal EEG due to the presence of moderate generalised slowing. No lateralizing or epileptiform features are noted. No electrographic or electroclinical seizures are recorded.      CLINICAL INTERPRETATION:  This is an abnormal EEG indicating moderate  encephalopathy.    Findings of this study indicate a generalized encephalopathic process that is nonspecific in type. Toxic, metabolic, or structural abnormalities may be considered.  Further clinical correlation is advised .      Cayden Guardado MD  Neurology

## 2024-09-17 NOTE — PLAN OF CARE
DOM sent secure msg to PT/OT requesting updated recommendations       09/17/24 9448   Post-Acute Status   Post-Acute Authorization Other   Other Status See Comments

## 2024-09-17 NOTE — ASSESSMENT & PLAN NOTE
Continue keppra 500 mg twice daily  Keppra level pending  EEG without epileptiform seizure activity noted  neurology consulted

## 2024-09-17 NOTE — PT/OT/SLP PROGRESS
"Physical Therapy Treatment    Patient Name:  Lavon Brandon   MRN:  [id number]    Recommendations:     Discharge Recommendations: Moderate Intensity Therapy  Discharge Equipment Recommendations: none  Barriers to discharge:  decreased mobility from baseline; maxAx2 for transfers    Assessment:     Lavon Brandon is a 89 y.o. male admitted with a medical diagnosis of Severe sepsis.  He presents with the following impairments/functional limitations: weakness, impaired endurance, impaired self care skills, impaired functional mobility, gait instability, impaired balance, impaired cognition, decreased safety awareness, decreased coordination, impaired cardiopulmonary response to activity.    Pt required maxAx1 and modAx1 for supine to sit transfer, with sitting balance slowly improving to CGA. Despite performing anterior reaches to targets for improved midline awareness,  continued to require frequent VC for anterior wt shift.    Two standing trials to RW with maxAx2 and good BLE activation, but significant retropulsion and inability to translate pelvis fwd over COG. Very poor standing balance. Each standing attempt was discontinued when patient began lifting one foot from floor.     Transfer to chair performed via squat pivot with no AD and maxAx2. Therapist returned to room after 1 hour for return transfer to bed. Good tolerance to up in chair x 1 hr.     Rehab Prognosis: Good and Fair; patient would benefit from acute skilled PT services to address these deficits and reach maximum level of function.    Recent Surgery: * No surgery found *      Plan:     During this hospitalization, patient to be seen 5 x/week to address the identified rehab impairments via gait training, therapeutic activities, therapeutic exercises and progress toward the following goals:    Plan of Care Expires:  10/14/24    Subjective     Chief Complaint: none  Patient/Family Comments/goals: "feels good to be up in this " "chair"  Pain/Comfort:  Pain Rating 1: 0/10  Pain Rating Post-Intervention 1: 0/10      Objective:     Communicated with nurse prior to session.  Patient found HOB elevated with bed alarm, santo catheter, peripheral IV, telemetry upon PT entry to room.     General Precautions: Standard, fall, seizure (dementia)  Orthopedic Precautions: N/A  Braces: N/A  Respiratory Status: Room air     Functional Mobility:  Bed Mobility:     Supine to Sit: moderate assistance and maximal assistance  Sit to Supine: maximal assistance  Transfers:     Sit to Stand:  2 trials to RW, maxAx2, retropulsive with posterior pelvis, spontaneously lifting 1 foot from floor; efforts discontinued    Bed to Chair: maximal assistance and of 2 persons with  no AD  using  Squat Pivot and Drawsheet      AM-PAC 6 CLICK MOBILITY          Treatment & Education:      Patient left up in chair with all lines intact, call button in reach, chair alarm on, and nurse notified..    GOALS:   Multidisciplinary Problems       Physical Therapy Goals          Problem: Physical Therapy    Goal Priority Disciplines Outcome Goal Variances Interventions   Physical Therapy Goal     PT, PT/OT Progressing     Description: Goals to be met by: 10/14/24     Patient will increase functional independence with mobility by performin. Supine to sit with MInimal Assistance  2. Sit to supine with MInimal Assistance  3. Sit to stand transfer with Minimal Assistance  4. Bed to chair transfer with Minimal Assistance using Rolling Walker  5. Gait  2 x 25 feet with Minimal Assistance using Rolling Walker.                          Time Tracking:     PT Received On: 24  PT Start Time: 1402     PT Stop Time: 1425  PT Second Session: 5498-7449 (11 min)  PT Total Time (min): 23 min + 11 min = 34 min total     Billable Minutes: Therapeutic Activity 34    Treatment Type: Treatment  PT/PTA: PTA     Number of PTA visits since last PT visit: 2     2024  "

## 2024-09-17 NOTE — ASSESSMENT & PLAN NOTE
Creatine stable for now. BMP reviewed- noted Estimated Creatinine Clearance: 22.5 mL/min (A) (based on SCr of 2 mg/dL (H)). according to latest data. Based on current GFR, CKD stage is stage 3 - GFR 30-59.  Monitor UOP and serial BMP and adjust therapy as needed. Renally dose meds. Avoid nephrotoxic medications and procedures.

## 2024-09-18 LAB
ANION GAP SERPL CALC-SCNC: 7 MMOL/L (ref 8–16)
BACTERIA BLD CULT: NORMAL
BACTERIA BLD CULT: NORMAL
BASOPHILS # BLD AUTO: 0.02 K/UL (ref 0–0.2)
BASOPHILS NFR BLD: 0.3 % (ref 0–1.9)
BUN SERPL-MCNC: 38 MG/DL (ref 10–30)
CALCIUM SERPL-MCNC: 8.7 MG/DL (ref 8.7–10.5)
CHLORIDE SERPL-SCNC: 106 MMOL/L (ref 95–110)
CO2 SERPL-SCNC: 27 MMOL/L (ref 23–29)
CREAT SERPL-MCNC: 2 MG/DL (ref 0.5–1.4)
DIFFERENTIAL METHOD BLD: ABNORMAL
EOSINOPHIL # BLD AUTO: 0.1 K/UL (ref 0–0.5)
EOSINOPHIL NFR BLD: 1.5 % (ref 0–8)
ERYTHROCYTE [DISTWIDTH] IN BLOOD BY AUTOMATED COUNT: 14.4 % (ref 11.5–14.5)
EST. GFR  (NO RACE VARIABLE): 30.9 ML/MIN/1.73 M^2
GLUCOSE SERPL-MCNC: 102 MG/DL (ref 70–110)
HCT VFR BLD AUTO: 28.8 % (ref 40–54)
HGB BLD-MCNC: 9.4 G/DL (ref 14–18)
IMM GRANULOCYTES # BLD AUTO: 0.02 K/UL (ref 0–0.04)
IMM GRANULOCYTES NFR BLD AUTO: 0.3 % (ref 0–0.5)
LYMPHOCYTES # BLD AUTO: 2.2 K/UL (ref 1–4.8)
LYMPHOCYTES NFR BLD: 36.1 % (ref 18–48)
MCH RBC QN AUTO: 30 PG (ref 27–31)
MCHC RBC AUTO-ENTMCNC: 32.6 G/DL (ref 32–36)
MCV RBC AUTO: 92 FL (ref 82–98)
MONOCYTES # BLD AUTO: 0.6 K/UL (ref 0.3–1)
MONOCYTES NFR BLD: 9.9 % (ref 4–15)
NEUTROPHILS # BLD AUTO: 3.2 K/UL (ref 1.8–7.7)
NEUTROPHILS NFR BLD: 51.9 % (ref 38–73)
NRBC BLD-RTO: 0 /100 WBC
OHS QRS DURATION: 94 MS
OHS QTC CALCULATION: 440 MS
PLATELET # BLD AUTO: 135 K/UL (ref 150–450)
PMV BLD AUTO: 11 FL (ref 9.2–12.9)
POTASSIUM SERPL-SCNC: 4.5 MMOL/L (ref 3.5–5.1)
RBC # BLD AUTO: 3.13 M/UL (ref 4.6–6.2)
SODIUM SERPL-SCNC: 140 MMOL/L (ref 136–145)
WBC # BLD AUTO: 6.07 K/UL (ref 3.9–12.7)

## 2024-09-18 PROCEDURE — 25000003 PHARM REV CODE 250: Performed by: INTERNAL MEDICINE

## 2024-09-18 PROCEDURE — 97535 SELF CARE MNGMENT TRAINING: CPT

## 2024-09-18 PROCEDURE — 99221 1ST HOSP IP/OBS SF/LOW 40: CPT | Mod: ,,, | Performed by: FAMILY MEDICINE

## 2024-09-18 PROCEDURE — 85025 COMPLETE CBC W/AUTO DIFF WBC: CPT | Performed by: NURSE PRACTITIONER

## 2024-09-18 PROCEDURE — 97165 OT EVAL LOW COMPLEX 30 MIN: CPT

## 2024-09-18 PROCEDURE — 80048 BASIC METABOLIC PNL TOTAL CA: CPT | Performed by: NURSE PRACTITIONER

## 2024-09-18 PROCEDURE — 63600175 PHARM REV CODE 636 W HCPCS: Performed by: NURSE PRACTITIONER

## 2024-09-18 PROCEDURE — 97168 OT RE-EVAL EST PLAN CARE: CPT

## 2024-09-18 PROCEDURE — 36415 COLL VENOUS BLD VENIPUNCTURE: CPT | Performed by: NURSE PRACTITIONER

## 2024-09-18 PROCEDURE — 97530 THERAPEUTIC ACTIVITIES: CPT

## 2024-09-18 PROCEDURE — 12000002 HC ACUTE/MED SURGE SEMI-PRIVATE ROOM

## 2024-09-18 PROCEDURE — 25000003 PHARM REV CODE 250: Performed by: STUDENT IN AN ORGANIZED HEALTH CARE EDUCATION/TRAINING PROGRAM

## 2024-09-18 PROCEDURE — 51798 US URINE CAPACITY MEASURE: CPT

## 2024-09-18 PROCEDURE — 63600175 PHARM REV CODE 636 W HCPCS: Performed by: INTERNAL MEDICINE

## 2024-09-18 PROCEDURE — 25000003 PHARM REV CODE 250: Performed by: NURSE PRACTITIONER

## 2024-09-18 RX ADMIN — LEVETIRACETAM 250 MG: 250 TABLET, FILM COATED ORAL at 09:09

## 2024-09-18 RX ADMIN — FLUTICASONE PROPIONATE 100 MCG: 50 SPRAY, METERED NASAL at 08:09

## 2024-09-18 RX ADMIN — TAMSULOSIN HYDROCHLORIDE 0.4 MG: 0.4 CAPSULE ORAL at 08:09

## 2024-09-18 RX ADMIN — FAMOTIDINE 20 MG: 20 TABLET ORAL at 08:09

## 2024-09-18 RX ADMIN — HYDRALAZINE HYDROCHLORIDE 10 MG: 10 TABLET, FILM COATED ORAL at 05:09

## 2024-09-18 RX ADMIN — AMLODIPINE BESYLATE 10 MG: 5 TABLET ORAL at 05:09

## 2024-09-18 RX ADMIN — CEFTRIAXONE SODIUM 1 G: 1 INJECTION, POWDER, FOR SOLUTION INTRAMUSCULAR; INTRAVENOUS at 12:09

## 2024-09-18 RX ADMIN — HYDRALAZINE HYDROCHLORIDE 10 MG: 10 TABLET, FILM COATED ORAL at 09:09

## 2024-09-18 RX ADMIN — LEVETIRACETAM 250 MG: 250 TABLET, FILM COATED ORAL at 08:09

## 2024-09-18 RX ADMIN — ENOXAPARIN SODIUM 30 MG: 30 INJECTION SUBCUTANEOUS at 08:09

## 2024-09-18 RX ADMIN — CYANOCOBALAMIN TAB 1000 MCG 1000 MCG: 1000 TAB at 08:09

## 2024-09-18 NOTE — PROGRESS NOTES
[facility] Medicine  Progress Note    Patient Name: Lavon Brandon  MRN: [id number]  Patient Class: IP- Inpatient   Admission Date: 9/13/2024  Length of Stay: 4 days  Attending Physician: Tatyana Neal MD  Primary Care Provider: Aristides Haynes MD        Subjective:     Principal Problem:Severe sepsis        HPI:  89 year old male with a past medical history of hypertension, hyperlipidemia, CVA, PE, Seizure, UTI and dementia who presents to the emergency room with reports of abdominal and back pain and hallucinations. Also decreased bowel movements over last 2-4 days. He was recently diagnosed with UTI and started on augmentin. Per urine culture sensitivity, organism is resistant to Augmentin, sensitive to Rocephin.  Hypertensive in the ER at 212/94, reports did not take his home medication this morning.  WBC 9.56, mild MATA with serum creatinine 2.0, BUN 34, UA negative for leukocytes and nitrites.  Blood culture sent, lactic 2.39.  CT abdomen with trace left pleural effusion, large left renal cyst, CT head no acute findings, chest x-ray Blunting of the left costophrenic angle. IV rocephin given in ER with Hydralazine, amlodipine,  ml.  Admit to hospital medicine for further medical management.  The patient did have some urinary retention, a Draper catheter was placed with adequate urine output.  EEG pending.  Urine culture with no growth to date.  Patient's mental status continued to slowly improve.  He has tolerated p.o. diet.  PT and OT consulted for evaluation    Overview/Hospital Course:  89 year old hospitalized for UTI, AMS, severe sepsis. In Er, Lactate 2.39, BUN 3, Sr Cr 2, recently diagnosed with UTI, treated with oral Augmentin. Urine sensitivity resistant to Augmentin, sensitive to Rocephin. Rocephin started in ER. Afebrile, no leukocytosis.  Lactic acid improved with IV antibiotics and fluid resuscitation.  Renal function stable at 2.1, this is the patient's baseline renal  function per historical records.  Urine cultures continued to show no growth to date however patient maintained on Rocephin 1 g daily.  Continued to have waxing and waning mental status.  Neurology consulted.  EEG obtained without epileptiform activity.  Mental status continue to improve.  PT and OT consulted for further evaluation for possible skilled nursing placement.    Interval History:  Patient is sitting up in bed.  No complaints, awake, oriented to self.  Nursing staff states he ate 100% of his meal this morning.  No overnight events.  Maintained on Rocephin for UTI however no growth from culture at this time.  Awaiting skilled nursing placement  Review of Systems   Unable to perform ROS: Dementia     Objective:     Vital Signs (Most Recent):  Temp: 97.6 °F (36.4 °C) (09/18/24 0811)  Pulse: (!) 57 (09/18/24 0811)  Resp: 16 (09/18/24 0811)  BP: (!) 152/67 (09/18/24 0811)  SpO2: 97 % (09/18/24 0811) Vital Signs (24h Range):  Temp:  [97.6 °F (36.4 °C)-98.5 °F (36.9 °C)] 97.6 °F (36.4 °C)  Pulse:  [57-72] 57  Resp:  [16-18] 16  SpO2:  [96 %-99 %] 97 %  BP: (119-155)/(61-72) 152/67     Weight: 66 kg (145 lb 8.1 oz)  Body mass index is 20.88 kg/m².    Intake/Output Summary (Last 24 hours) at 9/18/2024 1134  Last data filed at 9/17/2024 1550  Gross per 24 hour   Intake --   Output 500 ml   Net -500 ml         Physical Exam  Vitals reviewed.   Constitutional:       General: He is not in acute distress.     Appearance: He is ill-appearing. He is not toxic-appearing.      Comments: awake   HENT:      Head: Normocephalic.      Nose: Nose normal.      Mouth/Throat:      Mouth: Mucous membranes are moist.      Pharynx: Oropharynx is clear.   Eyes:      Extraocular Movements: Extraocular movements intact.      Conjunctiva/sclera: Conjunctivae normal.   Cardiovascular:      Rate and Rhythm: Normal rate and regular rhythm.      Pulses: Normal pulses.      Heart sounds: Normal heart sounds.   Pulmonary:      Effort:  Pulmonary effort is normal.      Breath sounds: Normal breath sounds.   Abdominal:      General: Bowel sounds are normal. There is no distension.      Tenderness: There is no abdominal tenderness. There is no guarding.   Genitourinary:     Comments: Draper in place, clear yellow urine  Skin:     General: Skin is warm and dry.      Capillary Refill: Capillary refill takes less than 2 seconds.   Neurological:      General: No focal deficit present.      Mental Status: Mental status is at baseline. He is disoriented.             Significant Labs: All pertinent labs within the past 24 hours have been reviewed.  Recent Lab Results         09/18/24  0536        Anion Gap 7       Baso # 0.02       Basophil % 0.3       BUN 38       Calcium 8.7       Chloride 106       CO2 27       Creatinine 2.0       Differential Method Automated       eGFR 30.9       Eos # 0.1       Eos % 1.5       Glucose 102       Gran # (ANC) 3.2       Gran % 51.9       Hematocrit 28.8       Hemoglobin 9.4       Immature Grans (Abs) 0.02  Comment: Mild elevation in immature granulocytes is non specific and   can be seen in a variety of conditions including stress response,   acute inflammation, trauma and pregnancy. Correlation with other   laboratory and clinical findings is essential.         Immature Granulocytes 0.3       Lymph # 2.2       Lymph % 36.1       MCH 30.0       MCHC 32.6       MCV 92       Mono # 0.6       Mono % 9.9       MPV 11.0       nRBC 0       Platelet Count 135       Potassium 4.5       RBC 3.13       RDW 14.4       Sodium 140       WBC 6.07               Significant Imaging: I have reviewed all pertinent imaging results/findings within the past 24 hours.    Assessment/Plan:      * Severe sepsis  This patient does have evidence of infective focus  My overall impression is sepsis.  Source: Urinary Tract  Antibiotics given-   Antibiotics (72h ago, onward)      Start     Stop Route Frequency Ordered    09/14/24 1300  cefTRIAXone  "(ROCEPHIN) 1 g in dextrose 5 % 100 mL IVPB (ready to mix)         -- IV Every 24 hours (non-standard times) 09/13/24 1455          Latest lactate reviewed-  No results for input(s): "LACTATE", "POCLAC" in the last 72 hours.    Organ dysfunction indicated by Acute kidney injury and Encephalopathy    Fluid challenge Not needed - patient is not hypotensive      Post- resuscitation assessment No - Post resuscitation assessment not needed       Will Not start Pressors- Levophed for MAP of 65  Source control achieved by:   Blood cultures, urine cultures sent  Rocephin IV given  1st Lactate 2.37, repeat Lactic acid ordered  Procalcitonin ordered      Urinary retention   ml  Place santo cath   Start flomax        Pleural effusion on left  Patient found to have  trace  pleural effusion on imaging. I have personally reviewed and interpreted the following imaging: Xray. A thoracentesis was deferred. Most likely etiology includes  CDK st III . Management to include Diuresis      Encephalopathy, metabolic  Slowly improving   History of dementia  Likely related to UTI, continue rocephin  Monitor neuro check  Check keppra level, pending  EEG pending  Neurology consulted  Check ammonia  Vitamin D 29, Vitamin B12 1400, vitamin b6, b1 pending     UTI (urinary tract infection)  Urine culture NGTD  Continue Rocephin 1 gm daily         Seizure disorder  Continue keppra 500 mg twice daily  Keppra level pending  EEG without epileptiform seizure activity noted  neurology consulted      Debility  Patient with Acute debility due to age-related physical debility. Latest AMPAC and GEMS scores have not been reviewed. Evaluation for etiology is underway. Plan includes PT/OT consulted and fall precautions in place.    Chronic renal disease, stage IV  Creatine stable for now. BMP reviewed- noted Estimated Creatinine Clearance: 22.5 mL/min (A) (based on SCr of 2 mg/dL (H)). according to latest data. Based on current GFR, CKD stage is stage 3 " - GFR 30-59.  Monitor UOP and serial BMP and adjust therapy as needed. Renally dose meds. Avoid nephrotoxic medications and procedures.    Constipation  Resolved  Continue stool softeners daily      VTE Risk Mitigation (From admission, onward)           Ordered     enoxaparin injection 30 mg  Every 24 hours (non-standard times)         09/13/24 1848     IP VTE HIGH RISK PATIENT  Once         09/13/24 1455     Place sequential compression device  Until discontinued         09/13/24 1455                    Discharge Planning   JOANNE:      Code Status: DNR   Is the patient medically ready for discharge?:     Reason for patient still in hospital (select all that apply): Pending disposition  Discharge Plan A: Skilled Nursing Facility   Discharge Delays: None known at this time              VANE Bran  Department of Hospital Medicine   [facility]

## 2024-09-18 NOTE — SUBJECTIVE & OBJECTIVE
Interval History:  Patient is sitting up in bed.  No complaints, awake, oriented to self.  Nursing staff states he ate 100% of his meal this morning.  No overnight events.  Maintained on Rocephin for UTI however no growth from culture at this time.  Awaiting skilled nursing placement  Review of Systems   Unable to perform ROS: Dementia     Objective:     Vital Signs (Most Recent):  Temp: 97.6 °F (36.4 °C) (09/18/24 0811)  Pulse: (!) 57 (09/18/24 0811)  Resp: 16 (09/18/24 0811)  BP: (!) 152/67 (09/18/24 0811)  SpO2: 97 % (09/18/24 0811) Vital Signs (24h Range):  Temp:  [97.6 °F (36.4 °C)-98.5 °F (36.9 °C)] 97.6 °F (36.4 °C)  Pulse:  [57-72] 57  Resp:  [16-18] 16  SpO2:  [96 %-99 %] 97 %  BP: (119-155)/(61-72) 152/67     Weight: 66 kg (145 lb 8.1 oz)  Body mass index is 20.88 kg/m².    Intake/Output Summary (Last 24 hours) at 9/18/2024 1134  Last data filed at 9/17/2024 1550  Gross per 24 hour   Intake --   Output 500 ml   Net -500 ml         Physical Exam  Vitals reviewed.   Constitutional:       General: He is not in acute distress.     Appearance: He is ill-appearing. He is not toxic-appearing.      Comments: awake   HENT:      Head: Normocephalic.      Nose: Nose normal.      Mouth/Throat:      Mouth: Mucous membranes are moist.      Pharynx: Oropharynx is clear.   Eyes:      Extraocular Movements: Extraocular movements intact.      Conjunctiva/sclera: Conjunctivae normal.   Cardiovascular:      Rate and Rhythm: Normal rate and regular rhythm.      Pulses: Normal pulses.      Heart sounds: Normal heart sounds.   Pulmonary:      Effort: Pulmonary effort is normal.      Breath sounds: Normal breath sounds.   Abdominal:      General: Bowel sounds are normal. There is no distension.      Tenderness: There is no abdominal tenderness. There is no guarding.   Genitourinary:     Comments: Draper in place, clear yellow urine  Skin:     General: Skin is warm and dry.      Capillary Refill: Capillary refill takes less than 2  seconds.   Neurological:      General: No focal deficit present.      Mental Status: Mental status is at baseline. He is disoriented.             Significant Labs: All pertinent labs within the past 24 hours have been reviewed.  Recent Lab Results         09/18/24  0536        Anion Gap 7       Baso # 0.02       Basophil % 0.3       BUN 38       Calcium 8.7       Chloride 106       CO2 27       Creatinine 2.0       Differential Method Automated       eGFR 30.9       Eos # 0.1       Eos % 1.5       Glucose 102       Gran # (ANC) 3.2       Gran % 51.9       Hematocrit 28.8       Hemoglobin 9.4       Immature Grans (Abs) 0.02  Comment: Mild elevation in immature granulocytes is non specific and   can be seen in a variety of conditions including stress response,   acute inflammation, trauma and pregnancy. Correlation with other   laboratory and clinical findings is essential.         Immature Granulocytes 0.3       Lymph # 2.2       Lymph % 36.1       MCH 30.0       MCHC 32.6       MCV 92       Mono # 0.6       Mono % 9.9       MPV 11.0       nRBC 0       Platelet Count 135       Potassium 4.5       RBC 3.13       RDW 14.4       Sodium 140       WBC 6.07               Significant Imaging: I have reviewed all pertinent imaging results/findings within the past 24 hours.

## 2024-09-18 NOTE — PT/OT/SLP RE-EVAL
Occupational Therapy   Re-evaluation    Name: Lavon Brandon  MRN: [id number]  Admitting Diagnosis:  Severe sepsis  Recent Surgery: * No surgery found *      Recommendations:     Discharge Recommendations: Moderate Intensity Therapy  Discharge Equipment Recommendations: none  Barriers to discharge:   (Increased physical assistance with functional mobility and ADLs.)    Assessment:     Lavon Brandon is a 89 y.o. male with a medical diagnosis of Severe sepsis.  He presents with general weakness. Performance deficits affecting function are weakness, impaired endurance, impaired self care skills, impaired functional mobility, gait instability, impaired balance, decreased safety awareness, decreased lower extremity function, decreased upper extremity function.      Rehab Prognosis:  fair; patient would benefit from acute skilled OT services to address these deficits and reach maximum level of function.       Plan:     Patient to be seen 5 x/week to address the above listed problems via self-care/home management, therapeutic activities, therapeutic exercises  Plan of Care Expires: 10/18/24  Plan of Care Reviewed with: patient    Subjective     Chief Complaint: General weakness  Patient/Family stated goals: Improved functional mobility and ADL independence.  Communicated with: nurse prior to session.  Pain/Comfort:  Pain Rating 1: 0/10  Pain Rating Post-Intervention 1: 0/10    Objective:     Communicated with: nurse prior to session.  Patient found HOB elevated with: telemetry, peripheral IV upon OT entry to room.    General Precautions: Standard, fall, seizure  Orthopedic Precautions: N/A  Braces: N/A  Respiratory Status: Room air    Occupational Performance:    Bed Mobility:    Patient completed Scooting/Bridging with maximal assistance  Patient completed Supine to Sit with maximal assistance  Patient completed Sit to Supine with maximal assistance  Performed unsupported sitting EOB with contact guard  assistance.    Functional Mobility/Transfers:  Patient completed Sit <> Stand x1 Trial with moderate assistance using hand-held assist and rolling walker     Activities of Daily Living:  Lower Body Dressing: maximal assistance to don/doff socks sitting EOB.    Cognitive/Visual Perceptual:  Cognitive/Psychosocial Skills:     -       Oriented to: Person   -       Follows Commands/attention:Follows one-step commands  -       Communication: clear/fluent  -       Memory: Impaired STM and Poor immediate recall  -       Safety awareness/insight to disability: impaired   -       Mood/Affect/Coping skills/emotional control: Cooperative and Pleasant  Visual/Perceptual:      -Intact Acuity    Physical Exam:  Balance:    -       Sitting: Contact Guard, Standing: Moderate Assist  Upper Extremity Range of Motion:     -       Right Upper Extremity: WFL  -       Left Upper Extremity: WFL  Upper Extremity Strength:    -       Right Upper Extremity: 3/5  -       Left Upper Extremity: 3/5   Strength:    -       Right Upper Extremity: fair  -       Left Upper Extremity: fair  Fine Motor Coordination:    -       Intact    AMPAC 6 Click:  AMPAC Total Score:      Treatment & Education:  Patient educated on the purpose of Occupational Therapy and the importance of getting OOB.    Patient left HOB elevated with all lines intact, call button in reach, and bed alarm on    GOALS:   Multidisciplinary Problems       Occupational Therapy Goals          Problem: Occupational Therapy    Goal Priority Disciplines Outcome Interventions   Occupational Therapy Goal     OT, PT/OT     Description: Goals to be met by: 10/18/2024     Patient will increase functional independence with ADLs by performing:    UE Dressing with Stand-by Assistance.  LE Dressing with Stand-by Assistance.  Grooming while seated with Stand-by Assistance.  Toileting from toilet with Stand-by Assistance for hygiene and clothing management.   Supine to sit with Stand-by  Assistance.  Toilet transfer to toilet with Stand-by Assistance.  Perform 30 minutes sitting/standing ADL activity with no LOB.                         History:     Past Medical History:   Diagnosis Date    Bradyarrhythmia     CVA (cerebral infarction) 2006    Dementia     Depression     Hyperlipidemia     Hypertension     renal htn    Pulmonary embolism 2002    Seizure disorder          Past Surgical History:   Procedure Laterality Date    left foot  with crushed injry         Time Tracking:     OT Date of Treatment: 09/18/24  OT Start Time: 1011  OT Stop Time: 1036  OT Total Time (min): 25 min    Billable Minutes:Evaluation 10  Self Care/Home Management 15    9/18/2024

## 2024-09-18 NOTE — NURSING
Nurses Note -- 4 Eyes      9/18/2024   4:35 AM      Skin assessed during: Daily Assessment      [] No Altered Skin Integrity Present    []Prevention Measures Documented      [x] Yes- Altered Skin Integrity Present or Discovered   [x] LDA Added if Not in Epic (Describe Wound)   [] New Altered Skin Integrity was Present on Admit and Documented in LDA   [x] Wound Image Taken    Wound Care Consulted? Yes    Attending Nurse:  Simona De Guzman RN/Staff Member:  Yesenia

## 2024-09-18 NOTE — CONSULTS
Sacral DTI 7x11x0.1cm purple discoloration with areas of broken skin and old scarring.  Patient is on waffle mattress, heels are floated, heel protectors are now in place, complete assessment done. Dr. Samuels notified and orders received.

## 2024-09-18 NOTE — PLAN OF CARE
Problem: Infection  Goal: Absence of Infection Signs and Symptoms  Outcome: Progressing     Problem: Adult Inpatient Plan of Care  Goal: Plan of Care Review  Outcome: Progressing  Goal: Patient-Specific Goal (Individualized)  Outcome: Progressing  Goal: Absence of Hospital-Acquired Illness or Injury  Outcome: Progressing  Goal: Optimal Comfort and Wellbeing  Outcome: Progressing  Goal: Readiness for Transition of Care  Outcome: Progressing     Problem: Sepsis/Septic Shock  Goal: Optimal Coping  Outcome: Progressing  Goal: Absence of Bleeding  Outcome: Progressing  Goal: Blood Glucose Level Within Targeted Range  Outcome: Progressing  Goal: Absence of Infection Signs and Symptoms  Outcome: Progressing  Goal: Optimal Nutrition Intake  Outcome: Progressing     Problem: Skin Injury Risk Increased  Goal: Skin Health and Integrity  Outcome: Progressing     Problem: Fall Injury Risk  Goal: Absence of Fall and Fall-Related Injury  Outcome: Progressing

## 2024-09-18 NOTE — PT/OT/SLP PROGRESS
Physical Therapy Treatment    Patient Name:  Lavon Brandon   MRN:  [id number]    Recommendations:     Discharge Recommendations: Moderate Intensity Therapy (to low intensity, dependening on progress)  Discharge Equipment Recommendations: none  Barriers to discharge:  decreased mobility from baseline; maxAx2 for transfers    Assessment:     Lavon Brandon is a 89 y.o. male admitted with a medical diagnosis of Severe sepsis.  He presents with the following impairments/functional limitations: weakness, impaired endurance, impaired self care skills, impaired functional mobility, gait instability, impaired balance, impaired cognition, decreased safety awareness, decreased coordination, impaired cardiopulmonary response to activity.pt found awake today. Asking to eat. He is pleasant and easily redirected. Today he was able to stand with min A x 2 and RW and walk 6-8 steps to sit in BS chair.No retropulsion today. Min A for postural stability once standing. Progressing      Rehab Prognosis: Good and Fair; patient would benefit from acute skilled PT services to address these deficits and reach maximum level of function.    Recent Surgery: * No surgery found *      Plan:     During this hospitalization, patient to be seen 5 x/week to address the identified rehab impairments via gait training, therapeutic activities, therapeutic exercises and progress toward the following goals:    Plan of Care Expires:  10/14/24    Subjective     Chief Complaint: none  Patient/Family Comments/goals: eat lunch  Pain Rating 1: 0/10  Pain Rating Post-Intervention 1: 0/10      Objective:     Communicated with nurse prior to session.  Patient found HOB elevated with bed alarm, peripheral IV upon PT entry to room.     General Precautions: Standard, fall, seizure (dementia)  Orthopedic Precautions: N/A  Braces: N/A  Respiratory Status: Room air     Functional Mobility:  Bed Mobility:     Supine to Sit: moderate assistance  x 2  Sit to Supine: min  to mod A x 2 with RW, no retropulsion, min A to stabilize in stand  ing with Ue support on AD  Amb 6-8 steps with RW min A x 2        AM-PAC 6 CLICK MOBILITY  Turning over in bed (including adjusting bedclothes, sheets and blankets)?: 3  Sitting down on and standing up from a chair with arms (e.g., wheelchair, bedside commode, etc.): 2  Moving from lying on back to sitting on the side of the bed?: 2  Moving to and from a bed to a chair (including a wheelchair)?: 2  Need to walk in hospital room?: 2  Climbing 3-5 steps with a railing?: 1  Basic Mobility Total Score: 12       Treatment & Education:      Patient left up in chair with all lines intact, call button in reach, chair alarm on, and extender notified..    GOALS:   Multidisciplinary Problems       Physical Therapy Goals          Problem: Physical Therapy    Goal Priority Disciplines Outcome Goal Variances Interventions   Physical Therapy Goal     PT, PT/OT Progressing     Description: Goals to be met by: 10/14/24     Patient will increase functional independence with mobility by performin. Supine to sit with MInimal Assistance  2. Sit to supine with MInimal Assistance  3. Sit to stand transfer with Minimal Assistance  4. Bed to chair transfer with Minimal Assistance using Rolling Walker  5. Gait  2 x 25 feet with Minimal Assistance using Rolling Walker.                          Time Tracking:     PT Received On: 24  PT Start Time: 1330     PT Stop Time: 1345  PT Total Time (min): 15 min     Billable Minutes: Therapeutic Activity 15      2024

## 2024-09-18 NOTE — PLAN OF CARE
[facility] [phone] who stated  they received patient information and will review at 8:30am morning meeting.  Per Mariel, she will not have answer regarding SNF until after 10:00am    DOM spoke with patient's daughter to inform of [facility] declined patient; however, [facility] accepted patient.  Per daughter she will contact DOM in the morning with preference.     [facility] [phone] and faxed [facility] [phone] for state approval.  Fax confirmation received.      [facility] scanned in [employer] and [employer].     09/18/24 1969   Post-Acute Status   Post-Acute Authorization Placement   Post-Acute Placement Status Pending payor review/awaiting authorization (if required)
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
## 2024-09-18 NOTE — PLAN OF CARE
[facility]  Discharge Reassessment    Primary Care Provider: Aristides Haynes MD    Expected Discharge Date:     09:30am [phone] placement approved.  Fax confirmation received.     DOM called [facility] locet to [facility] [phone] and faxed pasrr to [phone] for stated approval.  Fax confirmation received.    12:22pm [phone] who stated they will review and return call.  DOM spoke to patient's daughter Gabriela via telephone who stated 1st choice is  of [facility].  DOM explained that  is reviewing.    Reassessment (most recent)       Discharge Reassessment - 09/18/24 1525          Discharge Reassessment    Assessment Type Discharge Planning Reassessment     Did the patient's condition or plan change since previous assessment? Yes     Discharge Plan discussed with: Adult children;Patient     Communicated JOANNE with patient/caregiver Date not available/Unable to determine     Discharge Plan A Skilled Nursing Facility     Discharge Plan B Home Health     DME Needed Upon Discharge  none     Transition of Care Barriers None     Why the patient remains in the hospital Requires continued medical care        Post-Acute Status    Post-Acute Authorization Placement     Post-Acute Placement Status Pending payor review/awaiting authorization (if required)     Patient choice form signed by patient/caregiver List with quality metrics by geographic area provided

## 2024-09-18 NOTE — ASSESSMENT & PLAN NOTE
"This patient does have evidence of infective focus  My overall impression is sepsis.  Source: Urinary Tract  Antibiotics given-   Antibiotics (72h ago, onward)    Start     Stop Route Frequency Ordered    09/14/24 1300  cefTRIAXone (ROCEPHIN) 1 g in dextrose 5 % 100 mL IVPB (ready to mix)         -- IV Every 24 hours (non-standard times) 09/13/24 1455        Latest lactate reviewed-  No results for input(s): "LACTATE", "POCLAC" in the last 72 hours.    Organ dysfunction indicated by Acute kidney injury and Encephalopathy    Fluid challenge Not needed - patient is not hypotensive      Post- resuscitation assessment No - Post resuscitation assessment not needed       Will Not start Pressors- Levophed for MAP of 65  Source control achieved by:   Blood cultures, urine cultures sent  Rocephin IV given  1st Lactate 2.37, repeat Lactic acid ordered  Procalcitonin ordered    "

## 2024-09-19 LAB
ANION GAP SERPL CALC-SCNC: 5 MMOL/L (ref 8–16)
BASOPHILS # BLD AUTO: 0.02 K/UL (ref 0–0.2)
BASOPHILS NFR BLD: 0.4 % (ref 0–1.9)
BUN SERPL-MCNC: 34 MG/DL (ref 10–30)
CALCIUM SERPL-MCNC: 8.9 MG/DL (ref 8.7–10.5)
CHLORIDE SERPL-SCNC: 106 MMOL/L (ref 95–110)
CO2 SERPL-SCNC: 28 MMOL/L (ref 23–29)
CREAT SERPL-MCNC: 1.8 MG/DL (ref 0.5–1.4)
DIFFERENTIAL METHOD BLD: ABNORMAL
EOSINOPHIL # BLD AUTO: 0.1 K/UL (ref 0–0.5)
EOSINOPHIL NFR BLD: 1.9 % (ref 0–8)
ERYTHROCYTE [DISTWIDTH] IN BLOOD BY AUTOMATED COUNT: 14.1 % (ref 11.5–14.5)
EST. GFR  (NO RACE VARIABLE): 35.1 ML/MIN/1.73 M^2
GLUCOSE SERPL-MCNC: 100 MG/DL (ref 70–110)
HCT VFR BLD AUTO: 29.6 % (ref 40–54)
HGB BLD-MCNC: 9.5 G/DL (ref 14–18)
IMM GRANULOCYTES # BLD AUTO: 0.01 K/UL (ref 0–0.04)
IMM GRANULOCYTES NFR BLD AUTO: 0.2 % (ref 0–0.5)
LYMPHOCYTES # BLD AUTO: 1.7 K/UL (ref 1–4.8)
LYMPHOCYTES NFR BLD: 32 % (ref 18–48)
MCH RBC QN AUTO: 29.1 PG (ref 27–31)
MCHC RBC AUTO-ENTMCNC: 32.1 G/DL (ref 32–36)
MCV RBC AUTO: 91 FL (ref 82–98)
MONOCYTES # BLD AUTO: 0.6 K/UL (ref 0.3–1)
MONOCYTES NFR BLD: 10.4 % (ref 4–15)
NEUTROPHILS # BLD AUTO: 3 K/UL (ref 1.8–7.7)
NEUTROPHILS NFR BLD: 55.1 % (ref 38–73)
NRBC BLD-RTO: 0 /100 WBC
PLATELET # BLD AUTO: 144 K/UL (ref 150–450)
PMV BLD AUTO: 10.5 FL (ref 9.2–12.9)
POTASSIUM SERPL-SCNC: 4.7 MMOL/L (ref 3.5–5.1)
RBC # BLD AUTO: 3.26 M/UL (ref 4.6–6.2)
SODIUM SERPL-SCNC: 139 MMOL/L (ref 136–145)
TB INDURATION 48 - 72 HR READ: NORMAL
TB SKIN TEST 48 - 72 HR READ: NORMAL
VIT B1 BLD-SCNC: 97.9 NMOL/L (ref 66.5–200)
VIT B6 SERPL-MCNC: 7.6 UG/L (ref 3.4–65.2)
WBC # BLD AUTO: 5.37 K/UL (ref 3.9–12.7)

## 2024-09-19 PROCEDURE — 63600175 PHARM REV CODE 636 W HCPCS: Performed by: NURSE PRACTITIONER

## 2024-09-19 PROCEDURE — 12000002 HC ACUTE/MED SURGE SEMI-PRIVATE ROOM

## 2024-09-19 PROCEDURE — 25000003 PHARM REV CODE 250: Performed by: NURSE PRACTITIONER

## 2024-09-19 PROCEDURE — 85025 COMPLETE CBC W/AUTO DIFF WBC: CPT | Performed by: NURSE PRACTITIONER

## 2024-09-19 PROCEDURE — 80048 BASIC METABOLIC PNL TOTAL CA: CPT | Performed by: NURSE PRACTITIONER

## 2024-09-19 PROCEDURE — 63600175 PHARM REV CODE 636 W HCPCS: Performed by: INTERNAL MEDICINE

## 2024-09-19 PROCEDURE — 25000003 PHARM REV CODE 250: Performed by: STUDENT IN AN ORGANIZED HEALTH CARE EDUCATION/TRAINING PROGRAM

## 2024-09-19 PROCEDURE — 97530 THERAPEUTIC ACTIVITIES: CPT

## 2024-09-19 PROCEDURE — 97535 SELF CARE MNGMENT TRAINING: CPT

## 2024-09-19 PROCEDURE — 25000003 PHARM REV CODE 250: Performed by: INTERNAL MEDICINE

## 2024-09-19 PROCEDURE — 36415 COLL VENOUS BLD VENIPUNCTURE: CPT | Performed by: NURSE PRACTITIONER

## 2024-09-19 RX ADMIN — HYDRALAZINE HYDROCHLORIDE 10 MG: 10 TABLET, FILM COATED ORAL at 05:09

## 2024-09-19 RX ADMIN — CYANOCOBALAMIN TAB 1000 MCG 1000 MCG: 1000 TAB at 08:09

## 2024-09-19 RX ADMIN — TAMSULOSIN HYDROCHLORIDE 0.4 MG: 0.4 CAPSULE ORAL at 08:09

## 2024-09-19 RX ADMIN — LEVETIRACETAM 250 MG: 250 TABLET, FILM COATED ORAL at 08:09

## 2024-09-19 RX ADMIN — FAMOTIDINE 20 MG: 20 TABLET ORAL at 08:09

## 2024-09-19 RX ADMIN — CEFTRIAXONE SODIUM 1 G: 1 INJECTION, POWDER, FOR SOLUTION INTRAMUSCULAR; INTRAVENOUS at 12:09

## 2024-09-19 RX ADMIN — HYDRALAZINE HYDROCHLORIDE 10 MG: 10 TABLET, FILM COATED ORAL at 09:09

## 2024-09-19 RX ADMIN — FLUTICASONE PROPIONATE 100 MCG: 50 SPRAY, METERED NASAL at 08:09

## 2024-09-19 RX ADMIN — ENOXAPARIN SODIUM 30 MG: 30 INJECTION SUBCUTANEOUS at 08:09

## 2024-09-19 RX ADMIN — HYDRALAZINE HYDROCHLORIDE 10 MG: 10 TABLET, FILM COATED ORAL at 02:09

## 2024-09-19 RX ADMIN — AMLODIPINE BESYLATE 10 MG: 5 TABLET ORAL at 05:09

## 2024-09-19 NOTE — ASSESSMENT & PLAN NOTE
neurology consulted  Continue keppra 500 mg twice daily  EEG without epileptiform seizure activity noted

## 2024-09-19 NOTE — PLAN OF CARE
Problem: Occupational Therapy  Goal: Occupational Therapy Goal  Description: Goals to be met by: 10/18/2024     Patient will increase functional independence with ADLs by performing:    UE Dressing with Stand-by Assistance.  LE Dressing with Stand-by Assistance.  Grooming while seated with Stand-by Assistance.  Toileting from toilet with Stand-by Assistance for hygiene and clothing management.   Supine to sit with Stand-by Assistance.  Toilet transfer to toilet with Stand-by Assistance.  Perform 30 minutes sitting/standing ADL activity with no LOB.    Outcome: Progressing

## 2024-09-19 NOTE — PT/OT/SLP PROGRESS
Occupational Therapy   Treatment    Name: Lavon Brandon  MRN: [id number]  Admitting Diagnosis:  Severe sepsis       Recommendations:     Discharge Recommendations: Moderate Intensity Therapy  Discharge Equipment Recommendations:  none  Barriers to discharge:   (Increased physical assistance with functional mobility and ADLs.)    Assessment:     Lavon Brandon is a 89 y.o. male with a medical diagnosis of Severe sepsis.  He presents with improving medical acuity and functional mobility. Patient participated in bed mobility, unsupported sitting EOB, bed/chair transfer and LB dressing sitting in chair. Performance deficits affecting function are weakness, impaired endurance, impaired self care skills, impaired functional mobility, gait instability, impaired balance, decreased upper extremity function, decreased lower extremity function, decreased safety awareness.     Rehab Prognosis:  Fair; patient would benefit from acute skilled OT services to address these deficits and reach maximum level of function.       Plan:     Patient to be seen 5 x/week to address the above listed problems via self-care/home management, therapeutic activities, therapeutic exercises  Plan of Care Expires: 10/18/24  Plan of Care Reviewed with: patient    Subjective     Chief Complaint: General weakness and confusion  Patient/Family Comments/goals: Improved cognition, functional mobility and ADL independence.  Pain/Comfort:  Pain Rating 1: 0/10  Pain Rating Post-Intervention 1: 0/10    Objective:     Communicated with: nurse prior to session.  Patient found HOB elevated with telemetry, peripheral IV upon OT entry to room.    General Precautions: Standard, fall, seizure    Orthopedic Precautions:N/A  Braces: N/A  Respiratory Status: Room air     Occupational Performance:     Bed Mobility:    Patient completed Scooting/Bridging with minimum assistance  Patient completed Supine to Sit with minimum assistance   Performed unsupported sitting EOB  with contact guard assistance.    Functional Mobility/Transfers:  Patient completed Bed <> Chair Transfer using Step Transfer technique with moderate assistance with hand-held assist and rolling walker  Functional Mobility: ambulated 5 feet using rolling walker with moderate assistance.    Activities of Daily Living:  Lower Body Dressing: maximal assistance to don/doff socks sitting in chair.      [facility] 6 Click ADL: 15    Treatment & Education:  Patient more alert and following 1 step commands with minimal verbal cues today. Mobility has improved. Patient left sitting in a chair at end of session.    Patient left up in chair with all lines intact, call button in reach, and chair alarm on    GOALS:   Multidisciplinary Problems       Occupational Therapy Goals          Problem: Occupational Therapy    Goal Priority Disciplines Outcome Interventions   Occupational Therapy Goal     OT, PT/OT Progressing    Description: Goals to be met by: 10/18/2024     Patient will increase functional independence with ADLs by performing:    UE Dressing with Stand-by Assistance.  LE Dressing with Stand-by Assistance.  Grooming while seated with Stand-by Assistance.  Toileting from toilet with Stand-by Assistance for hygiene and clothing management.   Supine to sit with Stand-by Assistance.  Toilet transfer to toilet with Stand-by Assistance.  Perform 30 minutes sitting/standing ADL activity with no LOB.                         Time Tracking:     OT Date of Treatment: 09/19/24  OT Start Time: 1013  OT Stop Time: 1036  OT Total Time (min): 23 min    Billable Minutes:Self Care/Home Management 12  Therapeutic Activity 11    OT/KIMMY: OT     Number of KIMMY visits since last OT visit: 0    9/19/2024   <<----- Click to add NO significant Past Surgical History

## 2024-09-19 NOTE — SUBJECTIVE & OBJECTIVE
Interval History: Patient evaluated sitting up in chair working with PT. Denies complaints. Awaiting SNF.     Review of Systems   Unable to perform ROS: Dementia      Objective:     Vital Signs (Most Recent):  Temp: 97.6 °F (36.4 °C) (09/19/24 0756)  Pulse: (!) 59 (09/19/24 0756)  Resp: 17 (09/19/24 0756)  BP: (!) 150/68 (09/19/24 0756)  SpO2: 96 % (09/19/24 0756) Vital Signs (24h Range):  Temp:  [97.6 °F (36.4 °C)-98.2 °F (36.8 °C)] 97.6 °F (36.4 °C)  Pulse:  [59-67] 59  Resp:  [16-18] 17  SpO2:  [96 %-99 %] 96 %  BP: (144-188)/(67-80) 150/68     Weight: 66 kg (145 lb 8.1 oz)  Body mass index is 20.88 kg/m².    Intake/Output Summary (Last 24 hours) at 9/19/2024 1256  Last data filed at 9/19/2024 0410  Gross per 24 hour   Intake 360 ml   Output 600 ml   Net -240 ml         Physical Exam  Vitals reviewed.   Constitutional:       General: He is not in acute distress.     Appearance: He is ill-appearing. He is not toxic-appearing.      Comments: awake   HENT:      Head: Normocephalic.      Nose: Nose normal.      Mouth/Throat:      Mouth: Mucous membranes are moist.      Pharynx: Oropharynx is clear.   Eyes:      Extraocular Movements: Extraocular movements intact.      Conjunctiva/sclera: Conjunctivae normal.   Cardiovascular:      Rate and Rhythm: Normal rate and regular rhythm.      Pulses: Normal pulses.      Heart sounds: Normal heart sounds.   Pulmonary:      Effort: Pulmonary effort is normal.      Breath sounds: Normal breath sounds.   Abdominal:      General: Bowel sounds are normal. There is no distension.      Tenderness: There is no abdominal tenderness. There is no guarding.   Genitourinary:     Comments: Draper in place, clear yellow urine  Skin:     General: Skin is warm and dry.      Capillary Refill: Capillary refill takes less than 2 seconds.   Neurological:      General: No focal deficit present.      Mental Status: Mental status is at baseline. He is disoriented.             Significant Labs: All  pertinent labs within the past 24 hours have been reviewed.  BMP:   Recent Labs   Lab 09/19/24  0558         K 4.7      CO2 28   BUN 34*   CREATININE 1.8*   CALCIUM 8.9     CBC:   Recent Labs   Lab 09/18/24  0536 09/19/24  0558   WBC 6.07 5.37   HGB 9.4* 9.5*   HCT 28.8* 29.6*   * 144*       Significant Imaging: I have reviewed all pertinent imaging results/findings within the past 24 hours.

## 2024-09-19 NOTE — PROGRESS NOTES
[facility] Medicine  Progress Note    Patient Name: Lavon Brandon  MRN: [id number]  Patient Class: IP- Inpatient   Admission Date: 9/13/2024  Length of Stay: 5 days  Attending Physician: Tatyana Neal MD  Primary Care Provider: Aristides Haynes MD        Subjective:     Principal Problem:Severe sepsis    HPI:  89 year old male with a past medical history of hypertension, hyperlipidemia, CVA, PE, Seizure, UTI and dementia who presents to the emergency room with reports of abdominal and back pain and hallucinations. Also decreased bowel movements over last 2-4 days. He was recently diagnosed with UTI and started on augmentin. Per urine culture sensitivity, organism is resistant to Augmentin, sensitive to Rocephin.  Hypertensive in the ER at 212/94, reports did not take his home medication this morning.  WBC 9.56, mild MATA with serum creatinine 2.0, BUN 34, UA negative for leukocytes and nitrites.  Blood culture sent, lactic 2.39.  CT abdomen with trace left pleural effusion, large left renal cyst, CT head no acute findings, chest x-ray Blunting of the left costophrenic angle. IV rocephin given in ER with Hydralazine, amlodipine,  ml.  Admit to hospital medicine for further medical management.  The patient did have some urinary retention, a Draper catheter was placed with adequate urine output.  EEG pending.  Urine culture with no growth to date.  Patient's mental status continued to slowly improve.  He has tolerated p.o. diet.  PT and OT consulted for evaluation    Overview/Hospital Course:  89 year old hospitalized for UTI, AMS, severe sepsis. In Er, Lactate 2.39, BUN 3, Sr Cr 2, recently diagnosed with UTI, treated with oral Augmentin. Urine sensitivity resistant to Augmentin, sensitive to Rocephin. Rocephin started in ER. Afebrile, no leukocytosis.  Lactic acid improved with IV antibiotics and fluid resuscitation.  Renal function stable at 2.1, this is the patient's baseline renal  function per historical records.  Urine cultures continued to show no growth to date however patient maintained on Rocephin 1 g daily.  Continued to have waxing and waning mental status.  Neurology consulted.  EEG obtained without epileptiform activity.  Mental status continue to improve.  PT and OT evaluation.      Interval History: Patient evaluated sitting up in chair working with PT. Denies complaints. Awaiting SNF.     Review of Systems   Unable to perform ROS: Dementia      Objective:     Vital Signs (Most Recent):  Temp: 97.6 °F (36.4 °C) (09/19/24 0756)  Pulse: (!) 59 (09/19/24 0756)  Resp: 17 (09/19/24 0756)  BP: (!) 150/68 (09/19/24 0756)  SpO2: 96 % (09/19/24 0756) Vital Signs (24h Range):  Temp:  [97.6 °F (36.4 °C)-98.2 °F (36.8 °C)] 97.6 °F (36.4 °C)  Pulse:  [59-67] 59  Resp:  [16-18] 17  SpO2:  [96 %-99 %] 96 %  BP: (144-188)/(67-80) 150/68     Weight: 66 kg (145 lb 8.1 oz)  Body mass index is 20.88 kg/m².    Intake/Output Summary (Last 24 hours) at 9/19/2024 1256  Last data filed at 9/19/2024 0410  Gross per 24 hour   Intake 360 ml   Output 600 ml   Net -240 ml         Physical Exam  Vitals reviewed.   Constitutional:       General: He is not in acute distress.     Appearance: He is ill-appearing. He is not toxic-appearing.      Comments: awake   HENT:      Head: Normocephalic.      Nose: Nose normal.      Mouth/Throat:      Mouth: Mucous membranes are moist.      Pharynx: Oropharynx is clear.   Eyes:      Extraocular Movements: Extraocular movements intact.      Conjunctiva/sclera: Conjunctivae normal.   Cardiovascular:      Rate and Rhythm: Normal rate and regular rhythm.      Pulses: Normal pulses.      Heart sounds: Normal heart sounds.   Pulmonary:      Effort: Pulmonary effort is normal.      Breath sounds: Normal breath sounds.   Abdominal:      General: Bowel sounds are normal. There is no distension.      Tenderness: There is no abdominal tenderness. There is no guarding.   Genitourinary:     " Comments: Santo in place, clear yellow urine  Skin:     General: Skin is warm and dry.      Capillary Refill: Capillary refill takes less than 2 seconds.   Neurological:      General: No focal deficit present.      Mental Status: Mental status is at baseline. He is disoriented.             Significant Labs: All pertinent labs within the past 24 hours have been reviewed.  BMP:   Recent Labs   Lab 09/19/24  0558         K 4.7      CO2 28   BUN 34*   CREATININE 1.8*   CALCIUM 8.9     CBC:   Recent Labs   Lab 09/18/24  0536 09/19/24  0558   WBC 6.07 5.37   HGB 9.4* 9.5*   HCT 28.8* 29.6*   * 144*       Significant Imaging: I have reviewed all pertinent imaging results/findings within the past 24 hours.    Assessment/Plan:      * Severe sepsis  This patient does have evidence of infective focus  My overall impression is sepsis.  Source: Urinary Tract  Antibiotics given-   Antibiotics (72h ago, onward)      Start     Stop Route Frequency Ordered    09/14/24 1300  cefTRIAXone (ROCEPHIN) 1 g in dextrose 5 % 100 mL IVPB (ready to mix)         -- IV Every 24 hours (non-standard times) 09/13/24 1455          Latest lactate reviewed-  No results for input(s): "LACTATE", "POCLAC" in the last 72 hours.    Organ dysfunction indicated by Acute kidney injury and Encephalopathy    Fluid challenge Not needed - patient is not hypotensive      Post- resuscitation assessment No - Post resuscitation assessment not needed       Will Not start Pressors- Levophed for MAP of 65  Source control achieved by:   Blood cultures, urine cultures sent  Rocephin IV given  1st Lactate 2.37, repeat Lactic acid ordered  Procalcitonin ordered      Urinary retention   ml  Place santo cath   Start flomax        Pleural effusion on left  Patient found to have  trace  pleural effusion on imaging. I have personally reviewed and interpreted the following imaging: Xray. A thoracentesis was deferred. Most likely etiology includes "  CDK st III . Management to include Diuresis      Encephalopathy, metabolic  History of dementia  Likely related to UTI, continue rocephin  Monitor neuro check  Check keppra level, pending  EEG pending  Neurology consulted  Check ammonia  Vitamin D 29, Vitamin B12 1400, vitamin b6, b1 pending     UTI (urinary tract infection)  Urine culture NGTD  Continue Rocephin 1 gm daily         Seizure disorder  neurology consulted  Continue keppra 500 mg twice daily  EEG without epileptiform seizure activity noted        Debility  Patient with Acute debility due to age-related physical debility. Latest AMPAC and GEMS scores have not been reviewed. Evaluation for etiology is underway. Plan includes PT/OT consulted and fall precautions in place.    Chronic renal disease, stage IV  Creatine stable for now. BMP reviewed- noted Estimated Creatinine Clearance: 22.5 mL/min (A) (based on SCr of 2 mg/dL (H)). according to latest data. Based on current GFR, CKD stage is stage 3 - GFR 30-59.  Monitor UOP and serial BMP and adjust therapy as needed. Renally dose meds. Avoid nephrotoxic medications and procedures.    Constipation  Resolved  Continue stool softeners daily      VTE Risk Mitigation (From admission, onward)           Ordered     enoxaparin injection 30 mg  Every 24 hours (non-standard times)         09/13/24 1848     IP VTE HIGH RISK PATIENT  Once         09/13/24 1455     Place sequential compression device  Until discontinued         09/13/24 1455                    Discharge Planning   JOANNE:      Code Status: DNR   Is the patient medically ready for discharge?:     Reason for patient still in hospital (select all that apply): Pending disposition  Discharge Plan A: Skilled Nursing Facility   Discharge Delays: None known at this time              Mariel Pro NP  Department of Hospital Medicine   [facility] [place]

## 2024-09-19 NOTE — PT/OT/SLP PROGRESS
Physical Therapy Treatment    Patient Name:  Lavon Brandon   MRN:  [id number]    Recommendations:     Discharge Recommendations: Moderate Intensity Therapy (to low intensity, dependening on progress)  Discharge Equipment Recommendations: none  Barriers to discharge:  decreased mobility from baseline; mod/min A x 2for transfers    Assessment:     Lavon Brandon is a 89 y.o. male admitted with a medical diagnosis of Severe sepsis.  He presents with the following impairments/functional limitations: weakness, impaired endurance, impaired self care skills, impaired functional mobility, gait instability, impaired balance, impaired cognition, decreased safety awareness, decreased coordination, impaired cardiopulmonary response to activity.Pt found seated in BS chair. Just completed OT session. He is pleasant and easily redirected. Today he was able to stand with min A x 2 and RW and walk 22ft  with CGA x 1. Managing walker without Assistance. Min A for postural stability once standing.Returned to BS chair. Progressing      Rehab Prognosis: Good and Fair; patient would benefit from acute skilled PT services to address these deficits and reach maximum level of function.    Recent Surgery: * No surgery found *      Plan:     During this hospitalization, patient to be seen 5 x/week to address the identified rehab impairments via gait training, therapeutic activities, therapeutic exercises and progress toward the following goals:    Plan of Care Expires:  10/14/24    Subjective     Chief Complaint:back pain  Patient/Family Comments/goals: none stated  Pain Rating 1:  (does not rate)  Location 1: back  Pain Addressed 1: Distraction, Cessation of Activity  Pain Rating Post-Intervention 1:  (not rated, increased with gait)      Objective:     Communicated with nurse prior to session.  Patient found HOB elevated with PureWick, telemetry, peripheral IV upon PT entry to room.     General Precautions: Standard, fall, seizure  (dementia)  Orthopedic Precautions: N/A  Braces: N/A  Respiratory Status: Room air     Functional Mobility:   Transfers:     Sit to Stand:  Mod A/min A x 2  Gait training with RW CGA x 22 ft, self managing walker today, short shuffling steps      AM-PAC 6 CLICK MOBILITY          Treatment & Education:  Educated in safety with RW for transfers and gait    Patient left up in chair with all lines intact, call button in reach, and chair alarm on..    GOALS:   Multidisciplinary Problems       Physical Therapy Goals          Problem: Physical Therapy    Goal Priority Disciplines Outcome Goal Variances Interventions   Physical Therapy Goal     PT, PT/OT Progressing     Description: Goals to be met by: 10/14/24     Patient will increase functional independence with mobility by performin. Supine to sit with MInimal Assistance  2. Sit to supine with MInimal Assistance  3. Sit to stand transfer with Minimal Assistance  4. Bed to chair transfer with Minimal Assistance using Rolling Walker  5. Gait  2 x 25 feet with Minimal Assistance using Rolling Walker.                          Time Tracking:     PT Received On: 24  PT Start Time: 1035     PT Stop Time: 1047  PT Total Time (min): 12 min     Billable Minutes: Gait Training 12      2024

## 2024-09-19 NOTE — ASSESSMENT & PLAN NOTE
History of dementia  Likely related to UTI, continue rocephin  Monitor neuro check  Check keppra level, pending  EEG pending  Neurology consulted  Check ammonia  Vitamin D 29, Vitamin B12 1400, vitamin b6, b1 pending

## 2024-09-20 ENCOUNTER — TELEPHONE (OUTPATIENT)
Dept: PRIMARY CARE CLINIC | Facility: CLINIC | Age: 89
End: 2024-09-20
Payer: OTHER GOVERNMENT

## 2024-09-20 LAB
ANION GAP SERPL CALC-SCNC: 5 MMOL/L (ref 8–16)
BASOPHILS # BLD AUTO: 0.02 K/UL (ref 0–0.2)
BASOPHILS NFR BLD: 0.4 % (ref 0–1.9)
BUN SERPL-MCNC: 30 MG/DL (ref 10–30)
CALCIUM SERPL-MCNC: 8.6 MG/DL (ref 8.7–10.5)
CHLORIDE SERPL-SCNC: 107 MMOL/L (ref 95–110)
CO2 SERPL-SCNC: 29 MMOL/L (ref 23–29)
CREAT SERPL-MCNC: 1.8 MG/DL (ref 0.5–1.4)
DIFFERENTIAL METHOD BLD: ABNORMAL
EOSINOPHIL # BLD AUTO: 0.1 K/UL (ref 0–0.5)
EOSINOPHIL NFR BLD: 2.1 % (ref 0–8)
ERYTHROCYTE [DISTWIDTH] IN BLOOD BY AUTOMATED COUNT: 14.3 % (ref 11.5–14.5)
EST. GFR  (NO RACE VARIABLE): 35.1 ML/MIN/1.73 M^2
GLUCOSE SERPL-MCNC: 128 MG/DL (ref 70–110)
HCT VFR BLD AUTO: 29.2 % (ref 40–54)
HGB BLD-MCNC: 9.3 G/DL (ref 14–18)
IMM GRANULOCYTES # BLD AUTO: 0.02 K/UL (ref 0–0.04)
IMM GRANULOCYTES NFR BLD AUTO: 0.4 % (ref 0–0.5)
LYMPHOCYTES # BLD AUTO: 1.8 K/UL (ref 1–4.8)
LYMPHOCYTES NFR BLD: 34 % (ref 18–48)
MCH RBC QN AUTO: 29.2 PG (ref 27–31)
MCHC RBC AUTO-ENTMCNC: 31.8 G/DL (ref 32–36)
MCV RBC AUTO: 92 FL (ref 82–98)
MONOCYTES # BLD AUTO: 0.5 K/UL (ref 0.3–1)
MONOCYTES NFR BLD: 8.9 % (ref 4–15)
NEUTROPHILS # BLD AUTO: 2.9 K/UL (ref 1.8–7.7)
NEUTROPHILS NFR BLD: 54.2 % (ref 38–73)
NRBC BLD-RTO: 0 /100 WBC
PLATELET # BLD AUTO: 151 K/UL (ref 150–450)
PMV BLD AUTO: 10.9 FL (ref 9.2–12.9)
POTASSIUM SERPL-SCNC: 4.9 MMOL/L (ref 3.5–5.1)
RBC # BLD AUTO: 3.19 M/UL (ref 4.6–6.2)
SODIUM SERPL-SCNC: 141 MMOL/L (ref 136–145)
WBC # BLD AUTO: 5.27 K/UL (ref 3.9–12.7)

## 2024-09-20 PROCEDURE — 25000003 PHARM REV CODE 250: Performed by: INTERNAL MEDICINE

## 2024-09-20 PROCEDURE — 25000003 PHARM REV CODE 250: Performed by: STUDENT IN AN ORGANIZED HEALTH CARE EDUCATION/TRAINING PROGRAM

## 2024-09-20 PROCEDURE — 97116 GAIT TRAINING THERAPY: CPT | Mod: CQ

## 2024-09-20 PROCEDURE — 97530 THERAPEUTIC ACTIVITIES: CPT | Mod: CQ

## 2024-09-20 PROCEDURE — 85025 COMPLETE CBC W/AUTO DIFF WBC: CPT | Performed by: NURSE PRACTITIONER

## 2024-09-20 PROCEDURE — 36415 COLL VENOUS BLD VENIPUNCTURE: CPT | Performed by: NURSE PRACTITIONER

## 2024-09-20 PROCEDURE — 63600175 PHARM REV CODE 636 W HCPCS: Performed by: INTERNAL MEDICINE

## 2024-09-20 PROCEDURE — 12000002 HC ACUTE/MED SURGE SEMI-PRIVATE ROOM

## 2024-09-20 PROCEDURE — 97535 SELF CARE MNGMENT TRAINING: CPT

## 2024-09-20 PROCEDURE — 80048 BASIC METABOLIC PNL TOTAL CA: CPT | Performed by: NURSE PRACTITIONER

## 2024-09-20 PROCEDURE — 25000003 PHARM REV CODE 250: Performed by: NURSE PRACTITIONER

## 2024-09-20 RX ADMIN — CYANOCOBALAMIN TAB 1000 MCG 1000 MCG: 1000 TAB at 09:09

## 2024-09-20 RX ADMIN — FAMOTIDINE 20 MG: 20 TABLET ORAL at 09:09

## 2024-09-20 RX ADMIN — HYDRALAZINE HYDROCHLORIDE 10 MG: 10 TABLET, FILM COATED ORAL at 05:09

## 2024-09-20 RX ADMIN — HYDRALAZINE HYDROCHLORIDE 10 MG: 10 TABLET, FILM COATED ORAL at 01:09

## 2024-09-20 RX ADMIN — LEVETIRACETAM 250 MG: 250 TABLET, FILM COATED ORAL at 09:09

## 2024-09-20 RX ADMIN — TAMSULOSIN HYDROCHLORIDE 0.4 MG: 0.4 CAPSULE ORAL at 09:09

## 2024-09-20 RX ADMIN — LEVETIRACETAM 250 MG: 250 TABLET, FILM COATED ORAL at 08:09

## 2024-09-20 RX ADMIN — HYDRALAZINE HYDROCHLORIDE 10 MG: 10 TABLET, FILM COATED ORAL at 09:09

## 2024-09-20 RX ADMIN — FLUTICASONE PROPIONATE 100 MCG: 50 SPRAY, METERED NASAL at 09:09

## 2024-09-20 RX ADMIN — AMLODIPINE BESYLATE 10 MG: 5 TABLET ORAL at 05:09

## 2024-09-20 RX ADMIN — ENOXAPARIN SODIUM 30 MG: 30 INJECTION SUBCUTANEOUS at 08:09

## 2024-09-20 NOTE — SUBJECTIVE & OBJECTIVE
Interval History: Patient seen in bed.  NAD.  Alert, oriented to self/place.  Denies complaints.    Awaiting authorization for SNF placement.     Review of Systems   Respiratory:  Negative for shortness of breath.    Cardiovascular:  Negative for chest pain.   Gastrointestinal:  Negative for abdominal pain and nausea.     Objective:     Vital Signs (Most Recent):  Temp: 97.8 °F (36.6 °C) (09/20/24 1154)  Pulse: 62 (09/20/24 1154)  Resp: 18 (09/20/24 1154)  BP: 137/72 (09/20/24 1154)  SpO2: 99 % (09/20/24 1154) Vital Signs (24h Range):  Temp:  [97.8 °F (36.6 °C)-98.5 °F (36.9 °C)] 97.8 °F (36.6 °C)  Pulse:  [54-70] 62  Resp:  [17-18] 18  SpO2:  [96 %-99 %] 99 %  BP: (131-139)/(50-72) 137/72     Weight: 66 kg (145 lb 8.1 oz)  Body mass index is 20.88 kg/m².    Intake/Output Summary (Last 24 hours) at 9/20/2024 1215  Last data filed at 9/20/2024 1030  Gross per 24 hour   Intake --   Output 150 ml   Net -150 ml         Physical Exam  Vitals and nursing note reviewed.   Constitutional:       General: He is not in acute distress.  Cardiovascular:      Rate and Rhythm: Normal rate and regular rhythm.   Pulmonary:      Effort: Pulmonary effort is normal. No respiratory distress.      Breath sounds: Normal breath sounds.   Abdominal:      Palpations: Abdomen is soft.      Tenderness: There is no abdominal tenderness.   Musculoskeletal:      Right lower leg: No edema.      Left lower leg: No edema.   Skin:     General: Skin is warm and dry.   Neurological:      Mental Status: He is alert. Mental status is at baseline.      Comments: Oriented to self and place              Significant Labs: All pertinent labs within the past 24 hours have been reviewed.  CBC:   Recent Labs   Lab 09/19/24  0558 09/20/24  0506   WBC 5.37 5.27   HGB 9.5* 9.3*   HCT 29.6* 29.2*   * 151     CMP:   Recent Labs   Lab 09/19/24  0558 09/20/24  0506    141   K 4.7 4.9    107   CO2 28 29    128*   BUN 34* 30   CREATININE 1.8*  "1.8*   CALCIUM 8.9 8.6*   ANIONGAP 5* 5*     Magnesium: No results for input(s): "MG" in the last 48 hours.  Urine Culture: No results for input(s): "LABURIN" in the last 48 hours.  Urine Studies: No results for input(s): "COLORU", "APPEARANCEUA", "PHUR", "SPECGRAV", "PROTEINUA", "GLUCUA", "KETONESU", "BILIRUBINUA", "OCCULTUA", "NITRITE", "UROBILINOGEN", "LEUKOCYTESUR", "RBCUA", "WBCUA", "BACTERIA", "SQUAMEPITHEL", "HYALINECASTS" in the last 48 hours.    Invalid input(s): "WRIGHTSUR"    Imaging Results              CT Head Without Contrast (Final result)  Result time 09/13/24 13:35:00      Final result by Az Sethi MD (09/13/24 13:35:00)                   Impression:      1. Chronic findings as above.  No acute abnormalities or detrimental changes when compared to June 2024..      Electronically signed by: Az Sethi  Date:    09/13/2024  Time:    13:35               Narrative:    EXAMINATION:  CT HEAD WITHOUT CONTRAST    CLINICAL HISTORY:  Mental status change, unknown cause;.    TECHNIQUE:  Thin section axial images were obtained.  No contrast was administered.  Image quality is mildly degraded by motion artifact.    COMPARISON:  June 2024    FINDINGS:  There is no evidence of intracranial mass, hemorrhage, or midline shift.  Ventricles and sulci are mildly prominent.  There are no pathologic extra-axial fluid collections.    There is no CT evidence of acute ischemic change.  Changes of moderate chronic microvascular white matter disease are noted, similar to the prior study.  Tiny chronic lacunar infarct is noted at the caudate nucleus on the left.    Cerebellum is atrophic.  Hypodensity centrally within the cedric is unchanged and likely on the basis of chronic small vessel ischemic change.    The calvarium is intact.  Near complete opacification of the right maxillary sinus is compatible with chronic sinusitis, similar to the prior study.  There is a trace amount of mastoid air cell fluid " bilaterally, also unchanged.                                       CT Abdomen Pelvis  Without Contrast (Final result)  Result time 09/13/24 13:41:48      Final result by Prateek Troy DO (09/13/24 13:41:48)                   Impression:      1. No acute intra-abdominal abnormality observed.  2. Trace left pleural effusion.  3. Large left renal cyst is unchanged from previous exam measuring 6.7 cm.  4. Bilateral fat filled inguinal hernias.  Small amount of fluid in the right inguinal canal is unchanged from prior exam.      Electronically signed by: Prateek Troy  Date:    09/13/2024  Time:    13:41               Narrative:      CMS MANDATED QUALITY DATA - CT RADIATION - 436    All CT scans at this facility utilize dose modulation, iterative reconstruction, and/or weight based dosing when appropriate to reduce radiation dose to as low as reasonably achievable.    EXAMINATION:  CT ABDOMEN PELVIS WITHOUT CONTRAST    CLINICAL HISTORY:  Abdominal pain, acute, nonlocalized;    TECHNIQUE:  CT abdomen and pelvis without IV or oral contrast.    COMPARISON:  CT abdomen pelvis 06/23/2024.    FINDINGS:  There is subsegmental atelectasis of the lung bases.  Trace left pleural effusion.    Heart size is normal.    Beam hardening artifacts limits evaluation of the abdomen.    There are calcified granulomas noted scattered throughout the liver.  No gross hepatic lesions otherwise identified.  The gallbladder is not identified.  The pancreas and adrenal glands unremarkable.  There are numerous calcified granulomas of the spleen.  The spleen is normal size.    The kidneys are normal size.  Large left renal cyst measuring 6.7 cm is again noted with no significant change from previous exam.  There is no hydronephrosis or nephrolithiasis bilaterally.  There is poor visualization of the ureters due to beam hardening artifact.  The bladder is fluid filled with no focal wall abnormalities.  Small amount of fluid noted in the right  inguinal canal, unchanged from previous exam.  Fat filled bilateral inguinal hernia is demonstrated.    Prostate gland and seminal vesicles are unremarkable.  Pelvic phleboliths noted.    The large and small bowel normal caliber.  The appendix is normal.  There is no bowel wall thickening or inflammatory changes.  The stomach is decompressed and grossly unremarkable.    Ventral abdominal wall is unremarkable.    There are degenerative changes of the spine, pelvis and hips.  No acute osseous abnormality noted.                                       X-Ray Chest 1 View (Final result)  Result time 09/13/24 11:21:37      Final result by Prateek Troy DO (09/13/24 11:21:37)                   Impression:      Blunting of the left costophrenic angle could reflect small pleural effusion, atelectasis or infiltrate.      Electronically signed by: Prateek Troy  Date:    09/13/2024  Time:    11:21               Narrative:    EXAMINATION:  XR CHEST 1 VIEW    CLINICAL HISTORY:  Sepsis;    FINDINGS:  Portable chest with comparison chest x-ray 06/22/2024.  Normal cardiomediastinal silhouette.There is blunting of the left costophrenic angle.  The lungs are otherwise clear.  Calcified granuloma of the right lung again noted.  Pulmonary vasculature is normal. No acute osseous abnormality.                                        Significant Imaging: I have reviewed all pertinent imaging results/findings within the past 24 hours.

## 2024-09-20 NOTE — TELEPHONE ENCOUNTER
----- Message from Courtney Pedraza sent at 9/20/2024  9:45 AM CDT -----  Type:  Patient Returning Call    Who Called:  Claudia from [facility]    Who Left Message for Patient:  Sanaz    Does the patient know what this is regarding?:  yes    Would the patient rather a call back or a response via [facility]?  Call back    Best Call Back Number:   [phone] -- direct line    Additional Information:  Reaching back out to office.   Please call back to advise. Thanks!

## 2024-09-20 NOTE — ASSESSMENT & PLAN NOTE
Creatine stable for now. BMP reviewed- noted Estimated Creatinine Clearance: 25 mL/min (A) (based on SCr of 1.8 mg/dL (H)). according to latest data. Based on current GFR, CKD stage is stage 3 - GFR 30-59.  Monitor UOP and serial BMP and adjust therapy as needed. Renally dose meds. Avoid nephrotoxic medications and procedures.

## 2024-09-20 NOTE — ASSESSMENT & PLAN NOTE
History of dementia  Likely related to UTI, treated with rocephin   Monitor neuro checks  Check keppra level   EEG negative for epileptiform activity   Neurology consulted  Ammonia, Vit B12/6/1 reviewed   Mentation slowly improving

## 2024-09-20 NOTE — PT/OT/SLP PROGRESS
Physical Therapy Treatment    Patient Name:  Lavon Brandon   MRN:  [id number]    Recommendations:     Discharge Recommendations: Moderate Intensity Therapy (to low intensity, dependening on progress)  Discharge Equipment Recommendations: none  Barriers to discharge:  decreased mobility from baseline, poor endurance    Assessment:     Lavon Brandon is a 89 y.o. male admitted with a medical diagnosis of Severe sepsis.  He presents with the following impairments/functional limitations: weakness, impaired endurance, impaired functional mobility, impaired cognition, decreased safety awareness.    Pt ambulated 15' with RW, requiring Thompson on straight path and modA to turn. Noted BM into brief at end of gait trial. Stood to RW two trials for skin hygiene and change of brief. Standing balance required varying min-modA of 1 person. 2nd person performed skin cleanup. Pt greatly fatigued after stand >60 seconds.     Remained up in chair with caregiver present and chair alarm active.      Rehab Prognosis: Good and Fair; patient would benefit from acute skilled PT services to address these deficits and reach maximum level of function.    Recent Surgery: * No surgery found *      Plan:     During this hospitalization, patient to be seen 5 x/week to address the identified rehab impairments via gait training, therapeutic activities, therapeutic exercises and progress toward the following goals:    Plan of Care Expires:  10/14/24    Subjective     Chief Complaint: none  Patient/Family Comments/goals: be up in chair   Pain/Comfort:  Pain Rating 1: 0/10  Pain Rating Post-Intervention 1: 0/10      Objective:     Communicated with nurse prior to session.  Patient found HOB elevated with telemetry, peripheral IV, PureWick, bed alarm upon PT entry to room.     General Precautions: Standard, fall, seizure  Orthopedic Precautions: N/A  Braces: N/A  Respiratory Status: Room air     Functional Mobility:  Bed Mobility:     Supine to Sit:  moderate assistance  Transfers:     Sit to Stand:  moderate assistance with rolling walker  Gait: 15' RW, Thompson straight and modA on turn; VC for upright posture and increased step length       AM-PAC 6 CLICK MOBILITY          Treatment & Education:  -pt educ; benefits of participation with therapy, OOB to chair during day, call light, fall prevention  -Gait training  -change of gown, brief, and purewick    Patient left up in chair with all lines intact, call button in reach, chair alarm on, and nurse notified..    GOALS:   Multidisciplinary Problems       Physical Therapy Goals          Problem: Physical Therapy    Goal Priority Disciplines Outcome Goal Variances Interventions   Physical Therapy Goal     PT, PT/OT Progressing     Description: Goals to be met by: 10/14/24     Patient will increase functional independence with mobility by performin. Supine to sit with MInimal Assistance  2. Sit to supine with MInimal Assistance  3. Sit to stand transfer with Minimal Assistance  4. Bed to chair transfer with Minimal Assistance using Rolling Walker  5. Gait  2 x 25 feet with Minimal Assistance using Rolling Walker.                          Time Tracking:     PT Received On: 24  PT Start Time: 0945     PT Stop Time: 1023  PT Total Time (min): 38 min     Billable Minutes: Gait Training 12 and Therapeutic Activity 26    Treatment Type: Treatment  PT/PTA: PTA     Number of PTA visits since last PT visit: 3     2024

## 2024-09-20 NOTE — PT/OT/SLP PROGRESS
Occupational Therapy   Treatment    Name: Lavon Brandon  MRN: [id number]  Admitting Diagnosis:  Severe sepsis       Recommendations:     Discharge Recommendations: Moderate Intensity Therapy  Discharge Equipment Recommendations:  none  Barriers to discharge:   (Increased physical assistance with functional mobility and ADLs.)    Assessment:     Lavon Brandon is a 89 y.o. male with a medical diagnosis of Severe sepsis.  He presents with improving medical acuity and functional mobility. Patient participated in LB dressing sitting in chair, grooming standing at sink, chair transfer and ambulation using rolling walker. Performance deficits affecting function are weakness, impaired endurance, impaired self care skills, impaired functional mobility, gait instability, impaired balance, decreased safety awareness, decreased lower extremity function, decreased upper extremity function.     Rehab Prognosis:  Fair; patient would benefit from acute skilled OT services to address these deficits and reach maximum level of function.       Plan:     Patient to be seen 5 x/week to address the above listed problems via self-care/home management, therapeutic activities, therapeutic exercises  Plan of Care Expires: 10/18/24  Plan of Care Reviewed with: patient    Subjective     Chief Complaint: General weakness  Patient/Family Comments/goals: Improved functional mobility and ADL independence.  Pain/Comfort:  Pain Rating 1: 0/10  Pain Rating Post-Intervention 1: 0/10    Objective:     Communicated with: nurse prior to session.  Patient found up in chair with telemetry, peripheral IV, PureWick upon OT entry to room.    General Precautions: Standard, fall, seizure    Orthopedic Precautions:N/A  Braces: N/A  Respiratory Status: Room air     Occupational Performance:     Functional Mobility/Transfers:  Patient completed Sit <> Stand Transfer with moderate assistance  with  hand-held assist and rolling walker   Functional Mobility:  ambulated 15 feet x2 with moderate assistance using hand-held assistance and rolling walker.    Activities of Daily Living:  Grooming: moderate assistance to wash hands standing at sink.  Lower Body Dressing: maximal assistance to don/doff socks sitting in chair.      [facility] 6 Click ADL:      Treatment & Education:  Patient had difficulty keeping standing balance while washing hands standing at sink.     Patient left up in chair with all lines intact, call button in reach, and paid caregiver present    GOALS:   Multidisciplinary Problems       Occupational Therapy Goals          Problem: Occupational Therapy    Goal Priority Disciplines Outcome Interventions   Occupational Therapy Goal     OT, PT/OT Progressing    Description: Goals to be met by: 10/18/2024     Patient will increase functional independence with ADLs by performing:    UE Dressing with Stand-by Assistance.  LE Dressing with Stand-by Assistance.  Grooming while seated with Stand-by Assistance.  Toileting from toilet with Stand-by Assistance for hygiene and clothing management.   Supine to sit with Stand-by Assistance.  Toilet transfer to toilet with Stand-by Assistance.  Perform 30 minutes sitting/standing ADL activity with no LOB.                         Time Tracking:     OT Date of Treatment: 09/20/24  OT Start Time: 1055  OT Stop Time: 1118  OT Total Time (min): 23 min    Billable Minutes:Self Care/Home Management 23    OT/KIMMY: OT     Number of KIMMY visits since last OT visit: 0    9/20/2024

## 2024-09-20 NOTE — ASSESSMENT & PLAN NOTE
Patient with Acute debility due to age-related physical debility. Latest AMPAC and GEMS scores have not been reviewed. Evaluation for etiology is underway. Plan includes PT/OT consulted and fall precautions in place.   Plans for discharge to SNF

## 2024-09-20 NOTE — TELEPHONE ENCOUNTER
Attempted to return call. No answer. LVM  Patient daughter has not spoken to us about this. Patient is also currently admitted into the hospital.

## 2024-09-20 NOTE — PLAN OF CARE
11:48am DOM spoke to Mariel with [facility] who requested this SW send updated PT/OT notes for evaluation.  SW sent via [employer].    [place] state approval obtained and scanned into [facility].         09/20/24 9863   Post-Acute Status   Post-Acute Authorization Placement   Post-Acute Placement Status Pending post-acute provider review/more information requested

## 2024-09-20 NOTE — ASSESSMENT & PLAN NOTE
"This patient does have evidence of infective focus  My overall impression is sepsis.  Source: Urinary Tract  Antibiotics given-   Antibiotics (72h ago, onward)      None          Latest lactate reviewed-  No results for input(s): "LACTATE", "POCLAC" in the last 72 hours.    Organ dysfunction indicated by Acute kidney injury and Encephalopathy    Fluid challenge Not needed - patient is not hypotensive      Post- resuscitation assessment No - Post resuscitation assessment not needed       Will Not start Pressors- Levophed for MAP of 65  Source control achieved by:   Blood cultures, urine cultures sent upon admission -- no growth   Rocephin IV completed x 7 days   Lactic, Procalcitonin WNL    "

## 2024-09-20 NOTE — TELEPHONE ENCOUNTER
Spoke to Claudia via phone. She states that the patient daughter has requested this order. I advised that the patient daughter has not spoken to us about this matter. I advised that the patient will need an appointment so that this can be discussed with the provider. She states that this is not how it usually goes and that the provider usually just puts the order in when she calls. I explained that Ms. Patton will not be placing any orders without speaking to the patient and daughter about this first. She continues on stating this is a benefit for the daughter provider (ELADIO PATTON) then takes the phone and explains that the order will not be placed without speaking to patient daughter first. The call was ended and I reached out to the patient daughter who states she did not request this order to be placed.

## 2024-09-20 NOTE — ASSESSMENT & PLAN NOTE
neurology consulted  Continue keppra, dose adjusted per Neuro to 250mg BID  EEG without epileptiform seizure activity noted  Seizure precautions

## 2024-09-20 NOTE — PROGRESS NOTES
[facility]  Progress Note    Patient Name: Lavon Brandon  MRN: [id number]  Patient Class: IP- Inpatient   Admission Date: 9/13/2024  Length of Stay: 6 days  Attending Physician: Tatyana Neal MD  Primary Care Provider: Aristides Haynes MD        Subjective:     Principal Problem:Severe sepsis        HPI:  89 year old male with a past medical history of hypertension, hyperlipidemia, CVA, PE, Seizure, UTI and dementia who presents to the emergency room with reports of abdominal and back pain and hallucinations. Also decreased bowel movements over last 2-4 days. He was recently diagnosed with UTI and started on augmentin. Per urine culture sensitivity, organism is resistant to Augmentin, sensitive to Rocephin.  Hypertensive in the ER at 212/94, reports did not take his home medication this morning.  WBC 9.56, mild MATA with serum creatinine 2.0, BUN 34, UA negative for leukocytes and nitrites.  Blood culture sent, lactic 2.39.  CT abdomen with trace left pleural effusion, large left renal cyst, CT head no acute findings, chest x-ray Blunting of the left costophrenic angle. IV rocephin given in ER with Hydralazine, amlodipine,  ml.  Admit to hospital medicine for further medical management.  The patient did have some urinary retention, a Draper catheter was placed with adequate urine output.  EEG pending.  Urine culture with no growth to date.  Patient's mental status continued to slowly improve.  He has tolerated p.o. diet.  PT and OT consulted for evaluation    Overview/Hospital Course:  89 year old hospitalized for UTI, AMS, severe sepsis. In Er, Lactate 2.39, BUN 3, Sr Cr 2, recently diagnosed with UTI, treated with oral Augmentin. However, the urine sensitivity was resistant to Augmentin, sensitive to Rocephin. Therefore, rocephin was started in the ER and continued during admission. Patient was afebrile, no leukocytosis.  Lactic acid improved with IV antibiotics and fluid  resuscitation.  Renal function stable at 2.1, this is the patient's baseline renal function per historical records.  Repeat urine cultures continued to show no growth to date however patient maintained on Rocephin 1 g daily.  Patient continued to have waxing and waning mental status.  Neurology was consulted.  CT head was negative for acute findings.  An EEG was obtained which was negative for epileptiform activity.  Patient's Keppra dose was decreased per Neurology recs.  Patient's mental status continue to improve.  PT and OT was consulted for evaluation with recommendations for moderate intensity therapy made.  CM was consulted for SNF placement.      Interval History: Patient seen in bed.  NAD.  Alert, oriented to self/place.  Denies complaints.    Awaiting authorization for SNF placement.     Review of Systems   Respiratory:  Negative for shortness of breath.    Cardiovascular:  Negative for chest pain.   Gastrointestinal:  Negative for abdominal pain and nausea.     Objective:     Vital Signs (Most Recent):  Temp: 97.8 °F (36.6 °C) (09/20/24 1154)  Pulse: 62 (09/20/24 1154)  Resp: 18 (09/20/24 1154)  BP: 137/72 (09/20/24 1154)  SpO2: 99 % (09/20/24 1154) Vital Signs (24h Range):  Temp:  [97.8 °F (36.6 °C)-98.5 °F (36.9 °C)] 97.8 °F (36.6 °C)  Pulse:  [54-70] 62  Resp:  [17-18] 18  SpO2:  [96 %-99 %] 99 %  BP: (131-139)/(50-72) 137/72     Weight: 66 kg (145 lb 8.1 oz)  Body mass index is 20.88 kg/m².    Intake/Output Summary (Last 24 hours) at 9/20/2024 1215  Last data filed at 9/20/2024 1030  Gross per 24 hour   Intake --   Output 150 ml   Net -150 ml         Physical Exam  Vitals and nursing note reviewed.   Constitutional:       General: He is not in acute distress.  Cardiovascular:      Rate and Rhythm: Normal rate and regular rhythm.   Pulmonary:      Effort: Pulmonary effort is normal. No respiratory distress.      Breath sounds: Normal breath sounds.   Abdominal:      Palpations: Abdomen is soft.       "Tenderness: There is no abdominal tenderness.   Musculoskeletal:      Right lower leg: No edema.      Left lower leg: No edema.   Skin:     General: Skin is warm and dry.   Neurological:      Mental Status: He is alert. Mental status is at baseline.      Comments: Oriented to self and place              Significant Labs: All pertinent labs within the past 24 hours have been reviewed.  CBC:   Recent Labs   Lab 09/19/24  0558 09/20/24  0506   WBC 5.37 5.27   HGB 9.5* 9.3*   HCT 29.6* 29.2*   * 151     CMP:   Recent Labs   Lab 09/19/24  0558 09/20/24  0506    141   K 4.7 4.9    107   CO2 28 29    128*   BUN 34* 30   CREATININE 1.8* 1.8*   CALCIUM 8.9 8.6*   ANIONGAP 5* 5*     Magnesium: No results for input(s): "MG" in the last 48 hours.  Urine Culture: No results for input(s): "LABURIN" in the last 48 hours.  Urine Studies: No results for input(s): "COLORU", "APPEARANCEUA", "PHUR", "SPECGRAV", "PROTEINUA", "GLUCUA", "KETONESU", "BILIRUBINUA", "OCCULTUA", "NITRITE", "UROBILINOGEN", "LEUKOCYTESUR", "RBCUA", "WBCUA", "BACTERIA", "SQUAMEPITHEL", "HYALINECASTS" in the last 48 hours.    Invalid input(s): "WRIGHTSUR"    Imaging Results              CT Head Without Contrast (Final result)  Result time 09/13/24 13:35:00      Final result by Az Sethi MD (09/13/24 13:35:00)                   Impression:      1. Chronic findings as above.  No acute abnormalities or detrimental changes when compared to June 2024..      Electronically signed by: Az Sethi  Date:    09/13/2024  Time:    13:35               Narrative:    EXAMINATION:  CT HEAD WITHOUT CONTRAST    CLINICAL HISTORY:  Mental status change, unknown cause;.    TECHNIQUE:  Thin section axial images were obtained.  No contrast was administered.  Image quality is mildly degraded by motion artifact.    COMPARISON:  June 2024    FINDINGS:  There is no evidence of intracranial mass, hemorrhage, or midline shift.  Ventricles and sulci " are mildly prominent.  There are no pathologic extra-axial fluid collections.    There is no CT evidence of acute ischemic change.  Changes of moderate chronic microvascular white matter disease are noted, similar to the prior study.  Tiny chronic lacunar infarct is noted at the caudate nucleus on the left.    Cerebellum is atrophic.  Hypodensity centrally within the cedric is unchanged and likely on the basis of chronic small vessel ischemic change.    The calvarium is intact.  Near complete opacification of the right maxillary sinus is compatible with chronic sinusitis, similar to the prior study.  There is a trace amount of mastoid air cell fluid bilaterally, also unchanged.                                       CT Abdomen Pelvis  Without Contrast (Final result)  Result time 09/13/24 13:41:48      Final result by Prateek Troy DO (09/13/24 13:41:48)                   Impression:      1. No acute intra-abdominal abnormality observed.  2. Trace left pleural effusion.  3. Large left renal cyst is unchanged from previous exam measuring 6.7 cm.  4. Bilateral fat filled inguinal hernias.  Small amount of fluid in the right inguinal canal is unchanged from prior exam.      Electronically signed by: Prateek Troy  Date:    09/13/2024  Time:    13:41               Narrative:      CMS MANDATED QUALITY DATA - CT RADIATION - 436    All CT scans at this facility utilize dose modulation, iterative reconstruction, and/or weight based dosing when appropriate to reduce radiation dose to as low as reasonably achievable.    EXAMINATION:  CT ABDOMEN PELVIS WITHOUT CONTRAST    CLINICAL HISTORY:  Abdominal pain, acute, nonlocalized;    TECHNIQUE:  CT abdomen and pelvis without IV or oral contrast.    COMPARISON:  CT abdomen pelvis 06/23/2024.    FINDINGS:  There is subsegmental atelectasis of the lung bases.  Trace left pleural effusion.    Heart size is normal.    Beam hardening artifacts limits evaluation of the abdomen.    There  are calcified granulomas noted scattered throughout the liver.  No gross hepatic lesions otherwise identified.  The gallbladder is not identified.  The pancreas and adrenal glands unremarkable.  There are numerous calcified granulomas of the spleen.  The spleen is normal size.    The kidneys are normal size.  Large left renal cyst measuring 6.7 cm is again noted with no significant change from previous exam.  There is no hydronephrosis or nephrolithiasis bilaterally.  There is poor visualization of the ureters due to beam hardening artifact.  The bladder is fluid filled with no focal wall abnormalities.  Small amount of fluid noted in the right inguinal canal, unchanged from previous exam.  Fat filled bilateral inguinal hernia is demonstrated.    Prostate gland and seminal vesicles are unremarkable.  Pelvic phleboliths noted.    The large and small bowel normal caliber.  The appendix is normal.  There is no bowel wall thickening or inflammatory changes.  The stomach is decompressed and grossly unremarkable.    Ventral abdominal wall is unremarkable.    There are degenerative changes of the spine, pelvis and hips.  No acute osseous abnormality noted.                                       X-Ray Chest 1 View (Final result)  Result time 09/13/24 11:21:37      Final result by Prateek Troy DO (09/13/24 11:21:37)                   Impression:      Blunting of the left costophrenic angle could reflect small pleural effusion, atelectasis or infiltrate.      Electronically signed by: Prateek Troy  Date:    09/13/2024  Time:    11:21               Narrative:    EXAMINATION:  XR CHEST 1 VIEW    CLINICAL HISTORY:  Sepsis;    FINDINGS:  Portable chest with comparison chest x-ray 06/22/2024.  Normal cardiomediastinal silhouette.There is blunting of the left costophrenic angle.  The lungs are otherwise clear.  Calcified granuloma of the right lung again noted.  Pulmonary vasculature is normal. No acute osseous abnormality.    "                                     Significant Imaging: I have reviewed all pertinent imaging results/findings within the past 24 hours.    Assessment/Plan:      * Severe sepsis  This patient does have evidence of infective focus  My overall impression is sepsis.  Source: Urinary Tract  Antibiotics given-   Antibiotics (72h ago, onward)      None          Latest lactate reviewed-  No results for input(s): "LACTATE", "POCLAC" in the last 72 hours.    Organ dysfunction indicated by Acute kidney injury and Encephalopathy    Fluid challenge Not needed - patient is not hypotensive      Post- resuscitation assessment No - Post resuscitation assessment not needed       Will Not start Pressors- Levophed for MAP of 65  Source control achieved by:   Blood cultures, urine cultures sent upon admission -- no growth   Rocephin IV completed x 7 days   Lactic, Procalcitonin WNL      Urinary retention   ml  Draper cath was placed --- has since been d/c'd and patient voiding well  Start flomax        Pleural effusion on left  Patient found to have  trace  pleural effusion on imaging. I have personally reviewed and interpreted the following imaging: Xray. A thoracentesis was deferred. Most likely etiology includes  CDK st III . Management to include Diuresis      Encephalopathy, metabolic  History of dementia  Likely related to UTI, treated with rocephin   Monitor neuro checks  Check keppra level   EEG negative for epileptiform activity   Neurology consulted  Ammonia, Vit B12/6/1 reviewed   Mentation slowly improving     UTI (urinary tract infection)  Urine culture NGTD  Completed IV rocephin x 7 days         Seizure disorder  neurology consulted  Continue keppra, dose adjusted per Neuro to 250mg BID  EEG without epileptiform seizure activity noted  Seizure precautions         Debility  Patient with Acute debility due to age-related physical debility. Latest AMPAC and GEMS scores have not been reviewed. Evaluation for etiology " is underway. Plan includes PT/OT consulted and fall precautions in place.   Plans for discharge to SNF    Chronic renal disease, stage IV  Creatine stable for now. BMP reviewed- noted Estimated Creatinine Clearance: 25 mL/min (A) (based on SCr of 1.8 mg/dL (H)). according to latest data. Based on current GFR, CKD stage is stage 3 - GFR 30-59.  Monitor UOP and serial BMP and adjust therapy as needed. Renally dose meds. Avoid nephrotoxic medications and procedures.    Constipation  Resolved  Continue stool softeners daily      VTE Risk Mitigation (From admission, onward)           Ordered     enoxaparin injection 30 mg  Every 24 hours (non-standard times)         09/13/24 1848     IP VTE HIGH RISK PATIENT  Once         09/13/24 1455     Place sequential compression device  Until discontinued         09/13/24 1455                    Discharge Planning   JOANNE:      Code Status: DNR   Is the patient medically ready for discharge?:     Reason for patient still in hospital (select all that apply): Pending disposition  Discharge Plan A: Skilled Nursing Facility   Discharge Delays: None known at this time              Josselyn Dumas NP  Department of Hospital Medicine   [facility]

## 2024-09-21 LAB
ANION GAP SERPL CALC-SCNC: 6 MMOL/L (ref 8–16)
BASOPHILS # BLD AUTO: 0.03 K/UL (ref 0–0.2)
BASOPHILS NFR BLD: 0.5 % (ref 0–1.9)
BUN SERPL-MCNC: 31 MG/DL (ref 10–30)
CALCIUM SERPL-MCNC: 8.4 MG/DL (ref 8.7–10.5)
CHLORIDE SERPL-SCNC: 107 MMOL/L (ref 95–110)
CO2 SERPL-SCNC: 25 MMOL/L (ref 23–29)
CREAT SERPL-MCNC: 1.7 MG/DL (ref 0.5–1.4)
DIFFERENTIAL METHOD BLD: ABNORMAL
EOSINOPHIL # BLD AUTO: 0.1 K/UL (ref 0–0.5)
EOSINOPHIL NFR BLD: 2.1 % (ref 0–8)
ERYTHROCYTE [DISTWIDTH] IN BLOOD BY AUTOMATED COUNT: 14.2 % (ref 11.5–14.5)
EST. GFR  (NO RACE VARIABLE): 37.6 ML/MIN/1.73 M^2
GLUCOSE SERPL-MCNC: 122 MG/DL (ref 70–110)
HCT VFR BLD AUTO: 29.4 % (ref 40–54)
HGB BLD-MCNC: 9.3 G/DL (ref 14–18)
IMM GRANULOCYTES # BLD AUTO: 0.03 K/UL (ref 0–0.04)
IMM GRANULOCYTES NFR BLD AUTO: 0.5 % (ref 0–0.5)
LYMPHOCYTES # BLD AUTO: 2.3 K/UL (ref 1–4.8)
LYMPHOCYTES NFR BLD: 40.2 % (ref 18–48)
MAGNESIUM SERPL-MCNC: 2.1 MG/DL (ref 1.6–2.6)
MCH RBC QN AUTO: 29.7 PG (ref 27–31)
MCHC RBC AUTO-ENTMCNC: 31.6 G/DL (ref 32–36)
MCV RBC AUTO: 94 FL (ref 82–98)
MONOCYTES # BLD AUTO: 0.6 K/UL (ref 0.3–1)
MONOCYTES NFR BLD: 10.5 % (ref 4–15)
NEUTROPHILS # BLD AUTO: 2.6 K/UL (ref 1.8–7.7)
NEUTROPHILS NFR BLD: 46.2 % (ref 38–73)
NRBC BLD-RTO: 0 /100 WBC
PLATELET # BLD AUTO: 146 K/UL (ref 150–450)
PMV BLD AUTO: 11.3 FL (ref 9.2–12.9)
POTASSIUM SERPL-SCNC: 5 MMOL/L (ref 3.5–5.1)
RBC # BLD AUTO: 3.13 M/UL (ref 4.6–6.2)
SODIUM SERPL-SCNC: 138 MMOL/L (ref 136–145)
WBC # BLD AUTO: 5.6 K/UL (ref 3.9–12.7)

## 2024-09-21 PROCEDURE — 12000002 HC ACUTE/MED SURGE SEMI-PRIVATE ROOM

## 2024-09-21 PROCEDURE — 25000003 PHARM REV CODE 250: Performed by: INTERNAL MEDICINE

## 2024-09-21 PROCEDURE — 80048 BASIC METABOLIC PNL TOTAL CA: CPT | Performed by: NURSE PRACTITIONER

## 2024-09-21 PROCEDURE — 63600175 PHARM REV CODE 636 W HCPCS: Performed by: INTERNAL MEDICINE

## 2024-09-21 PROCEDURE — 85025 COMPLETE CBC W/AUTO DIFF WBC: CPT | Performed by: NURSE PRACTITIONER

## 2024-09-21 PROCEDURE — 25000003 PHARM REV CODE 250: Performed by: NURSE PRACTITIONER

## 2024-09-21 PROCEDURE — 83735 ASSAY OF MAGNESIUM: CPT | Performed by: NURSE PRACTITIONER

## 2024-09-21 PROCEDURE — 25000003 PHARM REV CODE 250: Performed by: STUDENT IN AN ORGANIZED HEALTH CARE EDUCATION/TRAINING PROGRAM

## 2024-09-21 PROCEDURE — 36415 COLL VENOUS BLD VENIPUNCTURE: CPT | Performed by: NURSE PRACTITIONER

## 2024-09-21 RX ADMIN — FLUTICASONE PROPIONATE 100 MCG: 50 SPRAY, METERED NASAL at 08:09

## 2024-09-21 RX ADMIN — AMLODIPINE BESYLATE 10 MG: 5 TABLET ORAL at 05:09

## 2024-09-21 RX ADMIN — LEVETIRACETAM 250 MG: 250 TABLET, FILM COATED ORAL at 08:09

## 2024-09-21 RX ADMIN — ENOXAPARIN SODIUM 30 MG: 30 INJECTION SUBCUTANEOUS at 08:09

## 2024-09-21 RX ADMIN — HYDRALAZINE HYDROCHLORIDE 10 MG: 10 TABLET, FILM COATED ORAL at 08:09

## 2024-09-21 RX ADMIN — TAMSULOSIN HYDROCHLORIDE 0.4 MG: 0.4 CAPSULE ORAL at 08:09

## 2024-09-21 RX ADMIN — HYDRALAZINE HYDROCHLORIDE 10 MG: 10 TABLET, FILM COATED ORAL at 01:09

## 2024-09-21 RX ADMIN — HYDRALAZINE HYDROCHLORIDE 10 MG: 10 TABLET, FILM COATED ORAL at 05:09

## 2024-09-21 RX ADMIN — FAMOTIDINE 20 MG: 20 TABLET ORAL at 08:09

## 2024-09-21 RX ADMIN — CYANOCOBALAMIN TAB 1000 MCG 1000 MCG: 1000 TAB at 08:09

## 2024-09-21 RX ADMIN — BISACODYL 10 MG: 10 SUPPOSITORY RECTAL at 10:09

## 2024-09-21 NOTE — NURSING
- cleaned and dried sacrum- applied money honey-pt turned and positioned with no complaints. Bed alarm on.

## 2024-09-21 NOTE — PROGRESS NOTES
[facility]  Progress Note    Patient Name: Lavon Brandon  MRN: [id number]  Patient Class: IP- Inpatient   Admission Date: 9/13/2024  Length of Stay: 7 days  Attending Physician: Milind Walker, *  Primary Care Provider: Aristides Haynes MD        Subjective:     Principal Problem:Severe sepsis        HPI:  89 year old male with a past medical history of hypertension, hyperlipidemia, CVA, PE, Seizure, UTI and dementia who presents to the emergency room with reports of abdominal and back pain and hallucinations. Also decreased bowel movements over last 2-4 days. He was recently diagnosed with UTI and started on augmentin. Per urine culture sensitivity, organism is resistant to Augmentin, sensitive to Rocephin.  Hypertensive in the ER at 212/94, reports did not take his home medication this morning.  WBC 9.56, mild MATA with serum creatinine 2.0, BUN 34, UA negative for leukocytes and nitrites.  Blood culture sent, lactic 2.39.  CT abdomen with trace left pleural effusion, large left renal cyst, CT head no acute findings, chest x-ray Blunting of the left costophrenic angle. IV rocephin given in ER with Hydralazine, amlodipine,  ml.  Admit to hospital medicine for further medical management.  The patient did have some urinary retention, a Draper catheter was placed with adequate urine output.  EEG pending.  Urine culture with no growth to date.  Patient's mental status continued to slowly improve.  He has tolerated p.o. diet.  PT and OT consulted for evaluation    Overview/Hospital Course:  89 year old hospitalized for UTI, AMS, severe sepsis. In Er, Lactate 2.39, BUN 3, Sr Cr 2, recently diagnosed with UTI, treated with oral Augmentin. However, the urine sensitivity was resistant to Augmentin, sensitive to Rocephin. Therefore, rocephin was started in the ER and continued during admission. Patient was afebrile, no leukocytosis.  Lactic acid improved with IV antibiotics and  fluid resuscitation.  Renal function stable at 2.1, this is the patient's baseline renal function per historical records.  Repeat urine cultures continued to show no growth to date however patient maintained on Rocephin 1 g daily.  Patient continued to have waxing and waning mental status.  Neurology was consulted.  CT head was negative for acute findings.  An EEG was obtained which was negative for epileptiform activity.  Patient's Keppra dose was decreased per Neurology recs.  Patient's mental status continued to improve.  PT and OT was consulted for evaluation with recommendations for moderate intensity therapy made.  CM was consulted for SNF placement, awaiting authorization.      Interval History: Patient seen in bed.  NAD.  Alert.  Reports feeling full after eating all of breakfast.  Denies n/v.   Awaiting SNF placement.    Review of Systems   Respiratory:  Negative for shortness of breath.    Cardiovascular:  Negative for chest pain.   Gastrointestinal:  Negative for abdominal pain and nausea.     Objective:     Vital Signs (Most Recent):  Temp: 97.7 °F (36.5 °C) (09/21/24 0805)  Pulse: (!) 57 (09/21/24 0805)  Resp: 16 (09/21/24 0805)  BP: 139/65 (09/21/24 0805)  SpO2: 99 % (09/21/24 0805) Vital Signs (24h Range):  Temp:  [97.7 °F (36.5 °C)-98.2 °F (36.8 °C)] 97.7 °F (36.5 °C)  Pulse:  [57-72] 57  Resp:  [10-18] 16  SpO2:  [97 %-99 %] 99 %  BP: (135-142)/(62-72) 139/65     Weight: 66 kg (145 lb 8.1 oz)  Body mass index is 20.88 kg/m².    Intake/Output Summary (Last 24 hours) at 9/21/2024 1136  Last data filed at 9/21/2024 0828  Gross per 24 hour   Intake 360 ml   Output 900 ml   Net -540 ml         Physical Exam  Vitals and nursing note reviewed.   Constitutional:       General: He is not in acute distress.  Cardiovascular:      Rate and Rhythm: Normal rate and regular rhythm.   Pulmonary:      Effort: Pulmonary effort is normal. No respiratory distress.      Breath sounds: Normal breath sounds.   Abdominal:  "     Palpations: Abdomen is soft.      Tenderness: There is no abdominal tenderness.   Musculoskeletal:      Right lower leg: No edema.      Left lower leg: No edema.   Skin:     General: Skin is warm and dry.   Neurological:      Mental Status: He is alert. Mental status is at baseline.      Comments: Oriented to self and place              Significant Labs: All pertinent labs within the past 24 hours have been reviewed.  CBC:   Recent Labs   Lab 09/20/24  0506 09/21/24  0415   WBC 5.27 5.60   HGB 9.3* 9.3*   HCT 29.2* 29.4*    146*     CMP:   Recent Labs   Lab 09/20/24  0506 09/21/24  0415    138   K 4.9 5.0    107   CO2 29 25   * 122*   BUN 30 31*   CREATININE 1.8* 1.7*   CALCIUM 8.6* 8.4*   ANIONGAP 5* 6*     Magnesium:   Recent Labs   Lab 09/21/24  0415   MG 2.1     Urine Culture: No results for input(s): "LABURIN" in the last 48 hours.  Urine Studies: No results for input(s): "COLORU", "APPEARANCEUA", "PHUR", "SPECGRAV", "PROTEINUA", "GLUCUA", "KETONESU", "BILIRUBINUA", "OCCULTUA", "NITRITE", "UROBILINOGEN", "LEUKOCYTESUR", "RBCUA", "WBCUA", "BACTERIA", "SQUAMEPITHEL", "HYALINECASTS" in the last 48 hours.    Invalid input(s): "WRIGHTSUR"    Imaging Results              CT Head Without Contrast (Final result)  Result time 09/13/24 13:35:00      Final result by Az Sethi MD (09/13/24 13:35:00)                   Impression:      1. Chronic findings as above.  No acute abnormalities or detrimental changes when compared to June 2024..      Electronically signed by: Az Sethi  Date:    09/13/2024  Time:    13:35               Narrative:    EXAMINATION:  CT HEAD WITHOUT CONTRAST    CLINICAL HISTORY:  Mental status change, unknown cause;.    TECHNIQUE:  Thin section axial images were obtained.  No contrast was administered.  Image quality is mildly degraded by motion artifact.    COMPARISON:  June 2024    FINDINGS:  There is no evidence of intracranial mass, hemorrhage, or " midline shift.  Ventricles and sulci are mildly prominent.  There are no pathologic extra-axial fluid collections.    There is no CT evidence of acute ischemic change.  Changes of moderate chronic microvascular white matter disease are noted, similar to the prior study.  Tiny chronic lacunar infarct is noted at the caudate nucleus on the left.    Cerebellum is atrophic.  Hypodensity centrally within the cedric is unchanged and likely on the basis of chronic small vessel ischemic change.    The calvarium is intact.  Near complete opacification of the right maxillary sinus is compatible with chronic sinusitis, similar to the prior study.  There is a trace amount of mastoid air cell fluid bilaterally, also unchanged.                                       CT Abdomen Pelvis  Without Contrast (Final result)  Result time 09/13/24 13:41:48      Final result by Prateek Troy DO (09/13/24 13:41:48)                   Impression:      1. No acute intra-abdominal abnormality observed.  2. Trace left pleural effusion.  3. Large left renal cyst is unchanged from previous exam measuring 6.7 cm.  4. Bilateral fat filled inguinal hernias.  Small amount of fluid in the right inguinal canal is unchanged from prior exam.      Electronically signed by: Prateek Troy  Date:    09/13/2024  Time:    13:41               Narrative:      CMS MANDATED QUALITY DATA - CT RADIATION - 436    All CT scans at this facility utilize dose modulation, iterative reconstruction, and/or weight based dosing when appropriate to reduce radiation dose to as low as reasonably achievable.    EXAMINATION:  CT ABDOMEN PELVIS WITHOUT CONTRAST    CLINICAL HISTORY:  Abdominal pain, acute, nonlocalized;    TECHNIQUE:  CT abdomen and pelvis without IV or oral contrast.    COMPARISON:  CT abdomen pelvis 06/23/2024.    FINDINGS:  There is subsegmental atelectasis of the lung bases.  Trace left pleural effusion.    Heart size is normal.    Beam hardening artifacts limits  evaluation of the abdomen.    There are calcified granulomas noted scattered throughout the liver.  No gross hepatic lesions otherwise identified.  The gallbladder is not identified.  The pancreas and adrenal glands unremarkable.  There are numerous calcified granulomas of the spleen.  The spleen is normal size.    The kidneys are normal size.  Large left renal cyst measuring 6.7 cm is again noted with no significant change from previous exam.  There is no hydronephrosis or nephrolithiasis bilaterally.  There is poor visualization of the ureters due to beam hardening artifact.  The bladder is fluid filled with no focal wall abnormalities.  Small amount of fluid noted in the right inguinal canal, unchanged from previous exam.  Fat filled bilateral inguinal hernia is demonstrated.    Prostate gland and seminal vesicles are unremarkable.  Pelvic phleboliths noted.    The large and small bowel normal caliber.  The appendix is normal.  There is no bowel wall thickening or inflammatory changes.  The stomach is decompressed and grossly unremarkable.    Ventral abdominal wall is unremarkable.    There are degenerative changes of the spine, pelvis and hips.  No acute osseous abnormality noted.                                       X-Ray Chest 1 View (Final result)  Result time 09/13/24 11:21:37      Final result by Prateek Troy DO (09/13/24 11:21:37)                   Impression:      Blunting of the left costophrenic angle could reflect small pleural effusion, atelectasis or infiltrate.      Electronically signed by: Prateek Troy  Date:    09/13/2024  Time:    11:21               Narrative:    EXAMINATION:  XR CHEST 1 VIEW    CLINICAL HISTORY:  Sepsis;    FINDINGS:  Portable chest with comparison chest x-ray 06/22/2024.  Normal cardiomediastinal silhouette.There is blunting of the left costophrenic angle.  The lungs are otherwise clear.  Calcified granuloma of the right lung again noted.  Pulmonary vasculature is  "normal. No acute osseous abnormality.                                        Significant Imaging: I have reviewed all pertinent imaging results/findings within the past 24 hours.    Assessment/Plan:      * Severe sepsis  This patient does have evidence of infective focus  My overall impression is sepsis.  Source: Urinary Tract  Antibiotics given-   Antibiotics (72h ago, onward)      None          Latest lactate reviewed-  No results for input(s): "LACTATE", "POCLAC" in the last 72 hours.    Organ dysfunction indicated by Acute kidney injury and Encephalopathy    Fluid challenge Not needed - patient is not hypotensive      Post- resuscitation assessment No - Post resuscitation assessment not needed       Will Not start Pressors- Levophed for MAP of 65  Source control achieved by:   Blood cultures, urine cultures sent upon admission -- no growth   Rocephin IV completed x 7 days   Lactic, Procalcitonin WNL      Urinary retention   ml  Draper cath was placed --- has since been d/c'd and patient voiding well  Start flomax        Pleural effusion on left  Patient found to have  trace  pleural effusion on imaging. I have personally reviewed and interpreted the following imaging: Xray. A thoracentesis was deferred. Most likely etiology includes  CDK st III . Management to include Diuresis      Encephalopathy, metabolic  History of dementia  Likely related to UTI, treated with rocephin   Monitor neuro checks  Check keppra level   EEG negative for epileptiform activity   Neurology consulted  Ammonia, Vit B12/6/1 reviewed   Mentation slowly improving     UTI (urinary tract infection)  Urine culture NGTD  Completed IV rocephin x 7 days         Seizure disorder  neurology consulted  Continue keppra, dose adjusted per Neuro to 250mg BID  EEG without epileptiform seizure activity noted  Seizure precautions         Debility  Patient with Acute debility due to age-related physical debility. Latest AMPAC and GEMS scores have " not been reviewed. Evaluation for etiology is underway. Plan includes PT/OT consulted and fall precautions in place.   Plans for discharge to SNF    Chronic renal disease, stage IV  Creatine stable for now. BMP reviewed- noted Estimated Creatinine Clearance: 26.4 mL/min (A) (based on SCr of 1.7 mg/dL (H)). according to latest data. Based on current GFR, CKD stage is stage 3 - GFR 30-59.  Monitor UOP and serial BMP and adjust therapy as needed. Renally dose meds. Avoid nephrotoxic medications and procedures.    Constipation  Resolved  Continue stool softeners daily      VTE Risk Mitigation (From admission, onward)           Ordered     enoxaparin injection 30 mg  Every 24 hours (non-standard times)         09/13/24 1848     IP VTE HIGH RISK PATIENT  Once         09/13/24 1455     Place sequential compression device  Until discontinued         09/13/24 1455                    Discharge Planning   JOANNE:      Code Status: DNR   Is the patient medically ready for discharge?:     Reason for patient still in hospital (select all that apply): Pending disposition  Discharge Plan A: Skilled Nursing Facility   Discharge Delays: None known at this time              Josselyn Dumas NP  Department of Hospital Medicine   [facility]

## 2024-09-21 NOTE — SUBJECTIVE & OBJECTIVE
Interval History: Patient seen in bed.  NAD.  Alert.  Reports feeling full after eating all of breakfast.  Denies n/v.   Awaiting SNF placement.    Review of Systems   Respiratory:  Negative for shortness of breath.    Cardiovascular:  Negative for chest pain.   Gastrointestinal:  Negative for abdominal pain and nausea.     Objective:     Vital Signs (Most Recent):  Temp: 97.7 °F (36.5 °C) (09/21/24 0805)  Pulse: (!) 57 (09/21/24 0805)  Resp: 16 (09/21/24 0805)  BP: 139/65 (09/21/24 0805)  SpO2: 99 % (09/21/24 0805) Vital Signs (24h Range):  Temp:  [97.7 °F (36.5 °C)-98.2 °F (36.8 °C)] 97.7 °F (36.5 °C)  Pulse:  [57-72] 57  Resp:  [10-18] 16  SpO2:  [97 %-99 %] 99 %  BP: (135-142)/(62-72) 139/65     Weight: 66 kg (145 lb 8.1 oz)  Body mass index is 20.88 kg/m².    Intake/Output Summary (Last 24 hours) at 9/21/2024 1136  Last data filed at 9/21/2024 0828  Gross per 24 hour   Intake 360 ml   Output 900 ml   Net -540 ml         Physical Exam  Vitals and nursing note reviewed.   Constitutional:       General: He is not in acute distress.  Cardiovascular:      Rate and Rhythm: Normal rate and regular rhythm.   Pulmonary:      Effort: Pulmonary effort is normal. No respiratory distress.      Breath sounds: Normal breath sounds.   Abdominal:      Palpations: Abdomen is soft.      Tenderness: There is no abdominal tenderness.   Musculoskeletal:      Right lower leg: No edema.      Left lower leg: No edema.   Skin:     General: Skin is warm and dry.   Neurological:      Mental Status: He is alert. Mental status is at baseline.      Comments: Oriented to self and place              Significant Labs: All pertinent labs within the past 24 hours have been reviewed.  CBC:   Recent Labs   Lab 09/20/24  0506 09/21/24 0415   WBC 5.27 5.60   HGB 9.3* 9.3*   HCT 29.2* 29.4*    146*     CMP:   Recent Labs   Lab 09/20/24  0506 09/21/24  0415    138   K 4.9 5.0    107   CO2 29 25   * 122*   BUN 30 31*  "  CREATININE 1.8* 1.7*   CALCIUM 8.6* 8.4*   ANIONGAP 5* 6*     Magnesium:   Recent Labs   Lab 09/21/24  0415   MG 2.1     Urine Culture: No results for input(s): "LABURIN" in the last 48 hours.  Urine Studies: No results for input(s): "COLORU", "APPEARANCEUA", "PHUR", "SPECGRAV", "PROTEINUA", "GLUCUA", "KETONESU", "BILIRUBINUA", "OCCULTUA", "NITRITE", "UROBILINOGEN", "LEUKOCYTESUR", "RBCUA", "WBCUA", "BACTERIA", "SQUAMEPITHEL", "HYALINECASTS" in the last 48 hours.    Invalid input(s): "WRIGHTSUR"    Imaging Results              CT Head Without Contrast (Final result)  Result time 09/13/24 13:35:00      Final result by Az Sethi MD (09/13/24 13:35:00)                   Impression:      1. Chronic findings as above.  No acute abnormalities or detrimental changes when compared to June 2024..      Electronically signed by: Az Sethi  Date:    09/13/2024  Time:    13:35               Narrative:    EXAMINATION:  CT HEAD WITHOUT CONTRAST    CLINICAL HISTORY:  Mental status change, unknown cause;.    TECHNIQUE:  Thin section axial images were obtained.  No contrast was administered.  Image quality is mildly degraded by motion artifact.    COMPARISON:  June 2024    FINDINGS:  There is no evidence of intracranial mass, hemorrhage, or midline shift.  Ventricles and sulci are mildly prominent.  There are no pathologic extra-axial fluid collections.    There is no CT evidence of acute ischemic change.  Changes of moderate chronic microvascular white matter disease are noted, similar to the prior study.  Tiny chronic lacunar infarct is noted at the caudate nucleus on the left.    Cerebellum is atrophic.  Hypodensity centrally within the cedric is unchanged and likely on the basis of chronic small vessel ischemic change.    The calvarium is intact.  Near complete opacification of the right maxillary sinus is compatible with chronic sinusitis, similar to the prior study.  There is a trace amount of mastoid air cell " fluid bilaterally, also unchanged.                                       CT Abdomen Pelvis  Without Contrast (Final result)  Result time 09/13/24 13:41:48      Final result by Prateek Troy,  (09/13/24 13:41:48)                   Impression:      1. No acute intra-abdominal abnormality observed.  2. Trace left pleural effusion.  3. Large left renal cyst is unchanged from previous exam measuring 6.7 cm.  4. Bilateral fat filled inguinal hernias.  Small amount of fluid in the right inguinal canal is unchanged from prior exam.      Electronically signed by: Prateek Troy  Date:    09/13/2024  Time:    13:41               Narrative:      CMS MANDATED QUALITY DATA - CT RADIATION - 436    All CT scans at this facility utilize dose modulation, iterative reconstruction, and/or weight based dosing when appropriate to reduce radiation dose to as low as reasonably achievable.    EXAMINATION:  CT ABDOMEN PELVIS WITHOUT CONTRAST    CLINICAL HISTORY:  Abdominal pain, acute, nonlocalized;    TECHNIQUE:  CT abdomen and pelvis without IV or oral contrast.    COMPARISON:  CT abdomen pelvis 06/23/2024.    FINDINGS:  There is subsegmental atelectasis of the lung bases.  Trace left pleural effusion.    Heart size is normal.    Beam hardening artifacts limits evaluation of the abdomen.    There are calcified granulomas noted scattered throughout the liver.  No gross hepatic lesions otherwise identified.  The gallbladder is not identified.  The pancreas and adrenal glands unremarkable.  There are numerous calcified granulomas of the spleen.  The spleen is normal size.    The kidneys are normal size.  Large left renal cyst measuring 6.7 cm is again noted with no significant change from previous exam.  There is no hydronephrosis or nephrolithiasis bilaterally.  There is poor visualization of the ureters due to beam hardening artifact.  The bladder is fluid filled with no focal wall abnormalities.  Small amount of fluid noted in the  right inguinal canal, unchanged from previous exam.  Fat filled bilateral inguinal hernia is demonstrated.    Prostate gland and seminal vesicles are unremarkable.  Pelvic phleboliths noted.    The large and small bowel normal caliber.  The appendix is normal.  There is no bowel wall thickening or inflammatory changes.  The stomach is decompressed and grossly unremarkable.    Ventral abdominal wall is unremarkable.    There are degenerative changes of the spine, pelvis and hips.  No acute osseous abnormality noted.                                       X-Ray Chest 1 View (Final result)  Result time 09/13/24 11:21:37      Final result by Prateek Troy DO (09/13/24 11:21:37)                   Impression:      Blunting of the left costophrenic angle could reflect small pleural effusion, atelectasis or infiltrate.      Electronically signed by: Prateek Troy  Date:    09/13/2024  Time:    11:21               Narrative:    EXAMINATION:  XR CHEST 1 VIEW    CLINICAL HISTORY:  Sepsis;    FINDINGS:  Portable chest with comparison chest x-ray 06/22/2024.  Normal cardiomediastinal silhouette.There is blunting of the left costophrenic angle.  The lungs are otherwise clear.  Calcified granuloma of the right lung again noted.  Pulmonary vasculature is normal. No acute osseous abnormality.                                        Significant Imaging: I have reviewed all pertinent imaging results/findings within the past 24 hours.

## 2024-09-21 NOTE — PROGRESS NOTES
Patient ID:  Lavon Brandon  89 y.o.  male      Assessment:       1. Primary hypertension    2. Asymptomatic bradycardia           Plan:     He had a mechanical fall at his rehab facility.  He went to the ER and the workup was unremarkable.  He recently had an echo which did not show any significant abnormalities.  He has advanced dementia, he is minimally ambulatory and mostly wheelchair-bound.  Continue current regimen of amlodipine 10 mg daily and hydralazine 100 mg t.i.d. for hypertension.  It is within normal limits today.  Would not be too aggressive given his advanced age and co morbidities.  Avoid any heart rate lowering medications such as BB/non-dihydropyridine CCB as he had sinus bradycardia, although asymptomatic in the past while on a BB. His baseline HR runs on the lower end of normal.    Subjective:     Chief Complaint   Patient presents with    Hospital Follow Up     fall    Hypertension    Results     echo       HPI:  Lavon Brandon is a 89 y.o. male who presents for ER follow-up.  He was at [facility] at the end of June for altered mental status secondary to UTI which was treated with a course of Zosyn.  He was discharged to a rehab facility.  He then had a mechanical fall at the rehab facility.  He was in his bed, rolled over and fell on the floor.  Imaging studies did not show any acute.  He was discharged from the ER.  He is now back at home with his daughter.  He is minimally ambulatory.  He is mostly wheelchair-bound.  Reports no chest pain, dyspnea, orthopnea, PND, swelling of extremities, palpitations, syncope or near syncope.   Echo was ordered at last visit in May.  Echo with no significant abnormalities.  Discussed results and answered all questions.    He has hypertension, advanced dementia, CVA,  DM type 2 and CKD stage 4.  He had asymptomatic sinus bradycardia in the past and his BB was discontinued due to that.       PREVIOUS CARDIAC TESTING/PROCEDURES HISTORY:  Most Recent  Echocardiogram Results  Results for orders placed in visit on 05/28/24    Echo    Interpretation Summary    Left Ventricle: The left ventricle is normal in size. Normal wall thickness. There is normal systolic function with a visually estimated ejection fraction of 55 - 60%. There is normal diastolic function.    Right Ventricle: Normal right ventricular cavity size. Wall thickness is normal. Systolic function is normal.    Aortic Valve: The aortic valve is structurally normal. Mildly calcified left and noncoronary cusps.          The ASCVD Risk score (Pool DK, et al., 2019) failed to calculate for the following reasons:    The 2019 ASCVD risk score is only valid for ages 40 to 79    The patient has a prior MI or stroke diagnosis    Review of patient's allergies indicates:   Allergen Reactions    Ciprofloxacin (bulk) Swelling       Past Medical History:   Diagnosis Date    Bradyarrhythmia     CVA (cerebral infarction) 2006    Dementia     Depression     Hyperlipidemia     Hypertension     renal htn    Pulmonary embolism 2002    Seizure disorder      Past Surgical History:   Procedure Laterality Date    left foot  with crushed injry       Social History     Tobacco Use    Smoking status: Never    Smokeless tobacco: Never   Substance Use Topics    Alcohol use: No    Drug use: No          REVIEW OF SYSTEMS  CONSTITUTIONAL: No chills, no fatigue, no fever.   EYES: No double vision, no blurred vision  NEURO: No headaches, no dizziness  RESPIRATORY: No cough, no wheezing.    CARDIOVASCULAR: See HPI   GI: No abdominal pain, no melena, no diarrhea, no nausea or vomiting.   : No  dysuria and frequency, no hematuria  SKIN: no bruising, no discoloration  ENDOCRINE: no polyphagia, no heat intolerance, no cold intolerance  PSYCHIATRIC: no depression, no anxiety, no memory loss  MUSCULOSKELETAL: no  neck pain, no muscle weakness,no back pain          Objective:        Vitals:    08/07/24 1302   BP: 122/62   Pulse: 65        PHYSICAL EXAM  CONSTITUTIONAL: In no apparent distress  HEENT: Normocephalic. Pupils normal and conjunctivae normal. No pallor  NECK: No JVD  LUNGS: B/L air entry to the lungs, clear to auscultation. No rales, wheezing or rhonchi.  HEART: Normal rate and regular rhythm. Normal S1 and S2.  No murmur   ABDOMEN: soft, non-tender; bowel sounds normal  EXTREMITIES: No edema. Good palpable distal pulses.  NEURO: AAO X 3, no gross sensory or motor deficits  SKIN:  No rash  Psych:  Normal affect               Current Outpatient Medications   Medication Instructions    amLODIPine (NORVASC) 10 mg, Oral, Daily before evening meal    calcitRIOL (ROCALTROL) 0.25 MCG Cap TAKE 1 CAPSULE(0.25 MCG) BY MOUTH EVERY DAY    citalopram (CELEXA) 20 mg, Oral, Daily    cyanocobalamin (VITAMIN B-12) 100 mcg, Oral, Daily    fluticasone propionate (FLONASE) 100 mcg, Each Nostril, Daily    hydrALAZINE (APRESOLINE) 100 mg, Oral, Every 8 hours    levETIRAcetam (KEPPRA) 500 mg, Oral, 2 times daily    methylPREDNISolone (MEDROL DOSEPACK) 4 mg tablet use as directed           All pertinent labs, imaging, and EKGs reviewed.  Patient's most recent EKG tracing was personally interpreted by this provider.  Follow up in about 1 year (around 8/7/2025).

## 2024-09-21 NOTE — ASSESSMENT & PLAN NOTE
Creatine stable for now. BMP reviewed- noted Estimated Creatinine Clearance: 26.4 mL/min (A) (based on SCr of 1.7 mg/dL (H)). according to latest data. Based on current GFR, CKD stage is stage 3 - GFR 30-59.  Monitor UOP and serial BMP and adjust therapy as needed. Renally dose meds. Avoid nephrotoxic medications and procedures.

## 2024-09-21 NOTE — NURSING
Nurses Note -- 4 Eyes      9/21/2024   8:20 AM      Skin assessed during: Daily Assessment      [] No Altered Skin Integrity Present    []Prevention Measures Documented      [] Yes- Altered Skin Integrity Present or Discovered   [] LDA Added if Not in Epic (Describe Wound)   [] New Altered Skin Integrity was Present on Admit and Documented in LDA   [x] Wound Image Taken 9/21 taken    Wound Care Consulted? Yes    Attending Nurse:  Selma De Guzman RN/Staff Member:   Shanice LIU

## 2024-09-21 NOTE — NURSING
Nurses Note -- 4 Eyes      9/20/2024   9:48PM      Skin assessed during: Q Shift Change      [] No Altered Skin Integrity Present    []Prevention Measures Documented      [x] Yes- Altered Skin Integrity Present or Discovered   [] LDA Added if Not in Epic (Describe Wound)   [] New Altered Skin Integrity was Present on Admit and Documented in LDA   [] Wound Image Taken    Wound Care Consulted? No  Wound care previously consulted  Attending Nurse:  Brandon De Guzman RN/Staff Member:   Bo

## 2024-09-22 LAB
ANION GAP SERPL CALC-SCNC: 4 MMOL/L (ref 8–16)
BASOPHILS # BLD AUTO: 0.04 K/UL (ref 0–0.2)
BASOPHILS NFR BLD: 0.7 % (ref 0–1.9)
BUN SERPL-MCNC: 29 MG/DL (ref 10–30)
CALCIUM SERPL-MCNC: 8.5 MG/DL (ref 8.7–10.5)
CHLORIDE SERPL-SCNC: 107 MMOL/L (ref 95–110)
CO2 SERPL-SCNC: 27 MMOL/L (ref 23–29)
CREAT SERPL-MCNC: 2 MG/DL (ref 0.5–1.4)
DIFFERENTIAL METHOD BLD: ABNORMAL
EOSINOPHIL # BLD AUTO: 0.1 K/UL (ref 0–0.5)
EOSINOPHIL NFR BLD: 2.4 % (ref 0–8)
ERYTHROCYTE [DISTWIDTH] IN BLOOD BY AUTOMATED COUNT: 14.2 % (ref 11.5–14.5)
EST. GFR  (NO RACE VARIABLE): 30.9 ML/MIN/1.73 M^2
GLUCOSE SERPL-MCNC: 90 MG/DL (ref 70–110)
HCT VFR BLD AUTO: 30.4 % (ref 40–54)
HGB BLD-MCNC: 9.5 G/DL (ref 14–18)
IMM GRANULOCYTES # BLD AUTO: 0.02 K/UL (ref 0–0.04)
IMM GRANULOCYTES NFR BLD AUTO: 0.3 % (ref 0–0.5)
LYMPHOCYTES # BLD AUTO: 2.2 K/UL (ref 1–4.8)
LYMPHOCYTES NFR BLD: 37.7 % (ref 18–48)
MAGNESIUM SERPL-MCNC: 2.2 MG/DL (ref 1.6–2.6)
MCH RBC QN AUTO: 28.8 PG (ref 27–31)
MCHC RBC AUTO-ENTMCNC: 31.3 G/DL (ref 32–36)
MCV RBC AUTO: 92 FL (ref 82–98)
MONOCYTES # BLD AUTO: 0.6 K/UL (ref 0.3–1)
MONOCYTES NFR BLD: 10.3 % (ref 4–15)
NEUTROPHILS # BLD AUTO: 2.9 K/UL (ref 1.8–7.7)
NEUTROPHILS NFR BLD: 48.6 % (ref 38–73)
NRBC BLD-RTO: 0 /100 WBC
PLATELET # BLD AUTO: 161 K/UL (ref 150–450)
PMV BLD AUTO: 10.9 FL (ref 9.2–12.9)
POTASSIUM SERPL-SCNC: 5 MMOL/L (ref 3.5–5.1)
RBC # BLD AUTO: 3.3 M/UL (ref 4.6–6.2)
SODIUM SERPL-SCNC: 138 MMOL/L (ref 136–145)
WBC # BLD AUTO: 5.91 K/UL (ref 3.9–12.7)

## 2024-09-22 PROCEDURE — 63600175 PHARM REV CODE 636 W HCPCS: Performed by: INTERNAL MEDICINE

## 2024-09-22 PROCEDURE — 12000002 HC ACUTE/MED SURGE SEMI-PRIVATE ROOM

## 2024-09-22 PROCEDURE — 25000003 PHARM REV CODE 250: Performed by: INTERNAL MEDICINE

## 2024-09-22 PROCEDURE — 36415 COLL VENOUS BLD VENIPUNCTURE: CPT | Performed by: NURSE PRACTITIONER

## 2024-09-22 PROCEDURE — 83735 ASSAY OF MAGNESIUM: CPT | Performed by: NURSE PRACTITIONER

## 2024-09-22 PROCEDURE — 85025 COMPLETE CBC W/AUTO DIFF WBC: CPT | Performed by: NURSE PRACTITIONER

## 2024-09-22 PROCEDURE — 25000003 PHARM REV CODE 250: Performed by: STUDENT IN AN ORGANIZED HEALTH CARE EDUCATION/TRAINING PROGRAM

## 2024-09-22 PROCEDURE — 80048 BASIC METABOLIC PNL TOTAL CA: CPT | Performed by: NURSE PRACTITIONER

## 2024-09-22 PROCEDURE — 25000003 PHARM REV CODE 250: Performed by: NURSE PRACTITIONER

## 2024-09-22 RX ADMIN — LEVETIRACETAM 250 MG: 250 TABLET, FILM COATED ORAL at 09:09

## 2024-09-22 RX ADMIN — FLUTICASONE PROPIONATE 100 MCG: 50 SPRAY, METERED NASAL at 09:09

## 2024-09-22 RX ADMIN — HYDRALAZINE HYDROCHLORIDE 10 MG: 10 TABLET, FILM COATED ORAL at 03:09

## 2024-09-22 RX ADMIN — HYDRALAZINE HYDROCHLORIDE 10 MG: 10 TABLET, FILM COATED ORAL at 09:09

## 2024-09-22 RX ADMIN — CYANOCOBALAMIN TAB 1000 MCG 1000 MCG: 1000 TAB at 09:09

## 2024-09-22 RX ADMIN — FAMOTIDINE 20 MG: 20 TABLET ORAL at 09:09

## 2024-09-22 RX ADMIN — ENOXAPARIN SODIUM 30 MG: 30 INJECTION SUBCUTANEOUS at 09:09

## 2024-09-22 RX ADMIN — HYDRALAZINE HYDROCHLORIDE 10 MG: 10 TABLET, FILM COATED ORAL at 05:09

## 2024-09-22 RX ADMIN — TAMSULOSIN HYDROCHLORIDE 0.4 MG: 0.4 CAPSULE ORAL at 09:09

## 2024-09-22 RX ADMIN — AMLODIPINE BESYLATE 10 MG: 5 TABLET ORAL at 05:09

## 2024-09-22 NOTE — ASSESSMENT & PLAN NOTE
History of dementia  Likely related to UTI, treated with rocephin   Monitor neuro checks  Check keppra level -WNL  EEG negative for epileptiform activity   Neurology consulted  Ammonia, Vit B12/6/1 reviewed   Mentation slowly improving

## 2024-09-22 NOTE — PROGRESS NOTES
[facility]  Progress Note    Patient Name: Lavon Brandon  MRN: [id number]  Patient Class: IP- Inpatient   Admission Date: 9/13/2024  Length of Stay: 8 days  Attending Physician: Milind Walker, *  Primary Care Provider: Aristides Haynes MD        Subjective:     Principal Problem:Severe sepsis        HPI:  89 year old male with a past medical history of hypertension, hyperlipidemia, CVA, PE, Seizure, UTI and dementia who presents to the emergency room with reports of abdominal and back pain and hallucinations. Also decreased bowel movements over last 2-4 days. He was recently diagnosed with UTI and started on augmentin. Per urine culture sensitivity, organism is resistant to Augmentin, sensitive to Rocephin.  Hypertensive in the ER at 212/94, reports did not take his home medication this morning.  WBC 9.56, mild MATA with serum creatinine 2.0, BUN 34, UA negative for leukocytes and nitrites.  Blood culture sent, lactic 2.39.  CT abdomen with trace left pleural effusion, large left renal cyst, CT head no acute findings, chest x-ray Blunting of the left costophrenic angle. IV rocephin given in ER with Hydralazine, amlodipine,  ml.  Admit to hospital medicine for further medical management.  The patient did have some urinary retention, a Draper catheter was placed with adequate urine output.  EEG pending.  Urine culture with no growth to date.  Patient's mental status continued to slowly improve.  He has tolerated p.o. diet.  PT and OT consulted for evaluation    Overview/Hospital Course:  89 year old hospitalized for UTI, AMS, severe sepsis. In Er, Lactate 2.39, BUN 3, Sr Cr 2, recently diagnosed with UTI, treated with oral Augmentin. However, the urine sensitivity was resistant to Augmentin, sensitive to Rocephin. Therefore, rocephin was started in the ER and continued during admission. Patient was afebrile, no leukocytosis.  Lactic acid improved with IV antibiotics and  fluid resuscitation.  Renal function stable at 2.1, this is the patient's baseline renal function per historical records.  Repeat urine cultures continued to show no growth to date however patient maintained on Rocephin 1 g daily.  Patient continued to have waxing and waning mental status.  Neurology was consulted.  CT head was negative for acute findings.  An EEG was obtained which was negative for epileptiform activity.  Patient's Keppra dose was decreased per Neurology recs.  Patient's mental status continued to improve.  PT and OT was consulted for evaluation with recommendations for moderate intensity therapy made.  CM was consulted for SNF placement, awaiting authorization.      Interval History: no acute events overnight.  Sleeping at time of assessment, easily aroused.  Awaiting SNF placement.    Review of Systems   Respiratory:  Negative for shortness of breath.    Cardiovascular:  Negative for chest pain.   Gastrointestinal:  Negative for abdominal pain and nausea.     Objective:     Vital Signs (Most Recent):  Temp: 97.8 °F (36.6 °C) (09/22/24 1221)  Pulse: (!) 55 (09/22/24 1221)  Resp: 16 (09/22/24 1221)  BP: 128/62 (09/22/24 1221)  SpO2: 98 % (09/22/24 1221) Vital Signs (24h Range):  Temp:  [97.6 °F (36.4 °C)-98.7 °F (37.1 °C)] 97.8 °F (36.6 °C)  Pulse:  [49-56] 55  Resp:  [16] 16  SpO2:  [96 %-98 %] 98 %  BP: (123-154)/(57-93) 128/62     Weight: 66 kg (145 lb 8.1 oz)  Body mass index is 20.88 kg/m².    Intake/Output Summary (Last 24 hours) at 9/22/2024 1608  Last data filed at 9/22/2024 1538  Gross per 24 hour   Intake 1010 ml   Output 1700 ml   Net -690 ml         Physical Exam  Vitals and nursing note reviewed.   Constitutional:       General: He is not in acute distress.  Cardiovascular:      Rate and Rhythm: Normal rate and regular rhythm.   Pulmonary:      Effort: Pulmonary effort is normal. No respiratory distress.      Breath sounds: Normal breath sounds.   Abdominal:      Palpations: Abdomen is  "soft.      Tenderness: There is no abdominal tenderness.   Musculoskeletal:      Right lower leg: No edema.      Left lower leg: No edema.   Skin:     General: Skin is warm and dry.   Neurological:      Mental Status: He is alert. Mental status is at baseline.      Comments: Oriented to self and place    Psychiatric:         Mood and Affect: Mood normal.         Behavior: Behavior normal.             Significant Labs: All pertinent labs within the past 24 hours have been reviewed.  CBC:   Recent Labs   Lab 09/21/24  0415 09/22/24  0751   WBC 5.60 5.91   HGB 9.3* 9.5*   HCT 29.4* 30.4*   * 161     CMP:   Recent Labs   Lab 09/21/24  0415 09/22/24  0751    138   K 5.0 5.0    107   CO2 25 27   * 90   BUN 31* 29   CREATININE 1.7* 2.0*   CALCIUM 8.4* 8.5*   ANIONGAP 6* 4*       Significant Imaging: I have reviewed all pertinent imaging results/findings within the past 24 hours.    Assessment/Plan:      * Severe sepsis  This patient does have evidence of infective focus  My overall impression is sepsis.  Source: Urinary Tract  Antibiotics given-   Antibiotics (72h ago, onward)      None          Latest lactate reviewed-  No results for input(s): "LACTATE", "POCLAC" in the last 72 hours.    Organ dysfunction indicated by Acute kidney injury and Encephalopathy    Fluid challenge Not needed - patient is not hypotensive      Post- resuscitation assessment No - Post resuscitation assessment not needed       Will Not start Pressors- Levophed for MAP of 65  Source control achieved by:   Blood cultures, urine cultures sent upon admission -- no growth   Rocephin IV completed x 7 days   Lactic, Procalcitonin WNL      Urinary retention   ml  Draper cath was placed --- has since been d/c'd and patient voiding well  Start flomax        Pleural effusion on left  Patient found to have  trace  pleural effusion on imaging. I have personally reviewed and interpreted the following imaging: Xray. A " thoracentesis was deferred. Most likely etiology includes  CDK st III . Management to include Diuresis      Encephalopathy, metabolic  History of dementia  Likely related to UTI, treated with rocephin   Monitor neuro checks  Check keppra level -WNL  EEG negative for epileptiform activity   Neurology consulted  Ammonia, Vit B12/6/1 reviewed   Mentation slowly improving     UTI (urinary tract infection)  Urine culture NGTD  Completed IV rocephin x 7 days-completed         Seizure disorder  neurology consulted  Continue keppra, dose adjusted per Neuro to 250mg BID  EEG without epileptiform seizure activity noted  Seizure precautions         Debility  Patient with Acute debility due to age-related physical debility. Latest AMPAC and GEMS scores have not been reviewed. Evaluation for etiology is underway. Plan includes PT/OT consulted and fall precautions in place.   Plans for discharge to SNF    Chronic renal disease, stage IV  Creatine stable for now. BMP reviewed- noted Estimated Creatinine Clearance: 22.5 mL/min (A) (based on SCr of 2 mg/dL (H)). according to latest data. Based on current GFR, CKD stage is stage 3 - GFR 30-59.  Monitor UOP and serial BMP and adjust therapy as needed. Renally dose meds. Avoid nephrotoxic medications and procedures.    Constipation  Resolved  Continue stool softeners daily      VTE Risk Mitigation (From admission, onward)           Ordered     enoxaparin injection 30 mg  Every 24 hours (non-standard times)         09/13/24 1848     IP VTE HIGH RISK PATIENT  Once         09/13/24 1455     Place sequential compression device  Until discontinued         09/13/24 1455                    Discharge Planning   JOANNE: 9/24/2024     Code Status: DNR   Is the patient medically ready for discharge?:     Reason for patient still in hospital (select all that apply): Pending disposition  Discharge Plan A: Skilled Nursing Facility   Discharge Delays: None known at this time              Lisbeth WOODARD  VANE Meraz  Department of Hospital Medicine   [facility]

## 2024-09-22 NOTE — PLAN OF CARE
Problem: Infection  Goal: Absence of Infection Signs and Symptoms  Outcome: Progressing     Problem: Adult Inpatient Plan of Care  Goal: Readiness for Transition of Care  Outcome: Progressing     Problem: Adult Inpatient Plan of Care  Goal: Optimal Comfort and Wellbeing  Outcome: Progressing     Problem: Wound  Goal: Absence of Infection Signs and Symptoms  Outcome: Progressing

## 2024-09-22 NOTE — SUBJECTIVE & OBJECTIVE
Interval History: no acute events overnight.  Sleeping at time of assessment, easily aroused.  Awaiting SNF placement.    Review of Systems   Respiratory:  Negative for shortness of breath.    Cardiovascular:  Negative for chest pain.   Gastrointestinal:  Negative for abdominal pain and nausea.     Objective:     Vital Signs (Most Recent):  Temp: 97.8 °F (36.6 °C) (09/22/24 1221)  Pulse: (!) 55 (09/22/24 1221)  Resp: 16 (09/22/24 1221)  BP: 128/62 (09/22/24 1221)  SpO2: 98 % (09/22/24 1221) Vital Signs (24h Range):  Temp:  [97.6 °F (36.4 °C)-98.7 °F (37.1 °C)] 97.8 °F (36.6 °C)  Pulse:  [49-56] 55  Resp:  [16] 16  SpO2:  [96 %-98 %] 98 %  BP: (123-154)/(57-93) 128/62     Weight: 66 kg (145 lb 8.1 oz)  Body mass index is 20.88 kg/m².    Intake/Output Summary (Last 24 hours) at 9/22/2024 1608  Last data filed at 9/22/2024 1538  Gross per 24 hour   Intake 1010 ml   Output 1700 ml   Net -690 ml         Physical Exam  Vitals and nursing note reviewed.   Constitutional:       General: He is not in acute distress.  Cardiovascular:      Rate and Rhythm: Normal rate and regular rhythm.   Pulmonary:      Effort: Pulmonary effort is normal. No respiratory distress.      Breath sounds: Normal breath sounds.   Abdominal:      Palpations: Abdomen is soft.      Tenderness: There is no abdominal tenderness.   Musculoskeletal:      Right lower leg: No edema.      Left lower leg: No edema.   Skin:     General: Skin is warm and dry.   Neurological:      Mental Status: He is alert. Mental status is at baseline.      Comments: Oriented to self and place    Psychiatric:         Mood and Affect: Mood normal.         Behavior: Behavior normal.             Significant Labs: All pertinent labs within the past 24 hours have been reviewed.  CBC:   Recent Labs   Lab 09/21/24 0415 09/22/24  0751   WBC 5.60 5.91   HGB 9.3* 9.5*   HCT 29.4* 30.4*   * 161     CMP:   Recent Labs   Lab 09/21/24 0415 09/22/24  0751    138   K 5.0 5.0     107   CO2 25 27   * 90   BUN 31* 29   CREATININE 1.7* 2.0*   CALCIUM 8.4* 8.5*   ANIONGAP 6* 4*       Significant Imaging: I have reviewed all pertinent imaging results/findings within the past 24 hours.

## 2024-09-22 NOTE — NURSING
Nurses Note -- 4 Eyes      9/21/2024   7:10 PM      Skin assessed during: Q Shift Change      [] No Altered Skin Integrity Present    []Prevention Measures Documented      [x] Yes- Altered Skin Integrity Present already documented on LDA   [] LDA Added if Not in Epic (Describe Wound)   [] New Altered Skin Integrity was Present on Admit and Documented in LDA   [x] Wound Image Taken, 9/21/24    Wound Care Consulted? Yes    Attending Nurse:   Kathleen FIGUEROA RN    Second RN/Staff Member:   Selma FIGUEROA LPN

## 2024-09-22 NOTE — PLAN OF CARE
Problem: Wound  Goal: Optimal Coping  9/22/2024 0700 by Kathleen Mckay RN  Outcome: Progressing  9/22/2024 0634 by Kathleen Mckay RN  Outcome: Progressing  Goal: Optimal Functional Ability  9/22/2024 0700 by Kathleen Mckay RN  Outcome: Progressing  9/22/2024 0634 by Kathleen Mckay RN  Outcome: Progressing  Intervention: Optimize Functional Ability  Flowsheets (Taken 9/22/2024 0700)  Activity Management:   Arm raise - L1   Rolling - L1  Activity Assistance Provided: assistance, 2 people  Goal: Absence of Infection Signs and Symptoms  Outcome: Progressing  Goal: Skin Health and Integrity  Outcome: Progressing  Intervention: Optimize Skin Protection  Flowsheets (Taken 9/22/2024 0700)  Activity Management:   Arm raise - L1   Rolling - L1   Turned Q 2. Pillows and heel boots in place. Sacral dressing changed.

## 2024-09-23 PROBLEM — E43 SEVERE PROTEIN-CALORIE MALNUTRITION: Status: ACTIVE | Noted: 2022-10-17

## 2024-09-23 LAB
ANION GAP SERPL CALC-SCNC: 3 MMOL/L (ref 8–16)
BASOPHILS # BLD AUTO: 0.03 K/UL (ref 0–0.2)
BASOPHILS NFR BLD: 0.6 % (ref 0–1.9)
BUN SERPL-MCNC: 32 MG/DL (ref 10–30)
CALCIUM SERPL-MCNC: 8.7 MG/DL (ref 8.7–10.5)
CHLORIDE SERPL-SCNC: 106 MMOL/L (ref 95–110)
CO2 SERPL-SCNC: 29 MMOL/L (ref 23–29)
CREAT SERPL-MCNC: 1.9 MG/DL (ref 0.5–1.4)
DIFFERENTIAL METHOD BLD: ABNORMAL
EOSINOPHIL # BLD AUTO: 0.1 K/UL (ref 0–0.5)
EOSINOPHIL NFR BLD: 2.2 % (ref 0–8)
ERYTHROCYTE [DISTWIDTH] IN BLOOD BY AUTOMATED COUNT: 14.2 % (ref 11.5–14.5)
EST. GFR  (NO RACE VARIABLE): 32.9 ML/MIN/1.73 M^2
GLUCOSE SERPL-MCNC: 108 MG/DL (ref 70–110)
HCT VFR BLD AUTO: 29 % (ref 40–54)
HGB BLD-MCNC: 9.3 G/DL (ref 14–18)
IMM GRANULOCYTES # BLD AUTO: 0.01 K/UL (ref 0–0.04)
IMM GRANULOCYTES NFR BLD AUTO: 0.2 % (ref 0–0.5)
LYMPHOCYTES # BLD AUTO: 2 K/UL (ref 1–4.8)
LYMPHOCYTES NFR BLD: 39.3 % (ref 18–48)
MAGNESIUM SERPL-MCNC: 2.1 MG/DL (ref 1.6–2.6)
MCH RBC QN AUTO: 29.6 PG (ref 27–31)
MCHC RBC AUTO-ENTMCNC: 32.1 G/DL (ref 32–36)
MCV RBC AUTO: 92 FL (ref 82–98)
MONOCYTES # BLD AUTO: 0.5 K/UL (ref 0.3–1)
MONOCYTES NFR BLD: 10 % (ref 4–15)
NEUTROPHILS # BLD AUTO: 2.4 K/UL (ref 1.8–7.7)
NEUTROPHILS NFR BLD: 47.7 % (ref 38–73)
NRBC BLD-RTO: 0 /100 WBC
PLATELET # BLD AUTO: 169 K/UL (ref 150–450)
PMV BLD AUTO: 10.9 FL (ref 9.2–12.9)
POTASSIUM SERPL-SCNC: 5 MMOL/L (ref 3.5–5.1)
RBC # BLD AUTO: 3.14 M/UL (ref 4.6–6.2)
SODIUM SERPL-SCNC: 138 MMOL/L (ref 136–145)
WBC # BLD AUTO: 5.01 K/UL (ref 3.9–12.7)

## 2024-09-23 PROCEDURE — 12000002 HC ACUTE/MED SURGE SEMI-PRIVATE ROOM

## 2024-09-23 PROCEDURE — 97116 GAIT TRAINING THERAPY: CPT | Mod: CQ

## 2024-09-23 PROCEDURE — 36415 COLL VENOUS BLD VENIPUNCTURE: CPT | Performed by: NURSE PRACTITIONER

## 2024-09-23 PROCEDURE — 25000003 PHARM REV CODE 250: Performed by: NURSE PRACTITIONER

## 2024-09-23 PROCEDURE — 97535 SELF CARE MNGMENT TRAINING: CPT

## 2024-09-23 PROCEDURE — 80048 BASIC METABOLIC PNL TOTAL CA: CPT | Performed by: NURSE PRACTITIONER

## 2024-09-23 PROCEDURE — 85025 COMPLETE CBC W/AUTO DIFF WBC: CPT | Performed by: NURSE PRACTITIONER

## 2024-09-23 PROCEDURE — 25000003 PHARM REV CODE 250: Performed by: INTERNAL MEDICINE

## 2024-09-23 PROCEDURE — 97530 THERAPEUTIC ACTIVITIES: CPT

## 2024-09-23 PROCEDURE — 83735 ASSAY OF MAGNESIUM: CPT | Performed by: NURSE PRACTITIONER

## 2024-09-23 PROCEDURE — 25000003 PHARM REV CODE 250: Performed by: STUDENT IN AN ORGANIZED HEALTH CARE EDUCATION/TRAINING PROGRAM

## 2024-09-23 PROCEDURE — 63600175 PHARM REV CODE 636 W HCPCS: Performed by: INTERNAL MEDICINE

## 2024-09-23 RX ADMIN — ENOXAPARIN SODIUM 30 MG: 30 INJECTION SUBCUTANEOUS at 09:09

## 2024-09-23 RX ADMIN — TAMSULOSIN HYDROCHLORIDE 0.4 MG: 0.4 CAPSULE ORAL at 09:09

## 2024-09-23 RX ADMIN — HYDRALAZINE HYDROCHLORIDE 10 MG: 10 TABLET, FILM COATED ORAL at 09:09

## 2024-09-23 RX ADMIN — AMLODIPINE BESYLATE 10 MG: 5 TABLET ORAL at 05:09

## 2024-09-23 RX ADMIN — LEVETIRACETAM 250 MG: 250 TABLET, FILM COATED ORAL at 09:09

## 2024-09-23 RX ADMIN — HYDRALAZINE HYDROCHLORIDE 10 MG: 10 TABLET, FILM COATED ORAL at 05:09

## 2024-09-23 RX ADMIN — HYDRALAZINE HYDROCHLORIDE 10 MG: 10 TABLET, FILM COATED ORAL at 02:09

## 2024-09-23 RX ADMIN — CYANOCOBALAMIN TAB 1000 MCG 1000 MCG: 1000 TAB at 09:09

## 2024-09-23 RX ADMIN — FLUTICASONE PROPIONATE 100 MCG: 50 SPRAY, METERED NASAL at 09:09

## 2024-09-23 RX ADMIN — FAMOTIDINE 20 MG: 20 TABLET ORAL at 09:09

## 2024-09-23 NOTE — NURSING
Nurses Note -- 4 Eyes      9/23/2024   1:44 AM      Skin assessed during: Q Shift Change      [] No Altered Skin Integrity Present    []Prevention Measures Documented      [x] Yes- Altered Skin Integrity Present or Discovered   [] LDA Added if Not in Epic (Describe Wound)   [] New Altered Skin Integrity was Present on Admit and Documented in LDA   [] Wound Image Taken    Wound Care Consulted? Yes    Attending Nurse:  Anahi De Guzman RN/Staff Member:   Yesenia HUGHES

## 2024-09-23 NOTE — PT/OT/SLP PROGRESS
Physical Therapy Treatment    Patient Name:  Lavon Brandon   MRN:  [id number]    Recommendations:     Discharge Recommendations: Moderate Intensity Therapy (to low intensity, dependening on progress)  Discharge Equipment Recommendations: none  Barriers to discharge:  increased assist with mobility, decreased activity tolerance, balance deficits    Assessment:     Lavon Brandon is a 89 y.o. male admitted with a medical diagnosis of Severe sepsis.  He presents with the following impairments/functional limitations: weakness, impaired endurance, impaired functional mobility, impaired cognition, decreased safety awareness.    Pt agreeable to visit. Caregiver present. Pt required min assist x 2 for sit to stand transfer due to flexed posture and posterior lean requiring facilitation to weight shift anteriorly.    Pt ambulated 35' with min to mod assist x 1-2 persons with RW and verbal cuing for RW proximity and to widen base of support. Pt with slow pacing that decreased further as pt fatigued.    Rehab Prognosis: Fair; patient would benefit from acute skilled PT services to address these deficits and reach maximum level of function.    Recent Surgery: * No surgery found *      Plan:     During this hospitalization, patient to be seen 5 x/week to address the identified rehab impairments via gait training, therapeutic activities, therapeutic exercises and progress toward the following goals:    Plan of Care Expires:  10/14/24    Subjective     Chief Complaint: fatigue  Patient/Family Comments/goals: to get better  Pain/Comfort:  Pain Rating 1: other (see comments) (not rated)  Location - Orientation 1: medial  Location 1: back  Pain Addressed 1: Reposition, Distraction, Cessation of Activity  Pain Rating Post-Intervention 1: other (see comments) (not rated)      Objective:     Communicated with nurse prior to session.  Patient found up in chair with telemetry, peripheral IV, PureWick, bed alarm upon PT entry to room.      General Precautions: Standard, fall, seizure  Orthopedic Precautions: N/A  Braces: N/A  Respiratory Status: Room air     Functional Mobility:  Transfers:     Sit to Stand:  minimum assistance and of 2 persons with rolling walker  Gait: x 35' with min-modA x 1-2 and RW      AM-PAC 6 CLICK MOBILITY          Treatment & Education:  Pt educated on importance of time OOB, importance of intermittent mobility, safe techniques for transfers/ambulation, discharge recommendations/options, and use of call light for assistance and fall prevention.      Patient left up in chair with all lines intact, call button in reach, and chair alarm on..    GOALS:   Multidisciplinary Problems       Physical Therapy Goals          Problem: Physical Therapy    Goal Priority Disciplines Outcome Goal Variances Interventions   Physical Therapy Goal     PT, PT/OT Progressing     Description: Goals to be met by: 10/14/24     Patient will increase functional independence with mobility by performin. Supine to sit with MInimal Assistance  2. Sit to supine with MInimal Assistance  3. Sit to stand transfer with Minimal Assistance  4. Bed to chair transfer with Minimal Assistance using Rolling Walker  5. Gait  2 x 25 feet with Minimal Assistance using Rolling Walker.                          Time Tracking:     PT Received On: 24  PT Start Time: 1043     PT Stop Time: 1057  PT Total Time (min): 14 min     Billable Minutes: Gait Training 14    Treatment Type: Treatment  PT/PTA: PTA     Number of PTA visits since last PT visit: 4     2024

## 2024-09-23 NOTE — PT/OT/SLP PROGRESS
Occupational Therapy   Treatment    Name: Lavon Brandon  MRN: [id number]  Admitting Diagnosis:  Severe sepsis       Recommendations:     Discharge Recommendations: Moderate Intensity Therapy  Discharge Equipment Recommendations:  none  Barriers to discharge:   (Increased physical assistance with functional mobility and ADLs.)    Assessment:     Lavon Brandon is a 89 y.o. male with a medical diagnosis of Severe sepsis.  He presents with improving medical acuity and functional mobility. Patient participated in bed mobility, unsupported sitting EOB, LB dressing sitting in chair and ambulation using rolling walker. Performance deficits affecting function are weakness, impaired endurance, impaired self care skills, impaired functional mobility, gait instability, impaired balance, impaired cognition, decreased upper extremity function, decreased lower extremity function, decreased safety awareness.     Rehab Prognosis:  Fair; patient would benefit from acute skilled OT services to address these deficits and reach maximum level of function.       Plan:     Patient to be seen 5 x/week to address the above listed problems via self-care/home management, therapeutic activities, therapeutic exercises  Plan of Care Expires: 10/18/24  Plan of Care Reviewed with: patient, caregiver    Subjective     Chief Complaint: Confusion and general weakness  Patient/Family Comments/goals: Improved functional mobility and ADL independence.  Pain/Comfort:  Pain Rating 1: 0/10  Pain Rating Post-Intervention 1: 0/10    Objective:     Communicated with: nurse prior to session.  Patient found HOB elevated with telemetry, peripheral IV, PureWick upon OT entry to room.    General Precautions: Standard, fall, seizure    Orthopedic Precautions:N/A  Braces: N/A  Respiratory Status: Room air     Occupational Performance:     Bed Mobility:    Patient completed Scooting/Bridging with moderate assistance  Patient completed Supine to Sit with moderate  assistance   Performed unsupported sitting EOB with contact guard assistance.    Functional Mobility/Transfers:  Patient completed Sit <> Stand Transfer with moderate assistance  with  rolling walker   Functional Mobility: ambulated 15 feet in the hospital room with moderate assistance using rolling walker.    Activities of Daily Living:  Lower Body Dressing: maximal assistance to don/doff socks sitting in chair.      [facility] 6 Click ADL:      Treatment & Education:  Patient's private caregiver present throughout session. Patient instructed to sit up in chair for at least 2 hours.    Patient left up in chair with all lines intact, call button in reach, and chair alarm on    GOALS:   Multidisciplinary Problems       Occupational Therapy Goals          Problem: Occupational Therapy    Goal Priority Disciplines Outcome Interventions   Occupational Therapy Goal     OT, PT/OT Progressing    Description: Goals to be met by: 10/18/2024     Patient will increase functional independence with ADLs by performing:    UE Dressing with Stand-by Assistance.  LE Dressing with Stand-by Assistance.  Grooming while seated with Stand-by Assistance.  Toileting from toilet with Stand-by Assistance for hygiene and clothing management.   Supine to sit with Stand-by Assistance.  Toilet transfer to toilet with Stand-by Assistance.  Perform 30 minutes sitting/standing ADL activity with no LOB.                         Time Tracking:     OT Date of Treatment: 09/23/24  OT Start Time: 1013  OT Stop Time: 1037  OT Total Time (min): 24 min    Billable Minutes:Self Care/Home Management 12  Therapeutic Activity 12    OT/KIMMY: OT     Number of KIMMY visits since last OT visit: 0    9/23/2024

## 2024-09-23 NOTE — CONSULTS
RN entered room and sitter at bedside. Patient alert and in good mood. Able to roll with RN's assistance to view sacrum. Pictures as follows.     Sacrum cleaned. Triad and foam applied. Patient on waffle mattress.

## 2024-09-23 NOTE — ASSESSMENT & PLAN NOTE
Malnutrition Type:  Context: chronic illness  Level: severe    Related to (etiology):   Altered skin integrity ; dementia    Signs and Symptoms (as evidenced by):   Chronic DTI and stage 2 wounds to sacrum; > 5% weight loss in 1 month; moderate fat depletion and mild muscle wasting meeting AAIM criteria.    Malnutrition Characteristic Summary:  Weight Loss (Malnutrition): greater than 5% in 1 month  Subcutaneous Fat (Malnutrition): moderate depletion  Muscle Mass (Malnutrition): mild depletion      Interventions/Recommendations (treatment strategy):  1. Continue Cardiac diet as tolerated. 2. Recommend and ordered Ensure +HP vanilla flavor with meals.    Nutrition Diagnosis Status:   Continues

## 2024-09-23 NOTE — PROGRESS NOTES
[facility]  Adult Nutrition   Progress Note (Initial Assessment)     SUMMARY     Recommendations  Recommendation/Intervention: 1. Continue Cardiac diet as tolerated. 2. Recommend and ordered Ensure +HP vanilla flavor with meals.  Goals: 1. Intake to remain >?+ 75% by follow up.  Communication of RD Recs: other (comment) (discussed with nurse assistant at bedside with caregiver.)    Nutrition Diagnosis PES Statement:   Malnutrition Type:  Context: chronic illness  Level: severe     Related to (etiology):   Altered skin integrity ; dementia     Signs and Symptoms (as evidenced by):   Chronic DTI and stage 2 wounds to sacrum; > 5% weight loss in 1 month; moderate fat depletion and mild muscle wasting meeting AAIM criteria.     Malnutrition Characteristic Summary:  Weight Loss (Malnutrition): greater than 5% in 1 month  Subcutaneous Fat (Malnutrition): moderate depletion  Muscle Mass (Malnutrition): mild depletion        Interventions/Recommendations (treatment strategy):  1. Continue Cardiac diet as tolerated. 2. Recommend and ordered Ensure +HP vanilla flavor with meals.     Nutrition Diagnosis Status:   Continues    Dietitian Rounds Brief  RD screen for LOS. Patient admit for AMS eating 100% of his meals per nursing. Noted seen by WC RN and MD for chronic wound status. Family agrees to Ensure + HP and patient states he likes vanilla. Patient MST 2, unsure, weight loss per Epic 8%, 12 lbs in 1 month. Per visual patient with mild muscle wasting and per WC photos and visual, pt with moderate fat loss.RD to follow for intake and status change PRN.    Nutrition Related Social Determinants of Health: SDOH: None Identified    Malnutrition Assessment  Malnutrition Context: chronic illness  Malnutrition Level: severe  Skin (Micronutrient): wounds unhealed (DTI with stage 2 pressure ulcer of the sacrum)       Weight Loss (Malnutrition): greater than 5% in 1 month  Subcutaneous Fat (Malnutrition): moderate  "depletion  Muscle Mass (Malnutrition): mild depletion   Orbital Region (Subcutaneous Fat Loss): mild depletion  Upper Arm Region (Subcutaneous Fat Loss): mild depletion   [place] Region (Muscle Loss): moderate depletion  Clavicle Bone Region (Muscle Loss): moderate depletion  Dorsal Hand (Muscle Loss): mild depletion  Posterior Calf Region (Muscle Loss): moderate depletion                 Diet order:   Current Diet Order: Cardiac diet                 Evaluation of Received Nutrient/Fluid Intake  Energy Calories Required: meeting needs  Protein Required: meeting needs  Fluid Required: meeting needs  Tolerance: tolerating     % Intake of Estimated Energy Needs: 75 - 100 %  % Meal Intake: 75 - 100 %      Intake/Output Summary (Last 24 hours) at 9/23/2024 1626  Last data filed at 9/23/2024 1023  Gross per 24 hour   Intake 300 ml   Output 1150 ml   Net -850 ml        Anthropometrics  Temp: 98.2 °F (36.8 °C)  Height Method: Stated  Height: 5' 10" (177.8 cm)  Height (inches): 70 in  Weight Method: Bed Scale  Weight: 66 kg (145 lb 8.1 oz)  Weight (lb): 145.51 lb  Ideal Body Weight (IBW), Male: 166 lb  % Ideal Body Weight, Male (lb): 87.66 %  BMI (Calculated): 20.9  BMI Grade: 18.5-24.9 - normal       Estimated/Assessed Needs  Weight Used For Calorie Calculations: 66 kg (145 lb 8.1 oz)  Energy Calorie Requirements (kcal): 2067-3288 kcal/day (25-30 kcal/kg)     Protein Requirements: 52-79 gm/day (0.8-1.2 gm/kg)  Weight Used For Protein Calculations: 66 kg (145 lb 8.1 oz)     Estimated Fluid Requirement Method: RDA Method  RDA Method (mL): 1650       Reason for Assessment  Reason For Assessment: length of stay  Diagnosis: infection/sepsis  Relevant Medical History: hypertension, hyperlipidemia, CVA, PE, Seizure, UTI and dementia  Interdisciplinary Rounds: did not attend  General Information Comments: Patient admit with altered mental status who presents to the emergency room with reports of abdominal and back pain and " hallucinations. Also decreased bowel movements over last 2-4 days. He was recently diagnosed with UTI and started on augmentin. (per H&P note).  Nutrition Discharge Planning: Cardiac diet with Ensure TID    Nutrition/Diet History  Spiritual, Cultural Beliefs, Jain Practices, Values that Affect Care: no  Food Allergies: NKFA  Factors Affecting Nutritional Intake: None identified at this time    Nutrition Risk Screen  Nutrition Risk Screen: large or nonhealing wound, burn or pressure injury (large sacral wound, not open)     MST Score: 2  Have you recently lost weight without trying?: Unsure  Weight loss score: 2  Have you been eating poorly because of a decreased appetite?: No  Appetite score: 0       Weight History:  Wt Readings from Last 10 Encounters:   09/13/24 66 kg (145 lb 8.1 oz)   08/27/24 71.2 kg (157 lb)   06/23/24 70.8 kg (156 lb 1.6 oz)   05/28/24 80.7 kg (178 lb)   02/17/24 80.8 kg (178 lb 2.1 oz)   12/04/23 78.5 kg (173 lb 1 oz)   11/01/23 70.7 kg (155 lb 13.8 oz)   06/23/23 82.6 kg (182 lb 1.6 oz)   04/17/23 80.1 kg (176 lb 9.4 oz)   04/14/23 80.7 kg (177 lb 14.6 oz)        Lab/Procedures/Meds: Pertinent Labs/Meds Reviewed    Medications:Pertinent Medications Reviewed  Scheduled Meds:   amLODIPine  10 mg Oral Daily before evening meal    cyanocobalamin  1,000 mcg Oral Daily    enoxparin  30 mg Subcutaneous Q24H    famotidine  20 mg Oral Daily    fluticasone propionate  2 spray Each Nostril Daily    hydrALAZINE  10 mg Oral Q8H    levETIRAcetam  250 mg Oral BID    tamsulosin  0.4 mg Oral Daily     Continuous Infusions:  PRN Meds:.  Current Facility-Administered Medications:     acetaminophen, 650 mg, Oral, Q4H PRN    bisacodyL, 10 mg, Rectal, Daily PRN    magnesium oxide, 800 mg, Oral, PRN    magnesium oxide, 800 mg, Oral, PRN    ondansetron, 4 mg, Intravenous, Q6H PRN    potassium bicarbonate, 35 mEq, Oral, PRN    potassium bicarbonate, 50 mEq, Oral, PRN    potassium bicarbonate, 60 mEq, Oral,  "PRN    potassium, sodium phosphates, 2 packet, Oral, PRN    potassium, sodium phosphates, 2 packet, Oral, PRN    potassium, sodium phosphates, 2 packet, Oral, PRN    sodium chloride 0.9%, 10 mL, Intravenous, PRN    Labs: Pertinent Labs Reviewed  Clinical Chemistry:  Recent Labs   Lab 09/23/24  0527      K 5.0      CO2 29      BUN 32*   CREATININE 1.9*   CALCIUM 8.7   ANIONGAP 3*   MG 2.1     CBC:   Recent Labs   Lab 09/23/24  0527   WBC 5.01   RBC 3.14*   HGB 9.3*   HCT 29.0*      MCV 92   MCH 29.6   MCHC 32.1     Lipid Panel:  No results for input(s): "CHOL", "HDL", "LDLCALC", "TRIG", "CHOLHDL" in the last 168 hours.  Cardiac Profile:  No results for input(s): "BNP", "CPK", "CPKMB", "TROPONINI", "CKTOTAL" in the last 168 hours.  Inflammatory Labs:  No results for input(s): "CRP" in the last 168 hours.  Diabetes:  No results for input(s): "HGBA1C", "POCTGLUCOSE" in the last 168 hours.  Thyroid & Parathyroid:  No results for input(s): "TSH", "FREET4", "E1MRCLX", "E2GUNII", "THYROIDAB" in the last 168 hours.    Monitor and Evaluation  Food and Nutrient Intake: energy intake, food and beverage intake  Food and Nutrient Adminstration: diet order  Knowledge/Beliefs/Attitudes: food and nutrition knowledge/skill  Physical Activity and Function: nutrition-related ADLs and IADLs  Anthropometric Measurements: weight change, body mass index, weight  Biochemical Data, Medical Tests and Procedures: gastrointestinal profile, glucose/endocrine profile, electrolyte and renal panel, inflammatory profile, lipid profile  Nutrition-Focused Physical Findings: overall appearance     Nutrition Risk  Level of Risk/Frequency of Follow-up:  (2 times / week)     Nutrition Follow-Up  RD Follow-up?: Yes            "

## 2024-09-23 NOTE — PLAN OF CARE
DOM contacted  Mariel SIMPSON ([phone]) Per Mariel auth is still pending but she updated the notes that [facility] has accepted; DOM contacted Sharon to give her auth status update; Per Sharon they are getting full and once auth is approved she will be able to see where they at with admission. DOM will continue to follow    09/23/24 1035   Post-Acute Status   Post-Acute Authorization Placement

## 2024-09-23 NOTE — SUBJECTIVE & OBJECTIVE
Interval History: more alert today.  Pleasantly confused.    Review of Systems   Respiratory:  Negative for shortness of breath.    Cardiovascular:  Negative for chest pain.   Gastrointestinal:  Negative for abdominal pain and nausea.     Objective:     Vital Signs (Most Recent):  Temp: 98.2 °F (36.8 °C) (09/23/24 1140)  Pulse: 60 (09/23/24 1140)  Resp: 18 (09/23/24 1140)  BP: (!) 115/53 (09/23/24 1140)  SpO2: 100 % (09/23/24 1140) Vital Signs (24h Range):  Temp:  [97.8 °F (36.6 °C)-98.5 °F (36.9 °C)] 98.2 °F (36.8 °C)  Pulse:  [50-60] 60  Resp:  [16-18] 18  SpO2:  [96 %-100 %] 100 %  BP: (115-140)/(53-68) 115/53     Weight: 66 kg (145 lb 8.1 oz)  Body mass index is 20.88 kg/m².    Intake/Output Summary (Last 24 hours) at 9/23/2024 1623  Last data filed at 9/23/2024 1023  Gross per 24 hour   Intake 300 ml   Output 1150 ml   Net -850 ml         Physical Exam  Vitals and nursing note reviewed.   Constitutional:       General: He is not in acute distress.  Cardiovascular:      Rate and Rhythm: Normal rate and regular rhythm.   Pulmonary:      Effort: Pulmonary effort is normal. No respiratory distress.      Breath sounds: Normal breath sounds.   Abdominal:      Palpations: Abdomen is soft.      Tenderness: There is no abdominal tenderness.   Musculoskeletal:      Right lower leg: No edema.      Left lower leg: No edema.   Skin:     General: Skin is warm and dry.   Neurological:      Mental Status: He is alert. Mental status is at baseline.      Comments: Oriented to self and place    Psychiatric:         Mood and Affect: Mood normal.         Behavior: Behavior normal.             Significant Labs: All pertinent labs within the past 24 hours have been reviewed.  CBC:   Recent Labs   Lab 09/22/24  0751 09/23/24 0527   WBC 5.91 5.01   HGB 9.5* 9.3*   HCT 30.4* 29.0*    169     CMP:   Recent Labs   Lab 09/22/24  0751 09/23/24 0527    138   K 5.0 5.0    106   CO2 27 29   GLU 90 108   BUN 29 32*    CREATININE 2.0* 1.9*   CALCIUM 8.5* 8.7   ANIONGAP 4* 3*       Significant Imaging: I have reviewed all pertinent imaging results/findings within the past 24 hours.

## 2024-09-23 NOTE — PROGRESS NOTES
Addended by: LARRY AGUILAR on: 5/26/2020 01:59 PM     Modules accepted: Orders     [facility] Medicine  Progress Note    Patient Name: Lavon Brandon  MRN: [id number]  Patient Class: IP- Inpatient   Admission Date: 9/13/2024  Length of Stay: 9 days  Attending Physician: Milind Walker, *  Primary Care Provider: Aristides Haynes MD        Subjective:     Principal Problem:Severe sepsis        HPI:  89 year old male with a past medical history of hypertension, hyperlipidemia, CVA, PE, Seizure, UTI and dementia who presents to the emergency room with reports of abdominal and back pain and hallucinations. Also decreased bowel movements over last 2-4 days. He was recently diagnosed with UTI and started on augmentin. Per urine culture sensitivity, organism is resistant to Augmentin, sensitive to Rocephin.  Hypertensive in the ER at 212/94, reports did not take his home medication this morning.  WBC 9.56, mild MATA with serum creatinine 2.0, BUN 34, UA negative for leukocytes and nitrites.  Blood culture sent, lactic 2.39.  CT abdomen with trace left pleural effusion, large left renal cyst, CT head no acute findings, chest x-ray Blunting of the left costophrenic angle. IV rocephin given in ER with Hydralazine, amlodipine,  ml.  Admit to hospital medicine for further medical management.  The patient did have some urinary retention, a Draper catheter was placed with adequate urine output.  EEG pending.  Urine culture with no growth to date.  Patient's mental status continued to slowly improve.  He has tolerated p.o. diet.  PT and OT consulted for evaluation    Overview/Hospital Course:  89 year old hospitalized for UTI, AMS, severe sepsis. In Er, Lactate 2.39, BUN 3, Sr Cr 2, recently diagnosed with UTI, treated with oral Augmentin. However, the urine sensitivity was resistant to Augmentin, sensitive to Rocephin. Therefore, rocephin was started in the ER and continued during admission. Patient was afebrile, no leukocytosis.  Lactic acid improved with IV antibiotics and  fluid resuscitation.  Renal function stable at 2.1, this is the patient's baseline renal function per historical records.  Repeat urine cultures continued to show no growth to date however patient maintained on Rocephin 1 g daily.  Patient continued to have waxing and waning mental status.  Neurology was consulted.  CT head was negative for acute findings.  An EEG was obtained which was negative for epileptiform activity.  Patient's Keppra dose was decreased per Neurology recs.  Patient's mental status continued to improve.  PT and OT was consulted for evaluation with recommendations for moderate intensity therapy made.  CM was consulted for SNF placement, awaiting authorization.      Interval History: more alert today.  Pleasantly confused.    Review of Systems   Respiratory:  Negative for shortness of breath.    Cardiovascular:  Negative for chest pain.   Gastrointestinal:  Negative for abdominal pain and nausea.     Objective:     Vital Signs (Most Recent):  Temp: 98.2 °F (36.8 °C) (09/23/24 1140)  Pulse: 60 (09/23/24 1140)  Resp: 18 (09/23/24 1140)  BP: (!) 115/53 (09/23/24 1140)  SpO2: 100 % (09/23/24 1140) Vital Signs (24h Range):  Temp:  [97.8 °F (36.6 °C)-98.5 °F (36.9 °C)] 98.2 °F (36.8 °C)  Pulse:  [50-60] 60  Resp:  [16-18] 18  SpO2:  [96 %-100 %] 100 %  BP: (115-140)/(53-68) 115/53     Weight: 66 kg (145 lb 8.1 oz)  Body mass index is 20.88 kg/m².    Intake/Output Summary (Last 24 hours) at 9/23/2024 1623  Last data filed at 9/23/2024 1023  Gross per 24 hour   Intake 300 ml   Output 1150 ml   Net -850 ml         Physical Exam  Vitals and nursing note reviewed.   Constitutional:       General: He is not in acute distress.  Cardiovascular:      Rate and Rhythm: Normal rate and regular rhythm.   Pulmonary:      Effort: Pulmonary effort is normal. No respiratory distress.      Breath sounds: Normal breath sounds.   Abdominal:      Palpations: Abdomen is soft.      Tenderness: There is no abdominal  tenderness.   Musculoskeletal:      Right lower leg: No edema.      Left lower leg: No edema.   Skin:     General: Skin is warm and dry.   Neurological:      Mental Status: He is alert. Mental status is at baseline.      Comments: Oriented to self and place    Psychiatric:         Mood and Affect: Mood normal.         Behavior: Behavior normal.             Significant Labs: All pertinent labs within the past 24 hours have been reviewed.  CBC:   Recent Labs   Lab 09/22/24  0751 09/23/24  0527   WBC 5.91 5.01   HGB 9.5* 9.3*   HCT 30.4* 29.0*    169     CMP:   Recent Labs   Lab 09/22/24  0751 09/23/24  0527    138   K 5.0 5.0    106   CO2 27 29   GLU 90 108   BUN 29 32*   CREATININE 2.0* 1.9*   CALCIUM 8.5* 8.7   ANIONGAP 4* 3*       Significant Imaging: I have reviewed all pertinent imaging results/findings within the past 24 hours.    Assessment/Plan:      * Severe sepsis  resolved    Urinary retention   ml  Draper cath was placed --- has since been d/c'd and patient voiding well  Start flomax        Pleural effusion on left  Patient found to have  trace  pleural effusion on imaging. I have personally reviewed and interpreted the following imaging: Xray. A thoracentesis was deferred. Most likely etiology includes  CDK st III . Management to include Diuresis      Encephalopathy, metabolic  History of dementia  Likely related to UTI, treated with rocephin   Monitor neuro checks  Check keppra level -WNL  EEG negative for epileptiform activity   Neurology consulted  Ammonia, Vit B12/6/1 reviewed   Mentation likely at baseline    UTI (urinary tract infection)  Urine culture NGTD  Completed IV rocephin x 7 days-completed         Seizure disorder  neurology consulted  Continue keppra, dose adjusted per Neuro to 250mg BID  EEG without epileptiform seizure activity noted  Seizure precautions         Debility  Patient with Acute debility due to age-related physical debility. Latest [facility] and [facility]  scores have not been reviewed. Evaluation for etiology is underway. Plan includes PT/OT consulted and fall precautions in place.   Plans for discharge to SNF    Chronic renal disease, stage IV  Creatine stable for now. BMP reviewed- noted Estimated Creatinine Clearance: 23.6 mL/min (A) (based on SCr of 1.9 mg/dL (H)). according to latest data. Based on current GFR, CKD stage is stage 3 - GFR 30-59.  Monitor UOP and serial BMP and adjust therapy as needed. Renally dose meds. Avoid nephrotoxic medications and procedures.    Constipation  Resolved  Continue stool softeners daily      VTE Risk Mitigation (From admission, onward)           Ordered     enoxaparin injection 30 mg  Every 24 hours (non-standard times)         09/13/24 1848     IP VTE HIGH RISK PATIENT  Once         09/13/24 1455     Place sequential compression device  Until discontinued         09/13/24 1455                    Discharge Planning   JOANNE: 9/24/2024     Code Status: DNR   Is the patient medically ready for discharge?:     Reason for patient still in hospital (select all that apply): Patient trending condition and Pending disposition  Discharge Plan A: Skilled Nursing Facility   Discharge Delays: None known at this time              VANE Desir  Department of Hospital Medicine   Formerly [facility]

## 2024-09-23 NOTE — PLAN OF CARE
Problem: Malnutrition, severe  Goal: Improved Nutritional Intake  Intervention: Promote and Optimize Oral Intake  Flowsheets (Taken 9/23/2024 1634)  Nutrition Interventions:   supplemental drinks provided   food preferences provided

## 2024-09-23 NOTE — ASSESSMENT & PLAN NOTE
History of dementia  Likely related to UTI, treated with rocephin   Monitor neuro checks  Check keppra level -WNL  EEG negative for epileptiform activity   Neurology consulted  Ammonia, Vit B12/6/1 reviewed   Mentation likely at baseline

## 2024-09-24 LAB
ANION GAP SERPL CALC-SCNC: 4 MMOL/L (ref 8–16)
BASOPHILS # BLD AUTO: 0.03 K/UL (ref 0–0.2)
BASOPHILS NFR BLD: 0.5 % (ref 0–1.9)
BUN SERPL-MCNC: 36 MG/DL (ref 10–30)
CALCIUM SERPL-MCNC: 8.3 MG/DL (ref 8.7–10.5)
CHLORIDE SERPL-SCNC: 106 MMOL/L (ref 95–110)
CO2 SERPL-SCNC: 27 MMOL/L (ref 23–29)
CREAT SERPL-MCNC: 1.9 MG/DL (ref 0.5–1.4)
DIFFERENTIAL METHOD BLD: ABNORMAL
EOSINOPHIL # BLD AUTO: 0.1 K/UL (ref 0–0.5)
EOSINOPHIL NFR BLD: 2.1 % (ref 0–8)
ERYTHROCYTE [DISTWIDTH] IN BLOOD BY AUTOMATED COUNT: 14.2 % (ref 11.5–14.5)
EST. GFR  (NO RACE VARIABLE): 32.9 ML/MIN/1.73 M^2
GLUCOSE SERPL-MCNC: 102 MG/DL (ref 70–110)
HCT VFR BLD AUTO: 26.8 % (ref 40–54)
HGB BLD-MCNC: 8.6 G/DL (ref 14–18)
IMM GRANULOCYTES # BLD AUTO: 0.01 K/UL (ref 0–0.04)
IMM GRANULOCYTES NFR BLD AUTO: 0.2 % (ref 0–0.5)
LYMPHOCYTES # BLD AUTO: 2 K/UL (ref 1–4.8)
LYMPHOCYTES NFR BLD: 34.4 % (ref 18–48)
MAGNESIUM SERPL-MCNC: 2.1 MG/DL (ref 1.6–2.6)
MCH RBC QN AUTO: 29.9 PG (ref 27–31)
MCHC RBC AUTO-ENTMCNC: 32.1 G/DL (ref 32–36)
MCV RBC AUTO: 93 FL (ref 82–98)
MONOCYTES # BLD AUTO: 0.6 K/UL (ref 0.3–1)
MONOCYTES NFR BLD: 11.1 % (ref 4–15)
NEUTROPHILS # BLD AUTO: 2.9 K/UL (ref 1.8–7.7)
NEUTROPHILS NFR BLD: 51.7 % (ref 38–73)
NRBC BLD-RTO: 0 /100 WBC
PLATELET # BLD AUTO: 161 K/UL (ref 150–450)
PMV BLD AUTO: 10.9 FL (ref 9.2–12.9)
POTASSIUM SERPL-SCNC: 5 MMOL/L (ref 3.5–5.1)
RBC # BLD AUTO: 2.88 M/UL (ref 4.6–6.2)
SODIUM SERPL-SCNC: 137 MMOL/L (ref 136–145)
WBC # BLD AUTO: 5.69 K/UL (ref 3.9–12.7)

## 2024-09-24 PROCEDURE — 97530 THERAPEUTIC ACTIVITIES: CPT | Mod: CQ

## 2024-09-24 PROCEDURE — 25000003 PHARM REV CODE 250: Performed by: INTERNAL MEDICINE

## 2024-09-24 PROCEDURE — 63600175 PHARM REV CODE 636 W HCPCS: Performed by: INTERNAL MEDICINE

## 2024-09-24 PROCEDURE — 25000003 PHARM REV CODE 250: Performed by: STUDENT IN AN ORGANIZED HEALTH CARE EDUCATION/TRAINING PROGRAM

## 2024-09-24 PROCEDURE — 36415 COLL VENOUS BLD VENIPUNCTURE: CPT | Performed by: NURSE PRACTITIONER

## 2024-09-24 PROCEDURE — 25000003 PHARM REV CODE 250: Performed by: NURSE PRACTITIONER

## 2024-09-24 PROCEDURE — 83735 ASSAY OF MAGNESIUM: CPT | Performed by: NURSE PRACTITIONER

## 2024-09-24 PROCEDURE — 85025 COMPLETE CBC W/AUTO DIFF WBC: CPT | Performed by: NURSE PRACTITIONER

## 2024-09-24 PROCEDURE — 97530 THERAPEUTIC ACTIVITIES: CPT

## 2024-09-24 PROCEDURE — 80048 BASIC METABOLIC PNL TOTAL CA: CPT | Performed by: NURSE PRACTITIONER

## 2024-09-24 PROCEDURE — 12000002 HC ACUTE/MED SURGE SEMI-PRIVATE ROOM

## 2024-09-24 RX ORDER — TAMSULOSIN HYDROCHLORIDE 0.4 MG/1
0.4 CAPSULE ORAL DAILY
Qty: 30 CAPSULE | Refills: 0 | Status: SHIPPED | OUTPATIENT
Start: 2024-09-25 | End: 2025-09-25

## 2024-09-24 RX ORDER — LEVETIRACETAM 500 MG/1
250 TABLET ORAL 2 TIMES DAILY
Qty: 90 TABLET | Refills: 3 | Status: SHIPPED | OUTPATIENT
Start: 2024-09-24 | End: 2025-09-24

## 2024-09-24 RX ADMIN — HYDRALAZINE HYDROCHLORIDE 10 MG: 10 TABLET, FILM COATED ORAL at 05:09

## 2024-09-24 RX ADMIN — LEVETIRACETAM 250 MG: 250 TABLET, FILM COATED ORAL at 09:09

## 2024-09-24 RX ADMIN — CYANOCOBALAMIN TAB 1000 MCG 1000 MCG: 1000 TAB at 09:09

## 2024-09-24 RX ADMIN — TAMSULOSIN HYDROCHLORIDE 0.4 MG: 0.4 CAPSULE ORAL at 09:09

## 2024-09-24 RX ADMIN — HYDRALAZINE HYDROCHLORIDE 10 MG: 10 TABLET, FILM COATED ORAL at 09:09

## 2024-09-24 RX ADMIN — ENOXAPARIN SODIUM 30 MG: 30 INJECTION SUBCUTANEOUS at 09:09

## 2024-09-24 RX ADMIN — FAMOTIDINE 20 MG: 20 TABLET ORAL at 09:09

## 2024-09-24 RX ADMIN — FLUTICASONE PROPIONATE 100 MCG: 50 SPRAY, METERED NASAL at 09:09

## 2024-09-24 RX ADMIN — HYDRALAZINE HYDROCHLORIDE 10 MG: 10 TABLET, FILM COATED ORAL at 03:09

## 2024-09-24 RX ADMIN — ACETAMINOPHEN 650 MG: 325 TABLET ORAL at 01:09

## 2024-09-24 RX ADMIN — AMLODIPINE BESYLATE 10 MG: 5 TABLET ORAL at 05:09

## 2024-09-24 NOTE — PLAN OF CARE
[facility] and [facility]iel with [facility] notified    09/24/24 6531   Post-Acute Status   Post-Acute Authorization Home Health   Home Health Status Referrals Sent   Discharge Plan   Discharge Plan A Home Health   Discharge Plan B Home Health

## 2024-09-24 NOTE — NURSING
Nurses Note -- 4 Eyes      9/24/2024   2:02 AM      Skin assessed during: Q Shift Change      [] No Altered Skin Integrity Present    []Prevention Measures Documented      [x] Yes- Altered Skin Integrity Present or Discovered   [] LDA Added if Not in Epic (Describe Wound)   [] New Altered Skin Integrity was Present on Admit and Documented in LDA   [] Wound Image Taken    Wound Care Consulted? Yes    Attending Nurse:  Alfonso De Guzman RN/Staff Member:   Yesenia

## 2024-09-24 NOTE — PT/OT/SLP PROGRESS
Occupational Therapy   Treatment    Name: Lavon Brandon  MRN: [id number]  Admitting Diagnosis:  Severe sepsis       Recommendations:     Discharge Recommendations: Moderate Intensity Therapy  Discharge Equipment Recommendations:  none  Barriers to discharge:   (Increased physical assistance with functional mobility and ADLs.)    Assessment:     Lavon Brandon is a 89 y.o. male with a medical diagnosis of Severe sepsis.  He presents with general weakness. Patient participated in bed mobility and chair to bed transfer using rolling walker. Performance deficits affecting function are weakness, impaired endurance, impaired self care skills, impaired functional mobility, gait instability, impaired balance, decreased safety awareness, decreased lower extremity function, decreased upper extremity function.     Rehab Prognosis:  Fair; patient would benefit from acute skilled OT services to address these deficits and reach maximum level of function.       Plan:     Patient to be seen 5 x/week to address the above listed problems via self-care/home management, therapeutic activities, therapeutic exercises  Plan of Care Expires: 10/18/24  Plan of Care Reviewed with: patient, caregiver    Subjective     Chief Complaint: General weakness  Patient/Family Comments/goals: Improved functional mobility and Adl independence.  Pain/Comfort:  Pain Rating 1: 0/10  Pain Rating Post-Intervention 1: 0/10    Objective:     Communicated with: nurse prior to session.  Patient found up in chair with telemetry, peripheral IV, PureWick upon OT entry to room.    General Precautions: Standard, fall, seizure    Orthopedic Precautions:N/A  Braces: N/A  Respiratory Status: Room air     Occupational Performance:     Bed Mobility:    Patient completed Sit to Supine with moderate assistance     Functional Mobility/Transfers:  Patient completed Bed <> Chair Transfer using Step Transfer technique with moderate assistance with hand-held assist and rolling  walker  Functional Mobility: ambulated 5 feet using rolling walker with moderate assistance.    [facility] 6 Click ADL: 14    Treatment & Education:  Patient reports back soreness from sitting in recliner after Physical Therapy session. Patient elected to transfer back to bed.    Patient left HOB elevated with all lines intact, call button in reach, bed alarm on, and caregiver present    GOALS:   Multidisciplinary Problems       Occupational Therapy Goals          Problem: Occupational Therapy    Goal Priority Disciplines Outcome Interventions   Occupational Therapy Goal     OT, PT/OT Progressing    Description: Goals to be met by: 10/18/2024     Patient will increase functional independence with ADLs by performing:    UE Dressing with Stand-by Assistance.  LE Dressing with Stand-by Assistance.  Grooming while seated with Stand-by Assistance.  Toileting from toilet with Stand-by Assistance for hygiene and clothing management.   Supine to sit with Stand-by Assistance.  Toilet transfer to toilet with Stand-by Assistance.  Perform 30 minutes sitting/standing ADL activity with no LOB.                         Time Tracking:     OT Date of Treatment: 09/24/24  OT Start Time: 1047  OT Stop Time: 1057  OT Total Time (min): 10 min    Billable Minutes:Therapeutic Activity 10    OT/KIMMY: OT     Number of KIMMY visits since last OT visit: 0    9/24/2024

## 2024-09-24 NOTE — PLAN OF CARE
SW spoke to Pt daughter Gabriela in regards to dc plan after [facility] re-reviewed  and still not able to accept. Per Pt daughter the Pt needs skilled and she cannot care for him by herself Pt daughter is agreeable to SNF at [facility] referral sent and Aydee notified    09/24/24 0948   Post-Acute Status   Post-Acute Authorization Placement   Post-Acute Placement Status Referrals Sent   Discharge Delays None known at this time   Discharge Plan   Discharge Plan A Skilled Nursing Facility   Discharge Plan B Home Health

## 2024-09-24 NOTE — PLAN OF CARE
8:58am-  left message for Sharon  at  [facility] [phone]     DANIEL spoke with Sharon at  Per Sharon the DON has decided to take there acceptance back and will no longer accept Pt for SNF. DOM contacted Pt daughter Gabriela in regards to dc plan and what  said. Per Pt daughter she would like to keep Pt in [place] does not want him to go way to [place] and will take Pt home with HH with [facility] since SNF is not a option in [place]. MD notified      of [place] currently reviewing Pt one more time in regards to SNF placement. Per Gretchen she will have a answer for us within a hour.   09/24/24 0858   Post-Acute Status   Post-Acute Authorization Placement

## 2024-09-24 NOTE — PT/OT/SLP PROGRESS
Physical Therapy Treatment    Patient Name:  Lavon Brandon   MRN:  [id number]    Recommendations:     Discharge Recommendations: Moderate Intensity Therapy (to low intensity, dependening on progress)  Discharge Equipment Recommendations: none  Barriers to discharge:  increased assist with mobility, decreased activity tolerance, balance deficits    Assessment:     Lavon Brandon is a 89 y.o. male admitted with a medical diagnosis of Severe sepsis.  He presents with the following impairments/functional limitations: weakness, impaired endurance, impaired functional mobility, impaired cognition, decreased safety awareness.    Pt sleeping with HOB elevated. Pt easily roused and agreeable to visit. Pt declined wanting to ambulate today due to fatigue but agreeable to sit up.    Pt required mod assist for supine to sit transfer. Pt required min to mod assist x 2 for sit to stand transfer due to posterior lean and narrow NICHOL. Extra time in standing to widen NICHOL and weight shift anteriorly with min to mod assist x 2 persons with verbal and tactile cuing.    Pt took steps to chair with RW and min to mod assist x 2 persons, verbal and tactile cuing to facilitate weight shifting and directional changes.    Pt left up in chair with caregiver present.    Rehab Prognosis: Fair; patient would benefit from acute skilled PT services to address these deficits and reach maximum level of function.    Recent Surgery: * No surgery found *      Plan:     During this hospitalization, patient to be seen 5 x/week to address the identified rehab impairments via gait training, therapeutic activities, therapeutic exercises and progress toward the following goals:    Plan of Care Expires:  10/14/24    Subjective     Chief Complaint: fatigue  Patient/Family Comments/goals: to get better  Pain/Comfort:  Pain Rating 1: 0/10      Objective:     Communicated with nurse prior to session.  Patient found HOB elevated with telemetry, peripheral IV,  santo catheter, bed alarm upon PT entry to room.     General Precautions: Standard, fall, seizure  Orthopedic Precautions: N/A  Braces: N/A  Respiratory Status: Room air     Functional Mobility:  Bed Mobility:     Supine to Sit: moderate assistance  Transfers:     Sit to Stand:  minimum assistance, moderate assistance, and of 2 persons with rolling walker  Gait: steps to chair with min to mod assist x 2      AM-PAC 6 CLICK MOBILITY          Treatment & Education:  Pt educated on importance of time OOB, importance of intermittent mobility, safe techniques for transfers/ambulation, discharge recommendations/options, and use of call light for assistance and fall prevention.      Patient left up in chair with all lines intact, call button in reach, chair alarm on, and caregiver present..    GOALS:   Multidisciplinary Problems       Physical Therapy Goals          Problem: Physical Therapy    Goal Priority Disciplines Outcome Goal Variances Interventions   Physical Therapy Goal     PT, PT/OT Progressing     Description: Goals to be met by: 10/14/24     Patient will increase functional independence with mobility by performin. Supine to sit with MInimal Assistance  2. Sit to supine with MInimal Assistance  3. Sit to stand transfer with Minimal Assistance  4. Bed to chair transfer with Minimal Assistance using Rolling Walker  5. Gait  2 x 25 feet with Minimal Assistance using Rolling Walker.                          Time Tracking:     PT Received On: 24  PT Start Time: 0947     PT Stop Time: 1002  PT Total Time (min): 15 min     Billable Minutes: Therapeutic Activity 15    Treatment Type: Treatment  PT/PTA: PTA     Number of PTA visits since last PT visit: 5     2024

## 2024-09-25 LAB
ANION GAP SERPL CALC-SCNC: 6 MMOL/L (ref 8–16)
BASOPHILS # BLD AUTO: 0.03 K/UL (ref 0–0.2)
BASOPHILS NFR BLD: 0.6 % (ref 0–1.9)
BUN SERPL-MCNC: 35 MG/DL (ref 10–30)
CALCIUM SERPL-MCNC: 8.6 MG/DL (ref 8.7–10.5)
CHLORIDE SERPL-SCNC: 106 MMOL/L (ref 95–110)
CO2 SERPL-SCNC: 27 MMOL/L (ref 23–29)
CREAT SERPL-MCNC: 1.8 MG/DL (ref 0.5–1.4)
DIFFERENTIAL METHOD BLD: ABNORMAL
EOSINOPHIL # BLD AUTO: 0.1 K/UL (ref 0–0.5)
EOSINOPHIL NFR BLD: 2 % (ref 0–8)
ERYTHROCYTE [DISTWIDTH] IN BLOOD BY AUTOMATED COUNT: 14.5 % (ref 11.5–14.5)
EST. GFR  (NO RACE VARIABLE): 35.1 ML/MIN/1.73 M^2
GLUCOSE SERPL-MCNC: 97 MG/DL (ref 70–110)
HCT VFR BLD AUTO: 29.9 % (ref 40–54)
HGB BLD-MCNC: 9.5 G/DL (ref 14–18)
IMM GRANULOCYTES # BLD AUTO: 0.01 K/UL (ref 0–0.04)
IMM GRANULOCYTES NFR BLD AUTO: 0.2 % (ref 0–0.5)
LYMPHOCYTES # BLD AUTO: 1.8 K/UL (ref 1–4.8)
LYMPHOCYTES NFR BLD: 34.3 % (ref 18–48)
MAGNESIUM SERPL-MCNC: 2.2 MG/DL (ref 1.6–2.6)
MCH RBC QN AUTO: 29.9 PG (ref 27–31)
MCHC RBC AUTO-ENTMCNC: 31.8 G/DL (ref 32–36)
MCV RBC AUTO: 94 FL (ref 82–98)
MONOCYTES # BLD AUTO: 0.6 K/UL (ref 0.3–1)
MONOCYTES NFR BLD: 11.7 % (ref 4–15)
NEUTROPHILS # BLD AUTO: 2.8 K/UL (ref 1.8–7.7)
NEUTROPHILS NFR BLD: 51.2 % (ref 38–73)
NRBC BLD-RTO: 0 /100 WBC
PLATELET # BLD AUTO: 162 K/UL (ref 150–450)
PMV BLD AUTO: 10.7 FL (ref 9.2–12.9)
POTASSIUM SERPL-SCNC: 5.2 MMOL/L (ref 3.5–5.1)
RBC # BLD AUTO: 3.18 M/UL (ref 4.6–6.2)
SODIUM SERPL-SCNC: 139 MMOL/L (ref 136–145)
WBC # BLD AUTO: 5.37 K/UL (ref 3.9–12.7)

## 2024-09-25 PROCEDURE — 83735 ASSAY OF MAGNESIUM: CPT | Performed by: NURSE PRACTITIONER

## 2024-09-25 PROCEDURE — 97535 SELF CARE MNGMENT TRAINING: CPT

## 2024-09-25 PROCEDURE — 25000003 PHARM REV CODE 250: Performed by: STUDENT IN AN ORGANIZED HEALTH CARE EDUCATION/TRAINING PROGRAM

## 2024-09-25 PROCEDURE — 63600175 PHARM REV CODE 636 W HCPCS: Performed by: INTERNAL MEDICINE

## 2024-09-25 PROCEDURE — 25000003 PHARM REV CODE 250: Performed by: INTERNAL MEDICINE

## 2024-09-25 PROCEDURE — 25000003 PHARM REV CODE 250: Performed by: NURSE PRACTITIONER

## 2024-09-25 PROCEDURE — 85025 COMPLETE CBC W/AUTO DIFF WBC: CPT | Performed by: NURSE PRACTITIONER

## 2024-09-25 PROCEDURE — 80048 BASIC METABOLIC PNL TOTAL CA: CPT | Performed by: NURSE PRACTITIONER

## 2024-09-25 PROCEDURE — 36415 COLL VENOUS BLD VENIPUNCTURE: CPT | Performed by: NURSE PRACTITIONER

## 2024-09-25 PROCEDURE — 12000002 HC ACUTE/MED SURGE SEMI-PRIVATE ROOM

## 2024-09-25 PROCEDURE — 97530 THERAPEUTIC ACTIVITIES: CPT

## 2024-09-25 RX ADMIN — HYDRALAZINE HYDROCHLORIDE 10 MG: 10 TABLET, FILM COATED ORAL at 05:09

## 2024-09-25 RX ADMIN — FAMOTIDINE 20 MG: 20 TABLET ORAL at 09:09

## 2024-09-25 RX ADMIN — FLUTICASONE PROPIONATE 100 MCG: 50 SPRAY, METERED NASAL at 09:09

## 2024-09-25 RX ADMIN — LEVETIRACETAM 250 MG: 250 TABLET, FILM COATED ORAL at 09:09

## 2024-09-25 RX ADMIN — ENOXAPARIN SODIUM 30 MG: 30 INJECTION SUBCUTANEOUS at 09:09

## 2024-09-25 RX ADMIN — HYDRALAZINE HYDROCHLORIDE 10 MG: 10 TABLET, FILM COATED ORAL at 09:09

## 2024-09-25 RX ADMIN — HYDRALAZINE HYDROCHLORIDE 10 MG: 10 TABLET, FILM COATED ORAL at 02:09

## 2024-09-25 RX ADMIN — AMLODIPINE BESYLATE 10 MG: 5 TABLET ORAL at 05:09

## 2024-09-25 RX ADMIN — TAMSULOSIN HYDROCHLORIDE 0.4 MG: 0.4 CAPSULE ORAL at 09:09

## 2024-09-25 RX ADMIN — CYANOCOBALAMIN TAB 1000 MCG 1000 MCG: 1000 TAB at 09:09

## 2024-09-25 NOTE — PHYSICIAN QUERY
Please clarify the nutritional diagnosis associated with the clinical findings:   Severe Protein Calorie Malnutrition

## 2024-09-25 NOTE — PT/OT/SLP PROGRESS
Physical Therapy Treatment    Patient Name:  Lavon Brandon   MRN:  [id number]    Recommendations:     Discharge Recommendations: Moderate Intensity Therapy  Discharge Equipment Recommendations: none  Barriers to discharge:  Increased caregiver burden of care    Assessment:     Lavon Brandon is a 89 y.o. male admitted with a medical diagnosis of Severe sepsis.  He presents with the following impairments/functional limitations: weakness, impaired endurance, impaired self care skills, impaired functional mobility, gait instability, impaired balance, decreased safety awareness, decreased upper extremity function, decreased lower extremity function .    Rehab Prognosis: Fair; patient would benefit from acute skilled PT services to address these deficits and reach maximum level of function.    Recent Surgery: * No surgery found *      Plan:     During this hospitalization, patient to be seen 5 x/week to address the identified rehab impairments via therapeutic activities, therapeutic exercises, gait training and progress toward the following goals:    Plan of Care Expires:  10/14/24    Subjective     Chief Complaint: Daughter voiced concern  of sacral wound  Patient/Family Comments/goals: DC to SNF in [place]  Pain/Comfort:  Pain Rating 1: 0/10      Objective:     Communicated with OT  prior to session.  Patient found supine with telemetry, Condom Catheter upon PT entry to room.     General Precautions: Standard, fall, seizure  Orthopedic Precautions: N/A  Braces: N/A  Respiratory Status: Room air     Functional Mobility:  Bed Mobility:     Rolling Left:  moderate assistance  Rolling Right: moderate assistance  Supine to Sit: moderate assistance  Sit to Supine: moderate assistance  Transfers:     Sit to Stand:  moderate assistance and of 2 persons with rolling walker.  His feet slid       forward and pt  was unable to assume safe standing posture  Balance: *fair sitting balance and poor standing balance      AM-PAC 6  CLICK MOBILITY          Treatment & Education:  Pt had BM with t/f to stand and returned to supine for hygiene care  and changing of his sheets and gown as pt rolled to R and L with Mod A . Educated on use of call light.    Patient left supine with all lines intact, call button in reach, bed alarm on, and his daughter  present..    GOALS:   Multidisciplinary Problems       Physical Therapy Goals          Problem: Physical Therapy    Goal Priority Disciplines Outcome Goal Variances Interventions   Physical Therapy Goal     PT, PT/OT Progressing     Description: Goals to be met by: 10/14/24     Patient will increase functional independence with mobility by performin. Supine to sit with MInimal Assistance  2. Sit to supine with MInimal Assistance  3. Sit to stand transfer with Minimal Assistance  4. Bed to chair transfer with Minimal Assistance using Rolling Walker  5. Gait  2 x 25 feet with Minimal Assistance using Rolling Walker.                          Time Tracking:     PT Received On: 24  PT Start Time: 1100     PT Stop Time: 1126  PT Total Time (min): 26 min     Billable Minutes: Therapeutic Activity 26 minutes    Treatment Type: Treatment  PT/PTA: PT     Number of PTA visits since last PT visit: 2024

## 2024-09-25 NOTE — PT/OT/SLP PROGRESS
Occupational Therapy   Treatment    Name: Lavon Brandon  MRN: [id number]  Admitting Diagnosis:  Severe sepsis       Recommendations:     Discharge Recommendations: Moderate Intensity Therapy  Discharge Equipment Recommendations:  none  Barriers to discharge:   (Increased physical assistance with functional mobility and ADLs.)    Assessment:     Lavon Brandon is a 89 y.o. male with a medical diagnosis of Severe sepsis.  He presents with confusion and general weakness. Patient participated in bed mobility, unsupported sitting EOB and grooming sitting EOB. Performance deficits affecting function are weakness, impaired endurance, impaired self care skills, impaired functional mobility, gait instability, impaired balance, impaired cognition, decreased safety awareness, decreased lower extremity function, decreased upper extremity function.     Rehab Prognosis:  Fair; patient would benefit from acute skilled OT services to address these deficits and reach maximum level of function.       Plan:     Patient to be seen 5 x/week to address the above listed problems via self-care/home management, therapeutic activities, therapeutic exercises  Plan of Care Expires: 10/18/24  Plan of Care Reviewed with: patient    Subjective     Chief Complaint: Confusion and general weakness  Patient/Family Comments/goals: Improved functional mobility and ADL independence.  Pain/Comfort:  Pain Rating 1: 0/10  Pain Rating Post-Intervention 1: 0/10    Objective:     Communicated with: nurse prior to session.  Patient found HOB elevated with telemetry, peripheral IV, PureWick upon OT entry to room.    General Precautions: Standard, fall, seizure    Orthopedic Precautions:N/A  Braces: N/A  Respiratory Status: Room air     Occupational Performance:     Bed Mobility:    Patient completed Scooting/Bridging with moderate assistance  Patient completed Supine to Sit with moderate assistance   Performed unsupported sitting EOB with contact guard  assistance.    Activities of Daily Living:  Grooming: minimum assistance to wash faced sitting EOB.      [facility] 6 Click ADL:      Treatment & Education:  Patient working on grooming activity in unsupported sitting position.    Patient left sitting edge of bed with  turnover to Physical Therapy bed side.    GOALS:   Multidisciplinary Problems       Occupational Therapy Goals          Problem: Occupational Therapy    Goal Priority Disciplines Outcome Interventions   Occupational Therapy Goal     OT, PT/OT Progressing    Description: Goals to be met by: 10/18/2024     Patient will increase functional independence with ADLs by performing:    UE Dressing with Stand-by Assistance.  LE Dressing with Stand-by Assistance.  Grooming while seated with Stand-by Assistance.  Toileting from toilet with Stand-by Assistance for hygiene and clothing management.   Supine to sit with Stand-by Assistance.  Toilet transfer to toilet with Stand-by Assistance.  Perform 30 minutes sitting/standing ADL activity with no LOB.                         Time Tracking:     OT Date of Treatment: 09/25/24  OT Start Time: 1043  OT Stop Time: 1053  OT Total Time (min): 10 min    Billable Minutes:Self Care/Home Management 10    OT/KIMMY: OT     Number of KIMMY visits since last OT visit: 0    9/25/2024

## 2024-09-25 NOTE — ASSESSMENT & PLAN NOTE
Patient with Acute debility due to age-related physical debility. Latest AMPAC and GEMS scores have not been reviewed. Evaluation for etiology is underway. Plan includes PT/OT consulted and fall precautions in place.   Awaiting placement

## 2024-09-25 NOTE — SUBJECTIVE & OBJECTIVE
Interval History:  Lying in bed resting, easily awakens.  Pleasantly confused and cooperative.  Denies pain or acute issues currently.  No overnight events.  Tolerating PT/OT.  Awaiting SNF placement.    Review of Systems   Unable to perform ROS: Dementia     Objective:     Vital Signs (Most Recent):  Temp: 98 °F (36.7 °C) (09/25/24 1212)  Pulse: (!) 59 (09/25/24 1212)  Resp: 17 (09/25/24 1212)  BP: (!) 154/66 (09/25/24 1212)  SpO2: 98 % (09/25/24 1212) Vital Signs (24h Range):  Temp:  [97.3 °F (36.3 °C)-98.8 °F (37.1 °C)] 98 °F (36.7 °C)  Pulse:  [50-63] 59  Resp:  [16-18] 17  SpO2:  [97 %-99 %] 98 %  BP: (126-160)/(58-73) 154/66     Weight: 66 kg (145 lb 8.1 oz)  Body mass index is 20.88 kg/m².    Intake/Output Summary (Last 24 hours) at 9/25/2024 1222  Last data filed at 9/25/2024 0422  Gross per 24 hour   Intake 1280 ml   Output 1550 ml   Net -270 ml         Physical Exam  Constitutional:       General: He is not in acute distress.     Comments: Frail, elderly   Eyes:      Extraocular Movements: Extraocular movements intact.      Pupils: Pupils are equal, round, and reactive to light.   Cardiovascular:      Rate and Rhythm: Normal rate and regular rhythm.      Heart sounds: Normal heart sounds.   Pulmonary:      Effort: Pulmonary effort is normal. No respiratory distress.      Breath sounds: Normal breath sounds.   Abdominal:      General: Bowel sounds are normal. There is no distension.      Palpations: Abdomen is soft.      Tenderness: There is no abdominal tenderness.   Musculoskeletal:         General: No swelling. Normal range of motion.      Comments: Pressure boots to BLE   Skin:     General: Skin is warm and dry.      Capillary Refill: Capillary refill takes less than 2 seconds.      Comments: Sacral dressing CDI   Neurological:      Mental Status: He is alert. Mental status is at baseline.      Motor: Weakness (Generalized) present.      Comments: Ox2, intermittent confusion             Significant  Labs: All pertinent labs within the past 24 hours have been reviewed.  CBC:   Recent Labs   Lab 09/24/24  0557 09/25/24  0659   WBC 5.69 5.37   HGB 8.6* 9.5*   HCT 26.8* 29.9*    162     CMP:   Recent Labs   Lab 09/24/24  0557 09/25/24  0659    139   K 5.0 5.2*    106   CO2 27 27    97   BUN 36* 35*   CREATININE 1.9* 1.8*   CALCIUM 8.3* 8.6*   ANIONGAP 4* 6*     Magnesium:   Recent Labs   Lab 09/24/24  0557 09/25/24  0659   MG 2.1 2.2     Microbiology Results (last 7 days)       Procedure Component Value Units Date/Time    Blood culture x two cultures. Draw prior to antibiotics [[id number]] Collected: 09/13/24 1103    Order Status: Completed Specimen: Blood Updated: 09/18/24 1232     Blood Culture, Routine No growth after 5 days.    Narrative:      Aerobic and anaerobic    Blood culture x two cultures. Draw prior to antibiotics [[id number]] Collected: 09/13/24 1105    Order Status: Completed Specimen: Blood Updated: 09/18/24 1232     Blood Culture, Routine No growth after 5 days.    Narrative:      Aerobic and anaerobic  Collection has been rescheduled by CLC4 at 09/13/2024 11:03 Reason:   DONE  Collection has been rescheduled by CLC4 at 09/13/2024 11:03 Reason:   DONE          X-Ray Chest AP Portable  Narrative: EXAMINATION:  XR CHEST AP PORTABLE    CLINICAL HISTORY:  aspiration;    FINDINGS:  Portable chest radiograph obtained in 2 images at 13:09 hours is compared to 09/13/2024, and shows the cardiomediastinal silhouette and pulmonary vasculature are within normal limits.    The lungs are hypoexpanded, with no consolidation, large pleural effusion, or evidence of pulmonary edema. No pneumothorax or acute fracture.  Impression: No evidence of active cardiopulmonary disease.    Electronically signed by: Fly Cherry  Date:    09/19/2024  Time:    13:32        Significant Imaging: I have reviewed all pertinent imaging results/findings within the past 24 hours.

## 2024-09-25 NOTE — PLAN OF CARE
Per Pt daughter Gabriela she is the only person at home with Pt and feels Pt should do SNF before returning home. Referral was re-sent to [facility] on yesterday 9/24. DOM follow up with Aydee this morning pending a answer on if she is able to accept Pt.    1:20pm- DOM received a call back from Aydee Per Aydee Pt owes 2400 and the facility would need this paid before Pt can come. Per Aydee she spoke with Pt daughter is asking I she can pay half so Pt can admit then pay the other half once Pt discharge. Pending a answer from Aydee TADEO. DOM will continue to follow     09/25/24 0847   Discharge Reassessment   Assessment Type Discharge Planning Reassessment   Did the patient's condition or plan change since previous assessment? Yes   Discharge Plan discussed with: Adult children   Discharge Plan A Skilled Nursing Facility   Discharge Plan B Home Health   Post-Acute Status   Post-Acute Placement Status Referrals Sent   Discharge Delays None known at this time

## 2024-09-25 NOTE — PLAN OF CARE
Problem: Infection  Goal: Absence of Infection Signs and Symptoms  Outcome: Progressing     Problem: Adult Inpatient Plan of Care  Goal: Plan of Care Review  Outcome: Progressing  Goal: Patient-Specific Goal (Individualized)  Outcome: Progressing  Goal: Absence of Hospital-Acquired Illness or Injury  Outcome: Progressing  Goal: Optimal Comfort and Wellbeing  Outcome: Progressing  Goal: Readiness for Transition of Care  Outcome: Progressing     Problem: Sepsis/Septic Shock  Goal: Optimal Coping  Outcome: Progressing  Goal: Absence of Bleeding  Outcome: Progressing  Goal: Blood Glucose Level Within Targeted Range  Outcome: Progressing  Goal: Absence of Infection Signs and Symptoms  Outcome: Progressing  Goal: Optimal Nutrition Intake  Outcome: Progressing     Problem: Wound  Goal: Optimal Coping  Outcome: Progressing  Goal: Optimal Functional Ability  Outcome: Progressing  Goal: Absence of Infection Signs and Symptoms  Outcome: Progressing  Goal: Improved Oral Intake  Outcome: Progressing  Goal: Optimal Pain Control and Function  Outcome: Progressing  Goal: Skin Health and Integrity  Outcome: Progressing  Goal: Optimal Wound Healing  Outcome: Progressing     Problem: Skin Injury Risk Increased  Goal: Skin Health and Integrity  Outcome: Progressing     Problem: Fall Injury Risk  Goal: Absence of Fall and Fall-Related Injury  Outcome: Progressing     Problem: Malnutrition  Goal: Improved Nutritional Intake  Outcome: Progressing

## 2024-09-25 NOTE — ASSESSMENT & PLAN NOTE
Possibly multifactorial:  UTI, underlying dementia  History of dementia  Completed course of IV Rocephin for UTI.  Final urine culture negative, however, patient treated for UTI prior to admission and possibly affected culture results  Neurology consulted and evaluated: Encephalopathy likely secondary to hypertensive emergency and UTI. Concern for acute intracranial pathology is low. CT head on admission was negative for acute pathology.   EEG negative for epileptiform activity   Encephalopathy workup unremarkable  Mentation likely at baseline

## 2024-09-25 NOTE — CONSULTS
Red discoloration right heel, does not appear to be pressure wound.  Patient wearing heel protectors and heels are floated    Bilateral sacrum/buttock DTI with peeling skin over wound yonis wound bed is red pink and purple.  6x6.5x0.2cm cleaned and skin peels with cleaning, applied Triad and mepilex and turned to right side, lying on pillow.  Complete skin assessment done

## 2024-09-25 NOTE — PHYSICIAN QUERY
Due to the conflicting clinical picture, please clinically validate the Sepsis. If validated, please provide additional clinical support for the Sepsis.   The condition is not confirmed and/or it has been ruled out

## 2024-09-25 NOTE — NURSING
Nurses Note -- 4 Eyes      9/24/2024   10:59 PM      Skin assessed during: Q Shift Change      [] No Altered Skin Integrity Present    []Prevention Measures Documented      [x] Yes- Altered Skin Integrity Present or Discovered   [] LDA Added if Not in Epic (Describe Wound)   [] New Altered Skin Integrity was Present on Admit and Documented in LDA   [] Wound Image Taken    Wound Care Consulted? Yes    Attending Nurse:  Yesenia De Guzman RN/Staff Member:   Theresa LIU

## 2024-09-25 NOTE — ASSESSMENT & PLAN NOTE
Nutrition consulted. Most recent weight and BMI monitored-     Measurements:  Wt Readings from Last 1 Encounters:   09/13/24 66 kg (145 lb 8.1 oz)   Body mass index is 20.88 kg/m².    Patient has been screened and assessed by RD.    Malnutrition Type:  Context: chronic illness  Level: severe    Malnutrition Characteristic Summary:  Weight Loss (Malnutrition): greater than 5% in 1 month  Subcutaneous Fat (Malnutrition): moderate depletion  Muscle Mass (Malnutrition): mild depletion    Interventions/Recommendations (treatment strategy):  1. Continue Cardiac diet as tolerated. 2. Recommend and ordered Ensure +HP vanilla flavor with meals.     No

## 2024-09-25 NOTE — NURSING
Nurses Note -- 4 Eyes      9/25/2024   6:58 AM      Skin assessed during: Q Shift Change      [] No Altered Skin Integrity Present    []Prevention Measures Documented      [x] Yes- Altered Skin Integrity Present or Discovered   [] LDA Added if Not in Epic (Describe Wound)   [] New Altered Skin Integrity was Present on Admit and Documented in LDA   [] Wound Image Taken    Wound Care Consulted? Yes    Attending Nurse:  Rhiannon De Guzman RN/Staff Member:   Yesenia

## 2024-09-25 NOTE — ASSESSMENT & PLAN NOTE
Final urine culture negative--was being treated for UTI prior to admission and may have affected urine culture results  Completed IV rocephin x 7 days-completed

## 2024-09-25 NOTE — PROGRESS NOTES
[facility]  Progress Note    Patient Name: Lavon Brandon  MRN: [id number]  Patient Class: IP- Inpatient   Admission Date: 9/13/2024  Length of Stay: 11 days  Attending Physician: Milind Walker, *  Primary Care Provider: Aristides Haynes MD        Subjective:     Principal Problem:Severe sepsis        HPI:  89 year old male with a past medical history of hypertension, hyperlipidemia, CVA, PE, Seizure, UTI and dementia who presents to the emergency room with reports of abdominal and back pain and hallucinations. Also decreased bowel movements over last 2-4 days. He was recently diagnosed with UTI and started on augmentin. Per urine culture sensitivity, organism is resistant to Augmentin, sensitive to Rocephin.  Hypertensive in the ER at 212/94, reports did not take his home medication this morning.  WBC 9.56, mild MATA with serum creatinine 2.0, BUN 34, UA negative for leukocytes and nitrites.  Blood culture sent, lactic 2.39.  CT abdomen with trace left pleural effusion, large left renal cyst, CT head no acute findings, chest x-ray Blunting of the left costophrenic angle. IV rocephin given in ER with Hydralazine, amlodipine,  ml.  Admit to hospital medicine for further medical management.  The patient did have some urinary retention, a Draper catheter was placed with adequate urine output.  EEG pending.  Urine culture with no growth to date.  Patient's mental status continued to slowly improve.  He has tolerated p.o. diet.  PT and OT consulted for evaluation    Overview/Hospital Course:  89 year old hospitalized for UTI, AMS, severe sepsis. In Er, Lactate 2.39, BUN 3, Sr Cr 2, recently diagnosed with UTI, treated with oral Augmentin. However, the urine sensitivity was resistant to Augmentin, sensitive to Rocephin. Therefore, rocephin was started in the ER and continued during admission. Patient was afebrile, no leukocytosis.  Lactic acid improved with IV antibiotics and  fluid resuscitation.  Renal function stable at 2.1, this is the patient's baseline renal function per historical records.  Repeat urine cultures continued to show no growth to date however patient maintained on Rocephin 1 g daily.  Patient continued to have waxing and waning mental status.  Neurology was consulted.  CT head was negative for acute findings.  An EEG was obtained which was negative for epileptiform activity.  Patient's Keppra dose was decreased per Neurology recs.  Patient's mental status continued to improve.  PT and OT was consulted for evaluation with recommendations for moderate intensity therapy made.  CM was consulted for SNF placement, awaiting authorization.      Interval History:  Lying in bed resting, easily awakens.  Pleasantly confused and cooperative.  Denies pain or acute issues currently.  No overnight events.  Tolerating PT/OT.  Awaiting SNF placement.    Review of Systems   Unable to perform ROS: Dementia     Objective:     Vital Signs (Most Recent):  Temp: 98 °F (36.7 °C) (09/25/24 1212)  Pulse: (!) 59 (09/25/24 1212)  Resp: 17 (09/25/24 1212)  BP: (!) 154/66 (09/25/24 1212)  SpO2: 98 % (09/25/24 1212) Vital Signs (24h Range):  Temp:  [97.3 °F (36.3 °C)-98.8 °F (37.1 °C)] 98 °F (36.7 °C)  Pulse:  [50-63] 59  Resp:  [16-18] 17  SpO2:  [97 %-99 %] 98 %  BP: (126-160)/(58-73) 154/66     Weight: 66 kg (145 lb 8.1 oz)  Body mass index is 20.88 kg/m².    Intake/Output Summary (Last 24 hours) at 9/25/2024 1222  Last data filed at 9/25/2024 0422  Gross per 24 hour   Intake 1280 ml   Output 1550 ml   Net -270 ml         Physical Exam  Constitutional:       General: He is not in acute distress.     Comments: Frail, elderly   Eyes:      Extraocular Movements: Extraocular movements intact.      Pupils: Pupils are equal, round, and reactive to light.   Cardiovascular:      Rate and Rhythm: Normal rate and regular rhythm.      Heart sounds: Normal heart sounds.   Pulmonary:      Effort: Pulmonary  effort is normal. No respiratory distress.      Breath sounds: Normal breath sounds.   Abdominal:      General: Bowel sounds are normal. There is no distension.      Palpations: Abdomen is soft.      Tenderness: There is no abdominal tenderness.   Musculoskeletal:         General: No swelling. Normal range of motion.      Comments: Pressure boots to BLE   Skin:     General: Skin is warm and dry.      Capillary Refill: Capillary refill takes less than 2 seconds.      Comments: Sacral dressing CDI   Neurological:      Mental Status: He is alert. Mental status is at baseline.      Motor: Weakness (Generalized) present.      Comments: Ox2, intermittent confusion             Significant Labs: All pertinent labs within the past 24 hours have been reviewed.  CBC:   Recent Labs   Lab 09/24/24  0557 09/25/24  0659   WBC 5.69 5.37   HGB 8.6* 9.5*   HCT 26.8* 29.9*    162     CMP:   Recent Labs   Lab 09/24/24  0557 09/25/24  0659    139   K 5.0 5.2*    106   CO2 27 27    97   BUN 36* 35*   CREATININE 1.9* 1.8*   CALCIUM 8.3* 8.6*   ANIONGAP 4* 6*     Magnesium:   Recent Labs   Lab 09/24/24  0557 09/25/24  0659   MG 2.1 2.2     Microbiology Results (last 7 days)       Procedure Component Value Units Date/Time    Blood culture x two cultures. Draw prior to antibiotics [[id number]] Collected: 09/13/24 1103    Order Status: Completed Specimen: Blood Updated: 09/18/24 1232     Blood Culture, Routine No growth after 5 days.    Narrative:      Aerobic and anaerobic    Blood culture x two cultures. Draw prior to antibiotics [[id number]] Collected: 09/13/24 1105    Order Status: Completed Specimen: Blood Updated: 09/18/24 1232     Blood Culture, Routine No growth after 5 days.    Narrative:      Aerobic and anaerobic  Collection has been rescheduled by CLC4 at 09/13/2024 11:03 Reason:   DONE  Collection has been rescheduled by CLC4 at 09/13/2024 11:03 Reason:   DONE          X-Ray Chest AP Portable  Narrative:  EXAMINATION:  XR CHEST AP PORTABLE    CLINICAL HISTORY:  aspiration;    FINDINGS:  Portable chest radiograph obtained in 2 images at 13:09 hours is compared to 09/13/2024, and shows the cardiomediastinal silhouette and pulmonary vasculature are within normal limits.    The lungs are hypoexpanded, with no consolidation, large pleural effusion, or evidence of pulmonary edema. No pneumothorax or acute fracture.  Impression: No evidence of active cardiopulmonary disease.    Electronically signed by: Fly Cherry  Date:    09/19/2024  Time:    13:32        Significant Imaging: I have reviewed all pertinent imaging results/findings within the past 24 hours.    Assessment/Plan:      * Severe sepsis  Resolved--due to UTI    Encephalopathy, metabolic  Possibly multifactorial:  UTI, underlying dementia  History of dementia  Completed course of IV Rocephin for UTI.  Final urine culture negative, however, patient treated for UTI prior to admission and possibly affected culture results  Neurology consulted and evaluated: Encephalopathy likely secondary to hypertensive emergency and UTI. Concern for acute intracranial pathology is low. CT head on admission was negative for acute pathology.   EEG negative for epileptiform activity   Encephalopathy workup unremarkable  Mentation likely at baseline    Urinary retention  Resolved.  Continues voiding without issues  Continue flomax        Pleural effusion on left  Patient found to have  trace  pleural effusion on imaging. I have personally reviewed and interpreted the following imaging: Xray. A thoracentesis was deferred. Most likely etiology includes  CDK st III . Management to include Diuresis      UTI (urinary tract infection)  Final urine culture negative--was being treated for UTI prior to admission and may have affected urine culture results  Completed IV rocephin x 7 days-completed         Seizure disorder  Controlled   neurology consulted  [facility] therapeutic  Continue  keppra, dose adjusted per Neuro to 250mg BID  EEG without epileptiform seizure activity noted  Seizure precautions         Severe protein-calorie malnutrition  Nutrition consulted. Most recent weight and BMI monitored-     Measurements:  Wt Readings from Last 1 Encounters:   09/13/24 66 kg (145 lb 8.1 oz)   Body mass index is 20.88 kg/m².    Patient has been screened and assessed by RD.    Malnutrition Type:  Context: chronic illness  Level: severe    Malnutrition Characteristic Summary:  Weight Loss (Malnutrition): greater than 5% in 1 month  Subcutaneous Fat (Malnutrition): moderate depletion  Muscle Mass (Malnutrition): mild depletion    Interventions/Recommendations (treatment strategy):  1. Continue Cardiac diet as tolerated. 2. Recommend and ordered Ensure +HP vanilla flavor with meals.      Debility  Patient with Acute debility due to age-related physical debility. Latest AMPAC and GEMS scores have not been reviewed. Evaluation for etiology is underway. Plan includes PT/OT consulted and fall precautions in place.   Awaiting placement    Chronic renal disease, stage IV  Creatine stable for now. BMP reviewed- noted Estimated Creatinine Clearance: 25 mL/min (A) (based on SCr of 1.8 mg/dL (H)). according to latest data. Based on current GFR, CKD stage is stage 3 - GFR 30-59.  Monitor UOP and serial BMP and adjust therapy as needed. Renally dose meds. Avoid nephrotoxic medications and procedures.    Constipation  Resolved/controlled.  Continue Dulcolax suppository PRN      VTE Risk Mitigation (From admission, onward)           Ordered     enoxaparin injection 30 mg  Every 24 hours (non-standard times)         09/13/24 1848     IP VTE HIGH RISK PATIENT  Once         09/13/24 1455     Place sequential compression device  Until discontinued         09/13/24 1455                    Discharge Planning   JOANNE: 9/26/2024     Code Status: DNR   Is the patient medically ready for discharge?:     Reason for patient still in  hospital (select all that apply): Patient trending condition, Treatment, and Pending disposition  Discharge Plan A: Skilled Nursing Facility   Discharge Delays: None known at this time              Missy Salguero NP  Department of Hospital Medicine   [facility]     Ambulatory

## 2024-09-26 VITALS
HEIGHT: 70 IN | RESPIRATION RATE: 18 BRPM | DIASTOLIC BLOOD PRESSURE: 63 MMHG | SYSTOLIC BLOOD PRESSURE: 141 MMHG | HEART RATE: 56 BPM | OXYGEN SATURATION: 100 % | BODY MASS INDEX: 20.83 KG/M2 | WEIGHT: 145.5 LBS | TEMPERATURE: 98 F

## 2024-09-26 LAB
ANION GAP SERPL CALC-SCNC: 2 MMOL/L (ref 8–16)
BASOPHILS # BLD AUTO: 0.02 K/UL (ref 0–0.2)
BASOPHILS NFR BLD: 0.4 % (ref 0–1.9)
BUN SERPL-MCNC: 40 MG/DL (ref 10–30)
CALCIUM SERPL-MCNC: 8.4 MG/DL (ref 8.7–10.5)
CHLORIDE SERPL-SCNC: 107 MMOL/L (ref 95–110)
CO2 SERPL-SCNC: 29 MMOL/L (ref 23–29)
CREAT SERPL-MCNC: 2 MG/DL (ref 0.5–1.4)
DIFFERENTIAL METHOD BLD: ABNORMAL
EOSINOPHIL # BLD AUTO: 0.2 K/UL (ref 0–0.5)
EOSINOPHIL NFR BLD: 2.8 % (ref 0–8)
ERYTHROCYTE [DISTWIDTH] IN BLOOD BY AUTOMATED COUNT: 14.7 % (ref 11.5–14.5)
EST. GFR  (NO RACE VARIABLE): 30.9 ML/MIN/1.73 M^2
GLUCOSE SERPL-MCNC: 97 MG/DL (ref 70–110)
HCT VFR BLD AUTO: 27.8 % (ref 40–54)
HGB BLD-MCNC: 9 G/DL (ref 14–18)
IMM GRANULOCYTES # BLD AUTO: 0.01 K/UL (ref 0–0.04)
IMM GRANULOCYTES NFR BLD AUTO: 0.2 % (ref 0–0.5)
LYMPHOCYTES # BLD AUTO: 1.9 K/UL (ref 1–4.8)
LYMPHOCYTES NFR BLD: 33.9 % (ref 18–48)
MAGNESIUM SERPL-MCNC: 2.2 MG/DL (ref 1.6–2.6)
MCH RBC QN AUTO: 30.2 PG (ref 27–31)
MCHC RBC AUTO-ENTMCNC: 32.4 G/DL (ref 32–36)
MCV RBC AUTO: 93 FL (ref 82–98)
MONOCYTES # BLD AUTO: 0.6 K/UL (ref 0.3–1)
MONOCYTES NFR BLD: 11.3 % (ref 4–15)
NEUTROPHILS # BLD AUTO: 2.9 K/UL (ref 1.8–7.7)
NEUTROPHILS NFR BLD: 51.4 % (ref 38–73)
NRBC BLD-RTO: 0 /100 WBC
PLATELET # BLD AUTO: 167 K/UL (ref 150–450)
PMV BLD AUTO: 10.9 FL (ref 9.2–12.9)
POTASSIUM SERPL-SCNC: 5.2 MMOL/L (ref 3.5–5.1)
RBC # BLD AUTO: 2.98 M/UL (ref 4.6–6.2)
SODIUM SERPL-SCNC: 138 MMOL/L (ref 136–145)
WBC # BLD AUTO: 5.67 K/UL (ref 3.9–12.7)

## 2024-09-26 PROCEDURE — 25000003 PHARM REV CODE 250: Performed by: INTERNAL MEDICINE

## 2024-09-26 PROCEDURE — 25000003 PHARM REV CODE 250: Performed by: STUDENT IN AN ORGANIZED HEALTH CARE EDUCATION/TRAINING PROGRAM

## 2024-09-26 PROCEDURE — 25000003 PHARM REV CODE 250: Performed by: NURSE PRACTITIONER

## 2024-09-26 PROCEDURE — 97535 SELF CARE MNGMENT TRAINING: CPT

## 2024-09-26 PROCEDURE — 83735 ASSAY OF MAGNESIUM: CPT | Performed by: NURSE PRACTITIONER

## 2024-09-26 PROCEDURE — 85025 COMPLETE CBC W/AUTO DIFF WBC: CPT | Performed by: NURSE PRACTITIONER

## 2024-09-26 PROCEDURE — 97116 GAIT TRAINING THERAPY: CPT

## 2024-09-26 PROCEDURE — 80048 BASIC METABOLIC PNL TOTAL CA: CPT | Performed by: NURSE PRACTITIONER

## 2024-09-26 PROCEDURE — 36415 COLL VENOUS BLD VENIPUNCTURE: CPT | Performed by: NURSE PRACTITIONER

## 2024-09-26 RX ADMIN — CYANOCOBALAMIN TAB 1000 MCG 1000 MCG: 1000 TAB at 09:09

## 2024-09-26 RX ADMIN — LEVETIRACETAM 250 MG: 250 TABLET, FILM COATED ORAL at 09:09

## 2024-09-26 RX ADMIN — HYDRALAZINE HYDROCHLORIDE 10 MG: 10 TABLET, FILM COATED ORAL at 06:09

## 2024-09-26 RX ADMIN — FAMOTIDINE 20 MG: 20 TABLET ORAL at 09:09

## 2024-09-26 RX ADMIN — HYDRALAZINE HYDROCHLORIDE 10 MG: 10 TABLET, FILM COATED ORAL at 02:09

## 2024-09-26 RX ADMIN — TAMSULOSIN HYDROCHLORIDE 0.4 MG: 0.4 CAPSULE ORAL at 09:09

## 2024-09-26 NOTE — PLAN OF CARE
Patient cleared for discharge to SNF at [facility].    Report information:   Call report to Station 2 at [phone].    ADT 30 entered for ambulance transport.

## 2024-09-26 NOTE — ASSESSMENT & PLAN NOTE
Controlled   neurology consulted  Keppra level therapeutic  Continue keppra, dose adjusted per Neuro to 250mg BID  EEG without epileptiform seizure activity noted  Seizure precautions

## 2024-09-26 NOTE — NURSING
[facility] here to transport patient. Patient report called to Olivia at [facility], station [facility]. Patient's daughter Gabriela also notified of patient leaving at this time.

## 2024-09-26 NOTE — NURSING
Nurses Note -- 4 Eyes      9/25/2024   10:39 PM      Skin assessed during: Q Shift Change      [] No Altered Skin Integrity Present    []Prevention Measures Documented      [x] Yes- Altered Skin Integrity Present or Discovered   [] LDA Added if Not in Epic (Describe Wound)   [] New Altered Skin Integrity was Present on Admit and Documented in LDA   [] Wound Image Taken    Wound Care Consulted? Yes    Attending Nurse:  Anahi De Guzman RN/Staff Member: Yesenia

## 2024-09-26 NOTE — PT/OT/SLP PROGRESS
Occupational Therapy   Treatment    Name: Lavon Brandon  MRN: [id number]  Admitting Diagnosis:  Severe sepsis       Recommendations:     Discharge Recommendations: Moderate Intensity Therapy  Discharge Equipment Recommendations:  none  Barriers to discharge:   (Increased physical assistance with functional mobility and ADLs.)    Assessment:     Lavon Brandon is a 89 y.o. male with a medical diagnosis of Severe sepsis.  He presents with general weakness. Patient participated in bed mobility, unsupported sitting EOB, bed/chair transfer and grooming sitting in chair. Performance deficits affecting function are weakness, impaired endurance, impaired self care skills, impaired functional mobility, gait instability, impaired balance, decreased safety awareness, decreased lower extremity function, decreased upper extremity function.     Rehab Prognosis:  Fair; patient would benefit from acute skilled OT services to address these deficits and reach maximum level of function.       Plan:     Patient to be seen 5 x/week to address the above listed problems via self-care/home management, therapeutic activities, therapeutic exercises  Plan of Care Expires: 10/18/24  Plan of Care Reviewed with: patient    Subjective     Chief Complaint: General weakness  Patient/Family Comments/goals: Improved functional mobility and ADL independence.  Pain/Comfort:  Pain Rating 1: 0/10  Pain Rating Post-Intervention 1: 0/10    Objective:     Communicated with: nurse prior to session.  Patient found HOB elevated with telemetry, peripheral IV, PureWick upon OT entry to room.    General Precautions: Standard, fall    Orthopedic Precautions:N/A  Braces: N/A  Respiratory Status: Room air     Occupational Performance:     Bed Mobility:    Patient completed Scooting/Bridging with moderate assistance  Patient completed Supine to Sit with moderate assistance   Performed unsupported sitting EOB with contact guard assistance.    Functional  Mobility/Transfers:  Patient completed Bed <> Chair Transfer using Step Transfer technique with minimum assistance with hand-held assist and rolling walker    Activities of Daily Living:  Grooming: contact guard assistance to wash faces sitting in chair.      [facility] 6 Click ADL: 15    Treatment & Education:  Patient instructed to sit up in chair for at least 2 hours.     Patient left up in chair with all lines intact, call button in reach, and chair alarm on    GOALS:   Multidisciplinary Problems       Occupational Therapy Goals          Problem: Occupational Therapy    Goal Priority Disciplines Outcome Interventions   Occupational Therapy Goal     OT, PT/OT Progressing    Description: Goals to be met by: 10/18/2024     Patient will increase functional independence with ADLs by performing:    UE Dressing with Stand-by Assistance.  LE Dressing with Stand-by Assistance.  Grooming while seated with Stand-by Assistance.  Toileting from toilet with Stand-by Assistance for hygiene and clothing management.   Supine to sit with Stand-by Assistance.  Toilet transfer to toilet with Stand-by Assistance.  Perform 30 minutes sitting/standing ADL activity with no LOB.                         Time Tracking:     OT Date of Treatment: 09/26/24  OT Start Time: 1001  OT Stop Time: 1016  OT Total Time (min): 15 min    Billable Minutes:Self Care/Home Management 15    OT/KIMMY: OT     Number of KIMMY visits since last OT visit: 0    9/26/2024

## 2024-09-26 NOTE — PT/OT/SLP PROGRESS
Physical Therapy Treatment    Patient Name:  Lavon Brandon   MRN:  [id number]    Recommendations:     Discharge Recommendations: Moderate Intensity Therapy  Discharge Equipment Recommendations: none  Barriers to discharge: None    Assessment:     Lavon Brandon is a 89 y.o. male admitted with a medical diagnosis of Severe sepsis.  He presents with the following impairments/functional limitations: weakness, impaired endurance, impaired self care skills, impaired functional mobility, gait instability, impaired balance, decreased safety awareness, impaired skin .    Rehab Prognosis: Fair; patient would benefit from acute skilled PT services to address these deficits and reach maximum level of function.    Recent Surgery: * No surgery found *      Plan:     During this hospitalization, patient to be seen 5 x/week to address the identified rehab impairments via gait training, therapeutic activities, therapeutic exercises and progress toward the following goals:    Plan of Care Expires:  10/14/24    Subjective     Chief Complaint: weakness  Patient/Family Comments/goals: Did not state  Pain/Comfort:  Pain Rating 1: 0/10      Objective:     Communicated with nurse prior to session.  Patient found up in chair with telemetry, PureWick, chair check upon PT entry to room.     General Precautions: Standard, fall  Orthopedic Precautions: N/A  Braces: N/A  Respiratory Status: Room air     Functional Mobility:  Transfers:     Sit to Stand:  moderate assistance with rolling walker  Gait: 8 ft RW and Min A  Flexed trunk posture.. Complained of weakness in his legs and returned to room seated in chair      AM-PAC 6 CLICK MOBILITY          Treatment & Education:  Gait as indicated above    Patient left up in chair with all lines intact, call button in reach, and chair alarm on..    GOALS:   Multidisciplinary Problems       Physical Therapy Goals          Problem: Physical Therapy    Goal Priority Disciplines Outcome Goal Variances  Interventions   Physical Therapy Goal     PT, PT/OT Progressing     Description: Goals to be met by: 10/14/24     Patient will increase functional independence with mobility by performin. Supine to sit with MInimal Assistance  2. Sit to supine with MInimal Assistance  3. Sit to stand transfer with Minimal Assistance  4. Bed to chair transfer with Minimal Assistance using Rolling Walker  5. Gait  2 x 25 feet with Minimal Assistance using Rolling Walker.                          Time Tracking:     PT Received On: 24  PT Start Time: 1015     PT Stop Time: 1025  PT Total Time (min): 10 min     Billable Minutes: Gait Training 10 minutes    Treatment Type: Treatment  PT/PTA: PT     Number of PTA visits since last PT visit: 5     2024

## 2024-09-26 NOTE — PLAN OF CARE
Per Aydee she is able to accept Pt. Per Aydee Pt owes 2400 Pt daughter will go to facility at 1pm to pay half so Pt can be admitted and also do paperwork. [facility] getting  auth [facility] over      09/26/24 0907   Post-Acute Status   Post-Acute Authorization Placement   Discharge Plan   Discharge Plan A Skilled Nursing Facility   Discharge Plan B Skilled Nursing Facility

## 2024-09-26 NOTE — DISCHARGE INSTRUCTIONS
[facility]  Facility Transfer Orders        Admit to: SNF    Diagnoses:   Active Hospital Problems    Diagnosis  POA    *Severe sepsis [A41.9, R65.20]  Yes    Urinary retention [R33.9]  Yes    UTI (urinary tract infection) [N39.0]  Yes    Encephalopathy, metabolic [G93.41]  Yes    Pleural effusion on left [J90]  Yes    Seizure disorder [G40.909]  Yes    Severe protein-calorie malnutrition [E43]  Yes    Debility [R53.81]  Yes    Chronic renal disease, stage IV [N18.4]  Yes     Chronic    Constipation [K59.00]  Yes     Dx updated per 2019 IMO Load        Resolved Hospital Problems   No resolved problems to display.     Allergies:   Review of patient's allergies indicates:   Allergen Reactions    Ciprofloxacin (bulk) Swelling       Code Status: DNR    Vitals: Routine       Diet: cardiac diet    Activity: Activity as tolerated    Nursing Precautions: Aspiration , Fall, and Pressure ulcer prevention    Bed/Surface: Low Air Loss    Consults: PT to evaluate and treat- 5 times a week, OT to evaluate and treat- 5 times a week, Wound Care, and Nutrition to evaluate and recommend diet    Oxygen: room air    Dialysis: Patient is not on dialysis.       Pending Diagnostic Studies:       None          Miscellaneous Care:   Routine Skin for Bedridden Patients:  Apply moisture barrier cream to all  Wound Care: Float heels, heel protector boots.  DTI to sacrum, offload pressure as much as possible, clean with wound cleanser and apply triad.  Wound care nurse to evaluate and treat.    IV Access: None     Medications: Discontinue all previous medication orders, if any. See new list below.  Current Discharge Medication List        START taking these medications    Details   tamsulosin (FLOMAX) 0.4 mg Cap Take 1 capsule (0.4 mg total) by mouth once daily.  Qty: 30 capsule, Refills: 0           CONTINUE these medications which have CHANGED    Details   levETIRAcetam (KEPPRA) 500 MG Tab Take 0.5 tablets (250 mg total) by  mouth 2 (two) times daily.  Qty: 90 tablet, Refills: 3    Associated Diagnoses: Seizure disorder           CONTINUE these medications which have NOT CHANGED    Details   amLODIPine (NORVASC) 10 MG tablet Take 1 tablet (10 mg total) by mouth before evening meal.  Qty: 30 tablet, Refills: 11    Comments: .      calcitRIOL (ROCALTROL) 0.25 MCG Cap TAKE 1 CAPSULE(0.25 MCG) BY MOUTH EVERY DAY  Qty: 90 capsule, Refills: 4      citalopram (CELEXA) 20 MG tablet Take 1 tablet (20 mg total) by mouth once daily.  Qty: 90 tablet, Refills: 11    Associated Diagnoses: Dementia with behavioral disturbance      cyanocobalamin (VITAMIN B-12) 100 MCG tablet Take 100 mcg by mouth once daily.      fluticasone propionate (FLONASE) 50 mcg/actuation nasal spray 2 sprays (100 mcg total) by Each Nostril route once daily.  Qty: 18.2 mL, Refills: 0      hydrALAZINE (APRESOLINE) 100 MG tablet Take 1 tablet (100 mg total) by mouth every 8 (eight) hours.  Qty: 90 tablet, Refills: 11    Comments: .  Associated Diagnoses: Hypertension, unspecified type           STOP taking these medications       amoxicillin-clavulanate 875-125mg (AUGMENTIN) 875-125 mg per tablet Comments:   Reason for Stopping:         methylPREDNISolone (MEDROL DOSEPACK) 4 mg tablet Comments:   Reason for Stopping:             Follow up:    Follow-up Information       Aristides Haynes MD Follow up in 1 week(s).    Specialty: Family Medicine  Contact information:  [address] [facility] [address] [phone]                             VANE Desir

## 2024-09-27 NOTE — DISCHARGE SUMMARY
[facility] Medicine  Discharge Summary      Patient Name: Lavon Brandon  MRN: [id number]  MIMI: [id number]  Patient Class: IP- Inpatient  Admission Date: 9/13/2024  Hospital Length of Stay: 12 days  Discharge Date and Time: 9/26/2024  2:23 PM  Attending Physician: Francisca att. providers found   Discharging Provider: VANE Desir  Primary Care Provider: Aristides Haynes MD    Primary Care Team: Networked reference to record PCT     HPI:   89 year old male with a past medical history of hypertension, hyperlipidemia, CVA, PE, Seizure, UTI and dementia who presents to the emergency room with reports of abdominal and back pain and hallucinations. Also decreased bowel movements over last 2-4 days. He was recently diagnosed with UTI and started on augmentin. Per urine culture sensitivity, organism is resistant to Augmentin, sensitive to Rocephin.  Hypertensive in the ER at 212/94, reports did not take his home medication this morning.  WBC 9.56, mild MATA with serum creatinine 2.0, BUN 34, UA negative for leukocytes and nitrites.  Blood culture sent, lactic 2.39.  CT abdomen with trace left pleural effusion, large left renal cyst, CT head no acute findings, chest x-ray Blunting of the left costophrenic angle. IV rocephin given in ER with Hydralazine, amlodipine,  ml.  Admit to hospital medicine for further medical management.  The patient did have some urinary retention, a Draper catheter was placed with adequate urine output.  EEG pending.  Urine culture with no growth to date.  Patient's mental status continued to slowly improve.  He has tolerated p.o. diet.  PT and OT consulted for evaluation    * No surgery found *      Hospital Course:   89 year old hospitalized for UTI, AMS, severe sepsis. In Er, Lactate 2.39, BUN 3, Sr Cr 2, recently diagnosed with UTI, treated with oral Augmentin. However, the urine sensitivity was resistant to Augmentin, sensitive to Rocephin. Therefore, rocephin  was started in the ER and continued during admission. Patient was afebrile, no leukocytosis.  Lactic acid improved with IV antibiotics and fluid resuscitation.  Renal function stable at 2.1, this is the patient's baseline renal function per historical records.  Repeat urine cultures continued to show no growth to date however patient maintained on Rocephin 1 g daily.  Since patient was being treated for UTI prior to admission, the urine culture may have been affected.  Patient continued to have waxing and waning mental status.  Neurology was consulted.  CT head was negative for acute findings.  An EEG was obtained which was negative for epileptiform activity.  Patient's Keppra dose was decreased per Neurology recs.  Patient's mental status continued to improve to baseline.  PT and OT were consulted for evaluation with recommendations for moderate intensity therapy made.  Wound care was consulted for treatment of deep tissue injury to sacrum. CM was consulted for SNF placement.  He was accepted to SNF at [facility] where they will continue wound care and PT/OT.  Discharge plan was discussed with patient's daughter and she was in agreement.  He will follow up with his PCP in about a week.     Goals of Care Treatment Preferences:  Code Status: DNR          What is most important right now is to focus on comfort and QOL .  Accordingly, we have decided that the best plan to meet the patient's goals includes enrolling in hospice care.         Consults:   Consults (From admission, onward)          Status Ordering Provider     Inpatient consult to Neurology  Once        Provider:  Cayden Guardado MD    Completed MATEUSZ COLLAZO            No new Assessment & Plan notes have been filed under this hospital service since the last note was generated.  Service: Hospital Medicine    Final Active Diagnoses:    Diagnosis Date Noted POA    PRINCIPAL PROBLEM:  Severe sepsis [A41.9, R65.20] 09/13/2024 Yes    Urinary  retention [R33.9] 09/14/2024 Yes    UTI (urinary tract infection) [N39.0] 09/13/2024 Yes    Encephalopathy, metabolic [G93.41] 09/13/2024 Yes    Pleural effusion on left [J90] 09/13/2024 Yes    Seizure disorder [G40.909] 06/23/2024 Yes    Severe protein-calorie malnutrition [E43] 10/17/2022 Yes    Debility [R53.81] 10/14/2022 Yes    Chronic renal disease, stage IV [N18.4] 05/06/2019 Yes     Chronic    Constipation [K59.00] 05/14/2013 Yes      Problems Resolved During this Admission:       Discharged Condition: stable    Disposition: Skilled Nursing Facility    Follow Up:   Follow-up Information       Aristides Haynes MD Follow up in 1 week(s).    Specialty: Family Medicine  Contact information:  [address]  [facility] [address]  [phone]                           Patient Instructions:      Diet Cardiac     Notify your health care provider if you experience any of the following:  temperature >100.4     Notify your health care provider if you experience any of the following:  persistent nausea and vomiting or diarrhea     Notify your health care provider if you experience any of the following:  severe uncontrolled pain     Notify your health care provider if you experience any of the following:  difficulty breathing or increased cough     Notify your health care provider if you experience any of the following:  severe persistent headache     Notify your health care provider if you experience any of the following:  persistent dizziness, light-headedness, or visual disturbances     Notify your health care provider if you experience any of the following:  increased confusion or weakness     Activity as tolerated       Significant Diagnostic Studies: Labs: All labs within the past 24 hours have been reviewed    Pending Diagnostic Studies:       None           Medications:  Reconciled Home Medications:      Medication List        START taking these medications      tamsulosin 0.4 mg Cap  Commonly known as: FLOMAX  Take  1 capsule (0.4 mg total) by mouth once daily.            CHANGE how you take these medications      calcitRIOL 0.25 MCG Cap  Commonly known as: ROCALTROL  TAKE 1 CAPSULE(0.25 MCG) BY MOUTH EVERY DAY  What changed: See the new instructions.     levETIRAcetam 500 MG Tab  Commonly known as: KEPPRA  Take 0.5 tablets (250 mg total) by mouth 2 (two) times daily.  What changed: how much to take            CONTINUE taking these medications      amLODIPine 10 MG tablet  Commonly known as: NORVASC  Take 1 tablet (10 mg total) by mouth before evening meal.     citalopram 20 MG tablet  Commonly known as: CeleXA  Take 1 tablet (20 mg total) by mouth once daily.     fluticasone propionate 50 mcg/actuation nasal spray  Commonly known as: FLONASE  2 sprays (100 mcg total) by Each Nostril route once daily.     hydrALAZINE 100 MG tablet  Commonly known as: APRESOLINE  Take 1 tablet (100 mg total) by mouth every 8 (eight) hours.     VITAMIN B-12 100 MCG tablet  Generic drug: cyanocobalamin  Take 100 mcg by mouth once daily.            STOP taking these medications      amoxicillin-clavulanate 875-125mg 875-125 mg per tablet  Commonly known as: AUGMENTIN     methylPREDNISolone 4 mg tablet  Commonly known as: MEDROL DOSEPACK              Indwelling Lines/Drains at time of discharge:   Lines/Drains/Airways       None                   Time spent on the discharge of patient: 32 minutes         VANE Desir  Department of Hospital Medicine  [facility]

## 2024-10-01 ENCOUNTER — DOCUMENT SCAN (OUTPATIENT)
Dept: HOME HEALTH SERVICES | Facility: HOSPITAL | Age: 89
End: 2024-10-01
Payer: OTHER GOVERNMENT

## 2024-10-01 NOTE — ASSESSMENT & PLAN NOTE
-- DO NOT REPLY / DO NOT REPLY ALL --  -- This inbox is not monitored. If this was sent to the wrong provider or department, reroute message to [employer] pool. --  -- Message is from Engagement Center Operations (ECO) --    General Patient Message: Patient states she just had cataract surgery and wants to know if its safe to travel. Wants a callback    Caller Information       Contact Date/Time Type Contact Phone/Fax    10/01/2024 09:27 AM CDT Phone (Incoming) Nat Goel (Self) [phone] (M)            Alternative phone number: na    Can a detailed message be left? Yes - Voicemail   Patient has been advised the message will be addressed within 2-3 business days.                 Chronic, stable  Continue to monitor on vitals

## 2024-10-03 NOTE — SUBJECTIVE & OBJECTIVE
Interval History: no acute events overnight.  Pending SNF placement.    Review of Systems   Unable to perform ROS: Dementia     Objective:     Vital Signs (Most Recent):  Temp: 98.8 °F (36.6 °C) (09/24/24 1234)  Pulse: (!) 56 (09/24/24 1234)  Resp: 18 (09/24/24 1234)  BP: (!) 147/65 (09/24/24 1234)  SpO2: 97 % (09/24/24 1234) Vital Signs (24h Range):        Weight: 66 kg (145 lb 8.1 oz)  Body mass index is 20.88 kg/m².     Physical Exam  Constitutional:       General: He is not in acute distress.     Comments: Frail, elderly   Eyes:      Extraocular Movements: Extraocular movements intact.      Pupils: Pupils are equal, round, and reactive to light.   Cardiovascular:      Rate and Rhythm: Normal rate and regular rhythm.      Heart sounds: Normal heart sounds.   Pulmonary:      Effort: Pulmonary effort is normal. No respiratory distress.      Breath sounds: Normal breath sounds.   Abdominal:      General: Bowel sounds are normal. There is no distension.      Palpations: Abdomen is soft.      Tenderness: There is no abdominal tenderness.   Musculoskeletal:         General: No swelling. Normal range of motion.      Comments: Pressure boots to BLE   Skin:     General: Skin is warm and dry.      Capillary Refill: Capillary refill takes less than 2 seconds.      Comments: Sacral dressing CDI   Neurological:      Mental Status: He is alert. Mental status is at baseline.      Motor: Weakness (Generalized) present.      Comments: Ox2, intermittent confusion             Significant Labs: All pertinent labs within the past 24 hours have been reviewed.    Significant Imaging: I have reviewed all pertinent imaging results/findings within the past 24 hours.   Please see message    Thank you

## 2024-10-03 NOTE — PROGRESS NOTES
Follow-up/Nut[facility] Medicine  Progress Note    Patient Name: Lavon Brandon  MRN: [id number]  Patient Class: IP- Inpatient   Admission Date: 9/13/2024  Length of Stay: 10 days  Attending Physician:Milind Walker,    Primary Care Provider: Aristides Haynes MD        Subjective:     Principal Problem:Severe sepsis        HPI:  89 year old male with a past medical history of hypertension, hyperlipidemia, CVA, PE, Seizure, UTI and dementia who presents to the emergency room with reports of abdominal and back pain and hallucinations. Also decreased bowel movements over last 2-4 days. He was recently diagnosed with UTI and started on augmentin. Per urine culture sensitivity, organism is resistant to Augmentin, sensitive to Rocephin.  Hypertensive in the ER at 212/94, reports did not take his home medication this morning.  WBC 9.56, mild MATA with serum creatinine 2.0, BUN 34, UA negative for leukocytes and nitrites.  Blood culture sent, lactic 2.39.  CT abdomen with trace left pleural effusion, large left renal cyst, CT head no acute findings, chest x-ray Blunting of the left costophrenic angle. IV rocephin given in ER with Hydralazine, amlodipine,  ml.  Admit to hospital medicine for further medical management.  The patient did have some urinary retention, a Draper catheter was placed with adequate urine output.  EEG pending.  Urine culture with no growth to date.  Patient's mental status continued to slowly improve.  He has tolerated p.o. diet.  PT and OT consulted for evaluation    Overview/Hospital Course:  89 year old hospitalized for UTI, AMS, severe sepsis. In Er, Lactate 2.39, BUN 3, Sr Cr 2, recently diagnosed with UTI, treated with oral Augmentin. However, the urine sensitivity was resistant to Augmentin, sensitive to Rocephin. Therefore, rocephin was started in the ER and continued during admission. Patient was afebrile, no leukocytosis.  Lactic acid improved with IV antibiotics and  fluid resuscitation.  Renal function stable at 2.1, this is the patient's baseline renal function per historical records.  Repeat urine cultures continued to show no growth to date however patient maintained on Rocephin 1 g daily.  Since patient was being treated for UTI prior to admission, the urine culture may have been affected.  Patient continued to have waxing and waning mental status.  Neurology was consulted.  CT head was negative for acute findings.  An EEG was obtained which was negative for epileptiform activity.  Patient's Keppra dose was decreased per Neurology recs.  Patient's mental status continued to improve to baseline.  PT and OT were consulted for evaluation with recommendations for moderate intensity therapy made.  Wound care was consulted for treatment of deep tissue injury to sacrum. CM was consulted for SNF placement.  He was accepted to SNF at [facility] where they will continue wound care and PT/OT.    Interval History: no acute events overnight.  Pending SNF placement.    Review of Systems   Unable to perform ROS: Dementia     Objective:     Vital Signs (Most Recent):  Temp: 98.8 °F (36.6 °C) (09/24/24 1234)  Pulse: (!) 56 (09/24/24 1234)  Resp: 18 (09/24/24 1234)  BP: (!) 147/65 (09/24/24 1234)  SpO2: 97 % (09/24/24 1234) Vital Signs (24h Range):        Weight: 66 kg (145 lb 8.1 oz)  Body mass index is 20.88 kg/m².     Physical Exam  Constitutional:       General: He is not in acute distress.     Comments: Frail, elderly   Eyes:      Extraocular Movements: Extraocular movements intact.      Pupils: Pupils are equal, round, and reactive to light.   Cardiovascular:      Rate and Rhythm: Normal rate and regular rhythm.      Heart sounds: Normal heart sounds.   Pulmonary:      Effort: Pulmonary effort is normal. No respiratory distress.      Breath sounds: Normal breath sounds.   Abdominal:      General: Bowel sounds are normal. There is no distension.      Palpations: Abdomen  is soft.      Tenderness: There is no abdominal tenderness.   Musculoskeletal:         General: No swelling. Normal range of motion.      Comments: Pressure boots to BLE   Skin:     General: Skin is warm and dry.      Capillary Refill: Capillary refill takes less than 2 seconds.      Comments: Sacral dressing CDI   Neurological:      Mental Status: He is alert. Mental status is at baseline.      Motor: Weakness (Generalized) present.      Comments: Ox2, intermittent confusion             Significant Labs: All pertinent labs within the past 24 hours have been reviewed.    Significant Imaging: I have reviewed all pertinent imaging results/findings within the past 24 hours.    Assessment/Plan:      * Severe sepsis  Resolved--due to UTI    Urinary retention  Resolved.  Continues voiding without issues  Continue flomax        Pleural effusion on left  Patient found to have  trace  pleural effusion on imaging. I have personally reviewed and interpreted the following imaging: Xray. A thoracentesis was deferred. Most likely etiology includes  CDK st III . Management to include Diuresis      Encephalopathy, metabolic  Possibly multifactorial:  UTI, underlying dementia  History of dementia  Completed course of IV Rocephin for UTI.  Final urine culture negative, however, patient treated for UTI prior to admission and possibly affected culture results  Neurology consulted and evaluated: Encephalopathy likely secondary to hypertensive emergency and UTI. Concern for acute intracranial pathology is low. CT head on admission was negative for acute pathology.   EEG negative for epileptiform activity   Encephalopathy workup unremarkable  Mentation likely at baseline    UTI (urinary tract infection)  Final urine culture negative--was being treated for UTI prior to admission and may have affected urine culture results  Completed IV rocephin x 7 days-completed         Seizure disorder  Controlled   neurology consulted  Keppra level  therapeutic  Continue keppra, dose adjusted per Neuro to 250mg BID  EEG without epileptiform seizure activity noted  Seizure precautions         Severe protein-calorie malnutrition  Nutrition consulted. Most recent weight and BMI monitored-     Measurements:  Wt Readings from Last 1 Encounters:   09/13/24 66 kg (145 lb 8.1 oz)   Body mass index is 20.88 kg/m².    Patient has been screened and assessed by RD.    Malnutrition Type:  Context: chronic illness  Level: severe    Malnutrition Characteristic Summary:  Weight Loss (Malnutrition): greater than 5% in 1 month  Subcutaneous Fat (Malnutrition): moderate depletion  Muscle Mass (Malnutrition): mild depletion    Interventions/Recommendations (treatment strategy):  1. Continue Cardiac diet as tolerated. 2. Recommend and ordered Ensure +HP vanilla flavor with meals.      Debility  Patient with Acute debility due to age-related physical debility. Latest AMPAC and GEMS scores have not been reviewed. Evaluation for etiology is underway. Plan includes PT/OT consulted and fall precautions in place.   Awaiting placement    Chronic renal disease, stage IV  Creatine stable for now. BMP reviewed- noted Estimated Creatinine Clearance: 25 mL/min (A) (based on SCr of 1.8 mg/dL (H)). according to latest data. Based on current GFR, CKD stage is stage 3 - GFR 30-59.  Monitor UOP and serial BMP and adjust therapy as needed. Renally dose meds. Avoid nephrotoxic medications and procedures.    Constipation  Resolved/controlled.  Continue Dulcolax suppository PRN      VTE Risk Mitigation (From admission, onward)           Ordered     IP VTE HIGH RISK PATIENT  Once         09/13/24 1455                    Discharge Planning   JOANNE: 9/26/2024     Code Status: Prior   Is the patient medically ready for discharge?:     Reason for patient still in hospital (select all that apply): Pending disposition  Discharge Plan A: Skilled Nursing Facility   Discharge Delays: None known at this  time              Lisbeth Meraz, FNP  Department of Hospital Medicine   [facility]

## 2024-10-23 ENCOUNTER — TELEPHONE (OUTPATIENT)
Dept: PRIMARY CARE CLINIC | Facility: CLINIC | Age: 89
End: 2024-10-23
Payer: OTHER GOVERNMENT

## 2024-10-23 DIAGNOSIS — R53.81 PHYSICAL DECONDITIONING: ICD-10-CM

## 2024-10-23 DIAGNOSIS — A41.9 SEPSIS, DUE TO UNSPECIFIED ORGANISM, UNSPECIFIED WHETHER ACUTE ORGAN DYSFUNCTION PRESENT: ICD-10-CM

## 2024-10-23 DIAGNOSIS — G93.40 ACUTE ENCEPHALOPATHY: Primary | ICD-10-CM

## 2024-10-23 NOTE — TELEPHONE ENCOUNTER
----- Message from Ava sent at 10/23/2024  2:51 PM CDT -----   Type:  Needs Medical Advice    Who Called: Clear/EL    Best Call Back Number: [phone]      Additional Information: states want to see if Dr Haynes will write orders to continue wit  in [place]   Please call back to advise. Thank you!

## 2024-10-23 NOTE — TELEPHONE ENCOUNTER
----- Message from Doni sent at 10/23/2024  9:39 AM CDT -----  Regarding: return call  Contact: Latonia jamison [facility]  Type:  Patient Returning Call    Who Called:Latonia with  [facility]  Who Left Message for Patient:nurse  Does the patient know what this is regarding?:D/C patient today  Would the patient rather a call back or a response via [employer]? Please call  Best Call Back Number:[phone]  Additional Information:

## 2024-10-31 ENCOUNTER — TELEPHONE (OUTPATIENT)
Dept: PRIMARY CARE CLINIC | Facility: CLINIC | Age: 89
End: 2024-10-31
Payer: OTHER GOVERNMENT

## 2024-10-31 DIAGNOSIS — G93.40 ACUTE ENCEPHALOPATHY: Primary | ICD-10-CM

## 2024-10-31 DIAGNOSIS — R53.81 PHYSICAL DECONDITIONING: ICD-10-CM

## 2024-10-31 DIAGNOSIS — F03.918 DEMENTIA WITH BEHAVIORAL DISTURBANCE: ICD-10-CM

## 2024-10-31 DIAGNOSIS — A41.9 SEPSIS, DUE TO UNSPECIFIED ORGANISM, UNSPECIFIED WHETHER ACUTE ORGAN DYSFUNCTION PRESENT: ICD-10-CM

## 2024-11-04 ENCOUNTER — TELEPHONE (OUTPATIENT)
Dept: PRIMARY CARE CLINIC | Facility: CLINIC | Age: 89
End: 2024-11-04
Payer: OTHER GOVERNMENT

## 2024-11-04 NOTE — TELEPHONE ENCOUNTER
----- Message from Madhavi sent at 11/4/2024  3:39 PM CST -----  Type: Needs Medical Advice  Who Called:  Jorge [facility]  Best Call Back Number: [phone]  Additional Information: calling because she is verifying that orders was sent to [facility] for patient, [facility] stated they have not received those orders pt is being discharged from rehab on 11/7

## 2024-11-06 ENCOUNTER — PATIENT MESSAGE (OUTPATIENT)
Dept: PRIMARY CARE CLINIC | Facility: CLINIC | Age: 89
End: 2024-11-06
Payer: OTHER GOVERNMENT

## 2024-11-06 DIAGNOSIS — I10 PRIMARY HYPERTENSION: Primary | ICD-10-CM

## 2024-11-08 RX ORDER — LISINOPRIL 40 MG/1
40 TABLET ORAL DAILY
Qty: 90 TABLET | Refills: 3 | Status: SHIPPED | OUTPATIENT
Start: 2024-11-08 | End: 2025-11-08

## 2024-11-08 NOTE — TELEPHONE ENCOUNTER
I will send a Nurse Practitioner to his home for evaluation. Lisinopril sent to pharmacy.    Respite care provides short-term relief for primary caregivers, giving them time to rest, travel, or spend time with other family and friends. The care may last anywhere from a few hours to several weeks at a time. Respite care can take place at home, in a health care facility, or at an adult day care center. Some long-term care insurance plans may have coverage for this service. Does she want me to put in a referral for respite care?

## 2024-11-11 ENCOUNTER — TELEPHONE (OUTPATIENT)
Dept: PRIMARY CARE CLINIC | Facility: CLINIC | Age: 89
End: 2024-11-11
Payer: MEDICARE

## 2024-11-11 ENCOUNTER — OFFICE VISIT (OUTPATIENT)
Dept: HOME HEALTH SERVICES | Facility: CLINIC | Age: 89
End: 2024-11-11
Payer: MEDICARE

## 2024-11-11 VITALS — DIASTOLIC BLOOD PRESSURE: 50 MMHG | HEART RATE: 55 BPM | OXYGEN SATURATION: 97 % | SYSTOLIC BLOOD PRESSURE: 108 MMHG

## 2024-11-11 DIAGNOSIS — Z63.6 CAREGIVER BURDEN: ICD-10-CM

## 2024-11-11 DIAGNOSIS — N18.32 STAGE 3B CHRONIC KIDNEY DISEASE: ICD-10-CM

## 2024-11-11 DIAGNOSIS — R35.1 NOCTURIA: ICD-10-CM

## 2024-11-11 DIAGNOSIS — G40.909 NONINTRACTABLE EPILEPSY WITHOUT STATUS EPILEPTICUS, UNSPECIFIED EPILEPSY TYPE: ICD-10-CM

## 2024-11-11 DIAGNOSIS — Z78.9 IMPAIRED MOBILITY AND ACTIVITIES OF DAILY LIVING: ICD-10-CM

## 2024-11-11 DIAGNOSIS — R53.1 WEAKNESS GENERALIZED: ICD-10-CM

## 2024-11-11 DIAGNOSIS — G40.909 SEIZURE DISORDER: ICD-10-CM

## 2024-11-11 DIAGNOSIS — L89.152 PRESSURE INJURY OF SACRAL REGION, STAGE 2: ICD-10-CM

## 2024-11-11 DIAGNOSIS — K59.00 CONSTIPATION, UNSPECIFIED CONSTIPATION TYPE: ICD-10-CM

## 2024-11-11 DIAGNOSIS — I95.2 HYPOTENSION DUE TO DRUGS: Primary | ICD-10-CM

## 2024-11-11 DIAGNOSIS — R54 FRAILTY: ICD-10-CM

## 2024-11-11 DIAGNOSIS — Z74.09 IMPAIRED MOBILITY AND ACTIVITIES OF DAILY LIVING: ICD-10-CM

## 2024-11-11 DIAGNOSIS — F03.918 DEMENTIA WITH BEHAVIORAL DISTURBANCE: Chronic | ICD-10-CM

## 2024-11-11 DIAGNOSIS — I10 PRIMARY HYPERTENSION: ICD-10-CM

## 2024-11-11 SDOH — SOCIAL DETERMINANTS OF HEALTH (SDOH): DEPENDENT RELATIVE NEEDING CARE AT HOME: Z63.6

## 2024-11-11 NOTE — TELEPHONE ENCOUNTER
daughter took bp this morning read 129/49. When home health got there it was 110/48 , 106/48 and 107/49. She elevated feet and gave water to pt and it went to 113/52. Pt also was released from skilled nursing on Wednesday 11/6 and has not had a BM since being home     Virtual visit with you 11/14

## 2024-11-11 NOTE — TELEPHONE ENCOUNTER
----- Message from Mariel sent at 11/11/2024  1:39 PM CST -----  Type:  Needs Medical Advice    Who Called: Ada GENAO    Symptoms (please be specific): low BP reading, not urinating during the day sleeping more than normal     How long has patient had these symptoms:  na    Pharmacy name and phone #:    [id number] [address] & [address] [address]  Phone: [phone] Fax: [phone]    Would the patient rather a call back or a response via [facility]? Call back    Best Call Back Number: Ada - [phone]    Additional Information: daughter took bp this morning read 129/49. When home health got there it was 110/48 , 106/48 and 107/49. She elevated feet and gave water to pt and it went to 113/52. Pt also was released from skilled nursing on Wednesday 11/6 and has not had a BM since being home.     Please call Back to advise. Thanks!

## 2024-11-11 NOTE — PROGRESS NOTES
"[facility]  Transitional Care Management (TCM) Home Visit    Encounter Provider: ROSALIA HO,   PCP: Aristides Haynes MD  Consult Requested By: Jillian Patton  Admit Date: 9/13/24   IP Discharge Date: 9/26/24, 11/6/24 from SNF  Hospital Length of Stay: 12 days (hospital)  Days since discharge (from IP or SNF): 5 days  [facility] Contact Note: 11/8  Hospital Diagnosis: Primary hypertension [I10]     HISTORY OF PRESENT ILLNESS      Patient ID: Lavon Brandon is a 89 y.o. male was recently admitted to the hospital, this is their TCM encounter.    Hospital Course Synopsis:  "89 year old hospitalized for UTI, AMS, severe sepsis. In Er, Lactate 2.39, BUN 3, Sr Cr 2, recently diagnosed with UTI, treated with oral Augmentin. However, the urine sensitivity was resistant to Augmentin, sensitive to Rocephin. Therefore, rocephin was started in the ER and continued during admission. Patient was afebrile, no leukocytosis. Lactic acid improved with IV antibiotics and fluid resuscitation. Renal function stable at 2.1, this is the patient's baseline renal function per historical records. Repeat urine cultures continued to show no growth to date however patient maintained on Rocephin 1 g daily. Since patient was being treated for UTI prior to admission, the urine culture may have been affected. Patient continued to have waxing and waning mental status. Neurology was consulted. CT head was negative for acute findings. An EEG was obtained which was negative for epileptiform activity. Patient's Keppra dose was decreased per Neurology recs. Patient's mental status continued to improve to baseline. PT and OT were consulted for evaluation with recommendations for moderate intensity therapy made. Wound care was consulted for treatment of deep tissue injury to sacrum. CM was consulted for SNF placement. He was accepted to SNF at [facility] where they will continue wound care and PT/OT. " ""        Developments since hospitalization and current needs:   HH in home at my arrival. They report to me slight hypotension - readings low 100s/50s- 120s/60s. Patient asymptomatic. They also report patient has had no bowel movement since discharge.    Daughter Gabriela reports pt is having "episodes" of disorientation and weakness/fatigue. Likely due to medication induced hypotension episodes.     Patient on numerous BP medications. Lisinopril every AM, Hydralazine every 8h (6a, 2p, 9p) as well as amlodipine at 9p.     Discussed BP regimen in detail with daughter today. I would like her to take BP just prior to medication administration. If BP is 95/60 or less I would like her to hold that dose of BP medication, and recheck before next dose. Discussed purpose of BP medication is to treat high blood pressure, however if BP is not elevated, medication can cause more harm then help.She verbalizes understanding, agrees with plan.     Reports less urinary output during the day, more at night. Discussed normal physiology of renal perfusion when lying.     No BM since discharged from SNF, states stool was very hard while hospitalized. She has started giving Miralax. Okay to give Mirlax BID until bowels resume, then decrease to daily until good bowel regimen is established.    Keppra dose clarified for daughter. He is to receive 250mg BID.     Patient has an upcoming lab visit- daughter is unable to get him to physical appointments due to weakness. Will get lab orders sent to [facility] for RN to collect. A kiana lift is needed for transfer between bed and a chair, wheelchair, or commode because without the use of a lift, the patient would be bed confined.     Stage 2 pressure ulcer to backside. Discussed turning and shifting weight to optimize healing. Verbalizes understanding.     Receives some aide services via [facility] but only about 4 hours, 3 days per week. Daughter is interested in respite care order placed " today.     Current ADL functionality -   lives queta daughter  Mobility- wheelchair bound but extreme weakness since discharge, currently unable to help transfer to wheelchair at all. In recliner for duration of visit today.  Appetite is decreased but adequate  Elimination - no BM since discharge. Nocturia    Sleep- On and off during the day and night.   Medication Management per daughter.   Medication needs at this time - none  Transportation to and from appointments via daughter       DECISION MAKING TODAY       Assessment & Plan: SEE HPI    1. Hypotension due to drugs (medication)  Hold BP dose for hypotension, recheck before next dose.   See HPI  Encourage hydration     Urinalysis, Reflex to Urine Culture Urine, Clean Catch     2. Constipation, unspecified constipation type  -Miralax discussed, see HPI      3. Pressure injury of sacral region, stage 2   Rotate body positioning.    HH RN to help manage   -A luz lift is needed for transfer between bed and a chair, wheelchair, or commode because without the use of a lift, the patient would be bed confined.      PATIENT (LUZ) LIFT FOR HOME USE       4. Weakness generalized   Likely secondary to hypotension episodes.    Will r/o UTI. UA order placed for HH to collect.    -A luz lift is needed for transfer between bed and a chair, wheelchair, or commode because without the use of a lift, the patient would be bed confined.      PATIENT (LUZ) LIFT FOR HOME USE      Urinalysis, Reflex to Urine Culture Urine, Clean Catch       5. Nocturia  Controlled.  Continue flomax as prescribed.   Will recheck UA    6. Frailty     PATIENT (LUZ) LIFT FOR HOME USE       7. Caregiver burden   -Discussed respite care, order placed today.   -order sent to [facility]   -   SUBSEQUENT HOME HEALTH ORDERS     Admit to Respite care for caregiver reprieve and maintenance of patient's medical care and safety.       8. Primary hypertension  Overview:  renal htn    Orders:  -Noncompliant  with medications prior to hospitalizatio, hypertension previously uncontrolled  -Trending slightly hypotensive now that compliant with medications.   -Daugter to hold BP medication for hypotension.  -     Ambulatory referral/consult to [facility]    9. Impaired mobility and activities of daily living   -C. A luz lift is needed for transfer between bed and a chair, wheelchair, or commode because without the use of a lift, the patient would be bed confined.    -Continue      PATIENT (LUZ) LIFT FOR HOME USE     10. Stage 3b chronic kidney disease  Assessment & Plan:  Chronic and stable.   Continue current management.   Recommend avoid nsaids and other nephrotoxic medications.   Recheck CBC, CMP via HH RN.   Follow up with PCP and/or nephrologist.      Latest Reference Range & Units 09/13/24   Sodium 136 - 145 mmol/L 140   Potassium 3.5 - 5.1 mmol/L 4.6   Chloride 95 - 110 mmol/L 104   CO2 23 - 29 mmol/L 28   Anion Gap 8 - 16 mmol/L 8   BUN 10 - 30 mg/dL 34 (H)   Creatinine 0.5 - 1.4 mg/dL 2.0 (H)   eGFR >60 mL/min/1.73 m^2 30.9 !   (H): Data is abnormally high  !: Data is abnormal      11. Seizure disorder  Assessment & Plan:  Controlled.   No recent seizure activity.   Dose decrease in hospital to 250mg BID.   Continue this dose.  F/u with neurology as instructed.         Orders Placed This Encounter   Procedures    PATIENT (LUZ) LIFT FOR HOME USE     Order Specific Question:   Height:     Answer:   5f10in     Order Specific Question:   Weight:     Answer:   66kg     Order Specific Question:   Length of need (1-99 months):     Answer:   99    Urinalysis, Reflex to Urine Culture Urine, Clean Catch     [facility]  to collect     Standing Status:   Future     Standing Expiration Date:   1/10/2026     Order Specific Question:   Preferred Collection Type     Answer:   Urine, Clean Catch     Order Specific Question:   Specimen Source     Answer:   Urine     Order Specific Question:   Send normal result  to authorizing provider's In Basket if patient is active on MyChart:     Answer:   Yes    SUBSEQUENT HOME HEALTH ORDERS     Admit to Respite care for caregiver reprieve and maintenance of patient's medical care and safety.     Order Specific Question:   What Home Health Agency is the patient currently using?     Answer:   Other/External     Comments:   [facility]          ENVIRONMENT OF CARE      Family and/or Caregiver present at visit?  Yes  Name of Caregiver: daughter Gabriela  History provided by: caregiver    Advance Care Planning   Advanced Care Planning Status:  Patient has had an ACP conversation  Living Will: No  Power of : Yes  [place]: No    Does Caregiver have HCPoA: Yes  Changes today: n/a       Impression upon entering the home:  Physical Dwelling: single family home   Appearance of home environment: cleanliness: clean, walking pathways: clear, lighting: adequate, and home structure: sound structure  Functional Status: max assistance  Mobility: chair bound  Nutritional access: adequate intake and access  Home Health: Yes, HH Agency [facility]    DME/Supplies: rolling walker and wheelchair     Diagnostic tests reviewed/disposition: No diagnosic tests pending after this hospitalization.  Disease/illness education:  UTI, sepsis, hypotension, constipation  Establishment or re-establishment of referral orders for community resources: No other necessary community resources.   Discussion with other health care providers:  [facility] .   Does patient have a PCP at [facility]? Yes   Repatriation plan with PCP? follow-up with PCP within 90d   Does patient have an ostomy (ileostomy, colostomy, suprapubic catheter, nephrostomy tube, tracheostomy, PEG tube, pleurex catheter, cholecystostomy, etc)? No  Were BPAs reviewed? Yes    Social History     Socioeconomic History    Marital status:    Tobacco Use    Smoking status: Never    Smokeless tobacco: Never   Substance and Sexual Activity    Alcohol use: No     Drug use: No     Social Drivers of Health     Financial Resource Strain: Low Risk  (9/13/2024)    Overall Financial Resource Strain (CARDIA)     Difficulty of Paying Living Expenses: Not hard at all   Food Insecurity: No Food Insecurity (9/13/2024)    Hunger Vital Sign     Worried About Running Out of Food in the Last Year: Never true     Ran Out of Food in the Last Year: Never true   Transportation Needs: No Transportation Needs (9/13/2024)    TRANSPORTATION NEEDS     Transportation : No   Physical Activity: Inactive (9/13/2024)    Exercise Vital Sign     Days of Exercise per Week: 0 days     Minutes of Exercise per Session: 0 min   Stress: No Stress Concern Present (9/13/2024)    [place] [employer] - Occupational Stress Questionnaire     Feeling of Stress : Not at all   Housing Stability: Low Risk  (9/13/2024)    Housing Stability Vital Sign     Unable to Pay for Housing in the Last Year: No     Homeless in the Last Year: No         OBJECTIVE:     Vital Signs:  Vitals:    11/11/24 1426   BP: (!) 108/50   Pulse: (!) 55       Review of Systems   Constitutional:  Positive for activity change and fatigue. Negative for appetite change, fever and unexpected weight change.   HENT: Negative.     Eyes: Negative.    Respiratory: Negative.     Cardiovascular: Negative.    Gastrointestinal: Negative.    Endocrine: Negative.    Genitourinary: Negative.         Nocturia   Musculoskeletal:  Positive for gait problem.   Skin:  Positive for wound.   Neurological:  Positive for weakness. Negative for light-headedness.   Hematological: Negative.    Psychiatric/Behavioral:  Positive for confusion.        Physical Exam:  Physical Exam  Constitutional:       General: He is not in acute distress.     Appearance: He is normal weight. He is ill-appearing.   HENT:      Head: Normocephalic and atraumatic.      Nose: Nose normal.      Mouth/Throat:      Mouth: Mucous membranes are dry.      Pharynx: Oropharynx is clear.    Eyes:      Extraocular Movements: Extraocular movements intact.      Conjunctiva/sclera: Conjunctivae normal.      Pupils: Pupils are equal, round, and reactive to light.   Cardiovascular:      Rate and Rhythm: Regular rhythm. Bradycardia present.      Pulses: Normal pulses.      Heart sounds: Normal heart sounds.   Pulmonary:      Effort: Pulmonary effort is normal.      Breath sounds: Normal breath sounds.   Abdominal:      General: Bowel sounds are normal. There is no distension.      Palpations: Abdomen is soft.      Tenderness: There is no abdominal tenderness.   Musculoskeletal:      Cervical back: Normal range of motion and neck supple.   Skin:     General: Skin is warm and dry.      Capillary Refill: Capillary refill takes 2 to 3 seconds.      Comments: Stage 2 to sacrum   Neurological:      Mental Status: He is alert.      Motor: Weakness present.   Psychiatric:      Comments: Minimal interaction  Oriented to person only  Calm, cooperative         INSTRUCTIONS FOR PATIENT:   Medication List on Discharge:     Medication List            Accurate as of November 11, 2024 11:59 PM. If you have any questions, ask your nurse or doctor.                CHANGE how you take these medications      calcitRIOL 0.25 MCG Cap  Commonly known as: ROCALTROL  TAKE 1 CAPSULE(0.25 MCG) BY MOUTH EVERY DAY  What changed: See the new instructions.            CONTINUE taking these medications      amLODIPine 10 MG tablet  Commonly known as: NORVASC  Take 1 tablet (10 mg total) by mouth before evening meal.     citalopram 20 MG tablet  Commonly known as: CeleXA  Take 1 tablet (20 mg total) by mouth once daily.     fluticasone propionate 50 mcg/actuation nasal spray  Commonly known as: FLONASE  2 sprays (100 mcg total) by Each Nostril route once daily.     hydrALAZINE 100 MG tablet  Commonly known as: APRESOLINE  Take 1 tablet (100 mg total) by mouth every 8 (eight) hours.     levETIRAcetam 500 MG Tab  Commonly known as:  KEPPRA  Take 0.5 tablets (250 mg total) by mouth 2 (two) times daily.     lisinopriL 40 MG tablet  Commonly known as: PRINIVIL,ZESTRIL  Take 1 tablet (40 mg total) by mouth once daily.     tamsulosin 0.4 mg Cap  Commonly known as: FLOMAX  Take 1 capsule (0.4 mg total) by mouth once daily.     VITAMIN B-12 100 MCG tablet  Generic drug: cyanocobalamin  Take 100 mcg by mouth once daily.              Medication Reconciliation:  Were medications changed on discharge? Yes  Were medications in the home? Yes  Is the patient taking the medications as directed? Yes  Does the patient/caregiver understand the medications and changes? Yes  Does updated med list accurately reflects meds patient is currently taking? Yes      Scheduled Follow-up, Appts Reviewed with Modifications if Needed: Yes  Future Appointments   Date Time Provider Department Center   11/14/2024 12:00 PM Aristides Haynes MD [facility] [place]         Signature:      ROSALIA HO,, NP    Transition of Care Visit:  I have reviewed and updated the history and problem list.  I have reconciled the medication list.  I have discussed the hospitalization and current medical issues, prognosis and plans with the patient/family.

## 2024-11-12 ENCOUNTER — TELEPHONE (OUTPATIENT)
Dept: HOME HEALTH SERVICES | Facility: CLINIC | Age: 89
End: 2024-11-12
Payer: MEDICARE

## 2024-11-12 DIAGNOSIS — I10 PRIMARY HYPERTENSION: Primary | ICD-10-CM

## 2024-11-12 DIAGNOSIS — E78.2 MIXED HYPERLIPIDEMIA: Chronic | ICD-10-CM

## 2024-11-12 NOTE — ASSESSMENT & PLAN NOTE
Chronic and stable.   Continue current management.   Recommend avoid nsaids and other nephrotoxic medications.   Recheck CBC, CMP via HH RN.   Follow up with PCP and/or nephrologist.      Latest Reference Range & Units 09/13/24   Sodium 136 - 145 mmol/L 140   Potassium 3.5 - 5.1 mmol/L 4.6   Chloride 95 - 110 mmol/L 104   CO2 23 - 29 mmol/L 28   Anion Gap 8 - 16 mmol/L 8   BUN 10 - 30 mg/dL 34 (H)   Creatinine 0.5 - 1.4 mg/dL 2.0 (H)   eGFR >60 mL/min/1.73 m^2 30.9 !   (H): Data is abnormally high  !: Data is abnormal

## 2024-11-12 NOTE — ASSESSMENT & PLAN NOTE
A kiana lift is needed for transfer between bed and a chair, wheelchair, or commode because without the use of a lift, the patient would be bed confined.

## 2024-11-12 NOTE — TELEPHONE ENCOUNTER
Sw to  nurse via phone. Recommendations given per Dr. Haynes. Caller verbalized understanding. Sts she will reach out to daughter

## 2024-11-12 NOTE — TELEPHONE ENCOUNTER
Faxed orders, notes, and demographics to [facility] and [facility] for Respite care and kiana lift for patient per NP request.  Fax confirmation received.

## 2024-11-12 NOTE — TELEPHONE ENCOUNTER
Orders placed per NP request for Labs to be drawn on next nursing visit (Lipid, CMP, CBC, Hemoglobin A1C, and Levetiracetam level for Dr. Aristides Haynes and a U/A and C&S for Mel Johnson.  Orders faxed to [facility] for collection on next nursing visit.  Labs results need to go to Alexis Haynes.   Fax confirmation received.

## 2024-11-12 NOTE — ASSESSMENT & PLAN NOTE
Controlled.   No recent seizure activity.   Dose decrease in hospital to 250mg BID.   Continue this dose.  F/u with neurology as instructed.

## 2024-11-13 DIAGNOSIS — R73.9 HYPERGLYCEMIA: ICD-10-CM

## 2024-11-13 DIAGNOSIS — G40.909 SEIZURE DISORDER: Primary | ICD-10-CM

## 2024-11-13 DIAGNOSIS — N18.32 STAGE 3B CHRONIC KIDNEY DISEASE: ICD-10-CM

## 2024-11-13 DIAGNOSIS — E78.2 MIXED HYPERLIPIDEMIA: Chronic | ICD-10-CM

## 2024-11-14 ENCOUNTER — OFFICE VISIT (OUTPATIENT)
Dept: PRIMARY CARE CLINIC | Facility: CLINIC | Age: 89
End: 2024-11-14
Payer: OTHER GOVERNMENT

## 2024-11-14 DIAGNOSIS — I10 PRIMARY HYPERTENSION: ICD-10-CM

## 2024-11-14 DIAGNOSIS — N18.32 STAGE 3B CHRONIC KIDNEY DISEASE: ICD-10-CM

## 2024-11-14 DIAGNOSIS — L89.153 PRESSURE INJURY OF SACRAL REGION, STAGE 3: ICD-10-CM

## 2024-11-14 DIAGNOSIS — F43.25 ADJUSTMENT DISORDER WITH MIXED DISTURBANCE OF EMOTIONS AND CONDUCT: Primary | ICD-10-CM

## 2024-11-14 DIAGNOSIS — R73.9 HYPERGLYCEMIA: ICD-10-CM

## 2024-11-14 DIAGNOSIS — G40.309 GENERALIZED SEIZURE DISORDER: ICD-10-CM

## 2024-11-14 DIAGNOSIS — G47.33 OSA ON CPAP: ICD-10-CM

## 2024-11-14 PROCEDURE — 99215 OFFICE O/P EST HI 40 MIN: CPT | Mod: 95,,, | Performed by: FAMILY MEDICINE

## 2024-11-14 PROCEDURE — G2211 COMPLEX E/M VISIT ADD ON: HCPCS | Mod: 95,,, | Performed by: FAMILY MEDICINE

## 2024-11-14 RX ORDER — LORAZEPAM 0.5 MG/1
0.5 TABLET ORAL NIGHTLY
Qty: 15 TABLET | Refills: 0 | Status: SHIPPED | OUTPATIENT
Start: 2024-11-14 | End: 2024-12-14

## 2024-11-14 RX ORDER — HYDRALAZINE HYDROCHLORIDE 50 MG/1
50 TABLET, FILM COATED ORAL EVERY 12 HOURS
Start: 2024-11-14 | End: 2025-11-14

## 2024-11-14 RX ORDER — LISINOPRIL 20 MG/1
20 TABLET ORAL DAILY
Qty: 90 TABLET | Refills: 3 | Status: SHIPPED | OUTPATIENT
Start: 2024-11-14 | End: 2025-11-14

## 2024-11-14 NOTE — PROGRESS NOTES
The patient location is:  Home  The chief complaint leading to consultation is:  Dementia    Visit type: audiovisual    Face to Face time with patient:  60 SUBJECTIVE:   Lavon Brandon is a 89 y.o. male presenting for his annual checkup.  History of Present Illness    CHIEF COMPLAINT:  Lavon presents for telemedicine exam of lumbosacral decubitus ulcers and management of multiple chronic conditions.    HPI:  Lavon recently transferred from a nursing home with lumbosacral decubitus ulcers, stage 2-3, estimated to be 2 cm deep. The ulcers have no purulent discharge and cause minimal pain. Lavon has difficulty maintaining an upright posture and is unable to sit for extended periods.    Lavon has symptoms of dementia, particularly stress-related sundowning episodes, leading to the prescription of Celexa 20 mg daily. He has a history of CVA with ischemic encephalopathy, managed with Keppra 250 mg twice daily.    Hypertension is being treated with lisinopril 20 mg daily and hydralazine, recently adjusted to 50 mg twice daily for improved blood pressure control. Lavon is alert but severely drowsy.    Lavon has a history of obstructive sleep apnea, characterized by loud snoring when not using his CPAP machine.    Lavon denies episodes of hypertension, chest pain, and shortness of breath.    MEDICATIONS:  Lavon is on Celexa 20 mg daily for stress related to sunset episodes associated with dementia. He is also taking Keppra 250 mg twice daily for his history of CVA with ischemic encephalopathy. He is on Lisinopril 20 mg daily and Hydralazine 50 mg twice daily to control his blood pressure.    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    TEST RESULTS:  Lavon  Use ear drops daily for 7 days.  May repeat for an additional 7 days of symptoms are improving but not resolved  May trial over-the-counter regimens of your preference for foot wart  Return to ED with any ear pain that is worsening or does not improve after 3 days, severe headache, fever, or as needed   underwent a sleep study which resulted in a diagnosis of obstructive sleep apnea.      ROS:  General: -fever, -chills, -fatigue, -weight gain, -weight loss  Eyes: -vision changes, -redness, -discharge  ENT: -ear pain, -nasal congestion, -sore throat  Cardiovascular: -chest pain, -palpitations, -lower extremity edema  Respiratory: -cough, -shortness of breath  Gastrointestinal: -abdominal pain, -nausea, -vomiting, -diarrhea, -constipation, -blood in stool  Genitourinary: -dysuria, -hematuria, -frequency  Musculoskeletal: -joint pain, -muscle pain  Skin: -rash, -lesion  Neurological: -headache, -dizziness, -numbness, -tingling  Psychiatric: -anxiety, -depression, -sleep difficulty, -emotional lability     Physical Exam          Assessment & Plan    Assessed pressure injury in sacral region as stage 3  Evaluated adjustment disorder with mixed disturbance of emotion and conduct  Noted well-controlled hypertension  Identified stage 3B chronic kidney disease  Recognized hyperglycemia  Evaluated need for wound care, including possible vacuum therapy  Assessed nutritional status, recommending increased protein intake  Considered advanced care planning, including potential DNR status    NUTRITION AND PROTEIN-CALORIE MALNUTRITION:  Discussed protein boost with peanut butter for nutrition.  Emphasized the importance of increased protein intake for nutritional status.  Zinc level ordered.    OBSTRUCTIVE SLEEP APNEA:  Lavon to use CPAP for obstructive sleep apnea management.    HYPERTENSION:  Lavon to continue blood pressure monitoring.  Continued lisinopril 20 mg daily for hypertension management.  Continued hydralazine 50 mg twice daily for blood pressure control.    PRESSURE ULCER AND REDUCED MOBILITY:  Recommend using improved liner or top mattress to prevent pressure injuries and improve endurance.  Referred to wound care doctor for evaluation and potential vacuum therapy.    DEMENTIA AND BEHAVIORAL DISTURBANCE:  Continued  Celexa 20 mg daily for dementia-related stress and sunset episodes.  Referred to home health for additional behavior intervention.    CEREBRAL INFARCTION AND COGNITIVE FUNCTIONS:  Continued Keppra 250 mg twice daily for history of CVA with ischemic encephalopathy.    LABS:  CMP (Comprehensive Metabolic Panel) ordered.  CBC (Complete Blood Count) ordered.  Lipid panel ordered.  Urinalysis ordered.    CHRONIC KIDNEY DISEASE:  Follow up to continue kidney monitoring.       Current Outpatient Medications   Medication Sig Dispense Refill    calcitRIOL (ROCALTROL) 0.25 MCG Cap TAKE 1 CAPSULE(0.25 MCG) BY MOUTH EVERY DAY (Patient taking differently: Take 0.25 mcg by mouth once daily.) 90 capsule 4    citalopram (CELEXA) 20 MG tablet Take 1 tablet (20 mg total) by mouth once daily. 90 tablet 11    cyanocobalamin (VITAMIN B-12) 100 MCG tablet Take 100 mcg by mouth once daily.      fluticasone propionate (FLONASE) 50 mcg/actuation nasal spray 2 sprays (100 mcg total) by Each Nostril route once daily. 18.2 mL 0    hydrALAZINE (APRESOLINE) 50 MG tablet Take 1 tablet (50 mg total) by mouth every 12 (twelve) hours.      levETIRAcetam (KEPPRA) 500 MG Tab Take 0.5 tablets (250 mg total) by mouth 2 (two) times daily. 90 tablet 3    lisinopriL (PRINIVIL,ZESTRIL) 20 MG tablet Take 1 tablet (20 mg total) by mouth once daily. 90 tablet 3    LORazepam (ATIVAN) 0.5 MG tablet Take 1 tablet (0.5 mg total) by mouth every evening. 15 tablet 0    tamsulosin (FLOMAX) 0.4 mg Cap Take 1 capsule (0.4 mg total) by mouth once daily. 30 capsule 0     No current facility-administered medications for this visit.     Allergies: Ciprofloxacin (bulk)     ROS:  Feeling well. No dyspnea or chest pain on exertion. No abdominal pain, change in bowel habits, black or bloody stools. No urinary tract or prostatic symptoms. No neurological complaints.    OBJECTIVE:   The patient appears well, alert, oriented x 3, in no distress.   There were no vitals taken for  this visit.  ENT normal.  Neck supple. No adenopathy or thyromegaly. MARY. Lungs are clear, good air entry, no wheezes, rhonchi or rales. S1 and S2 normal, no murmurs, regular rate and rhythm. Abdomen is soft without tenderness, guarding, mass or organomegaly.  exam: N/a.  Extremities show no edema, normal peripheral pulses. Neurological is normal without focal findings.  Lab Results   Component Value Date    WBC 5.67 09/26/2024    HGB 9.0 (L) 09/26/2024    HCT 27.8 (L) 09/26/2024     09/26/2024    CHOL 109 (L) 11/08/2023    TRIG 34 11/08/2023    HDL 49 11/08/2023    ALT 9 (L) 09/13/2024    AST 16 09/13/2024     09/26/2024    K 5.2 (H) 09/26/2024     09/26/2024    CREATININE 2.0 (H) 09/26/2024    BUN 40 (H) 09/26/2024    CO2 29 09/26/2024    TSH 1.926 09/16/2024    PSA 1.5 01/07/2010    INR 0.9 11/30/2023    HGBA1C 5.1 06/23/2024     ASSESSMENT:   1. Adjustment disorder with mixed disturbance of emotions and conduct  LORazepam (ATIVAN) 0.5 MG tablet      2. Primary hypertension  hydrALAZINE (APRESOLINE) 50 MG tablet    lisinopriL (PRINIVIL,ZESTRIL) 20 MG tablet    Comprehensive Metabolic Panel    CBC Auto Differential      3. Stage 3b chronic kidney disease        4. Hyperglycemia  Lipid Panel    Comprehensive Metabolic Panel    HEMOGLOBIN A1C      5. Generalized seizure disorder  Levetiracetam level      6. Pressure injury of sacral region, stage 3  Ambulatory referral/consult to Wound Clinic    HME - OTHER    Post-i urine collection system    SLIDING BOARD FOR HOME USE      7. MARLIN on CPAP  Home Sleep Study        healthy adult male  4Ms for Medical Decision-Making in Older Adults    Last Completed EAWV: None    MOBILITY:  Get Up and Go:       No data to display              Activities of Daily Living:       No data to display              Whisper Test:       No data to display              Disability Status:       No data to display              Nutrition Screening:       No data to  display             Screening Score: 0-7 Malnourished, 8-11 At Risk, 12-14 Normal  Fall Risk:      8/27/2024     2:30 PM 8/7/2024     1:00 PM 6/4/2024     8:00 AM   Fall Risk Assessment - Outpatient   Mobility Status Ambulatory Ambulatory w/ assistance Ambulatory w/ assistance   Number of falls 1 1 with injury 0   Identified as fall risk False True True   Wrist band applied True             MENTATION:   Depression Patient Health Questionnaire:      8/27/2024     2:54 PM   Depression Patient Health Questionnaire   Over the last two weeks how often have you been bothered by little interest or pleasure in doing things Not at all   Over the last two weeks how often have you been bothered by feeling down, depressed or hopeless Not at all   PHQ-2 Total Score 0     Has Dementia Dx: Yes  Has Anxiety Dx: No    Cognitive Function Screening:       No data to display              Cognitive Function Screening Total - Less than 4 = Abnormal,  Greater than or equal to 4 = Normal        MEDICATIONS:  High Risk Medications:  Total Active Medications: 3  citalopram - 20 MG  levETIRAcetam Tab - 500 MG  LORazepam - 0.5 MG    WHAT MATTERS MOST:  Advance Care Planning   ACP Status:   Patient has had an ACP conversation  Living Will: No  Power of : Yes  LaPOST: No    What is most important right now is to focus on comfort and QOL     Accordingly, we have decided that the best plan to meet the patient's goals includes enrolling in hospice care      What matters most to patient today is:  Away from the hospital         PLAN:   continue current medications, continue current healthy lifestyle patterns, and return for routine annual checkups  Sixty minutes of total time spent on the encounter, which includes face to face time and non-face to face time preparing to see the patient (eg, review of tests), Obtaining and/or reviewing separately obtained history, Documenting clinical information in the electronic or other health record,  Independently interpreting results (not separately reported) and communicating results to the patient/family/caregiver, or Care coordination (not separately reported).         Each patient to whom he or she provides medical services by telemedicine is:  (1) informed of the relationship between the physician and patient and the respective role of any other health care provider with respect to management of the patient; and (2) notified that he or she may decline to receive medical services by telemedicine and may withdraw from such care at any time.    Notes: Discussed health maintenance guidelines appropriate for age.

## 2024-11-15 ENCOUNTER — PATIENT MESSAGE (OUTPATIENT)
Dept: PRIMARY CARE CLINIC | Facility: CLINIC | Age: 89
End: 2024-11-15
Payer: MEDICARE

## 2024-11-19 ENCOUNTER — PATIENT MESSAGE (OUTPATIENT)
Dept: PRIMARY CARE CLINIC | Facility: CLINIC | Age: 89
End: 2024-11-19
Payer: MEDICARE

## 2024-11-19 ENCOUNTER — TELEPHONE (OUTPATIENT)
Dept: PRIMARY CARE CLINIC | Facility: CLINIC | Age: 89
End: 2024-11-19
Payer: MEDICARE

## 2024-11-19 ENCOUNTER — LAB VISIT (OUTPATIENT)
Dept: LAB | Facility: HOSPITAL | Age: 89
End: 2024-11-19
Payer: MEDICARE

## 2024-11-19 DIAGNOSIS — R35.1 NOCTURIA: ICD-10-CM

## 2024-11-19 DIAGNOSIS — R53.1 WEAKNESS GENERALIZED: ICD-10-CM

## 2024-11-19 LAB
BILIRUB UR QL STRIP: NEGATIVE
CLARITY UR REFRACT.AUTO: CLEAR
COLOR UR AUTO: YELLOW
GLUCOSE UR QL STRIP: NEGATIVE
HGB UR QL STRIP: NEGATIVE
KETONES UR QL STRIP: NEGATIVE
LEUKOCYTE ESTERASE UR QL STRIP: NEGATIVE
NITRITE UR QL STRIP: NEGATIVE
PH UR STRIP: 7 [PH] (ref 5–8)
PROT UR QL STRIP: NEGATIVE
SP GR UR STRIP: 1.01 (ref 1–1.03)
URN SPEC COLLECT METH UR: NORMAL

## 2024-11-19 PROCEDURE — 81003 URINALYSIS AUTO W/O SCOPE: CPT

## 2024-11-19 NOTE — TELEPHONE ENCOUNTER
----- Message from Mariel sent at 11/19/2024  9:19 AM CST -----  Name of Who is Calling: MANSOOR SEGAL [[id number]] (daughter)        What is the request in detail: Pt wife would like to know should she drop off Pt urine sample to the office.         Can the clinic reply by [facility]: No        What Number to Call Back if not in [facility]: Telephone Information:  Mobile          [phone]

## 2024-11-20 ENCOUNTER — PATIENT MESSAGE (OUTPATIENT)
Dept: HOME HEALTH SERVICES | Facility: CLINIC | Age: 89
End: 2024-11-20
Payer: MEDICARE

## 2024-11-21 ENCOUNTER — PATIENT MESSAGE (OUTPATIENT)
Dept: PRIMARY CARE CLINIC | Facility: CLINIC | Age: 89
End: 2024-11-21
Payer: MEDICARE

## 2024-11-21 DIAGNOSIS — G40.409 CENTRENCEPHALIC EPILEPSY: Primary | Chronic | ICD-10-CM

## 2024-11-25 ENCOUNTER — TELEPHONE (OUTPATIENT)
Dept: PRIMARY CARE CLINIC | Facility: CLINIC | Age: 89
End: 2024-11-25
Payer: MEDICARE

## 2024-11-25 DIAGNOSIS — N39.41 URGE INCONTINENCE OF URINE: Primary | ICD-10-CM

## 2024-12-02 ENCOUNTER — PATIENT MESSAGE (OUTPATIENT)
Dept: PRIMARY CARE CLINIC | Facility: CLINIC | Age: 89
End: 2024-12-02
Payer: MEDICARE

## 2024-12-04 ENCOUNTER — EXTERNAL HOME HEALTH (OUTPATIENT)
Dept: HOME HEALTH SERVICES | Facility: HOSPITAL | Age: 89
End: 2024-12-04
Payer: MEDICARE

## 2024-12-05 ENCOUNTER — DOCUMENT SCAN (OUTPATIENT)
Dept: HOME HEALTH SERVICES | Facility: HOSPITAL | Age: 89
End: 2024-12-05
Payer: MEDICARE

## 2024-12-11 ENCOUNTER — PATIENT MESSAGE (OUTPATIENT)
Dept: PRIMARY CARE CLINIC | Facility: CLINIC | Age: 89
End: 2024-12-11
Payer: MEDICARE

## 2024-12-11 DIAGNOSIS — F43.25 ADJUSTMENT DISORDER WITH MIXED DISTURBANCE OF EMOTIONS AND CONDUCT: ICD-10-CM

## 2024-12-12 ENCOUNTER — PATIENT MESSAGE (OUTPATIENT)
Dept: PRIMARY CARE CLINIC | Facility: CLINIC | Age: 89
End: 2024-12-12
Payer: MEDICARE

## 2024-12-12 RX ORDER — LORAZEPAM 0.5 MG/1
0.5 TABLET ORAL NIGHTLY
Qty: 30 TABLET | Refills: 3 | Status: SHIPPED | OUTPATIENT
Start: 2024-12-12

## 2024-12-12 RX ORDER — TAMSULOSIN HYDROCHLORIDE 0.4 MG/1
0.4 CAPSULE ORAL DAILY
Qty: 90 CAPSULE | Refills: 3 | Status: SHIPPED | OUTPATIENT
Start: 2024-12-12 | End: 2025-12-12

## 2024-12-12 NOTE — TELEPHONE ENCOUNTER
Can you sent  The LORazepam (ATIVAN) 0.5 MG tablet and tamsulosin (FLOMAX) 0.4 mg Cap medication to [facility] on [address]. Medications was sent to wrong pharmacy. Thanks

## 2024-12-22 ENCOUNTER — HOSPITAL ENCOUNTER (INPATIENT)
Facility: HOSPITAL | Age: 89
LOS: 3 days | Discharge: HOME HEALTH | DRG: 871 | End: 2024-12-25
Attending: EMERGENCY MEDICINE | Admitting: HOSPITALIST
Payer: OTHER GOVERNMENT

## 2024-12-22 DIAGNOSIS — R65.20 SEVERE SEPSIS: Primary | ICD-10-CM

## 2024-12-22 DIAGNOSIS — J18.9 COMMUNITY ACQUIRED PNEUMONIA, UNSPECIFIED LATERALITY: ICD-10-CM

## 2024-12-22 DIAGNOSIS — A41.9 SEVERE SEPSIS: Primary | ICD-10-CM

## 2024-12-22 DIAGNOSIS — Z13.6 SCREENING FOR CARDIOVASCULAR CONDITION: ICD-10-CM

## 2024-12-22 DIAGNOSIS — A41.9 SEPSIS: ICD-10-CM

## 2024-12-22 DIAGNOSIS — N17.9 AKI (ACUTE KIDNEY INJURY): ICD-10-CM

## 2024-12-22 PROBLEM — R41.82 ACUTE ALTERATION IN MENTAL STATUS: Status: ACTIVE | Noted: 2024-12-22

## 2024-12-22 LAB
ADENOVIRUS: NOT DETECTED
ALBUMIN SERPL BCP-MCNC: 3.6 G/DL (ref 3.5–5.2)
ALP SERPL-CCNC: 40 U/L (ref 55–135)
ALT SERPL W/O P-5'-P-CCNC: 6 U/L (ref 10–44)
ANION GAP SERPL CALC-SCNC: 10 MMOL/L (ref 8–16)
AST SERPL-CCNC: 13 U/L (ref 10–40)
BASOPHILS # BLD AUTO: 0.04 K/UL (ref 0–0.2)
BASOPHILS NFR BLD: 0.3 % (ref 0–1.9)
BILIRUB SERPL-MCNC: 0.4 MG/DL (ref 0.1–1)
BILIRUB UR QL STRIP: NEGATIVE
BORDETELLA PARAPERTUSSIS (IS1001): NOT DETECTED
BORDETELLA PERTUSSIS (PTXP): NOT DETECTED
BUN SERPL-MCNC: 53 MG/DL (ref 10–30)
CALCIUM SERPL-MCNC: 10.2 MG/DL (ref 8.7–10.5)
CHLAMYDIA PNEUMONIAE: NOT DETECTED
CHLORIDE SERPL-SCNC: 102 MMOL/L (ref 95–110)
CLARITY UR: CLEAR
CO2 SERPL-SCNC: 26 MMOL/L (ref 23–29)
COLOR UR: YELLOW
CORONAVIRUS 229E, COMMON COLD VIRUS: NOT DETECTED
CORONAVIRUS HKU1, COMMON COLD VIRUS: NOT DETECTED
CORONAVIRUS NL63, COMMON COLD VIRUS: NOT DETECTED
CORONAVIRUS OC43, COMMON COLD VIRUS: NOT DETECTED
CREAT SERPL-MCNC: 2.2 MG/DL (ref 0.5–1.4)
DIFFERENTIAL METHOD BLD: ABNORMAL
EOSINOPHIL # BLD AUTO: 0.1 K/UL (ref 0–0.5)
EOSINOPHIL NFR BLD: 0.8 % (ref 0–8)
ERYTHROCYTE [DISTWIDTH] IN BLOOD BY AUTOMATED COUNT: 14.3 % (ref 11.5–14.5)
EST. GFR  (NO RACE VARIABLE): 27.4 ML/MIN/1.73 M^2
FLUBV RNA NPH QL NAA+NON-PROBE: NOT DETECTED
GLUCOSE SERPL-MCNC: 160 MG/DL (ref 70–110)
GLUCOSE UR QL STRIP: NEGATIVE
HCT VFR BLD AUTO: 34.2 % (ref 40–54)
HGB BLD-MCNC: 10.9 G/DL (ref 14–18)
HGB UR QL STRIP: NEGATIVE
HPIV1 RNA NPH QL NAA+NON-PROBE: NOT DETECTED
HPIV2 RNA NPH QL NAA+NON-PROBE: NOT DETECTED
HPIV3 RNA NPH QL NAA+NON-PROBE: NOT DETECTED
HPIV4 RNA NPH QL NAA+NON-PROBE: NOT DETECTED
HUMAN METAPNEUMOVIRUS: NOT DETECTED
IMM GRANULOCYTES # BLD AUTO: 0.05 K/UL (ref 0–0.04)
IMM GRANULOCYTES NFR BLD AUTO: 0.4 % (ref 0–0.5)
INFLUENZA A (SUBTYPES H1,H1-2009,H3): NOT DETECTED
INFLUENZA A, MOLECULAR: NEGATIVE
INFLUENZA B, MOLECULAR: NEGATIVE
KETONES UR QL STRIP: NEGATIVE
LACTATE SERPL-SCNC: 1.4 MMOL/L (ref 0.5–1.9)
LDH SERPL L TO P-CCNC: 4.35 MMOL/L (ref 0.5–2.2)
LEUKOCYTE ESTERASE UR QL STRIP: NEGATIVE
LYMPHOCYTES # BLD AUTO: 2.7 K/UL (ref 1–4.8)
LYMPHOCYTES NFR BLD: 23.1 % (ref 18–48)
MCH RBC QN AUTO: 29.1 PG (ref 27–31)
MCHC RBC AUTO-ENTMCNC: 31.9 G/DL (ref 32–36)
MCV RBC AUTO: 91 FL (ref 82–98)
MONOCYTES # BLD AUTO: 0.6 K/UL (ref 0.3–1)
MONOCYTES NFR BLD: 4.7 % (ref 4–15)
MYCOPLASMA PNEUMONIAE: NOT DETECTED
NEUTROPHILS # BLD AUTO: 8.4 K/UL (ref 1.8–7.7)
NEUTROPHILS NFR BLD: 70.7 % (ref 38–73)
NITRITE UR QL STRIP: NEGATIVE
NRBC BLD-RTO: 0 /100 WBC
PH UR STRIP: 7 [PH] (ref 5–8)
PLATELET # BLD AUTO: 182 K/UL (ref 150–450)
PMV BLD AUTO: 11.3 FL (ref 9.2–12.9)
POTASSIUM SERPL-SCNC: 4.7 MMOL/L (ref 3.5–5.1)
PROCALCITONIN SERPL IA-MCNC: 0.14 NG/ML (ref 0–0.5)
PROT SERPL-MCNC: 6.8 G/DL (ref 6–8.4)
PROT UR QL STRIP: NEGATIVE
RBC # BLD AUTO: 3.75 M/UL (ref 4.6–6.2)
RESPIRATORY INFECTION PANEL SOURCE: NORMAL
RSV RNA NPH QL NAA+NON-PROBE: NOT DETECTED
RV+EV RNA NPH QL NAA+NON-PROBE: NOT DETECTED
SAMPLE: ABNORMAL
SARS-COV-2 RDRP RESP QL NAA+PROBE: NEGATIVE
SARS-COV-2 RNA RESP QL NAA+PROBE: NOT DETECTED
SODIUM SERPL-SCNC: 138 MMOL/L (ref 136–145)
SP GR UR STRIP: 1.01 (ref 1–1.03)
SPECIMEN SOURCE: NORMAL
TSH SERPL DL<=0.005 MIU/L-ACNC: 3.04 UIU/ML (ref 0.34–5.6)
URN SPEC COLLECT METH UR: NORMAL
UROBILINOGEN UR STRIP-ACNC: NEGATIVE EU/DL
WBC # BLD AUTO: 11.84 K/UL (ref 3.9–12.7)

## 2024-12-22 PROCEDURE — 96366 THER/PROPH/DIAG IV INF ADDON: CPT

## 2024-12-22 PROCEDURE — 96361 HYDRATE IV INFUSION ADD-ON: CPT

## 2024-12-22 PROCEDURE — 84145 PROCALCITONIN (PCT): CPT | Performed by: EMERGENCY MEDICINE

## 2024-12-22 PROCEDURE — 99285 EMERGENCY DEPT VISIT HI MDM: CPT | Mod: 25

## 2024-12-22 PROCEDURE — 63600175 PHARM REV CODE 636 W HCPCS: Performed by: NURSE PRACTITIONER

## 2024-12-22 PROCEDURE — 84443 ASSAY THYROID STIM HORMONE: CPT | Performed by: EMERGENCY MEDICINE

## 2024-12-22 PROCEDURE — 85025 COMPLETE CBC W/AUTO DIFF WBC: CPT | Performed by: EMERGENCY MEDICINE

## 2024-12-22 PROCEDURE — 87635 SARS-COV-2 COVID-19 AMP PRB: CPT | Performed by: NURSE PRACTITIONER

## 2024-12-22 PROCEDURE — 87040 BLOOD CULTURE FOR BACTERIA: CPT | Mod: 59 | Performed by: EMERGENCY MEDICINE

## 2024-12-22 PROCEDURE — 93010 ELECTROCARDIOGRAM REPORT: CPT | Mod: ,,, | Performed by: INTERNAL MEDICINE

## 2024-12-22 PROCEDURE — 80177 DRUG SCRN QUAN LEVETIRACETAM: CPT | Performed by: NURSE PRACTITIONER

## 2024-12-22 PROCEDURE — 25000003 PHARM REV CODE 250: Performed by: EMERGENCY MEDICINE

## 2024-12-22 PROCEDURE — 93005 ELECTROCARDIOGRAM TRACING: CPT | Performed by: INTERNAL MEDICINE

## 2024-12-22 PROCEDURE — 81003 URINALYSIS AUTO W/O SCOPE: CPT | Performed by: EMERGENCY MEDICINE

## 2024-12-22 PROCEDURE — 80053 COMPREHEN METABOLIC PANEL: CPT | Performed by: EMERGENCY MEDICINE

## 2024-12-22 PROCEDURE — 63600175 PHARM REV CODE 636 W HCPCS: Performed by: HOSPITALIST

## 2024-12-22 PROCEDURE — 36415 COLL VENOUS BLD VENIPUNCTURE: CPT | Performed by: EMERGENCY MEDICINE

## 2024-12-22 PROCEDURE — 12000002 HC ACUTE/MED SURGE SEMI-PRIVATE ROOM

## 2024-12-22 PROCEDURE — 96365 THER/PROPH/DIAG IV INF INIT: CPT | Mod: 59

## 2024-12-22 PROCEDURE — 25000003 PHARM REV CODE 250: Performed by: NURSE PRACTITIONER

## 2024-12-22 PROCEDURE — 87798 DETECT AGENT NOS DNA AMP: CPT | Performed by: HOSPITALIST

## 2024-12-22 PROCEDURE — 63600175 PHARM REV CODE 636 W HCPCS: Performed by: EMERGENCY MEDICINE

## 2024-12-22 PROCEDURE — 87502 INFLUENZA DNA AMP PROBE: CPT | Performed by: NURSE PRACTITIONER

## 2024-12-22 PROCEDURE — 83605 ASSAY OF LACTIC ACID: CPT | Performed by: EMERGENCY MEDICINE

## 2024-12-22 PROCEDURE — 21400001 HC TELEMETRY ROOM

## 2024-12-22 RX ORDER — PNV NO.95/FERROUS FUM/FOLIC AC 28MG-0.8MG
100 TABLET ORAL DAILY
Status: CANCELLED | OUTPATIENT
Start: 2024-12-23

## 2024-12-22 RX ORDER — VITAMIN E 268 MG
400 CAPSULE ORAL DAILY
Status: CANCELLED | OUTPATIENT
Start: 2024-12-23

## 2024-12-22 RX ORDER — DEXTROSE MONOHYDRATE AND SODIUM CHLORIDE 5; .9 G/100ML; G/100ML
INJECTION, SOLUTION INTRAVENOUS CONTINUOUS
Status: DISCONTINUED | OUTPATIENT
Start: 2024-12-22 | End: 2024-12-23

## 2024-12-22 RX ORDER — MEROPENEM 500 MG/1
500 INJECTION, POWDER, FOR SOLUTION INTRAVENOUS
Status: DISCONTINUED | OUTPATIENT
Start: 2024-12-23 | End: 2024-12-22

## 2024-12-22 RX ORDER — CEFEPIME HYDROCHLORIDE 2 G/1
2 INJECTION, POWDER, FOR SOLUTION INTRAVENOUS
Status: DISCONTINUED | OUTPATIENT
Start: 2024-12-22 | End: 2024-12-24

## 2024-12-22 RX ORDER — HEPARIN SODIUM 5000 [USP'U]/ML
5000 INJECTION, SOLUTION INTRAVENOUS; SUBCUTANEOUS EVERY 8 HOURS
Status: DISCONTINUED | OUTPATIENT
Start: 2024-12-22 | End: 2024-12-25 | Stop reason: HOSPADM

## 2024-12-22 RX ORDER — LISINOPRIL 20 MG/1
20 TABLET ORAL DAILY
Status: CANCELLED | OUTPATIENT
Start: 2024-12-23

## 2024-12-22 RX ORDER — CALCITRIOL 0.25 UG/1
0.25 CAPSULE ORAL DAILY
Status: CANCELLED | OUTPATIENT
Start: 2024-12-23

## 2024-12-22 RX ORDER — METRONIDAZOLE 500 MG/100ML
500 INJECTION, SOLUTION INTRAVENOUS
Status: DISCONTINUED | OUTPATIENT
Start: 2024-12-22 | End: 2024-12-24

## 2024-12-22 RX ORDER — ONDANSETRON HYDROCHLORIDE 2 MG/ML
4 INJECTION, SOLUTION INTRAVENOUS EVERY 6 HOURS PRN
Status: DISCONTINUED | OUTPATIENT
Start: 2024-12-22 | End: 2024-12-25 | Stop reason: HOSPADM

## 2024-12-22 RX ORDER — ACETAMINOPHEN 325 MG/1
650 TABLET ORAL EVERY 4 HOURS PRN
Status: DISCONTINUED | OUTPATIENT
Start: 2024-12-22 | End: 2024-12-25 | Stop reason: HOSPADM

## 2024-12-22 RX ORDER — TAMSULOSIN HYDROCHLORIDE 0.4 MG/1
0.4 CAPSULE ORAL DAILY
Status: CANCELLED | OUTPATIENT
Start: 2024-12-23

## 2024-12-22 RX ORDER — CITALOPRAM 20 MG/1
20 TABLET, FILM COATED ORAL DAILY
Status: CANCELLED | OUTPATIENT
Start: 2024-12-23

## 2024-12-22 RX ORDER — HYDRALAZINE HYDROCHLORIDE 25 MG/1
50 TABLET, FILM COATED ORAL EVERY 12 HOURS
Status: CANCELLED | OUTPATIENT
Start: 2024-12-22

## 2024-12-22 RX ORDER — LEVETIRACETAM 500 MG/5ML
250 INJECTION, SOLUTION, CONCENTRATE INTRAVENOUS EVERY 12 HOURS
Status: DISCONTINUED | OUTPATIENT
Start: 2024-12-22 | End: 2024-12-25 | Stop reason: HOSPADM

## 2024-12-22 RX ORDER — BISACODYL 10 MG/1
10 SUPPOSITORY RECTAL DAILY PRN
Status: DISCONTINUED | OUTPATIENT
Start: 2024-12-22 | End: 2024-12-25 | Stop reason: HOSPADM

## 2024-12-22 RX ORDER — VITAMIN E 268 MG
400 CAPSULE ORAL DAILY
COMMUNITY

## 2024-12-22 RX ORDER — POLYETHYLENE GLYCOL 3350 17 G/17G
17 POWDER, FOR SOLUTION ORAL DAILY
Status: DISCONTINUED | OUTPATIENT
Start: 2024-12-22 | End: 2024-12-25 | Stop reason: HOSPADM

## 2024-12-22 RX ORDER — LORAZEPAM 0.5 MG/1
0.5 TABLET ORAL NIGHTLY
Status: DISCONTINUED | OUTPATIENT
Start: 2024-12-23 | End: 2024-12-25 | Stop reason: HOSPADM

## 2024-12-22 RX ADMIN — DEXTROSE AND SODIUM CHLORIDE: 5; 900 INJECTION, SOLUTION INTRAVENOUS at 08:12

## 2024-12-22 RX ADMIN — VANCOMYCIN HYDROCHLORIDE 1250 MG: 1.25 INJECTION, POWDER, LYOPHILIZED, FOR SOLUTION INTRAVENOUS at 05:12

## 2024-12-22 RX ADMIN — METRONIDAZOLE 500 MG: 500 INJECTION, SOLUTION INTRAVENOUS at 11:12

## 2024-12-22 RX ADMIN — HEPARIN SODIUM 5000 UNITS: 5000 INJECTION INTRAVENOUS; SUBCUTANEOUS at 11:12

## 2024-12-22 RX ADMIN — LEVETIRACETAM 250 MG: 100 INJECTION INTRAVENOUS at 11:12

## 2024-12-22 RX ADMIN — SODIUM CHLORIDE, POTASSIUM CHLORIDE, SODIUM LACTATE AND CALCIUM CHLORIDE 1800 ML: 600; 310; 30; 20 INJECTION, SOLUTION INTRAVENOUS at 02:12

## 2024-12-22 RX ADMIN — PIPERACILLIN SODIUM AND TAZOBACTAM SODIUM 3.38 G: 3; .375 INJECTION, POWDER, FOR SOLUTION INTRAVENOUS at 02:12

## 2024-12-22 RX ADMIN — CEFEPIME 2 G: 2 INJECTION, POWDER, FOR SOLUTION INTRAVENOUS at 11:12

## 2024-12-22 NOTE — ASSESSMENT & PLAN NOTE
"Hypothermia, hypotensive at home per daughter and EMS with SBP 80  Lactic acid 4, Sr Cr 2.2  RSV, Covid ordered  No leukocytosis,  Repeat Lactic acid level - pending      This patient does not have evidence of infective focus  UA negative, chest x-ray nonacute    My overall impression is sepsis.  Source: Unknown  Antibiotics given-   Zosyn 3.375 g given in ER,  Antibiotics (72h ago, onward)      Start     Stop Route Frequency Ordered    12/22/24 1648  vancomycin - pharmacy to dose  (Unknown source / Early in Workup)        Placed in "And" Linked Group    -- IV pharmacy to manage frequency 12/22/24 1549    12/22/24 1645  vancomycin 1,250 mg in 0.9% NaCl 250 mL IVPB (admixture device)         12/23/24 0444 IV ED 1 Time 12/22/24 1607    12/22/24 1600  piperacillin-tazobactam (ZOSYN) 4.5 g in D5W 100 mL IVPB (MB+)         12/28/24 1559 IV Every 12 hours (non-standard times) 12/22/24 1551          Latest lactate reviewed-  Recent Labs   Lab 12/22/24  1311   POCLAC 4.35*     Organ dysfunction indicated by Acute kidney injury and Encephalopathy    Fluid challenge Actual Body weight- Patient will receive 30ml/kg actual body weight to calculate fluid bolus for treatment of septic shock.     Post- resuscitation assessment No - Post resuscitation assessment not needed       Will Not start Pressors- Levophed for MAP of 65  Source control achieved by:   "

## 2024-12-22 NOTE — ED PROVIDER NOTES
"Encounter Date: 12/22/2024       History     Chief Complaint   Patient presents with    Fatigue     Family reports his "blood pressure has been dropping" Family takes care of patient and homecare comes in and gives him showers. Daughter reports generalized weakness episode today after shower and blood pressure was in the 80's SBP     Chief complaint: Hypotension    HPI: 89-year-old male presents with a measured systolic pressure of 80.  He has a history of dementia and family members report that he has been less responsive.  He has had no known fever with no vomiting but has been "gagging".  He has had an infrequent nonproductive cough.  He denies any headache, chest pain or abdominal pain.  Past medical history is significant for hypertension, hyperlipidemia, CVA, PE, Seizure, UTI and dementia.  His most recent admission was in September for a UTI.      Review of patient's allergies indicates:   Allergen Reactions    Ciprofloxacin (bulk) Swelling     Past Medical History:   Diagnosis Date    Bradyarrhythmia     CVA (cerebral infarction) 2006    Dementia     Depression     Hyperlipidemia     Hypertension     renal htn    Pulmonary embolism 2002    Seizure disorder      Past Surgical History:   Procedure Laterality Date    left foot  with crushed injry       Family History   Problem Relation Name Age of Onset    Diabetes Mother      Cancer Neg Hx       Social History     Tobacco Use    Smoking status: Never    Smokeless tobacco: Never   Substance Use Topics    Alcohol use: No    Drug use: No     Review of Systems   Constitutional:  Positive for fatigue. Negative for fever.   Eyes:  Negative for visual disturbance.   Respiratory:  Negative for apnea and shortness of breath.    Cardiovascular:  Negative for chest pain and palpitations.   Gastrointestinal:  Negative for abdominal distention and abdominal pain.   Genitourinary:  Negative for difficulty urinating.   Musculoskeletal:  Negative for neck pain.   Skin:  " Negative for pallor and rash.   Neurological:  Positive for weakness. Negative for headaches.   Hematological:  Does not bruise/bleed easily.   Psychiatric/Behavioral:  Negative for agitation.        Physical Exam     Initial Vitals [12/22/24 1248]   BP Pulse Resp Temp SpO2   (!) 102/50 89 18 97.3 °F (36.3 °C) 95 %      MAP       --         Physical Exam    Nursing note and vitals reviewed.  Constitutional: He appears well-developed and well-nourished.   HENT:   Head: Normocephalic and atraumatic.   Eyes: Conjunctivae are normal.   Neck: Neck supple.   Normal range of motion.  Cardiovascular:  Normal rate, regular rhythm and normal heart sounds.     Exam reveals no gallop and no friction rub.       No murmur heard.  Pulmonary/Chest: Breath sounds normal. No respiratory distress. He has no wheezes. He has no rhonchi. He has no rales.   Abdominal: He exhibits no distension.   Musculoskeletal:         General: Normal range of motion.      Cervical back: Normal range of motion and neck supple.     Neurological: He is alert. GCS score is 15. GCS eye subscore is 4. GCS verbal subscore is 5. GCS motor subscore is 6.   Oriented to person and place only   Skin: Skin is warm and dry.   Psychiatric: He has a normal mood and affect.         ED Course   Critical Care    Date/Time: 12/22/2024 4:34 PM    Performed by: Gualberto Lu III, MD  Authorized by: Stephanie Sharma MD  Direct patient critical care time: 120 minutes  Total critical care time (exclusive of procedural time) : 120 minutes  Critical care was necessary to treat or prevent imminent or life-threatening deterioration of the following conditions: sepsis.  Critical care was time spent personally by me on the following activities: discussions with primary provider, evaluation of patient's response to treatment, examination of patient, obtaining history from patient or surrogate, ordering and performing treatments and interventions, ordering and review of laboratory  studies, ordering and review of radiographic studies, pulse oximetry and re-evaluation of patient's condition.        Labs Reviewed   CBC W/ AUTO DIFFERENTIAL - Abnormal       Result Value    WBC 11.84      RBC 3.75 (*)     Hemoglobin 10.9 (*)     Hematocrit 34.2 (*)     MCV 91      MCH 29.1      MCHC 31.9 (*)     RDW 14.3      Platelets 182      MPV 11.3      Immature Granulocytes 0.4      Gran # (ANC) 8.4 (*)     Immature Grans (Abs) 0.05 (*)     Lymph # 2.7      Mono # 0.6      Eos # 0.1      Baso # 0.04      nRBC 0      Gran % 70.7      Lymph % 23.1      Mono % 4.7      Eosinophil % 0.8      Basophil % 0.3      Differential Method Automated     COMPREHENSIVE METABOLIC PANEL - Abnormal    Sodium 138      Potassium 4.7      Chloride 102      CO2 26      Glucose 160 (*)     BUN 53 (*)     Creatinine 2.2 (*)     Calcium 10.2      Total Protein 6.8      Albumin 3.6      Total Bilirubin 0.4      Alkaline Phosphatase 40 (*)     AST 13      ALT 6 (*)     eGFR 27.4 (*)     Anion Gap 10     ISTAT LACTATE - Abnormal    POC Lactate 4.35 (*)     Sample VENOUS     CULTURE, BLOOD   CULTURE, BLOOD   URINALYSIS, REFLEX TO URINE CULTURE    Specimen UA Urine, Clean Catch      Color, UA Yellow      Appearance, UA Clear      pH, UA 7.0      Specific Gravity, UA 1.010      Protein, UA Negative      Glucose, UA Negative      Ketones, UA Negative      Bilirubin (UA) Negative      Occult Blood UA Negative      Nitrite, UA Negative      Urobilinogen, UA Negative      Leukocytes, UA Negative      Narrative:     Specimen Source->Urine   TSH    TSH 3.043     INFLUENZA A AND B ANTIGEN   SARS-COV-2 RNA AMPLIFICATION, QUAL   LEVETIRACETAM  (KEPPRA) LEVEL   LACTIC ACID, PLASMA   POCT LACTATE     EKG Readings: (Independently Interpreted)   Rhythm: Normal Sinus Rhythm. Heart Rate: 65. Ectopy: No Ectopy. Conduction: Normal. ST Segments: Normal ST Segments. T Waves: Normal. Axis: Normal. Clinical Impression: Normal Sinus Rhythm     ECG Results               EKG 12-lead (In process)        Collection Time Result Time QRS Duration OHS QTC Calculation    12/22/24 13:36:50 12/22/24 15:39:26 74 391                     In process by Interface, Lab In [facility] (12/22/24 15:39:32)                   Narrative:    Test Reason : Z13.6,    Vent. Rate :  65 BPM     Atrial Rate :  65 BPM     P-R Int : 196 ms          QRS Dur :  74 ms      QT Int : 376 ms       P-R-T Axes :  52 -33   5 degrees    QTcB Int : 391 ms    Undetermined rhythm  Left axis deviation  ST elevation consider lateral injury or acute infarct    ACUTE MI / STEMI    Abnormal ECG  When compared with ECG of 13-Sep-2024 10:52,  Significant changes have occurred    Referred By: AAAREFERRAL SELF           Confirmed By:                                   Imaging Results              X-Ray Chest AP Portable (Final result)  Result time 12/22/24 14:34:32      Final result by Fly Cherry MD (12/22/24 14:34:32)                   Impression:      No evidence of active cardiopulmonary disease.      Electronically signed by: Fly Cherry  Date:    12/22/2024  Time:    14:34               Narrative:    EXAMINATION:  XR CHEST AP PORTABLE    CLINICAL HISTORY:  Sepsis;    FINDINGS:  Portable chest radiograph at 14:08 hours compared to prior exams shows the cardiomediastinal silhouette and pulmonary vasculature are within normal limits.    The lungs are hypoexpanded, with no consolidation, large pleural effusion, or evidence of pulmonary edema. No pneumothorax or acute fracture.                                       Medications   lactated ringers bolus 1,800 mL (1,800 mLs Intravenous New Bag 12/22/24 1447)   vancomycin - pharmacy to dose (has no administration in time range)   acetaminophen tablet 650 mg (has no administration in time range)   ondansetron injection 4 mg (has no administration in time range)   piperacillin-tazobactam (ZOSYN) 4.5 g in D5W 100 mL IVPB (MB+) (has no administration in time range)   vancomycin  1,250 mg in 0.9% NaCl 250 mL IVPB (admixture device) (has no administration in time range)   piperacillin-tazobactam (ZOSYN) 3.375 g in D5W 100 mL IVPB (MB+) (0 g Intravenous Paused 12/22/24 5614)     Medical Decision Making  89-year-old male presents with transient hypotension with hypothermia.  He has no identified source of infection; however, does have an elevated lactate concerning for sepsis.  He is empirically treated with Zosyn and vancomycin.  He will be admitted for sepsis treatment.  I discussed the case with hospital medicine who will admit the patient.    Amount and/or Complexity of Data Reviewed  Labs: ordered.  Radiology: ordered.                                      Clinical Impression:  Final diagnoses:  [Z13.6] Screening for cardiovascular condition  [A41.9] Sepsis          ED Disposition Condition    Observation                 Gualberto Lu III, MD  12/22/24 3264

## 2024-12-22 NOTE — PROGRESS NOTES
Pharmacist Renal Dose Adjustment Note    Lavon Brandon is a 89 y.o. male being treated with the medication Piperacillin-Tazobactam.    Patient Data:    Vital Signs (Most Recent):  Temp: 96.7 °F (35.9 °C) (12/22/24 1509)  Pulse: 73 (12/22/24 1303)  Resp: 19 (12/22/24 1303)  BP: (!) 168/72 (12/22/24 1301)  SpO2: 98 % (12/22/24 1303) Vital Signs (72h Range):  Temp:  [95.8 °F (35.4 °C)-97.3 °F (36.3 °C)]   Pulse:  [73-89]   Resp:  [18-19]   BP: (102-168)/(50-72)   SpO2:  [95 %-98 %]      Recent Labs   Lab 12/22/24  1315   CREATININE 2.2*     Serum creatinine: 2.2 mg/dL (H) 12/22/24 1315  Estimated creatinine clearance: 19.2 mL/min (A)    Piperacillin-Tazobactam 4.5 grams IV every 8 hours will be changed to Piperacillin-Tazobactam 4.5 grams IV every 12 hours due to CrCl < 20 ml/min.    Pharmacist's Name: Clemencia De León  Pharmacist's Extension: [phone]

## 2024-12-22 NOTE — ASSESSMENT & PLAN NOTE
less responsive  Daughter denies patient falling, recent head trauma, recent seizure activity  Patient with a history of seizures, on Keppra - check Keppra level  Neuro checks  Fall safety precautions  UA negative for infection, chest x-ray negative for infection,  Hold ativan    If no improvement in mental status, order EEG and consult Neurology

## 2024-12-22 NOTE — ED NOTES
Fluids paused to use fluid warmer - so sleeves in ED, called ICU who said to contact house supervisor to check OR.

## 2024-12-22 NOTE — ASSESSMENT & PLAN NOTE
MATA is likely due to  unknown . Baseline creatinine is  1.8 . Most recent creatinine and eGFR are listed below.  Recent Labs     12/22/24  1315   CREATININE 2.2*   EGFRNORACEVR 27.4*      Plan  - MATA is worsening. Will adjust treatment as follows: IV hydration  of 30 ml/kg given in ER  - Avoid nephrotoxins and renally dose meds for GFR listed above  - Monitor urine output, serial BMP, and adjust therapy as needed

## 2024-12-22 NOTE — H&P
"  [facility] - Emergency Dept  Hospital Medicine  History & Physical    Patient Name: Lavon Brandon  MRN: [id number]  Patient Class: OP- Observation  Admission Date: 12/22/2024  Attending Physician: Stephanie Sharma MD   Primary Care Provider: Aristides Haynes MD         Patient information was obtained from relative(s), past medical records, and ER records.     Subjective:     Principal Problem:Severe sepsis    Chief Complaint:   Chief Complaint   Patient presents with    Fatigue     Family reports his "blood pressure has been dropping" Family takes care of patient and homecare comes in and gives him showers. Daughter reports generalized weakness episode today after shower and blood pressure was in the 80's SBP        HPI: 89 year old male with a past medical history of lumbosacral decubitus ulcers , CVA, bradyarrhythmia, hyperlipidemia, hypertension, pulmonary embolism, seizure disorder, UTI, dementia who presents to the ER via EMS from home with concern of his daughter who reports patient's blood pressure has been dropping, he has generalized weakness after his shower today his blood pressure systolic was in the 80s, less responsive, infrequent nonproductive cough, and gagging.    Upon arrival to the ER, blood pressure 102/50, heart rate 89, initial temperature 97° repeated temperature was 95°.  Chiquita Hugger applied patient is given IV fluids which are not currently warmed.  We will stop IV fluids for new, place patient on fluid warmer and continue IV fluids.  Blood pressure stabilized with IV fluids.  BUN 53 creatinine 2.2 glucose 160 ALP 40 AST 60 TSH 3.043 UA negative for infection, blood culture sent lactic 4.35 chest x-ray with no evidence of cardiopulmonary disease.      Admit to hospital medicine for sepsis of unknown source with presence of hypothermia, MATA, increased lactic acidosis level.  Zosyn IV given in ER blood cultures UA negative for infection chest x-ray.         Past Medical History: "   Diagnosis Date    Bradyarrhythmia     CVA (cerebral infarction) 2006    Dementia     Depression     Hyperlipidemia     Hypertension     renal htn    Pulmonary embolism 2002    Seizure disorder        Past Surgical History:   Procedure Laterality Date    left foot  with crushed injry         Review of patient's allergies indicates:   Allergen Reactions    Ciprofloxacin (bulk) Swelling       No current facility-administered medications on file prior to encounter.     Current Outpatient Medications on File Prior to Encounter   Medication Sig    calcitRIOL (ROCALTROL) 0.25 MCG Cap TAKE 1 CAPSULE(0.25 MCG) BY MOUTH EVERY DAY (Patient taking differently: Take 0.25 mcg by mouth once daily.)    chlorpheniramine/dextromethorp (CORICIDIN HBP COUGH AND COLD ORAL) Take 30 mLs by mouth nightly as needed (Cough).    citalopram (CELEXA) 20 MG tablet Take 1 tablet (20 mg total) by mouth once daily.    cyanocobalamin (VITAMIN B-12) 100 MCG tablet Take 100 mcg by mouth once daily.    hydrALAZINE (APRESOLINE) 50 MG tablet Take 1 tablet (50 mg total) by mouth every 12 (twelve) hours. (Patient taking differently: Take 25 mg by mouth every 12 (twelve) hours.)    levETIRAcetam (KEPPRA) 500 MG Tab Take 0.5 tablets (250 mg total) by mouth 2 (two) times daily.    lisinopriL (PRINIVIL,ZESTRIL) 20 MG tablet Take 1 tablet (20 mg total) by mouth once daily. (Patient taking differently: Take 20 mg by mouth every evening.)    LORazepam (ATIVAN) 0.5 MG tablet Take 1 tablet (0.5 mg total) by mouth every evening.    tamsulosin (FLOMAX) 0.4 mg Cap Take 1 capsule (0.4 mg total) by mouth once daily.    vitamin E 400 UNIT capsule Take 400 Units by mouth once daily.    [DISCONTINUED] fluticasone propionate (FLONASE) 50 mcg/actuation nasal spray 2 sprays (100 mcg total) by Each Nostril route once daily.     Family History       Problem Relation (Age of Onset)    Diabetes Mother          Tobacco Use    Smoking status: Never    Smokeless tobacco: Never    Substance and Sexual Activity    Alcohol use: No    Drug use: No    Sexual activity: Not on file     Review of Systems   Constitutional:  Positive for activity change.   Neurological:  Positive for weakness.        Less responsive   Psychiatric/Behavioral: Negative.       Objective:     Vital Signs (Most Recent):  Temp: 96.7 °F (35.9 °C) (12/22/24 1509)  Pulse: 73 (12/22/24 1303)  Resp: 19 (12/22/24 1303)  BP: (!) 168/72 (12/22/24 1301)  SpO2: 98 % (12/22/24 1303) Vital Signs (24h Range):  Temp:  [95.8 °F (35.4 °C)-97.3 °F (36.3 °C)] 96.7 °F (35.9 °C)  Pulse:  [73-89] 73  Resp:  [18-19] 19  SpO2:  [95 %-98 %] 98 %  BP: (102-168)/(50-72) 168/72     Weight: 63.5 kg (140 lb)  Body mass index is 21.29 kg/m².     Physical Exam  Vitals reviewed.   Constitutional:       General: He is sleeping. He is not in acute distress.     Appearance: He is not ill-appearing.      Comments: Sleepy, answers questions and doses back to sleep.    HENT:      Head: Normocephalic.      Mouth/Throat:      Mouth: Mucous membranes are dry.   Eyes:      Pupils: Pupils are equal, round, and reactive to light.   Cardiovascular:      Rate and Rhythm: Normal rate.      Heart sounds: No murmur heard.  Pulmonary:      Effort: Pulmonary effort is normal. No respiratory distress.      Breath sounds: Normal breath sounds.   Abdominal:      General: Bowel sounds are normal.      Palpations: Abdomen is soft.   Musculoskeletal:      Cervical back: Neck supple.   Neurological:      Comments: Drowsy, wakes up to stimulus.    He was oriented to self/person and place but not to time.    Able to follow some commands.  Strength  4/5 in bilateral upper extremities and 3+/5 in bilateral lower extremities              CRANIAL NERVES     CN III, IV, VI   Pupils are equal, round, and reactive to light.       Significant Labs: All pertinent labs within the past 24 hours have been reviewed.  Recent Lab Results         12/22/24  1353   12/22/24  1315   12/22/24  1311         Albumin   3.6         ALP   40         ALT   6         Anion Gap   10         Appearance, UA Clear           AST   13         Baso #   0.04         Basophil %   0.3         Bilirubin (UA) Negative           BILIRUBIN TOTAL   0.4  Comment: For infants and newborns, interpretation of results should be based  on gestational age, weight and in agreement with clinical  observations.    Premature Infant recommended reference ranges:  Up to 24 hours.............<8.0 mg/dL  Up to 48 hours............<12.0 mg/dL  3-5 days..................<15.0 mg/dL  6-29 days.................<15.0 mg/dL           BUN   53         Calcium   10.2         Chloride   102         CO2   26         Color, UA Yellow           Creatinine   2.2         Differential Method   Automated         eGFR   27.4         Eos #   0.1         Eos %   0.8         Glucose   160         Glucose, UA Negative           Gran # (ANC)   8.4         Gran %   70.7         Hematocrit   34.2         Hemoglobin   10.9         Immature Grans (Abs)   0.05  Comment: Mild elevation in immature granulocytes is non specific and   can be seen in a variety of conditions including stress response,   acute inflammation, trauma and pregnancy. Correlation with other   laboratory and clinical findings is essential.           Immature Granulocytes   0.4         Ketones, UA Negative           Leukocyte Esterase, UA Negative           Lymph #   2.7         Lymph %   23.1         MCH   29.1         MCHC   31.9         MCV   91         Mono #   0.6         Mono %   4.7         MPV   11.3         NITRITE UA Negative           nRBC   0         Blood, UA Negative           pH, UA 7.0           Platelet Count   182         POC Lactate     4.35       Potassium   4.7         PROTEIN TOTAL   6.8         Protein, UA Negative  Comment: Recommend a 24 hour urine protein or a urine   protein/creatinine ratio if globulin induced proteinuria is  clinically suspected.             RBC   3.75          "RDW   14.3         Sample     VENOUS       Sodium   138         Spec Grav UA 1.010           Specimen UA Urine, Clean Catch           TSH   3.043         UROBILINOGEN UA Negative           WBC   11.84                 Significant Imaging: I have reviewed all pertinent imaging results/findings within the past 24 hours.  Imaging Results              X-Ray Chest AP Portable (Final result)  Result time 12/22/24 14:34:32      Final result by Fly Cherry MD (12/22/24 14:34:32)                   Impression:      No evidence of active cardiopulmonary disease.      Electronically signed by: Fly Cherry  Date:    12/22/2024  Time:    14:34               Narrative:    EXAMINATION:  XR CHEST AP PORTABLE    CLINICAL HISTORY:  Sepsis;    FINDINGS:  Portable chest radiograph at 14:08 hours compared to prior exams shows the cardiomediastinal silhouette and pulmonary vasculature are within normal limits.    The lungs are hypoexpanded, with no consolidation, large pleural effusion, or evidence of pulmonary edema. No pneumothorax or acute fracture.                                      Assessment/Plan:     * Severe sepsis  Hypothermia, hypotensive at home per daughter and EMS with SBP 80  Lactic acid 4, Sr Cr 2.2  RSV, Covid ordered  No leukocytosis,  Repeat Lactic acid level - pending      This patient does not have evidence of infective focus  UA negative, chest x-ray nonacute    My overall impression is sepsis.  Source: Unknown  Antibiotics given-   Zosyn 3.375 g given in ER,  Antibiotics (72h ago, onward)      Start     Stop Route Frequency Ordered    12/22/24 1648  vancomycin - pharmacy to dose  (Unknown source / Early in Workup)        Placed in "And" Linked Group    -- IV pharmacy to manage frequency 12/22/24 1549    12/22/24 1645  vancomycin 1,250 mg in 0.9% NaCl 250 mL IVPB (admixture device)         12/23/24 0444 IV ED 1 Time 12/22/24 1607    12/22/24 1600  piperacillin-tazobactam (ZOSYN) 4.5 g in D5W 100 mL IVPB (MB+)    "      12/28/24 1559 IV Every 12 hours (non-standard times) 12/22/24 1551          Latest lactate reviewed-  Recent Labs   Lab 12/22/24  1311   POCLAC 4.35*     Organ dysfunction indicated by Acute kidney injury and Encephalopathy    Fluid challenge Actual Body weight- Patient will receive 30ml/kg actual body weight to calculate fluid bolus for treatment of septic shock.     Post- resuscitation assessment No - Post resuscitation assessment not needed       Will Not start Pressors- Levophed for MAP of 65  Source control achieved by:     Acute alteration in mental status  less responsive  Daughter denies patient falling, recent head trauma, recent seizure activity  Patient with a history of seizures, on Keppra - check Keppra level  Neuro checks  Fall safety precautions  UA negative for infection, chest x-ray negative for infection,  Hold ativan    If no improvement in mental status, order EEG and consult Neurology    MATA (acute kidney injury)  MATA is likely due to  unknown . Baseline creatinine is  1.8 . Most recent creatinine and eGFR are listed below.  Recent Labs     12/22/24  1315   CREATININE 2.2*   EGFRNORACEVR 27.4*      Plan  - MATA is worsening. Will adjust treatment as follows: IV hydration  of 30 ml/kg given in ER  - Avoid nephrotoxins and renally dose meds for GFR listed above  - Monitor urine output, serial BMP, and adjust therapy as needed      Seizure disorder  Check Keppra level  Seizure precautions  Continue Keppra if levels within normal limits        VTE Risk Mitigation (From admission, onward)           Ordered     IP VTE HIGH RISK PATIENT  Once         12/22/24 1549     Place sequential compression device  Until discontinued         12/22/24 1549                         On 12/22/2024, patient should be placed in hospital observation services under my care in collaboration with Dr. Sharma.      Pharmacist Renal Dose Adjustment Note    Lavon Brandon is a 89 y.o. male being treated with the medication  Piperacillin-Tazobactam.    Patient Data:    Vital Signs (Most Recent):  Temp: 96.7 °F (35.9 °C) (12/22/24 1509)  Pulse: 73 (12/22/24 1303)  Resp: 19 (12/22/24 1303)  BP: (!) 168/72 (12/22/24 1301)  SpO2: 98 % (12/22/24 1303) Vital Signs (72h Range):  Temp:  [95.8 °F (35.4 °C)-97.3 °F (36.3 °C)]   Pulse:  [73-89]   Resp:  [18-19]   BP: (102-168)/(50-72)   SpO2:  [95 %-98 %]      Recent Labs   Lab 12/22/24  1315   CREATININE 2.2*     Serum creatinine: 2.2 mg/dL (H) 12/22/24 1315  Estimated creatinine clearance: 19.2 mL/min (A)    Piperacillin-Tazobactam 4.5 grams IV every 8 hours will be changed to Piperacillin-Tazobactam 4.5 grams IV every 12 hours due to CrCl < 20 ml/min.    Pharmacist's Name: Clemencia De León  Pharmacist's Extension: [phone]      Mirtha Keys NP  Department of Hospital [facility]  [facility]

## 2024-12-22 NOTE — HPI
89 year old male with a past medical history of lumbosacral decubitus ulcers , CVA, bradyarrhythmia, hyperlipidemia, hypertension, pulmonary embolism, seizure disorder, UTI, dementia who presents to the ER via EMS from home with concern of his daughter who reports patient's blood pressure has been dropping, he has generalized weakness after his shower today his blood pressure systolic was in the 80s, less responsive, infrequent nonproductive cough, and gagging.    Upon arrival to the ER, blood pressure 102/50, heart rate 89, initial temperature 97° repeated temperature was 95°.  Chiquita Hugger applied patient is given IV fluids which are not currently warmed.  We will stop IV fluids for new, place patient on fluid warmer and continue IV fluids.  Blood pressure stabilized with IV fluids.  BUN 53 creatinine 2.2 glucose 160 ALP 40 AST 60 TSH 3.043 UA negative for infection, blood culture sent lactic 4.35 chest x-ray with no evidence of cardiopulmonary disease.      Admit to hospital medicine for sepsis of unknown source with presence of hypothermia, MATA, increased lactic acidosis level.  Zosyn IV given in ER blood cultures UA negative for infection chest x-ray.

## 2024-12-22 NOTE — Clinical Note
Diagnosis: Severe sepsis [375862]   Future Attending Provider: CULLEN FRAUSTO [[id number]]   Admit to which facility:: [facility] [[id number]]   Reason for IP Medical Treatment  (Clinical interventions that can only be accomplished in the IP setting? ) :: IV hydration Chiquita Cheney   Plans for Post-Acute care--if anticipated (pick the single best option):: A. No post acute care anticipated at this time

## 2024-12-22 NOTE — SUBJECTIVE & OBJECTIVE
Past Medical History:   Diagnosis Date    Bradyarrhythmia     CVA (cerebral infarction) 2006    Dementia     Depression     Hyperlipidemia     Hypertension     renal htn    Pulmonary embolism 2002    Seizure disorder        Past Surgical History:   Procedure Laterality Date    left foot  with crushed injry         Review of patient's allergies indicates:   Allergen Reactions    Ciprofloxacin (bulk) Swelling       No current facility-administered medications on file prior to encounter.     Current Outpatient Medications on File Prior to Encounter   Medication Sig    calcitRIOL (ROCALTROL) 0.25 MCG Cap TAKE 1 CAPSULE(0.25 MCG) BY MOUTH EVERY DAY (Patient taking differently: Take 0.25 mcg by mouth once daily.)    chlorpheniramine/dextromethorp (CORICIDIN HBP COUGH AND COLD ORAL) Take 30 mLs by mouth nightly as needed (Cough).    citalopram (CELEXA) 20 MG tablet Take 1 tablet (20 mg total) by mouth once daily.    cyanocobalamin (VITAMIN B-12) 100 MCG tablet Take 100 mcg by mouth once daily.    hydrALAZINE (APRESOLINE) 50 MG tablet Take 1 tablet (50 mg total) by mouth every 12 (twelve) hours. (Patient taking differently: Take 25 mg by mouth every 12 (twelve) hours.)    levETIRAcetam (KEPPRA) 500 MG Tab Take 0.5 tablets (250 mg total) by mouth 2 (two) times daily.    lisinopriL (PRINIVIL,ZESTRIL) 20 MG tablet Take 1 tablet (20 mg total) by mouth once daily. (Patient taking differently: Take 20 mg by mouth every evening.)    LORazepam (ATIVAN) 0.5 MG tablet Take 1 tablet (0.5 mg total) by mouth every evening.    tamsulosin (FLOMAX) 0.4 mg Cap Take 1 capsule (0.4 mg total) by mouth once daily.    vitamin E 400 UNIT capsule Take 400 Units by mouth once daily.    [DISCONTINUED] fluticasone propionate (FLONASE) 50 mcg/actuation nasal spray 2 sprays (100 mcg total) by Each Nostril route once daily.     Family History       Problem Relation (Age of Onset)    Diabetes Mother          Tobacco Use    Smoking status: Never     Smokeless tobacco: Never   Substance and Sexual Activity    Alcohol use: No    Drug use: No    Sexual activity: Not on file     Review of Systems   Constitutional:  Positive for activity change.   Neurological:  Positive for weakness.        Less responsive   Psychiatric/Behavioral: Negative.       Objective:     Vital Signs (Most Recent):  Temp: 96.7 °F (35.9 °C) (12/22/24 1509)  Pulse: 73 (12/22/24 1303)  Resp: 19 (12/22/24 1303)  BP: (!) 168/72 (12/22/24 1301)  SpO2: 98 % (12/22/24 1303) Vital Signs (24h Range):  Temp:  [95.8 °F (35.4 °C)-97.3 °F (36.3 °C)] 96.7 °F (35.9 °C)  Pulse:  [73-89] 73  Resp:  [18-19] 19  SpO2:  [95 %-98 %] 98 %  BP: (102-168)/(50-72) 168/72     Weight: 63.5 kg (140 lb)  Body mass index is 21.29 kg/m².     Physical Exam  Vitals reviewed.   Constitutional:       General: He is sleeping. He is not in acute distress.     Appearance: He is not ill-appearing.      Comments: Sleepy, answers questions and doses back to sleep.    HENT:      Head: Normocephalic.      Mouth/Throat:      Mouth: Mucous membranes are dry.   Eyes:      Pupils: Pupils are equal, round, and reactive to light.   Cardiovascular:      Rate and Rhythm: Normal rate.      Heart sounds: No murmur heard.  Pulmonary:      Effort: Pulmonary effort is normal. No respiratory distress.      Breath sounds: Normal breath sounds.   Abdominal:      General: Bowel sounds are normal.      Palpations: Abdomen is soft.   Musculoskeletal:      Cervical back: Neck supple.   Neurological:      Comments: Drowsy, wakes up to stimulus.    He was oriented to self/person and place but not to time.    Able to follow some commands.  Strength  4/5 in bilateral upper extremities and 3+/5 in bilateral lower extremities              CRANIAL NERVES     CN III, IV, VI   Pupils are equal, round, and reactive to light.       Significant Labs: All pertinent labs within the past 24 hours have been reviewed.  Recent Lab Results         12/22/24  1353    12/22/24  1315   12/22/24  1311        Albumin   3.6         ALP   40         ALT   6         Anion Gap   10         Appearance, UA Clear           AST   13         Baso #   0.04         Basophil %   0.3         Bilirubin (UA) Negative           BILIRUBIN TOTAL   0.4  Comment: For infants and newborns, interpretation of results should be based  on gestational age, weight and in agreement with clinical  observations.    Premature Infant recommended reference ranges:  Up to 24 hours.............<8.0 mg/dL  Up to 48 hours............<12.0 mg/dL  3-5 days..................<15.0 mg/dL  6-29 days.................<15.0 mg/dL           BUN   53         Calcium   10.2         Chloride   102         CO2   26         Color, UA Yellow           Creatinine   2.2         Differential Method   Automated         eGFR   27.4         Eos #   0.1         Eos %   0.8         Glucose   160         Glucose, UA Negative           Gran # (ANC)   8.4         Gran %   70.7         Hematocrit   34.2         Hemoglobin   10.9         Immature Grans (Abs)   0.05  Comment: Mild elevation in immature granulocytes is non specific and   can be seen in a variety of conditions including stress response,   acute inflammation, trauma and pregnancy. Correlation with other   laboratory and clinical findings is essential.           Immature Granulocytes   0.4         Ketones, UA Negative           Leukocyte Esterase, UA Negative           Lymph #   2.7         Lymph %   23.1         MCH   29.1         MCHC   31.9         MCV   91         Mono #   0.6         Mono %   4.7         MPV   11.3         NITRITE UA Negative           nRBC   0         Blood, UA Negative           pH, UA 7.0           Platelet Count   182         POC Lactate     4.35       Potassium   4.7         PROTEIN TOTAL   6.8         Protein, UA Negative  Comment: Recommend a 24 hour urine protein or a urine   protein/creatinine ratio if globulin induced proteinuria is  clinically  suspected.             RBC   3.75         RDW   14.3         Sample     VENOUS       Sodium   138         Spec Grav UA 1.010           Specimen UA Urine, Clean Catch           TSH   3.043         UROBILINOGEN UA Negative           WBC   11.84                 Significant Imaging: I have reviewed all pertinent imaging results/findings within the past 24 hours.  Imaging Results              X-Ray Chest AP Portable (Final result)  Result time 12/22/24 14:34:32      Final result by Fly Cherry MD (12/22/24 14:34:32)                   Impression:      No evidence of active cardiopulmonary disease.      Electronically signed by: Fly Cherry  Date:    12/22/2024  Time:    14:34               Narrative:    EXAMINATION:  XR CHEST AP PORTABLE    CLINICAL HISTORY:  Sepsis;    FINDINGS:  Portable chest radiograph at 14:08 hours compared to prior exams shows the cardiomediastinal silhouette and pulmonary vasculature are within normal limits.    The lungs are hypoexpanded, with no consolidation, large pleural effusion, or evidence of pulmonary edema. No pneumothorax or acute fracture.

## 2024-12-23 LAB
ALBUMIN SERPL BCP-MCNC: 3.1 G/DL (ref 3.5–5.2)
ALP SERPL-CCNC: 33 U/L (ref 55–135)
ALT SERPL W/O P-5'-P-CCNC: 4 U/L (ref 10–44)
ANION GAP SERPL CALC-SCNC: 5 MMOL/L (ref 8–16)
AST SERPL-CCNC: 13 U/L (ref 10–40)
BASOPHILS # BLD AUTO: 0.03 K/UL (ref 0–0.2)
BASOPHILS NFR BLD: 0.5 % (ref 0–1.9)
BILIRUB SERPL-MCNC: 0.4 MG/DL (ref 0.1–1)
BUN SERPL-MCNC: 44 MG/DL (ref 10–30)
CALCIUM SERPL-MCNC: 9.5 MG/DL (ref 8.7–10.5)
CHLORIDE SERPL-SCNC: 108 MMOL/L (ref 95–110)
CO2 SERPL-SCNC: 25 MMOL/L (ref 23–29)
CREAT SERPL-MCNC: 2.2 MG/DL (ref 0.5–1.4)
DIFFERENTIAL METHOD BLD: ABNORMAL
EOSINOPHIL # BLD AUTO: 0.1 K/UL (ref 0–0.5)
EOSINOPHIL NFR BLD: 1.1 % (ref 0–8)
ERYTHROCYTE [DISTWIDTH] IN BLOOD BY AUTOMATED COUNT: 14.6 % (ref 11.5–14.5)
EST. GFR  (NO RACE VARIABLE): 27.4 ML/MIN/1.73 M^2
GLUCOSE SERPL-MCNC: 108 MG/DL (ref 70–110)
HCT VFR BLD AUTO: 28.7 % (ref 40–54)
HGB BLD-MCNC: 9.1 G/DL (ref 14–18)
IMM GRANULOCYTES # BLD AUTO: 0.01 K/UL (ref 0–0.04)
IMM GRANULOCYTES NFR BLD AUTO: 0.2 % (ref 0–0.5)
LACTATE SERPL-SCNC: 0.9 MMOL/L (ref 0.5–1.9)
LYMPHOCYTES # BLD AUTO: 2.4 K/UL (ref 1–4.8)
LYMPHOCYTES NFR BLD: 37.8 % (ref 18–48)
MAGNESIUM SERPL-MCNC: 1.9 MG/DL (ref 1.6–2.6)
MCH RBC QN AUTO: 29.6 PG (ref 27–31)
MCHC RBC AUTO-ENTMCNC: 31.7 G/DL (ref 32–36)
MCV RBC AUTO: 94 FL (ref 82–98)
MONOCYTES # BLD AUTO: 0.5 K/UL (ref 0.3–1)
MONOCYTES NFR BLD: 8.5 % (ref 4–15)
NEUTROPHILS # BLD AUTO: 3.2 K/UL (ref 1.8–7.7)
NEUTROPHILS NFR BLD: 51.9 % (ref 38–73)
NRBC BLD-RTO: 0 /100 WBC
PHOSPHATE SERPL-MCNC: 3.6 MG/DL (ref 2.7–4.5)
PLATELET # BLD AUTO: 156 K/UL (ref 150–450)
PMV BLD AUTO: 11.6 FL (ref 9.2–12.9)
POCT GLUCOSE: 103 MG/DL (ref 70–110)
POCT GLUCOSE: 117 MG/DL (ref 70–110)
POCT GLUCOSE: 134 MG/DL (ref 70–110)
POCT GLUCOSE: 92 MG/DL (ref 70–110)
POCT GLUCOSE: 97 MG/DL (ref 70–110)
POTASSIUM SERPL-SCNC: 5.3 MMOL/L (ref 3.5–5.1)
PROT SERPL-MCNC: 5.6 G/DL (ref 6–8.4)
RBC # BLD AUTO: 3.07 M/UL (ref 4.6–6.2)
SODIUM SERPL-SCNC: 138 MMOL/L (ref 136–145)
VANCOMYCIN SERPL-MCNC: 6.2 UG/ML
WBC # BLD AUTO: 6.24 K/UL (ref 3.9–12.7)

## 2024-12-23 PROCEDURE — 97161 PT EVAL LOW COMPLEX 20 MIN: CPT

## 2024-12-23 PROCEDURE — 36415 COLL VENOUS BLD VENIPUNCTURE: CPT | Performed by: NURSE PRACTITIONER

## 2024-12-23 PROCEDURE — 83605 ASSAY OF LACTIC ACID: CPT | Performed by: STUDENT IN AN ORGANIZED HEALTH CARE EDUCATION/TRAINING PROGRAM

## 2024-12-23 PROCEDURE — 25000003 PHARM REV CODE 250: Performed by: NURSE PRACTITIONER

## 2024-12-23 PROCEDURE — 21400001 HC TELEMETRY ROOM

## 2024-12-23 PROCEDURE — 25000003 PHARM REV CODE 250: Performed by: STUDENT IN AN ORGANIZED HEALTH CARE EDUCATION/TRAINING PROGRAM

## 2024-12-23 PROCEDURE — 92610 EVALUATE SWALLOWING FUNCTION: CPT

## 2024-12-23 PROCEDURE — 63600175 PHARM REV CODE 636 W HCPCS: Performed by: HOSPITALIST

## 2024-12-23 PROCEDURE — 97530 THERAPEUTIC ACTIVITIES: CPT

## 2024-12-23 PROCEDURE — 80053 COMPREHEN METABOLIC PANEL: CPT | Performed by: NURSE PRACTITIONER

## 2024-12-23 PROCEDURE — 80202 ASSAY OF VANCOMYCIN: CPT | Performed by: HOSPITALIST

## 2024-12-23 PROCEDURE — 36415 COLL VENOUS BLD VENIPUNCTURE: CPT | Performed by: STUDENT IN AN ORGANIZED HEALTH CARE EDUCATION/TRAINING PROGRAM

## 2024-12-23 PROCEDURE — 63600175 PHARM REV CODE 636 W HCPCS: Performed by: STUDENT IN AN ORGANIZED HEALTH CARE EDUCATION/TRAINING PROGRAM

## 2024-12-23 PROCEDURE — 84100 ASSAY OF PHOSPHORUS: CPT | Performed by: NURSE PRACTITIONER

## 2024-12-23 PROCEDURE — 36415 COLL VENOUS BLD VENIPUNCTURE: CPT | Performed by: HOSPITALIST

## 2024-12-23 PROCEDURE — 97535 SELF CARE MNGMENT TRAINING: CPT

## 2024-12-23 PROCEDURE — 97165 OT EVAL LOW COMPLEX 30 MIN: CPT

## 2024-12-23 PROCEDURE — 94760 N-INVAS EAR/PLS OXIMETRY 1: CPT

## 2024-12-23 PROCEDURE — 83735 ASSAY OF MAGNESIUM: CPT | Performed by: NURSE PRACTITIONER

## 2024-12-23 PROCEDURE — 25000003 PHARM REV CODE 250: Performed by: HOSPITALIST

## 2024-12-23 PROCEDURE — 85025 COMPLETE CBC W/AUTO DIFF WBC: CPT | Performed by: NURSE PRACTITIONER

## 2024-12-23 RX ORDER — LISINOPRIL 20 MG/1
20 TABLET ORAL NIGHTLY
Status: DISCONTINUED | OUTPATIENT
Start: 2024-12-23 | End: 2024-12-25 | Stop reason: HOSPADM

## 2024-12-23 RX ORDER — SODIUM CHLORIDE 9 MG/ML
INJECTION, SOLUTION INTRAVENOUS CONTINUOUS
Status: DISCONTINUED | OUTPATIENT
Start: 2024-12-23 | End: 2024-12-25

## 2024-12-23 RX ORDER — CITALOPRAM 20 MG/1
20 TABLET, FILM COATED ORAL DAILY
Status: DISCONTINUED | OUTPATIENT
Start: 2024-12-24 | End: 2024-12-25 | Stop reason: HOSPADM

## 2024-12-23 RX ORDER — LANOLIN ALCOHOL/MO/W.PET/CERES
1000 CREAM (GRAM) TOPICAL DAILY
Status: DISCONTINUED | OUTPATIENT
Start: 2024-12-24 | End: 2024-12-25 | Stop reason: HOSPADM

## 2024-12-23 RX ORDER — TAMSULOSIN HYDROCHLORIDE 0.4 MG/1
0.4 CAPSULE ORAL DAILY
Status: DISCONTINUED | OUTPATIENT
Start: 2024-12-23 | End: 2024-12-25 | Stop reason: HOSPADM

## 2024-12-23 RX ORDER — HYDRALAZINE HYDROCHLORIDE 25 MG/1
25 TABLET, FILM COATED ORAL EVERY 12 HOURS
Status: DISCONTINUED | OUTPATIENT
Start: 2024-12-23 | End: 2024-12-25 | Stop reason: HOSPADM

## 2024-12-23 RX ADMIN — METRONIDAZOLE 500 MG: 500 INJECTION, SOLUTION INTRAVENOUS at 11:12

## 2024-12-23 RX ADMIN — LISINOPRIL 20 MG: 20 TABLET ORAL at 09:12

## 2024-12-23 RX ADMIN — LEVETIRACETAM 250 MG: 100 INJECTION INTRAVENOUS at 09:12

## 2024-12-23 RX ADMIN — METRONIDAZOLE 500 MG: 500 INJECTION, SOLUTION INTRAVENOUS at 02:12

## 2024-12-23 RX ADMIN — HEPARIN SODIUM 5000 UNITS: 5000 INJECTION INTRAVENOUS; SUBCUTANEOUS at 09:12

## 2024-12-23 RX ADMIN — HEPARIN SODIUM 5000 UNITS: 5000 INJECTION INTRAVENOUS; SUBCUTANEOUS at 02:12

## 2024-12-23 RX ADMIN — HEPARIN SODIUM 5000 UNITS: 5000 INJECTION INTRAVENOUS; SUBCUTANEOUS at 06:12

## 2024-12-23 RX ADMIN — SODIUM CHLORIDE: 9 INJECTION, SOLUTION INTRAVENOUS at 02:12

## 2024-12-23 RX ADMIN — VANCOMYCIN HYDROCHLORIDE 1250 MG: 1.25 INJECTION, POWDER, LYOPHILIZED, FOR SOLUTION INTRAVENOUS at 09:12

## 2024-12-23 RX ADMIN — CEFEPIME 2 G: 2 INJECTION, POWDER, FOR SOLUTION INTRAVENOUS at 11:12

## 2024-12-23 RX ADMIN — POLYETHYLENE GLYCOL 3350 17 G: 17 POWDER, FOR SOLUTION ORAL at 09:12

## 2024-12-23 RX ADMIN — METRONIDAZOLE 500 MG: 500 INJECTION, SOLUTION INTRAVENOUS at 06:12

## 2024-12-23 RX ADMIN — LEVETIRACETAM 250 MG: 100 INJECTION INTRAVENOUS at 10:12

## 2024-12-23 RX ADMIN — LORAZEPAM 0.5 MG: 0.5 TABLET ORAL at 09:12

## 2024-12-23 RX ADMIN — HYDRALAZINE HYDROCHLORIDE 25 MG: 25 TABLET ORAL at 04:12

## 2024-12-23 RX ADMIN — TAMSULOSIN HYDROCHLORIDE 0.4 MG: 0.4 CAPSULE ORAL at 04:12

## 2024-12-23 NOTE — PT/OT/SLP EVAL
Speech Language Pathology  SLP Evaluate and treat    Evaluation     Patient Name:  Lavon Brandon   MRN:  [id number]  Admitting Diagnosis: Sepsis [A41.9]  Severe sepsis [A41.9, R65.20]  Recommendations:     Discharge: Therapy Intensity Recommendations at Discharge: No Therapy Indicated   Speech Therapy not indicated at discharge  Acute Recommendations:   Speech Therapy not indicated  Diet recommendations:  Solid Diet Level: Regular Diet - IDDSI Level 7  Aspiration Precautions:   Whole 1 at a time in liquid  Standard Precautions  Remain upright 1 hour after meals  Increase physical activity to promote pulmonary hygiene  Encourage multiple dry swallows towards end of meals  General Precautions:     Communication strategies:  none    Assessment:       SPEECH THERAPY ASSESSMENT: Speech Therapy consult received on: 12/22/2024  for evaluation of SLP Evaluate and treat    due to aspiration concern.     Lavon Brandon presents with swallow that is at baseline per family. Patient has dementia with a history of cognitively based oral dysphagia. Generalized weakness observed orally and suspected pharyngeally but patient observed to be functional for Regular Diet - IDDSI Level 7, Thin liquids-IDDSI Level 0 diet. Swallow Deficits are suspected  in  oropharyngeal  phase(s) of swallow and characterized by prolonged mastication, decreased laryngeal rise on palpation for liquids/puree with better response for solids. No s/s of aspiration observed. Acute speech therapy is not indicated. If problems worsen, patient presents with pneumonia or temperature spikes family educated to seek swallow evaluation.    Recommend Regular Diet, IDDSI Level 7 textures with thin liquids (IDDSI 0). Medications whole with liquid.  Diet recommendations communicated to patient's nurse Lauren  @ [phone] via secure chat.       History of Present Illness Relevant to Speech Therapy Evaluation   Admitting Diagnosis: Sepsis [A41.9]  Severe sepsis [A41.9,  "R65.20]  Admit Date:  12/22/2024            Current length of stay: 1              Admitting Diet: Diet Cardiac     Lavon Brandon is a 89 y.o. male with a chief complaint of Fatigue (Family reports his "blood pressure has been dropping" Family takes care of patient and homecare comes in and gives him showers. Daughter reports generalized weakness episode today after shower and blood pressure was in the 80's SBP). Per HPI 89 year old male with a past medical history of lumbosacral decubitus ulcers , CVA, bradyarrhythmia, hyperlipidemia, hypertension, pulmonary embolism, seizure disorder, UTI, dementia who presents to the ER via EMS from home with concern of his daughter who reports patient's blood pressure has been dropping, he has generalized weakness after his shower today his blood pressure systolic was in the 80s, less responsive, infrequent nonproductive cough, and gagging.    Upon arrival to the ER, blood pressure 102/50, heart rate 89, initial temperature 97° repeated temperature was 95°.  Chiquita Hugger applied patient is given IV fluids which are not currently warmed.  We will stop IV fluids for new, place patient on fluid warmer and continue IV fluids.  Blood pressure stabilized with IV fluids.  BUN 53 creatinine 2.2 glucose 160 ALP 40 AST 60 TSH 3.043 UA negative for infection, blood culture sent lactic 4.35 chest x-ray with no evidence of cardiopulmonary disease.      Admit to hospital medicine for sepsis of unknown source with presence of hypothermia, MATA, increased lactic acidosis level.  Zosyn IV given in ER blood cultures UA negative for infection chest x-ray.     .     Additionally, Lavon Brandon has a past medical history including  has a past medical history of Bradyarrhythmia, CVA (cerebral infarction) (2006), Dementia, Depression, Hyperlipidemia, Hypertension, Pulmonary embolism (2002), and Seizure disorder..       Room air99 %   97.3 °F (36.3 °C) 97.9 °F (36.6 °C)    Pulse: 89 Pulse: (!) 53 "   Respiratory Rate:   Respiratory Rate:20    WBC 11.84   WBC 6.24    Hgb: (!) 10.9 Hgb: (!) 9.1   Albumin 3.6 Albumin (!) 3.1   Creatinine: (!) 2.2 Creatinine: (!) 2.2     IMAGING:   Chest: CT CHEST: 12/22/2024  Impression:   Bilateral pulmonary opacities consistent with pulmonary infiltrate/airspace disease, as discussed above, small bilateral pleural effusions, left greater than right.  Bilateral peribronchial thickening.   Mild opacity within the trachea may relate to secretions and or aspiration.  CHEST XRAY: 12/22/2024  The lungs are hypoexpanded, with no consolidation, large pleural effusion, or evidence of pulmonary edema. No pneumothorax or acute fracture.   CT Head: none  MRI:  none    MODIFIED BARIUM SWALLOW STUDY:  None on file in Epic        PRIOR SPEECH THERAPY:  None in [facility]  Acute Care 6/2024 Lavon Brandon is a 89 y.o. male with an SLP diagnosis of  cognitive based dysphagia .  He presents with decreased alertness and ability to follow directives, impacting safety w/ PO intake. He tolerated all trialed textures, w/ prolonged mastication during complex texture trials. Rec IDDSI 6- soft and bite sized w/ thin liquids; advanced as tolerated. Rec meds crushed in puree and assistance w/ meals; only feed when pt is awake and alert. SLP to sign off.       SOCIAL HISTORY/HOBBIES:  According to last report in [facility]: Mr. Brandon is   and lives in [address] [place] [address].   PLOF:   dementia  Occupation/Homemaking:   dependent, lives with family       SUBJECTIVE:     Grandchild(idalmis)  present at the bedside. Upon entering the room Mr. Barndon was lying in bed requiring assistance from staff for repositioning for PO intake.  He was pleasant, cooperative, and confused.    Objective:   Oral Musculature Evaluation  Oral Musculature Evaluation  Oral Musculature: general weakness  Dentition: scattered dentition (missing posterior upper and lower)  Mucosal Quality: good  Mandibular Strength  and Mobility: impaired  Oral Labial Strength and Mobility:  (Full ROM, generally weak and slow)  Lingual Strength and Mobility:  (Deviates to right on protrusion (probably baseline) Full ROM, generally weak and slow)  Velar Elevation:  (difficult to visualize, some movement observed)  Volitional Cough: mildly weak  Volitional Swallow: reduced rise  Voice Prior to PO Intake: fair, no change after  Oral Musculature Comments: Generally weak, lingual tremor but WFL for reg diet/liquids    Bedside Swallow Eval:   Consistencies Assessed:  Thin liquids Method/Volume: Cup and Straw  Puree   Mixed consistencies  Solids     Oral Phase:   WFL  Prolonged mastication    Pharyngeal Phase: WFL swallow at bedside  no overt clinical signs/symptoms of aspiration  no overt clinical signs/symptoms of pharyngeal dysphagia    Compensatory Strategies      Treatment: role of SLP results of evaluation including no indication for acute Speech Therapy. the family  was receptive to the information. Recommend extra swallows at end of meal and to remain upright as a precaution with possible generally weak pharyngeal swallow and family report of coughing occasionally at end of a meal.  No history of pneumonia or compromise. Family educated re: benefit of Speech Therapy evaluation for swallowing if worsens or history of pneumonia.    Goals:     Multidisciplinary Problems       SLP Goals       Not on file                  Plan:     Patient to be seen:     Plan of Care expires:     Plan of Care reviewed with:  patient, grandchild(idalmis)  SLP Follow-Up:  No      Discharge recommendations:  Therapy Intensity Recommendations at Discharge: No Therapy Indicated     Barriers to Discharge:  None    Time Tracking:     SLP Treatment Date: 12/23/24  Speech Start Time:  0952  Speech Stop Time:   1015  Speech Total Time (min):  Speech Total (min): 23 min    Billable Minutes: Eval Swallow and Oral Function 8, Self Care/Home Management Training 15, and Total Time  23    12/23/2024

## 2024-12-23 NOTE — PLAN OF CARE
Goals to be met by: 25     Patient will increase functional independence with mobility by performin. Supine to sit with Minimal Assistance  2. Sit to stand transfer with Minimal Assistance  3. Bed to chair transfer with Minimal Assistance using Rolling Walker  4. Gait  x 15 feet with Minimal Assistance using Rolling Walker.

## 2024-12-23 NOTE — SUBJECTIVE & OBJECTIVE
Interval History:  Patient was seen and examined at bedside this morning.  He reports that he is feeling much better than when he 1st arrived at the hospital.  WBC normal, lactic acid now within normal limits.  Patient is afebrile, and hemodynamically stable.  Blood cultures show no growth.  CT chest abdomen and pelvis showed a likely bilateral pneumonia, right kidney lesion.  Continue IV antibiotics and IV fluids.    Review of Systems   Constitutional:  Positive for activity change.   Neurological:  Positive for weakness.        Less responsive   Psychiatric/Behavioral: Negative.       Objective:     Vital Signs (Most Recent):  Temp: 97.9 °F (36.6 °C) (12/23/24 0816)  Pulse: (!) 53 (12/23/24 1035)  Resp: 20 (12/23/24 0816)  BP: (!) 138/58 (12/23/24 1030)  SpO2: 100 % (12/23/24 1030) Vital Signs (24h Range):  Temp:  [97.8 °F (36.6 °C)-98.9 °F (37.2 °C)] 97.9 °F (36.6 °C)  Pulse:  [50-77] 53  Resp:  [13-20] 20  SpO2:  [96 %-100 %] 100 %  BP: (107-180)/(51-78) 138/58     Weight: 74.9 kg (165 lb 2 oz)  Body mass index is 24.38 kg/m².    Intake/Output Summary (Last 24 hours) at 12/23/2024 1514  Last data filed at 12/23/2024 1159  Gross per 24 hour   Intake 3084.38 ml   Output 50 ml   Net 3034.38 ml         Physical Exam  Vitals reviewed.   Constitutional:       General: He is sleeping. He is not in acute distress.     Appearance: He is not ill-appearing.      Comments: Sleepy, answers questions and doses back to sleep.    HENT:      Head: Normocephalic.      Mouth/Throat:      Mouth: Mucous membranes are dry.   Eyes:      Pupils: Pupils are equal, round, and reactive to light.   Cardiovascular:      Rate and Rhythm: Normal rate.      Heart sounds: No murmur heard.  Pulmonary:      Effort: Pulmonary effort is normal. No respiratory distress.      Breath sounds: Normal breath sounds.   Abdominal:      General: Bowel sounds are normal.      Palpations: Abdomen is soft.   Musculoskeletal:      Cervical back: Neck supple.    Neurological:      Comments: Drowsy, wakes up to stimulus.    He was oriented to self/person and place but not to time.    Able to follow some commands.  Strength  4/5 in bilateral upper extremities and 3+/5 in bilateral lower extremities             Significant Labs: All pertinent labs within the past 24 hours have been reviewed.  CBC:   Recent Labs   Lab 12/22/24  1315 12/23/24  0610   WBC 11.84 6.24   HGB 10.9* 9.1*   HCT 34.2* 28.7*    156     CMP:   Recent Labs   Lab 12/22/24  1315 12/23/24  0844    138   K 4.7 5.3*    108   CO2 26 25   * 108   BUN 53* 44*   CREATININE 2.2* 2.2*   CALCIUM 10.2 9.5   PROT 6.8 5.6*   ALBUMIN 3.6 3.1*   BILITOT 0.4 0.4   ALKPHOS 40* 33*   AST 13 13   ALT 6* 4*   ANIONGAP 10 5*     Lactic Acid:   Recent Labs   Lab 12/22/24  1703 12/23/24  0844   LACTATE 1.4 0.9       Significant Imaging: I have reviewed all pertinent imaging results/findings within the past 24 hours.    Imaging Results               CT Chest Abdomen Pelvis Without Contrast (XPD) (Final result)  Result time 12/22/24 21:46:29      Final result by Emeka Kramer MD (12/22/24 21:46:29)                   Impression:      Bilateral pulmonary opacities consistent with pulmonary infiltrate/airspace disease, as discussed above, small bilateral pleural effusions, left greater than right.  Bilateral peribronchial thickening.    Mild opacity within the trachea may relate to secretions and or aspiration.    Suspected indeterminate lesion of the right kidney, as discussed above, ultrasound follow-up is recommended.    Pelvocaliceal dilatation of the left kidney without hydroureter or ureteral calculus may relate to large left renal cyst, appears stable, clinical correlation is needed.    Mild urinary bladder wall thickening, correlation for UTI/cystitis is needed.    Findings that may relate to mild rectal colitis for which clinical correlation is needed.    Findings that may relate to bilateral  mobile testicles, clinical and historical correlation is needed.    Additional findings as above.    This report was flagged in Epic as abnormal.      Electronically signed by: Emeka Kramer  Date:    12/22/2024  Time:    21:46               Narrative:    EXAMINATION:  CT CHEST ABDOMEN PELVIS WITHOUT CONTRAST(XPD)    CLINICAL HISTORY:  severe sepsis;    TECHNIQUE:  Low dose axial images, sagittal and coronal reformations were obtained from the thoracic inlet to the pubic symphysis intravenous contrast and oral contrast was not utilized, this diminishes the sensitivity of the exam.    COMPARISON:  Chest radiograph December 22, 2024, CT examination abdomen and pelvis September 13, 2024    FINDINGS:  There is artifact present, this combined with the lack of intravenous contrast and oral contrast diminishes the sensitivity of the exam.    There is appearance of mild opacity layering posteriorly at the level of the distal trachea, this may relate to secretions and or aspiration.  There is peribronchial thickening bilaterally particularly at the lung bases, most notably the lower lobes.    There is a prominent calcified granuloma of the right lung noted.  The right upper lobe and right middle lobe demonstrate appearance of mild motion artifact and mild atelectatic change.  Bandlike opacity involving the mid to upper right lower lobe may represent scarring and or atelectasis.  The right lower lobe demonstrates atelectatic change and there is elevation of the right hemidiaphragm noted, there is appearance likely representing superimposed confluent infiltrates/airspace disease at the right infrahilar location, with patchy infiltrates at the right lower lobe as well.    The left upper lobe demonstrates motion artifact and appearance of atelectatic change.  The left lower lobe demonstrates atelectatic change, as well as appearance of superimposed patchy and mildly confluent alveolar infiltrates/airspace disease.  There appear  to be prominent bronchi peripherally inferiorly posteriorly at the left lower lobe, this may relate to a component of bronchiectasis.    There is minimal pleural fluid on the right, small amount of pleural fluid on the left.  There is appearance of a cyst small amount of pericardial fluid present.  Evaluation for adenopathy is limited, there is no obvious enlarged mediastinal or hilar adenopathy.  Coronary artery and aortic atherosclerotic change noted.  The thoracic aorta appears normal in caliber.    There is appearance of may relate to mild gynecomastia associated with the left breast.    The stomach appears decompressed, not optimally evaluated.  There is colon interposed between the liver and the anterior aspect of the diaphragm.  Evaluation of the pancreas is limited however there is no obvious peripancreatic inflammatory change or abnormal pancreatic ductal dilatation.  Calcifications of the spleen are likely granulomatous.  When accounting for limitations of the exam there is no evidence for acute process of the liver, gallbladder, pancreas, or spleen.  Mild adrenal gland thickening bilaterally is noted.    There is a dense lesion at the mid to upper pole of the right kidney measuring approximately 86 Hounsfield units, 2.3 cm, likely a proteinaceous or hemorrhagic cyst, adjacent to this is seen on axial image 138 there is an indeterminate appearance possibly a adjacent lesion measuring 1.8 cm, 67 Hounsfield units.  Indeterminate attenuation character ultrasound follow-up is recommended.  The right kidney demonstrates no evidence for ureteral calculus or obstructive uropathy.    The left kidney demonstrates a hypodense finding measuring approximately 2.4 cm at the midpole, 7.1 Hounsfield units, potentially a cyst.  At the lower pole there is a large hypodensity measuring 6.8 cm, seen on the prior study as well and likely represents a cyst.  There is appearance of pelvocaliceal dilatation of the central and  upper pole collecting structures of the left kidney.  There is no evidence for left-sided hydroureter or ureteral calculus, and this configuration is stable, potentially the larger left renal cyst produces some degree of mass effect upon the collecting structures accounting for pelvocaliceal dilatation.    The arterial vascular structures including the abdominal aorta demonstrate atherosclerotic change.  The abdominal aorta appears normal in caliber otherwise not optimally evaluated on this noncontrast examination.  The prostate measures approximately 5.1 x 3.8 cm in size appearing prominent.  Urinary bladder wall thickening may relate to chronic change however correlation for UTI/cystitis is needed.    Bilateral inguinal hernia noted without bowel involvement.  There is low-density within the inguinal hernia bilaterally, may to some degree relate to fluid however appears mildly greater attenuation and fluid, may relate to bilateral mobile testicles, clinical correlation is needed.    There is no evidence for small bowel obstructive process.  The appendix is identified, it does not appear inflamed.  There is mild-to-moderate prominence of the colon with air and stool throughout its course.  There is mild rectal wall thickening with perirectal edema/inflammation, mild, correlation for mild rectal colitis is needed.  There is no evidence for free intraperitoneal air.    The visualized osseous structures demonstrate chronic change.  There is diminished mineralization and degenerative change.                                       X-Ray Chest AP Portable (Final result)  Result time 12/22/24 14:34:32      Final result by Fly Cherry MD (12/22/24 14:34:32)                   Impression:      No evidence of active cardiopulmonary disease.      Electronically signed by: Fly Cherry  Date:    12/22/2024  Time:    14:34               Narrative:    EXAMINATION:  XR CHEST AP PORTABLE    CLINICAL  HISTORY:  Sepsis;    FINDINGS:  Portable chest radiograph at 14:08 hours compared to prior exams shows the cardiomediastinal silhouette and pulmonary vasculature are within normal limits.    The lungs are hypoexpanded, with no consolidation, large pleural effusion, or evidence of pulmonary edema. No pneumothorax or acute fracture.

## 2024-12-23 NOTE — PROGRESS NOTES
"Pharmacokinetic Initial Assessment: IV Vancomycin    Assessment/Plan:    Initiate intravenous vancomycin with loading dose of 1250 mg once with subsequent doses when random concentrations are less than 20 mcg/mL  Desired empiric serum trough concentration is 15 to 20 mcg/mL  Draw vancomycin random level on 12/23 at 1800.  Pharmacy will continue to follow and monitor vancomycin.      Please contact pharmacy at extension [phone] with any questions regarding this assessment.     Thank you for the consult,   Juan Antonio Marques       Patient brief summary:  Lavon Brandon is a 89 y.o. male initiated on antimicrobial therapy with IV Vancomycin for treatment of suspected sepsis    Drug Allergies:   Review of patient's allergies indicates:   Allergen Reactions    Ciprofloxacin (bulk) Swelling       Actual Body Weight:   63.5 kg    Renal Function:   Estimated Creatinine Clearance: 19.2 mL/min (A) (based on SCr of 2.2 mg/dL (H)).,     Dialysis Method (if applicable):  N/A    CBC (last 72 hours):  Recent Labs   Lab Result Units 12/22/24  1315   WBC K/uL 11.84   Hemoglobin g/dL 10.9*   Hematocrit % 34.2*   Platelets K/uL 182   Gran % % 70.7   Lymph % % 23.1   Mono % % 4.7   Eosinophil % % 0.8   Basophil % % 0.3   Differential Method  Automated       Metabolic Panel (last 72 hours):  Recent Labs   Lab Result Units 12/22/24  1315 12/22/24  1353   Sodium mmol/L 138  --    Potassium mmol/L 4.7  --    Chloride mmol/L 102  --    CO2 mmol/L 26  --    Glucose mg/dL 160*  --    Glucose, UA   --  Negative   BUN mg/dL 53*  --    Creatinine mg/dL 2.2*  --    Albumin g/dL 3.6  --    Total Bilirubin mg/dL 0.4  --    Alkaline Phosphatase U/L 40*  --    AST U/L 13  --    ALT U/L 6*  --        Drug levels (last 3 results):  No results for input(s): "VANCOMYCINRA", "VANCORANDOM", "VANCOMYCINPE", "VANCOPEAK", "VANCOMYCINTR", "VANCOTROUGH" in the last 72 hours.    Microbiologic Results:  Microbiology Results (last 7 days)       Procedure Component " Value Units Date/Time    Blood culture x two cultures. Draw prior to antibiotics. [[id number]] Collected: 12/22/24 1327    Order Status: Completed Specimen: Blood from Peripheral, Hand, Left Updated: 12/22/24 1958     Blood Culture, Routine No Growth to date    Narrative:      Aerobic and anaerobic    Blood culture x two cultures. Draw prior to antibiotics. [[id number]] Collected: 12/22/24 1318    Order Status: Completed Specimen: Blood from Peripheral, Hand, Right Updated: 12/22/24 1958     Blood Culture, Routine No Growth to date    Narrative:      Aerobic and anaerobic    Respiratory Infection Panel (PCR), Nasopharyngeal [[id number]] Collected: 12/22/24 1750    Order Status: Completed Specimen: Nasopharyngeal Swab Updated: 12/22/24 1912     Respiratory Infection Panel Source NP swab     Adenovirus Not Detected     Coronavirus 229E, Common Cold Virus Not Detected     Coronavirus HKU1, Common Cold Virus Not Detected     Coronavirus NL63, Common Cold Virus Not Detected     Coronavirus OC43, Common Cold Virus Not Detected     Comment: Coronavirus strains 229E, HKU1, NL63, and OC43 can cause the common   cold   and are not associated with the respiratory disease outbreak caused   by  the COVID-19 (SARS-CoV-2 novel Coronavirus) strain.           SARS-CoV2 (COVID-19) Qualitative PCR Not Detected     Human Metapneumovirus Not Detected     Human Rhinovirus/Enterovirus Not Detected     Influenza A (subtypes H1, H1-2009,H3) Not Detected     Influenza B Not Detected     Parainfluenza Virus 1 Not Detected     Parainfluenza Virus 2 Not Detected     Parainfluenza Virus 3 Not Detected     Parainfluenza Virus 4 Not Detected     Respiratory Syncytial Virus Not Detected     Bordetella Parapertussis ([id number]) Not Detected     Bordetella pertussis (ptxP) Not Detected     Chlamydia pneumoniae Not Detected     Mycoplasma pneumoniae Not Detected     Comment: Respiratory Infection Panel testing performed by Multiplex PCR.       Narrative:       Respiratory Infection Panel source->NP Swab

## 2024-12-23 NOTE — PROGRESS NOTES
[facility] Medicine  Progress Note    Patient Name: Lavon Brandon  MRN: [id number]  Patient Class: IP- Inpatient   Admission Date: 12/22/2024  Length of Stay: 1 days  Attending Physician: Mike Pedersen DO  Primary Care Provider: Aristides Haynes MD        Subjective     Principal Problem:Severe sepsis        HPI:  89 year old male with a past medical history of lumbosacral decubitus ulcers , CVA, bradyarrhythmia, hyperlipidemia, hypertension, pulmonary embolism, seizure disorder, UTI, dementia who presents to the ER via EMS from home with concern of his daughter who reports patient's blood pressure has been dropping, he has generalized weakness after his shower today his blood pressure systolic was in the 80s, less responsive, infrequent nonproductive cough, and gagging.    Upon arrival to the ER, blood pressure 102/50, heart rate 89, initial temperature 97° repeated temperature was 95°.  Chiquita Hugger applied patient is given IV fluids which are not currently warmed.  We will stop IV fluids for new, place patient on fluid warmer and continue IV fluids.  Blood pressure stabilized with IV fluids.  BUN 53 creatinine 2.2 glucose 160 ALP 40 AST 60 TSH 3.043 UA negative for infection, blood culture sent lactic 4.35 chest x-ray with no evidence of cardiopulmonary disease.      Admit to hospital medicine for sepsis of unknown source with presence of hypothermia, MATA, increased lactic acidosis level.  Zosyn IV given in ER blood cultures UA negative for infection chest x-ray.         Overview/Hospital Course:  No notes on file    Interval History:  Patient was seen and examined at bedside this morning.  He reports that he is feeling much better than when he 1st arrived at the hospital.  WBC normal, lactic acid now within normal limits.  Patient is afebrile, and hemodynamically stable.  Blood cultures show no growth.  CT chest abdomen and pelvis showed a likely bilateral pneumonia, right kidney  lesion.  Continue IV antibiotics and IV fluids.    Review of Systems   Constitutional:  Positive for activity change.   Neurological:  Positive for weakness.        Less responsive   Psychiatric/Behavioral: Negative.       Objective:     Vital Signs (Most Recent):  Temp: 97.9 °F (36.6 °C) (12/23/24 0816)  Pulse: (!) 53 (12/23/24 1035)  Resp: 20 (12/23/24 0816)  BP: (!) 138/58 (12/23/24 1030)  SpO2: 100 % (12/23/24 1030) Vital Signs (24h Range):  Temp:  [97.8 °F (36.6 °C)-98.9 °F (37.2 °C)] 97.9 °F (36.6 °C)  Pulse:  [50-77] 53  Resp:  [13-20] 20  SpO2:  [96 %-100 %] 100 %  BP: (107-180)/(51-78) 138/58     Weight: 74.9 kg (165 lb 2 oz)  Body mass index is 24.38 kg/m².    Intake/Output Summary (Last 24 hours) at 12/23/2024 1514  Last data filed at 12/23/2024 1159  Gross per 24 hour   Intake 3084.38 ml   Output 50 ml   Net 3034.38 ml         Physical Exam  Vitals reviewed.   Constitutional:       General: He is sleeping. He is not in acute distress.     Appearance: He is not ill-appearing.      Comments: Sleepy, answers questions and doses back to sleep.    HENT:      Head: Normocephalic.      Mouth/Throat:      Mouth: Mucous membranes are dry.   Eyes:      Pupils: Pupils are equal, round, and reactive to light.   Cardiovascular:      Rate and Rhythm: Normal rate.      Heart sounds: No murmur heard.  Pulmonary:      Effort: Pulmonary effort is normal. No respiratory distress.      Breath sounds: Normal breath sounds.   Abdominal:      General: Bowel sounds are normal.      Palpations: Abdomen is soft.   Musculoskeletal:      Cervical back: Neck supple.   Neurological:      Comments: Drowsy, wakes up to stimulus.    He was oriented to self/person and place but not to time.    Able to follow some commands.  Strength  4/5 in bilateral upper extremities and 3+/5 in bilateral lower extremities             Significant Labs: All pertinent labs within the past 24 hours have been reviewed.  CBC:   Recent Labs   Lab  12/22/24  1315 12/23/24  0610   WBC 11.84 6.24   HGB 10.9* 9.1*   HCT 34.2* 28.7*    156     CMP:   Recent Labs   Lab 12/22/24  1315 12/23/24  0844    138   K 4.7 5.3*    108   CO2 26 25   * 108   BUN 53* 44*   CREATININE 2.2* 2.2*   CALCIUM 10.2 9.5   PROT 6.8 5.6*   ALBUMIN 3.6 3.1*   BILITOT 0.4 0.4   ALKPHOS 40* 33*   AST 13 13   ALT 6* 4*   ANIONGAP 10 5*     Lactic Acid:   Recent Labs   Lab 12/22/24  1703 12/23/24  0844   LACTATE 1.4 0.9       Significant Imaging: I have reviewed all pertinent imaging results/findings within the past 24 hours.    Imaging Results               CT Chest Abdomen Pelvis Without Contrast (XPD) (Final result)  Result time 12/22/24 21:46:29      Final result by Emeka Kramer MD (12/22/24 21:46:29)                   Impression:      Bilateral pulmonary opacities consistent with pulmonary infiltrate/airspace disease, as discussed above, small bilateral pleural effusions, left greater than right.  Bilateral peribronchial thickening.    Mild opacity within the trachea may relate to secretions and or aspiration.    Suspected indeterminate lesion of the right kidney, as discussed above, ultrasound follow-up is recommended.    Pelvocaliceal dilatation of the left kidney without hydroureter or ureteral calculus may relate to large left renal cyst, appears stable, clinical correlation is needed.    Mild urinary bladder wall thickening, correlation for UTI/cystitis is needed.    Findings that may relate to mild rectal colitis for which clinical correlation is needed.    Findings that may relate to bilateral mobile testicles, clinical and historical correlation is needed.    Additional findings as above.    This report was flagged in Epic as abnormal.      Electronically signed by: Emeka Kramer  Date:    12/22/2024  Time:    21:46               Narrative:    EXAMINATION:  CT CHEST ABDOMEN PELVIS WITHOUT CONTRAST(XPD)    CLINICAL HISTORY:  severe  sepsis;    TECHNIQUE:  Low dose axial images, sagittal and coronal reformations were obtained from the thoracic inlet to the pubic symphysis intravenous contrast and oral contrast was not utilized, this diminishes the sensitivity of the exam.    COMPARISON:  Chest radiograph December 22, 2024, CT examination abdomen and pelvis September 13, 2024    FINDINGS:  There is artifact present, this combined with the lack of intravenous contrast and oral contrast diminishes the sensitivity of the exam.    There is appearance of mild opacity layering posteriorly at the level of the distal trachea, this may relate to secretions and or aspiration.  There is peribronchial thickening bilaterally particularly at the lung bases, most notably the lower lobes.    There is a prominent calcified granuloma of the right lung noted.  The right upper lobe and right middle lobe demonstrate appearance of mild motion artifact and mild atelectatic change.  Bandlike opacity involving the mid to upper right lower lobe may represent scarring and or atelectasis.  The right lower lobe demonstrates atelectatic change and there is elevation of the right hemidiaphragm noted, there is appearance likely representing superimposed confluent infiltrates/airspace disease at the right infrahilar location, with patchy infiltrates at the right lower lobe as well.    The left upper lobe demonstrates motion artifact and appearance of atelectatic change.  The left lower lobe demonstrates atelectatic change, as well as appearance of superimposed patchy and mildly confluent alveolar infiltrates/airspace disease.  There appear to be prominent bronchi peripherally inferiorly posteriorly at the left lower lobe, this may relate to a component of bronchiectasis.    There is minimal pleural fluid on the right, small amount of pleural fluid on the left.  There is appearance of a cyst small amount of pericardial fluid present.  Evaluation for adenopathy is limited, there  is no obvious enlarged mediastinal or hilar adenopathy.  Coronary artery and aortic atherosclerotic change noted.  The thoracic aorta appears normal in caliber.    There is appearance of may relate to mild gynecomastia associated with the left breast.    The stomach appears decompressed, not optimally evaluated.  There is colon interposed between the liver and the anterior aspect of the diaphragm.  Evaluation of the pancreas is limited however there is no obvious peripancreatic inflammatory change or abnormal pancreatic ductal dilatation.  Calcifications of the spleen are likely granulomatous.  When accounting for limitations of the exam there is no evidence for acute process of the liver, gallbladder, pancreas, or spleen.  Mild adrenal gland thickening bilaterally is noted.    There is a dense lesion at the mid to upper pole of the right kidney measuring approximately 86 Hounsfield units, 2.3 cm, likely a proteinaceous or hemorrhagic cyst, adjacent to this is seen on axial image 138 there is an indeterminate appearance possibly a adjacent lesion measuring 1.8 cm, 67 Hounsfield units.  Indeterminate attenuation character ultrasound follow-up is recommended.  The right kidney demonstrates no evidence for ureteral calculus or obstructive uropathy.    The left kidney demonstrates a hypodense finding measuring approximately 2.4 cm at the midpole, 7.1 Hounsfield units, potentially a cyst.  At the lower pole there is a large hypodensity measuring 6.8 cm, seen on the prior study as well and likely represents a cyst.  There is appearance of pelvocaliceal dilatation of the central and upper pole collecting structures of the left kidney.  There is no evidence for left-sided hydroureter or ureteral calculus, and this configuration is stable, potentially the larger left renal cyst produces some degree of mass effect upon the collecting structures accounting for pelvocaliceal dilatation.    The arterial vascular structures  including the abdominal aorta demonstrate atherosclerotic change.  The abdominal aorta appears normal in caliber otherwise not optimally evaluated on this noncontrast examination.  The prostate measures approximately 5.1 x 3.8 cm in size appearing prominent.  Urinary bladder wall thickening may relate to chronic change however correlation for UTI/cystitis is needed.    Bilateral inguinal hernia noted without bowel involvement.  There is low-density within the inguinal hernia bilaterally, may to some degree relate to fluid however appears mildly greater attenuation and fluid, may relate to bilateral mobile testicles, clinical correlation is needed.    There is no evidence for small bowel obstructive process.  The appendix is identified, it does not appear inflamed.  There is mild-to-moderate prominence of the colon with air and stool throughout its course.  There is mild rectal wall thickening with perirectal edema/inflammation, mild, correlation for mild rectal colitis is needed.  There is no evidence for free intraperitoneal air.    The visualized osseous structures demonstrate chronic change.  There is diminished mineralization and degenerative change.                                       X-Ray Chest AP Portable (Final result)  Result time 12/22/24 14:34:32      Final result by Fly Cherry MD (12/22/24 14:34:32)                   Impression:      No evidence of active cardiopulmonary disease.      Electronically signed by: Fly Cherry  Date:    12/22/2024  Time:    14:34               Narrative:    EXAMINATION:  XR CHEST AP PORTABLE    CLINICAL HISTORY:  Sepsis;    FINDINGS:  Portable chest radiograph at 14:08 hours compared to prior exams shows the cardiomediastinal silhouette and pulmonary vasculature are within normal limits.    The lungs are hypoexpanded, with no consolidation, large pleural effusion, or evidence of pulmonary edema. No pneumothorax or acute fracture.                                 "        Assessment and Plan     * Severe sepsis  Hypothermia, hypotensive at home per daughter and EMS with SBP 80  Lactic acid 4, Sr Cr 2.2  RSV, Covid ordered  No leukocytosis,  Repeat Lactic acid level - pending      This patient does not have evidence of infective focus  UA negative, chest x-ray nonacute    My overall impression is sepsis.  Source: Unknown  Antibiotics given-   Zosyn 3.375 g given in ER,  Antibiotics (72h ago, onward)      Start     Stop Route Frequency Ordered    12/22/24 2300  ceFEPIme injection 2 g         -- IV Every 24 hours (non-standard times) 12/22/24 2159    12/22/24 2300  metronidazole IVPB 500 mg         -- IV Every 8 hours (non-standard times) 12/22/24 2159 12/22/24 1648  vancomycin - pharmacy to dose  (Unknown source / Early in Workup)        Placed in "And" Linked Group    -- IV pharmacy to manage frequency 12/22/24 1549          Latest lactate reviewed-  Recent Labs   Lab 12/22/24  1311 12/22/24  1703 12/23/24  0844   LACTATE  --    < > 0.9   POCLAC 4.35*  --   --     < > = values in this interval not displayed.       Organ dysfunction indicated by Acute kidney injury and Encephalopathy    Fluid challenge Actual Body weight- Patient will receive 30ml/kg actual body weight to calculate fluid bolus for treatment of septic shock.     Post- resuscitation assessment No - Post resuscitation assessment not needed       Will Not start Pressors- Levophed for MAP of 65  Source control achieved by:     No longer septic   Hemodynamically stable   Continue IV antibiotics  Blood cultures show no growth  No cough, no oxygen requirement    Acute alteration in mental status  Mental status appears at baseline at this time    MATA (acute kidney injury)  MATA is likely due to  unknown . Baseline creatinine is  1.8 . Most recent creatinine and eGFR are listed below.  Recent Labs     12/22/24  1315 12/23/24  0844   CREATININE 2.2* 2.2*   EGFRNORACEVR 27.4* 27.4*        Plan  - MATA is stable of 30 " ml/kg given in ER  - Avoid nephrotoxins and renally dose meds for GFR listed above  - Monitor urine output, serial BMP, and adjust therapy as needed      Seizure disorder  Check Keppra level  Seizure precautions  Continue Keppra if levels within normal limits        VTE Risk Mitigation (From admission, onward)           Ordered     heparin (porcine) injection 5,000 Units  Every 8 hours         12/22/24 2155     IP VTE HIGH RISK PATIENT  Once         12/22/24 1549     Place sequential compression device  Until discontinued         12/22/24 1549                    Discharge Planning   JOANNE: 12/26/2024     Code Status: DNR   Medical Readiness for Discharge Date:   Discharge Plan A: Home Health, Home                Please place Justification for DME        Mike Pedersen DO  Department of Hospital Medicine   [facility]

## 2024-12-23 NOTE — PT/OT/SLP EVAL
"Physical Therapy Evaluation    Patient Name:  Lavon Brandon   MRN:  [id number]    Recommendations:     Discharge Recommendations: Low Intensity Therapy   Discharge Equipment Recommendations: none   Barriers to discharge:  medical needs    Assessment:     Lavon Brandon is a 89 y.o. male admitted with a medical diagnosis of Severe sepsis.  He presents with the following impairments/functional limitations: weakness, impaired endurance, impaired self care skills, impaired functional mobility, gait instability, impaired balance, impaired cognition, decreased lower extremity function, decreased upper extremity function, decreased safety awareness, pain, impaired cardiopulmonary response to activity, impaired joint extensibility, impaired coordination.  Pt tolerated session fairly due to "not feeling well today", but participated with encouragement from his granddaughter. PT/tech assisted pt with mobility to get orthostatic blood pressures with RN present. Pt denied dizziness during session, but reported difficulty with standing due to B LE weakness/soreness.      Rehab Prognosis: Fair and Poor; patient would benefit from acute skilled PT services to address these deficits and reach maximum level of function.    Recent Surgery: * No surgery found *      Plan:     During this hospitalization, patient to be seen 5 x/week to address the identified rehab impairments via gait training, therapeutic activities, therapeutic exercises, neuromuscular re-education and progress toward the following goals:    Plan of Care Expires:  01/20/25    Subjective     Chief Complaint: "not feeling well"  Patient/Family Comments/goals: home with daughter upon discharge  Pain/Comfort:  Pain Rating 1:  (not rated)  Location - Side 1: Bilateral  Location 1: leg  Pain Addressed 1: Reposition, Distraction, Cessation of Activity, Nurse notified    Patients cultural, spiritual, [employer] conflicts given the current situation:      Living " "Environment:  Pt lives with his daughter.  Prior to admission, patients level of function was required assist of 1 person for transfers bed <> w/c daily and pt responded "very little" when PT inquired if he walked. Pt's granddaughter reported "on days when his legs don't work very well they use a kiana lift."  Equipment used at home: walker, rolling, wheelchair, lift device.  DME owned (not currently used): none.  Upon discharge, patient will have assistance from his daughter.    Objective:     Communicated with NOE Aguilar prior to and during session.  Patient found HOB elevated with bed alarm, PureWick, peripheral IV, telemetry  upon PT entry to room.    General Precautions: Standard, fall, seizure  Orthopedic Precautions:N/A   Braces: N/A  Respiratory Status: Room air    Exams:  Cognitive Exam:  Patient is oriented to Person and Place  Fine Motor Coordination:    -       Impaired  RLE heel shin   and LLE heel shin    RLE ROM: WFL AAROM, with pt exhibiting increased tonal influence at times  LLE ROM: WFL AAROM, with pt exhibiting increased tonal influence at times    Functional Mobility:  Bed Mobility:   HOB 20 deg, /69, MAP 99, HR 57  Scooting: moderate assistance, maximal assistance, and of 2 persons  Supine to Sit: moderate assistance and of 1 persons; sitting /68, MAP 98, HR 62  Sit to Supine: maximal assistance and of 1-2 persons  Transfers:     Sit to Stand:  moderate assistance, maximal assistance, and of 2 persons with rolling walker; standing /58, MAP 84, HR 68      AM-PAC 6 CLICK MOBILITY  Total Score:11       Treatment & Education:  Pt was educated on the following: call light use, importance of OOB activity and functional mobility to negate the negative effects of prolonged bed rest during this hospitalization, safe transfers/ambulation and discharge planning recommendations/options.      Patient left HOB elevated with all lines intact, call button in reach, bed alarm on, and RN & " pt's granddaughter present.    GOALS:   Multidisciplinary Problems       Physical Therapy Goals          Problem: Physical Therapy    Goal Priority Disciplines Outcome Interventions   Physical Therapy Goal     PT, PT/OT     Description: Goals to be met by: 25     Patient will increase functional independence with mobility by performin. Supine to sit with Minimal Assistance  2. Sit to stand transfer with Minimal Assistance  3. Bed to chair transfer with Minimal Assistance using Rolling Walker  4. Gait  x 15 feet with Minimal Assistance using Rolling Walker.                          History:     Past Medical History:   Diagnosis Date    Bradyarrhythmia     CVA (cerebral infarction)     Dementia     Depression     Hyperlipidemia     Hypertension     renal htn    Pulmonary embolism 2002    Seizure disorder        Past Surgical History:   Procedure Laterality Date    left foot  with crushed injry         Time Tracking:     PT Received On: 24  PT Start Time: 1017     PT Stop Time: 1030  PT Total Time (min): 13 min     Billable Minutes: Evaluation 5 and Therapeutic Activity 8      2024

## 2024-12-23 NOTE — NURSING
Pt. Seen for Wound Consult.    Old scarring to the inner aspects of the Jann. Buttocks. Granddaughter at bedside and is one of his caregivers.  Reports that she likes to use Calmoseptine and An Adhesive   Bordered dressing at home for protection.      Calmoseptine and a new Adhesive Bordered Foam dressing in place.         Floated the Jann Heels on a pillow for prevention. Jeet. All well.

## 2024-12-23 NOTE — PLAN OF CARE
[facility]  Initial Discharge Assessment       Primary Care Provider: Aristides Haynes MD    Admission Diagnosis: Sepsis [A41.9]  Severe sepsis [A41.9, R65.20]    Admission Date: 12/22/2024  Expected Discharge Date: 12/26/2024    Transition of Care Barriers: None    Payor: [employer] / Plan: [place] [employer] [employer] / Product Type: Government /     Extended Emergency Contact Information  Primary Emergency Contact: Gabriela Merino  Address: [address] [place] [address] [place]  Work Phone: [phone]  Mobile Phone: [phone]  Relation: Daughter  Preferred language: English   needed? No  Secondary Emergency Contact: Nilda Brandon  Mobile Phone: [phone]  Relation: Daughter  Preferred language: English   needed? No    Discharge Plan A: Home Health, Home  Discharge Plan B: Home with family      [id number] [address] [place] [address] AT [facility] [address] & [facility]  [address] [place] [id number]  Phone: [phone] Fax: [phone]    [facility]  for [place], [place] - [address] [facility]  [address] [facility] [address]  Phone: [phone] Fax: [phone]    [facility] [address] [place] [address] [address] [facility].  [address] [facility] [address]  Phone: [phone] Fax: [phone]    Assessment completed over the phone with daughter Gabriela.  Gabriela is patients primary caregiver and NOK. Patient has hospital bed, lift, walker, wheelchair, bsc, shower chair and grabbars at home. Patient needs max assist.  Patient has [facility]- daughter interested in have [facility].       Initial Assessment (most recent)       Adult Discharge Assessment - 12/23/24 1326          Discharge Assessment    Assessment Type Discharge Planning Assessment     Confirmed/corrected address, phone number and insurance Yes     Source of Information family     When was your last doctors  appointment? 11/14/24     Reason For Admission severe sepsis     People in Home child(idalmis), adult     Facility Arrived From: home     Do you expect to return to your current living situation? Yes     Do you have help at home or someone to help you manage your care at home? Yes     Who are your caregiver(s) and their phone number(s)? Daughter Gabriela     Prior to hospitilization cognitive status: Not Oriented to Time     Current cognitive status: Not Oriented to Time     Walking or Climbing Stairs Difficulty yes     Walking or Climbing Stairs ambulation difficulty, assistance 1 person;ambulation difficulty, requires equipment;stair climbing difficulty, assistance 1 person;stair climbing difficulty, dependent;transferring difficulty, assistance 1 person     Mobility Management wheelchair, walker     Dressing/Bathing Difficulty yes     Dressing/Bathing bathing difficulty, requires equipment     Dressing/Bathing Management bath bench, grab bars     Home Accessibility wheelchair accessible     Equipment Currently Used at Home walker, rolling;shower chair;wheelchair;lift device;hospital bed;bedside commode     Readmission within 30 days? No     Patient currently being followed by outpatient case management? No     Do you currently have service(s) that help you manage your care at home? Yes     Name and Contact number of agency [facility]     Is the pt/caregiver preference to resume services with current agency Yes     Do you take prescription medications? Yes     Do you have prescription coverage? Yes     Do you have any problems affording any of your prescribed medications? No     Is the patient taking medications as prescribed? yes     Who is going to help you get home at discharge? Gabriela     How do you get to doctors appointments? family or friend will provide     Are you on dialysis? No     Do you take coumadin? No     Discharge Plan A Home Health;Home     Discharge Plan B Home with family     DME Needed Upon Discharge  none      Discharge Plan discussed with: Adult children     Transition of Care Barriers None

## 2024-12-23 NOTE — PLAN OF CARE
Problem: Adult Inpatient Plan of Care  Goal: Plan of Care Review  Outcome: Progressing  Goal: Patient-Specific Goal (Individualized)  Outcome: Progressing  Goal: Absence of Hospital-Acquired Illness or Injury  Outcome: Progressing  Goal: Optimal Comfort and Wellbeing  Outcome: Progressing  Goal: Readiness for Transition of Care  Outcome: Progressing     Problem: Sepsis/Septic Shock  Goal: Optimal Coping  Outcome: Progressing  Goal: Absence of Bleeding  Outcome: Progressing  Goal: Blood Glucose Level Within Targeted Range  Outcome: Progressing  Goal: Absence of Infection Signs and Symptoms  Outcome: Progressing  Goal: Optimal Nutrition Intake  Outcome: Progressing     Problem: Acute Kidney Injury/Impairment  Goal: Fluid and Electrolyte Balance  Outcome: Progressing  Goal: Improved Oral Intake  Outcome: Progressing  Goal: Effective Renal Function  Outcome: Progressing     Problem: Wound  Goal: Optimal Coping  Outcome: Progressing  Goal: Optimal Functional Ability  Outcome: Progressing  Goal: Absence of Infection Signs and Symptoms  Outcome: Progressing  Goal: Improved Oral Intake  Outcome: Progressing  Goal: Optimal Pain Control and Function  Outcome: Progressing  Goal: Skin Health and Integrity  Outcome: Progressing  Goal: Optimal Wound Healing  Outcome: Progressing     Problem: Skin Injury Risk Increased  Goal: Skin Health and Integrity  Outcome: Progressing     Problem: Fall Injury Risk  Goal: Absence of Fall and Fall-Related Injury  Outcome: Progressing

## 2024-12-23 NOTE — ASSESSMENT & PLAN NOTE
MATA is likely due to  unknown . Baseline creatinine is  1.8 . Most recent creatinine and eGFR are listed below.  Recent Labs     12/22/24  1315 12/23/24  0844   CREATININE 2.2* 2.2*   EGFRNORACEVR 27.4* 27.4*        Plan  - MATA is stable of 30 ml/kg given in ER  - Avoid nephrotoxins and renally dose meds for GFR listed above  - Monitor urine output, serial BMP, and adjust therapy as needed

## 2024-12-23 NOTE — PT/OT/SLP EVAL
Occupational Therapy   Evaluation    Name: Lavon Brandon  MRN: [id number]  Admitting Diagnosis: Severe sepsis  Recent Surgery: * No surgery found *      Recommendations:     Discharge Recommendations: Low Intensity Therapy  Discharge Equipment Recommendations:  none  Barriers to discharge:  None    Assessment:     Lavon Brandon is a 89 y.o. male with a medical diagnosis of Severe sepsis. Pt agreeable to OT evaluation this AM. Performance deficits affecting function: weakness, impaired endurance, impaired self care skills, impaired functional mobility, gait instability, impaired balance, impaired cognition, decreased coordination, decreased upper extremity function, decreased lower extremity function, decreased safety awareness, impaired cardiopulmonary response to activity.      Rehab Prognosis: Fair; patient would benefit from acute skilled OT services to address these deficits and reach maximum level of function.       Plan:     Patient to be seen 3 x/week to address the above listed problems via self-care/home management, therapeutic activities, therapeutic exercises  Plan of Care Expires: 01/23/25  Plan of Care Reviewed with: patient, grandchild(idalmis)    Subjective     Chief Complaint: none stated  Patient/Family Comments/goals: none stated    Occupational Profile:  Living Environment: Pt lives with family in a 1 story home. Pt has a tub/shower combo with a TTB.  Previous level of function: Max assist bathing and toileting (pt wears a depends) bed level by granddaughter; granddaughter states that an aide comes once a week to get patient in the tub/shower combo for a shower; Max assist for dressing; Setup assistance for feeding and grooming bed level or seated in w/c.  Roles and Routines: father; grandfather  Equipment Used at Home: walker, rolling, bath bench, wheelchair, lift device, bedside commode, hospital bed  Assistance upon Discharge: yes, from family/aide/HH    Pain/Comfort:  Pain Rating 1:  0/10    Patients cultural, spiritual, Baptism conflicts given the current situation:      Objective:     Communicated with: nursing prior to session.  Patient found HOB elevated with bed alarm, PureWick, peripheral IV, telemetry upon OT entry to room.    General Precautions: Standard, fall, seizure  Orthopedic Precautions: N/A  Braces: N/A  Respiratory Status: Room air    Occupational Performance:    Activities of Daily Living:  Grooming: setup assistance bed level to wash face    Toileting: purewick      Cognitive/Visual Perceptual:  Cognitive/Psychosocial Skills:     -       Follows Commands/attention:Follows one-step commands  -       Communication: clear/fluent  -       Mood/Affect/Coping skills/emotional control: Appropriate to situation, Cooperative, Lethargic, and Pleasant    Physical Exam:  Upper Extremity Range of Motion:     -       Right Upper Extremity: WFL  -       Left Upper Extremity: WFL  Upper Extremity Strength:    -       Right Upper Extremity: WFL  -       Left Upper Extremity: WFL   Strength:    -       Right Upper Extremity: fair   -       Left Upper Extremity: fair   Gross motor coordination:   WFL    AMPAC 6 Click ADL:  AMPAC Total Score: 14    Treatment & Education:  Pt educated on role of OT/POC, importance of OOB/EOB activity, use of call bell, and safety during ADLs, transfers, and functional mobility.    Patient left HOB elevated with all lines intact, call button in reach, bed alarm on, and granddaughter present    GOALS:   Multidisciplinary Problems       Occupational Therapy Goals          Problem: Occupational Therapy    Goal Priority Disciplines Outcome Interventions   Occupational Therapy Goal     OT, PT/OT     Description: Goals to be met by: 1/23/25     Patient will increase functional independence with ADLs by performing:    Feeding with Set-up Assistance.  Grooming while seated with Supervision.  Upper extremity exercise program x10 reps per handout, with  assistance as needed.                         History:     Past Medical History:   Diagnosis Date    Bradyarrhythmia     CVA (cerebral infarction) 2006    Dementia     Depression     Hyperlipidemia     Hypertension     renal htn    Pulmonary embolism 2002    Seizure disorder          Past Surgical History:   Procedure Laterality Date    left foot  with crushed injry         Time Tracking:     OT Date of Treatment: 12/23/24  OT Start Time: 1111  OT Stop Time: 1121  OT Total Time (min): 10 min    Billable Minutes:Evaluation 10    12/23/2024

## 2024-12-24 LAB
ALBUMIN SERPL BCP-MCNC: 2.8 G/DL (ref 3.5–5.2)
ALP SERPL-CCNC: 27 U/L (ref 55–135)
ALT SERPL W/O P-5'-P-CCNC: 4 U/L (ref 10–44)
ANION GAP SERPL CALC-SCNC: 2 MMOL/L (ref 8–16)
AST SERPL-CCNC: 11 U/L (ref 10–40)
BASOPHILS # BLD AUTO: 0.03 K/UL (ref 0–0.2)
BASOPHILS NFR BLD: 0.6 % (ref 0–1.9)
BILIRUB SERPL-MCNC: 0.4 MG/DL (ref 0.1–1)
BUN SERPL-MCNC: 42 MG/DL (ref 10–30)
CALCIUM SERPL-MCNC: 8.5 MG/DL (ref 8.7–10.5)
CHLORIDE SERPL-SCNC: 109 MMOL/L (ref 95–110)
CO2 SERPL-SCNC: 25 MMOL/L (ref 23–29)
CREAT SERPL-MCNC: 2.2 MG/DL (ref 0.5–1.4)
DIFFERENTIAL METHOD BLD: ABNORMAL
EOSINOPHIL # BLD AUTO: 0.1 K/UL (ref 0–0.5)
EOSINOPHIL NFR BLD: 2.5 % (ref 0–8)
ERYTHROCYTE [DISTWIDTH] IN BLOOD BY AUTOMATED COUNT: 14.6 % (ref 11.5–14.5)
EST. GFR  (NO RACE VARIABLE): 27.4 ML/MIN/1.73 M^2
GLUCOSE SERPL-MCNC: 92 MG/DL (ref 70–110)
HCT VFR BLD AUTO: 25 % (ref 40–54)
HGB BLD-MCNC: 8.1 G/DL (ref 14–18)
IMM GRANULOCYTES # BLD AUTO: 0.01 K/UL (ref 0–0.04)
IMM GRANULOCYTES NFR BLD AUTO: 0.2 % (ref 0–0.5)
LYMPHOCYTES # BLD AUTO: 2.4 K/UL (ref 1–4.8)
LYMPHOCYTES NFR BLD: 45.9 % (ref 18–48)
MAGNESIUM SERPL-MCNC: 1.8 MG/DL (ref 1.6–2.6)
MCH RBC QN AUTO: 29.7 PG (ref 27–31)
MCHC RBC AUTO-ENTMCNC: 32.4 G/DL (ref 32–36)
MCV RBC AUTO: 92 FL (ref 82–98)
MONOCYTES # BLD AUTO: 0.5 K/UL (ref 0.3–1)
MONOCYTES NFR BLD: 8.6 % (ref 4–15)
NEUTROPHILS # BLD AUTO: 2.2 K/UL (ref 1.8–7.7)
NEUTROPHILS NFR BLD: 42.2 % (ref 38–73)
NRBC BLD-RTO: 0 /100 WBC
PHOSPHATE SERPL-MCNC: 3.4 MG/DL (ref 2.7–4.5)
PLATELET # BLD AUTO: 139 K/UL (ref 150–450)
PMV BLD AUTO: 11.1 FL (ref 9.2–12.9)
POCT GLUCOSE: 101 MG/DL (ref 70–110)
POCT GLUCOSE: 102 MG/DL (ref 70–110)
POCT GLUCOSE: 116 MG/DL (ref 70–110)
POCT GLUCOSE: 98 MG/DL (ref 70–110)
POTASSIUM SERPL-SCNC: 5 MMOL/L (ref 3.5–5.1)
PROT SERPL-MCNC: 5 G/DL (ref 6–8.4)
RBC # BLD AUTO: 2.73 M/UL (ref 4.6–6.2)
SODIUM SERPL-SCNC: 136 MMOL/L (ref 136–145)
WBC # BLD AUTO: 5.23 K/UL (ref 3.9–12.7)

## 2024-12-24 PROCEDURE — 63600175 PHARM REV CODE 636 W HCPCS: Performed by: HOSPITALIST

## 2024-12-24 PROCEDURE — 80053 COMPREHEN METABOLIC PANEL: CPT | Performed by: NURSE PRACTITIONER

## 2024-12-24 PROCEDURE — 21400001 HC TELEMETRY ROOM

## 2024-12-24 PROCEDURE — 63600175 PHARM REV CODE 636 W HCPCS: Performed by: STUDENT IN AN ORGANIZED HEALTH CARE EDUCATION/TRAINING PROGRAM

## 2024-12-24 PROCEDURE — 25000003 PHARM REV CODE 250: Performed by: STUDENT IN AN ORGANIZED HEALTH CARE EDUCATION/TRAINING PROGRAM

## 2024-12-24 PROCEDURE — 36415 COLL VENOUS BLD VENIPUNCTURE: CPT | Performed by: NURSE PRACTITIONER

## 2024-12-24 PROCEDURE — 83735 ASSAY OF MAGNESIUM: CPT | Performed by: NURSE PRACTITIONER

## 2024-12-24 PROCEDURE — 85025 COMPLETE CBC W/AUTO DIFF WBC: CPT | Performed by: NURSE PRACTITIONER

## 2024-12-24 PROCEDURE — 84100 ASSAY OF PHOSPHORUS: CPT | Performed by: NURSE PRACTITIONER

## 2024-12-24 PROCEDURE — 25000003 PHARM REV CODE 250: Performed by: NURSE PRACTITIONER

## 2024-12-24 PROCEDURE — 25000003 PHARM REV CODE 250: Performed by: HOSPITALIST

## 2024-12-24 RX ORDER — CEFTRIAXONE 1 G/1
1 INJECTION, POWDER, FOR SOLUTION INTRAMUSCULAR; INTRAVENOUS
Status: DISCONTINUED | OUTPATIENT
Start: 2024-12-24 | End: 2024-12-25 | Stop reason: HOSPADM

## 2024-12-24 RX ORDER — DOXYCYCLINE 100 MG/1
100 CAPSULE ORAL EVERY 12 HOURS
Status: DISCONTINUED | OUTPATIENT
Start: 2024-12-24 | End: 2024-12-25 | Stop reason: HOSPADM

## 2024-12-24 RX ADMIN — LEVETIRACETAM 250 MG: 100 INJECTION INTRAVENOUS at 09:12

## 2024-12-24 RX ADMIN — DOXYCYCLINE HYCLATE 100 MG: 100 CAPSULE ORAL at 09:12

## 2024-12-24 RX ADMIN — METRONIDAZOLE 500 MG: 500 INJECTION, SOLUTION INTRAVENOUS at 06:12

## 2024-12-24 RX ADMIN — HYDRALAZINE HYDROCHLORIDE 25 MG: 25 TABLET ORAL at 08:12

## 2024-12-24 RX ADMIN — DOXYCYCLINE HYCLATE 100 MG: 100 CAPSULE ORAL at 12:12

## 2024-12-24 RX ADMIN — SODIUM CHLORIDE: 9 INJECTION, SOLUTION INTRAVENOUS at 08:12

## 2024-12-24 RX ADMIN — CYANOCOBALAMIN TAB 1000 MCG 1000 MCG: 1000 TAB at 08:12

## 2024-12-24 RX ADMIN — HYDRALAZINE HYDROCHLORIDE 25 MG: 25 TABLET ORAL at 09:12

## 2024-12-24 RX ADMIN — TAMSULOSIN HYDROCHLORIDE 0.4 MG: 0.4 CAPSULE ORAL at 08:12

## 2024-12-24 RX ADMIN — LISINOPRIL 20 MG: 20 TABLET ORAL at 09:12

## 2024-12-24 RX ADMIN — LORAZEPAM 0.5 MG: 0.5 TABLET ORAL at 09:12

## 2024-12-24 RX ADMIN — POLYETHYLENE GLYCOL 3350 17 G: 17 POWDER, FOR SOLUTION ORAL at 08:12

## 2024-12-24 RX ADMIN — HEPARIN SODIUM 5000 UNITS: 5000 INJECTION INTRAVENOUS; SUBCUTANEOUS at 09:12

## 2024-12-24 RX ADMIN — HEPARIN SODIUM 5000 UNITS: 5000 INJECTION INTRAVENOUS; SUBCUTANEOUS at 12:12

## 2024-12-24 RX ADMIN — CITALOPRAM HYDROBROMIDE 20 MG: 20 TABLET ORAL at 08:12

## 2024-12-24 RX ADMIN — SODIUM CHLORIDE: 9 INJECTION, SOLUTION INTRAVENOUS at 09:12

## 2024-12-24 RX ADMIN — HEPARIN SODIUM 5000 UNITS: 5000 INJECTION INTRAVENOUS; SUBCUTANEOUS at 06:12

## 2024-12-24 RX ADMIN — CEFTRIAXONE 1 G: 1 INJECTION, POWDER, FOR SOLUTION INTRAMUSCULAR; INTRAVENOUS at 09:12

## 2024-12-24 NOTE — PROGRESS NOTES
[facility] Medicine  Progress Note    Patient Name: Lavon Brandon  MRN: [id number]  Patient Class: IP- Inpatient   Admission Date: 12/22/2024  Length of Stay: 2 days  Attending Physician: Mike Pedersen DO  Primary Care Provider: Aristides Haynes MD        Subjective     Principal Problem:Severe sepsis        HPI:  89 year old male with a past medical history of lumbosacral decubitus ulcers , CVA, bradyarrhythmia, hyperlipidemia, hypertension, pulmonary embolism, seizure disorder, UTI, dementia who presents to the ER via EMS from home with concern of his daughter who reports patient's blood pressure has been dropping, he has generalized weakness after his shower today his blood pressure systolic was in the 80s, less responsive, infrequent nonproductive cough, and gagging.    Upon arrival to the ER, blood pressure 102/50, heart rate 89, initial temperature 97° repeated temperature was 95°.  Chiquita Hugger applied patient is given IV fluids which are not currently warmed.  We will stop IV fluids for new, place patient on fluid warmer and continue IV fluids.  Blood pressure stabilized with IV fluids.  BUN 53 creatinine 2.2 glucose 160 ALP 40 AST 60 TSH 3.043 UA negative for infection, blood culture sent lactic 4.35 chest x-ray with no evidence of cardiopulmonary disease.      Admit to hospital medicine for sepsis of unknown source with presence of hypothermia, MATA, increased lactic acidosis level.  Zosyn IV given in ER blood cultures UA negative for infection chest x-ray.         Overview/Hospital Course:  No notes on file    Interval History:  Patient was seen and examined at bedside this morning.  He is doing well today, has no complaints at this time.  Deescalate antibiotics to ceftriaxone and doxycycline.    Review of Systems   Constitutional:  Positive for activity change.   Neurological:  Positive for weakness.        Less responsive   Psychiatric/Behavioral: Negative.       Objective:      Vital Signs (Most Recent):  Temp: 97.9 °F (36.6 °C) (12/24/24 1120)  Pulse: (!) 52 (12/24/24 1120)  Resp: 20 (12/24/24 1120)  BP: (!) 118/55 (12/24/24 1120)  SpO2: 98 % (12/24/24 1120) Vital Signs (24h Range):  Temp:  [97.7 °F (36.5 °C)-98.6 °F (37 °C)] 97.9 °F (36.6 °C)  Pulse:  [51-61] 52  Resp:  [18-20] 20  SpO2:  [96 %-98 %] 98 %  BP: (118-175)/(47-75) 118/55     Weight: 74.9 kg (165 lb 2 oz)  Body mass index is 24.38 kg/m².    Intake/Output Summary (Last 24 hours) at 12/24/2024 1706  Last data filed at 12/24/2024 1441  Gross per 24 hour   Intake 1470.77 ml   Output 1450 ml   Net 20.77 ml         Physical Exam  Vitals reviewed.   Constitutional:       General: He is sleeping. He is not in acute distress.     Appearance: He is not ill-appearing.      Comments: Sleepy, answers questions and doses back to sleep.    HENT:      Head: Normocephalic.      Mouth/Throat:      Mouth: Mucous membranes are dry.   Eyes:      Pupils: Pupils are equal, round, and reactive to light.   Cardiovascular:      Rate and Rhythm: Normal rate.      Heart sounds: No murmur heard.  Pulmonary:      Effort: Pulmonary effort is normal. No respiratory distress.      Breath sounds: Normal breath sounds.   Abdominal:      General: Bowel sounds are normal.      Palpations: Abdomen is soft.   Musculoskeletal:      Cervical back: Neck supple.   Neurological:      Comments: Drowsy, wakes up to stimulus.    He was oriented to self/person and place but not to time.    Able to follow some commands.  Strength  4/5 in bilateral upper extremities and 3+/5 in bilateral lower extremities             Significant Labs: All pertinent labs within the past 24 hours have been reviewed.  CBC:   Recent Labs   Lab 12/23/24  0610 12/24/24  0515   WBC 6.24 5.23   HGB 9.1* 8.1*   HCT 28.7* 25.0*    139*     CMP:   Recent Labs   Lab 12/23/24  0844 12/24/24  0515    136   K 5.3* 5.0    109   CO2 25 25    92   BUN 44* 42*   CREATININE 2.2*  2.2*   CALCIUM 9.5 8.5*   PROT 5.6* 5.0*   ALBUMIN 3.1* 2.8*   BILITOT 0.4 0.4   ALKPHOS 33* 27*   AST 13 11   ALT 4* 4*   ANIONGAP 5* 2*     Lactic Acid:   Recent Labs   Lab 12/23/24  0844   LACTATE 0.9       Significant Imaging: I have reviewed all pertinent imaging results/findings within the past 24 hours.    Imaging Results               CT Chest Abdomen Pelvis Without Contrast (XPD) (Final result)  Result time 12/22/24 21:46:29      Final result by Emeka Kramer MD (12/22/24 21:46:29)                   Impression:      Bilateral pulmonary opacities consistent with pulmonary infiltrate/airspace disease, as discussed above, small bilateral pleural effusions, left greater than right.  Bilateral peribronchial thickening.    Mild opacity within the trachea may relate to secretions and or aspiration.    Suspected indeterminate lesion of the right kidney, as discussed above, ultrasound follow-up is recommended.    Pelvocaliceal dilatation of the left kidney without hydroureter or ureteral calculus may relate to large left renal cyst, appears stable, clinical correlation is needed.    Mild urinary bladder wall thickening, correlation for UTI/cystitis is needed.    Findings that may relate to mild rectal colitis for which clinical correlation is needed.    Findings that may relate to bilateral mobile testicles, clinical and historical correlation is needed.    Additional findings as above.    This report was flagged in Epic as abnormal.      Electronically signed by: Emeka Kramer  Date:    12/22/2024  Time:    21:46               Narrative:    EXAMINATION:  CT CHEST ABDOMEN PELVIS WITHOUT CONTRAST(XPD)    CLINICAL HISTORY:  severe sepsis;    TECHNIQUE:  Low dose axial images, sagittal and coronal reformations were obtained from the thoracic inlet to the pubic symphysis intravenous contrast and oral contrast was not utilized, this diminishes the sensitivity of the exam.    COMPARISON:  Chest radiograph December  22, 2024, CT examination abdomen and pelvis September 13, 2024    FINDINGS:  There is artifact present, this combined with the lack of intravenous contrast and oral contrast diminishes the sensitivity of the exam.    There is appearance of mild opacity layering posteriorly at the level of the distal trachea, this may relate to secretions and or aspiration.  There is peribronchial thickening bilaterally particularly at the lung bases, most notably the lower lobes.    There is a prominent calcified granuloma of the right lung noted.  The right upper lobe and right middle lobe demonstrate appearance of mild motion artifact and mild atelectatic change.  Bandlike opacity involving the mid to upper right lower lobe may represent scarring and or atelectasis.  The right lower lobe demonstrates atelectatic change and there is elevation of the right hemidiaphragm noted, there is appearance likely representing superimposed confluent infiltrates/airspace disease at the right infrahilar location, with patchy infiltrates at the right lower lobe as well.    The left upper lobe demonstrates motion artifact and appearance of atelectatic change.  The left lower lobe demonstrates atelectatic change, as well as appearance of superimposed patchy and mildly confluent alveolar infiltrates/airspace disease.  There appear to be prominent bronchi peripherally inferiorly posteriorly at the left lower lobe, this may relate to a component of bronchiectasis.    There is minimal pleural fluid on the right, small amount of pleural fluid on the left.  There is appearance of a cyst small amount of pericardial fluid present.  Evaluation for adenopathy is limited, there is no obvious enlarged mediastinal or hilar adenopathy.  Coronary artery and aortic atherosclerotic change noted.  The thoracic aorta appears normal in caliber.    There is appearance of may relate to mild gynecomastia associated with the left breast.    The stomach appears  decompressed, not optimally evaluated.  There is colon interposed between the liver and the anterior aspect of the diaphragm.  Evaluation of the pancreas is limited however there is no obvious peripancreatic inflammatory change or abnormal pancreatic ductal dilatation.  Calcifications of the spleen are likely granulomatous.  When accounting for limitations of the exam there is no evidence for acute process of the liver, gallbladder, pancreas, or spleen.  Mild adrenal gland thickening bilaterally is noted.    There is a dense lesion at the mid to upper pole of the right kidney measuring approximately 86 Hounsfield units, 2.3 cm, likely a proteinaceous or hemorrhagic cyst, adjacent to this is seen on axial image 138 there is an indeterminate appearance possibly a adjacent lesion measuring 1.8 cm, 67 Hounsfield units.  Indeterminate attenuation character ultrasound follow-up is recommended.  The right kidney demonstrates no evidence for ureteral calculus or obstructive uropathy.    The left kidney demonstrates a hypodense finding measuring approximately 2.4 cm at the midpole, 7.1 Hounsfield units, potentially a cyst.  At the lower pole there is a large hypodensity measuring 6.8 cm, seen on the prior study as well and likely represents a cyst.  There is appearance of pelvocaliceal dilatation of the central and upper pole collecting structures of the left kidney.  There is no evidence for left-sided hydroureter or ureteral calculus, and this configuration is stable, potentially the larger left renal cyst produces some degree of mass effect upon the collecting structures accounting for pelvocaliceal dilatation.    The arterial vascular structures including the abdominal aorta demonstrate atherosclerotic change.  The abdominal aorta appears normal in caliber otherwise not optimally evaluated on this noncontrast examination.  The prostate measures approximately 5.1 x 3.8 cm in size appearing prominent.  Urinary bladder wall  thickening may relate to chronic change however correlation for UTI/cystitis is needed.    Bilateral inguinal hernia noted without bowel involvement.  There is low-density within the inguinal hernia bilaterally, may to some degree relate to fluid however appears mildly greater attenuation and fluid, may relate to bilateral mobile testicles, clinical correlation is needed.    There is no evidence for small bowel obstructive process.  The appendix is identified, it does not appear inflamed.  There is mild-to-moderate prominence of the colon with air and stool throughout its course.  There is mild rectal wall thickening with perirectal edema/inflammation, mild, correlation for mild rectal colitis is needed.  There is no evidence for free intraperitoneal air.    The visualized osseous structures demonstrate chronic change.  There is diminished mineralization and degenerative change.                                       X-Ray Chest AP Portable (Final result)  Result time 12/22/24 14:34:32      Final result by Fly Cherry MD (12/22/24 14:34:32)                   Impression:      No evidence of active cardiopulmonary disease.      Electronically signed by: Fly Cherry  Date:    12/22/2024  Time:    14:34               Narrative:    EXAMINATION:  XR CHEST AP PORTABLE    CLINICAL HISTORY:  Sepsis;    FINDINGS:  Portable chest radiograph at 14:08 hours compared to prior exams shows the cardiomediastinal silhouette and pulmonary vasculature are within normal limits.    The lungs are hypoexpanded, with no consolidation, large pleural effusion, or evidence of pulmonary edema. No pneumothorax or acute fracture.                                        Assessment and Plan     * Severe sepsis  Hypothermia, hypotensive at home per daughter and EMS with SBP 80  Lactic acid 4, Sr Cr 2.2  RSV, Covid ordered  No leukocytosis,  Repeat Lactic acid level - pending      This patient does not have evidence of infective focus  UA  negative, chest x-ray nonacute    My overall impression is sepsis.  Source: Unknown  Antibiotics given-   Zosyn 3.375 g given in ER,  Antibiotics (72h ago, onward)      Start     Stop Route Frequency Ordered    12/24/24 1030  cefTRIAXone injection 1 g         -- IV Every 24 hours (non-standard times) 12/24/24 0926    12/24/24 1030  doxycycline capsule 100 mg         -- Oral Every 12 hours 12/24/24 0926          Latest lactate reviewed-  Recent Labs   Lab 12/22/24  1311 12/22/24  1703 12/23/24  0844   LACTATE  --    < > 0.9   POCLAC 4.35*  --   --     < > = values in this interval not displayed.       Organ dysfunction indicated by Acute kidney injury and Encephalopathy    Fluid challenge Actual Body weight- Patient will receive 30ml/kg actual body weight to calculate fluid bolus for treatment of septic shock.     Post- resuscitation assessment No - Post resuscitation assessment not needed       Will Not start Pressors- Levophed for MAP of 65  Source control achieved by:     No longer septic   Hemodynamically stable   Blood cultures show no growth  No cough, no oxygen requirement  Antibiotics de-escalated to ceftriaxone and doxycycline    Acute alteration in mental status  Mental status appears at baseline at this time    MATA (acute kidney injury)  MATA is likely due to  unknown . Baseline creatinine is  1.8 . Most recent creatinine and eGFR are listed below.  Recent Labs     12/22/24  1315 12/23/24  0844 12/24/24  0515   CREATININE 2.2* 2.2* 2.2*   EGFRNORACEVR 27.4* 27.4* 27.4*        Plan  - MATA is stable of 30 ml/kg given in ER  - Avoid nephrotoxins and renally dose meds for GFR listed above  - Monitor urine output, serial BMP, and adjust therapy as needed    Likely at baseline creatinine right now    Seizure disorder  Check Keppra level  Seizure precautions  Continue Keppra if levels within normal limits        VTE Risk Mitigation (From admission, onward)           Ordered     heparin (porcine) injection 5,000  Units  Every 8 hours         12/22/24 2155     IP VTE HIGH RISK PATIENT  Once         12/22/24 1549     Place sequential compression device  Until discontinued         12/22/24 1549                    Discharge Planning   JOANNE: 12/27/2024     Code Status: DNR   Medical Readiness for Discharge Date:   Discharge Plan A: Home Health, Home                Please place Justification for DME        Mike Pedersen DO  Department of Hospital Medicine   [facility]

## 2024-12-24 NOTE — ASSESSMENT & PLAN NOTE
MATA is likely due to  unknown . Baseline creatinine is  1.8 . Most recent creatinine and eGFR are listed below.  Recent Labs     12/22/24  1315 12/23/24  0844 12/24/24  0515   CREATININE 2.2* 2.2* 2.2*   EGFRNORACEVR 27.4* 27.4* 27.4*        Plan  - MATA is stable of 30 ml/kg given in ER  - Avoid nephrotoxins and renally dose meds for GFR listed above  - Monitor urine output, serial BMP, and adjust therapy as needed    Likely at baseline creatinine right now

## 2024-12-24 NOTE — PROGRESS NOTES
Pharmacy Consult Discontinuation: Vancomycin    Lavon Brandon [id number] is a 89 y.o. male was consulted for vancomycin pharmacotherapy management by pharmacy.    Pharmacy consult for vancomycin dosing is no longer required.  Vancomycin was discontinued 12/24/2024.    Thank you for allowing us to participate in this patient's care. Should you have any questions or concerns please feel free to contact the pharmacy department at [phone].    Jai Loomis, YamilethD

## 2024-12-24 NOTE — ASSESSMENT & PLAN NOTE
Hypothermia, hypotensive at home per daughter and EMS with SBP 80  Lactic acid 4, Sr Cr 2.2  RSV, Covid ordered  No leukocytosis,  Repeat Lactic acid level - pending      This patient does not have evidence of infective focus  UA negative, chest x-ray nonacute    My overall impression is sepsis.  Source: Unknown  Antibiotics given-   Zosyn 3.375 g given in ER,  Antibiotics (72h ago, onward)      Start     Stop Route Frequency Ordered    12/24/24 1030  cefTRIAXone injection 1 g         -- IV Every 24 hours (non-standard times) 12/24/24 0926    12/24/24 1030  doxycycline capsule 100 mg         -- Oral Every 12 hours 12/24/24 0926          Latest lactate reviewed-  Recent Labs   Lab 12/22/24  1311 12/22/24  1703 12/23/24  0844   LACTATE  --    < > 0.9   POCLAC 4.35*  --   --     < > = values in this interval not displayed.       Organ dysfunction indicated by Acute kidney injury and Encephalopathy    Fluid challenge Actual Body weight- Patient will receive 30ml/kg actual body weight to calculate fluid bolus for treatment of septic shock.     Post- resuscitation assessment No - Post resuscitation assessment not needed       Will Not start Pressors- Levophed for MAP of 65  Source control achieved by:     No longer septic   Hemodynamically stable   Blood cultures show no growth  No cough, no oxygen requirement  Antibiotics de-escalated to ceftriaxone and doxycycline

## 2024-12-24 NOTE — SUBJECTIVE & OBJECTIVE
Interval History:  Patient was seen and examined at bedside this morning.  He is doing well today, has no complaints at this time.  Deescalate antibiotics to ceftriaxone and doxycycline.    Review of Systems   Constitutional:  Positive for activity change.   Neurological:  Positive for weakness.        Less responsive   Psychiatric/Behavioral: Negative.       Objective:     Vital Signs (Most Recent):  Temp: 97.9 °F (36.6 °C) (12/24/24 1120)  Pulse: (!) 52 (12/24/24 1120)  Resp: 20 (12/24/24 1120)  BP: (!) 118/55 (12/24/24 1120)  SpO2: 98 % (12/24/24 1120) Vital Signs (24h Range):  Temp:  [97.7 °F (36.5 °C)-98.6 °F (37 °C)] 97.9 °F (36.6 °C)  Pulse:  [51-61] 52  Resp:  [18-20] 20  SpO2:  [96 %-98 %] 98 %  BP: (118-175)/(47-75) 118/55     Weight: 74.9 kg (165 lb 2 oz)  Body mass index is 24.38 kg/m².    Intake/Output Summary (Last 24 hours) at 12/24/2024 1706  Last data filed at 12/24/2024 1441  Gross per 24 hour   Intake 1470.77 ml   Output 1450 ml   Net 20.77 ml         Physical Exam  Vitals reviewed.   Constitutional:       General: He is sleeping. He is not in acute distress.     Appearance: He is not ill-appearing.      Comments: Sleepy, answers questions and doses back to sleep.    HENT:      Head: Normocephalic.      Mouth/Throat:      Mouth: Mucous membranes are dry.   Eyes:      Pupils: Pupils are equal, round, and reactive to light.   Cardiovascular:      Rate and Rhythm: Normal rate.      Heart sounds: No murmur heard.  Pulmonary:      Effort: Pulmonary effort is normal. No respiratory distress.      Breath sounds: Normal breath sounds.   Abdominal:      General: Bowel sounds are normal.      Palpations: Abdomen is soft.   Musculoskeletal:      Cervical back: Neck supple.   Neurological:      Comments: Drowsy, wakes up to stimulus.    He was oriented to self/person and place but not to time.    Able to follow some commands.  Strength  4/5 in bilateral upper extremities and 3+/5 in bilateral lower  extremities             Significant Labs: All pertinent labs within the past 24 hours have been reviewed.  CBC:   Recent Labs   Lab 12/23/24  0610 12/24/24  0515   WBC 6.24 5.23   HGB 9.1* 8.1*   HCT 28.7* 25.0*    139*     CMP:   Recent Labs   Lab 12/23/24  0844 12/24/24  0515    136   K 5.3* 5.0    109   CO2 25 25    92   BUN 44* 42*   CREATININE 2.2* 2.2*   CALCIUM 9.5 8.5*   PROT 5.6* 5.0*   ALBUMIN 3.1* 2.8*   BILITOT 0.4 0.4   ALKPHOS 33* 27*   AST 13 11   ALT 4* 4*   ANIONGAP 5* 2*     Lactic Acid:   Recent Labs   Lab 12/23/24  0844   LACTATE 0.9       Significant Imaging: I have reviewed all pertinent imaging results/findings within the past 24 hours.    Imaging Results               CT Chest Abdomen Pelvis Without Contrast (XPD) (Final result)  Result time 12/22/24 21:46:29      Final result by Emeka Kramer MD (12/22/24 21:46:29)                   Impression:      Bilateral pulmonary opacities consistent with pulmonary infiltrate/airspace disease, as discussed above, small bilateral pleural effusions, left greater than right.  Bilateral peribronchial thickening.    Mild opacity within the trachea may relate to secretions and or aspiration.    Suspected indeterminate lesion of the right kidney, as discussed above, ultrasound follow-up is recommended.    Pelvocaliceal dilatation of the left kidney without hydroureter or ureteral calculus may relate to large left renal cyst, appears stable, clinical correlation is needed.    Mild urinary bladder wall thickening, correlation for UTI/cystitis is needed.    Findings that may relate to mild rectal colitis for which clinical correlation is needed.    Findings that may relate to bilateral mobile testicles, clinical and historical correlation is needed.    Additional findings as above.    This report was flagged in [employer] as abnormal.      Electronically signed by: Emeka Kramer  Date:    12/22/2024  Time:    21:46                Narrative:    EXAMINATION:  CT CHEST ABDOMEN PELVIS WITHOUT CONTRAST(XPD)    CLINICAL HISTORY:  severe sepsis;    TECHNIQUE:  Low dose axial images, sagittal and coronal reformations were obtained from the thoracic inlet to the pubic symphysis intravenous contrast and oral contrast was not utilized, this diminishes the sensitivity of the exam.    COMPARISON:  Chest radiograph December 22, 2024, CT examination abdomen and pelvis September 13, 2024    FINDINGS:  There is artifact present, this combined with the lack of intravenous contrast and oral contrast diminishes the sensitivity of the exam.    There is appearance of mild opacity layering posteriorly at the level of the distal trachea, this may relate to secretions and or aspiration.  There is peribronchial thickening bilaterally particularly at the lung bases, most notably the lower lobes.    There is a prominent calcified granuloma of the right lung noted.  The right upper lobe and right middle lobe demonstrate appearance of mild motion artifact and mild atelectatic change.  Bandlike opacity involving the mid to upper right lower lobe may represent scarring and or atelectasis.  The right lower lobe demonstrates atelectatic change and there is elevation of the right hemidiaphragm noted, there is appearance likely representing superimposed confluent infiltrates/airspace disease at the right infrahilar location, with patchy infiltrates at the right lower lobe as well.    The left upper lobe demonstrates motion artifact and appearance of atelectatic change.  The left lower lobe demonstrates atelectatic change, as well as appearance of superimposed patchy and mildly confluent alveolar infiltrates/airspace disease.  There appear to be prominent bronchi peripherally inferiorly posteriorly at the left lower lobe, this may relate to a component of bronchiectasis.    There is minimal pleural fluid on the right, small amount of pleural fluid on the left.  There is  appearance of a cyst small amount of pericardial fluid present.  Evaluation for adenopathy is limited, there is no obvious enlarged mediastinal or hilar adenopathy.  Coronary artery and aortic atherosclerotic change noted.  The thoracic aorta appears normal in caliber.    There is appearance of may relate to mild gynecomastia associated with the left breast.    The stomach appears decompressed, not optimally evaluated.  There is colon interposed between the liver and the anterior aspect of the diaphragm.  Evaluation of the pancreas is limited however there is no obvious peripancreatic inflammatory change or abnormal pancreatic ductal dilatation.  Calcifications of the spleen are likely granulomatous.  When accounting for limitations of the exam there is no evidence for acute process of the liver, gallbladder, pancreas, or spleen.  Mild adrenal gland thickening bilaterally is noted.    There is a dense lesion at the mid to upper pole of the right kidney measuring approximately 86 Hounsfield units, 2.3 cm, likely a proteinaceous or hemorrhagic cyst, adjacent to this is seen on axial image 138 there is an indeterminate appearance possibly a adjacent lesion measuring 1.8 cm, 67 Hounsfield units.  Indeterminate attenuation character ultrasound follow-up is recommended.  The right kidney demonstrates no evidence for ureteral calculus or obstructive uropathy.    The left kidney demonstrates a hypodense finding measuring approximately 2.4 cm at the midpole, 7.1 Hounsfield units, potentially a cyst.  At the lower pole there is a large hypodensity measuring 6.8 cm, seen on the prior study as well and likely represents a cyst.  There is appearance of pelvocaliceal dilatation of the central and upper pole collecting structures of the left kidney.  There is no evidence for left-sided hydroureter or ureteral calculus, and this configuration is stable, potentially the larger left renal cyst produces some degree of mass effect upon  the collecting structures accounting for pelvocaliceal dilatation.    The arterial vascular structures including the abdominal aorta demonstrate atherosclerotic change.  The abdominal aorta appears normal in caliber otherwise not optimally evaluated on this noncontrast examination.  The prostate measures approximately 5.1 x 3.8 cm in size appearing prominent.  Urinary bladder wall thickening may relate to chronic change however correlation for UTI/cystitis is needed.    Bilateral inguinal hernia noted without bowel involvement.  There is low-density within the inguinal hernia bilaterally, may to some degree relate to fluid however appears mildly greater attenuation and fluid, may relate to bilateral mobile testicles, clinical correlation is needed.    There is no evidence for small bowel obstructive process.  The appendix is identified, it does not appear inflamed.  There is mild-to-moderate prominence of the colon with air and stool throughout its course.  There is mild rectal wall thickening with perirectal edema/inflammation, mild, correlation for mild rectal colitis is needed.  There is no evidence for free intraperitoneal air.    The visualized osseous structures demonstrate chronic change.  There is diminished mineralization and degenerative change.                                       X-Ray Chest AP Portable (Final result)  Result time 12/22/24 14:34:32      Final result by Fly Cherry MD (12/22/24 14:34:32)                   Impression:      No evidence of active cardiopulmonary disease.      Electronically signed by: Fly Cherry  Date:    12/22/2024  Time:    14:34               Narrative:    EXAMINATION:  XR CHEST AP PORTABLE    CLINICAL HISTORY:  Sepsis;    FINDINGS:  Portable chest radiograph at 14:08 hours compared to prior exams shows the cardiomediastinal silhouette and pulmonary vasculature are within normal limits.    The lungs are hypoexpanded, with no consolidation, large pleural effusion,  or evidence of pulmonary edema. No pneumothorax or acute fracture.

## 2024-12-24 NOTE — PT/OT/SLP PROGRESS
Physical Therapy      Patient Name:  Lavon Brandon   MRN:  [id number]    Patient not seen today secondary to Patient unwilling to participate, Other (Comment) (Pt found HOB 20degrees and leaning toward left side eating food from breakfast tray with his hand. PT raised HOB for safety, but otherwise pt did not want to be disturbed.). Will follow-up 12/26/24(no therapy 12/25/24 for Christmas holiday).

## 2024-12-25 VITALS
HEIGHT: 69 IN | BODY MASS INDEX: 24.46 KG/M2 | SYSTOLIC BLOOD PRESSURE: 154 MMHG | WEIGHT: 165.13 LBS | RESPIRATION RATE: 16 BRPM | HEART RATE: 62 BPM | OXYGEN SATURATION: 97 % | DIASTOLIC BLOOD PRESSURE: 53 MMHG | TEMPERATURE: 99 F

## 2024-12-25 LAB
ALBUMIN SERPL BCP-MCNC: 2.8 G/DL (ref 3.5–5.2)
ALP SERPL-CCNC: 29 U/L (ref 55–135)
ALT SERPL W/O P-5'-P-CCNC: 5 U/L (ref 10–44)
ANION GAP SERPL CALC-SCNC: 3 MMOL/L (ref 8–16)
AST SERPL-CCNC: 11 U/L (ref 10–40)
BASOPHILS # BLD AUTO: 0.03 K/UL (ref 0–0.2)
BASOPHILS NFR BLD: 0.5 % (ref 0–1.9)
BILIRUB SERPL-MCNC: 0.3 MG/DL (ref 0.1–1)
BUN SERPL-MCNC: 35 MG/DL (ref 10–30)
CALCIUM SERPL-MCNC: 8.3 MG/DL (ref 8.7–10.5)
CHLORIDE SERPL-SCNC: 110 MMOL/L (ref 95–110)
CO2 SERPL-SCNC: 26 MMOL/L (ref 23–29)
CREAT SERPL-MCNC: 2 MG/DL (ref 0.5–1.4)
DIFFERENTIAL METHOD BLD: ABNORMAL
EOSINOPHIL # BLD AUTO: 0.2 K/UL (ref 0–0.5)
EOSINOPHIL NFR BLD: 3.1 % (ref 0–8)
ERYTHROCYTE [DISTWIDTH] IN BLOOD BY AUTOMATED COUNT: 14.5 % (ref 11.5–14.5)
EST. GFR  (NO RACE VARIABLE): 30.7 ML/MIN/1.73 M^2
GLUCOSE SERPL-MCNC: 90 MG/DL (ref 70–110)
HCT VFR BLD AUTO: 26.5 % (ref 40–54)
HGB BLD-MCNC: 8.4 G/DL (ref 14–18)
IMM GRANULOCYTES # BLD AUTO: 0.02 K/UL (ref 0–0.04)
IMM GRANULOCYTES NFR BLD AUTO: 0.3 % (ref 0–0.5)
LEVETIRACETAM SERPL-MCNC: 17 UG/ML (ref 10–40)
LYMPHOCYTES # BLD AUTO: 2.4 K/UL (ref 1–4.8)
LYMPHOCYTES NFR BLD: 40.7 % (ref 18–48)
MAGNESIUM SERPL-MCNC: 1.8 MG/DL (ref 1.6–2.6)
MCH RBC QN AUTO: 29.5 PG (ref 27–31)
MCHC RBC AUTO-ENTMCNC: 31.7 G/DL (ref 32–36)
MCV RBC AUTO: 93 FL (ref 82–98)
MONOCYTES # BLD AUTO: 0.6 K/UL (ref 0.3–1)
MONOCYTES NFR BLD: 10.2 % (ref 4–15)
NEUTROPHILS # BLD AUTO: 2.7 K/UL (ref 1.8–7.7)
NEUTROPHILS NFR BLD: 45.2 % (ref 38–73)
NRBC BLD-RTO: 0 /100 WBC
PHOSPHATE SERPL-MCNC: 2.8 MG/DL (ref 2.7–4.5)
PLATELET # BLD AUTO: 141 K/UL (ref 150–450)
PMV BLD AUTO: 11.6 FL (ref 9.2–12.9)
POCT GLUCOSE: 141 MG/DL (ref 70–110)
POCT GLUCOSE: 88 MG/DL (ref 70–110)
POTASSIUM SERPL-SCNC: 4.5 MMOL/L (ref 3.5–5.1)
PROT SERPL-MCNC: 5.1 G/DL (ref 6–8.4)
RBC # BLD AUTO: 2.85 M/UL (ref 4.6–6.2)
SODIUM SERPL-SCNC: 139 MMOL/L (ref 136–145)
WBC # BLD AUTO: 5.9 K/UL (ref 3.9–12.7)

## 2024-12-25 PROCEDURE — 83735 ASSAY OF MAGNESIUM: CPT | Performed by: NURSE PRACTITIONER

## 2024-12-25 PROCEDURE — 84100 ASSAY OF PHOSPHORUS: CPT | Performed by: NURSE PRACTITIONER

## 2024-12-25 PROCEDURE — 36415 COLL VENOUS BLD VENIPUNCTURE: CPT | Performed by: NURSE PRACTITIONER

## 2024-12-25 PROCEDURE — 85025 COMPLETE CBC W/AUTO DIFF WBC: CPT | Performed by: NURSE PRACTITIONER

## 2024-12-25 PROCEDURE — 80053 COMPREHEN METABOLIC PANEL: CPT | Performed by: NURSE PRACTITIONER

## 2024-12-25 PROCEDURE — 63600175 PHARM REV CODE 636 W HCPCS: Performed by: HOSPITALIST

## 2024-12-25 PROCEDURE — 63600175 PHARM REV CODE 636 W HCPCS: Performed by: STUDENT IN AN ORGANIZED HEALTH CARE EDUCATION/TRAINING PROGRAM

## 2024-12-25 PROCEDURE — 25000003 PHARM REV CODE 250: Performed by: NURSE PRACTITIONER

## 2024-12-25 PROCEDURE — 25000003 PHARM REV CODE 250: Performed by: STUDENT IN AN ORGANIZED HEALTH CARE EDUCATION/TRAINING PROGRAM

## 2024-12-25 RX ORDER — AMOXICILLIN 500 MG/1
1000 CAPSULE ORAL 3 TIMES DAILY
Qty: 18 CAPSULE | Refills: 0 | Status: SHIPPED | OUTPATIENT
Start: 2024-12-25 | End: 2024-12-28

## 2024-12-25 RX ORDER — DOXYCYCLINE 100 MG/1
100 CAPSULE ORAL EVERY 12 HOURS
Qty: 6 CAPSULE | Refills: 0 | Status: SHIPPED | OUTPATIENT
Start: 2024-12-25 | End: 2024-12-28

## 2024-12-25 RX ADMIN — HEPARIN SODIUM 5000 UNITS: 5000 INJECTION INTRAVENOUS; SUBCUTANEOUS at 05:12

## 2024-12-25 RX ADMIN — CYANOCOBALAMIN TAB 1000 MCG 1000 MCG: 1000 TAB at 10:12

## 2024-12-25 RX ADMIN — POLYETHYLENE GLYCOL 3350 17 G: 17 POWDER, FOR SOLUTION ORAL at 10:12

## 2024-12-25 RX ADMIN — TAMSULOSIN HYDROCHLORIDE 0.4 MG: 0.4 CAPSULE ORAL at 10:12

## 2024-12-25 RX ADMIN — HYDRALAZINE HYDROCHLORIDE 25 MG: 25 TABLET ORAL at 10:12

## 2024-12-25 RX ADMIN — CEFTRIAXONE 1 G: 1 INJECTION, POWDER, FOR SOLUTION INTRAMUSCULAR; INTRAVENOUS at 10:12

## 2024-12-25 RX ADMIN — LEVETIRACETAM 250 MG: 100 INJECTION INTRAVENOUS at 10:12

## 2024-12-25 RX ADMIN — CITALOPRAM HYDROBROMIDE 20 MG: 20 TABLET ORAL at 10:12

## 2024-12-25 RX ADMIN — DOXYCYCLINE HYCLATE 100 MG: 100 CAPSULE ORAL at 10:12

## 2024-12-25 NOTE — HOSPITAL COURSE
Patient rapidly improved IV fluids and IV antibiotics.  Renal function improved.  Patient has remained hemodynamically stable, afebrile.  Blood cultures showed no growth, respiratory panel negative.  CT chest negative for PE, did show likely bilateral lower lobe pneumonia.  Patient has no no cough, no O2 requirement.  Antibiotics were de-escalated to ceftriaxone and doxycycline.  Patient okay for discharge at this time.  He was seen and evaluated and examined on day of discharge.  Patient will be discharged with 3 more days of oral doxycycline, and 3 days of oral amoxicillin.  He will be discharged with home health.  Patient will follow up outpatient with his primary care physician.

## 2024-12-25 NOTE — NURSING
Patients IV removed. Monitor removed and returned. Reviewed discharge instructions with daughter, verbalized understanding. Educated daughter of home health, verbalized understanding. Patients belongings packed and sent with patients daughter. Patient transferred via wheelchair downstairs where daughter awaits for patient to discharge home.

## 2024-12-25 NOTE — DISCHARGE SUMMARY
[facility] Medicine  Discharge Summary      Patient Name: Lavon Brandon  MRN: [id number]  MIMI: [id number]  Patient Class: IP- Inpatient  Admission Date: 12/22/2024  Hospital Length of Stay: 3 days  Discharge Date and Time:  12/25/2024 3:08 PM  Attending Physician: Mike Pedersen DO   Discharging Provider: Mike Pedersen DO  Primary Care Provider: Aristides Haynes MD    Primary Care Team: Networked reference to record PCT     HPI:   89 year old male with a past medical history of lumbosacral decubitus ulcers , CVA, bradyarrhythmia, hyperlipidemia, hypertension, pulmonary embolism, seizure disorder, UTI, dementia who presents to the ER via EMS from home with concern of his daughter who reports patient's blood pressure has been dropping, he has generalized weakness after his shower today his blood pressure systolic was in the 80s, less responsive, infrequent nonproductive cough, and gagging.    Upon arrival to the ER, blood pressure 102/50, heart rate 89, initial temperature 97° repeated temperature was 95°.  Cihquita Hugger applied patient is given IV fluids which are not currently warmed.  We will stop IV fluids for new, place patient on fluid warmer and continue IV fluids.  Blood pressure stabilized with IV fluids.  BUN 53 creatinine 2.2 glucose 160 ALP 40 AST 60 TSH 3.043 UA negative for infection, blood culture sent lactic 4.35 chest x-ray with no evidence of cardiopulmonary disease.      Admit to hospital medicine for sepsis of unknown source with presence of hypothermia, MATA, increased lactic acidosis level.  Zosyn IV given in ER blood cultures UA negative for infection chest x-ray.         * No surgery found *      Hospital Course:   Patient rapidly improved IV fluids and IV antibiotics.  Renal function improved.  Patient has remained hemodynamically stable, afebrile.  Blood cultures showed no growth, respiratory panel negative.  CT chest negative for PE, did show likely bilateral lower  lobe pneumonia.  Patient has no no cough, no O2 requirement.  Antibiotics were de-escalated to ceftriaxone and doxycycline.  Patient okay for discharge at this time.  He was seen and evaluated and examined on day of discharge.  Patient will be discharged with 3 more days of oral doxycycline, and 3 days of oral amoxicillin.  He will be discharged with home health.  Patient will follow up outpatient with his primary care physician.     Goals of Care Treatment Preferences:  Code Status: DNR          What is most important right now is to focus on comfort and QOL .  Accordingly, we have decided that the best plan to meet the patient's goals includes enrolling in hospice care.      SDOH Screening:  The patient was screened for utility difficulties, food insecurity, transport difficulties, housing insecurity, and interpersonal safety and there were no concerns identified this admission.     Consults:     * Severe sepsis  Hypothermia, hypotensive at home per daughter and EMS with SBP 80  Lactic acid 4, Sr Cr 2.2  RSV, Covid ordered  No leukocytosis,  Repeat Lactic acid level - pending      This patient does not have evidence of infective focus  UA negative, chest x-ray nonacute    My overall impression is sepsis.  Source: Unknown  Antibiotics given-   Zosyn 3.375 g given in ER,  Antibiotics (72h ago, onward)      Start     Stop Route Frequency Ordered    12/24/24 1030  cefTRIAXone injection 1 g         -- IV Every 24 hours (non-standard times) 12/24/24 0926    12/24/24 1030  doxycycline capsule 100 mg         -- Oral Every 12 hours 12/24/24 0926          Latest lactate reviewed-  Recent Labs   Lab 12/22/24  1311 12/22/24  1703 12/23/24  0844   LACTATE  --    < > 0.9   POCLAC 4.35*  --   --     < > = values in this interval not displayed.       Organ dysfunction indicated by Acute kidney injury and Encephalopathy    Fluid challenge Actual Body weight- Patient will receive 30ml/kg actual body weight to calculate fluid bolus  for treatment of septic shock.     Post- resuscitation assessment No - Post resuscitation assessment not needed       Will Not start Pressors- Levophed for MAP of 65  Source control achieved by:     No longer septic   Hemodynamically stable   Blood cultures show no growth  No cough, no oxygen requirement  Antibiotics de-escalated to ceftriaxone and doxycycline    Acute alteration in mental status  Mental status appears at baseline at this time    MATA (acute kidney injury)  MATA is likely due to  unknown . Baseline creatinine is  1.8 . Most recent creatinine and eGFR are listed below.  Recent Labs     12/22/24  1315 12/23/24  0844 12/24/24  0515   CREATININE 2.2* 2.2* 2.2*   EGFRNORACEVR 27.4* 27.4* 27.4*        Plan  - MATA is stable of 30 ml/kg given in ER  - Avoid nephrotoxins and renally dose meds for GFR listed above  - Monitor urine output, serial BMP, and adjust therapy as needed    Likely at baseline creatinine right now    Seizure disorder  Check Keppra level  Seizure precautions  Continue Keppra if levels within normal limits        Final Active Diagnoses:    Diagnosis Date Noted POA    PRINCIPAL PROBLEM:  Severe sepsis [A41.9, R65.20] 09/13/2024 Yes    MATA (acute kidney injury) [N17.9] 12/22/2024 Yes    Acute alteration in mental status [R41.82] 12/22/2024 Yes    Seizure disorder [G40.909] 06/23/2024 Yes      Problems Resolved During this Admission:       Discharged Condition: good    Disposition: [facility]-Health Care [facility] [place]    Follow Up:   Follow-up Information       Aristides Haynes MD. Go on 12/27/2024.    Specialty: Family Medicine  Why: Follow-up appointment scheduled with PCP Friday, 12/27/24, at 12 PM.  Contact information:  [address] [place] [id number] [phone]               [facility]. Call in 1 day(s).    Specialties: Home Health Services, Home Therapy Services, Home Living Aide Services  Why: home health - follow up with [facility] for home health services  Contact information:  [id number]  [address]  [facility] [id number]  [phone]                         Patient Instructions:      Ambulatory referral/consult to [facility]   Standing Status: Future   Referral Priority: Routine Referral Type: Consultation   Referral Reason: Specialty Services Required   Number of Visits Requested: 1     Ambulatory referral/consult to Home Health   Standing Status: Future   Referral Priority: Routine Referral Type: Home Health   Referral Reason: Specialty Services Required   Requested Specialty: Home Health Services   Number of Visits Requested: 1     Notify your health care provider if you experience any of the following:  increased confusion or weakness     Notify your health care provider if you experience any of the following:  persistent dizziness, light-headedness, or visual disturbances     Notify your health care provider if you experience any of the following:  worsening rash     Notify your health care provider if you experience any of the following:  severe persistent headache     Notify your health care provider if you experience any of the following:  difficulty breathing or increased cough     Notify your health care provider if you experience any of the following:  redness, tenderness, or signs of infection (pain, swelling, redness, odor or green/yellow discharge around incision site)     Notify your health care provider if you experience any of the following:  severe uncontrolled pain     Notify your health care provider if you experience any of the following:  persistent nausea and vomiting or diarrhea     Notify your health care provider if you experience any of the following:  temperature >100.4     Activity as tolerated       Significant Diagnostic Studies: Labs: CMP   Recent Labs   Lab 12/24/24  0515 12/25/24  0354    139   K 5.0 4.5    110   CO2 25 26   GLU 92 90   BUN 42* 35*   CREATININE 2.2* 2.0*   CALCIUM 8.5* 8.3*   PROT 5.0* 5.1*   ALBUMIN 2.8* 2.8*    BILITOT 0.4 0.3   ALKPHOS 27* 29*   AST 11 11   ALT 4* 5*   ANIONGAP 2* 3*    and CBC   Recent Labs   Lab 12/24/24  0515 12/25/24  0354   WBC 5.23 5.90   HGB 8.1* 8.4*   HCT 25.0* 26.5*   * 141*       Pending Diagnostic Studies:       None           Imaging Results               CT Chest Abdomen Pelvis Without Contrast (XPD) (Final result)  Result time 12/22/24 21:46:29      Final result by Emeka Kramer MD (12/22/24 21:46:29)                   Impression:      Bilateral pulmonary opacities consistent with pulmonary infiltrate/airspace disease, as discussed above, small bilateral pleural effusions, left greater than right.  Bilateral peribronchial thickening.    Mild opacity within the trachea may relate to secretions and or aspiration.    Suspected indeterminate lesion of the right kidney, as discussed above, ultrasound follow-up is recommended.    Pelvocaliceal dilatation of the left kidney without hydroureter or ureteral calculus may relate to large left renal cyst, appears stable, clinical correlation is needed.    Mild urinary bladder wall thickening, correlation for UTI/cystitis is needed.    Findings that may relate to mild rectal colitis for which clinical correlation is needed.    Findings that may relate to bilateral mobile testicles, clinical and historical correlation is needed.    Additional findings as above.    This report was flagged in Epic as abnormal.      Electronically signed by: Emeka Kramer  Date:    12/22/2024  Time:    21:46               Narrative:    EXAMINATION:  CT CHEST ABDOMEN PELVIS WITHOUT CONTRAST(XPD)    CLINICAL HISTORY:  severe sepsis;    TECHNIQUE:  Low dose axial images, sagittal and coronal reformations were obtained from the thoracic inlet to the pubic symphysis intravenous contrast and oral contrast was not utilized, this diminishes the sensitivity of the exam.    COMPARISON:  Chest radiograph December 22, 2024, CT examination abdomen and pelvis September  13, 2024    FINDINGS:  There is artifact present, this combined with the lack of intravenous contrast and oral contrast diminishes the sensitivity of the exam.    There is appearance of mild opacity layering posteriorly at the level of the distal trachea, this may relate to secretions and or aspiration.  There is peribronchial thickening bilaterally particularly at the lung bases, most notably the lower lobes.    There is a prominent calcified granuloma of the right lung noted.  The right upper lobe and right middle lobe demonstrate appearance of mild motion artifact and mild atelectatic change.  Bandlike opacity involving the mid to upper right lower lobe may represent scarring and or atelectasis.  The right lower lobe demonstrates atelectatic change and there is elevation of the right hemidiaphragm noted, there is appearance likely representing superimposed confluent infiltrates/airspace disease at the right infrahilar location, with patchy infiltrates at the right lower lobe as well.    The left upper lobe demonstrates motion artifact and appearance of atelectatic change.  The left lower lobe demonstrates atelectatic change, as well as appearance of superimposed patchy and mildly confluent alveolar infiltrates/airspace disease.  There appear to be prominent bronchi peripherally inferiorly posteriorly at the left lower lobe, this may relate to a component of bronchiectasis.    There is minimal pleural fluid on the right, small amount of pleural fluid on the left.  There is appearance of a cyst small amount of pericardial fluid present.  Evaluation for adenopathy is limited, there is no obvious enlarged mediastinal or hilar adenopathy.  Coronary artery and aortic atherosclerotic change noted.  The thoracic aorta appears normal in caliber.    There is appearance of may relate to mild gynecomastia associated with the left breast.    The stomach appears decompressed, not optimally evaluated.  There is colon interposed  between the liver and the anterior aspect of the diaphragm.  Evaluation of the pancreas is limited however there is no obvious peripancreatic inflammatory change or abnormal pancreatic ductal dilatation.  Calcifications of the spleen are likely granulomatous.  When accounting for limitations of the exam there is no evidence for acute process of the liver, gallbladder, pancreas, or spleen.  Mild adrenal gland thickening bilaterally is noted.    There is a dense lesion at the mid to upper pole of the right kidney measuring approximately 86 Hounsfield units, 2.3 cm, likely a proteinaceous or hemorrhagic cyst, adjacent to this is seen on axial image 138 there is an indeterminate appearance possibly a adjacent lesion measuring 1.8 cm, 67 Hounsfield units.  Indeterminate attenuation character ultrasound follow-up is recommended.  The right kidney demonstrates no evidence for ureteral calculus or obstructive uropathy.    The left kidney demonstrates a hypodense finding measuring approximately 2.4 cm at the midpole, 7.1 Hounsfield units, potentially a cyst.  At the lower pole there is a large hypodensity measuring 6.8 cm, seen on the prior study as well and likely represents a cyst.  There is appearance of pelvocaliceal dilatation of the central and upper pole collecting structures of the left kidney.  There is no evidence for left-sided hydroureter or ureteral calculus, and this configuration is stable, potentially the larger left renal cyst produces some degree of mass effect upon the collecting structures accounting for pelvocaliceal dilatation.    The arterial vascular structures including the abdominal aorta demonstrate atherosclerotic change.  The abdominal aorta appears normal in caliber otherwise not optimally evaluated on this noncontrast examination.  The prostate measures approximately 5.1 x 3.8 cm in size appearing prominent.  Urinary bladder wall thickening may relate to chronic change however correlation for  UTI/cystitis is needed.    Bilateral inguinal hernia noted without bowel involvement.  There is low-density within the inguinal hernia bilaterally, may to some degree relate to fluid however appears mildly greater attenuation and fluid, may relate to bilateral mobile testicles, clinical correlation is needed.    There is no evidence for small bowel obstructive process.  The appendix is identified, it does not appear inflamed.  There is mild-to-moderate prominence of the colon with air and stool throughout its course.  There is mild rectal wall thickening with perirectal edema/inflammation, mild, correlation for mild rectal colitis is needed.  There is no evidence for free intraperitoneal air.    The visualized osseous structures demonstrate chronic change.  There is diminished mineralization and degenerative change.                                       X-Ray Chest AP Portable (Final result)  Result time 12/22/24 14:34:32      Final result by Fly Cherry MD (12/22/24 14:34:32)                   Impression:      No evidence of active cardiopulmonary disease.      Electronically signed by: Fly Cherry  Date:    12/22/2024  Time:    14:34               Narrative:    EXAMINATION:  XR CHEST AP PORTABLE    CLINICAL HISTORY:  Sepsis;    FINDINGS:  Portable chest radiograph at 14:08 hours compared to prior exams shows the cardiomediastinal silhouette and pulmonary vasculature are within normal limits.    The lungs are hypoexpanded, with no consolidation, large pleural effusion, or evidence of pulmonary edema. No pneumothorax or acute fracture.                                      Medications:  Reconciled Home Medications:      Medication List        START taking these medications      amoxicillin 500 MG capsule  Commonly known as: AMOXIL  Take 2 capsules (1,000 mg total) by mouth 3 (three) times daily. for 3 days     doxycycline 100 MG Cap  Commonly known as: VIBRAMYCIN  Take 1 capsule (100 mg total) by mouth every 12  (twelve) hours. for 3 days            CHANGE how you take these medications      calcitRIOL 0.25 MCG Cap  Commonly known as: ROCALTROL  TAKE 1 CAPSULE(0.25 MCG) BY MOUTH EVERY DAY  What changed: See the new instructions.     lisinopriL 20 MG tablet  Commonly known as: PRINIVIL,ZESTRIL  Take 1 tablet (20 mg total) by mouth once daily.  What changed: when to take this            CONTINUE taking these medications      citalopram 20 MG tablet  Commonly known as: CeleXA  Take 1 tablet (20 mg total) by mouth once daily.     CORICIDIN HBP COUGH AND COLD ORAL  Take 30 mLs by mouth nightly as needed (Cough).     hydrALAZINE 50 MG tablet  Commonly known as: APRESOLINE  Take 1 tablet (50 mg total) by mouth every 12 (twelve) hours.     levETIRAcetam 500 MG Tab  Commonly known as: KEPPRA  Take 0.5 tablets (250 mg total) by mouth 2 (two) times daily.     LORazepam 0.5 MG tablet  Commonly known as: ATIVAN  Take 1 tablet (0.5 mg total) by mouth every evening.     tamsulosin 0.4 mg Cap  Commonly known as: FLOMAX  Take 1 capsule (0.4 mg total) by mouth once daily.     VITAMIN B-12 100 MCG tablet  Generic drug: cyanocobalamin  Take 100 mcg by mouth once daily.     vitamin E 400 UNIT capsule  Take 400 Units by mouth once daily.              Indwelling Lines/Drains at time of discharge:   Lines/Drains/Airways       Drain  Duration             Male External Urinary Catheter 12/22/24 2000 2 days                    Time spent on the discharge of patient: 35 minutes         Mike Pedersen DO  Department of Hospital Medicine  [facility]

## 2024-12-25 NOTE — PLAN OF CARE
Patient cleared for discharge from case management standpoint.    Follow up appointment with PCP scheduled and added to AVS.    Chart and discharge orders reviewed.  Patient discharged home with no further case management needs.  Home health referral has been sent with orders for resumption of care; [facility] worksheet has been completed. Home health f/u info also added to AVS.     12/25/24 9236   Final Note   Assessment Type Final Discharge Note   Anticipated Discharge Disposition [facility]   Hospital Resources/Appts/Education Provided Provided patient/caregiver with written discharge plan information;Provided education on problems/symptoms using teachback;Appointments scheduled and added to AVS   Post-Acute Status   Post-Acute Authorization Home Health   Home Health Status Pending post-acute provider review/more information requested   Discharge Delays None known at this time

## 2024-12-25 NOTE — PT/OT/SLP PROGRESS
"Occupational Therapy      Patient Name:  Lavon Brandon   MRN:  [id number]    Patient not seen today secondary to Patient unwilling to participate ("I don't want to be bothered right now."). Will follow-up next service date.    12/24/2024  "

## 2024-12-25 NOTE — PLAN OF CARE
Problem: Adult Inpatient Plan of Care  Goal: Plan of Care Review  Outcome: Met  Goal: Patient-Specific Goal (Individualized)  Outcome: Met  Goal: Absence of Hospital-Acquired Illness or Injury  Outcome: Met  Goal: Optimal Comfort and Wellbeing  Outcome: Met  Goal: Readiness for Transition of Care  Outcome: Met     Problem: Sepsis/Septic Shock  Goal: Optimal Coping  Outcome: Met  Goal: Absence of Bleeding  Outcome: Met  Goal: Blood Glucose Level Within Targeted Range  Outcome: Met  Goal: Absence of Infection Signs and Symptoms  Outcome: Met  Goal: Optimal Nutrition Intake  Outcome: Met     Problem: Acute Kidney Injury/Impairment  Goal: Fluid and Electrolyte Balance  Outcome: Met  Goal: Improved Oral Intake  Outcome: Met  Goal: Effective Renal Function  Outcome: Met     Problem: Wound  Goal: Optimal Coping  Outcome: Met  Goal: Optimal Functional Ability  Outcome: Met  Goal: Absence of Infection Signs and Symptoms  Outcome: Met  Goal: Improved Oral Intake  Outcome: Met  Goal: Optimal Pain Control and Function  Outcome: Met  Goal: Skin Health and Integrity  Outcome: Met  Goal: Optimal Wound Healing  Outcome: Met     Problem: Skin Injury Risk Increased  Goal: Skin Health and Integrity  Outcome: Met     Problem: Fall Injury Risk  Goal: Absence of Fall and Fall-Related Injury  Outcome: Met

## 2024-12-27 ENCOUNTER — OFFICE VISIT (OUTPATIENT)
Dept: PRIMARY CARE CLINIC | Facility: CLINIC | Age: 89
End: 2024-12-27
Payer: MEDICARE

## 2024-12-27 VITALS
DIASTOLIC BLOOD PRESSURE: 62 MMHG | HEIGHT: 69 IN | WEIGHT: 146 LBS | SYSTOLIC BLOOD PRESSURE: 138 MMHG | HEART RATE: 52 BPM | OXYGEN SATURATION: 99 % | TEMPERATURE: 99 F | BODY MASS INDEX: 21.62 KG/M2

## 2024-12-27 DIAGNOSIS — G60.3 IDIOPATHIC PROGRESSIVE NEUROPATHY: ICD-10-CM

## 2024-12-27 DIAGNOSIS — N18.32 STAGE 3B CHRONIC KIDNEY DISEASE: Primary | ICD-10-CM

## 2024-12-27 DIAGNOSIS — S06.9X0D TRAUMATIC BRAIN INJURY, WITHOUT LOSS OF CONSCIOUSNESS, SUBSEQUENT ENCOUNTER: ICD-10-CM

## 2024-12-27 DIAGNOSIS — D63.8 ANEMIA, CHRONIC DISEASE: ICD-10-CM

## 2024-12-27 DIAGNOSIS — Z23 INFLUENZA VACCINATION ADMINISTERED AT CURRENT VISIT: ICD-10-CM

## 2024-12-27 DIAGNOSIS — F01.518 VASCULAR DEMENTIA OF ACUTE ONSET, WITH BEHAVIORAL DISTURBANCE: ICD-10-CM

## 2024-12-27 LAB
BACTERIA BLD CULT: NORMAL
BACTERIA BLD CULT: NORMAL

## 2024-12-27 PROCEDURE — 99214 OFFICE O/P EST MOD 30 MIN: CPT | Mod: S$GLB,,, | Performed by: FAMILY MEDICINE

## 2024-12-27 PROCEDURE — 1157F ADVNC CARE PLAN IN RCRD: CPT | Mod: CPTII,S$GLB,, | Performed by: FAMILY MEDICINE

## 2024-12-27 PROCEDURE — G0008 ADMIN INFLUENZA VIRUS VAC: HCPCS | Mod: S$GLB,,, | Performed by: FAMILY MEDICINE

## 2024-12-27 PROCEDURE — G2211 COMPLEX E/M VISIT ADD ON: HCPCS | Mod: S$GLB,,, | Performed by: FAMILY MEDICINE

## 2024-12-27 PROCEDURE — 3288F FALL RISK ASSESSMENT DOCD: CPT | Mod: CPTII,S$GLB,, | Performed by: FAMILY MEDICINE

## 2024-12-27 PROCEDURE — 1160F RVW MEDS BY RX/DR IN RCRD: CPT | Mod: CPTII,S$GLB,, | Performed by: FAMILY MEDICINE

## 2024-12-27 PROCEDURE — 1159F MED LIST DOCD IN RCRD: CPT | Mod: CPTII,S$GLB,, | Performed by: FAMILY MEDICINE

## 2024-12-27 PROCEDURE — 1101F PT FALLS ASSESS-DOCD LE1/YR: CPT | Mod: CPTII,S$GLB,, | Performed by: FAMILY MEDICINE

## 2024-12-27 PROCEDURE — 1125F AMNT PAIN NOTED PAIN PRSNT: CPT | Mod: CPTII,S$GLB,, | Performed by: FAMILY MEDICINE

## 2024-12-27 PROCEDURE — 90653 IIV ADJUVANT VACCINE IM: CPT | Mod: S$GLB,,, | Performed by: FAMILY MEDICINE

## 2024-12-27 PROCEDURE — 1111F DSCHRG MED/CURRENT MED MERGE: CPT | Mod: CPTII,S$GLB,, | Performed by: FAMILY MEDICINE

## 2024-12-27 PROCEDURE — 99999 PR PBB SHADOW E&M-EST. PATIENT-LVL IV: CPT | Mod: PBBFAC,,, | Performed by: FAMILY MEDICINE

## 2024-12-28 ENCOUNTER — PATIENT MESSAGE (OUTPATIENT)
Dept: PRIMARY CARE CLINIC | Facility: CLINIC | Age: 89
End: 2024-12-28
Payer: MEDICARE

## 2024-12-29 NOTE — PROGRESS NOTES
Subjective:       Patient ID: Lavon Brandon is a 89 y.o. male.    Chief Complaint: Follow-up    Patient with severe dementia, not able to give history          Patient presents today with daughter, Gabriela for follow up visit. history of Lewy body dementia, seizure disorder, DM, anemia, CVA and hypertension . Home Health [facility]-daughter reports patient had labs and urine collected- I will check our fax        8/7/24 [facility] bradycardia    7/19/24 [facility]: [facility] fall from bed. Struck forehead. -No acute intracranial abnormality by CT. Chronic microvascular ischemic changes and remote ischemia. This report was signed by Roman Herman MD on 7/19/2024 6/22/24 hospital stay acute encephalopathy secondary to UTI for which he completed a course of Zosyn. [facility] was consulted; the family was not agreeable to hospice at this time. PT/OT was consulted given debility. The patient was discharged to SNF 7/1.       Past Medical History:   Diagnosis Date    Bradyarrhythmia     CVA (cerebral infarction) 2006    Dementia     Depression     Hyperlipidemia     Hypertension     renal htn    Pulmonary embolism 2002    Seizure disorder        Review of patient's allergies indicates:   Allergen Reactions    Ciprofloxacin (bulk) Swelling         Current Outpatient Medications:     amoxicillin (AMOXIL) 500 MG capsule, Take 2 capsules (1,000 mg total) by mouth 3 (three) times daily. for 3 days, Disp: 18 capsule, Rfl: 0    calcitRIOL (ROCALTROL) 0.25 MCG Cap, TAKE 1 CAPSULE(0.25 MCG) BY MOUTH EVERY DAY (Patient taking differently: Take 0.25 mcg by mouth once daily.), Disp: 90 capsule, Rfl: 4    chlorpheniramine/dextromethorp (CORICIDIN HBP COUGH AND COLD ORAL), Take 30 mLs by mouth nightly as needed (Cough)., Disp: , Rfl:     citalopram (CELEXA) 20 MG tablet, Take 1 tablet (20 mg total) by mouth once daily., Disp: 90 tablet, Rfl: 11    cyanocobalamin (VITAMIN B-12) 100 MCG tablet,  Take 100 mcg by mouth once daily., Disp: , Rfl:     doxycycline (VIBRAMYCIN) 100 MG Cap, Take 1 capsule (100 mg total) by mouth every 12 (twelve) hours. for 3 days, Disp: 6 capsule, Rfl: 0    hydrALAZINE (APRESOLINE) 50 MG tablet, Take 1 tablet (50 mg total) by mouth every 12 (twelve) hours. (Patient taking differently: Take 25 mg by mouth every 12 (twelve) hours.), Disp: , Rfl:     levETIRAcetam (KEPPRA) 500 MG Tab, Take 0.5 tablets (250 mg total) by mouth 2 (two) times daily., Disp: 90 tablet, Rfl: 3    lisinopriL (PRINIVIL,ZESTRIL) 20 MG tablet, Take 1 tablet (20 mg total) by mouth once daily. (Patient taking differently: Take 20 mg by mouth every evening.), Disp: 90 tablet, Rfl: 3    LORazepam (ATIVAN) 0.5 MG tablet, Take 1 tablet (0.5 mg total) by mouth every evening., Disp: 30 tablet, Rfl: 3    tamsulosin (FLOMAX) 0.4 mg Cap, Take 1 capsule (0.4 mg total) by mouth once daily., Disp: 90 capsule, Rfl: 3    vitamin E 400 UNIT capsule, Take 400 Units by mouth once daily., Disp: , Rfl:     Review of Systems   Constitutional:  Positive for activity change, appetite change and fatigue. Negative for unexpected weight change.   HENT:  Positive for congestion, hearing loss and postnasal drip. Negative for trouble swallowing.    Eyes:  Negative for redness and visual disturbance.   Respiratory:  Positive for shortness of breath.    Cardiovascular:  Negative for chest pain, palpitations and leg swelling.   Gastrointestinal:  Negative for blood in stool.   Genitourinary:  Positive for enuresis, frequency and urgency. Negative for hematuria.   Musculoskeletal:  Positive for arthralgias, back pain, gait problem and neck pain.   Skin:  Negative for rash.   Allergic/Immunologic: Negative for immunocompromised state.   Neurological:  Negative for headaches.   Hematological:  Does not bruise/bleed easily.   Psychiatric/Behavioral:  Negative for agitation. The patient is not nervous/anxious.        Objective:      /62 (BP  "Location: Right arm, Patient Position: Sitting)   Pulse (!) 52   Temp 98.7 °F (37.1 °C) (Oral)   Ht 5' 9" (1.753 m)   Wt 66.2 kg (146 lb)   SpO2 99%   BMI 21.56 kg/m²   Physical Exam  Constitutional:       Appearance: He is well-developed. He is obese. He is ill-appearing and toxic-appearing.   HENT:      Right Ear: There is no impacted cerumen.      Left Ear: There is no impacted cerumen.      Nose: Congestion and rhinorrhea present.   Eyes:      Conjunctiva/sclera: Conjunctivae normal.      Pupils: Pupils are equal, round, and reactive to light.   Cardiovascular:      Rate and Rhythm: Normal rate and regular rhythm.      Heart sounds: Normal heart sounds.   Pulmonary:      Effort: Pulmonary effort is normal.   Musculoskeletal:         General: Normal range of motion.      Comments: Patient in wheelchair   Neurological:      Mental Status: He is alert and oriented to person, place, and time.   Psychiatric:         Behavior: Behavior normal.         Thought Content: Thought content normal.         Judgment: Judgment normal.         Assessment:       1. Stage 3b chronic kidney disease    2. Vascular dementia of acute onset, with behavioral disturbance    3. Traumatic brain injury, without loss of consciousness, subsequent encounter    4. Anemia, chronic disease    5. Idiopathic progressive neuropathy    6. Influenza vaccination administered at current visit        Plan:       Primary hypertension  Stable, continue management  Low sodium diet  BP Readings from Last 3 Encounters:   12/27/24 138/62   12/25/24 (!) 154/53   11/11/24 (!) 108/50      Seizure disorder  -     levETIRAcetam (KEPPRA) 500 MG Tab; Take 1 tablet (500 mg total) by mouth 2 (two) times daily.  Stable, continue management  Dementia with behavioral disturbance  -     citalopram (CELEXA) 20 MG tablet; Take 1 tablet (20 mg total) by mouth once daily.  Dispense: 90 tablet; Refill: 11  Stable, continue management  Chronic renal disease, stage " IV  Stable and chronic.  Will continue to monitor q3-6 months and control chronic conditions as optimally as possible to preserve function.   Systolic congestive heart failure, unspecified HF chronicity  Stable, continue follow up    Physical deconditioning  Stable, continue Home PT/OT    BMI 22.0-22.9, adult  Stable, continue management         Patient readiness: acceptance and barriers:none    During the course of the visit the patient was educated and counseled about the following:     Hypertension:   Dietary sodium restriction.  Regular aerobic exercise.    Goals: Hypertension: Reduce Blood Pressure    Did patient meet goals/outcomes: Yes    The following self management tools provided: declined    Patient Instructions (the written plan) was given to the patient/family.     Time spent with patient: 30 minutes    Barriers to medications present (no )    Adverse reactions to current medications (no)    Over the counter medications reviewed (Yes)

## 2024-12-30 ENCOUNTER — PATIENT OUTREACH (OUTPATIENT)
Dept: ADMINISTRATIVE | Facility: CLINIC | Age: 89
End: 2024-12-30
Payer: MEDICARE

## 2024-12-30 ENCOUNTER — TELEPHONE (OUTPATIENT)
Dept: PRIMARY CARE CLINIC | Facility: CLINIC | Age: 89
End: 2024-12-30
Payer: MEDICARE

## 2024-12-30 NOTE — TELEPHONE ENCOUNTER
----- Message from Izzy sent at 12/30/2024  1:14 PM CST -----  Regarding: call back  Contact: pt  Type: Needs Medical Advice  Who Called:  patient nurse   Symptoms (please be specific):    How long has patient had these symptoms:    Pharmacy name and phone #:    Best Call Back Number:  [phone]     Additional Information: home nurse is calling regarding increase swelling on pt right side of body no pain sworeness in right forearm tender to touch MRN: [id number] gordon ramirez  PULSE IS SAME IN BOTH ARMS   Pictures will be uploaded on [facility] call nurse [phone]- or daughter [phone]

## 2024-12-30 NOTE — PROGRESS NOTES
C3 nurse attempted to contact Lavon Brandon for a TCC post hospital discharge follow up call. No answer. Left voicemail with callback information. The patient had a scheduled HOSFU appointment with Aristides Haynes MD on 12/27/24.

## 2024-12-30 NOTE — TELEPHONE ENCOUNTER
Home nurse is calling regarding increase swelling on pt right side of body (right arm, trunk, knee, ankle, and penis?) reported by daughter this AM. Pt reports no pain. Right forearm slight tender to touch. Sts pulses are equal bilaterally. Left ankle 23 cm Right 24 cm. Left knee 38.5 cm Right knee 42 cm. Please advise.

## 2024-12-31 NOTE — PROGRESS NOTES
C3 nurse spoke with Lavon Brandon daughter Gabriela for a TCC post hospital discharge follow up call. The patient had a scheduled HOSFU appointment with Aristides Haynes MD on 12/27/24.

## 2025-01-02 ENCOUNTER — LAB VISIT (OUTPATIENT)
Dept: LAB | Facility: HOSPITAL | Age: OVER 89
End: 2025-01-02
Attending: FAMILY MEDICINE
Payer: MEDICARE

## 2025-01-02 DIAGNOSIS — N18.32 STAGE 3B CHRONIC KIDNEY DISEASE: ICD-10-CM

## 2025-01-02 DIAGNOSIS — D63.8 ANEMIA, CHRONIC DISEASE: ICD-10-CM

## 2025-01-02 DIAGNOSIS — G60.3 IDIOPATHIC PROGRESSIVE NEUROPATHY: ICD-10-CM

## 2025-01-02 DIAGNOSIS — F01.518 VASCULAR DEMENTIA OF ACUTE ONSET, WITH BEHAVIORAL DISTURBANCE: ICD-10-CM

## 2025-01-02 LAB
25(OH)D3+25(OH)D2 SERPL-MCNC: 27 NG/ML (ref 30–96)
ALBUMIN SERPL BCP-MCNC: 3.3 G/DL (ref 3.5–5.2)
ALP SERPL-CCNC: 38 U/L (ref 55–135)
ALT SERPL W/O P-5'-P-CCNC: 6 U/L (ref 10–44)
ANION GAP SERPL CALC-SCNC: 3 MMOL/L (ref 8–16)
AST SERPL-CCNC: 14 U/L (ref 10–40)
BILIRUB SERPL-MCNC: 0.3 MG/DL (ref 0.1–1)
BUN SERPL-MCNC: 34 MG/DL (ref 10–30)
CALCIUM SERPL-MCNC: 9.8 MG/DL (ref 8.7–10.5)
CHLORIDE SERPL-SCNC: 109 MMOL/L (ref 95–110)
CO2 SERPL-SCNC: 29 MMOL/L (ref 23–29)
CREAT SERPL-MCNC: 2 MG/DL (ref 0.5–1.4)
EST. GFR  (NO RACE VARIABLE): 30.7 ML/MIN/1.73 M^2
FERRITIN SERPL-MCNC: 59 NG/ML (ref 20–300)
FOLATE SERPL-MCNC: 14.8 NG/ML (ref 4–24)
GLUCOSE SERPL-MCNC: 92 MG/DL (ref 70–110)
IRON SERPL-MCNC: 105 UG/DL (ref 45–160)
LDH SERPL L TO P-CCNC: 120 U/L (ref 110–260)
PHOSPHATE SERPL-MCNC: 3.2 MG/DL (ref 2.7–4.5)
POTASSIUM SERPL-SCNC: 4.9 MMOL/L (ref 3.5–5.1)
PROT SERPL-MCNC: 6 G/DL (ref 6–8.4)
PTH-INTACT SERPL-MCNC: 51.8 PG/ML (ref 9–77)
RETICS/RBC NFR AUTO: 2.2 % (ref 0.4–2)
SATURATED IRON: 56 % (ref 20–50)
SODIUM SERPL-SCNC: 141 MMOL/L (ref 136–145)
TOTAL IRON BINDING CAPACITY: 188 UG/DL (ref 250–450)
TRANSFERRIN SERPL-MCNC: 134 MG/DL (ref 200–375)
VIT B12 SERPL-MCNC: 763 PG/ML (ref 210–950)

## 2025-01-02 PROCEDURE — 83010 ASSAY OF HAPTOGLOBIN QUANT: CPT | Performed by: FAMILY MEDICINE

## 2025-01-02 PROCEDURE — 82728 ASSAY OF FERRITIN: CPT | Performed by: FAMILY MEDICINE

## 2025-01-02 PROCEDURE — 36415 COLL VENOUS BLD VENIPUNCTURE: CPT | Performed by: FAMILY MEDICINE

## 2025-01-02 PROCEDURE — 85045 AUTOMATED RETICULOCYTE COUNT: CPT | Performed by: FAMILY MEDICINE

## 2025-01-02 PROCEDURE — 83970 ASSAY OF PARATHORMONE: CPT | Performed by: FAMILY MEDICINE

## 2025-01-02 PROCEDURE — 83615 LACTATE (LD) (LDH) ENZYME: CPT | Performed by: FAMILY MEDICINE

## 2025-01-02 PROCEDURE — 80053 COMPREHEN METABOLIC PANEL: CPT | Performed by: FAMILY MEDICINE

## 2025-01-02 PROCEDURE — 84100 ASSAY OF PHOSPHORUS: CPT | Performed by: FAMILY MEDICINE

## 2025-01-02 PROCEDURE — 84466 ASSAY OF TRANSFERRIN: CPT | Performed by: FAMILY MEDICINE

## 2025-01-02 PROCEDURE — 82746 ASSAY OF FOLIC ACID SERUM: CPT | Performed by: FAMILY MEDICINE

## 2025-01-02 PROCEDURE — 82607 VITAMIN B-12: CPT | Performed by: FAMILY MEDICINE

## 2025-01-02 PROCEDURE — 82306 VITAMIN D 25 HYDROXY: CPT | Performed by: FAMILY MEDICINE

## 2025-01-03 ENCOUNTER — LAB VISIT (OUTPATIENT)
Dept: LAB | Facility: HOSPITAL | Age: OVER 89
End: 2025-01-03
Attending: FAMILY MEDICINE
Payer: MEDICARE

## 2025-01-03 ENCOUNTER — TELEPHONE (OUTPATIENT)
Dept: PRIMARY CARE CLINIC | Facility: CLINIC | Age: OVER 89
End: 2025-01-03
Payer: MEDICARE

## 2025-01-03 DIAGNOSIS — D63.8 MEGALOBLASTIC ANEMIA DUE TO FISH TAPEWORM: ICD-10-CM

## 2025-01-03 DIAGNOSIS — B70.0 MEGALOBLASTIC ANEMIA DUE TO FISH TAPEWORM: ICD-10-CM

## 2025-01-03 DIAGNOSIS — N18.32 CHRONIC KIDNEY DISEASE (CKD) STAGE G3B/A1, MODERATELY DECREASED GLOMERULAR FILTRATION RATE (GFR) BETWEEN 30-44 ML/MIN/1.73 SQUARE METER AND ALBUMINURIA CREATININE RATIO LESS THAN 30 MG/G: Primary | ICD-10-CM

## 2025-01-03 DIAGNOSIS — F01.518 VASCULAR DEMENTIA WITH BEHAVIOR DISTURBANCE: ICD-10-CM

## 2025-01-03 DIAGNOSIS — G60.3 IDIOPATHIC PROGRESSIVE POLYNEUROPATHY: ICD-10-CM

## 2025-01-03 PROCEDURE — 82397 CHEMILUMINESCENT ASSAY: CPT | Performed by: FAMILY MEDICINE

## 2025-01-03 PROCEDURE — 36415 COLL VENOUS BLD VENIPUNCTURE: CPT | Performed by: FAMILY MEDICINE

## 2025-01-03 NOTE — TELEPHONE ENCOUNTER
"Sw Gretchen with HH. Sts tip of his penis is very swollen, almost "double the normal size". Sts daughter reported jelly like mucous in his brief this morning, but no bowel movement. Pt is also not wanting to weight bear and appears weaker. Please advise.   "

## 2025-01-03 NOTE — TELEPHONE ENCOUNTER
----- Message from Ivette sent at 1/3/2025 11:29 AM CST -----  Contact: Gretchen from [facility]  Type:  Needs Medical Advice    Who Called:   Gretchen from [facility]    Would the patient rather a call back or a response via [facility]?   Call back  Best Call Back Number:    [phone] - Gretchen    Additional Information:   States patient still has swelling to the penis about double the size of normal - states he has a gel-like mucus he is passing in place of stool - states he is weaker and not wanting to weight-bare or walk - please call - thank you

## 2025-01-05 LAB
OHS QRS DURATION: 74 MS
OHS QTC CALCULATION: 391 MS

## 2025-01-06 ENCOUNTER — PATIENT MESSAGE (OUTPATIENT)
Dept: PRIMARY CARE CLINIC | Facility: CLINIC | Age: OVER 89
End: 2025-01-06
Payer: MEDICARE

## 2025-01-09 ENCOUNTER — DOCUMENT SCAN (OUTPATIENT)
Dept: HOME HEALTH SERVICES | Facility: HOSPITAL | Age: OVER 89
End: 2025-01-09
Payer: MEDICARE

## 2025-01-13 ENCOUNTER — PATIENT MESSAGE (OUTPATIENT)
Dept: PRIMARY CARE CLINIC | Facility: CLINIC | Age: OVER 89
End: 2025-01-13
Payer: MEDICARE

## 2025-01-13 ENCOUNTER — DOCUMENT SCAN (OUTPATIENT)
Dept: HOME HEALTH SERVICES | Facility: HOSPITAL | Age: OVER 89
End: 2025-01-13
Payer: MEDICARE

## 2025-01-14 ENCOUNTER — OFFICE VISIT (OUTPATIENT)
Dept: PRIMARY CARE CLINIC | Facility: CLINIC | Age: OVER 89
End: 2025-01-14
Payer: MEDICARE

## 2025-01-14 ENCOUNTER — ANCILLARY ORDERS (OUTPATIENT)
Dept: PRIMARY CARE CLINIC | Facility: CLINIC | Age: OVER 89
End: 2025-01-14
Payer: MEDICARE

## 2025-01-14 ENCOUNTER — HOSPITAL ENCOUNTER (OUTPATIENT)
Dept: RADIOLOGY | Facility: CLINIC | Age: OVER 89
Discharge: HOME | End: 2025-01-14
Attending: NURSE PRACTITIONER
Payer: MEDICARE

## 2025-01-14 ENCOUNTER — PATIENT MESSAGE (OUTPATIENT)
Dept: PRIMARY CARE CLINIC | Facility: CLINIC | Age: OVER 89
End: 2025-01-14
Payer: MEDICARE

## 2025-01-14 VITALS
TEMPERATURE: 98 F | HEART RATE: 77 BPM | OXYGEN SATURATION: 97 % | BODY MASS INDEX: 23.77 KG/M2 | SYSTOLIC BLOOD PRESSURE: 128 MMHG | WEIGHT: 160.94 LBS | DIASTOLIC BLOOD PRESSURE: 58 MMHG

## 2025-01-14 DIAGNOSIS — G40.909 SEIZURE DISORDER: ICD-10-CM

## 2025-01-14 DIAGNOSIS — F01.518 VASCULAR DEMENTIA OF ACUTE ONSET, WITH BEHAVIORAL DISTURBANCE: ICD-10-CM

## 2025-01-14 DIAGNOSIS — D63.8 ANEMIA, CHRONIC DISEASE: ICD-10-CM

## 2025-01-14 DIAGNOSIS — R53.81 PHYSICAL DECONDITIONING: ICD-10-CM

## 2025-01-14 DIAGNOSIS — G47.33 OSA (OBSTRUCTIVE SLEEP APNEA): ICD-10-CM

## 2025-01-14 DIAGNOSIS — N18.32 STAGE 3B CHRONIC KIDNEY DISEASE: ICD-10-CM

## 2025-01-14 DIAGNOSIS — R05.1 ACUTE COUGH: Primary | ICD-10-CM

## 2025-01-14 DIAGNOSIS — F03.918 DEMENTIA WITH BEHAVIORAL DISTURBANCE: ICD-10-CM

## 2025-01-14 DIAGNOSIS — R53.1 LEFT-SIDED WEAKNESS: ICD-10-CM

## 2025-01-14 DIAGNOSIS — Z86.73 HISTORY OF CVA IN ADULTHOOD: ICD-10-CM

## 2025-01-14 DIAGNOSIS — E11.49 DM (DIABETES MELLITUS), TYPE 2 WITH NEUROLOGICAL COMPLICATIONS: ICD-10-CM

## 2025-01-14 DIAGNOSIS — R05.1 ACUTE COUGH: ICD-10-CM

## 2025-01-14 DIAGNOSIS — I10 PRIMARY HYPERTENSION: ICD-10-CM

## 2025-01-14 DIAGNOSIS — Z63.8 CAREGIVER ROLE STRAIN: ICD-10-CM

## 2025-01-14 DIAGNOSIS — N18.4 CHRONIC RENAL DISEASE, STAGE IV: ICD-10-CM

## 2025-01-14 DIAGNOSIS — I50.20 SYSTOLIC CONGESTIVE HEART FAILURE, UNSPECIFIED HF CHRONICITY: ICD-10-CM

## 2025-01-14 PROCEDURE — 71045 X-RAY EXAM CHEST 1 VIEW: CPT | Mod: 26,,, | Performed by: STUDENT IN AN ORGANIZED HEALTH CARE EDUCATION/TRAINING PROGRAM

## 2025-01-14 PROCEDURE — 94640 AIRWAY INHALATION TREATMENT: CPT | Mod: S$GLB,,, | Performed by: NURSE PRACTITIONER

## 2025-01-14 PROCEDURE — 1111F DSCHRG MED/CURRENT MED MERGE: CPT | Mod: CPTII,S$GLB,, | Performed by: NURSE PRACTITIONER

## 2025-01-14 PROCEDURE — 1157F ADVNC CARE PLAN IN RCRD: CPT | Mod: CPTII,S$GLB,, | Performed by: NURSE PRACTITIONER

## 2025-01-14 PROCEDURE — 71045 X-RAY EXAM CHEST 1 VIEW: CPT | Mod: TC,FY,PO

## 2025-01-14 PROCEDURE — 1159F MED LIST DOCD IN RCRD: CPT | Mod: CPTII,S$GLB,, | Performed by: NURSE PRACTITIONER

## 2025-01-14 PROCEDURE — 99999 PR PBB SHADOW E&M-EST. PATIENT-LVL IV: CPT | Mod: PBBFAC,,, | Performed by: NURSE PRACTITIONER

## 2025-01-14 PROCEDURE — 99214 OFFICE O/P EST MOD 30 MIN: CPT | Mod: 25,S$GLB,, | Performed by: NURSE PRACTITIONER

## 2025-01-14 RX ORDER — IPRATROPIUM BROMIDE AND ALBUTEROL SULFATE 2.5; .5 MG/3ML; MG/3ML
3 SOLUTION RESPIRATORY (INHALATION)
Status: COMPLETED | OUTPATIENT
Start: 2025-01-14 | End: 2025-01-14

## 2025-01-14 RX ORDER — LORAZEPAM 0.5 MG/1
1 TABLET ORAL NIGHTLY
COMMUNITY

## 2025-01-14 RX ORDER — HYDROCHLOROTHIAZIDE 12.5 MG/1
12.5 TABLET ORAL DAILY
Qty: 3 TABLET | Refills: 0 | Status: SHIPPED | OUTPATIENT
Start: 2025-01-14 | End: 2025-01-17

## 2025-01-14 RX ADMIN — IPRATROPIUM BROMIDE AND ALBUTEROL SULFATE 3 ML: 2.5; .5 SOLUTION RESPIRATORY (INHALATION) at 02:01

## 2025-01-14 SDOH — SOCIAL DETERMINANTS OF HEALTH (SDOH): OTHER SPECIFIED PROBLEMS RELATED TO PRIMARY SUPPORT GROUP: Z63.8

## 2025-01-14 NOTE — PROGRESS NOTES
Subjective:       Patient ID: Lavon Brandon is a 89 y.o. male.    Chief Complaint: Cough (7-8 days. No fever)    URI   This is a new problem. The current episode started in the past 7 days. The problem has been unchanged. There has been no fever. The cough is Productive of sputum. Associated symptoms include coughing and rhinorrhea. Pertinent negatives include no chest pain, ear pain, headaches, joint swelling, rash, sinus pain, sneezing, sore throat or wheezing. He has tried nothing for the symptoms.     Patient's daughter reports Mr. Doll is having new left side weakness, dragging leg while ambulating. He as Home Health. Also a productive cough for several days-worse at night. Last visit with PCP- on 11/14/24    Patient requesting respite care referral for care giver strain. She stated that she is the sole caregiver for Mr. Doll (bathing, dressing, changing, cook/clean, appts, etc) and voiced she needs more assistance as he progresses and also would like respite care so she can have a break sometimes     1/23/24- I spoke Ruby, case management with [facility], over the phone-Ruby reports patient will have to be in hospice care for a respite referral. I will have my nurse       12/22/24 CT Chest Abdomen Pelvis without Contrast  Impression:     Bilateral pulmonary opacities consistent with pulmonary infiltrate/airspace disease, as discussed above, small bilateral pleural effusions, left greater than right.  Bilateral peribronchial thickening.     Mild opacity within the trachea may relate to secretions and or aspiration.     Suspected indeterminate lesion of the right kidney, as discussed above, ultrasound follow-up is recommended.     Pelvocaliceal dilatation of the left kidney without hydroureter or ureteral calculus may relate to large left renal cyst, appears stable, clinical correlation is needed.     Mild urinary bladder wall thickening, correlation for UTI/cystitis is needed.     Findings  that may relate to mild rectal colitis for which clinical correlation is needed.     Findings that may relate to bilateral mobile testicles, clinical and historical correlation is needed.     Additional findings as above.     This report was flagged in Epic as abnormal.        Past Medical History:   Diagnosis Date    Bradyarrhythmia     CVA (cerebral infarction) 2006    Dementia     Depression     Hyperlipidemia     Hypertension     renal htn    Pulmonary embolism 2002    Seizure disorder        Review of patient's allergies indicates:   Allergen Reactions    Ciprofloxacin (bulk) Swelling         Current Outpatient Medications:     calcitRIOL (ROCALTROL) 0.25 MCG Cap, TAKE 1 CAPSULE(0.25 MCG) BY MOUTH EVERY DAY, Disp: 90 capsule, Rfl: 4    chlorpheniramine/dextromethorp (CORICIDIN HBP COUGH AND COLD ORAL), Take 30 mLs by mouth nightly as needed (Cough)., Disp: , Rfl:     citalopram (CELEXA) 20 MG tablet, Take 1 tablet (20 mg total) by mouth once daily., Disp: 90 tablet, Rfl: 11    cyanocobalamin (VITAMIN B-12) 100 MCG tablet, Take 100 mcg by mouth once daily., Disp: , Rfl:     hydrALAZINE (APRESOLINE) 50 MG tablet, Take 1 tablet (50 mg total) by mouth every 12 (twelve) hours., Disp: , Rfl:     levETIRAcetam (KEPPRA) 500 MG Tab, Take 0.5 tablets (250 mg total) by mouth 2 (two) times daily., Disp: 90 tablet, Rfl: 3    lisinopriL (PRINIVIL,ZESTRIL) 20 MG tablet, Take 1 tablet (20 mg total) by mouth once daily., Disp: 90 tablet, Rfl: 3    LORazepam (ATIVAN) 0.5 MG tablet, Take 1 tablet (0.5 mg total) by mouth every evening., Disp: 30 tablet, Rfl: 3    LORazepam (ATIVAN) 0.5 MG tablet, Take 1 tablet by mouth every evening., Disp: , Rfl:     tamsulosin (FLOMAX) 0.4 mg Cap, Take 1 capsule (0.4 mg total) by mouth once daily., Disp: 90 capsule, Rfl: 3    vitamin E 400 UNIT capsule, Take 400 Units by mouth once daily., Disp: , Rfl:     hydroCHLOROthiazide (HYDRODIURIL) 12.5 MG Tab, Take 1 tablet (12.5 mg total) by mouth once  daily. for 3 days, Disp: 3 tablet, Rfl: 0    Review of Systems   Constitutional:  Negative for unexpected weight change.   HENT:  Positive for rhinorrhea. Negative for ear pain, sinus pain, sneezing, sore throat and trouble swallowing.    Eyes:  Negative for visual disturbance.   Respiratory:  Positive for cough. Negative for shortness of breath and wheezing.    Cardiovascular:  Negative for chest pain, palpitations and leg swelling.   Gastrointestinal:  Negative for blood in stool.   Genitourinary:  Negative for hematuria.   Skin:  Negative for rash.   Allergic/Immunologic: Negative for immunocompromised state.   Neurological:  Negative for headaches.   Hematological:  Does not bruise/bleed easily.   Psychiatric/Behavioral:  Negative for agitation. The patient is not nervous/anxious.        Objective:      BP (!) 128/58 (BP Location: Left arm, Patient Position: Sitting)   Pulse 77   Temp 98.1 °F (36.7 °C) (Oral)   Wt 73 kg (160 lb 15 oz)   SpO2 97%   BMI 23.77 kg/m²   Physical Exam  Constitutional:       Appearance: He is well-developed.   Eyes:      Conjunctiva/sclera: Conjunctivae normal.      Pupils: Pupils are equal, round, and reactive to light.   Cardiovascular:      Rate and Rhythm: Normal rate and regular rhythm.      Heart sounds: Normal heart sounds.   Pulmonary:      Effort: Pulmonary effort is normal.   Musculoskeletal:         General: Normal range of motion.   Neurological:      Mental Status: He is alert and oriented to person, place, and time.   Psychiatric:         Behavior: Behavior normal.         Thought Content: Thought content normal.         Judgment: Judgment normal.         Assessment:       1. Acute cough    2. Physical deconditioning    3. Vascular dementia of acute onset, with behavioral disturbance    4. Caregiver role strain    5. Left-sided weakness    6. MARLIN (obstructive sleep apnea), 2008, not on Rx    7. DM (diabetes mellitus), type 2 with neurological complications    8.  Seizure disorder    9. Chronic renal disease, stage IV    10. Systolic congestive heart failure, unspecified HF chronicity    11. Primary hypertension    12. Stage 3b chronic kidney disease    13. History of CVA in adulthood    14. Anemia, chronic disease    15. BMI 23.0-23.9, adult        Plan:       Acute cough  -     X-Ray Chest PA And Lateral; Future; Expected date: 01/14/2025  -     albuterol-ipratropium 2.5 mg-0.5 mg/3 mL nebulizer solution 3 mL    Physical deconditioning  -     Ambulatory referral/consult to Outpatient Case Management    Vascular dementia of acute onset, with behavioral disturbance  -     Ambulatory referral/consult to Outpatient Case Management    Caregiver role strain  -     Ambulatory referral/consult to Outpatient Case Management    Left-sided weakness  -     CT Head Without Contrast; Future; Expected date: 01/14/2025    MARLIN (obstructive sleep apnea), 2008, not on Rx  -     Home Sleep Study; Future    DM (diabetes mellitus), type 2 with neurological complications  Stable, continue management  Not on medication  Follow the ADA diet  Hemoglobin A1C   Date Value Ref Range Status   06/23/2024 5.1 4.5 - 6.2 % Final     Comment:     According to ADA guidelines, hemoglobin A1C <7.0% represents  optimal control in non-pregnant diabetic patients.  Different  metrics may apply to specific populations.   Standards of Medical Care in Diabetes - 2016.    For the purpose of screening for the presence of diabetes:  <5.7%     Consistent with the absence of diabetes  5.7-6.4%  Consistent with increasing risk for diabetes   (prediabetes)  >or=6.5%  Consistent with diabetes    Currently no consensus exists for use of hemoglobin A1C  for diagnosis of diabetes for children.     11/08/2023 5.2 4.0 - 5.6 % Final     Comment:     ADA Screening Guidelines:  5.7-6.4%  Consistent with prediabetes  >or=6.5%  Consistent with diabetes    High levels of fetal hemoglobin interfere with the HbA1C  assay. Heterozygous  hemoglobin variants (HbS, HgC, etc)do  not significantly interfere with this assay.   However, presence of multiple variants may affect accuracy.     06/23/2023 5.1 4.0 - 5.6 % Final     Comment:     ADA Screening Guidelines:  5.7-6.4%  Consistent with prediabetes  >or=6.5%  Consistent with diabetes    High levels of fetal hemoglobin interfere with the HbA1C  assay. Heterozygous hemoglobin variants (HbS, HgC, etc)do  not significantly interfere with this assay.   However, presence of multiple variants may affect accuracy.        Seizure disorder  Stable, on Keppra    Systolic congestive heart failure, unspecified HF chronicity  Stable, last echo 5/28/24-Left Ventricle: The left ventricle is normal in size. Normal wall thickness. There is normal systolic function with a visually estimated ejection fraction of 55 - 60%. There is normal diastolic function.    Right Ventricle: Normal right ventricular cavity size. Wall thickness is normal. Systolic function is normal.    Aortic Valve: The aortic valve is structurally normal. Mildly calcified left and noncoronary cusps.  Primary hypertension  Stable, continue management  Low sodium diet  BP Readings from Last 3 Encounters:   01/14/25 (!) 128/58   12/27/24 138/62   12/25/24 (!) 154/53      Stage 3b chronic kidney disease  Stable and chronic.  Will continue to monitor q3-6 months and control chronic conditions as optimally as possible to preserve function.   History of CVA in adulthood  Stable, continue management  On seizure prophylaxis  Anemia, chronic disease  Stable, continue management  BMI 23.0-23.9, adult  Stable, continue management        Patient readiness: acceptance and barriers:none    During the course of the visit the patient was educated and counseled about the following:     Diabetes:  Discussed general issues about diabetes pathophysiology and management.  Addressed ADA diet.  Suggested low cholesterol diet.  Hypertension:   Dietary sodium  restriction.  Regular aerobic exercise.    Goals: Diabetes: Maintain Hemoglobin A1C below 7 and Hypertension: Reduce Blood Pressure    Did patient meet goals/outcomes: Yes    The following self management tools provided: declined    Patient Instructions (the written plan) was given to the patient/family.     Time spent with patient: 30 minutes    Barriers to medications present (no )    Adverse reactions to current medications (no)    Over the counter medications reviewed (Yes)

## 2025-01-14 NOTE — TELEPHONE ENCOUNTER
I spoke with pts daughter via phone. Pt will see Mrs. Patton today at 1300.     *Pts daughter states that she has received a call from [facility] stating that someone in our office called and states that he should be seen in our office. I advised her that there is no documentation that anyone has booked appt. I advised her that I would call the ins. I called ins spoke with the nurse she states that when she spoke with the pts daughter she advised her that she was unsure of the time of the arrival of the home health nurse. She states that it would just be easier to bring the pt in today. No further questions at this time.  *

## 2025-01-15 ENCOUNTER — TELEPHONE (OUTPATIENT)
Dept: PRIMARY CARE CLINIC | Facility: CLINIC | Age: OVER 89
End: 2025-01-15
Payer: MEDICARE

## 2025-01-15 DIAGNOSIS — R05.1 ACUTE COUGH: Primary | ICD-10-CM

## 2025-01-15 DIAGNOSIS — R93.89 ABNORMAL CT OF THE CHEST: ICD-10-CM

## 2025-01-15 LAB — PTH RELATED PROT SERPL-SCNC: <2 PMOL/L

## 2025-01-15 RX ORDER — AMOXICILLIN AND CLAVULANATE POTASSIUM 875; 125 MG/1; MG/1
1 TABLET, FILM COATED ORAL EVERY 12 HOURS
Qty: 14 TABLET | Refills: 0 | Status: SHIPPED | OUTPATIENT
Start: 2025-01-15 | End: 2025-01-22

## 2025-01-15 NOTE — TELEPHONE ENCOUNTER
I ordered a swallow study for possible aspiration that was shown on 12/22/24 CT Chest.     Augmentin sent, continue coricidin HBP

## 2025-01-16 ENCOUNTER — PATIENT OUTREACH (OUTPATIENT)
Dept: ADMINISTRATIVE | Facility: OTHER | Age: OVER 89
End: 2025-01-16
Payer: MEDICARE

## 2025-01-16 ENCOUNTER — PATIENT MESSAGE (OUTPATIENT)
Dept: PRIMARY CARE CLINIC | Facility: CLINIC | Age: OVER 89
End: 2025-01-16
Payer: MEDICARE

## 2025-01-16 ENCOUNTER — TELEPHONE (OUTPATIENT)
Dept: PRIMARY CARE CLINIC | Facility: CLINIC | Age: OVER 89
End: 2025-01-16
Payer: MEDICARE

## 2025-01-16 ENCOUNTER — HOSPITAL ENCOUNTER (OUTPATIENT)
Dept: RADIOLOGY | Facility: HOSPITAL | Age: OVER 89
Discharge: HOME | End: 2025-01-16
Attending: NURSE PRACTITIONER
Payer: MEDICARE

## 2025-01-16 DIAGNOSIS — R53.1 LEFT-SIDED WEAKNESS: ICD-10-CM

## 2025-01-16 DIAGNOSIS — Z51.81 THERAPEUTIC DRUG MONITORING: Primary | ICD-10-CM

## 2025-01-16 PROCEDURE — 70450 CT HEAD/BRAIN W/O DYE: CPT | Mod: TC,PO

## 2025-01-16 PROCEDURE — 70450 CT HEAD/BRAIN W/O DYE: CPT | Mod: 26,,, | Performed by: RADIOLOGY

## 2025-01-16 NOTE — PROGRESS NOTES
CHW - Outreach Attempt    Community Health Worker left a voicemail message for 1st attempt to contact  daughter Gabriela  regarding: caregiver support  Community Health Worker to attempt to contact  Gabriela  on: [phone]

## 2025-01-16 NOTE — TELEPHONE ENCOUNTER
I spoke with [facility] via phone  states that they do not provide IS. Will have to pickup from the pharmacy.      I spoke with pts daughter she states that she picked up the IS yesterday. Phone number given for scheduling in regards to swallow study.

## 2025-01-17 ENCOUNTER — PATIENT OUTREACH (OUTPATIENT)
Dept: ADMINISTRATIVE | Facility: OTHER | Age: OVER 89
End: 2025-01-17
Payer: MEDICARE

## 2025-01-17 NOTE — PROGRESS NOTES
CHW - Initial Contact    This Community Health Worker completed OR updated the Social Determinant of Health questionnaire with daughter Gabriela, via telephone today.    Pt identified barriers of most importance are: caregiver support     Support and Services:     I spoke with Gabriela for 52 minutes today discussing what kind of caregiver support her and her father's needs. She stated that she is the sole caregiver for Mr. Doll (bathing, dressing, changing, cook/clean, appts, etc) and voiced she needs more assistance as he progresses and also would like respite care so she can have a break sometimes. Gabriela stated that [facility] 2-4 hours a piece; they do help with changing him and homemaker services if needed. Gabriela stated that a private sitter cost between $25 - $30.00 an hour, and she has exhausted most of their funds for sitting services. Mr. Doll's monthly income is around $2824.    Mr. Doll uses a walker, but sometimes his body forgets how to do things/move due to his dementia. Gabriela said it depends on how his mind is that day whether or not he will have the ability to do things (walk on own, play card game, respond in complete thought). She voiced it is getting harder to change him; sometimes they change in bed, others will go sit on the toilet to use the restroom, but he is weak and cannot stand up for long and will need to sit down, sometimes it will take an hour to change/clean him because he needs breaks; wither weak and cannot stand long or has back pain. The only pain medication he takes is Tylenol. I advised to rotate ice/heat on back to help with pain.    Mr. Doll has [facility], but Gabriela said she believes the nurse will only be out once more and PT twice more before services end. Gabriela stated, PT is good for him because it helps with keeping a routine. He is a  man and likes routine and can do almost anything if you tell him too, but  to ask him {if he wants to do something}, the answer may be no, even though he wants to or can. Gabriela is honoring her father's wishes by caring for him at home; keeps him motivated to do. He has been to SNF twice and he did not like the experience because no one engaged with him.    Gabriela asked for respite care information (11/12/24); I advised her I will follow up on the referral for respite evaluation and asked her to call insurance to get more details (intermittent or consistent) as well as call Medicaid to see if he qualifies to apply so we can discuss Medicaid long term sitter program if eligible.

## 2025-01-17 NOTE — PROGRESS NOTES
CHW - Initial Contact    This Community Health Worker completed OR updated the Social Determinant of Health questionnaire with  daughter Gabriela  via telephone today.    Pt identified barriers of most importance are: caregiver support     Support and Services:     Support and Services:     I spoke with Gabriela for 52 minutes today discussing what kind of caregiver support her and her father's needs. She stated that she is the sole caregiver for Mr. Doll (bathing, dressing, changing, cook/clean, appts, etc) and voiced she needs more assistance as he progresses and also would like respite care so she can have a break sometimes. Gabriela stated that [facility] does [facility] a [facility], 2-4 hours a piece; they do help with changing him and homemaker services if needed. Gabriela stated that a private sitter cost between $25 - $30.00 an hour, and she has exhausted most of their funds for sitting services. Mr. Doll's monthly income is around $2824.    Mr. Doll uses a walker, but sometimes his body forgets how to do things/move due to his dementia. Gabriela said it depends on how his mind is that day whether or not he will have the ability to do things (walk on own, play card game, respond in complete thought). She voiced it is getting harder to change him; sometimes they change in bed, others will go sit on the toilet to use the restroom, but he is weak and cannot stand up for long and will need to sit down, sometimes it will take an hour to change/clean him because he needs breaks; wither weak and cannot stand long or has back pain. The only pain medication he takes is Tylenol. I advised to rotate ice/heat on back to help with pain.    Mr. Doll has [facility], but Gabriela said she believes the nurse will only be out once more and PT twice more before services end. Gabriela stated, PT is good for him because it helps with keeping a routine. He is a  man and likes routine and can do almost  anything if you tell him too, but to ask him if he wants to do something, the answer may be no, even though he wants to or can. Gabriela is honoring her father's wishes by caring for him at home; keeps him motivated to do. He has been to SNF twice and he did not like the experience because no one engaged with him.    Gabriela asked for respite care information (11/12/24); I advised her I will follow up on the referral for respite evaluation and asked her to call insurance to get more details (intermittent or consistent) as well as call Medicaid to see if he qualifies to apply so we can discuss Medicaid long term sitter program if eligible.     Other information discussed the patient needs / wants help with: respite care   Follow up required:   Follow-up Outreach - Due: 1/24/2025

## 2025-01-23 ENCOUNTER — TELEPHONE (OUTPATIENT)
Dept: PRIMARY CARE CLINIC | Facility: CLINIC | Age: OVER 89
End: 2025-01-23
Payer: MEDICARE

## 2025-01-23 ENCOUNTER — PATIENT MESSAGE (OUTPATIENT)
Dept: PRIMARY CARE CLINIC | Facility: CLINIC | Age: OVER 89
End: 2025-01-23
Payer: OTHER GOVERNMENT

## 2025-01-23 PROBLEM — L89.153 PRESSURE INJURY OF SACRAL REGION, STAGE 3: Status: ACTIVE | Noted: 2025-01-23

## 2025-01-23 PROBLEM — E11.49 DM (DIABETES MELLITUS), TYPE 2 WITH NEUROLOGICAL COMPLICATIONS: Status: ACTIVE | Noted: 2025-01-23

## 2025-01-23 NOTE — TELEPHONE ENCOUNTER
Please call patient daughter, Gabriela Hammond, to inform her that  I spoke Ruby, case management with [facility], over the phone about Respite Care Referral -Ruby reports patient will have to be in hospice care for a respite referral. hospice care is a specific type of palliative care provided when a person is nearing the end of life and has a life expectancy of six months, which Mr. Doll is not at this stage.

## 2025-01-28 ENCOUNTER — PATIENT OUTREACH (OUTPATIENT)
Dept: ADMINISTRATIVE | Facility: OTHER | Age: OVER 89
End: 2025-01-28
Payer: MEDICARE

## 2025-01-28 NOTE — PROGRESS NOTES
CHW - Follow Up    This Community Health Worker completed a follow up visit with caregiver via telephone today.  Pt/Caregiver reported: Respite Care  I spoke with daughter Gabriela and informed her that Jillian Patton NP stated,  spoke Ruby, case management with [facility], over the phone about Respite Care Referral -Ruby reports patient will have to be in hospice care for a respite referral. hospice care is a specific type of palliative care provided when a person is nearing the end of life and has a life expectancy of six months, which Mr. Doll is not at this stage.        Daughter Gabriela voiced she has never been told that he had to be on hospice care to receive resite because it is a benefit for the primary caretaker. I advised her I will call N and find out and ask my CM team for advise.   I called [facility], spoke with Veto, she stated his plan covers respite care, at $0 copay for each session with a covered network provider for 12 session per year. The member has to call and initiate a respite care assessment. 1 session per day (8hours) can be used daily and has to request services at least 3 days in advance, with no referral needed.    I spoke with Gabriela again to notify her of the found information. I advised her to call [facility] for resp[facility] services, COA, and a sitter agency to align respite care for a trip to visit her mom in a few months. Gabriela voiced it has all been so confusing, because she has been told different things and didn't understand how to go about getting the care. I advised her if he needs a referral, that would be to get respite care from home health. His insurance provides it as a benefit without a referral. She voiced understanding and gratitude.       Community Health Worker provided: called insurance,   Follow up required:   No future outreach task assigned    CHW - Case Closure    Pt/Caregiver denied any additional needs at this time and agrees with episode closure at this time.  Provided  caregiver Gabriela with Community Health Worker's contact information and encouraged him/her to contact this Community Health Worker if additional needs arise.

## 2025-02-04 ENCOUNTER — PATIENT MESSAGE (OUTPATIENT)
Dept: PRIMARY CARE CLINIC | Facility: CLINIC | Age: OVER 89
End: 2025-02-04
Payer: OTHER GOVERNMENT

## 2025-02-04 DIAGNOSIS — F03.918 DEMENTIA WITH BEHAVIORAL DISTURBANCE: ICD-10-CM

## 2025-02-04 DIAGNOSIS — Z51.81 THERAPEUTIC DRUG MONITORING: Primary | ICD-10-CM

## 2025-02-05 ENCOUNTER — TELEPHONE (OUTPATIENT)
Dept: PRIMARY CARE CLINIC | Facility: CLINIC | Age: OVER 89
End: 2025-02-05
Payer: OTHER GOVERNMENT

## 2025-02-05 RX ORDER — CITALOPRAM 20 MG/1
10 TABLET, FILM COATED ORAL DAILY
Qty: 90 TABLET | Refills: 2 | Status: SHIPPED | OUTPATIENT
Start: 2025-02-05

## 2025-02-05 NOTE — TELEPHONE ENCOUNTER
William nettles at [employer] . Clarifying questions from pt daughter that can be seen in portal message encounter. Advised that message was sent to Dr. Haynes in regards to Celexa dosage, and lab order for Keppra has been sent. Daughter will arrange for trapeze, and is also going to be contacting previous neurology for appt.

## 2025-02-05 NOTE — TELEPHONE ENCOUNTER
----- Message from Odette sent at 2/5/2025 11:58 AM CST -----  Regarding: Call  Type:  Needs Medical Advice    Who Called: Gretchen / [facility]    Would the patient rather a call back or a response via [facility]? Call    Best Call Back Number: [phone]    Additional Information: Daughter found a message in the portal about meds changes and home health instruction. Home health need and order and speak with someone about meds changes. Thanks

## 2025-02-06 ENCOUNTER — LAB VISIT (OUTPATIENT)
Dept: LAB | Facility: HOSPITAL | Age: OVER 89
End: 2025-02-06
Attending: FAMILY MEDICINE
Payer: MEDICARE

## 2025-02-06 DIAGNOSIS — Z51.81 THERAPEUTIC DRUG MONITORING: ICD-10-CM

## 2025-02-06 PROCEDURE — 80177 DRUG SCRN QUAN LEVETIRACETAM: CPT | Performed by: FAMILY MEDICINE

## 2025-02-06 PROCEDURE — 36415 COLL VENOUS BLD VENIPUNCTURE: CPT | Performed by: FAMILY MEDICINE

## 2025-02-07 ENCOUNTER — DOCUMENT SCAN (OUTPATIENT)
Dept: HOME HEALTH SERVICES | Facility: HOSPITAL | Age: OVER 89
End: 2025-02-07
Payer: OTHER GOVERNMENT

## 2025-02-09 LAB — LEVETIRACETAM SERPL-MCNC: 15.8 UG/ML (ref 10–40)

## 2025-02-10 ENCOUNTER — TELEPHONE (OUTPATIENT)
Dept: PRIMARY CARE CLINIC | Facility: CLINIC | Age: OVER 89
End: 2025-02-10
Payer: OTHER GOVERNMENT

## 2025-02-11 ENCOUNTER — CLINICAL SUPPORT (OUTPATIENT)
Dept: REHABILITATION | Facility: HOSPITAL | Age: OVER 89
End: 2025-02-11
Attending: NURSE PRACTITIONER
Payer: MEDICARE

## 2025-02-11 ENCOUNTER — HOSPITAL ENCOUNTER (OUTPATIENT)
Dept: RADIOLOGY | Facility: HOSPITAL | Age: OVER 89
Discharge: HOME | End: 2025-02-11
Attending: NURSE PRACTITIONER
Payer: MEDICARE

## 2025-02-11 DIAGNOSIS — R93.89 ABNORMAL CT OF THE CHEST: ICD-10-CM

## 2025-02-11 PROCEDURE — A9698 NON-RAD CONTRAST MATERIALNOC: HCPCS | Performed by: NURSE PRACTITIONER

## 2025-02-11 PROCEDURE — 74230 X-RAY XM SWLNG FUNCJ C+: CPT | Mod: TC

## 2025-02-11 PROCEDURE — 74230 X-RAY XM SWLNG FUNCJ C+: CPT | Mod: 26,,, | Performed by: RADIOLOGY

## 2025-02-11 PROCEDURE — 92610 EVALUATE SWALLOWING FUNCTION: CPT | Mod: PO

## 2025-02-11 PROCEDURE — 25500020 PHARM REV CODE 255: Performed by: NURSE PRACTITIONER

## 2025-02-11 PROCEDURE — 92611 MOTION FLUOROSCOPY/SWALLOW: CPT | Mod: PO

## 2025-02-11 RX ADMIN — BARIUM SULFATE 90 ML: 0.81 POWDER, FOR SUSPENSION ORAL at 01:02

## 2025-02-12 NOTE — PROGRESS NOTES
Outpatient Rehab  Modified Barium Swallow Study     Patient Name: Lavon Brandon  MRN: [id number]  YOB: 1932  Today's Date: 2/12/2025    Therapy Diagnosis:   Encounter Diagnosis   Name Primary?    Abnormal CT of the chest      Physician: Jillian Patton, *    Physician Orders: Modified Barium Swallow Study   Medical Diagnosis: Abnormal CT of the chest [R93.89]     Visit # / Visits Authorized:  1 / 1   Date of Evaluation:  2/11/2025  Insurance Authorization Period: 2/5/2025 to 4/6/2025  Plan of Care Certification:  Evaluation only     Time In:  1310  Time Out:  1340  Total Time:  30 minutes   Total Billable Time: 30 minutes        Subjective   History of Present Illness  Patient is a 89 y/o male who presents for outpatient Modified Barium Swallow Study to instrumentally assess oropharyngeal swallow physiology due to concerns for possible aspiration noted on CT chest from 12/22/24. Per patient's daughter who accompanied him to study, patient does not have any dysphagia symptoms at home and consumes a regular textured diet without difficulty. Daughter denied coughing, choking, and unintentional weight loss.     According to the patient's chart, Lavon has a past medical history of Bradyarrhythmia, CVA (cerebral infarction), Dementia, Depression, Hyperlipidemia, Hypertension, Pulmonary embolism, and Seizure disorder. Lavon has a past surgical history that includes left foot  with crushed injry.    Diagnostic tests related to this condition: CT Scan and Other (Comment).   CT Scan Details: CT head from 1/16/25: 1. No acute intracranial hemorrhage, mass effect, or loss of gray-white differentiation.  2. Stable chronic ischemic changes and generalized cerebral atrophy.  3. Right maxillary sinusitis.  Other Diagnostic Test Details: CT chest from 12/22/24: Bilateral pulmonary opacities consistent with pulmonary infiltrate/airspace disease, as discussed above, small bilateral pleural effusions, left  "greater than right.  Bilateral peribronchial thickening.     Mild opacity within the trachea may relate to secretions and or aspiration.     Suspected indeterminate lesion of the right kidney, as discussed above, ultrasound follow-up is recommended.     Pelvocaliceal dilatation of the left kidney without hydroureter or ureteral calculus may relate to large left renal cyst, appears stable, clinical correlation is needed.     Mild urinary bladder wall thickening, correlation for UTI/cystitis is needed.     Findings that may relate to mild rectal colitis for which clinical correlation is needed.     Findings that may relate to bilateral mobile testicles, clinical and historical correlation is needed.     Additional findings as above.    Patient on Room air at the time of today's study.    Clinical swallow evaluation completed 12/22/2024 during acute admission reported, "Speech Therapy consult received on: 12/22/2024  for evaluation of SLP Evaluate and treat    due to aspiration concern.      Lavon rBandon presents with swallow that is at baseline per family. Patient has dementia with a history of cognitively based oral dysphagia. Generalized weakness observed orally and suspected pharyngeally but patient observed to be functional for Regular Diet - IDDSI Level 7, Thin liquids-IDDSI Level 0 diet. Swallow Deficits are suspected  in  oropharyngeal  phase(s) of swallow and characterized by prolonged mastication, decreased laryngeal rise on palpation for liquids/puree with better response for solids. No s/s of aspiration observed. Acute speech therapy is not indicated. If problems worsen, patient presents with pneumonia or temperature spikes family educated to seek swallow evaluation.     Recommend Regular Diet, IDDSI Level 7 textures with thin liquids (IDDSI 0). Medications whole with liquid.  Diet recommendations communicated to patient's nurse Lauren  @ [phone] via secure chat."       Current Swallow Symptoms and " Diet  Current Mode of Intake: Oral diet  Oral Intake Details: Primary source of nutrition  Current Drink Consistency: Thin (IDDSI 0)  Current Food Consistency: Regular (IDDSI 7)  Ability to Self-Feed: Independent      Pain  No Pain Reported: Yes       Treatment History  No: Previously Received Treatments  No: Currently Receiving Treatments      Past Medical History/Physical Systems Review:   Lavon Brandon  has a past medical history of Bradyarrhythmia, CVA (cerebral infarction), Dementia, Depression, Hyperlipidemia, Hypertension, Pulmonary embolism, and Seizure disorder.    Lavon Brandon  has a past surgical history that includes left foot  with crushed injry.    Lavon has a current medication list which includes the following prescription(s): calcitriol, chlorpheniramine/dextromethorp, citalopram, cyanocobalamin, hydralazine, hydrochlorothiazide, levetiracetam, lisinopril, lorazepam, lorazepam, tamsulosin, and vitamin e.    Review of patient's allergies indicates:   Allergen Reactions    Ciprofloxacin (bulk) Swelling        Objective   Oral Peripheral Exam  Dentition and Oral Hygiene  The patient's current dental condition: Missing upper dentition and Missing lower dentition.   Oral hygiene is Fair.     Buccal and Oral Mucosa Exam  Buccal strength-CN VII-is Normal. Oral mucosa observations: Normal     Labial Exam  Labial Symmetry is Normal.  Range of motion-CN VII-is Normal.   Strength-CN VII-is Normal.         Seal is Normal.     Lingual Exam  Range of motion-CN XII-is Normal. Strength-CN XII-is Normal. Symmetry-CN XII-is Normal.               Velar and Uvular Exam  Velar elevation during phonation-CN X-is Normal. Sustained velar elevation is Intact.   Velum at rest is Intact.          Jaw Exam  Range of motion-CN V-is Normal.               Face and Neck Exam  Facial symmetry-CN VII-is Normal.   Facial sensation-CN V-is Normal.           Videofluroscopic Swallow Study  VFSS Exam Details  Reason for VFSS:  Objectively assess swallow anatomy and physiology, Rule out aspiration    Barium Type: thin barium, mildly thick barium, barium pudding, soft solid coated in barium, solid coated in barium    Video Speed: 30 fps  Patient Position During VFSS: Upright in chair   Views Taken: Lateral  Trials presented by: Trained staff     Drink Consistencies Presented: Thin (IDDSI 0), Mildly thick (IDDSI 2)   Food Consistencies Presented: Pureed (IDDSI 4), Soft and bite-sized (IDDSI 6), Regular (IDDSI 7)      Consistency Trials  Thin, IDDSI 0 - Presented 5 mL x2, 10 mL x2, self-regulated cup, self-regulated straw  Trialed none needed.  Mildly Thick, IDDSI 2 - Presented 5 mL x2, 10 mL x2  Trialed none needed.  Extremely Thick/Pureed, IDDSI 4 - Presented 5 mL x1, 10 mL x1 via Spoon. Trialed none needed.  Soft and Bite-Sized, IDDSI 6 - Presented 2 peaches x2 via Spoon. Trialed none needed.  Regular, IDDSI 7 - Presented self-regulated bite x1  Trialed none needed.    Oral Phase  Lip Closure: Full  Bolus Loss: No loss  Lingual Control: No loss  Mastication: Slow but functional with extra time  Oral Residue: Oral cavity  Additional Oral Phase Details: Mild oral residue cleared with spontaneous secondary swallow given extra time    Pharyngeal Phase  Bolus Head Location When Swallow Initiation Occurs: Pyriforms  Soft Palate Elevation: Adequate  Tongue Base Retraction: Complete  Tongue Base Retraction - Air/Contrast Column: None  Laryngeal Elevation: Complete  Hyoid Movement: Complete   Laryngeal Vestibular Closure: Complete  Epiglottic Inversion: Present  Pharyngeal Constriction/Stripping Wave: Reduced  Pharyngeal Residue: Valleculae  Pharyngeal Residue Details: trace to mild residue easily cleared with spontaneous secondary swallow    Rosenbek's Penetration-Aspiration Scale  Thin, IDDSI 0: 1-Material does not enter the airway.  Mildly Thick, IDDSI 2: 1-Material does not enter the airway.  Extremely Thick/Pureed, IDDSI 4: 1-Material does not  enter the airway.  Soft and Bite-Sized, IDDSI 6: 1-Material does not enter the airway.  Regular, IDDSI 7: 1-Material does not enter the airway.     Esophageal Sweep: Esophageal sweep not performed due to difficulty with positioning patient. No backflow was noted through the UES in lateral view during today's study       Assessment & Plan   Assessment   Lavon is a 89 y/o male referred for Modified Barium Swallow Study with a medical diagnosis of Abnormal CT of the chest R93.89. The patient presents with WFL/Modified [place] oropharyngeal swallow physiology as determined by the Dysphagia Outcome and Severity Scale (MOUNIKA). Level 6: WFL/ Modified [place].    Modified Barium Swallow Study (MBSS) revealed oral phase characterized by adequate lingual and labial strength and range of motion for tongue control, bolus preparation and transport. Lip closure was adequate with no labial escape. Bolus prep and mastication was prolonged with complete recollection. Lingual motion was brisk for adequate bolus transport. There was mild oral residue which was cleared with spontaneous secondary swallow. The swallow was initiated when the head of the bolus entered the valleculae or pyriform sinuses.    Pharyngeal phase characterized by timely initiation of swallow across consistencies. The soft palate elevated for complete of the velopharyngeal port. During pharyngeal swallow, adequate base of tongue retraction, anterior hyoid excursion, laryngeal elevation, and reduced pharyngeal stripping wave resulted in complete epiglottic inversion and UES opening with mild residue at the valleculae which was cleared with spontaneous sequential swallow. No penetration or aspiration was observed in today's study.       Impressions: Patient presents with overall WFL oral and pharyngeal phases of the swallow. In consideration of the Dynamic Imaging Grade of Swallowing Toxicity (DIGEST) (Angie et al, 2017), patient presents with  preserved safety of swallow and mild efficiency impairment given then consistent need for spontaneous secondary swallow to clear residue. Current presentation is considered WFL and consistent with age related swallow changes. Patient appears to be at low risk for aspiration related PNA in consideration of three pillars of aspiration pneumonia (Les, 2005) including oral health status, overall health/immune status, and laryngeal vestibule closure/severity of dysphagia. However, unable to assess risk related to aspiration pneumonia cause by the aspiration of gastric content. There is no indication for behavioral swallow rehabilitation at this time.             Referral Recommendations : Other (Comment) None    Education  Education was done with Patient and Other recipient present. The patient's learning style includes Listening. The patient Requires assistance.  They identified as Child. The reported learning style is Listening. The recipient Verbalizes understanding.     Swallowing Recommendations  Diet Recommendation: Oral diet  Drink Consistency Recommendation: Thin (IDDSI 0)    Food Consistency Recommendation: Regular (IDDSI 7)    Medication Administration Recommendation: Other (Comment) as tolerated  Effective Swallowing Strategies: Double swallow    Plan  From a speech language pathology perspective, the patient would not benefit from: Skilled Rehab Services    Patient's spiritual, cultural, and educational needs considered and patient agreeable to plan of care and goals.         SAVANNAH Knutson-SLP, CCC-SLP  Speech Language Pathologist   [facility]     No

## 2025-02-13 ENCOUNTER — TELEPHONE (OUTPATIENT)
Dept: PRIMARY CARE CLINIC | Facility: CLINIC | Age: OVER 89
End: 2025-02-13
Payer: OTHER GOVERNMENT

## 2025-02-19 ENCOUNTER — PROCEDURE VISIT (OUTPATIENT)
Dept: SLEEP MEDICINE | Facility: HOSPITAL | Age: OVER 89
End: 2025-02-19
Attending: NURSE PRACTITIONER
Payer: OTHER GOVERNMENT

## 2025-02-19 DIAGNOSIS — G47.33 OSA (OBSTRUCTIVE SLEEP APNEA): ICD-10-CM

## 2025-02-19 PROCEDURE — 95806 SLEEP STUDY UNATT&RESP EFFT: CPT

## 2025-02-20 ENCOUNTER — DOCUMENT SCAN (OUTPATIENT)
Dept: HOME HEALTH SERVICES | Facility: HOSPITAL | Age: OVER 89
End: 2025-02-20
Payer: OTHER GOVERNMENT

## 2025-02-20 ENCOUNTER — RESULTS FOLLOW-UP (OUTPATIENT)
Dept: PRIMARY CARE CLINIC | Facility: CLINIC | Age: OVER 89
End: 2025-02-20
Payer: OTHER GOVERNMENT

## 2025-02-21 DIAGNOSIS — Z00.00 ENCOUNTER FOR MEDICARE ANNUAL WELLNESS EXAM: ICD-10-CM

## 2025-02-23 ENCOUNTER — HOSPITAL ENCOUNTER (INPATIENT)
Facility: HOSPITAL | Age: OVER 89
LOS: 4 days | Discharge: HOSPICE | DRG: 309 | End: 2025-03-01
Attending: EMERGENCY MEDICINE | Admitting: INTERNAL MEDICINE
Payer: OTHER GOVERNMENT

## 2025-02-23 DIAGNOSIS — R55 SYNCOPE, UNSPECIFIED SYNCOPE TYPE: Primary | ICD-10-CM

## 2025-02-23 DIAGNOSIS — I10 ORTHOSTATIC HYPERTENSION: ICD-10-CM

## 2025-02-23 DIAGNOSIS — N17.9 AKI (ACUTE KIDNEY INJURY): ICD-10-CM

## 2025-02-23 DIAGNOSIS — R55 SYNCOPE: ICD-10-CM

## 2025-02-23 DIAGNOSIS — R07.9 CHEST PAIN: ICD-10-CM

## 2025-02-23 DIAGNOSIS — J32.9 RHINOSINUSITIS: ICD-10-CM

## 2025-02-23 LAB
ALBUMIN SERPL BCP-MCNC: 3.4 G/DL (ref 3.5–5.2)
ALP SERPL-CCNC: 36 U/L (ref 55–135)
ALT SERPL W/O P-5'-P-CCNC: 6 U/L (ref 10–44)
AMPHET+METHAMPHET UR QL: NEGATIVE
ANION GAP SERPL CALC-SCNC: 7 MMOL/L (ref 8–16)
AST SERPL-CCNC: 13 U/L (ref 10–40)
BARBITURATES UR QL SCN>200 NG/ML: NEGATIVE
BASOPHILS # BLD AUTO: 0.05 K/UL (ref 0–0.2)
BASOPHILS NFR BLD: 0.8 % (ref 0–1.9)
BENZODIAZ UR QL SCN>200 NG/ML: NEGATIVE
BILIRUB SERPL-MCNC: 0.3 MG/DL (ref 0.1–1)
BILIRUB UR QL STRIP: NEGATIVE
BNP SERPL-MCNC: 197 PG/ML (ref 0–99)
BUN SERPL-MCNC: 49 MG/DL (ref 10–30)
BZE UR QL SCN: NEGATIVE
CALCIUM SERPL-MCNC: 10.2 MG/DL (ref 8.7–10.5)
CANNABINOIDS UR QL SCN: NEGATIVE
CHLORIDE SERPL-SCNC: 104 MMOL/L (ref 95–110)
CLARITY UR: CLEAR
CO2 SERPL-SCNC: 28 MMOL/L (ref 23–29)
COLOR UR: YELLOW
CREAT SERPL-MCNC: 2.3 MG/DL (ref 0.5–1.4)
CREAT UR-MCNC: 89.2 MG/DL (ref 23–375)
DIFFERENTIAL METHOD BLD: ABNORMAL
EOSINOPHIL # BLD AUTO: 0.1 K/UL (ref 0–0.5)
EOSINOPHIL NFR BLD: 2 % (ref 0–8)
ERYTHROCYTE [DISTWIDTH] IN BLOOD BY AUTOMATED COUNT: 13.6 % (ref 11.5–14.5)
EST. GFR  (NO RACE VARIABLE): 26 ML/MIN/1.73 M^2
GLUCOSE SERPL-MCNC: 106 MG/DL (ref 70–110)
GLUCOSE UR QL STRIP: NEGATIVE
HCT VFR BLD AUTO: 31.8 % (ref 40–54)
HGB BLD-MCNC: 10.4 G/DL (ref 14–18)
HGB UR QL STRIP: NEGATIVE
IMM GRANULOCYTES # BLD AUTO: 0.02 K/UL (ref 0–0.04)
IMM GRANULOCYTES NFR BLD AUTO: 0.3 % (ref 0–0.5)
KETONES UR QL STRIP: NEGATIVE
LEUKOCYTE ESTERASE UR QL STRIP: NEGATIVE
LYMPHOCYTES # BLD AUTO: 2.2 K/UL (ref 1–4.8)
LYMPHOCYTES NFR BLD: 32.3 % (ref 18–48)
MAGNESIUM SERPL-MCNC: 2.1 MG/DL (ref 1.6–2.6)
MCH RBC QN AUTO: 30 PG (ref 27–31)
MCHC RBC AUTO-ENTMCNC: 32.7 G/DL (ref 32–36)
MCV RBC AUTO: 92 FL (ref 82–98)
MONOCYTES # BLD AUTO: 0.5 K/UL (ref 0.3–1)
MONOCYTES NFR BLD: 7.5 % (ref 4–15)
NEUTROPHILS # BLD AUTO: 3.8 K/UL (ref 1.8–7.7)
NEUTROPHILS NFR BLD: 57.1 % (ref 38–73)
NITRITE UR QL STRIP: NEGATIVE
NRBC BLD-RTO: 0 /100 WBC
OPIATES UR QL SCN: NEGATIVE
PCP UR QL SCN>25 NG/ML: NEGATIVE
PH UR STRIP: 6 [PH] (ref 5–8)
PHOSPHATE SERPL-MCNC: 4.2 MG/DL (ref 2.7–4.5)
PLATELET # BLD AUTO: 167 K/UL (ref 150–450)
PMV BLD AUTO: 10.9 FL (ref 9.2–12.9)
POCT GLUCOSE: 113 MG/DL (ref 70–110)
POCT GLUCOSE: 159 MG/DL (ref 70–110)
POTASSIUM SERPL-SCNC: 5 MMOL/L (ref 3.5–5.1)
PROT SERPL-MCNC: 6.5 G/DL (ref 6–8.4)
PROT UR QL STRIP: NEGATIVE
RBC # BLD AUTO: 3.47 M/UL (ref 4.6–6.2)
SODIUM SERPL-SCNC: 139 MMOL/L (ref 136–145)
SP GR UR STRIP: 1.01 (ref 1–1.03)
TOXICOLOGY INFORMATION: NORMAL
TROPONIN I SERPL HS-MCNC: 14.1 PG/ML (ref 0–14.9)
TROPONIN I SERPL HS-MCNC: 14.3 PG/ML (ref 0–14.9)
TROPONIN I SERPL HS-MCNC: 15.3 PG/ML (ref 0–14.9)
TSH SERPL DL<=0.005 MIU/L-ACNC: 2.09 UIU/ML (ref 0.34–5.6)
URN SPEC COLLECT METH UR: NORMAL
UROBILINOGEN UR STRIP-ACNC: NEGATIVE EU/DL
WBC # BLD AUTO: 6.65 K/UL (ref 3.9–12.7)

## 2025-02-23 PROCEDURE — 25000003 PHARM REV CODE 250

## 2025-02-23 PROCEDURE — 93005 ELECTROCARDIOGRAM TRACING: CPT | Performed by: INTERNAL MEDICINE

## 2025-02-23 PROCEDURE — 99285 EMERGENCY DEPT VISIT HI MDM: CPT | Mod: 25

## 2025-02-23 PROCEDURE — 84443 ASSAY THYROID STIM HORMONE: CPT | Performed by: STUDENT IN AN ORGANIZED HEALTH CARE EDUCATION/TRAINING PROGRAM

## 2025-02-23 PROCEDURE — 84484 ASSAY OF TROPONIN QUANT: CPT | Mod: 91

## 2025-02-23 PROCEDURE — 36415 COLL VENOUS BLD VENIPUNCTURE: CPT

## 2025-02-23 PROCEDURE — 85025 COMPLETE CBC W/AUTO DIFF WBC: CPT | Performed by: EMERGENCY MEDICINE

## 2025-02-23 PROCEDURE — 86803 HEPATITIS C AB TEST: CPT | Performed by: EMERGENCY MEDICINE

## 2025-02-23 PROCEDURE — 36415 COLL VENOUS BLD VENIPUNCTURE: CPT | Performed by: EMERGENCY MEDICINE

## 2025-02-23 PROCEDURE — 81003 URINALYSIS AUTO W/O SCOPE: CPT | Mod: 59 | Performed by: STUDENT IN AN ORGANIZED HEALTH CARE EDUCATION/TRAINING PROGRAM

## 2025-02-23 PROCEDURE — 84484 ASSAY OF TROPONIN QUANT: CPT | Performed by: EMERGENCY MEDICINE

## 2025-02-23 PROCEDURE — 93010 ELECTROCARDIOGRAM REPORT: CPT | Mod: ,,, | Performed by: INTERNAL MEDICINE

## 2025-02-23 PROCEDURE — 82962 GLUCOSE BLOOD TEST: CPT

## 2025-02-23 PROCEDURE — 80177 DRUG SCRN QUAN LEVETIRACETAM: CPT | Performed by: STUDENT IN AN ORGANIZED HEALTH CARE EDUCATION/TRAINING PROGRAM

## 2025-02-23 PROCEDURE — 80053 COMPREHEN METABOLIC PANEL: CPT | Performed by: EMERGENCY MEDICINE

## 2025-02-23 PROCEDURE — 80307 DRUG TEST PRSMV CHEM ANLYZR: CPT | Performed by: STUDENT IN AN ORGANIZED HEALTH CARE EDUCATION/TRAINING PROGRAM

## 2025-02-23 PROCEDURE — 87389 HIV-1 AG W/HIV-1&-2 AB AG IA: CPT | Performed by: EMERGENCY MEDICINE

## 2025-02-23 PROCEDURE — G0378 HOSPITAL OBSERVATION PER HR: HCPCS

## 2025-02-23 PROCEDURE — 84100 ASSAY OF PHOSPHORUS: CPT | Performed by: EMERGENCY MEDICINE

## 2025-02-23 PROCEDURE — 83880 ASSAY OF NATRIURETIC PEPTIDE: CPT | Performed by: EMERGENCY MEDICINE

## 2025-02-23 PROCEDURE — 83735 ASSAY OF MAGNESIUM: CPT | Performed by: EMERGENCY MEDICINE

## 2025-02-23 PROCEDURE — 84484 ASSAY OF TROPONIN QUANT: CPT | Mod: 91 | Performed by: STUDENT IN AN ORGANIZED HEALTH CARE EDUCATION/TRAINING PROGRAM

## 2025-02-23 RX ORDER — GLUCAGON 1 MG
1 KIT INJECTION
Status: DISCONTINUED | OUTPATIENT
Start: 2025-02-23 | End: 2025-03-01 | Stop reason: HOSPADM

## 2025-02-23 RX ORDER — SODIUM,POTASSIUM PHOSPHATES 280-250MG
2 POWDER IN PACKET (EA) ORAL
Status: DISCONTINUED | OUTPATIENT
Start: 2025-02-23 | End: 2025-03-01 | Stop reason: HOSPADM

## 2025-02-23 RX ORDER — ACETAMINOPHEN 325 MG/1
650 TABLET ORAL EVERY 4 HOURS PRN
Status: DISCONTINUED | OUTPATIENT
Start: 2025-02-23 | End: 2025-03-01 | Stop reason: HOSPADM

## 2025-02-23 RX ORDER — LISINOPRIL 20 MG/1
20 TABLET ORAL DAILY
Status: DISCONTINUED | OUTPATIENT
Start: 2025-02-24 | End: 2025-03-01 | Stop reason: HOSPADM

## 2025-02-23 RX ORDER — IBUPROFEN 200 MG
24 TABLET ORAL
Status: DISCONTINUED | OUTPATIENT
Start: 2025-02-23 | End: 2025-03-01 | Stop reason: HOSPADM

## 2025-02-23 RX ORDER — NALOXONE HCL 0.4 MG/ML
0.02 VIAL (ML) INJECTION
Status: DISCONTINUED | OUTPATIENT
Start: 2025-02-23 | End: 2025-03-01 | Stop reason: HOSPADM

## 2025-02-23 RX ORDER — CALCITRIOL 0.25 UG/1
0.25 CAPSULE ORAL DAILY
Status: DISCONTINUED | OUTPATIENT
Start: 2025-02-24 | End: 2025-03-01 | Stop reason: HOSPADM

## 2025-02-23 RX ORDER — INSULIN ASPART 100 [IU]/ML
0-10 INJECTION, SOLUTION INTRAVENOUS; SUBCUTANEOUS
Status: DISCONTINUED | OUTPATIENT
Start: 2025-02-23 | End: 2025-03-01 | Stop reason: HOSPADM

## 2025-02-23 RX ORDER — TALC
6 POWDER (GRAM) TOPICAL NIGHTLY PRN
Status: DISCONTINUED | OUTPATIENT
Start: 2025-02-23 | End: 2025-03-01 | Stop reason: HOSPADM

## 2025-02-23 RX ORDER — ALUMINUM HYDROXIDE, MAGNESIUM HYDROXIDE, AND SIMETHICONE 1200; 120; 1200 MG/30ML; MG/30ML; MG/30ML
30 SUSPENSION ORAL 4 TIMES DAILY PRN
Status: DISCONTINUED | OUTPATIENT
Start: 2025-02-23 | End: 2025-03-01 | Stop reason: HOSPADM

## 2025-02-23 RX ORDER — ENOXAPARIN SODIUM 100 MG/ML
30 INJECTION SUBCUTANEOUS EVERY 24 HOURS
Status: DISCONTINUED | OUTPATIENT
Start: 2025-02-24 | End: 2025-03-01 | Stop reason: HOSPADM

## 2025-02-23 RX ORDER — LANOLIN ALCOHOL/MO/W.PET/CERES
800 CREAM (GRAM) TOPICAL
Status: DISCONTINUED | OUTPATIENT
Start: 2025-02-23 | End: 2025-03-01 | Stop reason: HOSPADM

## 2025-02-23 RX ORDER — SODIUM CHLORIDE 9 MG/ML
INJECTION, SOLUTION INTRAVENOUS CONTINUOUS
Status: ACTIVE | OUTPATIENT
Start: 2025-02-23 | End: 2025-02-24

## 2025-02-23 RX ORDER — IBUPROFEN 200 MG
16 TABLET ORAL
Status: DISCONTINUED | OUTPATIENT
Start: 2025-02-23 | End: 2025-03-01 | Stop reason: HOSPADM

## 2025-02-23 RX ORDER — HYDRALAZINE HYDROCHLORIDE 25 MG/1
25 TABLET, FILM COATED ORAL EVERY 8 HOURS PRN
Status: DISCONTINUED | OUTPATIENT
Start: 2025-02-23 | End: 2025-03-01 | Stop reason: HOSPADM

## 2025-02-23 RX ORDER — ONDANSETRON HYDROCHLORIDE 2 MG/ML
4 INJECTION, SOLUTION INTRAVENOUS EVERY 6 HOURS PRN
Status: DISCONTINUED | OUTPATIENT
Start: 2025-02-23 | End: 2025-03-01 | Stop reason: HOSPADM

## 2025-02-23 RX ORDER — TAMSULOSIN HYDROCHLORIDE 0.4 MG/1
0.4 CAPSULE ORAL DAILY
Status: DISCONTINUED | OUTPATIENT
Start: 2025-02-24 | End: 2025-03-01 | Stop reason: HOSPADM

## 2025-02-23 RX ORDER — AMOXICILLIN 250 MG
1 CAPSULE ORAL 2 TIMES DAILY PRN
Status: DISCONTINUED | OUTPATIENT
Start: 2025-02-23 | End: 2025-03-01 | Stop reason: HOSPADM

## 2025-02-23 RX ORDER — LEVETIRACETAM 250 MG/1
250 TABLET ORAL 2 TIMES DAILY
Status: DISCONTINUED | OUTPATIENT
Start: 2025-02-23 | End: 2025-03-01 | Stop reason: HOSPADM

## 2025-02-23 RX ORDER — PNV NO.95/FERROUS FUM/FOLIC AC 28MG-0.8MG
100 TABLET ORAL DAILY
Status: DISCONTINUED | OUTPATIENT
Start: 2025-02-24 | End: 2025-02-23

## 2025-02-23 RX ORDER — CITALOPRAM 10 MG/1
10 TABLET ORAL DAILY
Status: DISCONTINUED | OUTPATIENT
Start: 2025-02-24 | End: 2025-03-01 | Stop reason: HOSPADM

## 2025-02-23 RX ORDER — SODIUM CHLORIDE 0.9 % (FLUSH) 0.9 %
10 SYRINGE (ML) INJECTION EVERY 12 HOURS PRN
Status: DISCONTINUED | OUTPATIENT
Start: 2025-02-23 | End: 2025-03-01 | Stop reason: HOSPADM

## 2025-02-23 RX ORDER — VIT C/E/ZN/COPPR/LUTEIN/ZEAXAN 250MG-90MG
400 CAPSULE ORAL DAILY
Status: DISCONTINUED | OUTPATIENT
Start: 2025-02-24 | End: 2025-03-01 | Stop reason: HOSPADM

## 2025-02-23 RX ADMIN — HYDRALAZINE HYDROCHLORIDE 25 MG: 25 TABLET ORAL at 09:02

## 2025-02-23 RX ADMIN — LEVETIRACETAM 250 MG: 250 TABLET, FILM COATED ORAL at 09:02

## 2025-02-23 RX ADMIN — SODIUM CHLORIDE: 9 INJECTION, SOLUTION INTRAVENOUS at 07:02

## 2025-02-23 NOTE — ASSESSMENT & PLAN NOTE
Creatine stable for now. BMP reviewed- noted Estimated Creatinine Clearance: 21.2 mL/min (A) (based on SCr of 2.3 mg/dL (H)). according to latest data. Based on current GFR, CKD stage is stage 4 - GFR 15-29.  Monitor UOP and serial BMP and adjust therapy as needed. Renally dose meds. Avoid nephrotoxic medications and procedures.

## 2025-02-23 NOTE — HPI
Mr. Brandon is a 89 yr old male with a hx of HTN, dementia, MARLIN, CKD stage 4, epilepsy, debility, DM type 2, depression, HLD, colonic polyps, TBI, CVA, secondary hyperparathyroidism, thrombocytopenia, anemia, arthritis, B12 deficiency, vitamin-D deficiency, pressure injury to sacral region, PE, bradycardia who presented to the ED via EMS with a chief complaint of syncope. Patient's daughter reports that the patient was sitting in his wheelchair, had finished eating and was watching TV when she noticed him lean forward and slumped over to the left.  Denies head trauma.  She endorsed that his mouth was open and he was drooling with his eyes open looking to the left and not focused. She tried to wake him and found him unconscious and called EMS to bring him here for further eval. Pt is currently A&O x 1 to self. He is disoriented to place and time.  Daughter states that this is his baseline.  She denies any bowel or bladder incontinence during episode of syncope.  She does endorse that his hydralazine was recently stopped last week.  She also states that he is normally bradycardic with heart rate in the 50s.  She states that he does not smoke tobacco, drink alcohol, or do recreational drugs.  Patient denies any pain at this time.  Remainder of history is limited due to dementia.    Lengthy discussion about code status with daughter at bedside as well as other daughter over the phone who are both healthcare POAs.  I discussed with them that the patient had been marked DNR in the past and wanted to confirm if they wanted to continue DNR status.  Discussed the pros and cons of CPR and patient's daughters would like to make patient full code at this time.  Patient's chart has been updated to reflect this code status change.    Upon arrival to ED, patient afebrile, HR of 42, RR of 16, BP of 151/70, satting 98% on RA.  Workup in the ED revealed RBC of 3.47, H/H of 10.4/31.8, BUN of 49, creatinine of 2.3, GFR of 26,  albumin of 3.4, BNP of 197, troponin of 15.3, POCT glucose of 113.  CXR shows low inspiratory volume exacerbating cardiomediastinal silhouette and causing crowding of vessels.  No evidence of acute cardiopulmonary findings.  Dilated colonic loops projects below the elevated right hemidiaphragm.  No medications administered while in the ED. case discussed with ED provider and patient will be placed under observation for further management.

## 2025-02-23 NOTE — Clinical Note
Diagnosis: Syncope [206001]   Future Attending Provider: CITLALY DUARTE [[id number]]   Place in Observation: [facility] [0215]

## 2025-02-23 NOTE — ASSESSMENT & PLAN NOTE
Last A1c reviewed-   Lab Results   Component Value Date    HGBA1C 5.1 06/23/2024     Most recent fingerstick glucose reviewed-   Recent Labs   Lab 02/23/25  1434   POCTGLUCOSE 113*     Current correctional scale  Medium  Maintain anti-hyperglycemic dose as follows-   Antihyperglycemics (From admission, onward)      None          Hold Oral hypoglycemics while patient is in the hospital.

## 2025-02-23 NOTE — ASSESSMENT & PLAN NOTE
Chronic,  Latest blood pressure and vitals reviewed-     Temp:  [97.5 °F (36.4 °C)]   Pulse:  [41-55]   Resp:  [13-16]   BP: (151-186)/(69-78)   SpO2:  [81 %-98 %] .   Home meds for hypertension were reviewed and noted below-  Hypertension Medications              lisinopriL (PRINIVIL,ZESTRIL) 20 MG tablet Take 1 tablet (20 mg total) by mouth once daily.    hydrALAZINE (APRESOLINE) 50 MG tablet Take 1 tablet (50 mg total) by mouth every 12 (twelve) hours.    hydroCHLOROthiazide (HYDRODIURIL) 12.5 MG Tab Take 1 tablet (12.5 mg total) by mouth once daily. for 3 days            While in the hospital, will manage blood pressure as follows; Adjust home antihypertensive regimen as follows- no longer taking hydralazine as it was discontinued last week.  Continue other home antihypertensives with parameters.    Will utilize p.r.n. blood pressure medication only if patient's blood pressure greater than 180/110 and he develops symptoms such as worsening chest pain or shortness of breath.

## 2025-02-23 NOTE — ED PROVIDER NOTES
Encounter Date: 2/23/2025       History     Chief Complaint   Patient presents with    Loss of Consciousness     Slumped over in chair and eyes rolled back.  Baseline gcs=14 when ems arrived. SZ hx. Bradycardic.    Bradycardia     HPI  89 year old M w/ PMH of hx of CVA, HTN, seizure disordered, T2DM, h x of vascular dementia with behavior distrubance, hx of physical deconditioning, hx of bradycardia  and stage 3b CKD presents to the ED with concern for LOC    The patient has history of dementia therefore daughter is at bedside providing history.     Patient's daughter reports that the patient was sitting in his wheelchair finished eating food with his other daughter when the daughter noticed her dad lean forward in his wheelchair.  She came by to wake him up realize that he was not waking up and was concerned thus bringing him here.  Daughter denies any fevers, coughs, runny nose, urinary issues, skin issues.  There was no tongue injury tonic clonic movements, urinary bowel or incontinence.  Daughter did report that the patient had his hydralazine discontinued last week and reports his normal HR is 53-56. In addition patient recently had a sleep study performed and did not sleep for what seems like a day and a half. She denies any recent fall. On no blood thinners. Daughter says he's at baseline now.  Review of patient's allergies indicates:   Allergen Reactions    Ciprofloxacin (bulk) Swelling     Past Medical History:   Diagnosis Date    Bradyarrhythmia     CVA (cerebral infarction) 2006    Dementia     Depression     Hyperlipidemia     Hypertension     renal htn    Pulmonary embolism 2002    Seizure disorder      Past Surgical History:   Procedure Laterality Date    left foot  with crushed injry       Family History   Problem Relation Name Age of Onset    Diabetes Mother      Cancer Neg Hx       Social History[1]  Review of Systems   Constitutional:  Negative for chills and fever.   Respiratory:  Negative for  cough, choking and shortness of breath.    Cardiovascular:  Negative for chest pain and palpitations.   Gastrointestinal:  Negative for abdominal pain, constipation, diarrhea, nausea and vomiting.   Genitourinary:  Negative for dysuria and frequency.   Musculoskeletal:  Negative for back pain.   Neurological:  Negative for headaches.   Psychiatric/Behavioral:  Positive for confusion. Negative for agitation.        Physical Exam     Initial Vitals   BP Pulse Resp Temp SpO2   02/23/25 1410 02/23/25 1410 02/23/25 1410 02/23/25 1446 02/23/25 1410   (!) 151/70 (!) 42 16 97.5 °F (36.4 °C) 98 %      MAP       --                Physical Exam    Nursing note and vitals reviewed.  Constitutional: He appears well-developed and well-nourished.   HENT:   Head: Normocephalic and atraumatic.   Eyes: EOM are normal.   Cardiovascular:            No murmur heard.  Bradycardic   Pulmonary/Chest: Breath sounds normal. No stridor. He has no wheezes. He has no rhonchi. He exhibits no tenderness.   Abdominal: Abdomen is soft. He exhibits no mass. There is no abdominal tenderness. There is no guarding.   Musculoskeletal:         General: No edema. Normal range of motion.     Neurological: He is oriented to person, place, and time.   GCS 14   Skin: Skin is warm. Capillary refill takes less than 2 seconds.   Psychiatric: He has a normal mood and affect.         ED Course   Procedures  Labs Reviewed   CBC W/ AUTO DIFFERENTIAL - Abnormal       Result Value    WBC 6.65      RBC 3.47 (*)     Hemoglobin 10.4 (*)     Hematocrit 31.8 (*)     MCV 92      MCH 30.0      MCHC 32.7      RDW 13.6      Platelets 167      MPV 10.9      Immature Granulocytes 0.3      Gran # (ANC) 3.8      Immature Grans (Abs) 0.02      Lymph # 2.2      Mono # 0.5      Eos # 0.1      Baso # 0.05      nRBC 0      Gran % 57.1      Lymph % 32.3      Mono % 7.5      Eosinophil % 2.0      Basophil % 0.8      Differential Method Automated     COMPREHENSIVE METABOLIC PANEL -  Abnormal    Sodium 139      Potassium 5.0      Chloride 104      CO2 28      Glucose 106      BUN 49 (*)     Creatinine 2.3 (*)     Calcium 10.2      Total Protein 6.5      Albumin 3.4 (*)     Total Bilirubin 0.3      Alkaline Phosphatase 36 (*)     AST 13      ALT 6 (*)     eGFR 26.0 (*)     Anion Gap 7 (*)    TROPONIN I HIGH SENSITIVITY - Abnormal    Troponin I High Sensitivity 15.3 (*)    B-TYPE NATRIURETIC PEPTIDE - Abnormal     (*)    POCT GLUCOSE - Abnormal    POCT Glucose 113 (*)    MAGNESIUM    Magnesium 2.1     HEPATITIS C ANTIBODY   HIV 1 / 2 ANTIBODY   PHOSPHORUS   LEVETIRACETAM  (KEPPRA) LEVEL   URINALYSIS   TSH   PHOSPHORUS   HIV 1 / 2 ANTIBODY          Imaging Results    None          Medications - No data to display  Medical Decision Making  Amount and/or Complexity of Data Reviewed  Labs: ordered. Decision-making details documented in ED Course.  Radiology:  Decision-making details documented in ED Course.               ED Course as of 25 1912   Sun 2025   1457 POCT Glucose(!): 113 [JN]   1459 EK bpm, sinus bradycardia, LAD, 1st degree AVB,no QRS widening, no Qtc prolongation. No acute ischemic findings  [JN]   1625 X-Ray Chest AP Portable  Low inspiratory volume exacerbating the cardiomediastinal silhouette and causing crowding of vessels.  No evidence of acute cardiopulmonary findings.  No detrimental change upon comparison. [JN]   1656 EKG: Sinus bradycardia with primary AV block, rate of 43, no significant ST elevation or depression.  Motion artifact noted.  Normal intervals otherwise and left axis.  (Independently interpreted by me)   [MR]      ED Course User Index  [JN] Marlon Loomis MD  [MR] Baldemar Ornelas MD             89 year old M w/ PMH of hx of CVA, HTN, seizure disordered, T2DM, h x of vascular dementia with behavior distrubance, hx of physical deconditioning, hx of bradycardia  and stage 3b CKD presents to the ED with concern for LOC    The patient  has history of dementia therefore daughter is at bedside providing history.     Patient's daughter reports that the patient was sitting in his wheelchair finished eating food with his other daughter when the daughter noticed her dad lean forward in his wheelchair.  She came by to wake him up realize that he was not waking up and was concerned thus bringing him here.  Daughter denies any fevers, coughs, runny nose, urinary issues, skin issues.  There was no tongue injury tonic clonic movements, urinary bowel or incontinence.  Daughter did report that the patient had his hydralazine discontinued last week and reports his normal HR is 53-56. In addition patient recently had a sleep study performed and did not sleep for what seems like a day and a half. She denies any recent fall. On no blood thinners. Daughter says he's at baseline now.      Vitals notable for: afebrile, bradycardic at 43, stable blood pressures with SBP in the 151  Physical exam notable for: as above    Ddx include but not limited to: hypoglycemia, seizure, arrhythmia, electrolyte abnormality, ACS/MI, dehydration, PNA, UTI    Basic labs ordered including: POCT glucose, CBC, CMP, electrolytes, cardiac enzymes, EKG, CXR, keppra level ordered.     Marlon Loomis  Emergency medicine PGY4    Case discussed with Dr. Hernandez    Workup notable for:  No leukocytosis Hgb 10.4, above baseline, Plt wnl  No major electrolyte abnormality  Glucose wnl  BUN/Cr elevated 49/2.3-->rising Cr  Isolated alk phos elevation    BNP mildly elevated at 197  HST mildly elevated-->likely due to rising Cr      EK bpm, sinus bradycardia, LAD, 1st degree AVB,no QRS widening, no Qtc prolongation. No acute ischemic findings     CXR  Low inspiratory volume exacerbating the cardiomediastinal silhouette and causing crowding of vessels. No evidence of acute cardiopulmonary findings. No detrimental change upon comparison.     Given high risk of syncopal episodes in the setting of  bradycardia, patient has been consulted for admission and has been admitted.    Clinical Impression:  Final diagnoses:  [R55] Syncope, unspecified syncope type (Primary)  [R55] Syncope                     [1]   Social History  Tobacco Use    Smoking status: Never    Smokeless tobacco: Never   Substance Use Topics    Alcohol use: No    Drug use: No        Marlon Loomis MD  Resident  02/23/25 4538

## 2025-02-23 NOTE — ASSESSMENT & PLAN NOTE
- PT/OT/CM consulted, appreciate recs  - Fall precautions  - Skin assessment Qshift  - Turn patient Q2H

## 2025-02-24 ENCOUNTER — CLINICAL SUPPORT (OUTPATIENT)
Dept: CARDIOLOGY | Facility: HOSPITAL | Age: OVER 89
End: 2025-02-24
Attending: EMERGENCY MEDICINE
Payer: OTHER GOVERNMENT

## 2025-02-24 VITALS — HEIGHT: 68 IN | BODY MASS INDEX: 22.58 KG/M2 | WEIGHT: 149 LBS

## 2025-02-24 PROBLEM — H66.93 ACUTE OTITIS MEDIA, BILATERAL: Status: ACTIVE | Noted: 2025-02-24

## 2025-02-24 PROBLEM — I10 ORTHOSTATIC HYPERTENSION: Status: ACTIVE | Noted: 2025-02-24

## 2025-02-24 PROBLEM — H65.93 OTITIS MEDIA WITH EFFUSION, BILATERAL: Status: ACTIVE | Noted: 2025-02-24

## 2025-02-24 PROBLEM — J32.9 RHINOSINUSITIS: Status: ACTIVE | Noted: 2025-02-24

## 2025-02-24 LAB
ALBUMIN SERPL BCP-MCNC: 3 G/DL (ref 3.5–5.2)
ALP SERPL-CCNC: 34 U/L (ref 55–135)
ALT SERPL W/O P-5'-P-CCNC: 5 U/L (ref 10–44)
ANION GAP SERPL CALC-SCNC: 5 MMOL/L (ref 8–16)
APICAL FOUR CHAMBER EJECTION FRACTION: 51 %
APICAL TWO CHAMBER EJECTION FRACTION: 52 %
AST SERPL-CCNC: 12 U/L (ref 10–40)
BASOPHILS # BLD AUTO: 0.02 K/UL (ref 0–0.2)
BASOPHILS NFR BLD: 0.4 % (ref 0–1.9)
BILIRUB SERPL-MCNC: 0.3 MG/DL (ref 0.1–1)
BSA FOR ECHO PROCEDURE: 1.8 M2
BUN SERPL-MCNC: 46 MG/DL (ref 10–30)
CALCIUM SERPL-MCNC: 9.3 MG/DL (ref 8.7–10.5)
CHLORIDE SERPL-SCNC: 109 MMOL/L (ref 95–110)
CHOLEST SERPL-MCNC: 115 MG/DL (ref 120–199)
CHOLEST/HDLC SERPL: 2.6 {RATIO} (ref 2–5)
CO2 SERPL-SCNC: 25 MMOL/L (ref 23–29)
CREAT SERPL-MCNC: 2.2 MG/DL (ref 0.5–1.4)
DIFFERENTIAL METHOD BLD: ABNORMAL
DOP CALC MV VTI: 31.9 CM
E WAVE DECELERATION TIME: 296 MSEC
E/A RATIO: 0.65
E/E' RATIO: 10 M/S
EOSINOPHIL # BLD AUTO: 0.1 K/UL (ref 0–0.5)
EOSINOPHIL NFR BLD: 1.8 % (ref 0–8)
ERYTHROCYTE [DISTWIDTH] IN BLOOD BY AUTOMATED COUNT: 13.6 % (ref 11.5–14.5)
EST. GFR  (NO RACE VARIABLE): 27.4 ML/MIN/1.73 M^2
ESTIMATED AVG GLUCOSE: 105 MG/DL (ref 68–131)
FRACTIONAL SHORTENING: 20 % (ref 28–44)
GLUCOSE SERPL-MCNC: 94 MG/DL (ref 70–110)
HBA1C MFR BLD: 5.3 % (ref 4.5–6.2)
HCT VFR BLD AUTO: 28.3 % (ref 40–54)
HDLC SERPL-MCNC: 44 MG/DL (ref 40–75)
HDLC SERPL: 38.3 % (ref 20–50)
HGB BLD-MCNC: 9.4 G/DL (ref 14–18)
HIV 1+2 AB+HIV1 P24 AG SERPL QL IA: NEGATIVE
IMM GRANULOCYTES # BLD AUTO: 0.01 K/UL (ref 0–0.04)
IMM GRANULOCYTES NFR BLD AUTO: 0.2 % (ref 0–0.5)
LDLC SERPL CALC-MCNC: 62.6 MG/DL (ref 63–159)
LEFT INTERNAL DIMENSION IN SYSTOLE: 3.6 CM (ref 2.1–4)
LEFT VENTRICLE DIASTOLIC VOLUME INDEX: 51.11 ML/M2
LEFT VENTRICLE DIASTOLIC VOLUME: 92 ML
LEFT VENTRICLE END DIASTOLIC VOLUME APICAL 2 CHAMBER: 95.6 ML
LEFT VENTRICLE END DIASTOLIC VOLUME APICAL 4 CHAMBER: 96 ML
LEFT VENTRICLE SYSTOLIC VOLUME INDEX: 30 ML/M2
LEFT VENTRICLE SYSTOLIC VOLUME: 54 ML
LEFT VENTRICULAR INTERNAL DIMENSION IN DIASTOLE: 4.5 CM (ref 3.5–6)
LV LATERAL E/E' RATIO: 8.7 M/S
LV SEPTAL E/E' RATIO: 12.2 M/S
LVED V (TEICH): 92.4 ML
LVES V (TEICH): 54.4 ML
LYMPHOCYTES # BLD AUTO: 2.1 K/UL (ref 1–4.8)
LYMPHOCYTES NFR BLD: 37.4 % (ref 18–48)
MAGNESIUM SERPL-MCNC: 2 MG/DL (ref 1.6–2.6)
MCH RBC QN AUTO: 30.1 PG (ref 27–31)
MCHC RBC AUTO-ENTMCNC: 33.2 G/DL (ref 32–36)
MCV RBC AUTO: 91 FL (ref 82–98)
MONOCYTES # BLD AUTO: 0.5 K/UL (ref 0.3–1)
MONOCYTES NFR BLD: 9.6 % (ref 4–15)
MV MEAN GRADIENT: 1 MMHG
MV PEAK A VEL: 0.94 M/S
MV PEAK E VEL: 0.61 M/S
MV PEAK GRADIENT: 5 MMHG
MV STENOSIS PRESSURE HALF TIME: 75 MS
MV VALVE AREA P 1/2 METHOD: 2.93 CM2
NEUTROPHILS # BLD AUTO: 2.8 K/UL (ref 1.8–7.7)
NEUTROPHILS NFR BLD: 50.6 % (ref 38–73)
NONHDLC SERPL-MCNC: 71 MG/DL
NRBC BLD-RTO: 0 /100 WBC
OHS LV EJECTION FRACTION SIMPSONS BIPLANE MOD: 51 %
PHOSPHATE SERPL-MCNC: 3.5 MG/DL (ref 2.7–4.5)
PLATELET # BLD AUTO: 149 K/UL (ref 150–450)
PMV BLD AUTO: 11 FL (ref 9.2–12.9)
POCT GLUCOSE: 108 MG/DL (ref 70–110)
POCT GLUCOSE: 137 MG/DL (ref 70–110)
POCT GLUCOSE: 147 MG/DL (ref 70–110)
POCT GLUCOSE: 86 MG/DL (ref 70–110)
POTASSIUM SERPL-SCNC: 5 MMOL/L (ref 3.5–5.1)
PROT SERPL-MCNC: 5.6 G/DL (ref 6–8.4)
RBC # BLD AUTO: 3.12 M/UL (ref 4.6–6.2)
SARS-COV-2 RDRP RESP QL NAA+PROBE: NEGATIVE
SODIUM SERPL-SCNC: 139 MMOL/L (ref 136–145)
TDI LATERAL: 0.07 M/S
TDI SEPTAL: 0.05 M/S
TDI: 0.06 M/S
TRICUSPID ANNULAR PLANE SYSTOLIC EXCURSION: 2.01 CM
TRIGL SERPL-MCNC: 42 MG/DL (ref 30–150)
TROPONIN I SERPL HS-MCNC: 15.2 PG/ML (ref 0–14.9)
WBC # BLD AUTO: 5.53 K/UL (ref 3.9–12.7)
Z-SCORE OF LEFT VENTRICULAR DIMENSION IN END DIASTOLE: -1.01
Z-SCORE OF LEFT VENTRICULAR DIMENSION IN END SYSTOLE: 1.24

## 2025-02-24 PROCEDURE — 80061 LIPID PANEL: CPT

## 2025-02-24 PROCEDURE — G0426 INPT/ED TELECONSULT50: HCPCS | Mod: GT,,, | Performed by: PSYCHIATRY & NEUROLOGY

## 2025-02-24 PROCEDURE — 97530 THERAPEUTIC ACTIVITIES: CPT

## 2025-02-24 PROCEDURE — 96372 THER/PROPH/DIAG INJ SC/IM: CPT

## 2025-02-24 PROCEDURE — 99223 1ST HOSP IP/OBS HIGH 75: CPT | Mod: ,,, | Performed by: INTERNAL MEDICINE

## 2025-02-24 PROCEDURE — 85025 COMPLETE CBC W/AUTO DIFF WBC: CPT

## 2025-02-24 PROCEDURE — 83036 HEMOGLOBIN GLYCOSYLATED A1C: CPT

## 2025-02-24 PROCEDURE — 80053 COMPREHEN METABOLIC PANEL: CPT

## 2025-02-24 PROCEDURE — 82962 GLUCOSE BLOOD TEST: CPT

## 2025-02-24 PROCEDURE — 63600175 PHARM REV CODE 636 W HCPCS

## 2025-02-24 PROCEDURE — 93308 TTE F-UP OR LMTD: CPT

## 2025-02-24 PROCEDURE — 93308 TTE F-UP OR LMTD: CPT | Mod: 26,,, | Performed by: INTERNAL MEDICINE

## 2025-02-24 PROCEDURE — G0378 HOSPITAL OBSERVATION PER HR: HCPCS

## 2025-02-24 PROCEDURE — 84484 ASSAY OF TROPONIN QUANT: CPT

## 2025-02-24 PROCEDURE — 95819 EEG AWAKE AND ASLEEP: CPT

## 2025-02-24 PROCEDURE — 97116 GAIT TRAINING THERAPY: CPT

## 2025-02-24 PROCEDURE — 97166 OT EVAL MOD COMPLEX 45 MIN: CPT

## 2025-02-24 PROCEDURE — 25000003 PHARM REV CODE 250: Performed by: NURSE PRACTITIONER

## 2025-02-24 PROCEDURE — 97162 PT EVAL MOD COMPLEX 30 MIN: CPT

## 2025-02-24 PROCEDURE — 83735 ASSAY OF MAGNESIUM: CPT

## 2025-02-24 PROCEDURE — 84100 ASSAY OF PHOSPHORUS: CPT

## 2025-02-24 PROCEDURE — 25000003 PHARM REV CODE 250

## 2025-02-24 PROCEDURE — 95816 EEG AWAKE AND DROWSY: CPT | Mod: 26,,, | Performed by: PSYCHIATRY & NEUROLOGY

## 2025-02-24 PROCEDURE — 87635 SARS-COV-2 COVID-19 AMP PRB: CPT

## 2025-02-24 RX ORDER — SYRING-NEEDL,DISP,INSUL,0.3 ML 29 G X1/2"
296 SYRINGE, EMPTY DISPOSABLE MISCELLANEOUS ONCE
Status: DISCONTINUED | OUTPATIENT
Start: 2025-02-24 | End: 2025-03-01 | Stop reason: HOSPADM

## 2025-02-24 RX ORDER — DIAZEPAM 2 MG/1
2 TABLET ORAL ONCE
Status: DISCONTINUED | OUTPATIENT
Start: 2025-02-24 | End: 2025-03-01 | Stop reason: HOSPADM

## 2025-02-24 RX ORDER — SODIUM CHLORIDE 9 MG/ML
INJECTION, SOLUTION INTRAVENOUS ONCE
Status: DISCONTINUED | OUTPATIENT
Start: 2025-02-24 | End: 2025-03-01 | Stop reason: HOSPADM

## 2025-02-24 RX ORDER — AMOXICILLIN AND CLAVULANATE POTASSIUM 500; 125 MG/1; MG/1
1 TABLET, FILM COATED ORAL EVERY 12 HOURS
Status: DISCONTINUED | OUTPATIENT
Start: 2025-02-24 | End: 2025-03-01 | Stop reason: HOSPADM

## 2025-02-24 RX ORDER — SODIUM CHLORIDE 9 MG/ML
INJECTION, SOLUTION INTRAVENOUS CONTINUOUS
Status: DISCONTINUED | OUTPATIENT
Start: 2025-02-24 | End: 2025-02-25

## 2025-02-24 RX ADMIN — SODIUM CHLORIDE: 9 INJECTION, SOLUTION INTRAVENOUS at 05:02

## 2025-02-24 RX ADMIN — LEVETIRACETAM 250 MG: 250 TABLET, FILM COATED ORAL at 08:02

## 2025-02-24 RX ADMIN — LEVETIRACETAM 250 MG: 250 TABLET, FILM COATED ORAL at 10:02

## 2025-02-24 RX ADMIN — ENOXAPARIN SODIUM 30 MG: 40 INJECTION SUBCUTANEOUS at 05:02

## 2025-02-24 RX ADMIN — ACETAMINOPHEN 650 MG: 325 TABLET ORAL at 08:02

## 2025-02-24 RX ADMIN — CITALOPRAM HYDROBROMIDE 10 MG: 10 TABLET ORAL at 10:02

## 2025-02-24 RX ADMIN — SODIUM CHLORIDE: 9 INJECTION, SOLUTION INTRAVENOUS at 02:02

## 2025-02-24 RX ADMIN — Medication 6 MG: at 08:02

## 2025-02-24 RX ADMIN — Medication 400 UNITS: at 10:02

## 2025-02-24 RX ADMIN — AMOXICILLIN AND CLAVULANATE POTASSIUM 500 MG: 500; 125 TABLET, FILM COATED ORAL at 08:02

## 2025-02-24 RX ADMIN — TAMSULOSIN HYDROCHLORIDE 0.4 MG: 0.4 CAPSULE ORAL at 10:02

## 2025-02-24 RX ADMIN — SODIUM CHLORIDE: 9 INJECTION, SOLUTION INTRAVENOUS at 07:02

## 2025-02-24 RX ADMIN — CALCITRIOL CAPSULES 0.25 MCG 0.25 MCG: 0.25 CAPSULE ORAL at 10:02

## 2025-02-24 NOTE — PLAN OF CARE
Problem: Skin Injury Risk Increased  Goal: Skin Health and Integrity  Outcome: Progressing     Problem: Adult Inpatient Plan of Care  Goal: Plan of Care Review  Outcome: Progressing  Goal: Patient-Specific Goal (Individualized)  Outcome: Progressing  Goal: Absence of Hospital-Acquired Illness or Injury  Outcome: Progressing  Goal: Optimal Comfort and Wellbeing  Outcome: Progressing  Goal: Readiness for Transition of Care  Outcome: Progressing     Problem: Diabetes Comorbidity  Goal: Blood Glucose Level Within Targeted Range  Outcome: Progressing     Problem: Sepsis/Septic Shock  Goal: Optimal Coping  Outcome: Progressing  Goal: Absence of Bleeding  Outcome: Progressing  Goal: Blood Glucose Level Within Targeted Range  Outcome: Progressing  Goal: Absence of Infection Signs and Symptoms  Outcome: Progressing  Goal: Optimal Nutrition Intake  Outcome: Progressing     Problem: Acute Kidney Injury/Impairment  Goal: Fluid and Electrolyte Balance  Outcome: Progressing  Goal: Improved Oral Intake  Outcome: Progressing  Goal: Effective Renal Function  Outcome: Progressing     Problem: Wound  Goal: Optimal Coping  Outcome: Progressing  Goal: Optimal Functional Ability  Outcome: Progressing  Goal: Absence of Infection Signs and Symptoms  Outcome: Progressing  Goal: Improved Oral Intake  Outcome: Progressing  Goal: Optimal Pain Control and Function  Outcome: Progressing  Goal: Skin Health and Integrity  Outcome: Progressing  Goal: Optimal Wound Healing  Outcome: Progressing     Problem: Fall Injury Risk  Goal: Absence of Fall and Fall-Related Injury  Outcome: Progressing

## 2025-02-24 NOTE — CONSULTS
"Nephrology Consult Note        Patient Name: Lavon Brandon  MRN: [id number]    Patient Class: OP- Observation   Admission Date: 2/23/2025  Length of Stay: 0 days  Date of Service: 2/24/2025    Attending Physician: Emma Mojica MD  Primary Care Provider: Arsitides Haynes MD    Reason for Consult: syncope/orthostatic hypotension    SUBJECTIVE:     HPI: 89M patient whom I see in office regularly, presented after syncopal episode at home. Per his daughter this is his second syncopal episode and had one presyncopal episode w/I past 6 months since living with her. At the time of presentation the patient's BP had recovered. He had carotid US that showed no evidence of hemodynamically significant carotid arterial stenosis. CT head showed no CT evidence of acute intracranial abnormality. Cardiology was consulted who recommends conservative treatment. CT head showing, "trace opacification of the right maxillary sinus, and trace opacification of the left mastoid air cells". Patient started on Augmentin with plan for outpatient referral to ENT. Lastly, KUB showed stool burden suggesting constipation.      Patient noted to be orthostatic. EEG performed. ECHO pending. Bowel evacuation and IV fluids prescribed. UA is bland.    Review of Systems:  Constitutional:  Negative for chills, fever, malaise/fatigue and weight loss.   HENT:  Negative for hearing loss and nosebleeds.    Eyes:  Negative for blurred vision, double vision and photophobia.   Respiratory:  Negative for cough, shortness of breath and wheezing.    Cardiovascular:  Negative for chest pain, palpitations and leg swelling.   Gastrointestinal:  Negative for abdominal pain, constipation, diarrhea, heartburn, nausea and vomiting.   Genitourinary:  Negative for dysuria, frequency and urgency.   Musculoskeletal:  Negative for falls, joint pain and myalgias.   Skin:  Negative for itching and rash.   Neurological:  Negative for dizziness, speech change, focal weakness, " loss of consciousness and headaches.   Endo/Heme/Allergies:  Does not bruise/bleed easily.   Psychiatric/Behavioral:  Negative for depression and substance abuse. The patient is not nervous/anxious.      ASSESSMENT/PLAN:     Orthostatic hypotension  CKD stage 4, eGFR < 30 ml/min  BPH  No NSAIDs, ACEI/ARB, IV contrast or other nephrotoxins.  Keep MAP > 60, SBP > 100.  Dose meds for GFR < 30 ml/min.  Renal diet - low K, low phos.  He probably needs BPH meds but can stop all BP meds...  Agree with Cards eval and plan.    Anemia of CKD  Hgb and HCT are acceptable. Monitor for now.  Will provide JASPREET and/or IV iron as needed to keep Hgb 8-10 range.    MBD / Secondary HPT  Ca, Phos, PTH and vitamin D levels are acceptable.   Phos binders, vitamin D and analogues, calcimimetics will be given as needed.    HTN  Tolerate asymptomatic HTN up to -160.  HOLD home BP meds.    Thank you for allowing us to participate in the care of your patient!   We will follow the patient and provide recommendations as needed.         Laboratory:  Recent Labs   Lab 02/23/25  1430 02/24/25  0421    139   K 5.0 5.0    109   CO2 28 25   BUN 49* 46*   CREATININE 2.3* 2.2*    94       Recent Labs   Lab 02/23/25  1430 02/24/25  0421   CALCIUM 10.2 9.3   ALBUMIN 3.4* 3.0*   PHOS 4.2 3.5   MG 2.1 2.0       Recent Labs   Lab 06/23/23  1635 01/02/25  1415   PTH, Intact 118.8 H 51.8       Recent Labs   Lab 02/23/25  1434 02/23/25  1956 02/24/25  0721 02/24/25  1109   POCTGLUCOSE 113* 159* 86 137*       Recent Labs   Lab 11/08/23  0824 06/23/24  0458 02/24/25  0421   Hemoglobin A1C 5.2 5.1 5.3       Recent Labs   Lab 02/23/25  1430 02/24/25  0421   WBC 6.65 5.53   HGB 10.4* 9.4*   HCT 31.8* 28.3*    149*   MCV 92 91   MCHC 32.7 33.2   MONO 7.5  0.5 9.6  0.5   EOSINOPHIL 2.0 1.8       Recent Labs   Lab 02/23/25  1430 02/24/25  0421   BILITOT 0.3 0.3   PROT 6.5 5.6*   ALBUMIN 3.4* 3.0*   ALKPHOS 36* 34*   ALT 6* 5*   AST 13  12       Recent Labs   Lab 10/14/22  0949 04/11/23  1428 04/13/23  2220 11/30/23  0319 02/16/24  0117 06/23/24  0545 09/06/24  1025 09/13/24  1437 11/19/24  1139 12/22/24  1353 02/23/25  1702   Color, UA Yellow Yellow   < > Colorless A   < > Yellow Yellow Yellow Yellow Yellow Yellow   Appearance, UA Clear Clear   < > Clear   < > Clear Clear Clear Clear Clear Clear   pH, UA 6.0 7.0   < > 6.0   < > 6.0 6.0 6.0 7.0 7.0 6.0   Specific Stephens, UA 1.025 1.015   < > 1.010   < > 1.015 1.020 1.010 1.015 1.010 1.015   Protein, UA 1+ A 1+ A   < > Negative   < > Trace A Negative Trace A Negative Negative Negative   Glucose, UA Negative Negative   < > Negative   < > Negative Negative Negative Negative Negative Negative   Ketones, UA 1+ A 1+ A   < > Negative   < > Negative Negative Negative Negative Negative Negative   Urobilinogen, UA 1.0 Negative   < > Negative   < > Negative  --  Negative  --  Negative Negative   Bilirubin (UA) Negative Negative   < > Negative   < > Negative Negative Negative Negative Negative Negative   Occult Blood UA 3+ A 1+ A   < > 1+ A   < > Negative Negative Negative Negative Negative Negative   Nitrite, UA Negative Negative   < > Negative   < > Negative Negative Negative Negative Negative Negative   RBC, UA 3 1  --  13 H  --  2 1  --   --   --   --    WBC, UA 1 0  --  0  --  14 H 46 H  --   --   --   --    Bacteria Rare Negative  --  Negative  --  Moderate A Many A  --   --   --   --    Hyaline Casts, UA 0 1  --  0  --   --   --   --   --   --   --     < > = values in this interval not displayed.       Recent Labs   Lab 06/22/24 2227 09/13/24  1112 12/22/24  1311   Sample VENOUS VENOUS VENOUS       Microbiology Results (last 7 days)       ** No results found for the last 168 hours. **            Review of patient's allergies indicates:   Allergen Reactions    Ciprofloxacin (bulk) Swelling       Outpatient meds:  No current facility-administered medications on file prior to encounter.     Current  Outpatient Medications on File Prior to Encounter   Medication Sig Dispense Refill    calcitRIOL (ROCALTROL) 0.25 MCG Cap TAKE 1 CAPSULE(0.25 MCG) BY MOUTH EVERY DAY 90 capsule 4    citalopram (CELEXA) 20 MG tablet Take 0.5 tablets (10 mg total) by mouth once daily. 90 tablet 2    cyanocobalamin (VITAMIN B-12) 100 MCG tablet Take 100 mcg by mouth once daily.      levETIRAcetam (KEPPRA) 500 MG Tab Take 0.5 tablets (250 mg total) by mouth 2 (two) times daily. 90 tablet 3    lisinopriL (PRINIVIL,ZESTRIL) 20 MG tablet Take 1 tablet (20 mg total) by mouth once daily. 90 tablet 3    tamsulosin (FLOMAX) 0.4 mg Cap Take 1 capsule (0.4 mg total) by mouth once daily. 90 capsule 3    vitamin E 400 UNIT capsule Take 400 Units by mouth once daily.      chlorpheniramine/dextromethorp (CORICIDIN HBP COUGH AND COLD ORAL) Take 30 mLs by mouth nightly as needed (Cough).      hydrALAZINE (APRESOLINE) 50 MG tablet Take 1 tablet (50 mg total) by mouth every 12 (twelve) hours.      hydroCHLOROthiazide (HYDRODIURIL) 12.5 MG Tab Take 1 tablet (12.5 mg total) by mouth once daily. for 3 days 3 tablet 0       Scheduled meds:   0.9% NaCl   Intravenous Once    amoxicillin-clavulanate 500-125mg  1 tablet Oral Q12H    calcitRIOL  0.25 mcg Oral Daily    citalopram  10 mg Oral Daily    enoxparin  30 mg Subcutaneous Daily    levETIRAcetam  250 mg Oral BID    lisinopriL  20 mg Oral Daily    tamsulosin  0.4 mg Oral Daily    vitamin E (dl, acetate)  400 Units Oral Daily       Infusions:   0.9% NaCl   Intravenous Continuous 100 mL/hr at 02/24/25 1418 New Bag at 02/24/25 1418       PRN meds:    Current Facility-Administered Medications:     acetaminophen, 650 mg, Oral, Q4H PRN    aluminum-magnesium hydroxide-simethicone, 30 mL, Oral, QID PRN    dextrose 50%, 12.5 g, Intravenous, PRN    dextrose 50%, 25 g, Intravenous, PRN    glucagon (human recombinant), 1 mg, Intramuscular, PRN    glucose, 16 g, Oral, PRN    glucose, 24 g, Oral, PRN    hydrALAZINE, 25  mg, Oral, Q8H PRN    insulin aspart U-100, 0-10 Units, Subcutaneous, QID (AC + HS) PRN    magnesium oxide, 800 mg, Oral, PRN    magnesium oxide, 800 mg, Oral, PRN    melatonin, 6 mg, Oral, Nightly PRN    naloxone, 0.02 mg, Intravenous, PRN    ondansetron, 4 mg, Intravenous, Q6H PRN    potassium bicarbonate, 35 mEq, Oral, PRN    potassium bicarbonate, 50 mEq, Oral, PRN    potassium bicarbonate, 60 mEq, Oral, PRN    potassium, sodium phosphates, 2 packet, Oral, PRN    potassium, sodium phosphates, 2 packet, Oral, PRN    potassium, sodium phosphates, 2 packet, Oral, PRN    senna-docusate 8.6-50 mg, 1 tablet, Oral, BID PRN    sodium chloride 0.9%, 10 mL, Intravenous, Q12H PRN    Past Medical History:   Diagnosis Date    Bradyarrhythmia     CVA (cerebral infarction) 2006    Dementia     Depression     Hyperlipidemia     Hypertension     renal htn    Pulmonary embolism 2002    Seizure disorder      Past Surgical History:   Procedure Laterality Date    left foot  with crushed injry       Family History   Problem Relation Name Age of Onset    Diabetes Mother      Cancer Neg Hx       Social History     Tobacco Use    Smoking status: Never    Smokeless tobacco: Never   Substance Use Topics    Alcohol use: No    Drug use: No       OBJECTIVE:     Vital Signs and IO:  Temp:  [97.5 °F (36.4 °C)-98.4 °F (36.9 °C)]   Pulse:  [43-72]   Resp:  [13-18]   BP: (107-191)/(57-78)   SpO2:  [96 %-99 %]   I/O last 3 completed shifts:  In: 120 [P.O.:120]  Out: 700 [Urine:700]  Wt Readings from Last 5 Encounters:   02/23/25 67.9 kg (149 lb 11.1 oz)   01/14/25 73 kg (160 lb 15 oz)   12/27/24 66.2 kg (146 lb)   12/22/24 74.9 kg (165 lb 2 oz)   09/13/24 66 kg (145 lb 8.1 oz)     Body mass index is 22.76 kg/m².    Physical Exam  Constitutional:       General: Patient is not in acute distress.     Appearance: Patient is well-developed. She is not diaphoretic.   HENT:      Head: Normocephalic and atraumatic.      Mouth/Throat: Mucous membranes are  moist.   Eyes:      General: No scleral icterus.     Pupils: Pupils are equal, round, and reactive to light.   Cardiovascular:      Rate and Rhythm: Normal rate and regular rhythm.   Pulmonary:      Effort: Pulmonary effort is normal. No respiratory distress.      Breath sounds: No stridor.   Abdominal:      General: There is no distension.      Palpations: Abdomen is soft.   Musculoskeletal:         General: No deformity. Normal range of motion.      Cervical back: Neck supple.   Skin:     General: Skin is warm and dry.      Findings: No rash present. No erythema.   Neurological:      Mental Status: Patient is alert and oriented to person, place, and time.      Cranial Nerves: No cranial nerve deficit.   Psychiatric:         Behavior: Behavior normal.          Patient care time was spent personally by me on the following activities:     Obtaining a history.  Examination of patient.  Providing medical care at the patients bedside.  Developing a treatment plan with patient or surrogate and bedside caregivers.  Ordering and reviewing laboratory studies, radiographic studies, pulse oximetry.  Ordering and performing treatments and interventions.  Evaluation of patient's response to treatment.  Discussions with consultants while on the unit and immediately available to the patient.  Re-evaluation of the patient's condition.  Documentation in the medical record.     London Chase MD    [facility]  [address] [place] [address], [place] [address]    [phone] - tel  [phone] - fax    2/24/2025

## 2025-02-24 NOTE — ASSESSMENT & PLAN NOTE
Patient's blood pressure range in the last 24 hours was: BP  Min: 107/57  Max: 191/65.The patient's inpatient anti-hypertensive regimen is listed below:  Current Antihypertensives  lisinopriL tablet 20 mg, Daily, Oral  hydrALAZINE tablet 25 mg, Every 8 hours PRN, Oral    Conservative IV fluids  Recheck in am

## 2025-02-24 NOTE — PLAN OF CARE
02/24/25 1512   BULLOCK Message   Medicare Outpatient and Observation Notification regarding financial responsibility Given to patient/caregiver;Explained to patient/caregiver;Signed/date by patient/caregiver   Date BULLOCK was signed 02/24/25   Time BULLOCK was signed 2323

## 2025-02-24 NOTE — PT/OT/SLP EVAL
Occupational Therapy   Evaluation    Name: Lavon Brandon  MRN: [id number]  Admitting Diagnosis: Syncope  Recent Surgery: * No surgery found *      Recommendations:     Discharge Recommendations: Low Intensity Therapy  Discharge Equipment Recommendations:  none  Barriers to discharge:  None    Assessment:     Lavon Brandon is a 89 y.o. male with a medical diagnosis of Syncope.  He presents with daughter at beside to provide history and prior occupational performance. Performance deficits affecting function: weakness, impaired endurance, impaired self care skills, impaired functional mobility, gait instability, impaired balance, impaired cognition, decreased coordination, decreased safety awareness, impaired cardiopulmonary response to activity.      Rehab Prognosis: Fair; patient would benefit from acute skilled OT services to address these deficits and reach maximum level of function.       Plan:     Patient to be seen 5 x/week to address the above listed problems via self-care/home management, therapeutic activities, therapeutic exercises  Plan of Care Expires: 03/24/25  Plan of Care Reviewed with: patient, daughter    Subjective     Chief Complaint: none   Patient/Family Comments/goals: none    Occupational Profile:  Living Environment: pt lives with daughter in a [facility] with no MERLYN, and a t/s combo with bath bench.   Previous level of function: pt requires a for all ADLS and IADLS. Pt takes bed baths, can't register BM dt dementia per daughter. Pt is A&O x1 at baseline. Pt had HH before. Pt is able to participate in grooming but requires commands, pt is able to feed self with set up  Roles and Routines: father   Equipment Used at Home: wheelchair, walker, rolling, bath bench, lift device, hospital bed, bedside commode  Assistance upon Discharge: daughter    Pain/Comfort:   None reported    Patients cultural, spiritual, Anglican conflicts given the current situation: no    Objective:     Communicated with: nurse  prior to session.  Patient found sitting edge of bed with PureWick, telemetry, peripheral IV upon OT entry to room.    General Precautions: Standard, fall  Orthopedic Precautions: N/A  Braces: N/A  Respiratory Status: Room air    Occupational Performance:    Bed Mobility:    Patient completed Rolling/Turning to Left with  contact guard assistance  Patient completed Rolling/Turning to Right with contact guard assistance  Patient completed Scooting/Bridging with contact guard assistance  Patient completed Sit to Supine with minimum assistance    Functional Mobility/Transfers:  Patient completed Sit <> Stand Transfer with minimum assistance  with  rolling walker   Functional Mobility: pt requires verbal cues for feet positioning and is used to high bed level for A dt ELLEN weakness. Pt partcipated in 2 sit<>Stand both Min A    Activities of Daily Living:  Grooming: stand by assistance for face grooming sitting EOB   Lower Body Dressing: maximal assistance to don socks    Cognitive/Visual Perceptual:  Cognitive/Psychosocial Skills:     -       Oriented to: Person   -       Follows Commands/attention:Follows one-step commands  -       Communication: clear/fluent  -       Safety awareness/insight to disability: impaired   -       Mood/Affect/Coping skills/emotional control: Appropriate to situation and Cooperative    Physical Exam:  Balance:    -       good unsupported sitting balance  Upper Extremity Range of Motion:     -       Right Upper Extremity: Deficits: 140ish. Full PROM  -       Left Upper Extremity: Deficits: 140ish. Full PROM  Upper Extremity Strength:    -       Right Upper Extremity: WFL  -       Left Upper Extremity: WFL   Strength:    -       Right Upper Extremity: WFL  -       Left Upper Extremity: WFL  Gross motor coordination:   WFL    AMPAC 6 Click ADL:  AMPAC Total Score: 15    Treatment & Education:  Pt educated on role of OT/POC, importance of OOB/EOB activity, use of call bell, and safety during  ADLs, transfers, and functional mobility.     Patient left supine with all lines intact, call button in reach, bed alarm on, and daughter present    GOALS:   Multidisciplinary Problems       Occupational Therapy Goals          Problem: Occupational Therapy    Goal Priority Disciplines Outcome Interventions   Occupational Therapy Goal     OT, PT/OT     Description: Goals to be met by: 3/24/2025     Patient will increase functional independence with ADLs by performing:    UE Dressing with Minimal Assistance.  Grooming while seated with Supervision.  Upper extremity exercise program x10 reps per handout, with supervision.                         DME Justifications:  No DME recommended requiring DME justifications    History:     Past Medical History:   Diagnosis Date    Bradyarrhythmia     CVA (cerebral infarction) 2006    Dementia     Depression     Hyperlipidemia     Hypertension     renal htn    Pulmonary embolism 2002    Seizure disorder          Past Surgical History:   Procedure Laterality Date    left foot  with crushed injry         Time Tracking:     OT Date of Treatment: 02/24/25  OT Start Time: 0916  OT Stop Time: 0944  OT Total Time (min): 28 min    Billable Minutes:Evaluation 8  Therapeutic Activity 20    2/24/2025

## 2025-02-24 NOTE — ASSESSMENT & PLAN NOTE
- CXR and UA negative for infection  - COVID/Flu pending  - UDS pending  - CT head pending  - Not hypoglycemic on CMP, or hypotensive on arrival  - Orthostatic BP pending  - TSH WNL  - Lipid and A1c pending  - Hx of bradycardia  - Cardiology consulted, appreciate recs  - Bilateral carotid US pending  - TTE pending  - Continuous tele monitoring  - Fall, seizure, and delirium precautions  - Conservative cardiac treatment - may need pacemaker in near future  - Orthostatic - IV fluids and recheck in am  - Awaiting EEG  - Treat sinuitis and OM with abx  - Outpatient referral to ENT

## 2025-02-24 NOTE — HOSPITAL COURSE
"89 y.o. BM elderly male patient who presented after syncopal episode at home. Per his daughter this is his second syncopal episode and had one presyncopal episode w/I past 6 months since living with her. At the time of presentation the patient's BP had recovered. He had Carotid US that showed no evidence of hemodynamically significant carotid arterial stenosis. CT head showed no CT evidence of acute intracranial abnormality. Cardiology was consulted who recommends conservative treatment. He is noted that after discussion with the family regarding further management, would, "consider a pacemaker but they would like to avoid so for now. Continue with close observation for now and repeat a monitor as an outpatient." If patient has recurrent syncopal events, can be considered for pacemaker at that point. Patient had CT head also showing, "trace opacification of the right maxillary sinus, and trace opacification of the left mastoid air cells". Exam of BL ears showed OM. Patient started on Augmentin with plan for outpatient referral to ENT. Lastly, KUB showed stool burden suggesting constipation.     Patient noted to be orthostatic. BL OM and CT with mastoid sinus opacification. Started on ABX with referral to ENT outpatient. EEG performed - EEG abnormal, evaluated by neurology - continue current BID keppra. Patient with bradycardia overnight - cardiology aware. After speaking to family they have opted to defer pacemaker. Amenable to hospice.  Case management was consulted for palliative care consult.  He was accepted for discharge with hospice.  He was seen and examined on the day of discharge and is medically stable for discharge  "

## 2025-02-24 NOTE — ASSESSMENT & PLAN NOTE
Advance Care Planning  Code Status  In light of the patients advanced illness, we reviewed what the patients preferences for care would be at the very end of life. Lengthy discussion about code status with daughter at bedside as well as other daughter over the phone who are both healthcare POAs.  I discussed with them that the patient had been marked DNR in the past and wanted to confirm if they wanted to continue DNR status. Patient's daughters would like to make patient full code at this time. I communicated to the patient that a FULL CODE order would be placed in his medical record to reflect this preference. I spent a total of 18 minutes engaging the patient in this advance care planning discussion.

## 2025-02-24 NOTE — ASSESSMENT & PLAN NOTE
- CXR and UA negative for infection  - COVID/Flu pending  - UDS pending  - CT head pending  - Not hypoglycemic on CMP, or hypotensive on arrival  - Orthostatic BP pending  - TSH WNL  - Lipid and A1c pending  - Hx of bradycardia  - Cardiology consulted, appreciate recs  - Bilateral carotid US pending  - TTE pending  - Continuous tele monitoring  - NPO until bedside greenwood passed  - Fall, seizure, and delirium precautions

## 2025-02-24 NOTE — ASSESSMENT & PLAN NOTE
Creatine stable for now. BMP reviewed- noted Estimated Creatinine Clearance: 20.6 mL/min (A) (based on SCr of 2.2 mg/dL (H)). according to latest data. Based on current GFR, CKD stage is stage 4 - GFR 15-29.  Monitor UOP and serial BMP and adjust therapy as needed. Renally dose meds. Avoid nephrotoxic medications and procedures.  Conservative IV fluids  Follow Chemistry  Nephrology consult

## 2025-02-24 NOTE — PROGRESS NOTES
Pharmacist Renal Dose Adjustment Note    Lavon Brandon is a 89 y.o. male being treated with the medication amoxicillin-clavulanate.    Patient Data:    Vital Signs (Most Recent):  Temp: 98.4 °F (36.9 °C) (02/24/25 1126)  Pulse: (!) 55 (02/24/25 1126)  Resp: 18 (02/24/25 1126)  BP: 116/64 (02/24/25 1126)  SpO2: 98 % (02/24/25 1126) Vital Signs (72h Range):  Temp:  [97.5 °F (36.4 °C)-98.4 °F (36.9 °C)]   Pulse:  [41-72]   Resp:  [13-18]   BP: (107-191)/(57-78)   SpO2:  [81 %-99 %]      Recent Labs   Lab 02/23/25  1430 02/24/25  0421   CREATININE 2.3* 2.2*     Serum creatinine: 2.2 mg/dL (H) 02/24/25 0421  Estimated creatinine clearance: 20.6 mL/min (A)    Amoxicillin-Clavulanate 875-125 mg Q12H will be changed to Amoxicillin-Clavulanate 500-125 mg Q12H.    Pharmacist's Name: Juan Antonio Marques  Pharmacist's Extension: [phone]

## 2025-02-24 NOTE — PLAN OF CARE
[facility]  Initial Discharge Assessment       Primary Care Provider: Aristides Haynes MD    Admission Diagnosis: Syncope [R55]    Admission Date: 2/23/2025  Expected Discharge Date: 2/25/2025     met with the patient and his daughter, Gabriela, who is also the HC POA at the bedside to complete the initial discharge assessment plan. Gabriela reports the patient live with her. He is a  and has an Home Health Aide that come to the home 13 hrs/wk to help with ADLs ([facility] by [facility]). He is also on HHC with [facility], that is paid for by [facility]. They denied HHC, HD, and Coumadin. Please see all DME listed below. The patient use a RW and WC at baseline. He requires assistance with all ADLs. Advance Directive- HC POA paperwork on file. Pharmacy: [facility]. PCP: Dr. Aristides Haynes. Gabriela transport to MD appointments and will transport home at DC. CM will continue to follow and assist with DC needs.    Transition of Care Barriers: None    Payor: [employer] / Plan: [facility] CCN [employer] / Product Type: Government /     Extended Emergency Contact Information  Primary Emergency Contact: Gabriela Merino  Address: [address] [place], [place] [id number] [place] [address] of [facility]  Work Phone: [phone]  Mobile Phone: [phone]  Relation: Daughter  Preferred language: English   needed? No  Secondary Emergency Contact: Nilda Brandon  Mobile Phone: [phone]  Relation: Daughter  Preferred language: English   needed? No    Discharge Plan A: Home Health  Discharge Plan B: Home with family      [id number] - [place] - [address]  [address] [facility] [address]  Phone: [phone] Fax: [phone]    [id number]  [place] - [address] [facility] [address]  [address] [facility] [address]  Phone: [phone] Fax: [phone]      Initial Assessment (most recent)       Adult Discharge  Assessment - 02/24/25 1514          Discharge Assessment    Assessment Type Discharge Planning Assessment     Confirmed/corrected address, phone number and insurance Yes     Confirmed Demographics Correct on Facesheet     Source of Information patient     When was your last doctors appointment? 01/22/25     Does patient/caregiver understand observation status Yes     Communicated JOANNE with patient/caregiver Yes     Reason For Admission Syncope     People in Home child(idalmis), adult     Facility Arrived From: Home     Do you expect to return to your current living situation? Yes     Do you have help at home or someone to help you manage your care at home? Yes     Who are your caregiver(s) and their phone number(s)? Gabriela (daughter) [phone]     Prior to hospitilization cognitive status: Unable to Assess     Current cognitive status: Alert/Oriented     Walking or Climbing Stairs Difficulty yes     Walking or Climbing Stairs ambulation difficulty, requires equipment;ambulation difficulty, assistance 1 person     Mobility Management Use WC and Walker, Has aide     Dressing/Bathing Difficulty yes     Dressing/Bathing bathing difficulty, assistance 1 person     Dressing/Bathing Management Has aide     Home Accessibility wheelchair accessible     Home Layout Able to live on 1st floor     Equipment Currently Used at Home hospital bed;bedside commode;shower chair;walker, rolling;lift device;blood pressure machine;wheelchair     Readmission within 30 days? No     Do you currently have service(s) that help you manage your care at home? Yes     How Many hours does patient receive services 13     Is the pt/caregiver preference to resume services with current agency Yes     Do you take prescription medications? Yes     Do you have prescription coverage? Yes     Coverage [employer], [employer] Medicare     Do you have any problems affording any of your prescribed medications? No     Is the patient taking medications as prescribed? yes     Who  is going to help you get home at discharge? Gabriela (daughter)     How do you get to doctors appointments? family or friend will provide     Are you on dialysis? No     Do you take coumadin? No     Discharge Plan A Home Health     Discharge Plan B Home with family     DME Needed Upon Discharge  none     Discharge Plan discussed with: Patient;Adult children     Transition of Care Barriers None

## 2025-02-24 NOTE — PT/OT/SLP EVAL
Physical Therapy Evaluation    Patient Name:  Lavon Brandon   MRN:  [id number]    Recommendations:     Discharge Recommendations: Low Intensity Therapy   Discharge Equipment Recommendations: none   Barriers to discharge: None    Assessment:     Lavon Brandon is a 89 y.o. male admitted with a medical diagnosis of Syncope. He has a PMH of HTN, dementia, MARLIN, CKD stage 4, epilepsy, debility, DM type 2, depression, HLD, colonic polyps, TBI, CVA   He presents with the following impairments/functional limitations: weakness, impaired endurance, gait instability, decreased coordination, decreased safety awareness, impaired cardiopulmonary response to activity .    Rehab Prognosis: Fair; patient would benefit from acute skilled PT services to address these deficits and reach maximum level of function.    Recent Surgery: * No surgery found *      Plan:     During this hospitalization, patient to be seen 5 x/week to address the identified rehab impairments via gait training, therapeutic activities, therapeutic exercises and progress toward the following goals:    Plan of Care Expires:  03/24/25    Subjective     Chief Complaint: weakness  Patient/Family Comments/goals: Return home with daughter with Low intensity therapy  Pain/Comfort:  Pain Rating 1: 0/10    Patients cultural, spiritual, Orthodox conflicts given the current situation:      Living Environment:  Pt lives at home with his daughter.   Prior to admission, patients level of function was Min/Mod A, minimal assisted ambulation with RW.  Equipment used at home: walker, rolling, wheelchair.  DME owned (not currently used): none.  Upon discharge, patient will have assistance from his daughter.    Objective:     Communicated with nurse prior to session.  Patient found supine with bed alarm, telemetry  upon PT entry to room.    General Precautions: Standard, fall  Orthopedic Precautions:    Braces:    Respiratory Status: Room air    Exams:  Cognitive Exam:  Patient  is oriented to Person and Place  RLE ROM: WFL  RLE Strength: WFL  LLE ROM: WFL  LLE Strength: WFL    Functional Mobility:  Bed Mobility:     Supine to Sit: moderate assistance  Sit to Supine: minimum assistance  Transfers:     Sit to Stand:  moderate assistance with rolling walker  Gait: 12 ft x2 RW and Min A   requiring a seated rest due to fatigue      AM-PAC 6 CLICK MOBILITY  Total Score:12       Treatment & Education:  Pt was educated on safety, use of call light, PT DC recommendation    Patient left supine with all lines intact, call button in reach, bed alarm on, and his daughter  present.    GOALS:   Multidisciplinary Problems       Physical Therapy Goals          Problem: Physical Therapy    Goal Priority Disciplines Outcome Interventions   Physical Therapy Goal     PT, PT/OT     Description:       Patient will increase functional independence with mobility by performin. Supine to sit with Contact Guard Assistance  2. Sit to stand transfer with Contact Guard Assistance  3. Gait  x 100  feet with Contact Guard Assistance using Rolling Walker.                          DME Justifications:  No DME recommended requiring DME justifications    History:     Past Medical History:   Diagnosis Date    Bradyarrhythmia     CVA (cerebral infarction)     Dementia     Depression     Hyperlipidemia     Hypertension     renal htn    Pulmonary embolism     Seizure disorder        Past Surgical History:   Procedure Laterality Date    left foot  with crushed injry         Time Tracking:     PT Received On: 25  PT Start Time: 0950     PT Stop Time: 1010  PT Total Time (min): 20 min     Billable Minutes: Evaluation 10 minutes  and Gait Training 10 minutes      2025

## 2025-02-24 NOTE — H&P
[facility] - Emergency Dept  Hospital Medicine  History & Physical    Patient Name: Lavon Brandon  MRN: [id number]  Patient Class: OP- Observation  Admission Date: 2/23/2025  Attending Physician: Ezequiel Sales MD   Primary Care Provider: Aristides Haynes MD         Patient information was obtained from patient, relative(s), past medical records, and ER records.     Subjective:     Principal Problem:Syncope    Chief Complaint:   Chief Complaint   Patient presents with    Loss of Consciousness     Slumped over in chair and eyes rolled back.  Baseline gcs=14 when ems arrived. SZ hx. Bradycardic.    Bradycardia        HPI: Mr. Brandon is a 89 yr old male with a hx of HTN, dementia, MARLIN, CKD stage 4, epilepsy, debility, DM type 2, depression, HLD, colonic polyps, TBI, CVA, secondary hyperparathyroidism, thrombocytopenia, anemia, arthritis, B12 deficiency, vitamin-D deficiency, pressure injury to sacral region, PE, bradycardia who presented to the ED via EMS with a chief complaint of syncope. Patient's daughter reports that the patient was sitting in his wheelchair, had finished eating and was watching TV when she noticed him lean forward and slumped over to the left.  Denies head trauma.  She endorsed that his mouth was open and he was drooling with his eyes open looking to the left and not focused. She tried to wake him and found him unconscious and called EMS to bring him here for further eval. Pt is currently A&O x 1 to self. He is disoriented to place and time.  Daughter states that this is his baseline.  She denies any bowel or bladder incontinence during episode of syncope.  She does endorse that his hydralazine was recently stopped last week.  She also states that he is normally bradycardic with heart rate in the 50s.  She states that he does not smoke tobacco, drink alcohol, or do recreational drugs.  Patient denies any pain at this time.  Remainder of history is limited due to  dementia.    Lengthy discussion about code status with daughter at bedside as well as other daughter over the phone who are both healthcare POAs.  I discussed with them that the patient had been marked DNR in the past and wanted to confirm if they wanted to continue DNR status.  Discussed the pros and cons of CPR and patient's daughters would like to make patient full code at this time.  Patient's chart has been updated to reflect this code status change.    Upon arrival to ED, patient afebrile, HR of 42, RR of 16, BP of 151/70, satting 98% on RA.  Workup in the ED revealed RBC of 3.47, H/H of 10.4/31.8, BUN of 49, creatinine of 2.3, GFR of 26, albumin of 3.4, BNP of 197, troponin of 15.3, POCT glucose of 113.  CXR shows low inspiratory volume exacerbating cardiomediastinal silhouette and causing crowding of vessels.  No evidence of acute cardiopulmonary findings.  Dilated colonic loops projects below the elevated right hemidiaphragm.  No medications administered while in the ED. case discussed with ED provider and patient will be placed under observation for further management.    Past Medical History:   Diagnosis Date    Bradyarrhythmia     CVA (cerebral infarction) 2006    Dementia     Depression     Hyperlipidemia     Hypertension     renal htn    Pulmonary embolism 2002    Seizure disorder        Past Surgical History:   Procedure Laterality Date    left foot  with crushed injry         Review of patient's allergies indicates:   Allergen Reactions    Ciprofloxacin (bulk) Swelling       No current facility-administered medications on file prior to encounter.     Current Outpatient Medications on File Prior to Encounter   Medication Sig    calcitRIOL (ROCALTROL) 0.25 MCG Cap TAKE 1 CAPSULE(0.25 MCG) BY MOUTH EVERY DAY    citalopram (CELEXA) 20 MG tablet Take 0.5 tablets (10 mg total) by mouth once daily.    cyanocobalamin (VITAMIN B-12) 100 MCG tablet Take 100 mcg by mouth once daily.    levETIRAcetam (KEPPRA)  500 MG Tab Take 0.5 tablets (250 mg total) by mouth 2 (two) times daily.    lisinopriL (PRINIVIL,ZESTRIL) 20 MG tablet Take 1 tablet (20 mg total) by mouth once daily.    tamsulosin (FLOMAX) 0.4 mg Cap Take 1 capsule (0.4 mg total) by mouth once daily.    vitamin E 400 UNIT capsule Take 400 Units by mouth once daily.    chlorpheniramine/dextromethorp (CORICIDIN HBP COUGH AND COLD ORAL) Take 30 mLs by mouth nightly as needed (Cough).    hydrALAZINE (APRESOLINE) 50 MG tablet Take 1 tablet (50 mg total) by mouth every 12 (twelve) hours.    hydroCHLOROthiazide (HYDRODIURIL) 12.5 MG Tab Take 1 tablet (12.5 mg total) by mouth once daily. for 3 days    [DISCONTINUED] LORazepam (ATIVAN) 0.5 MG tablet Take 1 tablet (0.5 mg total) by mouth every evening.    [DISCONTINUED] LORazepam (ATIVAN) 0.5 MG tablet Take 1 tablet by mouth every evening.     Family History       Problem Relation (Age of Onset)    Diabetes Mother          Tobacco Use    Smoking status: Never    Smokeless tobacco: Never   Substance and Sexual Activity    Alcohol use: No    Drug use: No    Sexual activity: Not on file     Review of Systems   Unable to perform ROS: Dementia     Objective:     Vital Signs (Most Recent):  Temp: 97.5 °F (36.4 °C) (02/23/25 1446)  Pulse: (!) 50 (02/23/25 1630)  Resp: 13 (02/23/25 1630)  BP: (!) 175/77 (02/23/25 1630)  SpO2: 97 % (02/23/25 1630) Vital Signs (24h Range):  Temp:  [97.5 °F (36.4 °C)] 97.5 °F (36.4 °C)  Pulse:  [41-55] 50  Resp:  [13-16] 13  SpO2:  [81 %-98 %] 97 %  BP: (151-186)/(69-78) 175/77     Weight: 73 kg (160 lb 15 oz)  Body mass index is 23.09 kg/m².     Physical Exam  Vitals reviewed.   Constitutional:       General: He is awake. He is not in acute distress.     Appearance: Normal appearance. He is ill-appearing (chronically). He is not diaphoretic.      Comments: Sleeping initially, but awakens easily to his name being called.   Cardiovascular:      Rate and Rhythm: Bradycardia present.      Heart sounds:  Normal heart sounds. No murmur heard.     No friction rub. No gallop.   Pulmonary:      Effort: Pulmonary effort is normal. No accessory muscle usage or respiratory distress.      Breath sounds: Normal breath sounds. No wheezing, rhonchi or rales.   Abdominal:      General: Abdomen is flat. Bowel sounds are normal.      Palpations: Abdomen is soft.      Tenderness: There is no abdominal tenderness. There is no guarding or rebound.   Musculoskeletal:      Right lower leg: No edema.      Left lower leg: No edema.   Skin:     General: Skin is warm and dry.   Neurological:      Mental Status: He is alert. Mental status is at baseline. He is disoriented.      Comments: A&O x1 to self.  Disoriented to place and time.   Psychiatric:         Behavior: Behavior is cooperative.                Significant Labs: All pertinent labs within the past 24 hours have been reviewed.  CBC:   Recent Labs   Lab 02/23/25  1430   WBC 6.65   HGB 10.4*   HCT 31.8*        CMP:   Recent Labs   Lab 02/23/25  1430      K 5.0      CO2 28      BUN 49*   CREATININE 2.3*   CALCIUM 10.2   PROT 6.5   ALBUMIN 3.4*   BILITOT 0.3   ALKPHOS 36*   AST 13   ALT 6*   ANIONGAP 7*     Cardiac Markers:   Recent Labs   Lab 02/23/25  1430   *     Magnesium:   Recent Labs   Lab 02/23/25  1430   MG 2.1     POCT Glucose:   Recent Labs   Lab 02/23/25  1434   POCTGLUCOSE 113*     Troponin:   Recent Labs   Lab 02/23/25  1430 02/23/25  1642   TROPONINIHS 15.3* 14.3     TSH:   Recent Labs   Lab 02/23/25  1642   TSH 2.091     Urine Studies:   Recent Labs   Lab 02/23/25  1702   COLORU Yellow   APPEARANCEUA Clear   PHUR 6.0   SPECGRAV 1.015   PROTEINUA Negative   GLUCUA Negative   KETONESU Negative   BILIRUBINUA Negative   OCCULTUA Negative   NITRITE Negative   UROBILINOGEN Negative   LEUKOCYTESUR Negative       Significant Imaging:   Imaging Results              US Carotid Bilateral (In process)                      X-Ray Chest AP Portable  (Final result)  Result time 02/23/25 15:22:10      Final result by Chuy Murphy Jr., MD (02/23/25 15:22:10)                   Impression:      Low inspiratory volume exacerbating the cardiomediastinal silhouette and causing crowding of vessels.  No evidence of acute cardiopulmonary findings.  No detrimental change upon comparison.      Electronically signed by: Chuy Murphy MD  Date:    02/23/2025  Time:    15:22               Narrative:    EXAMINATION:  XR CHEST AP PORTABLE    CLINICAL HISTORY:  Chest Pain;    TECHNIQUE:  Single frontal view of the chest was performed.    COMPARISON:  01/14/2025    FINDINGS:  Epicardial pacing pad projects over the patient's midline sternum.  Heart is upper normal in size exacerbated by the decreased inspiratory volume.  Small effusion obscures the left costophrenic sulcus.  Chronic eventration of right hemidiaphragm.  Crowding of vessels is attributed towards the poor inspiratory volume.  Dilated colonic loops projects below the elevated right hemidiaphragm.                                     Assessment/Plan:     * Syncope  - CXR and UA negative for infection  - COVID/Flu pending  - UDS pending  - CT head pending  - Not hypoglycemic on CMP, or hypotensive on arrival  - Orthostatic BP pending  - TSH WNL  - Lipid and A1c pending  - Hx of bradycardia  - Cardiology consulted, appreciate recs  - Bilateral carotid US pending  - TTE pending  - Continuous tele monitoring  - NPO until bedside greenwood passed  - Fall, seizure, and delirium precautions    DM (diabetes mellitus), type 2 with neurological complications  Last A1c reviewed-   Lab Results   Component Value Date    HGBA1C 5.1 06/23/2024     Most recent fingerstick glucose reviewed-   Recent Labs   Lab 02/23/25  1434   POCTGLUCOSE 113*     Current correctional scale  Medium  Maintain anti-hyperglycemic dose as follows-   Antihyperglycemics (From admission, onward)      None          Hold Oral hypoglycemics while patient is  in the hospital.    ACP (advance care planning)  Advance Care Planning Code Status  In light of the patients advanced illness, we reviewed what the patients preferences for care would be at the very end of life. Lengthy discussion about code status with daughter at bedside as well as other daughter over the phone who are both healthcare POAs.  I discussed with them that the patient had been marked DNR in the past and wanted to confirm if they wanted to continue DNR status. Patient's daughters would like to make patient full code at this time. I communicated to the patient that a FULL CODE order would be placed in his medical record to reflect this preference. I spent a total of 18 minutes engaging the patient in this advance care planning discussion.          Debility  - PT/OT/CM consulted, appreciate recs  - Fall precautions  - Skin assessment Qshift  - Turn patient Q2H    Centrencephalic epilepsy  - Hx noted  - Continue home keppra  - Keppra level pending  - Seizure precautions      CKD (chronic kidney disease) stage 4, GFR 15-29 ml/min  Creatine stable for now. BMP reviewed- noted Estimated Creatinine Clearance: 21.2 mL/min (A) (based on SCr of 2.3 mg/dL (H)). according to latest data. Based on current GFR, CKD stage is stage 4 - GFR 15-29.  Monitor UOP and serial BMP and adjust therapy as needed. Renally dose meds. Avoid nephrotoxic medications and procedures.    MARLIN (obstructive sleep apnea), 2008, not on Rx  - Recently had sleep study on 2/19/25  - Continue OP F/U      Dementia with behavioral disturbance  - Hx noted  - Delirium precautions    Primary hypertension  Chronic, Latest blood pressure and vitals reviewed-     Temp:  [97.5 °F (36.4 °C)]   Pulse:  [41-55]   Resp:  [13-16]   BP: (151-186)/(69-78)   SpO2:  [81 %-98 %] .   Home meds for hypertension were reviewed and noted below-  Hypertension Medications              lisinopriL (PRINIVIL,ZESTRIL) 20 MG tablet Take 1 tablet (20 mg total) by mouth once  daily.    hydrALAZINE (APRESOLINE) 50 MG tablet Take 1 tablet (50 mg total) by mouth every 12 (twelve) hours.    hydroCHLOROthiazide (HYDRODIURIL) 12.5 MG Tab Take 1 tablet (12.5 mg total) by mouth once daily. for 3 days            While in the hospital, will manage blood pressure as follows; Adjust home antihypertensive regimen as follows- patient no longer taking hydrochlorothiazide.  Also hydralazine was recently discontinued last week.  Continue home lisinopril with parameters.    Will utilize p.r.n. blood pressure medication only if patient's blood pressure greater than 180/110 and he develops symptoms such as worsening chest pain or shortness of breath.        VTE Risk Mitigation (From admission, onward)           Ordered     enoxaparin injection 30 mg  Daily         02/23/25 1822     IP VTE HIGH RISK PATIENT  Once         02/23/25 1822     Place sequential compression device  Until discontinued         02/23/25 1822                         On 02/23/2025, patient should be placed in hospital observation services under my care in collaboration with Ezequiel Sales MD.           Alma Ambrose PA-C  Department of Hospital Medicine  [facility] - Emergency Dept

## 2025-02-24 NOTE — PROGRESS NOTES
[facility] Medicine  Progress Note    Patient Name: Lavon Brandon  MRN: [id number]  Patient Class: OP- Observation   Admission Date: 2/23/2025  Length of Stay: 0 days  Attending Physician: Emma Mojica MD  Primary Care Provider: Aristides Haynes MD        Subjective     Principal Problem:Syncope        HPI:  Mr. Brandon is a 89 yr old male with a hx of HTN, dementia, MARLIN, CKD stage 4, epilepsy, debility, DM type 2, depression, HLD, colonic polyps, TBI, CVA, secondary hyperparathyroidism, thrombocytopenia, anemia, arthritis, B12 deficiency, vitamin-D deficiency, pressure injury to sacral region, PE, bradycardia who presented to the ED via EMS with a chief complaint of syncope. Patient's daughter reports that the patient was sitting in his wheelchair, had finished eating and was watching TV when she noticed him lean forward and slumped over to the left.  Denies head trauma.  She endorsed that his mouth was open and he was drooling with his eyes open looking to the left and not focused. She tried to wake him and found him unconscious and called EMS to bring him here for further eval. Pt is currently A&O x 1 to self. He is disoriented to place and time.  Daughter states that this is his baseline.  She denies any bowel or bladder incontinence during episode of syncope.  She does endorse that his hydralazine was recently stopped last week.  She also states that he is normally bradycardic with heart rate in the 50s.  She states that he does not smoke tobacco, drink alcohol, or do recreational drugs.  Patient denies any pain at this time.  Remainder of history is limited due to dementia.    Lengthy discussion about code status with daughter at bedside as well as other daughter over the phone who are both healthcare POAs.  I discussed with them that the patient had been marked DNR in the past and wanted to confirm if they wanted to continue DNR status.  Discussed the pros and cons of CPR and  "patient's daughters would like to make patient full code at this time.  Patient's chart has been updated to reflect this code status change.    Upon arrival to ED, patient afebrile, HR of 42, RR of 16, BP of 151/70, satting 98% on RA.  Workup in the ED revealed RBC of 3.47, H/H of 10.4/31.8, BUN of 49, creatinine of 2.3, GFR of 26, albumin of 3.4, BNP of 197, troponin of 15.3, POCT glucose of 113.  CXR shows low inspiratory volume exacerbating cardiomediastinal silhouette and causing crowding of vessels.  No evidence of acute cardiopulmonary findings.  Dilated colonic loops projects below the elevated right hemidiaphragm.  No medications administered while in the ED. case discussed with ED provider and patient will be placed under observation for further management.    Overview/Hospital Course:  89 y.o. BM elderly male patient who presented after syncopal episode at home. Per his daughter this is his second syncopal episode and had one presyncopal episode w/I past 6 months since living with her. At the time of presentation the patient's BP had recovered. He had Carotid US that showed no evidence of hemodynamically significant carotid arterial stenosis. CT head showed no CT evidence of acute intracranial abnormality. Cardiology was consulted who recommends conservative treatment. He is noted that after discussion with the family regarding further management, would, "consider a pacemaker but they would like to avoid so for now. Continue with close observation for now and repeat a monitor as an outpatient." If patient has recurrent syncopal events, can be considered for pacemaker at that point. Patient had CT head also showing, "trace opacification of the right maxillary sinus, and trace opacification of the left mastoid air cells". Exam of BL ears showed OM. Patient started on Augmentin with plan for outpatient referral to ENT. Lastly, KUB showed stool burden suggesting constipation.     Patient noted to be " orthostatic. BL OM and CT with mastoid sinus opacification. Started on ABX with referral to ENT outpatient. EEG performed - Awaiting EEG results. Bowel evacuation and IV fluids. Recheck orthostatics in the am. Hopefully home in am    Past Medical History:   Diagnosis Date    Bradyarrhythmia     CVA (cerebral infarction) 2006    Dementia     Depression     Hyperlipidemia     Hypertension     renal htn    Pulmonary embolism 2002    Seizure disorder        Past Surgical History:   Procedure Laterality Date    left foot  with crushed injry         Review of patient's allergies indicates:   Allergen Reactions    Ciprofloxacin (bulk) Swelling       No current facility-administered medications on file prior to encounter.     Current Outpatient Medications on File Prior to Encounter   Medication Sig    calcitRIOL (ROCALTROL) 0.25 MCG Cap TAKE 1 CAPSULE(0.25 MCG) BY MOUTH EVERY DAY    citalopram (CELEXA) 20 MG tablet Take 0.5 tablets (10 mg total) by mouth once daily.    cyanocobalamin (VITAMIN B-12) 100 MCG tablet Take 100 mcg by mouth once daily.    levETIRAcetam (KEPPRA) 500 MG Tab Take 0.5 tablets (250 mg total) by mouth 2 (two) times daily.    lisinopriL (PRINIVIL,ZESTRIL) 20 MG tablet Take 1 tablet (20 mg total) by mouth once daily.    tamsulosin (FLOMAX) 0.4 mg Cap Take 1 capsule (0.4 mg total) by mouth once daily.    vitamin E 400 UNIT capsule Take 400 Units by mouth once daily.    chlorpheniramine/dextromethorp (CORICIDIN HBP COUGH AND COLD ORAL) Take 30 mLs by mouth nightly as needed (Cough).    hydrALAZINE (APRESOLINE) 50 MG tablet Take 1 tablet (50 mg total) by mouth every 12 (twelve) hours.    hydroCHLOROthiazide (HYDRODIURIL) 12.5 MG Tab Take 1 tablet (12.5 mg total) by mouth once daily. for 3 days     Family History       Problem Relation (Age of Onset)    Diabetes Mother          Tobacco Use    Smoking status: Never    Smokeless tobacco: Never   Substance and Sexual Activity    Alcohol use: No    Drug use: No     Sexual activity: Not on file     Review of Systems   Unable to perform ROS: Dementia   Constitutional:  Positive for activity change.   HENT:  Positive for congestion.    Gastrointestinal:  Positive for constipation.   Neurological:  Positive for syncope.     Objective:     Vital Signs (Most Recent):  Temp: 98.4 °F (36.9 °C) (02/24/25 1126)  Pulse: (!) 55 (02/24/25 1126)  Resp: 18 (02/24/25 1126)  BP: 116/64 (02/24/25 1126)  SpO2: 98 % (02/24/25 1126) Vital Signs (24h Range):  Temp:  [97.5 °F (36.4 °C)-98.4 °F (36.9 °C)] 98.4 °F (36.9 °C)  Pulse:  [41-72] 55  Resp:  [13-18] 18  SpO2:  [81 %-99 %] 98 %  BP: (107-191)/(57-78) 116/64     Weight: 67.9 kg (149 lb 11.1 oz)  Body mass index is 22.76 kg/m².     Physical Exam  Vitals reviewed.   Constitutional:       General: He is awake. He is not in acute distress.     Appearance: Normal appearance. He is ill-appearing (chronically). He is not diaphoretic.      Comments: Sleeping initially, but awakens easily to his name being called.   Cardiovascular:      Rate and Rhythm: Bradycardia present.      Heart sounds: Normal heart sounds. No murmur heard.     No friction rub. No gallop.   Pulmonary:      Effort: Pulmonary effort is normal. No accessory muscle usage or respiratory distress.      Breath sounds: Normal breath sounds. No wheezing, rhonchi or rales.   Abdominal:      General: Abdomen is flat. Bowel sounds are normal.      Palpations: Abdomen is soft.      Tenderness: There is no abdominal tenderness. There is no guarding or rebound.   Musculoskeletal:      Right lower leg: No edema.      Left lower leg: No edema.   Skin:     General: Skin is warm and dry.   Neurological:      Mental Status: He is alert. Mental status is at baseline. He is disoriented.      Comments: A&O x1 to self.  Disoriented to place and time.   Psychiatric:         Behavior: Behavior is cooperative.                Significant Labs: All pertinent labs within the past 24 hours have been  reviewed.  CBC:   Recent Labs   Lab 02/23/25  1430 02/24/25  0421   WBC 6.65 5.53   HGB 10.4* 9.4*   HCT 31.8* 28.3*    149*     CMP:   Recent Labs   Lab 02/23/25  1430 02/24/25  0421    139   K 5.0 5.0    109   CO2 28 25    94   BUN 49* 46*   CREATININE 2.3* 2.2*   CALCIUM 10.2 9.3   PROT 6.5 5.6*   ALBUMIN 3.4* 3.0*   BILITOT 0.3 0.3   ALKPHOS 36* 34*   AST 13 12   ALT 6* 5*   ANIONGAP 7* 5*     Cardiac Markers:   Recent Labs   Lab 02/23/25  1430   *     Magnesium:   Recent Labs   Lab 02/23/25  1430 02/24/25  0421   MG 2.1 2.0     POCT Glucose:   Recent Labs   Lab 02/23/25  1956 02/24/25  0721 02/24/25  1109   POCTGLUCOSE 159* 86 137*     Troponin:   Recent Labs   Lab 02/23/25  1642 02/23/25  2107 02/24/25  0421   TROPONINIHS 14.3 14.1 15.2*     TSH:   Recent Labs   Lab 02/23/25  1642   TSH 2.091     Urine Studies:   Recent Labs   Lab 02/23/25  1702   COLORU Yellow   APPEARANCEUA Clear   PHUR 6.0   SPECGRAV 1.015   PROTEINUA Negative   GLUCUA Negative   KETONESU Negative   BILIRUBINUA Negative   OCCULTUA Negative   NITRITE Negative   UROBILINOGEN Negative   LEUKOCYTESUR Negative       Significant Imaging:   Imaging Results              CT Head Without Contrast (Final result)  Result time 02/23/25 20:29:29      Final result by Baldemar Morelos MD (02/23/25 20:29:29)                   Impression:      No CT evidence of acute intracranial abnormality. Clinical correlation and further evaluation as warranted.    Chronic senescent and microvascular ischemic changes.      Electronically signed by: Baldemar Morelos MD  Date:    02/23/2025  Time:    20:29               Narrative:    EXAMINATION:  CT HEAD WITHOUT CONTRAST    CLINICAL HISTORY:  Syncope, recurrent;    TECHNIQUE:  Low dose axial images were obtained through the head.  Coronal and sagittal reformations were also performed. Contrast was not administered.    COMPARISON:  CT head 01/16/2025    FINDINGS:  There is generalized  cerebral volume loss with compensatory sulcal widening and ventricular enlargement.  There is patchy and confluent periventricular and supratentorial white matter hypoattenuation suggesting sequelae of chronic microvascular ischemic change.  There are stable remote lacunar type infarcts of the left basal ganglia and cedric.  No CT evidence of acute intracranial hemorrhage.  The basal cisterns are patent. There is near complete opacification of the right maxillary sinus, similar to prior examination.  There is trace opacification of the left mastoid air cells.  No acute displaced calvarial fracture identified.  The visualized bones of the calvarium demonstrate no acute osseous abnormality.                                       X-Ray KUB (Final result)  Result time 02/23/25 19:31:13      Final result by Baldemar Morelos MD (02/23/25 19:31:13)                   Impression:      As above.      Electronically signed by: Baldemar Morelos MD  Date:    02/23/2025  Time:    19:31               Narrative:    EXAMINATION:  XR KUB    CLINICAL HISTORY:  dilated colonic loops on CXR;    TECHNIQUE:  Single AP supine view of the abdomen (KUB) was performed    COMPARISON:  Abdominal radiograph series 09/16/2024    FINDINGS:  There is mild gaseous distention of the colon which contains hyperdense material, possibly enteric contrast from prior barium fluoroscopic examination.  No central small bowel air-fluid levels appreciated to suggest high-grade obstruction.  Scattered retained stool projects over the colon suggesting constipation.  No large volume of free intraperitoneal air appreciated allowing for supine technique.  Osseous structures demonstrate degenerative changes.                                       US Carotid Bilateral (Final result)  Result time 02/24/25 06:57:49      Final result by Arnulfo Lopez IV, MD (02/24/25 06:57:49)                   Impression:      Scattered bilateral atheromatous changes without evidence of  hemodynamically significant carotid arterial stenosis.      Electronically signed by: Arnulfo Lopez  Date:    02/24/2025  Time:    06:57               Narrative:    EXAMINATION:  US CAROTID BILATERAL    CLINICAL HISTORY:  syncope;    TECHNIQUE:  CMS MANDATED QUALITY DATA - CAROTID - 195    All measurements and percent stenosis described below were determined using NASCET criteria or criteria similar to NASCET, as defined by the Society of Radiologists in Ultrasound Consensus Conference, Radiology, 2003    FINDINGS:  Real-time, duplex and color Doppler interrogation are performed.  Scattered atheromatous changes are present within the carotid arteries.  There are no findings to suggest hemodynamically significant carotid arterial stenosis on either side.  Antegrade flow is observed within the vertebral arteries bilaterally.                                       X-Ray Chest AP Portable (Final result)  Result time 02/23/25 15:22:10      Final result by Chuy Murphy Jr., MD (02/23/25 15:22:10)                   Impression:      Low inspiratory volume exacerbating the cardiomediastinal silhouette and causing crowding of vessels.  No evidence of acute cardiopulmonary findings.  No detrimental change upon comparison.      Electronically signed by: Chuy Murphy MD  Date:    02/23/2025  Time:    15:22               Narrative:    EXAMINATION:  XR CHEST AP PORTABLE    CLINICAL HISTORY:  Chest Pain;    TECHNIQUE:  Single frontal view of the chest was performed.    COMPARISON:  01/14/2025    FINDINGS:  Epicardial pacing pad projects over the patient's midline sternum.  Heart is upper normal in size exacerbated by the decreased inspiratory volume.  Small effusion obscures the left costophrenic sulcus.  Chronic eventration of right hemidiaphragm.  Crowding of vessels is attributed towards the poor inspiratory volume.  Dilated colonic loops projects below the elevated right hemidiaphragm.                                    "    Assessment and Plan     * Syncope  - CXR and UA negative for infection  - COVID/Flu pending  - UDS pending  - CT head pending  - Not hypoglycemic on CMP, or hypotensive on arrival  - Orthostatic BP pending  - TSH WNL  - Lipid and A1c pending  - Hx of bradycardia  - Cardiology consulted, appreciate recs  - Bilateral carotid US pending  - TTE pending  - Continuous tele monitoring  - Fall, seizure, and delirium precautions  - Conservative cardiac treatment - may need pacemaker in near future  - Orthostatic - IV fluids and recheck in am  - Awaiting EEG  - Treat sinuitis and OM with abx  - Outpatient referral to ENT    Acute otitis media, bilateral  Abx po  Refer to ENT outpatient      Rhinosinusitis  Per ct head, " There is near complete opacification of the right maxillary sinus, similar to prior examination.  There is trace opacification of the left mastoid air cells.  "  Start augmentin  Outpatient ENT    CKD (chronic kidney disease) stage 4, GFR 15-29 ml/min  Creatine stable for now. BMP reviewed- noted Estimated Creatinine Clearance: 20.6 mL/min (A) (based on SCr of 2.2 mg/dL (H)). according to latest data. Based on current GFR, CKD stage is stage 4 - GFR 15-29.  Monitor UOP and serial BMP and adjust therapy as needed. Renally dose meds. Avoid nephrotoxic medications and procedures.  Conservative IV fluids  Follow Chemistry  Nephrology consult    Orthostatic hypertension  Patient's blood pressure range in the last 24 hours was: BP  Min: 107/57  Max: 191/65.The patient's inpatient anti-hypertensive regimen is listed below:  Current Antihypertensives  lisinopriL tablet 20 mg, Daily, Oral  hydrALAZINE tablet 25 mg, Every 8 hours PRN, Oral    Conservative IV fluids  Recheck in am    DM (diabetes mellitus), type 2 with neurological complications  Last A1c reviewed-   Lab Results   Component Value Date    HGBA1C 5.1 06/23/2024     Most recent fingerstick glucose reviewed-   Recent Labs   Lab 02/23/25  1434 "   POCTGLUCOSE 113*     Current correctional scale  Medium  Maintain anti-hyperglycemic dose as follows-   Antihyperglycemics (From admission, onward)      None          Hold Oral hypoglycemics while patient is in the hospital.    ACP (advance care planning)  Advance Care Planning Code Status  In light of the patients advanced illness, we reviewed what the patients preferences for care would be at the very end of life. Lengthy discussion about code status with daughter at bedside as well as other daughter over the phone who are both healthcare POAs.  I discussed with them that the patient had been marked DNR in the past and wanted to confirm if they wanted to continue DNR status. Patient's daughters would like to make patient full code at this time. I communicated to the patient that a FULL CODE order would be placed in his medical record to reflect this preference. I spent a total of 18 minutes engaging the patient in this advance care planning discussion.          Debility  - PT/OT/CM consulted, appreciate recs  - Fall precautions  - Skin assessment Qshift  - Turn patient Q2H    Centrencephalic epilepsy  - Hx noted  - Continue home keppra  - Keppra level pending  - Seizure precautions      MARLIN (obstructive sleep apnea), 2008, not on Rx  - Recently had sleep study on 2/19/25  - Continue OP F/U      Dementia with behavioral disturbance  - Hx noted  - Delirium precautions    Primary hypertension  Chronic, Latest blood pressure and vitals reviewed-     Temp:  [97.5 °F (36.4 °C)]   Pulse:  [41-55]   Resp:  [13-16]   BP: (151-186)/(69-78)   SpO2:  [81 %-98 %] .   Home meds for hypertension were reviewed and noted below-  Hypertension Medications              lisinopriL (PRINIVIL,ZESTRIL) 20 MG tablet Take 1 tablet (20 mg total) by mouth once daily.    hydrALAZINE (APRESOLINE) 50 MG tablet Take 1 tablet (50 mg total) by mouth every 12 (twelve) hours.    hydroCHLOROthiazide (HYDRODIURIL) 12.5 MG Tab Take 1 tablet (12.5  mg total) by mouth once daily. for 3 days            While in the hospital, will manage blood pressure as follows; Adjust home antihypertensive regimen as follows- patient no longer taking hydrochlorothiazide.  Also hydralazine was recently discontinued last week.  Continue home lisinopril with parameters.    Will utilize p.r.n. blood pressure medication only if patient's blood pressure greater than 180/110 and he develops symptoms such as worsening chest pain or shortness of breath.        VTE Risk Mitigation (From admission, onward)           Ordered     enoxaparin injection 30 mg  Daily         02/23/25 1822     IP VTE HIGH RISK PATIENT  Once         02/23/25 1822     Place sequential compression device  Until discontinued         02/23/25 1822                    Discharge Planning   JOANNE: 2/25/2025     Code Status: Full Code   Medical Readiness for Discharge Date:   Discharge Plan A: Home Health   Discharge Delays: Access/Lines            Please place Justification for DME        Bobbi Schofield, NAHOMY, APRN, FNP-C  [facility] Medicine  [facility]  [email or web address]

## 2025-02-24 NOTE — SUBJECTIVE & OBJECTIVE
Past Medical History:   Diagnosis Date    Bradyarrhythmia     CVA (cerebral infarction) 2006    Dementia     Depression     Hyperlipidemia     Hypertension     renal htn    Pulmonary embolism 2002    Seizure disorder        Past Surgical History:   Procedure Laterality Date    left foot  with crushed injry         Review of patient's allergies indicates:   Allergen Reactions    Ciprofloxacin (bulk) Swelling       No current facility-administered medications on file prior to encounter.     Current Outpatient Medications on File Prior to Encounter   Medication Sig    calcitRIOL (ROCALTROL) 0.25 MCG Cap TAKE 1 CAPSULE(0.25 MCG) BY MOUTH EVERY DAY    citalopram (CELEXA) 20 MG tablet Take 0.5 tablets (10 mg total) by mouth once daily.    cyanocobalamin (VITAMIN B-12) 100 MCG tablet Take 100 mcg by mouth once daily.    levETIRAcetam (KEPPRA) 500 MG Tab Take 0.5 tablets (250 mg total) by mouth 2 (two) times daily.    lisinopriL (PRINIVIL,ZESTRIL) 20 MG tablet Take 1 tablet (20 mg total) by mouth once daily.    tamsulosin (FLOMAX) 0.4 mg Cap Take 1 capsule (0.4 mg total) by mouth once daily.    vitamin E 400 UNIT capsule Take 400 Units by mouth once daily.    chlorpheniramine/dextromethorp (CORICIDIN HBP COUGH AND COLD ORAL) Take 30 mLs by mouth nightly as needed (Cough).    hydrALAZINE (APRESOLINE) 50 MG tablet Take 1 tablet (50 mg total) by mouth every 12 (twelve) hours.    hydroCHLOROthiazide (HYDRODIURIL) 12.5 MG Tab Take 1 tablet (12.5 mg total) by mouth once daily. for 3 days     Family History       Problem Relation (Age of Onset)    Diabetes Mother          Tobacco Use    Smoking status: Never    Smokeless tobacco: Never   Substance and Sexual Activity    Alcohol use: No    Drug use: No    Sexual activity: Not on file     Review of Systems   Unable to perform ROS: Dementia   Constitutional:  Positive for activity change.   HENT:  Positive for congestion.    Gastrointestinal:  Positive for constipation.    Neurological:  Positive for syncope.     Objective:     Vital Signs (Most Recent):  Temp: 98.4 °F (36.9 °C) (02/24/25 1126)  Pulse: (!) 55 (02/24/25 1126)  Resp: 18 (02/24/25 1126)  BP: 116/64 (02/24/25 1126)  SpO2: 98 % (02/24/25 1126) Vital Signs (24h Range):  Temp:  [97.5 °F (36.4 °C)-98.4 °F (36.9 °C)] 98.4 °F (36.9 °C)  Pulse:  [41-72] 55  Resp:  [13-18] 18  SpO2:  [81 %-99 %] 98 %  BP: (107-191)/(57-78) 116/64     Weight: 67.9 kg (149 lb 11.1 oz)  Body mass index is 22.76 kg/m².     Physical Exam  Vitals reviewed.   Constitutional:       General: He is awake. He is not in acute distress.     Appearance: Normal appearance. He is ill-appearing (chronically). He is not diaphoretic.      Comments: Sleeping initially, but awakens easily to his name being called.   Cardiovascular:      Rate and Rhythm: Bradycardia present.      Heart sounds: Normal heart sounds. No murmur heard.     No friction rub. No gallop.   Pulmonary:      Effort: Pulmonary effort is normal. No accessory muscle usage or respiratory distress.      Breath sounds: Normal breath sounds. No wheezing, rhonchi or rales.   Abdominal:      General: Abdomen is flat. Bowel sounds are normal.      Palpations: Abdomen is soft.      Tenderness: There is no abdominal tenderness. There is no guarding or rebound.   Musculoskeletal:      Right lower leg: No edema.      Left lower leg: No edema.   Skin:     General: Skin is warm and dry.   Neurological:      Mental Status: He is alert. Mental status is at baseline. He is disoriented.      Comments: A&O x1 to self.  Disoriented to place and time.   Psychiatric:         Behavior: Behavior is cooperative.                Significant Labs: All pertinent labs within the past 24 hours have been reviewed.  CBC:   Recent Labs   Lab 02/23/25  1430 02/24/25  0421   WBC 6.65 5.53   HGB 10.4* 9.4*   HCT 31.8* 28.3*    149*     CMP:   Recent Labs   Lab 02/23/25  1430 02/24/25  0421    139   K 5.0 5.0     109   CO2 28 25    94   BUN 49* 46*   CREATININE 2.3* 2.2*   CALCIUM 10.2 9.3   PROT 6.5 5.6*   ALBUMIN 3.4* 3.0*   BILITOT 0.3 0.3   ALKPHOS 36* 34*   AST 13 12   ALT 6* 5*   ANIONGAP 7* 5*     Cardiac Markers:   Recent Labs   Lab 02/23/25  1430   *     Magnesium:   Recent Labs   Lab 02/23/25  1430 02/24/25  0421   MG 2.1 2.0     POCT Glucose:   Recent Labs   Lab 02/23/25  1956 02/24/25  0721 02/24/25  1109   POCTGLUCOSE 159* 86 137*     Troponin:   Recent Labs   Lab 02/23/25  1642 02/23/25  2107 02/24/25  0421   TROPONINIHS 14.3 14.1 15.2*     TSH:   Recent Labs   Lab 02/23/25  1642   TSH 2.091     Urine Studies:   Recent Labs   Lab 02/23/25  1702   COLORU Yellow   APPEARANCEUA Clear   PHUR 6.0   SPECGRAV 1.015   PROTEINUA Negative   GLUCUA Negative   KETONESU Negative   BILIRUBINUA Negative   OCCULTUA Negative   NITRITE Negative   UROBILINOGEN Negative   LEUKOCYTESUR Negative       Significant Imaging:   Imaging Results              CT Head Without Contrast (Final result)  Result time 02/23/25 20:29:29      Final result by Baldemar Morelos MD (02/23/25 20:29:29)                   Impression:      No CT evidence of acute intracranial abnormality. Clinical correlation and further evaluation as warranted.    Chronic senescent and microvascular ischemic changes.      Electronically signed by: Baldemar Morelos MD  Date:    02/23/2025  Time:    20:29               Narrative:    EXAMINATION:  CT HEAD WITHOUT CONTRAST    CLINICAL HISTORY:  Syncope, recurrent;    TECHNIQUE:  Low dose axial images were obtained through the head.  Coronal and sagittal reformations were also performed. Contrast was not administered.    COMPARISON:  CT head 01/16/2025    FINDINGS:  There is generalized cerebral volume loss with compensatory sulcal widening and ventricular enlargement.  There is patchy and confluent periventricular and supratentorial white matter hypoattenuation suggesting sequelae of chronic microvascular  ischemic change.  There are stable remote lacunar type infarcts of the left basal ganglia and cedric.  No CT evidence of acute intracranial hemorrhage.  The basal cisterns are patent. There is near complete opacification of the right maxillary sinus, similar to prior examination.  There is trace opacification of the left mastoid air cells.  No acute displaced calvarial fracture identified.  The visualized bones of the calvarium demonstrate no acute osseous abnormality.                                       X-Ray KUB (Final result)  Result time 02/23/25 19:31:13      Final result by Baldemar Morelos MD (02/23/25 19:31:13)                   Impression:      As above.      Electronically signed by: Baldemar Morelos MD  Date:    02/23/2025  Time:    19:31               Narrative:    EXAMINATION:  XR KUB    CLINICAL HISTORY:  dilated colonic loops on CXR;    TECHNIQUE:  Single AP supine view of the abdomen (KUB) was performed    COMPARISON:  Abdominal radiograph series 09/16/2024    FINDINGS:  There is mild gaseous distention of the colon which contains hyperdense material, possibly enteric contrast from prior barium fluoroscopic examination.  No central small bowel air-fluid levels appreciated to suggest high-grade obstruction.  Scattered retained stool projects over the colon suggesting constipation.  No large volume of free intraperitoneal air appreciated allowing for supine technique.  Osseous structures demonstrate degenerative changes.                                       US Carotid Bilateral (Final result)  Result time 02/24/25 06:57:49      Final result by Arnulfo Lopez IV, MD (02/24/25 06:57:49)                   Impression:      Scattered bilateral atheromatous changes without evidence of hemodynamically significant carotid arterial stenosis.      Electronically signed by: Arnulfo Lopez  Date:    02/24/2025  Time:    06:57               Narrative:    EXAMINATION:  US CAROTID BILATERAL    CLINICAL  HISTORY:  syncope;    TECHNIQUE:  CMS MANDATED QUALITY DATA - CAROTID - 195    All measurements and percent stenosis described below were determined using NASCET criteria or criteria similar to NASCET, as defined by the Society of Radiologists in Ultrasound Consensus Conference, Radiology, 2003    FINDINGS:  Real-time, duplex and color Doppler interrogation are performed.  Scattered atheromatous changes are present within the carotid arteries.  There are no findings to suggest hemodynamically significant carotid arterial stenosis on either side.  Antegrade flow is observed within the vertebral arteries bilaterally.                                       X-Ray Chest AP Portable (Final result)  Result time 02/23/25 15:22:10      Final result by Chuy Murphy Jr., MD (02/23/25 15:22:10)                   Impression:      Low inspiratory volume exacerbating the cardiomediastinal silhouette and causing crowding of vessels.  No evidence of acute cardiopulmonary findings.  No detrimental change upon comparison.      Electronically signed by: Chuy Murphy MD  Date:    02/23/2025  Time:    15:22               Narrative:    EXAMINATION:  XR CHEST AP PORTABLE    CLINICAL HISTORY:  Chest Pain;    TECHNIQUE:  Single frontal view of the chest was performed.    COMPARISON:  01/14/2025    FINDINGS:  Epicardial pacing pad projects over the patient's midline sternum.  Heart is upper normal in size exacerbated by the decreased inspiratory volume.  Small effusion obscures the left costophrenic sulcus.  Chronic eventration of right hemidiaphragm.  Crowding of vessels is attributed towards the poor inspiratory volume.  Dilated colonic loops projects below the elevated right hemidiaphragm.

## 2025-02-24 NOTE — PROCEDURES
EXTENDED  ELECTROENCEPHALOGRAM  REPORT    Lavon Brandon  MRN: [id number]  CSN: [id number]  : 1932  EEG #: [id number]     DATE OF SERVICE: 2025    DATE OF ADMISSION: 2025  1:57 PM    ADMITTING/REQUESTING PROVIDER: Ezequiel Sales MD    REASON FOR CONSULT: syncope    METHODOLOGY   Electroencephalographic (EEG) recording is with electrodes placed according to the International 10-20 placement system.  Thirty two (32) channels of digital signal (sampling rate of 512/sec) including T1 and T2 was simultaneously recorded from the scalp and may include  EKG, EMG, and/or eye monitors.  Recording band pass was 0.1 to 512 hz.  Digital video recording of the patient is simultaneously recorded with the EEG.  The patient is instructed report clinical symptoms which may occur during the recording session.  EEG and video recording is stored and archived in digital format.  Activation procedures which include photic stimulation, hyperventilation and instructing patients to perform simple task are done in selected patients.   The EEG is displayed on a monitor screen and can be reviewed using different montages.  Computer assisted analysis is employed to detect spike and electrographic seizure activity.   The entire record is submitted for computer analysis.  The entire recording is visually reviewed and the times identified by computer analysis as being spikes or seizures are reviewed again.  Compresses spectral analysis (CSA) is also performed on the activity recorded from each individual channel.  This is displayed as a power display of frequencies from 0 to 30 Hz over time.   The CSA is reviewed looking for asymmetries in power between homologous areas of the scalp and then compared with the original EEG recording.     10[employer] Software software was also utilized in the review of this study.  This software suite analyzes the EEG recording in multiple domains.  Coherence and rhythmicity is computed to identify EEG sections  which may contain organized seizures.  Each channel undergoes analysis to detect presence of spike and sharp waves which have special and morphological characteristic of epileptic activity.  The routine EEG recording is converted from spacial into frequency domain.  This is then displayed comparing homologous areas to identify areas of significant asymmetry.  Algorithm to identify non-cortically generated artifact is used to separate eye movement, EMG and other artifact from the EEG.      RECORDING TIMES  Start on 1258  Stop on 1324    A total of 25 min of EEG recording was obtained.    EEG FINDINGS  Background activity:   The background rhythm was continuous and characterized by predominantly medium voltage delta activity with intermittent theta. There is a poorly formed anterior to posterior gradient with a poorly sustained posterior dominant rhythm reaching 5hz. Variable. Reactive.     Sleep:    No sleep or state changes seen in this study.     Activation procedures:   Photic stimulation was performed with no abnormalities seen    Abnormal activity:   No epileptiform discharges, periodic discharges, lateralized rhythmic delta activity or electrographic seizures were seen.    EKG:   EKG was normal sinus rhythm.     IMPRESSION:     This is an abnormal EEG due to delta range slowing.     CLINICAL CORRELATION:  The patient is a 89 year-old male who is being evaluated for syncope.  The patient is currently not maintained on any anti-seizure medications. This is an abnormal EEG. There is evidence for moderate diffuse cortical dysfunction, likely toxic/metabolic vs excessive drowsiness. There was no evidence for an epileptic process on this recording. No seizures were recorded during this study.      Rowena Ibarra MD  Neurocritical Care   Neurology-Epilepsy

## 2025-02-24 NOTE — PROGRESS NOTES
Spoke to Dr. Gonsalez via Secure Chat; echo will be a limited echo for EF, no bubble study due to age.

## 2025-02-24 NOTE — SUBJECTIVE & OBJECTIVE
Past Medical History:   Diagnosis Date    Bradyarrhythmia     CVA (cerebral infarction) 2006    Dementia     Depression     Hyperlipidemia     Hypertension     renal htn    Pulmonary embolism 2002    Seizure disorder        Past Surgical History:   Procedure Laterality Date    left foot  with crushed injry         Review of patient's allergies indicates:   Allergen Reactions    Ciprofloxacin (bulk) Swelling       No current facility-administered medications on file prior to encounter.     Current Outpatient Medications on File Prior to Encounter   Medication Sig    calcitRIOL (ROCALTROL) 0.25 MCG Cap TAKE 1 CAPSULE(0.25 MCG) BY MOUTH EVERY DAY    citalopram (CELEXA) 20 MG tablet Take 0.5 tablets (10 mg total) by mouth once daily.    cyanocobalamin (VITAMIN B-12) 100 MCG tablet Take 100 mcg by mouth once daily.    levETIRAcetam (KEPPRA) 500 MG Tab Take 0.5 tablets (250 mg total) by mouth 2 (two) times daily.    lisinopriL (PRINIVIL,ZESTRIL) 20 MG tablet Take 1 tablet (20 mg total) by mouth once daily.    tamsulosin (FLOMAX) 0.4 mg Cap Take 1 capsule (0.4 mg total) by mouth once daily.    vitamin E 400 UNIT capsule Take 400 Units by mouth once daily.    chlorpheniramine/dextromethorp (CORICIDIN HBP COUGH AND COLD ORAL) Take 30 mLs by mouth nightly as needed (Cough).    hydrALAZINE (APRESOLINE) 50 MG tablet Take 1 tablet (50 mg total) by mouth every 12 (twelve) hours.    hydroCHLOROthiazide (HYDRODIURIL) 12.5 MG Tab Take 1 tablet (12.5 mg total) by mouth once daily. for 3 days    [DISCONTINUED] LORazepam (ATIVAN) 0.5 MG tablet Take 1 tablet (0.5 mg total) by mouth every evening.    [DISCONTINUED] LORazepam (ATIVAN) 0.5 MG tablet Take 1 tablet by mouth every evening.     Family History       Problem Relation (Age of Onset)    Diabetes Mother          Tobacco Use    Smoking status: Never    Smokeless tobacco: Never   Substance and Sexual Activity    Alcohol use: No    Drug use: No    Sexual activity: Not on file      Review of Systems   Unable to perform ROS: Dementia     Objective:     Vital Signs (Most Recent):  Temp: 97.5 °F (36.4 °C) (02/23/25 1446)  Pulse: (!) 50 (02/23/25 1630)  Resp: 13 (02/23/25 1630)  BP: (!) 175/77 (02/23/25 1630)  SpO2: 97 % (02/23/25 1630) Vital Signs (24h Range):  Temp:  [97.5 °F (36.4 °C)] 97.5 °F (36.4 °C)  Pulse:  [41-55] 50  Resp:  [13-16] 13  SpO2:  [81 %-98 %] 97 %  BP: (151-186)/(69-78) 175/77     Weight: 73 kg (160 lb 15 oz)  Body mass index is 23.09 kg/m².     Physical Exam  Vitals reviewed.   Constitutional:       General: He is awake. He is not in acute distress.     Appearance: Normal appearance. He is ill-appearing (chronically). He is not diaphoretic.      Comments: Sleeping initially, but awakens easily to his name being called.   Cardiovascular:      Rate and Rhythm: Bradycardia present.      Heart sounds: Normal heart sounds. No murmur heard.     No friction rub. No gallop.   Pulmonary:      Effort: Pulmonary effort is normal. No accessory muscle usage or respiratory distress.      Breath sounds: Normal breath sounds. No wheezing, rhonchi or rales.   Abdominal:      General: Abdomen is flat. Bowel sounds are normal.      Palpations: Abdomen is soft.      Tenderness: There is no abdominal tenderness. There is no guarding or rebound.   Musculoskeletal:      Right lower leg: No edema.      Left lower leg: No edema.   Skin:     General: Skin is warm and dry.   Neurological:      Mental Status: He is alert. Mental status is at baseline. He is disoriented.      Comments: A&O x1 to self.  Disoriented to place and time.   Psychiatric:         Behavior: Behavior is cooperative.                Significant Labs: All pertinent labs within the past 24 hours have been reviewed.  CBC:   Recent Labs   Lab 02/23/25  1430   WBC 6.65   HGB 10.4*   HCT 31.8*        CMP:   Recent Labs   Lab 02/23/25  1430      K 5.0      CO2 28      BUN 49*   CREATININE 2.3*   CALCIUM 10.2    PROT 6.5   ALBUMIN 3.4*   BILITOT 0.3   ALKPHOS 36*   AST 13   ALT 6*   ANIONGAP 7*     Cardiac Markers:   Recent Labs   Lab 02/23/25  1430   *     Magnesium:   Recent Labs   Lab 02/23/25  1430   MG 2.1     POCT Glucose:   Recent Labs   Lab 02/23/25  1434   POCTGLUCOSE 113*     Troponin:   Recent Labs   Lab 02/23/25  1430 02/23/25  1642   TROPONINIHS 15.3* 14.3     TSH:   Recent Labs   Lab 02/23/25  1642   TSH 2.091     Urine Studies:   Recent Labs   Lab 02/23/25  1702   COLORU Yellow   APPEARANCEUA Clear   PHUR 6.0   SPECGRAV 1.015   PROTEINUA Negative   GLUCUA Negative   KETONESU Negative   BILIRUBINUA Negative   OCCULTUA Negative   NITRITE Negative   UROBILINOGEN Negative   LEUKOCYTESUR Negative       Significant Imaging:   Imaging Results              US Carotid Bilateral (In process)                      X-Ray Chest AP Portable (Final result)  Result time 02/23/25 15:22:10      Final result by Chuy Murphy Jr., MD (02/23/25 15:22:10)                   Impression:      Low inspiratory volume exacerbating the cardiomediastinal silhouette and causing crowding of vessels.  No evidence of acute cardiopulmonary findings.  No detrimental change upon comparison.      Electronically signed by: Chuy Murphy MD  Date:    02/23/2025  Time:    15:22               Narrative:    EXAMINATION:  XR CHEST AP PORTABLE    CLINICAL HISTORY:  Chest Pain;    TECHNIQUE:  Single frontal view of the chest was performed.    COMPARISON:  01/14/2025    FINDINGS:  Epicardial pacing pad projects over the patient's midline sternum.  Heart is upper normal in size exacerbated by the decreased inspiratory volume.  Small effusion obscures the left costophrenic sulcus.  Chronic eventration of right hemidiaphragm.  Crowding of vessels is attributed towards the poor inspiratory volume.  Dilated colonic loops projects below the elevated right hemidiaphragm.

## 2025-02-24 NOTE — CONSULTS
"[facility]  Department of Cardiology  Consult Note      PATIENT NAME: Lavon Brandon  MRN: [id number]  TODAY'S DATE: 02/24/2025  ADMIT DATE: 2/23/2025                          CONSULT REQUESTED BY: Emma Mojica MD    SUBJECTIVE     PRINCIPAL PROBLEM: Syncope      REASON FOR CONSULT:  Syncope, bradycardia        Patient presents with    Loss of Consciousness       Slumped over in chair and eyes rolled back.  Baseline gcs=14 when ems arrived. SZ hx. Bradycardic.    Bradycardia      Pt is 89 yo male with PMH dementia. Daughter at  provides some history. Pt reportedly passed out in wheelchair after just finishing lunch, witnessed by daughter. States mouth was open and drool noted. Pt and daughter states he has not complained of any chest pain     HPI taken from medical record:   "Mr. Brandon is a 89 yr old male with a hx of HTN, dementia, MARLIN, CKD stage 4, epilepsy, debility, DM type 2, depression, HLD, colonic polyps, TBI, CVA, secondary hyperparathyroidism, thrombocytopenia, anemia, arthritis, B12 deficiency, vitamin-D deficiency, pressure injury to sacral region, PE, bradycardia who presented to the ED via EMS with a chief complaint of syncope. Patient's daughter reports that the patient was sitting in his wheelchair, had finished eating and was watching TV when she noticed him lean forward and slumped over to the left.  Denies head trauma.  She endorsed that his mouth was open and he was drooling with his eyes open looking to the left and not focused. She tried to wake him and found him unconscious and called EMS to bring him here for further eval. Pt is currently A&O x 1 to self. He is disoriented to place and time.  Daughter states that this is his baseline.  She denies any bowel or bladder incontinence during episode of syncope.  She does endorse that his hydralazine was recently stopped last week.  She also states that he is normally bradycardic with heart rate in the 50s.  She states " "that he does not smoke tobacco, drink alcohol, or do recreational drugs.  Patient denies any pain at this time.  Remainder of history is limited due to dementia.     Lengthy discussion about code status with daughter at bedside as well as other daughter over the phone who are both healthcare POAs.  I discussed with them that the patient had been marked DNR in the past and wanted to confirm if they wanted to continue DNR status.  Discussed the pros and cons of CPR and patient's daughters would like to make patient full code at this time.  Patient's chart has been updated to reflect this code status change.     Upon arrival to ED, patient afebrile, HR of 42, RR of 16, BP of 151/70, satting 98% on RA.  Workup in the ED revealed RBC of 3.47, H/H of 10.4/31.8, BUN of 49, creatinine of 2.3, GFR of 26, albumin of 3.4, BNP of 197, troponin of 15.3, POCT glucose of 113.  CXR shows low inspiratory volume exacerbating cardiomediastinal silhouette and causing crowding of vessels.  No evidence of acute cardiopulmonary findings.  Dilated colonic loops projects below the elevated right hemidiaphragm.  No medications administered while in the ED. case discussed with ED provider and patient will be placed under observation for further management."    Review of patient's allergies indicates:   Allergen Reactions    Ciprofloxacin (bulk) Swelling       Past Medical History:   Diagnosis Date    Bradyarrhythmia     CVA (cerebral infarction) 2006    Dementia     Depression     Hyperlipidemia     Hypertension     renal htn    Pulmonary embolism 2002    Seizure disorder      Past Surgical History:   Procedure Laterality Date    left foot  with crushed injry       Social History[1]     REVIEW OF SYSTEMS  Negative except as mentioned in HPI    OBJECTIVE     VITAL SIGNS (Most Recent)  Temp: 97.9 °F (36.6 °C) (02/24/25 0344)  Pulse: (!) 52 (02/24/25 0344)  Resp: 17 (02/23/25 1943)  BP: (!) 156/61 (02/24/25 0344)  SpO2: 98 % (02/24/25 " 3807)    VENTILATION STATUS  Resp: 17 (02/23/25 1943)  SpO2: 98 % (02/24/25 0344)           I & O (Last 24H):  Intake/Output Summary (Last 24 hours) at 2/24/2025 0833  Last data filed at 2/24/2025 0528  Gross per 24 hour   Intake 120 ml   Output 700 ml   Net -580 ml       WEIGHTS  Wt Readings from Last 3 Encounters:   02/23/25 1943 67.9 kg (149 lb 11.1 oz)   02/23/25 1410 73 kg (160 lb 15 oz)   01/14/25 1308 73 kg (160 lb 15 oz)   12/27/24 1203 66.2 kg (146 lb)       PHYSICAL EXAM    GENERAL:elderly male sitting on site of bed breathing comfortably in no apparent distress.  HEENT: Normocephalic.    NECK: No JVD.   CARDIAC: Regular rate and rhythm. S1 is normal.S2 is normal.No gallops, clicks or murmurs noted at this time.  CHEST ANATOMY: normal.   LUNGS: Clear to auscultation. No wheezing or rhonchi..   ABDOMEN: Soft .  Normal bowel sounds.    EXTREMITIES: No edema  CENTRAL NERVOUS SYSTEM: AAO x 3  SKIN: No rash     HOME MEDICATIONS:Medications Ordered Prior to Encounter[2]    SCHEDULED MEDS:   calcitRIOL  0.25 mcg Oral Daily    citalopram  10 mg Oral Daily    enoxparin  30 mg Subcutaneous Daily    levETIRAcetam  250 mg Oral BID    lisinopriL  20 mg Oral Daily    tamsulosin  0.4 mg Oral Daily    vitamin E  400 Units Oral Daily       CONTINUOUS INFUSIONS:    PRN MEDS:  Current Facility-Administered Medications:     acetaminophen, 650 mg, Oral, Q4H PRN    aluminum-magnesium hydroxide-simethicone, 30 mL, Oral, QID PRN    dextrose 50%, 12.5 g, Intravenous, PRN    dextrose 50%, 25 g, Intravenous, PRN    glucagon (human recombinant), 1 mg, Intramuscular, PRN    glucose, 16 g, Oral, PRN    glucose, 24 g, Oral, PRN    hydrALAZINE, 25 mg, Oral, Q8H PRN    insulin aspart U-100, 0-10 Units, Subcutaneous, QID (AC + HS) PRN    magnesium oxide, 800 mg, Oral, PRN    magnesium oxide, 800 mg, Oral, PRN    melatonin, 6 mg, Oral, Nightly PRN    naloxone, 0.02 mg, Intravenous, PRN    ondansetron, 4 mg, Intravenous, Q6H PRN    potassium  "bicarbonate, 35 mEq, Oral, PRN    potassium bicarbonate, 50 mEq, Oral, PRN    potassium bicarbonate, 60 mEq, Oral, PRN    potassium, sodium phosphates, 2 packet, Oral, PRN    potassium, sodium phosphates, 2 packet, Oral, PRN    potassium, sodium phosphates, 2 packet, Oral, PRN    senna-docusate 8.6-50 mg, 1 tablet, Oral, BID PRN    sodium chloride 0.9%, 10 mL, Intravenous, Q12H PRN    LABS AND DIAGNOSTICS     CBC LAST 3 DAYS  Recent Labs   Lab 02/23/25  1430 02/24/25  0421   WBC 6.65 5.53   RBC 3.47* 3.12*   HGB 10.4* 9.4*   HCT 31.8* 28.3*   MCV 92 91   MCH 30.0 30.1   MCHC 32.7 33.2   RDW 13.6 13.6    149*   MPV 10.9 11.0   GRAN 57.1  3.8 50.6  2.8   LYMPH 32.3  2.2 37.4  2.1   MONO 7.5  0.5 9.6  0.5   BASO 0.05 0.02   NRBC 0 0       COAGULATION LAST 3 DAYS  No results for input(s): "LABPT", "INR", "APTT" in the last 168 hours.    CHEMISTRY LAST 3 DAYS  Recent Labs   Lab 02/23/25  1430 02/24/25  0421    139   K 5.0 5.0    109   CO2 28 25   ANIONGAP 7* 5*   BUN 49* 46*   CREATININE 2.3* 2.2*    94   CALCIUM 10.2 9.3   MG 2.1 2.0   ALBUMIN 3.4* 3.0*   PROT 6.5 5.6*   ALKPHOS 36* 34*   ALT 6* 5*   AST 13 12   BILITOT 0.3 0.3       CARDIAC PROFILE LAST 3 DAYS  Recent Labs   Lab 02/23/25  1430   *       ENDOCRINE LAST 3 DAYS  Recent Labs   Lab 02/23/25  1642   TSH 2.091       LAST ARTERIAL BLOOD GAS  ABG  No results for input(s): "PH", "PO2", "PCO2", "HCO3", "BE" in the last 168 hours.    LAST 7 DAYS MICROBIOLOGY   Microbiology Results (last 7 days)       ** No results found for the last 168 hours. **            MOST RECENT IMAGING  US Carotid Bilateral  Narrative: EXAMINATION:  US CAROTID BILATERAL    CLINICAL HISTORY:  syncope;    TECHNIQUE:  CMS MANDATED QUALITY DATA - CAROTID - 195    All measurements and percent stenosis described below were determined using NASCET criteria or criteria similar to NASCET, as defined by the Society of Radiologists in Ultrasound Consensus " Conference, Radiology, 2003    FINDINGS:  Real-time, duplex and color Doppler interrogation are performed.  Scattered atheromatous changes are present within the carotid arteries.  There are no findings to suggest hemodynamically significant carotid arterial stenosis on either side.  Antegrade flow is observed within the vertebral arteries bilaterally.  Impression: Scattered bilateral atheromatous changes without evidence of hemodynamically significant carotid arterial stenosis.    Electronically signed by: Arnulfo Lopez  Date:    02/24/2025  Time:    06:57      ECHOCARDIOGRAM RESULTS (last 5)  Results for orders placed in visit on 05/28/24    Echo    Interpretation Summary    Left Ventricle: The left ventricle is normal in size. Normal wall thickness. There is normal systolic function with a visually estimated ejection fraction of 55 - 60%. There is normal diastolic function.    Right Ventricle: Normal right ventricular cavity size. Wall thickness is normal. Systolic function is normal.    Aortic Valve: The aortic valve is structurally normal. Mildly calcified left and noncoronary cusps.      Results for orders placed in visit on 01/06/22    Echo Saline Bubble? Yes    Interpretation Summary  · There is no evidence of intracardiac shunting.  · The left ventricle is normal in size with mild concentric hypertrophy and normal systolic function.  · The estimated ejection fraction is 65%.  · Normal left ventricular diastolic function.  · Mild-to-moderate mitral regurgitation.  · Normal central venous pressure (3 mmHg).  · The estimated PA systolic pressure is 31 mmHg.      CURRENT/PREVIOUS VISIT EKG  Results for orders placed or performed during the hospital encounter of 02/23/25   EKG 12-lead    Collection Time: 02/23/25  2:02 PM   Result Value Ref Range    QRS Duration 104 ms    [facility] QTC Calculation 381 ms    Narrative    Test Reason : R07.9,    Vent. Rate :  43 BPM     Atrial Rate :  43 BPM     P-R Int : 210 ms           QRS Dur : 104 ms      QT Int : 452 ms       P-R-T Axes :  29 -17  39 degrees    QTcB Int : 381 ms    Marked sinus bradycardia with 1st degree A-V block  Minimal voltage criteria for LVH, may be normal variant ( [place] product )  Abnormal ECG  When compared with ECG of 22-Dec-2024 13:36,  Previous ECG has undetermined rhythm, needs review  Questionable change in QRS duration    Referred By: AAAREFERRAL SELF           Confirmed By:            ASSESSMENT/PLAN:     Active Hospital Problems    Diagnosis    *Syncope    DM (diabetes mellitus), type 2 with neurological complications    ACP (advance care planning)    Debility    Centrencephalic epilepsy    CKD (chronic kidney disease) stage 4, GFR 15-29 ml/min    MARLIN (obstructive sleep apnea), 2008, not on Rx    Dementia with behavioral disturbance    Primary hypertension     renal htn         ASSESSMENT & PLAN:   Syncope  Advanced dementia  CKD  DM2  Epilepsy      RECOMMENDATIONS:  Reviewed telemetry, sinus bradycardia overnight 40-50s  Carotid US this admission negative for stenosis  Echocardiogram May '24 showed no abnormality  Normal troponins  Pt has had no chest pain    Avoid all AV krystyna blocker medications  For now recommend conservative management. If continues to have syncopal events, permanent pacemaker should be considered  Pt can wear Zio event monitor x 14 days as outpatient  Consider EP referral if recurrent syncope  Close outpatient follow up with cardiology      Gail Heredia NP  [facility]  Department of Cardiology  Date of Service: 02/24/2025               [1]   Social History  Tobacco Use    Smoking status: Never    Smokeless tobacco: Never   Substance Use Topics    Alcohol use: No    Drug use: No   [2]   No current facility-administered medications on file prior to encounter.     Current Outpatient Medications on File Prior to Encounter   Medication Sig Dispense Refill    calcitRIOL (ROCALTROL) 0.25 MCG Cap TAKE 1 CAPSULE(0.25 MCG)  BY MOUTH EVERY DAY 90 capsule 4    citalopram (CELEXA) 20 MG tablet Take 0.5 tablets (10 mg total) by mouth once daily. 90 tablet 2    cyanocobalamin (VITAMIN B-12) 100 MCG tablet Take 100 mcg by mouth once daily.      levETIRAcetam (KEPPRA) 500 MG Tab Take 0.5 tablets (250 mg total) by mouth 2 (two) times daily. 90 tablet 3    lisinopriL (PRINIVIL,ZESTRIL) 20 MG tablet Take 1 tablet (20 mg total) by mouth once daily. 90 tablet 3    tamsulosin (FLOMAX) 0.4 mg Cap Take 1 capsule (0.4 mg total) by mouth once daily. 90 capsule 3    vitamin E 400 UNIT capsule Take 400 Units by mouth once daily.      chlorpheniramine/dextromethorp (CORICIDIN HBP COUGH AND COLD ORAL) Take 30 mLs by mouth nightly as needed (Cough).      hydrALAZINE (APRESOLINE) 50 MG tablet Take 1 tablet (50 mg total) by mouth every 12 (twelve) hours.      hydroCHLOROthiazide (HYDRODIURIL) 12.5 MG Tab Take 1 tablet (12.5 mg total) by mouth once daily. for 3 days 3 tablet 0

## 2025-02-24 NOTE — ASSESSMENT & PLAN NOTE
Chronic, Latest blood pressure and vitals reviewed-     Temp:  [97.5 °F (36.4 °C)]   Pulse:  [41-55]   Resp:  [13-16]   BP: (151-186)/(69-78)   SpO2:  [81 %-98 %] .   Home meds for hypertension were reviewed and noted below-  Hypertension Medications              lisinopriL (PRINIVIL,ZESTRIL) 20 MG tablet Take 1 tablet (20 mg total) by mouth once daily.    hydrALAZINE (APRESOLINE) 50 MG tablet Take 1 tablet (50 mg total) by mouth every 12 (twelve) hours.    hydroCHLOROthiazide (HYDRODIURIL) 12.5 MG Tab Take 1 tablet (12.5 mg total) by mouth once daily. for 3 days            While in the hospital, will manage blood pressure as follows; Adjust home antihypertensive regimen as follows- patient no longer taking hydrochlorothiazide.  Also hydralazine was recently discontinued last week.  Continue home lisinopril with parameters.    Will utilize p.r.n. blood pressure medication only if patient's blood pressure greater than 180/110 and he develops symptoms such as worsening chest pain or shortness of breath.

## 2025-02-24 NOTE — SUBJECTIVE & OBJECTIVE
HPI:  89 y.o. male with medical Hx of HTN, dementia, MARLIN, CKD, seizures, stroke (see PMHx section for more details) was brought to ED after passing out at home.  Per his daughter he was sitting in his wheelchair and had finished dinner when noticed him lean forward when he was noted to be slumped over. No convulsions, loss of sphincter control. Family states that pt has not had a seizure in a long time. He is on levetiracetam 250 mg BID (medication has been weaned off slowly in clinic according to his daughter). Currently he is at his baseline (alert, oriented to self)     Images personally reviewed and interpreted:  Study: Head CT  Study Interpretation: FINDINGS:  There is generalized cerebral volume loss with compensatory sulcal widening and ventricular enlargement.  There is patchy and confluent periventricular and supratentorial white matter hypoattenuation suggesting sequelae of chronic microvascular ischemic change.  There are stable remote lacunar type infarcts of the left basal ganglia and cedric.  No CT evidence of acute intracranial hemorrhage.  The basal cisterns are patent. There is near complete opacification of the right maxillary sinus, similar to prior examination.  There is trace opacification of the left mastoid air cells.  No acute displaced calvarial fracture identified.  The visualized bones of the calvarium demonstrate no acute osseous abnormality.     Impression:     No CT evidence of acute intracranial abnormality. Clinical correlation and further evaluation as warranted.     Chronic senescent and microvascular ischemic changes.        Electronically signed by:Baldemar Morelos MD  Date:                                            02/23/2025  Time:                                           20:29    Additional studies reviewed:   Study: Cardiac_Neuro: EEG  Study Interpretation: IMPRESSION:      This is an abnormal EEG due to delta range slowing.     CLINICAL CORRELATION:  The patient is a 89  year-old male who is being evaluated for syncope.  The patient is currently not maintained on any anti-seizure medications. This is an abnormal EEG. There is evidence for moderate diffuse cortical dysfunction, likely toxic/metabolic vs excessive drowsiness. There was no evidence for an epileptic process on this recording. No seizures were recorded during this study.        Rowena Ibarra MD  Neurocritical Care   Neurology-Epilepsy    Laboratory studies reviewed:  BMP:   Lab Results   Component Value Date     02/24/2025    K 5.0 02/24/2025     02/24/2025    CO2 25 02/24/2025    BUN 46 (H) 02/24/2025    CREATININE 2.2 (H) 02/24/2025    CALCIUM 9.3 02/24/2025     CBC:   Lab Results   Component Value Date    WBC 5.53 02/24/2025    RBC 3.12 (L) 02/24/2025    HGB 9.4 (L) 02/24/2025    HCT 28.3 (L) 02/24/2025     (L) 02/24/2025    MCV 91 02/24/2025    MCH 30.1 02/24/2025    MCHC 33.2 02/24/2025     Lipid Panel:   Lab Results   Component Value Date    CHOL 115 (L) 02/24/2025    LDLCALC 62.6 (L) 02/24/2025    HDL 44 02/24/2025    TRIG 42 02/24/2025     Coagulation:   Lab Results   Component Value Date    INR 0.9 11/30/2023    APTT 27.9 04/12/2023     Hgb A1C:   Lab Results   Component Value Date    HGBA1C 5.3 02/24/2025     TSH:   Lab Results   Component Value Date    TSH 2.091 02/23/2025       Documentation personally reviewed:  Notes: Notes: ED notes and H&P     Assessment and plan:    89 y/o male consulted for abnormal EEG. His EEG shows global slowing which is also a finding in the one in 2023. This can be seen with encephalopathies, neurodegenerative diseases, sedating medications.    Syncope could had been vasovagal? Note orthostatic hypotension in vital signs when standing.    -Continue levetiracetam 250 mg BID. If any seizure-like activity then ok to increase to 500 mg BID      Post charge discharge plan:  Clinic follow up: General Neurology    Visit Type: in-person only  Timeframe: Next  available

## 2025-02-24 NOTE — TELEMEDICINE CONSULT
"[facility]   General Neurology  Consult Note      Consult Information  Inpatient Consult to Neurology Services (General Neurology)  Consult performed by: Brian Valdovinos MD  Consult ordered by: Bobbi Schofield FNP  Reason for consult: Abnormal EEG        Consulting Provider:    Current Providers  No providers found    Patient Location:  [address] IP Unit    Spoke hospital nurse at bedside with patient assisting consultant.  Patient information was obtained from relative(s).       Neurology Documentation       Blood pressure (!) 154/67, pulse (!) 56, temperature 98.6 °F (37 °C), temperature source Oral, resp. rate 18, height 5' 8" (1.727 m), weight 67.9 kg (149 lb 11.1 oz), SpO2 98%.    Medical Decision Making  HPI:  89 y.o. male with medical Hx of HTN, dementia, MARLIN, CKD, seizures, stroke (see PMHx section for more details) was brought to ED after passing out at home.  Per his daughter he was sitting in his wheelchair and had finished dinner when noticed him lean forward when he was noted to be slumped over. No convulsions, loss of sphincter control. Family states that pt has not had a seizure in a long time. He is on levetiracetam 250 mg BID (medication has been weaned off slowly in clinic according to his daughter). Currently he is at his baseline (alert, oriented to self)     Images personally reviewed and interpreted:  Study: Head CT  Study Interpretation: FINDINGS:  There is generalized cerebral volume loss with compensatory sulcal widening and ventricular enlargement.  There is patchy and confluent periventricular and supratentorial white matter hypoattenuation suggesting sequelae of chronic microvascular ischemic change.  There are stable remote lacunar type infarcts of the left basal ganglia and cedric.  No CT evidence of acute intracranial hemorrhage.  The basal cisterns are patent. There is near complete opacification of the right maxillary sinus, similar to prior examination.  There is trace " opacification of the left mastoid air cells.  No acute displaced calvarial fracture identified.  The visualized bones of the calvarium demonstrate no acute osseous abnormality.     Impression:     No CT evidence of acute intracranial abnormality. Clinical correlation and further evaluation as warranted.     Chronic senescent and microvascular ischemic changes.        Electronically signed by:Baldemar Morelos MD  Date:                                            02/23/2025  Time:                                           20:29    Additional studies reviewed:   Study: Cardiac_Neuro: EEG  Study Interpretation: IMPRESSION:      This is an abnormal EEG due to delta range slowing.     CLINICAL CORRELATION:  The patient is a 89 year-old male who is being evaluated for syncope.  The patient is currently not maintained on any anti-seizure medications. This is an abnormal EEG. There is evidence for moderate diffuse cortical dysfunction, likely toxic/metabolic vs excessive drowsiness. There was no evidence for an epileptic process on this recording. No seizures were recorded during this study.        Rowena Ibarra MD  Neurocritical Care   Neurology-Epilepsy    Laboratory studies reviewed:  BMP:   Lab Results   Component Value Date     02/24/2025    K 5.0 02/24/2025     02/24/2025    CO2 25 02/24/2025    BUN 46 (H) 02/24/2025    CREATININE 2.2 (H) 02/24/2025    CALCIUM 9.3 02/24/2025     CBC:   Lab Results   Component Value Date    WBC 5.53 02/24/2025    RBC 3.12 (L) 02/24/2025    HGB 9.4 (L) 02/24/2025    HCT 28.3 (L) 02/24/2025     (L) 02/24/2025    MCV 91 02/24/2025    MCH 30.1 02/24/2025    MCHC 33.2 02/24/2025     Lipid Panel:   Lab Results   Component Value Date    CHOL 115 (L) 02/24/2025    LDLCALC 62.6 (L) 02/24/2025    HDL 44 02/24/2025    TRIG 42 02/24/2025     Coagulation:   Lab Results   Component Value Date    INR 0.9 11/30/2023    APTT 27.9 04/12/2023     Hgb A1C:   Lab Results   Component Value  Date    HGBA1C 5.3 02/24/2025     TSH:   Lab Results   Component Value Date    TSH 2.091 02/23/2025       Documentation personally reviewed:  Notes: Notes: ED notes and H&P     Assessment and plan:    89 y/o male consulted for abnormal EEG. His EEG shows global slowing which is also a finding in the one in 2023. This can be seen with encephalopathies, neurodegenerative diseases, sedating medications.    Syncope could had been vasovagal? Note orthostatic hypotension in vital signs when standing.    -Continue levetiracetam 250 mg BID. If any seizure-like activity then ok to increase to 500 mg BID      Post charge discharge plan:  Clinic follow up: General Neurology    Visit Type: in-person only  Timeframe: Next available        Additional Physical Exam, History, & ROS  Review of Systems   Unable to perform ROS: Dementia     Physical Exam  Constitutional:       General: He is not in acute distress.  Pulmonary:      Effort: No respiratory distress.   Neurological:      Mental Status: He is alert.      Cranial Nerves: No facial asymmetry.      Comments: Oriented to self    AROM of UE's and LE's against gravity       Diagnosis Date    Bradyarrhythmia     CVA (cerebral infarction) 2006    Dementia     Depression     Hyperlipidemia     Hypertension     renal htn    Pulmonary embolism 2002    Seizure disorder      Past Surgical History:   Procedure Laterality Date    left foot  with crushed injry       Family History   Problem Relation Name Age of Onset    Diabetes Mother      Cancer Neg Hx         Diagnoses  No problems updated.    Brian Valdovinos MD    Neurology consultation requested by spoke provider. Audiovisual encounter with the patient performed using a secure connection.  Results and impressions from the visit are documented on this note and were communicated to the consulting provider/team via direct communication. The note has been shared for addition to the patients electronic medical record.

## 2025-02-24 NOTE — ASSESSMENT & PLAN NOTE
"Per ct head, " There is near complete opacification of the right maxillary sinus, similar to prior examination.  There is trace opacification of the left mastoid air cells.  "  Start augmentin  Outpatient ENT  "

## 2025-02-25 LAB
ALBUMIN SERPL BCP-MCNC: 2.7 G/DL (ref 3.5–5.2)
ALP SERPL-CCNC: 29 U/L (ref 55–135)
ALT SERPL W/O P-5'-P-CCNC: 4 U/L (ref 10–44)
ANION GAP SERPL CALC-SCNC: 3 MMOL/L (ref 8–16)
AST SERPL-CCNC: 10 U/L (ref 10–40)
BASOPHILS # BLD AUTO: 0.02 K/UL (ref 0–0.2)
BASOPHILS NFR BLD: 0.4 % (ref 0–1.9)
BILIRUB SERPL-MCNC: 0.3 MG/DL (ref 0.1–1)
BUN SERPL-MCNC: 43 MG/DL (ref 10–30)
CALCIUM SERPL-MCNC: 8.5 MG/DL (ref 8.7–10.5)
CHLORIDE SERPL-SCNC: 112 MMOL/L (ref 95–110)
CO2 SERPL-SCNC: 25 MMOL/L (ref 23–29)
CREAT SERPL-MCNC: 2.2 MG/DL (ref 0.5–1.4)
DIFFERENTIAL METHOD BLD: ABNORMAL
EOSINOPHIL # BLD AUTO: 0.1 K/UL (ref 0–0.5)
EOSINOPHIL NFR BLD: 1.4 % (ref 0–8)
ERYTHROCYTE [DISTWIDTH] IN BLOOD BY AUTOMATED COUNT: 13.6 % (ref 11.5–14.5)
EST. GFR  (NO RACE VARIABLE): 27.4 ML/MIN/1.73 M^2
GLUCOSE SERPL-MCNC: 90 MG/DL (ref 70–110)
HCT VFR BLD AUTO: 23.8 % (ref 40–54)
HCT VFR BLD AUTO: 24.5 % (ref 40–54)
HGB BLD-MCNC: 7.7 G/DL (ref 14–18)
HGB BLD-MCNC: 8.1 G/DL (ref 14–18)
IMM GRANULOCYTES # BLD AUTO: 0.01 K/UL (ref 0–0.04)
IMM GRANULOCYTES NFR BLD AUTO: 0.2 % (ref 0–0.5)
LYMPHOCYTES # BLD AUTO: 2.4 K/UL (ref 1–4.8)
LYMPHOCYTES NFR BLD: 50.1 % (ref 18–48)
MAGNESIUM SERPL-MCNC: 1.7 MG/DL (ref 1.6–2.6)
MCH RBC QN AUTO: 30 PG (ref 27–31)
MCHC RBC AUTO-ENTMCNC: 32.4 G/DL (ref 32–36)
MCV RBC AUTO: 93 FL (ref 82–98)
MONOCYTES # BLD AUTO: 0.4 K/UL (ref 0.3–1)
MONOCYTES NFR BLD: 8 % (ref 4–15)
NEUTROPHILS # BLD AUTO: 1.9 K/UL (ref 1.8–7.7)
NEUTROPHILS NFR BLD: 39.9 % (ref 38–73)
NRBC BLD-RTO: 0 /100 WBC
PHOSPHATE SERPL-MCNC: 3.6 MG/DL (ref 2.7–4.5)
PLATELET # BLD AUTO: 135 K/UL (ref 150–450)
PMV BLD AUTO: 11 FL (ref 9.2–12.9)
POCT GLUCOSE: 105 MG/DL (ref 70–110)
POCT GLUCOSE: 108 MG/DL (ref 70–110)
POCT GLUCOSE: 111 MG/DL (ref 70–110)
POCT GLUCOSE: 85 MG/DL (ref 70–110)
POTASSIUM SERPL-SCNC: 4.8 MMOL/L (ref 3.5–5.1)
PROT SERPL-MCNC: 5 G/DL (ref 6–8.4)
RBC # BLD AUTO: 2.57 M/UL (ref 4.6–6.2)
SODIUM SERPL-SCNC: 140 MMOL/L (ref 136–145)
WBC # BLD AUTO: 4.85 K/UL (ref 3.9–12.7)

## 2025-02-25 PROCEDURE — 36415 COLL VENOUS BLD VENIPUNCTURE: CPT

## 2025-02-25 PROCEDURE — 97116 GAIT TRAINING THERAPY: CPT

## 2025-02-25 PROCEDURE — 97535 SELF CARE MNGMENT TRAINING: CPT

## 2025-02-25 PROCEDURE — 85014 HEMATOCRIT: CPT | Performed by: NURSE PRACTITIONER

## 2025-02-25 PROCEDURE — 63600175 PHARM REV CODE 636 W HCPCS

## 2025-02-25 PROCEDURE — 25000003 PHARM REV CODE 250

## 2025-02-25 PROCEDURE — 83735 ASSAY OF MAGNESIUM: CPT

## 2025-02-25 PROCEDURE — 25000003 PHARM REV CODE 250: Performed by: NURSE PRACTITIONER

## 2025-02-25 PROCEDURE — 85018 HEMOGLOBIN: CPT | Performed by: NURSE PRACTITIONER

## 2025-02-25 PROCEDURE — 80053 COMPREHEN METABOLIC PANEL: CPT

## 2025-02-25 PROCEDURE — 84100 ASSAY OF PHOSPHORUS: CPT

## 2025-02-25 PROCEDURE — 85025 COMPLETE CBC W/AUTO DIFF WBC: CPT

## 2025-02-25 PROCEDURE — 97530 THERAPEUTIC ACTIVITIES: CPT

## 2025-02-25 PROCEDURE — 99233 SBSQ HOSP IP/OBS HIGH 50: CPT | Mod: ,,, | Performed by: INTERNAL MEDICINE

## 2025-02-25 PROCEDURE — 36415 COLL VENOUS BLD VENIPUNCTURE: CPT | Performed by: NURSE PRACTITIONER

## 2025-02-25 PROCEDURE — 21400001 HC TELEMETRY ROOM

## 2025-02-25 RX ADMIN — AMOXICILLIN AND CLAVULANATE POTASSIUM 500 MG: 500; 125 TABLET, FILM COATED ORAL at 08:02

## 2025-02-25 RX ADMIN — LEVETIRACETAM 250 MG: 250 TABLET, FILM COATED ORAL at 08:02

## 2025-02-25 RX ADMIN — CITALOPRAM HYDROBROMIDE 10 MG: 10 TABLET ORAL at 09:02

## 2025-02-25 RX ADMIN — TAMSULOSIN HYDROCHLORIDE 0.4 MG: 0.4 CAPSULE ORAL at 09:02

## 2025-02-25 RX ADMIN — Medication 400 UNITS: at 09:02

## 2025-02-25 RX ADMIN — SODIUM CHLORIDE: 9 INJECTION, SOLUTION INTRAVENOUS at 05:02

## 2025-02-25 RX ADMIN — ENOXAPARIN SODIUM 30 MG: 40 INJECTION SUBCUTANEOUS at 07:02

## 2025-02-25 RX ADMIN — CALCITRIOL CAPSULES 0.25 MCG 0.25 MCG: 0.25 CAPSULE ORAL at 09:02

## 2025-02-25 RX ADMIN — LEVETIRACETAM 250 MG: 250 TABLET, FILM COATED ORAL at 09:02

## 2025-02-25 RX ADMIN — Medication 6 MG: at 08:02

## 2025-02-25 RX ADMIN — AMOXICILLIN AND CLAVULANATE POTASSIUM 500 MG: 500; 125 TABLET, FILM COATED ORAL at 09:02

## 2025-02-25 NOTE — PROGRESS NOTES
"[facility]  Department of Cardiology  Progress Note      PATIENT NAME: Lavon Brandon  MRN: [id number]  TODAY'S DATE: 02/25/2025  ADMIT DATE: 2/23/2025                          CONSULT REQUESTED BY: Emma Mojica MD    SUBJECTIVE     PRINCIPAL PROBLEM: Bradycardia      REASON FOR CONSULT:  Syncope, bradycardia    INTERVAL HISTORY:  2/25/25  HR 40-50s overnight; BP trend stable; Pt denies c/o  Daughter at [facility] brushing pts teeth      Patient presents with    Loss of Consciousness       Slumped over in chair and eyes rolled back.  Baseline gcs=14 when ems arrived. SZ hx. Bradycardic.    Bradycardia      Pt is 89 yo male with PMH dementia. Daughter at [facility] provides some history. Pt reportedly passed out in wheelchair after just finishing lunch, witnessed by daughter. States mouth was open and drool noted. Pt and daughter states he has not complained of any chest pain     HPI taken from medical record:   "Mr. Brandon is a 89 yr old male with a hx of HTN, dementia, MARLIN, CKD stage 4, epilepsy, debility, DM type 2, depression, HLD, colonic polyps, TBI, CVA, secondary hyperparathyroidism, thrombocytopenia, anemia, arthritis, B12 deficiency, vitamin-D deficiency, pressure injury to sacral region, PE, bradycardia who presented to the ED via EMS with a chief complaint of syncope. Patient's daughter reports that the patient was sitting in his wheelchair, had finished eating and was watching TV when she noticed him lean forward and slumped over to the left.  Denies head trauma.  She endorsed that his mouth was open and he was drooling with his eyes open looking to the left and not focused. She tried to wake him and found him unconscious and called EMS to bring him here for further eval. Pt is currently A&O x 1 to self. He is disoriented to place and time.  Daughter states that this is his baseline.  She denies any bowel or bladder incontinence during episode of syncope.  She does endorse that his hydralazine " "was recently stopped last week.  She also states that he is normally bradycardic with heart rate in the 50s.  She states that he does not smoke tobacco, drink alcohol, or do recreational drugs.  Patient denies any pain at this time.  Remainder of history is limited due to dementia.     Lengthy discussion about code status with daughter at bedside as well as other daughter over the phone who are both healthcare POAs.  I discussed with them that the patient had been marked DNR in the past and wanted to confirm if they wanted to continue DNR status.  Discussed the pros and cons of CPR and patient's daughters would like to make patient full code at this time.  Patient's chart has been updated to reflect this code status change.     Upon arrival to ED, patient afebrile, HR of 42, RR of 16, BP of 151/70, satting 98% on RA.  Workup in the ED revealed RBC of 3.47, H/H of 10.4/31.8, BUN of 49, creatinine of 2.3, GFR of 26, albumin of 3.4, BNP of 197, troponin of 15.3, POCT glucose of 113.  CXR shows low inspiratory volume exacerbating cardiomediastinal silhouette and causing crowding of vessels.  No evidence of acute cardiopulmonary findings.  Dilated colonic loops projects below the elevated right hemidiaphragm.  No medications administered while in the ED. case discussed with ED provider and patient will be placed under observation for further management."    Review of patient's allergies indicates:   Allergen Reactions    Ciprofloxacin (bulk) Swelling       Past Medical History:   Diagnosis Date    Bradyarrhythmia     CVA (cerebral infarction) 2006    Dementia     Depression     Hyperlipidemia     Hypertension     renal htn    Pulmonary embolism 2002    Seizure disorder      Past Surgical History:   Procedure Laterality Date    left foot  with crushed injry       Social History[1]     REVIEW OF SYSTEMS  Negative except as mentioned in HPI    OBJECTIVE     VITAL SIGNS (Most Recent)  Temp: 98.3 °F (36.8 °C) (02/25/25 " 1107)  Pulse: (!) 52 (02/25/25 1107)  Resp: 18 (02/25/25 0800)  BP: 129/63 (02/25/25 1107)  SpO2: 96 % (02/25/25 1107)    VENTILATION STATUS  Resp: 18 (02/25/25 0800)  SpO2: 96 % (02/25/25 1107)           I & O (Last 24H):  Intake/Output Summary (Last 24 hours) at 2/25/2025 1517  Last data filed at 2/25/2025 1415  Gross per 24 hour   Intake 740 ml   Output 1510 ml   Net -770 ml       WEIGHTS  Wt Readings from Last 3 Encounters:   02/23/25 1943 67.9 kg (149 lb 11.1 oz)   02/23/25 1410 73 kg (160 lb 15 oz)   02/24/25 1537 67.6 kg (149 lb)   01/14/25 1308 73 kg (160 lb 15 oz)       PHYSICAL EXAM    GENERAL:elderly male resting in bed breathing comfortably in no apparent distress.  HEENT: Normocephalic.    NECK: No JVD.   CARDIAC: Regular rate and rhythm.   CHEST ANATOMY: normal.   LUNGS: Clear to auscultation. No wheezing or rhonchi..   ABDOMEN: Soft .  Normal bowel sounds.    EXTREMITIES: No edema  CENTRAL NERVOUS SYSTEM: AAO x 3  SKIN: No rash     HOME MEDICATIONS:Medications Ordered Prior to Encounter[2]    SCHEDULED MEDS:   0.9% NaCl   Intravenous Once    amoxicillin-clavulanate 500-125mg  1 tablet Oral Q12H    calcitRIOL  0.25 mcg Oral Daily    citalopram  10 mg Oral Daily    diazePAM  2 mg Oral Once    enoxparin  30 mg Subcutaneous Daily    levETIRAcetam  250 mg Oral BID    lisinopriL  20 mg Oral Daily    magnesium citrate  296 mL Rectal Once    tamsulosin  0.4 mg Oral Daily    vitamin E (dl, acetate)  400 Units Oral Daily       CONTINUOUS INFUSIONS:   0.9% NaCl   Intravenous Continuous 100 mL/hr at 02/25/25 0533 New Bag at 02/25/25 0533       PRN MEDS:  Current Facility-Administered Medications:     acetaminophen, 650 mg, Oral, Q4H PRN    aluminum-magnesium hydroxide-simethicone, 30 mL, Oral, QID PRN    dextrose 50%, 12.5 g, Intravenous, PRN    dextrose 50%, 25 g, Intravenous, PRN    glucagon (human recombinant), 1 mg, Intramuscular, PRN    glucose, 16 g, Oral, PRN    glucose, 24 g, Oral, PRN    hydrALAZINE, 25  "mg, Oral, Q8H PRN    insulin aspart U-100, 0-10 Units, Subcutaneous, QID (AC + HS) PRN    magnesium oxide, 800 mg, Oral, PRN    magnesium oxide, 800 mg, Oral, PRN    melatonin, 6 mg, Oral, Nightly PRN    naloxone, 0.02 mg, Intravenous, PRN    ondansetron, 4 mg, Intravenous, Q6H PRN    potassium bicarbonate, 35 mEq, Oral, PRN    potassium bicarbonate, 50 mEq, Oral, PRN    potassium bicarbonate, 60 mEq, Oral, PRN    potassium, sodium phosphates, 2 packet, Oral, PRN    potassium, sodium phosphates, 2 packet, Oral, PRN    potassium, sodium phosphates, 2 packet, Oral, PRN    senna-docusate 8.6-50 mg, 1 tablet, Oral, BID PRN    sodium chloride 0.9%, 10 mL, Intravenous, Q12H PRN    LABS AND DIAGNOSTICS     CBC LAST 3 DAYS  Recent Labs   Lab 02/23/25  1430 02/24/25  0421 02/25/25 0426 02/25/25  0746   WBC 6.65 5.53 4.85  --    RBC 3.47* 3.12* 2.57*  --    HGB 10.4* 9.4* 7.7* 8.1*   HCT 31.8* 28.3* 23.8* 24.5*   MCV 92 91 93  --    MCH 30.0 30.1 30.0  --    MCHC 32.7 33.2 32.4  --    RDW 13.6 13.6 13.6  --     149* 135*  --    MPV 10.9 11.0 11.0  --    GRAN 57.1  3.8 50.6  2.8 39.9  1.9  --    LYMPH 32.3  2.2 37.4  2.1 50.1*  2.4  --    MONO 7.5  0.5 9.6  0.5 8.0  0.4  --    BASO 0.05 0.02 0.02  --    NRBC 0 0 0  --        COAGULATION LAST 3 DAYS  No results for input(s): "LABPT", "INR", "APTT" in the last 168 hours.    CHEMISTRY LAST 3 DAYS  Recent Labs   Lab 02/23/25  1430 02/24/25  0421 02/25/25  0426    139 140   K 5.0 5.0 4.8    109 112*   CO2 28 25 25   ANIONGAP 7* 5* 3*   BUN 49* 46* 43*   CREATININE 2.3* 2.2* 2.2*    94 90   CALCIUM 10.2 9.3 8.5*   MG 2.1 2.0 1.7   ALBUMIN 3.4* 3.0* 2.7*   PROT 6.5 5.6* 5.0*   ALKPHOS 36* 34* 29*   ALT 6* 5* 4*   AST 13 12 10   BILITOT 0.3 0.3 0.3       CARDIAC PROFILE LAST 3 DAYS  Recent Labs   Lab 02/23/25  1430   *       ENDOCRINE LAST 3 DAYS  Recent Labs   Lab 02/23/25  1642   TSH 2.091       LAST ARTERIAL BLOOD GAS  ABG  No results for " "input(s): "PH", "PO2", "PCO2", "HCO3", "BE" in the last 168 hours.    LAST 7 DAYS MICROBIOLOGY   Microbiology Results (last 7 days)       ** No results found for the last 168 hours. **            MOST RECENT IMAGING  Echo Saline Bubble? Yes    Left Ventricle: The left ventricle is normal in size. Unable to assess   wall motion. There is mildly reduced systolic function with a visually   estimated ejection fraction of 40 - 50%.    Right Ventricle: Right ventricle was not well visualized due to poor   acoustic window.    Left Atrium: Left atrium was not well visualized.    Overall the study quality was technically difficult.    Limited echo  US Carotid Bilateral  Narrative: EXAMINATION:  US CAROTID BILATERAL    CLINICAL HISTORY:  syncope;    TECHNIQUE:  CMS MANDATED QUALITY DATA - CAROTID - 195    All measurements and percent stenosis described below were determined using NASCET criteria or criteria similar to NASCET, as defined by the Society of Radiologists in Ultrasound Consensus Conference, Radiology, 2003    FINDINGS:  Real-time, duplex and color Doppler interrogation are performed.  Scattered atheromatous changes are present within the carotid arteries.  There are no findings to suggest hemodynamically significant carotid arterial stenosis on either side.  Antegrade flow is observed within the vertebral arteries bilaterally.  Impression: Scattered bilateral atheromatous changes without evidence of hemodynamically significant carotid arterial stenosis.    Electronically signed by: Arnulfo Lopez  Date:    02/24/2025  Time:    06:57      ECHOCARDIOGRAM RESULTS (last 5)  Results for orders placed in visit on 05/28/24    Echo    Interpretation Summary    Left Ventricle: The left ventricle is normal in size. Normal wall thickness. There is normal systolic function with a visually estimated ejection fraction of 55 - 60%. There is normal diastolic function.    Right Ventricle: Normal right ventricular cavity size. Wall " thickness is normal. Systolic function is normal.    Aortic Valve: The aortic valve is structurally normal. Mildly calcified left and noncoronary cusps.      Results for orders placed in visit on 01/06/22    Echo Saline Bubble? Yes    Interpretation Summary  · There is no evidence of intracardiac shunting.  · The left ventricle is normal in size with mild concentric hypertrophy and normal systolic function.  · The estimated ejection fraction is 65%.  · Normal left ventricular diastolic function.  · Mild-to-moderate mitral regurgitation.  · Normal central venous pressure (3 mmHg).  · The estimated PA systolic pressure is 31 mmHg.      CURRENT/PREVIOUS VISIT EKG  Results for orders placed or performed during the hospital encounter of 02/23/25   EKG 12-lead    Collection Time: 02/23/25  2:02 PM   Result Value Ref Range    QRS Duration 104 ms    [facility] QTC Calculation 381 ms    Narrative    Test Reason : R07.9,    Vent. Rate :  43 BPM     Atrial Rate :  43 BPM     P-R Int : 210 ms          QRS Dur : 104 ms      QT Int : 452 ms       P-R-T Axes :  29 -17  39 degrees    QTcB Int : 381 ms    Marked sinus bradycardia with 1st degree A-V block  Minimal voltage criteria for LVH, may be normal variant ( [place] product )  Abnormal ECG  When compared with ECG of 22-Dec-2024 13:36,  Previous ECG has undetermined rhythm, needs review  Questionable change in QRS duration    Referred By: AAAREFERRAL SELF           Confirmed By:            ASSESSMENT/PLAN:     Active Hospital Problems    Diagnosis    *Bradycardia    Acute otitis media, bilateral    Rhinosinusitis    Orthostatic hypertension    Syncope    DM (diabetes mellitus), type 2 with neurological complications    ACP (advance care planning)    Debility    Centrencephalic epilepsy    CKD (chronic kidney disease) stage 4, GFR 15-29 ml/min    MARLIN (obstructive sleep apnea), 2008, not on Rx    Dementia with behavioral disturbance    Primary hypertension     renal htn          ASSESSMENT & PLAN:   Syncope  Advanced dementia  CKD  DM2  Epilepsy  Anemia    RECOMMENDATIONS:  Echocardiogram showed mildly reduced systolic function w/ Efx 40-50%  Normal troponins  Pt has had no chest pain  Remains sinus bradycardia overnight 40-50s  Avoid all AV krystyna blocker medications  Carotid US this admission negative for stenosis  Plan remains to continue conservative management.   Daughter at BS voiced she prefers palliative or hospice care  Above plan discussed with hospitalist NP      Gail Heredia, NP  [facility]  Department of Cardiology  Date of Service: 02/25/2025               [1]   Social History  Tobacco Use    Smoking status: Never    Smokeless tobacco: Never   Substance Use Topics    Alcohol use: No    Drug use: No   [2]   No current facility-administered medications on file prior to encounter.     Current Outpatient Medications on File Prior to Encounter   Medication Sig Dispense Refill    calcitRIOL (ROCALTROL) 0.25 MCG Cap TAKE 1 CAPSULE(0.25 MCG) BY MOUTH EVERY DAY 90 capsule 4    citalopram (CELEXA) 20 MG tablet Take 0.5 tablets (10 mg total) by mouth once daily. 90 tablet 2    cyanocobalamin (VITAMIN B-12) 100 MCG tablet Take 100 mcg by mouth once daily.      levETIRAcetam (KEPPRA) 500 MG Tab Take 0.5 tablets (250 mg total) by mouth 2 (two) times daily. 90 tablet 3    lisinopriL (PRINIVIL,ZESTRIL) 20 MG tablet Take 1 tablet (20 mg total) by mouth once daily. 90 tablet 3    tamsulosin (FLOMAX) 0.4 mg Cap Take 1 capsule (0.4 mg total) by mouth once daily. 90 capsule 3    vitamin E 400 UNIT capsule Take 400 Units by mouth once daily.      chlorpheniramine/dextromethorp (CORICIDIN HBP COUGH AND COLD ORAL) Take 30 mLs by mouth nightly as needed (Cough).      hydrALAZINE (APRESOLINE) 50 MG tablet Take 1 tablet (50 mg total) by mouth every 12 (twelve) hours.      hydroCHLOROthiazide (HYDRODIURIL) 12.5 MG Tab Take 1 tablet (12.5 mg total) by mouth once daily. for 3 days  3 tablet 0

## 2025-02-25 NOTE — PT/OT/SLP PROGRESS
Physical Therapy Treatment    Patient Name:  Lavon Brandon   MRN:  [id number]    Recommendations:     Discharge Recommendations: Low Intensity Therapy  Discharge Equipment Recommendations: none  Barriers to discharge:  Increased caregiver burden of care    Assessment:     Lavon Brandon is a 89 y.o. male admitted with a medical diagnosis of Bradycardia.  He presents with the following impairments/functional limitations: weakness, impaired endurance, impaired self care skills, impaired functional mobility, gait instability, impaired cardiopulmonary response to activity ,.    Rehab Prognosis: Fair; patient would benefit from acute skilled PT services to address these deficits and reach maximum level of function.    Recent Surgery: * No surgery found *      Plan:     During this hospitalization, patient to be seen 5 x/week to address the identified rehab impairments via gait training, therapeutic activities, therapeutic exercises and progress toward the following goals:    Plan of Care Expires:  03/24/25    Subjective     Chief Complaint: weakness   Patient/Family Comments/goals: daughter to make decision whether pt is to be DC'ed home with Hospice care  Pain/Comfort:  Pain Rating 1: 0/10      Objective:     Communicated with OT  prior to session.  Patient found  standing with OT  with PureWick, telemetry, peripheral IV upon PT entry to room.     General Precautions: Standard, fall  Orthopedic Precautions:    Braces:    Respiratory Status: Room air     Functional Mobility:  Bed Mobility:     Sit to Supine: minimum assistance  Transfers:     Sit to Stand:  moderate assistance with rolling walker  Gait: 20 ft x2 RW and Min A       AM-PAC 6 CLICK MOBILITY          Treatment & Education:  Gait training as indicated above    Patient left supine with all lines intact, call button in reach, and bed alarm on..    GOALS:   Multidisciplinary Problems       Physical Therapy Goals          Problem: Physical Therapy    Goal  Priority Disciplines Outcome Interventions   Physical Therapy Goal     PT, PT/OT Progressing    Description:       Patient will increase functional independence with mobility by performin. Supine to sit with Contact Guard Assistance  2. Sit to stand transfer with Contact Guard Assistance  3. Gait  x 100  feet with Contact Guard Assistance using Rolling Walker.                          DME Justifications:  No DME recommended requiring DME justifications    Time Tracking:     PT Received On: 25  PT Start Time: 1450     PT Stop Time: 1515  PT Total Time (min): 25 min     Billable Minutes: Gait Training 13 minutes  and Therapeutic Activity 12 minutes    Treatment Type: Treatment  PT/PTA: PT           2025

## 2025-02-25 NOTE — PROGRESS NOTES
"Nephrology Consult Note        Patient Name: Lavon Brandon  MRN: [id number]    Patient Class: OP- Observation   Admission Date: 2/23/2025  Length of Stay: 0 days  Date of Service: 2/25/2025    Attending Physician: Emma Mojica MD  Primary Care Provider: Aristides Haynes MD    Reason for Consult: syncope/orthostatic hypotension    SUBJECTIVE:     HPI: 89M patient whom I see in office regularly, presented after syncopal episode at home. Per his daughter this is his second syncopal episode and had one presyncopal episode w/I past 6 months since living with her. At the time of presentation the patient's BP had recovered. He had carotid US that showed no evidence of hemodynamically significant carotid arterial stenosis. CT head showed no CT evidence of acute intracranial abnormality. Cardiology was consulted who recommends conservative treatment. CT head showing, "trace opacification of the right maxillary sinus, and trace opacification of the left mastoid air cells". Patient started on Augmentin with plan for outpatient referral to ENT. Lastly, KUB showed stool burden suggesting constipation.      Patient noted to be orthostatic. EEG performed. ECHO pending. Bowel evacuation and IV fluids prescribed. UA is bland.    2/25 VSS. No new complains.    ASSESSMENT/PLAN:     Orthostatic hypotension  CKD stage 4, eGFR < 30 ml/min  BPH  No NSAIDs, ACEI/ARB, IV contrast or other nephrotoxins.  Keep MAP > 60, SBP > 100.  Dose meds for GFR < 30 ml/min.  Renal diet - low K, low phos.  He probably needs BPH meds but can stop all BP meds...  Agree with Cards eval and plan.    Anemia of CKD  Hgb and HCT are acceptable. Monitor for now.  Will provide JASPREET and/or IV iron as needed to keep Hgb 8-10 range.    MBD / Secondary HPT  Ca, Phos, PTH and vitamin D levels are acceptable.   Phos binders, vitamin D and analogues, calcimimetics will be given as needed.    HTN  Tolerate asymptomatic HTN up to -160.  HOLD home BP " meds.    Thank you for allowing us to participate in the care of your patient!   We will follow the patient and provide recommendations as needed.         Laboratory:  Recent Labs   Lab 02/23/25  1430 02/24/25  0421 02/25/25  0426    139 140   K 5.0 5.0 4.8    109 112*   CO2 28 25 25   BUN 49* 46* 43*   CREATININE 2.3* 2.2* 2.2*    94 90       Recent Labs   Lab 02/23/25  1430 02/24/25  0421 02/25/25  0426   CALCIUM 10.2 9.3 8.5*   ALBUMIN 3.4* 3.0* 2.7*   PHOS 4.2 3.5 3.6   MG 2.1 2.0 1.7       Recent Labs   Lab 06/23/23  1635 01/02/25  1415   PTH, Intact 118.8 H 51.8       Recent Labs   Lab 02/23/25  1434 02/23/25  1956 02/24/25  0721 02/24/25  1109 02/24/25  1658 02/24/25  1951 02/25/25  0714   POCTGLUCOSE 113* 159* 86 137* 108 147* 85       Recent Labs   Lab 11/08/23  0824 06/23/24  0458 02/24/25  0421   Hemoglobin A1C 5.2 5.1 5.3       Recent Labs   Lab 02/23/25  1430 02/24/25  0421 02/25/25  0426 02/25/25  0746   WBC 6.65 5.53 4.85  --    HGB 10.4* 9.4* 7.7* 8.1*   HCT 31.8* 28.3* 23.8* 24.5*    149* 135*  --    MCV 92 91 93  --    MCHC 32.7 33.2 32.4  --    MONO 7.5  0.5 9.6  0.5 8.0  0.4  --    EOSINOPHIL 2.0 1.8 1.4  --        Recent Labs   Lab 02/23/25  1430 02/24/25  0421 02/25/25  0426   BILITOT 0.3 0.3 0.3   PROT 6.5 5.6* 5.0*   ALBUMIN 3.4* 3.0* 2.7*   ALKPHOS 36* 34* 29*   ALT 6* 5* 4*   AST 13 12 10       Recent Labs   Lab 10/14/22  0949 04/11/23  1428 04/13/23  2220 11/30/23  0319 02/16/24  0117 06/23/24  0545 09/06/24  1025 09/13/24  1437 11/19/24  1139 12/22/24  1353 02/23/25  1702   Color, UA Yellow Yellow   < > Colorless A   < > Yellow Yellow Yellow Yellow Yellow Yellow   Appearance, UA Clear Clear   < > Clear   < > Clear Clear Clear Clear Clear Clear   pH, UA 6.0 7.0   < > 6.0   < > 6.0 6.0 6.0 7.0 7.0 6.0   Specific [place], UA 1.025 1.015   < > 1.010   < > 1.015 1.020 1.010 1.015 1.010 1.015   Protein, UA 1+ A 1+ A   < > Negative   < > Trace A Negative Trace A  Negative Negative Negative   Glucose, UA Negative Negative   < > Negative   < > Negative Negative Negative Negative Negative Negative   Ketones, UA 1+ A 1+ A   < > Negative   < > Negative Negative Negative Negative Negative Negative   Urobilinogen, UA 1.0 Negative   < > Negative   < > Negative  --  Negative  --  Negative Negative   Bilirubin (UA) Negative Negative   < > Negative   < > Negative Negative Negative Negative Negative Negative   Occult Blood UA 3+ A 1+ A   < > 1+ A   < > Negative Negative Negative Negative Negative Negative   Nitrite, UA Negative Negative   < > Negative   < > Negative Negative Negative Negative Negative Negative   RBC, UA 3 1  --  13 H  --  2 1  --   --   --   --    WBC, UA 1 0  --  0  --  14 H 46 H  --   --   --   --    Bacteria Rare Negative  --  Negative  --  Moderate A Many A  --   --   --   --    Hyaline Casts, UA 0 1  --  0  --   --   --   --   --   --   --     < > = values in this interval not displayed.       Recent Labs   Lab 06/22/24  2227 09/13/24  1112 12/22/24  1311   Sample VENOUS VENOUS VENOUS       Microbiology Results (last 7 days)       ** No results found for the last 168 hours. **            Review of patient's allergies indicates:   Allergen Reactions    Ciprofloxacin (bulk) Swelling       Outpatient meds:  No current facility-administered medications on file prior to encounter.     Current Outpatient Medications on File Prior to Encounter   Medication Sig Dispense Refill    calcitRIOL (ROCALTROL) 0.25 MCG Cap TAKE 1 CAPSULE(0.25 MCG) BY MOUTH EVERY DAY 90 capsule 4    citalopram (CELEXA) 20 MG tablet Take 0.5 tablets (10 mg total) by mouth once daily. 90 tablet 2    cyanocobalamin (VITAMIN B-12) 100 MCG tablet Take 100 mcg by mouth once daily.      levETIRAcetam (KEPPRA) 500 MG Tab Take 0.5 tablets (250 mg total) by mouth 2 (two) times daily. 90 tablet 3    lisinopriL (PRINIVIL,ZESTRIL) 20 MG tablet Take 1 tablet (20 mg total) by mouth once daily. 90 tablet 3     tamsulosin (FLOMAX) 0.4 mg Cap Take 1 capsule (0.4 mg total) by mouth once daily. 90 capsule 3    vitamin E 400 UNIT capsule Take 400 Units by mouth once daily.      chlorpheniramine/dextromethorp (CORICIDIN HBP COUGH AND COLD ORAL) Take 30 mLs by mouth nightly as needed (Cough).      hydrALAZINE (APRESOLINE) 50 MG tablet Take 1 tablet (50 mg total) by mouth every 12 (twelve) hours.      hydroCHLOROthiazide (HYDRODIURIL) 12.5 MG Tab Take 1 tablet (12.5 mg total) by mouth once daily. for 3 days 3 tablet 0       Scheduled meds:   0.9% NaCl   Intravenous Once    amoxicillin-clavulanate 500-125mg  1 tablet Oral Q12H    calcitRIOL  0.25 mcg Oral Daily    citalopram  10 mg Oral Daily    diazePAM  2 mg Oral Once    enoxparin  30 mg Subcutaneous Daily    levETIRAcetam  250 mg Oral BID    lisinopriL  20 mg Oral Daily    magnesium citrate  296 mL Rectal Once    tamsulosin  0.4 mg Oral Daily    vitamin E (dl, acetate)  400 Units Oral Daily       Infusions:   0.9% NaCl   Intravenous Continuous 100 mL/hr at 02/25/25 0533 New Bag at 02/25/25 0533       PRN meds:    Current Facility-Administered Medications:     acetaminophen, 650 mg, Oral, Q4H PRN    aluminum-magnesium hydroxide-simethicone, 30 mL, Oral, QID PRN    dextrose 50%, 12.5 g, Intravenous, PRN    dextrose 50%, 25 g, Intravenous, PRN    glucagon (human recombinant), 1 mg, Intramuscular, PRN    glucose, 16 g, Oral, PRN    glucose, 24 g, Oral, PRN    hydrALAZINE, 25 mg, Oral, Q8H PRN    insulin aspart U-100, 0-10 Units, Subcutaneous, QID (AC + HS) PRN    magnesium oxide, 800 mg, Oral, PRN    magnesium oxide, 800 mg, Oral, PRN    melatonin, 6 mg, Oral, Nightly PRN    naloxone, 0.02 mg, Intravenous, PRN    ondansetron, 4 mg, Intravenous, Q6H PRN    potassium bicarbonate, 35 mEq, Oral, PRN    potassium bicarbonate, 50 mEq, Oral, PRN    potassium bicarbonate, 60 mEq, Oral, PRN    potassium, sodium phosphates, 2 packet, Oral, PRN    potassium, sodium phosphates, 2 packet, Oral,  PRN    potassium, sodium phosphates, 2 packet, Oral, PRN    senna-docusate 8.6-50 mg, 1 tablet, Oral, BID PRN    sodium chloride 0.9%, 10 mL, Intravenous, Q12H PRN    Past Medical History:   Diagnosis Date    Bradyarrhythmia     CVA (cerebral infarction) 2006    Dementia     Depression     Hyperlipidemia     Hypertension     renal htn    Pulmonary embolism 2002    Seizure disorder      Past Surgical History:   Procedure Laterality Date    left foot  with crushed injry       Family History   Problem Relation Name Age of Onset    Diabetes Mother      Cancer Neg Hx       Social History     Tobacco Use    Smoking status: Never    Smokeless tobacco: Never   Substance Use Topics    Alcohol use: No    Drug use: No       OBJECTIVE:     Vital Signs and IO:  Temp:  [97.7 °F (36.5 °C)-98.6 °F (37 °C)]   Pulse:  [46-60]   Resp:  [18]   BP: (116-154)/(52-67)   SpO2:  [96 %-98 %]   I/O last 3 completed shifts:  In: 540 [P.O.:540]  Out: 1560 [Urine:1560]  Wt Readings from Last 5 Encounters:   02/23/25 67.9 kg (149 lb 11.1 oz)   02/24/25 67.6 kg (149 lb)   01/14/25 73 kg (160 lb 15 oz)   12/27/24 66.2 kg (146 lb)   12/22/24 74.9 kg (165 lb 2 oz)     Body mass index is 22.76 kg/m².    Physical Exam  Constitutional:       General: Patient is not in acute distress.     Appearance: Patient is well-developed. She is not diaphoretic.   HENT:      Head: Normocephalic and atraumatic.      Mouth/Throat: Mucous membranes are moist.   Eyes:      General: No scleral icterus.     Pupils: Pupils are equal, round, and reactive to light.   Cardiovascular:      Rate and Rhythm: Normal rate and regular rhythm.   Pulmonary:      Effort: Pulmonary effort is normal. No respiratory distress.      Breath sounds: No stridor.   Abdominal:      General: There is no distension.      Palpations: Abdomen is soft.   Musculoskeletal:         General: No deformity. Normal range of motion.      Cervical back: Neck supple.   Skin:     General: Skin is warm and dry.       Findings: No rash present. No erythema.   Neurological:      Mental Status: Patient is alert and oriented to person, place, and time.      Cranial Nerves: No cranial nerve deficit.   Psychiatric:         Behavior: Behavior normal.          Patient care time was spent personally by me on the following activities:     Obtaining a history.  Examination of patient.  Providing medical care at the patients bedside.  Developing a treatment plan with patient or surrogate and bedside caregivers.  Ordering and reviewing laboratory studies, radiographic studies, pulse oximetry.  Ordering and performing treatments and interventions.  Evaluation of patient's response to treatment.  Discussions with consultants while on the unit and immediately available to the patient.  Re-evaluation of the patient's condition.  Documentation in the medical record.     London Chase MD    [facility]  [address] [place] [id number]    [phone] - tel  [phone] - fax    2/25/2025

## 2025-02-25 NOTE — PROGRESS NOTES
[facility]  Progress Note    Patient Name: Lavon Brandon  MRN: [id number]  Patient Class: OP- Observation   Admission Date: 2/23/2025  Length of Stay: 0 days  Attending Physician: Emma Mojica MD  Primary Care Provider: Aristides Haynes MD        Subjective     Principal Problem:Bradycardia        HPI:  Mr. Brandon is a 89 yr old male with a hx of HTN, dementia, MARLIN, CKD stage 4, epilepsy, debility, DM type 2, depression, HLD, colonic polyps, TBI, CVA, secondary hyperparathyroidism, thrombocytopenia, anemia, arthritis, B12 deficiency, vitamin-D deficiency, pressure injury to sacral region, PE, bradycardia who presented to the ED via EMS with a chief complaint of syncope. Patient's daughter reports that the patient was sitting in his wheelchair, had finished eating and was watching TV when she noticed him lean forward and slumped over to the left.  Denies head trauma.  She endorsed that his mouth was open and he was drooling with his eyes open looking to the left and not focused. She tried to wake him and found him unconscious and called EMS to bring him here for further eval. Pt is currently A&O x 1 to self. He is disoriented to place and time.  Daughter states that this is his baseline.  She denies any bowel or bladder incontinence during episode of syncope.  She does endorse that his hydralazine was recently stopped last week.  She also states that he is normally bradycardic with heart rate in the 50s.  She states that he does not smoke tobacco, drink alcohol, or do recreational drugs.  Patient denies any pain at this time.  Remainder of history is limited due to dementia.    Lengthy discussion about code status with daughter at bedside as well as other daughter over the phone who are both healthcare POAs.  I discussed with them that the patient had been marked DNR in the past and wanted to confirm if they wanted to continue DNR status.  Discussed the pros and cons of CPR  "and patient's daughters would like to make patient full code at this time.  Patient's chart has been updated to reflect this code status change.    Upon arrival to ED, patient afebrile, HR of 42, RR of 16, BP of 151/70, satting 98% on RA.  Workup in the ED revealed RBC of 3.47, H/H of 10.4/31.8, BUN of 49, creatinine of 2.3, GFR of 26, albumin of 3.4, BNP of 197, troponin of 15.3, POCT glucose of 113.  CXR shows low inspiratory volume exacerbating cardiomediastinal silhouette and causing crowding of vessels.  No evidence of acute cardiopulmonary findings.  Dilated colonic loops projects below the elevated right hemidiaphragm.  No medications administered while in the ED. case discussed with ED provider and patient will be placed under observation for further management.    Overview/Hospital Course:  89 y.o. BM elderly male patient who presented after syncopal episode at home. Per his daughter this is his second syncopal episode and had one presyncopal episode w/I past 6 months since living with her. At the time of presentation the patient's BP had recovered. He had Carotid US that showed no evidence of hemodynamically significant carotid arterial stenosis. CT head showed no CT evidence of acute intracranial abnormality. Cardiology was consulted who recommends conservative treatment. He is noted that after discussion with the family regarding further management, would, "consider a pacemaker but they would like to avoid so for now. Continue with close observation for now and repeat a monitor as an outpatient." If patient has recurrent syncopal events, can be considered for pacemaker at that point. Patient had CT head also showing, "trace opacification of the right maxillary sinus, and trace opacification of the left mastoid air cells". Exam of BL ears showed OM. Patient started on Augmentin with plan for outpatient referral to ENT. Lastly, KUB showed stool burden suggesting constipation.     Patient noted to be " orthostatic. BL OM and CT with mastoid sinus opacification. Started on ABX with referral to ENT outpatient. EEG performed - EEG abnormal, evaluated by neurology - continue current BID keppra. Patient with bradycardia overnight - cardiology aware. After speaking to family they have opted to defer pacemaker. Amenable to hospice. Consult to palliative care, and case management.    INTERVAL HISTORY: Looks a little better. Eating this morning during rounds. Requires feeding by family. Very few words - Bradycardia overnight - family to defer pacemaker - Palliative care consulted for hospice referral        Past Medical History:   Diagnosis Date    Bradyarrhythmia     CVA (cerebral infarction) 2006    Dementia     Depression     Hyperlipidemia     Hypertension     renal htn    Pulmonary embolism 2002    Seizure disorder        Past Surgical History:   Procedure Laterality Date    left foot  with crushed injry         Review of patient's allergies indicates:   Allergen Reactions    Ciprofloxacin (bulk) Swelling       No current facility-administered medications on file prior to encounter.     Current Outpatient Medications on File Prior to Encounter   Medication Sig    calcitRIOL (ROCALTROL) 0.25 MCG Cap TAKE 1 CAPSULE(0.25 MCG) BY MOUTH EVERY DAY    citalopram (CELEXA) 20 MG tablet Take 0.5 tablets (10 mg total) by mouth once daily.    cyanocobalamin (VITAMIN B-12) 100 MCG tablet Take 100 mcg by mouth once daily.    levETIRAcetam (KEPPRA) 500 MG Tab Take 0.5 tablets (250 mg total) by mouth 2 (two) times daily.    lisinopriL (PRINIVIL,ZESTRIL) 20 MG tablet Take 1 tablet (20 mg total) by mouth once daily.    tamsulosin (FLOMAX) 0.4 mg Cap Take 1 capsule (0.4 mg total) by mouth once daily.    vitamin E 400 UNIT capsule Take 400 Units by mouth once daily.    chlorpheniramine/dextromethorp (CORICIDIN HBP COUGH AND COLD ORAL) Take 30 mLs by mouth nightly as needed (Cough).    hydrALAZINE (APRESOLINE) 50 MG tablet Take 1 tablet  (50 mg total) by mouth every 12 (twelve) hours.    hydroCHLOROthiazide (HYDRODIURIL) 12.5 MG Tab Take 1 tablet (12.5 mg total) by mouth once daily. for 3 days     Family History       Problem Relation (Age of Onset)    Diabetes Mother          Tobacco Use    Smoking status: Never    Smokeless tobacco: Never   Substance and Sexual Activity    Alcohol use: No    Drug use: No    Sexual activity: Not on file     Review of Systems   Unable to perform ROS: Dementia   Constitutional:  Positive for activity change.   HENT:  Positive for congestion.    Gastrointestinal:  Negative for constipation.   Neurological:  Positive for syncope.     Objective:     Vital Signs (Most Recent):  Temp: 97.7 °F (36.5 °C) (02/25/25 0800)  Pulse: (!) 46 (02/25/25 0800)  Resp: 18 (02/25/25 0800)  BP: (!) 128/58 (02/25/25 0800)  SpO2: 96 % (02/25/25 0800) Vital Signs (24h Range):  Temp:  [97.7 °F (36.5 °C)-98.6 °F (37 °C)] 97.7 °F (36.5 °C)  Pulse:  [46-60] 46  Resp:  [18] 18  SpO2:  [96 %-98 %] 96 %  BP: (116-154)/(52-67) 128/58     Weight: 67.9 kg (149 lb 11.1 oz)  Body mass index is 22.76 kg/m².     Physical Exam  Vitals reviewed.   Constitutional:       General: He is awake. He is not in acute distress.     Appearance: Normal appearance. He is ill-appearing (chronically). He is not diaphoretic.      Comments: Sleeping initially, but awakens easily to his name being called.   Cardiovascular:      Rate and Rhythm: Bradycardia present.      Heart sounds: Normal heart sounds. No murmur heard.     No friction rub. No gallop.   Pulmonary:      Effort: Pulmonary effort is normal. No accessory muscle usage or respiratory distress.      Breath sounds: Normal breath sounds. No wheezing, rhonchi or rales.   Abdominal:      General: Abdomen is flat. Bowel sounds are normal.      Palpations: Abdomen is soft.      Tenderness: There is no abdominal tenderness. There is no guarding or rebound.   Musculoskeletal:      Right lower leg: No edema.      Left  lower leg: No edema.   Skin:     General: Skin is warm and dry.   Neurological:      Mental Status: He is alert. Mental status is at baseline. He is disoriented.      Comments: A&O x1 to self.  Disoriented to place and time.   Psychiatric:         Behavior: Behavior is cooperative.                Significant Labs: All pertinent labs within the past 24 hours have been reviewed.  CBC:   Recent Labs   Lab 02/23/25  1430 02/24/25  0421 02/25/25  0426 02/25/25  0746   WBC 6.65 5.53 4.85  --    HGB 10.4* 9.4* 7.7* 8.1*   HCT 31.8* 28.3* 23.8* 24.5*    149* 135*  --      CMP:   Recent Labs   Lab 02/23/25  1430 02/24/25  0421 02/25/25  0426    139 140   K 5.0 5.0 4.8    109 112*   CO2 28 25 25    94 90   BUN 49* 46* 43*   CREATININE 2.3* 2.2* 2.2*   CALCIUM 10.2 9.3 8.5*   PROT 6.5 5.6* 5.0*   ALBUMIN 3.4* 3.0* 2.7*   BILITOT 0.3 0.3 0.3   ALKPHOS 36* 34* 29*   AST 13 12 10   ALT 6* 5* 4*   ANIONGAP 7* 5* 3*     Cardiac Markers:   Recent Labs   Lab 02/23/25  1430   *     Magnesium:   Recent Labs   Lab 02/23/25  1430 02/24/25  0421 02/25/25  0426   MG 2.1 2.0 1.7     POCT Glucose:   Recent Labs   Lab 02/24/25  1658 02/24/25  1951 02/25/25  0714   POCTGLUCOSE 108 147* 85     Troponin:   Recent Labs   Lab 02/23/25  1642 02/23/25  2107 02/24/25  0421   TROPONINIHS 14.3 14.1 15.2*     TSH:   Recent Labs   Lab 02/23/25  1642   TSH 2.091     Urine Studies:   Recent Labs   Lab 02/23/25  1702   COLORU Yellow   APPEARANCEUA Clear   PHUR 6.0   SPECGRAV 1.015   PROTEINUA Negative   GLUCUA Negative   KETONESU Negative   BILIRUBINUA Negative   OCCULTUA Negative   NITRITE Negative   UROBILINOGEN Negative   LEUKOCYTESUR Negative       Significant Imaging:   Imaging Results              CT Head Without Contrast (Final result)  Result time 02/23/25 20:29:29      Final result by Baldemar Morelos MD (02/23/25 20:29:29)                   Impression:      No CT evidence of acute intracranial abnormality.  Clinical correlation and further evaluation as warranted.    Chronic senescent and microvascular ischemic changes.      Electronically signed by: Baldemar Morelos MD  Date:    02/23/2025  Time:    20:29               Narrative:    EXAMINATION:  CT HEAD WITHOUT CONTRAST    CLINICAL HISTORY:  Syncope, recurrent;    TECHNIQUE:  Low dose axial images were obtained through the head.  Coronal and sagittal reformations were also performed. Contrast was not administered.    COMPARISON:  CT head 01/16/2025    FINDINGS:  There is generalized cerebral volume loss with compensatory sulcal widening and ventricular enlargement.  There is patchy and confluent periventricular and supratentorial white matter hypoattenuation suggesting sequelae of chronic microvascular ischemic change.  There are stable remote lacunar type infarcts of the left basal ganglia and cedric.  No CT evidence of acute intracranial hemorrhage.  The basal cisterns are patent. There is near complete opacification of the right maxillary sinus, similar to prior examination.  There is trace opacification of the left mastoid air cells.  No acute displaced calvarial fracture identified.  The visualized bones of the calvarium demonstrate no acute osseous abnormality.                                       X-Ray KUB (Final result)  Result time 02/23/25 19:31:13      Final result by Baldemar Morelos MD (02/23/25 19:31:13)                   Impression:      As above.      Electronically signed by: Baldemra Morelos MD  Date:    02/23/2025  Time:    19:31               Narrative:    EXAMINATION:  XR KUB    CLINICAL HISTORY:  dilated colonic loops on CXR;    TECHNIQUE:  Single AP supine view of the abdomen (KUB) was performed    COMPARISON:  Abdominal radiograph series 09/16/2024    FINDINGS:  There is mild gaseous distention of the colon which contains hyperdense material, possibly enteric contrast from prior barium fluoroscopic examination.  No central small bowel  air-fluid levels appreciated to suggest high-grade obstruction.  Scattered retained stool projects over the colon suggesting constipation.  No large volume of free intraperitoneal air appreciated allowing for supine technique.  Osseous structures demonstrate degenerative changes.                                       US Carotid Bilateral (Final result)  Result time 02/24/25 06:57:49      Final result by Arnulof Lopez IV, MD (02/24/25 06:57:49)                   Impression:      Scattered bilateral atheromatous changes without evidence of hemodynamically significant carotid arterial stenosis.      Electronically signed by: Arnulfo Lopez  Date:    02/24/2025  Time:    06:57               Narrative:    EXAMINATION:  US CAROTID BILATERAL    CLINICAL HISTORY:  syncope;    TECHNIQUE:  CMS MANDATED QUALITY DATA - CAROTID - 195    All measurements and percent stenosis described below were determined using NASCET criteria or criteria similar to NASCET, as defined by the Society of Radiologists in Ultrasound Consensus Conference, Radiology, 2003    FINDINGS:  Real-time, duplex and color Doppler interrogation are performed.  Scattered atheromatous changes are present within the carotid arteries.  There are no findings to suggest hemodynamically significant carotid arterial stenosis on either side.  Antegrade flow is observed within the vertebral arteries bilaterally.                                       X-Ray Chest AP Portable (Final result)  Result time 02/23/25 15:22:10      Final result by Chuy Murphy Jr., MD (02/23/25 15:22:10)                   Impression:      Low inspiratory volume exacerbating the cardiomediastinal silhouette and causing crowding of vessels.  No evidence of acute cardiopulmonary findings.  No detrimental change upon comparison.      Electronically signed by: Chuy Murphy MD  Date:    02/23/2025  Time:    15:22               Narrative:    EXAMINATION:  XR CHEST AP PORTABLE    CLINICAL  "HISTORY:  Chest Pain;    TECHNIQUE:  Single frontal view of the chest was performed.    COMPARISON:  01/14/2025    FINDINGS:  Epicardial pacing pad projects over the patient's midline sternum.  Heart is upper normal in size exacerbated by the decreased inspiratory volume.  Small effusion obscures the left costophrenic sulcus.  Chronic eventration of right hemidiaphragm.  Crowding of vessels is attributed towards the poor inspiratory volume.  Dilated colonic loops projects below the elevated right hemidiaphragm.                                       Assessment and Plan     * Bradycardia  Family has opted to defer pacemaker  Consult to hospice      Syncope  - CXR and UA negative for infection  - COVID/Flu pending  - UDS pending  - CT head pending  - Not hypoglycemic on CMP, or hypotensive on arrival  - Orthostatic BP pending  - TSH WNL  - Lipid and A1c pending  - Hx of bradycardia  - Cardiology consulted, appreciate recs  - Bilateral carotid US pending  - TTE pending  - Continuous tele monitoring  - Fall, seizure, and delirium precautions  - Conservative cardiac treatment - may need pacemaker in near future  - Orthostatic - IV fluids and recheck in am  - Awaiting EEG  - Treat sinuitis and OM with abx  - Outpatient referral to ENT    Acute otitis media, bilateral  Abx po  Refer to ENT outpatient      Rhinosinusitis  Per ct head, " There is near complete opacification of the right maxillary sinus, similar to prior examination.  There is trace opacification of the left mastoid air cells.  "  Start augmentin  Outpatient ENT    CKD (chronic kidney disease) stage 4, GFR 15-29 ml/min  Creatine stable for now. BMP reviewed- noted Estimated Creatinine Clearance: 20.6 mL/min (A) (based on SCr of 2.2 mg/dL (H)). according to latest data. Based on current GFR, CKD stage is stage 4 - GFR 15-29.  Monitor UOP and serial BMP and adjust therapy as needed. Renally dose meds. Avoid nephrotoxic medications and procedures.  Conservative " IV fluids  Follow Chemistry  Nephrology consult    Orthostatic hypertension  Patient's blood pressure range in the last 24 hours was: BP  Min: 107/57  Max: 191/65.The patient's inpatient anti-hypertensive regimen is listed below:  Current Antihypertensives  lisinopriL tablet 20 mg, Daily, Oral  hydrALAZINE tablet 25 mg, Every 8 hours PRN, Oral    Conservative IV fluids  Recheck in am    DM (diabetes mellitus), type 2 with neurological complications  Last A1c reviewed-   Lab Results   Component Value Date    HGBA1C 5.1 06/23/2024     Most recent fingerstick glucose reviewed-   Recent Labs   Lab 02/23/25  1434   POCTGLUCOSE 113*     Current correctional scale  Medium  Maintain anti-hyperglycemic dose as follows-   Antihyperglycemics (From admission, onward)      None          Hold Oral hypoglycemics while patient is in the hospital.    ACP (advance care planning)  Advance Care Planning Code Status  In light of the patients advanced illness, we reviewed what the patients preferences for care would be at the very end of life. Lengthy discussion about code status with daughter at bedside as well as other daughter over the phone who are both healthcare POAs.  I discussed with them that the patient had been marked DNR in the past and wanted to confirm if they wanted to continue DNR status. Patient's daughters would like to make patient full code at this time. I communicated to the patient that a FULL CODE order would be placed in his medical record to reflect this preference. I spent a total of 18 minutes engaging the patient in this advance care planning discussion.          Debility  - PT/OT/CM consulted, appreciate recs  - Fall precautions  - Skin assessment Qshift  - Turn patient Q2H    Centrencephalic epilepsy  - Hx noted  - Continue home keppra  - Keppra level pending  - Seizure precautions      MARLIN (obstructive sleep apnea), 2008, not on Rx  - Recently had sleep study on 2/19/25  - Continue OP F/U      Dementia  with behavioral disturbance  - Hx noted  - Delirium precautions    Primary hypertension  Chronic, Latest blood pressure and vitals reviewed-     Temp:  [97.5 °F (36.4 °C)]   Pulse:  [41-55]   Resp:  [13-16]   BP: (151-186)/(69-78)   SpO2:  [81 %-98 %] .   Home meds for hypertension were reviewed and noted below-  Hypertension Medications              lisinopriL (PRINIVIL,ZESTRIL) 20 MG tablet Take 1 tablet (20 mg total) by mouth once daily.    hydrALAZINE (APRESOLINE) 50 MG tablet Take 1 tablet (50 mg total) by mouth every 12 (twelve) hours.    hydroCHLOROthiazide (HYDRODIURIL) 12.5 MG Tab Take 1 tablet (12.5 mg total) by mouth once daily. for 3 days            While in the hospital, will manage blood pressure as follows; Adjust home antihypertensive regimen as follows- patient no longer taking hydrochlorothiazide.  Also hydralazine was recently discontinued last week.  Continue home lisinopril with parameters.    Will utilize p.r.n. blood pressure medication only if patient's blood pressure greater than 180/110 and he develops symptoms such as worsening chest pain or shortness of breath.        VTE Risk Mitigation (From admission, onward)           Ordered     enoxaparin injection 30 mg  Daily         02/23/25 1822     IP VTE HIGH RISK PATIENT  Once         02/23/25 1822     Place sequential compression device  Until discontinued         02/23/25 1822                    Discharge Planning   JOANNE: 2/26/2025     Code Status: Full Code   Medical Readiness for Discharge Date:   Discharge Plan A: Home Health   Discharge Delays: Access/Lines            Please place Justification for DME            Bobbi Schofield, NAHOMY, APRN, FNP-C  [facility]  [facility]  [email or web address]

## 2025-02-25 NOTE — PLAN OF CARE
Inpatient Upgrade Note     Lavon Brandon has warranted treatment spanning two or more midnights of hospital level care for the management of  Syncope. . He continues to require IV fluids, daily labs, monitoring of vital signs, medication adjustments, and monitoring on Telemetry. . His condition is also complicated by the following comorbidities: Hypertension, Diabetes, Chronic kidney disease, and   ., dementia, MARLIN, CKD stage 4, epilepsy, debility, , depression, HLD, colonic polyps, TBI, CVA, secondary hyperparathyroidism, thrombocytopenia, anemia, arthritis, B12 deficiency, vitamin-D deficiency, pressure injury to sacral region, PE, bradycardia who presented to the ED via EMS with a chief complaint of syncope.

## 2025-02-25 NOTE — PLAN OF CARE
Problem: Physical Therapy  Goal: Physical Therapy Goal  Description:       Patient will increase functional independence with mobility by performin. Supine to sit with Contact Guard Assistance  2. Sit to stand transfer with Contact Guard Assistance  3. Gait  x 100  feet with Contact Guard Assistance using Rolling Walker.     Outcome: Progressing

## 2025-02-25 NOTE — SUBJECTIVE & OBJECTIVE
INTERVAL HISTORY: Looks a little better. Eating this morning during rounds. Requires feeding by family. Very few words - Bradycardia overnight - family to defer pacemaker - Palliative care consulted for hospice referral        Past Medical History:   Diagnosis Date    Bradyarrhythmia     CVA (cerebral infarction) 2006    Dementia     Depression     Hyperlipidemia     Hypertension     renal htn    Pulmonary embolism 2002    Seizure disorder        Past Surgical History:   Procedure Laterality Date    left foot  with crushed injry         Review of patient's allergies indicates:   Allergen Reactions    Ciprofloxacin (bulk) Swelling       No current facility-administered medications on file prior to encounter.     Current Outpatient Medications on File Prior to Encounter   Medication Sig    calcitRIOL (ROCALTROL) 0.25 MCG Cap TAKE 1 CAPSULE(0.25 MCG) BY MOUTH EVERY DAY    citalopram (CELEXA) 20 MG tablet Take 0.5 tablets (10 mg total) by mouth once daily.    cyanocobalamin (VITAMIN B-12) 100 MCG tablet Take 100 mcg by mouth once daily.    levETIRAcetam (KEPPRA) 500 MG Tab Take 0.5 tablets (250 mg total) by mouth 2 (two) times daily.    lisinopriL (PRINIVIL,ZESTRIL) 20 MG tablet Take 1 tablet (20 mg total) by mouth once daily.    tamsulosin (FLOMAX) 0.4 mg Cap Take 1 capsule (0.4 mg total) by mouth once daily.    vitamin E 400 UNIT capsule Take 400 Units by mouth once daily.    chlorpheniramine/dextromethorp (CORICIDIN HBP COUGH AND COLD ORAL) Take 30 mLs by mouth nightly as needed (Cough).    hydrALAZINE (APRESOLINE) 50 MG tablet Take 1 tablet (50 mg total) by mouth every 12 (twelve) hours.    hydroCHLOROthiazide (HYDRODIURIL) 12.5 MG Tab Take 1 tablet (12.5 mg total) by mouth once daily. for 3 days     Family History       Problem Relation (Age of Onset)    Diabetes Mother          Tobacco Use    Smoking status: Never    Smokeless tobacco: Never   Substance and Sexual Activity    Alcohol use: No    Drug use: No     Sexual activity: Not on file     Review of Systems   Unable to perform ROS: Dementia   Constitutional:  Positive for activity change.   HENT:  Positive for congestion.    Gastrointestinal:  Negative for constipation.   Neurological:  Positive for syncope.     Objective:     Vital Signs (Most Recent):  Temp: 97.7 °F (36.5 °C) (02/25/25 0800)  Pulse: (!) 46 (02/25/25 0800)  Resp: 18 (02/25/25 0800)  BP: (!) 128/58 (02/25/25 0800)  SpO2: 96 % (02/25/25 0800) Vital Signs (24h Range):  Temp:  [97.7 °F (36.5 °C)-98.6 °F (37 °C)] 97.7 °F (36.5 °C)  Pulse:  [46-60] 46  Resp:  [18] 18  SpO2:  [96 %-98 %] 96 %  BP: (116-154)/(52-67) 128/58     Weight: 67.9 kg (149 lb 11.1 oz)  Body mass index is 22.76 kg/m².     Physical Exam  Vitals reviewed.   Constitutional:       General: He is awake. He is not in acute distress.     Appearance: Normal appearance. He is ill-appearing (chronically). He is not diaphoretic.      Comments: Sleeping initially, but awakens easily to his name being called.   Cardiovascular:      Rate and Rhythm: Bradycardia present.      Heart sounds: Normal heart sounds. No murmur heard.     No friction rub. No gallop.   Pulmonary:      Effort: Pulmonary effort is normal. No accessory muscle usage or respiratory distress.      Breath sounds: Normal breath sounds. No wheezing, rhonchi or rales.   Abdominal:      General: Abdomen is flat. Bowel sounds are normal.      Palpations: Abdomen is soft.      Tenderness: There is no abdominal tenderness. There is no guarding or rebound.   Musculoskeletal:      Right lower leg: No edema.      Left lower leg: No edema.   Skin:     General: Skin is warm and dry.   Neurological:      Mental Status: He is alert. Mental status is at baseline. He is disoriented.      Comments: A&O x1 to self.  Disoriented to place and time.   Psychiatric:         Behavior: Behavior is cooperative.                Significant Labs: All pertinent labs within the past 24 hours have been  reviewed.  CBC:   Recent Labs   Lab 02/23/25  1430 02/24/25  0421 02/25/25  0426 02/25/25  0746   WBC 6.65 5.53 4.85  --    HGB 10.4* 9.4* 7.7* 8.1*   HCT 31.8* 28.3* 23.8* 24.5*    149* 135*  --      CMP:   Recent Labs   Lab 02/23/25  1430 02/24/25  0421 02/25/25  0426    139 140   K 5.0 5.0 4.8    109 112*   CO2 28 25 25    94 90   BUN 49* 46* 43*   CREATININE 2.3* 2.2* 2.2*   CALCIUM 10.2 9.3 8.5*   PROT 6.5 5.6* 5.0*   ALBUMIN 3.4* 3.0* 2.7*   BILITOT 0.3 0.3 0.3   ALKPHOS 36* 34* 29*   AST 13 12 10   ALT 6* 5* 4*   ANIONGAP 7* 5* 3*     Cardiac Markers:   Recent Labs   Lab 02/23/25  1430   *     Magnesium:   Recent Labs   Lab 02/23/25  1430 02/24/25  0421 02/25/25  0426   MG 2.1 2.0 1.7     POCT Glucose:   Recent Labs   Lab 02/24/25  1658 02/24/25  1951 02/25/25  0714   POCTGLUCOSE 108 147* 85     Troponin:   Recent Labs   Lab 02/23/25  1642 02/23/25  2107 02/24/25  0421   TROPONINIHS 14.3 14.1 15.2*     TSH:   Recent Labs   Lab 02/23/25  1642   TSH 2.091     Urine Studies:   Recent Labs   Lab 02/23/25  1702   COLORU Yellow   APPEARANCEUA Clear   PHUR 6.0   SPECGRAV 1.015   PROTEINUA Negative   GLUCUA Negative   KETONESU Negative   BILIRUBINUA Negative   OCCULTUA Negative   NITRITE Negative   UROBILINOGEN Negative   LEUKOCYTESUR Negative       Significant Imaging:   Imaging Results              CT Head Without Contrast (Final result)  Result time 02/23/25 20:29:29      Final result by Baldemar Morelos MD (02/23/25 20:29:29)                   Impression:      No CT evidence of acute intracranial abnormality. Clinical correlation and further evaluation as warranted.    Chronic senescent and microvascular ischemic changes.      Electronically signed by: Baldemar Morelos MD  Date:    02/23/2025  Time:    20:29               Narrative:    EXAMINATION:  CT HEAD WITHOUT CONTRAST    CLINICAL HISTORY:  Syncope, recurrent;    TECHNIQUE:  Low dose axial images were obtained through the  head.  Coronal and sagittal reformations were also performed. Contrast was not administered.    COMPARISON:  CT head 01/16/2025    FINDINGS:  There is generalized cerebral volume loss with compensatory sulcal widening and ventricular enlargement.  There is patchy and confluent periventricular and supratentorial white matter hypoattenuation suggesting sequelae of chronic microvascular ischemic change.  There are stable remote lacunar type infarcts of the left basal ganglia and cedric.  No CT evidence of acute intracranial hemorrhage.  The basal cisterns are patent. There is near complete opacification of the right maxillary sinus, similar to prior examination.  There is trace opacification of the left mastoid air cells.  No acute displaced calvarial fracture identified.  The visualized bones of the calvarium demonstrate no acute osseous abnormality.                                       X-Ray KUB (Final result)  Result time 02/23/25 19:31:13      Final result by Baldemar Morelos MD (02/23/25 19:31:13)                   Impression:      As above.      Electronically signed by: Baldemar Morelos MD  Date:    02/23/2025  Time:    19:31               Narrative:    EXAMINATION:  XR KUB    CLINICAL HISTORY:  dilated colonic loops on CXR;    TECHNIQUE:  Single AP supine view of the abdomen (KUB) was performed    COMPARISON:  Abdominal radiograph series 09/16/2024    FINDINGS:  There is mild gaseous distention of the colon which contains hyperdense material, possibly enteric contrast from prior barium fluoroscopic examination.  No central small bowel air-fluid levels appreciated to suggest high-grade obstruction.  Scattered retained stool projects over the colon suggesting constipation.  No large volume of free intraperitoneal air appreciated allowing for supine technique.  Osseous structures demonstrate degenerative changes.                                       US Carotid Bilateral (Final result)  Result time 02/24/25  06:57:49      Final result by Arnulfo Lopez IV, MD (02/24/25 06:57:49)                   Impression:      Scattered bilateral atheromatous changes without evidence of hemodynamically significant carotid arterial stenosis.      Electronically signed by: Arnulfo Lopez  Date:    02/24/2025  Time:    06:57               Narrative:    EXAMINATION:  US CAROTID BILATERAL    CLINICAL HISTORY:  syncope;    TECHNIQUE:  CMS MANDATED QUALITY DATA - CAROTID - 195    All measurements and percent stenosis described below were determined using NASCET criteria or criteria similar to NASCET, as defined by the Society of Radiologists in Ultrasound Consensus Conference, Radiology, 2003    FINDINGS:  Real-time, duplex and color Doppler interrogation are performed.  Scattered atheromatous changes are present within the carotid arteries.  There are no findings to suggest hemodynamically significant carotid arterial stenosis on either side.  Antegrade flow is observed within the vertebral arteries bilaterally.                                       X-Ray Chest AP Portable (Final result)  Result time 02/23/25 15:22:10      Final result by Chuy Murphy Jr., MD (02/23/25 15:22:10)                   Impression:      Low inspiratory volume exacerbating the cardiomediastinal silhouette and causing crowding of vessels.  No evidence of acute cardiopulmonary findings.  No detrimental change upon comparison.      Electronically signed by: Chuy Murphy MD  Date:    02/23/2025  Time:    15:22               Narrative:    EXAMINATION:  XR CHEST AP PORTABLE    CLINICAL HISTORY:  Chest Pain;    TECHNIQUE:  Single frontal view of the chest was performed.    COMPARISON:  01/14/2025    FINDINGS:  Epicardial pacing pad projects over the patient's midline sternum.  Heart is upper normal in size exacerbated by the decreased inspiratory volume.  Small effusion obscures the left costophrenic sulcus.  Chronic eventration of right hemidiaphragm.  Crowding  of vessels is attributed towards the poor inspiratory volume.  Dilated colonic loops projects below the elevated right hemidiaphragm.

## 2025-02-25 NOTE — PT/OT/SLP PROGRESS
Occupational Therapy   Treatment    Name: Lavon Brandon  MRN: [id number]  Admitting Diagnosis:  Bradycardia       Recommendations:     Discharge Recommendations: Low Intensity Therapy  Discharge Equipment Recommendations:  none  Barriers to discharge:  None    Assessment:     Lavon Brandon is a 89 y.o. male with a medical diagnosis of Bradycardia.  He presents confused but more alert during PM vs AM today. Performance deficits affecting function are weakness, impaired endurance, impaired self care skills, impaired functional mobility, gait instability, impaired balance, impaired cognition, decreased coordination, decreased safety awareness, impaired cardiopulmonary response to activity.     Rehab Prognosis:  Fair; patient would benefit from acute skilled OT services to address these deficits and reach maximum level of function.       Plan:     Patient to be seen 5 x/week to address the above listed problems via self-care/home management, therapeutic activities, therapeutic exercises  Plan of Care Expires: 03/24/25  Plan of Care Reviewed with: patient, daughter    Subjective     Chief Complaint: none  Patient/Family Comments/goals: agreeable for OOB activity  Pain/Comfort:   None reported    Objective:     Communicated with: nurse prior to session.  Patient found supine with PureWick, telemetry, peripheral IV upon OT entry to room.    General Precautions: Standard, fall    Orthopedic Precautions:N/A  Braces: N/A  Respiratory Status: Room air     Occupational Performance:     Bed Mobility:    Patient completed Rolling/Turning to Left with  minimum assistance  Patient completed Rolling/Turning to Right with minimum assistance  Patient completed Scooting/Bridging with minimum assistance  Patient completed Supine to Sit with minimum assistance      Functional Mobility/Transfers:  Patient completed Sit <> Stand Transfer with moderate assistance  with  rolling walker   Functional Mobility: pt required verbal cues for  positioning and balance   Pt completed bed>chair transfer with Mod A with RW. Pt lost balance once dt posterior lean, therapist provided Mod A for balance    Activities of Daily Living:  Grooming: minimum assistance for face and oral hygiene  Toileting: dependence on pure wick at bed level      [facility] 6 Click ADL: 16    Treatment & Education:  Pt educated on role of OT/POC, importance of OOB/EOB activity, use of call bell, and safety during ADLs, transfers, and functional mobility.     Patient left up in chair with all lines intact, call button in reach, chair alarm on, and daughter and PT present    GOALS:   Multidisciplinary Problems       Occupational Therapy Goals          Problem: Occupational Therapy    Goal Priority Disciplines Outcome Interventions   Occupational Therapy Goal     OT, PT/OT     Description: Goals to be met by: 3/24/2025     Patient will increase functional independence with ADLs by performing:    UE Dressing with Minimal Assistance.  Grooming while seated with Supervision.  Upper extremity exercise program x10 reps per handout, with supervision.                         DME Justifications:  No DME recommended requiring DME justifications    Time Tracking:     OT Date of Treatment: 02/25/25  OT Start Time: 1420  OT Stop Time: 1450  OT Total Time (min): 30 min    Billable Minutes:Self Care/Home Management 30    OT/KIMMY: OT          2/25/2025

## 2025-02-25 NOTE — CONSULTS
[facility]  Wound Care    Patient Name:  Lavon Brandon   MRN:  [id number]  Date: 2/24/2025  Diagnosis: Syncope    History:     Past Medical History:   Diagnosis Date    Bradyarrhythmia     CVA (cerebral infarction) 2006    Dementia     Depression     Hyperlipidemia     Hypertension     renal htn    Pulmonary embolism 2002    Seizure disorder        Social History[1]    Precautions:     Allergies as of 02/23/2025 - Reviewed 02/23/2025   Allergen Reaction Noted    Ciprofloxacin (bulk) Swelling 05/28/2013       WOC Assessment Details/Treatment     Wound to coccyx is a healing stage 2 at this assessment. Wound care team to continue to follow up as needed throughout hospital stay.        02/24/25 1825   WOCN Assessment   WOCN Total Time (mins) 25   Visit Date 02/24/25   Visit Time 1800   Consult Type New   WOCN Speciality Wound   Wound pressure   Intervention assessed;applied;chart review;coordination of care;team conference;orders   Teaching on-going        Altered Skin Integrity 02/18/24 1033 Coccyx   Date First Assessed/Time First Assessed: 02/18/24 1033   Altered Skin Integrity Present on Admission - Did Patient arrive to the hospital with altered skin?: yes  Location: Coccyx   Wound Image     Description of Altered Skin Integrity Partial thickness tissue loss. Shallow open ulcer with a red or pink wound bed, without slough. Intact or Open/Ruptured Serum-filled blister.   Dressing Appearance Open to air   Drainage Amount None   Periwound Area [place]   Care Cleansed with:;Antimicrobial agent;Soap and water;Applied:;Skin Barrier   Dressing Applied  ([facility])       Recommendations made to primary team for [facility]ad every shift and leave open to air. Orders placed as per Dr. Samuels.     02/24/2025         [1]   Social History  Socioeconomic History    Marital status:    Tobacco Use    Smoking status: Never    Smokeless tobacco: Never   Substance and Sexual Activity    Alcohol use: No    Drug use: No      Social Drivers of Health     Financial Resource Strain: Low Risk  (12/23/2024)    Overall Financial Resource Strain (CARDIA)     Difficulty of Paying Living Expenses: Not very hard   Food Insecurity: No Food Insecurity (12/23/2024)    Hunger Vital Sign     Worried About Running Out of Food in the Last Year: Never true     Ran Out of Food in the Last Year: Never true   Transportation Needs: No Transportation Needs (12/23/2024)    TRANSPORTATION NEEDS     Transportation : No   Physical Activity: Inactive (9/13/2024)    Exercise Vital Sign     Days of Exercise per Week: 0 days     Minutes of Exercise per Session: 0 min   Stress: No Stress Concern Present (12/23/2024)    [employer] [place] [employer] - Occupational Stress Questionnaire     Feeling of Stress : Not at all   Housing Stability: Unknown (12/23/2024)    Housing Stability Vital Sign     Unable to Pay for Housing in the Last Year: No     Homeless in the Last Year: No

## 2025-02-26 LAB
ALBUMIN SERPL BCP-MCNC: 2.7 G/DL (ref 3.5–5.2)
ALP SERPL-CCNC: 28 U/L (ref 55–135)
ALT SERPL W/O P-5'-P-CCNC: 4 U/L (ref 10–44)
ANION GAP SERPL CALC-SCNC: 4 MMOL/L (ref 8–16)
AST SERPL-CCNC: 9 U/L (ref 10–40)
BASOPHILS # BLD AUTO: 0.03 K/UL (ref 0–0.2)
BASOPHILS NFR BLD: 0.6 % (ref 0–1.9)
BILIRUB SERPL-MCNC: 0.3 MG/DL (ref 0.1–1)
BUN SERPL-MCNC: 38 MG/DL (ref 10–30)
CALCIUM SERPL-MCNC: 8.4 MG/DL (ref 8.7–10.5)
CHLORIDE SERPL-SCNC: 110 MMOL/L (ref 95–110)
CO2 SERPL-SCNC: 23 MMOL/L (ref 23–29)
CREAT SERPL-MCNC: 2.2 MG/DL (ref 0.5–1.4)
DIFFERENTIAL METHOD BLD: ABNORMAL
EOSINOPHIL # BLD AUTO: 0.1 K/UL (ref 0–0.5)
EOSINOPHIL NFR BLD: 2.5 % (ref 0–8)
ERYTHROCYTE [DISTWIDTH] IN BLOOD BY AUTOMATED COUNT: 13.7 % (ref 11.5–14.5)
EST. GFR  (NO RACE VARIABLE): 27.4 ML/MIN/1.73 M^2
GLUCOSE SERPL-MCNC: 88 MG/DL (ref 70–110)
HCT VFR BLD AUTO: 25.2 % (ref 40–54)
HGB BLD-MCNC: 8 G/DL (ref 14–18)
IMM GRANULOCYTES # BLD AUTO: 0 K/UL (ref 0–0.04)
IMM GRANULOCYTES NFR BLD AUTO: 0 % (ref 0–0.5)
LEVETIRACETAM SERPL-MCNC: 19.3 UG/ML (ref 10–40)
LYMPHOCYTES # BLD AUTO: 2.2 K/UL (ref 1–4.8)
LYMPHOCYTES NFR BLD: 46.1 % (ref 18–48)
MAGNESIUM SERPL-MCNC: 1.7 MG/DL (ref 1.6–2.6)
MCH RBC QN AUTO: 29 PG (ref 27–31)
MCHC RBC AUTO-ENTMCNC: 31.7 G/DL (ref 32–36)
MCV RBC AUTO: 91 FL (ref 82–98)
MONOCYTES # BLD AUTO: 0.4 K/UL (ref 0.3–1)
MONOCYTES NFR BLD: 7.9 % (ref 4–15)
NEUTROPHILS # BLD AUTO: 2.1 K/UL (ref 1.8–7.7)
NEUTROPHILS NFR BLD: 42.9 % (ref 38–73)
NRBC BLD-RTO: 0 /100 WBC
OHS QRS DURATION: 104 MS
OHS QTC CALCULATION: 381 MS
PHOSPHATE SERPL-MCNC: 3.1 MG/DL (ref 2.7–4.5)
PLATELET # BLD AUTO: 137 K/UL (ref 150–450)
PMV BLD AUTO: 10.7 FL (ref 9.2–12.9)
POCT GLUCOSE: 105 MG/DL (ref 70–110)
POCT GLUCOSE: 109 MG/DL (ref 70–110)
POCT GLUCOSE: 133 MG/DL (ref 70–110)
POCT GLUCOSE: 91 MG/DL (ref 70–110)
POTASSIUM SERPL-SCNC: 4.6 MMOL/L (ref 3.5–5.1)
PROT SERPL-MCNC: 5 G/DL (ref 6–8.4)
RBC # BLD AUTO: 2.76 M/UL (ref 4.6–6.2)
SODIUM SERPL-SCNC: 137 MMOL/L (ref 136–145)
WBC # BLD AUTO: 4.82 K/UL (ref 3.9–12.7)

## 2025-02-26 PROCEDURE — 84100 ASSAY OF PHOSPHORUS: CPT

## 2025-02-26 PROCEDURE — 83735 ASSAY OF MAGNESIUM: CPT

## 2025-02-26 PROCEDURE — 85025 COMPLETE CBC W/AUTO DIFF WBC: CPT

## 2025-02-26 PROCEDURE — 25000003 PHARM REV CODE 250

## 2025-02-26 PROCEDURE — 97530 THERAPEUTIC ACTIVITIES: CPT

## 2025-02-26 PROCEDURE — 97535 SELF CARE MNGMENT TRAINING: CPT

## 2025-02-26 PROCEDURE — 21400001 HC TELEMETRY ROOM

## 2025-02-26 PROCEDURE — 36415 COLL VENOUS BLD VENIPUNCTURE: CPT

## 2025-02-26 PROCEDURE — 25000003 PHARM REV CODE 250: Performed by: NURSE PRACTITIONER

## 2025-02-26 PROCEDURE — 63600175 PHARM REV CODE 636 W HCPCS

## 2025-02-26 PROCEDURE — 99223 1ST HOSP IP/OBS HIGH 75: CPT | Mod: ,,, | Performed by: INTERNAL MEDICINE

## 2025-02-26 PROCEDURE — 99497 ADVNCD CARE PLAN 30 MIN: CPT | Mod: 25,,, | Performed by: INTERNAL MEDICINE

## 2025-02-26 PROCEDURE — 80053 COMPREHEN METABOLIC PANEL: CPT

## 2025-02-26 RX ADMIN — CALCITRIOL CAPSULES 0.25 MCG 0.25 MCG: 0.25 CAPSULE ORAL at 10:02

## 2025-02-26 RX ADMIN — AMOXICILLIN AND CLAVULANATE POTASSIUM 500 MG: 500; 125 TABLET, FILM COATED ORAL at 08:02

## 2025-02-26 RX ADMIN — CITALOPRAM HYDROBROMIDE 10 MG: 10 TABLET ORAL at 10:02

## 2025-02-26 RX ADMIN — ACETAMINOPHEN 650 MG: 325 TABLET ORAL at 06:02

## 2025-02-26 RX ADMIN — TAMSULOSIN HYDROCHLORIDE 0.4 MG: 0.4 CAPSULE ORAL at 10:02

## 2025-02-26 RX ADMIN — LEVETIRACETAM 250 MG: 250 TABLET, FILM COATED ORAL at 10:02

## 2025-02-26 RX ADMIN — LEVETIRACETAM 250 MG: 250 TABLET, FILM COATED ORAL at 08:02

## 2025-02-26 RX ADMIN — LISINOPRIL 20 MG: 20 TABLET ORAL at 10:02

## 2025-02-26 RX ADMIN — SENNOSIDES AND DOCUSATE SODIUM 1 TABLET: 50; 8.6 TABLET ORAL at 08:02

## 2025-02-26 RX ADMIN — Medication 400 UNITS: at 10:02

## 2025-02-26 RX ADMIN — AMOXICILLIN AND CLAVULANATE POTASSIUM 500 MG: 500; 125 TABLET, FILM COATED ORAL at 10:02

## 2025-02-26 RX ADMIN — ENOXAPARIN SODIUM 30 MG: 40 INJECTION SUBCUTANEOUS at 06:02

## 2025-02-26 NOTE — ASSESSMENT & PLAN NOTE
Advance Care Planning     Date: 02/26/2025    Hospice  I did explain the role for hospice care at this stage of the patient's illness, including its ability to help the patient live with the best quality of life possible.  We will be making a hospice referral.

## 2025-02-26 NOTE — ASSESSMENT & PLAN NOTE
Chronic, Latest blood pressure and vitals reviewed-     Temp:  [98.2 °F (36.8 °C)-99.4 °F (37.4 °C)]   Pulse:  [45-55]   Resp:  [18]   BP: (129-153)/(49-69)   SpO2:  [96 %-98 %] .   Home meds for hypertension were reviewed and noted below-  Hypertension Medications              lisinopriL (PRINIVIL,ZESTRIL) 20 MG tablet Take 1 tablet (20 mg total) by mouth once daily.    hydrALAZINE (APRESOLINE) 50 MG tablet Take 1 tablet (50 mg total) by mouth every 12 (twelve) hours.    hydroCHLOROthiazide (HYDRODIURIL) 12.5 MG Tab Take 1 tablet (12.5 mg total) by mouth once daily. for 3 days            While in the hospital, will manage blood pressure as follows; Adjust home antihypertensive regimen as follows- patient no longer taking hydrochlorothiazide.  Also hydralazine was recently discontinued last week.  Continue home lisinopril with parameters.    Will utilize p.r.n. blood pressure medication only if patient's blood pressure greater than 180/110 and he develops symptoms such as worsening chest pain or shortness of breath.

## 2025-02-26 NOTE — PROGRESS NOTES
[facility]  Progress Note    Patient Name: Lavon Brandon  MRN: [id number]  Patient Class: IP- Inpatient   Admission Date: 2/23/2025  Length of Stay: 1 days  Attending Physician: Emma Mojica MD  Primary Care Provider: Aristides Haynes MD        Subjective     Principal Problem:Bradycardia        HPI:  Mr. Brandon is a 89 yr old male with a hx of HTN, dementia, MARLIN, CKD stage 4, epilepsy, debility, DM type 2, depression, HLD, colonic polyps, TBI, CVA, secondary hyperparathyroidism, thrombocytopenia, anemia, arthritis, B12 deficiency, vitamin-D deficiency, pressure injury to sacral region, PE, bradycardia who presented to the ED via EMS with a chief complaint of syncope. Patient's daughter reports that the patient was sitting in his wheelchair, had finished eating and was watching TV when she noticed him lean forward and slumped over to the left.  Denies head trauma.  She endorsed that his mouth was open and he was drooling with his eyes open looking to the left and not focused. She tried to wake him and found him unconscious and called EMS to bring him here for further eval. Pt is currently A&O x 1 to self. He is disoriented to place and time.  Daughter states that this is his baseline.  She denies any bowel or bladder incontinence during episode of syncope.  She does endorse that his hydralazine was recently stopped last week.  She also states that he is normally bradycardic with heart rate in the 50s.  She states that he does not smoke tobacco, drink alcohol, or do recreational drugs.  Patient denies any pain at this time.  Remainder of history is limited due to dementia.    Lengthy discussion about code status with daughter at bedside as well as other daughter over the phone who are both healthcare POAs.  I discussed with them that the patient had been marked DNR in the past and wanted to confirm if they wanted to continue DNR status.  Discussed the pros and cons of CPR and  "patient's daughters would like to make patient full code at this time.  Patient's chart has been updated to reflect this code status change.    Upon arrival to ED, patient afebrile, HR of 42, RR of 16, BP of 151/70, satting 98% on RA.  Workup in the ED revealed RBC of 3.47, H/H of 10.4/31.8, BUN of 49, creatinine of 2.3, GFR of 26, albumin of 3.4, BNP of 197, troponin of 15.3, POCT glucose of 113.  CXR shows low inspiratory volume exacerbating cardiomediastinal silhouette and causing crowding of vessels.  No evidence of acute cardiopulmonary findings.  Dilated colonic loops projects below the elevated right hemidiaphragm.  No medications administered while in the ED. case discussed with ED provider and patient will be placed under observation for further management.    Overview/Hospital Course:  89 y.o. BM elderly male patient who presented after syncopal episode at home. Per his daughter this is his second syncopal episode and had one presyncopal episode w/I past 6 months since living with her. At the time of presentation the patient's BP had recovered. He had Carotid US that showed no evidence of hemodynamically significant carotid arterial stenosis. CT head showed no CT evidence of acute intracranial abnormality. Cardiology was consulted who recommends conservative treatment. He is noted that after discussion with the family regarding further management, would, "consider a pacemaker but they would like to avoid so for now. Continue with close observation for now and repeat a monitor as an outpatient." If patient has recurrent syncopal events, can be considered for pacemaker at that point. Patient had CT head also showing, "trace opacification of the right maxillary sinus, and trace opacification of the left mastoid air cells". Exam of BL ears showed OM. Patient started on Augmentin with plan for outpatient referral to ENT. Lastly, KUB showed stool burden suggesting constipation.     Patient noted to be " orthostatic. BL OM and CT with mastoid sinus opacification. Started on ABX with referral to ENT outpatient. EEG performed - EEG abnormal, evaluated by neurology - continue current BID keppra. Patient with bradycardia overnight - cardiology aware. After speaking to family they have opted to defer pacemaker. Amenable to hospice.  Case management was consulted for palliative care consult.    INTERVAL HISTORY:  Patient seen and examined at bedside during morning rounds, sitting up eating, family voices no new complaints overnight.  Awaiting home with hospice acceptance.  Kidney function remains at baseline.  Patient continues bradycardia.        Past Medical History:   Diagnosis Date    Bradyarrhythmia     CVA (cerebral infarction) 2006    Dementia     Depression     Hyperlipidemia     Hypertension     renal htn    Pulmonary embolism 2002    Seizure disorder        Past Surgical History:   Procedure Laterality Date    left foot  with crushed injry         Review of patient's allergies indicates:   Allergen Reactions    Ciprofloxacin (bulk) Swelling       No current facility-administered medications on file prior to encounter.     Current Outpatient Medications on File Prior to Encounter   Medication Sig    calcitRIOL (ROCALTROL) 0.25 MCG Cap TAKE 1 CAPSULE(0.25 MCG) BY MOUTH EVERY DAY    citalopram (CELEXA) 20 MG tablet Take 0.5 tablets (10 mg total) by mouth once daily.    cyanocobalamin (VITAMIN B-12) 100 MCG tablet Take 100 mcg by mouth once daily.    levETIRAcetam (KEPPRA) 500 MG Tab Take 0.5 tablets (250 mg total) by mouth 2 (two) times daily.    lisinopriL (PRINIVIL,ZESTRIL) 20 MG tablet Take 1 tablet (20 mg total) by mouth once daily.    tamsulosin (FLOMAX) 0.4 mg Cap Take 1 capsule (0.4 mg total) by mouth once daily.    vitamin E 400 UNIT capsule Take 400 Units by mouth once daily.    chlorpheniramine/dextromethorp (CORICIDIN HBP COUGH AND COLD ORAL) Take 30 mLs by mouth nightly as needed (Cough).    hydrALAZINE  (APRESOLINE) 50 MG tablet Take 1 tablet (50 mg total) by mouth every 12 (twelve) hours.    hydroCHLOROthiazide (HYDRODIURIL) 12.5 MG Tab Take 1 tablet (12.5 mg total) by mouth once daily. for 3 days     Family History       Problem Relation (Age of Onset)    Diabetes Mother          Tobacco Use    Smoking status: Never    Smokeless tobacco: Never   Substance and Sexual Activity    Alcohol use: No    Drug use: No    Sexual activity: Not on file     Review of Systems   Unable to perform ROS: Dementia   All other systems reviewed and are negative.    Objective:     Vital Signs (Most Recent):  Temp: 99.4 °F (37.4 °C) (02/26/25 0755)  Pulse: (!) 45 (02/26/25 0755)  Resp: 18 (02/26/25 0404)  BP: (!) 146/62 (02/26/25 0755)  SpO2: 96 % (02/26/25 0755) Vital Signs (24h Range):  Temp:  [98.2 °F (36.8 °C)-99.4 °F (37.4 °C)] 99.4 °F (37.4 °C)  Pulse:  [45-55] 45  Resp:  [18] 18  SpO2:  [96 %-98 %] 96 %  BP: (129-153)/(49-69) 146/62     Weight: 67.9 kg (149 lb 11.1 oz)  Body mass index is 22.76 kg/m².     Physical Exam  Vitals reviewed.   Constitutional:       General: He is awake. He is not in acute distress.     Appearance: Normal appearance. He is ill-appearing (chronically). He is not diaphoretic.      Comments: Elderly male, very little verbal communication.   Cardiovascular:      Rate and Rhythm: Bradycardia present.      Heart sounds: Normal heart sounds. No murmur heard.     No friction rub. No gallop.   Pulmonary:      Effort: Pulmonary effort is normal. No accessory muscle usage or respiratory distress.      Breath sounds: Normal breath sounds. No wheezing, rhonchi or rales.   Abdominal:      General: Abdomen is flat. Bowel sounds are normal.      Palpations: Abdomen is soft.      Tenderness: There is no abdominal tenderness. There is no guarding or rebound.   Musculoskeletal:      Right lower leg: No edema.      Left lower leg: No edema.   Skin:     General: Skin is warm and dry.   Neurological:      Mental Status: He  "is alert. Mental status is at baseline. He is disoriented.      Comments: A&O x1 to self.  Disoriented to place and time.   Psychiatric:         Behavior: Behavior is cooperative.                Significant Labs: All pertinent labs within the past 24 hours have been reviewed.  CBC:   Recent Labs   Lab 02/25/25  0426 02/25/25  0746 02/26/25  0612   WBC 4.85  --  4.82   HGB 7.7* 8.1* 8.0*   HCT 23.8* 24.5* 25.2*   *  --  137*     CMP:   Recent Labs   Lab 02/25/25  0426 02/26/25  0612    137   K 4.8 4.6   * 110   CO2 25 23   GLU 90 88   BUN 43* 38*   CREATININE 2.2* 2.2*   CALCIUM 8.5* 8.4*   PROT 5.0* 5.0*   ALBUMIN 2.7* 2.7*   BILITOT 0.3 0.3   ALKPHOS 29* 28*   AST 10 9*   ALT 4* 4*   ANIONGAP 3* 4*     Cardiac Markers:   No results for input(s): "CKMB", "MYOGLOBIN", "BNP", "TROPISTAT" in the last 48 hours.    Magnesium:   Recent Labs   Lab 02/25/25  0426 02/26/25  0612   MG 1.7 1.7     POCT Glucose:   Recent Labs   Lab 02/25/25  1628 02/25/25  2019 02/26/25  0818   POCTGLUCOSE 108 105 91     Troponin:   No results for input(s): "TROPONINI", "TROPONINIHS" in the last 48 hours.    TSH:   Recent Labs   Lab 02/23/25  1642   TSH 2.091     Urine Studies:   No results for input(s): "COLORU", "APPEARANCEUA", "PHUR", "SPECGRAV", "PROTEINUA", "GLUCUA", "KETONESU", "BILIRUBINUA", "OCCULTUA", "NITRITE", "UROBILINOGEN", "LEUKOCYTESUR", "RBCUA", "WBCUA", "BACTERIA", "SQUAMEPITHEL", "HYALINECASTS" in the last 48 hours.    Invalid input(s): "WRIGHTSUR"      Significant Imaging:   Imaging Results              CT Head Without Contrast (Final result)  Result time 02/23/25 20:29:29      Final result by Baldemar Morelos MD (02/23/25 20:29:29)                   Impression:      No CT evidence of acute intracranial abnormality. Clinical correlation and further evaluation as warranted.    Chronic senescent and microvascular ischemic changes.      Electronically signed by: Baldemar Morelos, " MD  Date:    02/23/2025  Time:    20:29               Narrative:    EXAMINATION:  CT HEAD WITHOUT CONTRAST    CLINICAL HISTORY:  Syncope, recurrent;    TECHNIQUE:  Low dose axial images were obtained through the head.  Coronal and sagittal reformations were also performed. Contrast was not administered.    COMPARISON:  CT head 01/16/2025    FINDINGS:  There is generalized cerebral volume loss with compensatory sulcal widening and ventricular enlargement.  There is patchy and confluent periventricular and supratentorial white matter hypoattenuation suggesting sequelae of chronic microvascular ischemic change.  There are stable remote lacunar type infarcts of the left basal ganglia and cedric.  No CT evidence of acute intracranial hemorrhage.  The basal cisterns are patent. There is near complete opacification of the right maxillary sinus, similar to prior examination.  There is trace opacification of the left mastoid air cells.  No acute displaced calvarial fracture identified.  The visualized bones of the calvarium demonstrate no acute osseous abnormality.                                       X-Ray KUB (Final result)  Result time 02/23/25 19:31:13      Final result by Baldemar Morelos MD (02/23/25 19:31:13)                   Impression:      As above.      Electronically signed by: Baldemar Morelos MD  Date:    02/23/2025  Time:    19:31               Narrative:    EXAMINATION:  XR KUB    CLINICAL HISTORY:  dilated colonic loops on CXR;    TECHNIQUE:  Single AP supine view of the abdomen (KUB) was performed    COMPARISON:  Abdominal radiograph series 09/16/2024    FINDINGS:  There is mild gaseous distention of the colon which contains hyperdense material, possibly enteric contrast from prior barium fluoroscopic examination.  No central small bowel air-fluid levels appreciated to suggest high-grade obstruction.  Scattered retained stool projects over the colon suggesting constipation.  No large volume of free  intraperitoneal air appreciated allowing for supine technique.  Osseous structures demonstrate degenerative changes.                                       US Carotid Bilateral (Final result)  Result time 02/24/25 06:57:49      Final result by Arnulfo Lopez IV, MD (02/24/25 06:57:49)                   Impression:      Scattered bilateral atheromatous changes without evidence of hemodynamically significant carotid arterial stenosis.      Electronically signed by: Arnulfo Lopez  Date:    02/24/2025  Time:    06:57               Narrative:    EXAMINATION:  US CAROTID BILATERAL    CLINICAL HISTORY:  syncope;    TECHNIQUE:  CMS MANDATED QUALITY DATA - CAROTID - 195    All measurements and percent stenosis described below were determined using NASCET criteria or criteria similar to NASCET, as defined by the Society of Radiologists in Ultrasound Consensus Conference, Radiology, 2003    FINDINGS:  Real-time, duplex and color Doppler interrogation are performed.  Scattered atheromatous changes are present within the carotid arteries.  There are no findings to suggest hemodynamically significant carotid arterial stenosis on either side.  Antegrade flow is observed within the vertebral arteries bilaterally.                                       X-Ray Chest AP Portable (Final result)  Result time 02/23/25 15:22:10      Final result by Chuy Murphy Jr., MD (02/23/25 15:22:10)                   Impression:      Low inspiratory volume exacerbating the cardiomediastinal silhouette and causing crowding of vessels.  No evidence of acute cardiopulmonary findings.  No detrimental change upon comparison.      Electronically signed by: Chuy uMrphy MD  Date:    02/23/2025  Time:    15:22               Narrative:    EXAMINATION:  XR CHEST AP PORTABLE    CLINICAL HISTORY:  Chest Pain;    TECHNIQUE:  Single frontal view of the chest was performed.    COMPARISON:  01/14/2025    FINDINGS:  Epicardial pacing pad projects over the  "patient's midline sternum.  Heart is upper normal in size exacerbated by the decreased inspiratory volume.  Small effusion obscures the left costophrenic sulcus.  Chronic eventration of right hemidiaphragm.  Crowding of vessels is attributed towards the poor inspiratory volume.  Dilated colonic loops projects below the elevated right hemidiaphragm.                                       Assessment and Plan     * Bradycardia  Family has opted to defer pacemaker  Consult to hospice      Orthostatic hypertension  Patient's blood pressure range in the last 24 hours was: BP  Min: 129/63  Max: 153/66.The patient's inpatient anti-hypertensive regimen is listed below:  Current Antihypertensives  lisinopriL tablet 20 mg, Daily, Oral  hydrALAZINE tablet 25 mg, Every 8 hours PRN, Oral    Conservative IV fluids  Recheck in am    Rhinosinusitis  Per ct head, " There is near complete opacification of the right maxillary sinus, similar to prior examination.  There is trace opacification of the left mastoid air cells.  "  Start augmentin  Outpatient ENT    Acute otitis media, bilateral  Abx po  Refer to ENT outpatient      Syncope  - CXR and UA negative for infection  - COVID/Flu pending  - UDS pending  - CT head pending  - Not hypoglycemic on CMP, or hypotensive on arrival  - Orthostatic BP pending  - TSH WNL  - Lipid and A1c pending  - Hx of bradycardia  - Cardiology consulted, appreciate recs  - Bilateral carotid US pending  - TTE pending  - Continuous tele monitoring  - Fall, seizure, and delirium precautions  - Conservative cardiac treatment - may need pacemaker in near future  - Orthostatic - IV fluids and recheck in am  - Awaiting EEG  - Treat sinuitis and OM with abx  - Outpatient referral to ENT    DM (diabetes mellitus), type 2 with neurological complications  Last A1c reviewed-   Lab Results   Component Value Date    HGBA1C 5.3 02/24/2025     Most recent fingerstick glucose reviewed-   Recent Labs   Lab 02/25/25  1111 " 02/25/25  1628 02/25/25 2019 02/26/25  0818   POCTGLUCOSE 111* 108 105 91       Current correctional scale  Medium  Maintain anti-hyperglycemic dose as follows-   Antihyperglycemics (From admission, onward)      Start     Stop Route Frequency Ordered    02/23/25 1838  insulin aspart U-100 pen 0-10 Units         -- SubQ Before meals & nightly PRN 02/23/25 1822          Hold Oral hypoglycemics while patient is in the hospital.    ACP (advance care planning)  Advance Care Planning Code Status  In light of the patients advanced illness, we reviewed what the patients preferences for care would be at the very end of life. Lengthy discussion about code status with daughter at bedside as well as other daughter over the phone who are both healthcare POAs.  I discussed with them that the patient had been marked DNR in the past and wanted to confirm if they wanted to continue DNR status. Patient's daughters would like to make patient full code at this time. I communicated to the patient that a FULL CODE order would be placed in his medical record to reflect this preference. I spent a total of 18 minutes engaging the patient in this advance care planning discussion.         Debility  - PT/OT/CM consulted, appreciate recs  - Fall precautions  - Skin assessment Qshift  - Turn patient Q2H    Centrencephalic epilepsy  - Hx noted  - Continue home keppra  - Keppra level pending  - Seizure precautions      CKD (chronic kidney disease) stage 4, GFR 15-29 ml/min  Creatine stable for now. BMP reviewed- noted Estimated Creatinine Clearance: 20.6 mL/min (A) (based on SCr of 2.2 mg/dL (H)). according to latest data. Based on current GFR, CKD stage is stage 4 - GFR 15-29.  Monitor UOP and serial BMP and adjust therapy as needed. Renally dose meds. Avoid nephrotoxic medications and procedures.  Conservative IV fluids  Follow Chemistry  Nephrology consult    MARLIN (obstructive sleep apnea), 2008, not on Rx  - Recently had sleep study on  2/19/25  - Continue OP F/U      Dementia with behavioral disturbance  - Hx noted  - Delirium precautions    Primary hypertension  Chronic, Latest blood pressure and vitals reviewed-     Temp:  [98.2 °F (36.8 °C)-99.4 °F (37.4 °C)]   Pulse:  [45-55]   Resp:  [18]   BP: (129-153)/(49-69)   SpO2:  [96 %-98 %] .   Home meds for hypertension were reviewed and noted below-  Hypertension Medications              lisinopriL (PRINIVIL,ZESTRIL) 20 MG tablet Take 1 tablet (20 mg total) by mouth once daily.    hydrALAZINE (APRESOLINE) 50 MG tablet Take 1 tablet (50 mg total) by mouth every 12 (twelve) hours.    hydroCHLOROthiazide (HYDRODIURIL) 12.5 MG Tab Take 1 tablet (12.5 mg total) by mouth once daily. for 3 days            While in the hospital, will manage blood pressure as follows; Adjust home antihypertensive regimen as follows- patient no longer taking hydrochlorothiazide.  Also hydralazine was recently discontinued last week.  Continue home lisinopril with parameters.    Will utilize p.r.n. blood pressure medication only if patient's blood pressure greater than 180/110 and he develops symptoms such as worsening chest pain or shortness of breath.        VTE Risk Mitigation (From admission, onward)           Ordered     enoxaparin injection 30 mg  Daily         02/23/25 1822     IP VTE HIGH RISK PATIENT  Once         02/23/25 1822     Place sequential compression device  Until discontinued         02/23/25 1822                    Discharge Planning   JOANNE: 2/27/2025     Code Status: Full Code   Medical Readiness for Discharge Date:   Discharge Plan A: Home Health   Discharge Delays: Access/Lines            Please place Justification for DME        Butch Wilkes NP  Department of Hospital Medicine   [facility]

## 2025-02-26 NOTE — PLAN OF CARE
DOM [facility] ([phone]) stating if pt is going to d/c with hospice they need a hospice agency and the d/c paperwork sent over including palliative consult and notes. If pt is going to d/c home with hh they need a GARRET on d/c paperwork.    The  (DMO) reached out to the patient's daughter, Gabriela, to discuss the preference for a hospice provider for her father. Gabriela expressed significant concerns about the idea of transitioning her father to hospice care. She believes that his heart remains as strong as it has been, questioning if hospice is truly the most appropriate option at this time. While acknowledging the patient's infection, Gabriela is more focused on potential treatments for the infection that might improve her fathers heart condition.    Jere perspective reflects her desire to explore all possible avenues to improve her fathers health, rather than opting for hospice care, which she associates with end-of-life care. This highlights a common dilemma where families are hesitant to embrace hospice services, often due to a belief that there might still be hope for recovery or improvement.    To address Gabriela's concerns, the  sent a message to the attending physician, requesting that they contact Gabriela directly to discuss treatment options for the infection and clarify whether hospice remains the best course of action.    1522- DOM called pt's daughter and LM on VM to r/c.

## 2025-02-26 NOTE — CONSULTS
[facility]  Palliative Medicine  Consult Note    Patient Name: Lavon Brandon  MRN: [id number]  Admission Date: 2/23/2025  Hospital Length of Stay: 1 days  Code Status: Full Code   Attending Provider: Emma Mojica MD  Consulting Provider: Chris Nesbitt MD  Primary Care Physician: Aristides Haynes MD  Principal Problem:Bradycardia    Patient information was obtained from patient, relative(s), past medical records, and primary team.      Consults  Assessment/Plan:     Palliative Care  Goals of care, counseling/discussion  I reviewed the patient's chart and discussed the case with the patient's team.      I examined Lavon Brandon at bedside.  The patient lacks capacity for complex medical decision-making.  I spoke with him, and his daughter at bedside. His daughter, Gabriela, joined us by phone.    I introduced myself and my role as palliative care physician. They were agreeable to speaking.    We discussed the patient's medical illness, prognosis, and values in detail.  Below is a brief summary of our discussion.    They are well up-to-date on his overall condition.  They discussed his multitude of medical issues.  They understand his primary issue is his heart arrhythmia.    We discussed prognosis.  At this point given his age and multiple comorbidities I worry that his prognosis is limited to weeks to maybe months.  I will be surprised if he is living a year from now.    We discussed his values.  It is important for him to go home.  They are not entirely sure what more he is willing to endure.  They do not believe he would be willing to undergo pacemaker placement particularly after discussing the risks with cardiology.  Not entirely sure if he would be willing to go through more hospitalization if he gets sicker.  They do not mention any particular worries or fears.    We spoke back and forth.  I took time to answer all questions to the best of my ability.    At this point the most important  "question for his daughter to consider his how much more he is willing to go through.  If it is most important for him to go home and get the care that he needs and deserves at home.  If he does not desire to return to the hospital then hospice is certainly a meléndez way to care for him.    Alternatively, if he would desire repeat hospitalizations when the next complication occurs then hospice isn't quite appropriate.    Regardless of the path they choose, I suspect his prognosis is not changed; hospice or not.  They understood.    After some more back and forth, they seemed to be leaning towards hospice at home.    Discussed with his case management and social work team.    I appreciate being involved.  I hope I have been helpful.    We will follow up.        ACP (advance care planning)  Advance Care Planning    Date: 02/26/2025    Hospice  I did explain the role for hospice care at this stage of the patient's illness, including its ability to help the patient live with the best quality of life possible.  We will be making a hospice referral.              Thank you for your consult. I will follow-up with patient. Please contact us if you have any additional questions.    Subjective:     HPI:   Per admit H&P: "Mr. Brandon is a 89 yr old male with a hx of HTN, dementia, MARLIN, CKD stage 4, epilepsy, debility, DM type 2, depression, HLD, colonic polyps, TBI, CVA, secondary hyperparathyroidism, thrombocytopenia, anemia, arthritis, B12 deficiency, vitamin-D deficiency, pressure injury to sacral region, PE, bradycardia who presented to the ED via EMS with a chief complaint of syncope. Patient's daughter reports that the patient was sitting in his wheelchair, had finished eating and was watching TV when she noticed him lean forward and slumped over to the left.  Denies head trauma.  She endorsed that his mouth was open and he was drooling with his eyes open looking to the left and not focused. She tried to wake him and found " "him unconscious and called EMS to bring him here for further eval. Pt is currently A&O x 1 to self. He is disoriented to place and time.  Daughter states that this is his baseline.  She denies any bowel or bladder incontinence during episode of syncope.  She does endorse that his hydralazine was recently stopped last week.  She also states that he is normally bradycardic with heart rate in the 50s.  She states that he does not smoke tobacco, drink alcohol, or do recreational drugs.  Patient denies any pain at this time.  Remainder of history is limited due to dementia.     Lengthy discussion about code status with daughter at bedside as well as other daughter over the phone who are both healthcare POAs.  I discussed with them that the patient had been marked DNR in the past and wanted to confirm if they wanted to continue DNR status.  Discussed the pros and cons of CPR and patient's daughters would like to make patient full code at this time.  Patient's chart has been updated to reflect this code status change.     Upon arrival to ED, patient afebrile, HR of 42, RR of 16, BP of 151/70, satting 98% on RA.  Workup in the ED revealed RBC of 3.47, H/H of 10.4/31.8, BUN of 49, creatinine of 2.3, GFR of 26, albumin of 3.4, BNP of 197, troponin of 15.3, POCT glucose of 113.  CXR shows low inspiratory volume exacerbating cardiomediastinal silhouette and causing crowding of vessels.  No evidence of acute cardiopulmonary findings.  Dilated colonic loops projects below the elevated right hemidiaphragm.  No medications administered while in the ED. case discussed with ED provider and patient will be placed under observation for further management."    He has been evaluated by Cardiology; he is a high-risk for pacemaker placement for bradycardia.  After discussion with Cardiology patient and family did not desire to pursue pacemaker placement.  Cardiology is recommending hospice.  I have been asked to assist with goals of " care.    Hospital Course:  No notes on file    Interval History:  See HPI    Past Medical History:   Diagnosis Date    Bradyarrhythmia     CVA (cerebral infarction) 2006    Dementia     Depression     Hyperlipidemia     Hypertension     renal htn    Pulmonary embolism 2002    Seizure disorder        Past Surgical History:   Procedure Laterality Date    left foot  with crushed injry         Review of patient's allergies indicates:   Allergen Reactions    Ciprofloxacin (bulk) Swelling       Medications:  Continuous Infusions:  Scheduled Meds:   0.9% NaCl   Intravenous Once    amoxicillin-clavulanate 500-125mg  1 tablet Oral Q12H    calcitRIOL  0.25 mcg Oral Daily    citalopram  10 mg Oral Daily    diazePAM  2 mg Oral Once    enoxparin  30 mg Subcutaneous Daily    levETIRAcetam  250 mg Oral BID    lisinopriL  20 mg Oral Daily    magnesium citrate  296 mL Rectal Once    tamsulosin  0.4 mg Oral Daily    vitamin E (dl, acetate)  400 Units Oral Daily     PRN Meds:  Current Facility-Administered Medications:     acetaminophen, 650 mg, Oral, Q4H PRN    aluminum-magnesium hydroxide-simethicone, 30 mL, Oral, QID PRN    dextrose 50%, 12.5 g, Intravenous, PRN    dextrose 50%, 25 g, Intravenous, PRN    glucagon (human recombinant), 1 mg, Intramuscular, PRN    glucose, 16 g, Oral, PRN    glucose, 24 g, Oral, PRN    hydrALAZINE, 25 mg, Oral, Q8H PRN    insulin aspart U-100, 0-10 Units, Subcutaneous, QID (AC + HS) PRN    magnesium oxide, 800 mg, Oral, PRN    magnesium oxide, 800 mg, Oral, PRN    melatonin, 6 mg, Oral, Nightly PRN    naloxone, 0.02 mg, Intravenous, PRN    ondansetron, 4 mg, Intravenous, Q6H PRN    potassium bicarbonate, 35 mEq, Oral, PRN    potassium bicarbonate, 50 mEq, Oral, PRN    potassium bicarbonate, 60 mEq, Oral, PRN    potassium, sodium phosphates, 2 packet, Oral, PRN    potassium, sodium phosphates, 2 packet, Oral, PRN    potassium, sodium phosphates, 2 packet, Oral, PRN    senna-docusate 8.6-50 mg, 1  tablet, Oral, BID PRN    sodium chloride 0.9%, 10 mL, Intravenous, Q12H PRN    Family History       Problem Relation (Age of Onset)    Diabetes Mother          Tobacco Use    Smoking status: Never    Smokeless tobacco: Never   Substance and Sexual Activity    Alcohol use: No    Drug use: No    Sexual activity: Not on file       Review of Systems  Objective:     Vital Signs (Most Recent):  Temp: 98.3 °F (36.8 °C) (02/26/25 1157)  Pulse: (!) 50 (02/26/25 1157)  Resp: 18 (02/26/25 0404)  BP: (!) 141/52 (02/26/25 1157)  SpO2: 97 % (02/26/25 1157) Vital Signs (24h Range):  Temp:  [98.2 °F (36.8 °C)-99.4 °F (37.4 °C)] 98.3 °F (36.8 °C)  Pulse:  [45-55] 50  Resp:  [18] 18  SpO2:  [96 %-98 %] 97 %  BP: (138-153)/(49-69) 141/52     Weight: 67.9 kg (149 lb 11.1 oz)  Body mass index is 22.76 kg/m².       Physical Exam  Constitutional:       General: He is not in acute distress.     Appearance: He is ill-appearing.   HENT:      Head: Normocephalic and atraumatic.      Right Ear: External ear normal.      Left Ear: External ear normal.      Nose: Nose normal. No congestion.      Mouth/Throat:      Mouth: Mucous membranes are moist.      Pharynx: No oropharyngeal exudate.   Eyes:      General:         Right eye: No discharge.         Left eye: No discharge.      Extraocular Movements: Extraocular movements intact.   Cardiovascular:      Rate and Rhythm: Normal rate.   Pulmonary:      Effort: Pulmonary effort is normal. No respiratory distress.   Abdominal:      General: There is no distension.      Palpations: Abdomen is soft.      Tenderness: There is no abdominal tenderness.   Neurological:      General: No focal deficit present.      Mental Status: He is alert. He is disoriented.      Motor: Weakness present.   Psychiatric:         Mood and Affect: Mood normal.         Behavior: Behavior normal.      Comments: Not able to exhibit appropriate thought content and judgment            Review of Symptoms      Symptom Assessment  (ESAS 0-10 Scale)  Unable to complete assessment due to Dementia         Pain Assessment in Advanced Demential Scale (PAINAD)   Breathing - Independent of vocalization:  0  Negative vocalization:  0  Facial expression:  0  Body language:  0  Consolability:  0  Total:  0    Performance Status:  40    Psychosocial/Cultural:   See Palliative Psychosocial Note: No  **Primary  to Follow**  Palliative Care  Consult: No        Advance Care Planning  Advance Directives:   Medical Power of : Yes      Decision Making:  Family answered questions and Patient unable to communicate due to disease severity/cognitive impairment  Goals of Care: What is most important right now is to focus on spending time at home, avoiding the hospital, comfort and QOL . Accordingly, we have decided that the best plan to meet the patient's goals includes enrolling in hospice care.         Significant Labs: BMP:   Recent Labs   Lab 02/26/25  0612   GLU 88      K 4.6      CO2 23   BUN 38*   CREATININE 2.2*   CALCIUM 8.4*   MG 1.7     CBC:   Recent Labs   Lab 02/25/25  0426 02/25/25  0746 02/26/25  0612   WBC 4.85  --  4.82   HGB 7.7* 8.1* 8.0*   HCT 23.8* 24.5* 25.2*   *  --  137*     CBC:   Recent Labs   Lab 02/26/25  0612   WBC 4.82   HGB 8.0*   HCT 25.2*   MCV 91   *     BMP:  Recent Labs   Lab 02/26/25  0612   GLU 88      K 4.6      CO2 23   BUN 38*   CREATININE 2.2*   CALCIUM 8.4*   MG 1.7     LFT:  Lab Results   Component Value Date    AST 9 (L) 02/26/2025    ALKPHOS 28 (L) 02/26/2025    BILITOT 0.3 02/26/2025     Albumin:   Albumin   Date Value Ref Range Status   02/26/2025 2.7 (L) 3.5 - 5.2 g/dL Final     Protein:   Total Protein   Date Value Ref Range Status   02/26/2025 5.0 (L) 6.0 - 8.4 g/dL Final     Lactic acid:   Lab Results   Component Value Date    LACTATE 0.9 12/23/2024    LACTATE 1.4 12/22/2024       Significant Imaging: I have reviewed all pertinent imaging  results/findings within the past 24 hours.      I spent a total of 70 minutes on the day of the visit. This includes face to face time in discussion of goals of care, symptom assessment, coordination of care and emotional support.  This also includes non-face to face time preparing to see the patient (eg, review of tests/imaging), obtaining and/or reviewing separately obtained history, documenting clinical information in the electronic or other health record, independently interpreting results and communicating results to the patient/family/caregiver, or care coordinator.    An additional 25 minutes was specifically spent in advance care planning.      Chris Nesbitt MD  Palliative Medicine  [facility]

## 2025-02-26 NOTE — ASSESSMENT & PLAN NOTE
Last A1c reviewed-   Lab Results   Component Value Date    HGBA1C 5.3 02/24/2025     Most recent fingerstick glucose reviewed-   Recent Labs   Lab 02/25/25  1111 02/25/25  1628 02/25/25  2019 02/26/25  0818   POCTGLUCOSE 111* 108 105 91       Current correctional scale  Medium  Maintain anti-hyperglycemic dose as follows-   Antihyperglycemics (From admission, onward)    Start     Stop Route Frequency Ordered    02/23/25 1838  insulin aspart U-100 pen 0-10 Units         -- SubQ Before meals & nightly PRN 02/23/25 1822        Hold Oral hypoglycemics while patient is in the hospital.

## 2025-02-26 NOTE — PT/OT/SLP PROGRESS
Occupational Therapy   Treatment    Name: Lavon Brandon  MRN: [id number]  Admitting Diagnosis:  Bradycardia       Recommendations:     Discharge Recommendations: Low Intensity Therapy  Discharge Equipment Recommendations:  none  Barriers to discharge:   (increased assistance with ADLs, Fall risk)    Assessment:     Lavon Brandon is a 89 y.o. male with a medical diagnosis of Bradycardia.  He was agreeable to OT treatment.  Required extra time for functional transfers. Performance deficits affecting function are weakness, impaired endurance, impaired self care skills, impaired functional mobility, gait instability, impaired balance, impaired cognition, decreased coordination, decreased lower extremity function, decreased upper extremity function, decreased safety awareness, impaired cardiopulmonary response to activity.     Rehab Prognosis:  Good; patient would benefit from acute skilled OT services to address these deficits and reach maximum level of function.       Plan:     Patient to be seen 5 x/week to address the above listed problems via self-care/home management, therapeutic activities, therapeutic exercises  Plan of Care Expires: 03/24/25  Plan of Care Reviewed with: patient, daughter    Subjective     Chief Complaint: No complaints.  He was agreeable to get out of the bed and into a chair.  Patient/Family Comments/goals: To return home.  Pain/Comfort:  Pain Rating 1: 0/10    Objective:     Communicated with: nurse prior to session.  Patient found supine with PureWick, telemetry, peripheral IV upon OT entry to room.    General Precautions: Standard, fall    Orthopedic Precautions:N/A  Braces: N/A  Respiratory Status: Room air     Occupational Performance:     Bed Mobility:    Patient completed Rolling/Turning to Left with  minimum assistance  Patient completed Supine to Sit with moderate assistance      Functional Mobility/Transfers:  Patient completed Sit <> Stand Transfer with moderate assistance  with   rolling walker   Patient completed Bed <> Chair Transfer using Step Transfer technique with minimum assistance with rolling walker  Functional Mobility: He took 3 steps forward and 2 steps back to the chair with Min A and rolling walker, required verbal cues and tactile cues for walker management.     Activities of Daily Living:  Grooming: set up assistance for oral care and face washing seated in bedside chair        [facility] 6 Click ADL: 15    Treatment & Education:  He was educated on Role of Occupational Therapy, goals and plan of care.  Discussed importance of OOB activity and functional mobility to negate the negative effects of prolonged bedrest during this hospitalization, safe transfers and d/c planning recommendations. OT educated patient on safety with walker use for functional mobility with cues for proper hand placement on and sequencing.     Patient left up in chair with all lines intact, call button in reach, chair alarm on, and daughter present    GOALS:   Multidisciplinary Problems       Occupational Therapy Goals          Problem: Occupational Therapy    Goal Priority Disciplines Outcome Interventions   Occupational Therapy Goal     OT, PT/OT     Description: Goals to be met by: 3/24/2025     Patient will increase functional independence with ADLs by performing:    UE Dressing with Minimal Assistance.  Grooming while seated with Supervision.  Upper extremity exercise program x10 reps per handout, with supervision.                         DME Justifications:  No DME recommended requiring DME justifications    Time Tracking:     OT Date of Treatment: 02/26/25  OT Start Time: 1150  OT Stop Time: 1216  OT Total Time (min): 26 min    Billable Minutes:Self Care/Home Management 13  Therapeutic Activity 13    OT/KIMMY: OT          2/26/2025

## 2025-02-26 NOTE — PLAN OF CARE
[facility]  Discharge Reassessment    Primary Care Provider: Aristides Haynes MD    Expected Discharge Date: 2/27/2025    Palliative care team following and have been in communication with pt's daughter, Gabriela. States plan is likely changing to hospice. Verified with daughter, Nilda, at bedside that family [facility] but unsure if Gabriela had officially decided on hospice. CM updated SW, SW to follow up with family and coordinate [facility]    Reassessment (most recent)       Discharge Reassessment - 02/26/25 1332          Discharge Reassessment    Assessment Type Discharge Planning Reassessment     Did the patient's condition or plan change since previous assessment? No     Discharge Plan discussed with: Adult children;Patient     Communicated JOANNE with patient/caregiver Yes     Discharge Plan A Hospice/home     Discharge Plan B Home with family     DME Needed Upon Discharge  none   tbd    Transition of Care Barriers None

## 2025-02-26 NOTE — PLAN OF CARE
DOM called and spoke with pt's daughter Gabriela, per Gabriela after talking to Dr. Nesbitt she feels that Hospice is the best route for her father. Gabriela req referrals be sent [facility] and [facility]. Both companies [facility]. Gabriela wants to meet with both companies and make her decision. DOM sent referrals and will cont to follow.      02/26/25 1600   Discharge Plan   Discharge Plan A Hospice/home   Discharge Plan B Home with family

## 2025-02-26 NOTE — ASSESSMENT & PLAN NOTE
I reviewed the patient's chart and discussed the case with the patient's team.      I examined Lavon Brandon at bedside.  The patient lacks capacity for complex medical decision-making.  I spoke with him, and his daughter at bedside. His daughter, Gabriela, joined us by phone.    I introduced myself and my role as palliative care physician. They were agreeable to speaking.    We discussed the patient's medical illness, prognosis, and values in detail.  Below is a brief summary of our discussion.    They are well up-to-date on his overall condition.  They discussed his multitude of medical issues.  They understand his primary issue is his heart arrhythmia.    We discussed prognosis.  At this point given his age and multiple comorbidities I worry that his prognosis is limited to weeks to maybe months.  I will be surprised if he is living a year from now.    We discussed his values.  It is important for him to go home.  They are not entirely sure what more he is willing to endure.  They do not believe he would be willing to undergo pacemaker placement particularly after discussing the risks with cardiology.  Not entirely sure if he would be willing to go through more hospitalization if he gets sicker.  They do not mention any particular worries or fears.    We spoke back and forth.  I took time to answer all questions to the best of my ability.    At this point the most important question for his daughter to consider his how much more he is willing to go through.  If it is most important for him to go home and get the care that he needs and deserves at home.  If he does not desire to return to the hospital then hospice is certainly a meléndez way to care for him.    Alternatively, if he would desire repeat hospitalizations when the next complication occurs then hospice isn't quite appropriate.    Regardless of the path they choose, I suspect his prognosis is not changed; hospice or not.  They understood.    After some more  back and forth, they seemed to be leaning towards hospice at home.    Discussed with his case management and social work team.    I appreciate being involved.  I hope I have been helpful.    We will follow up.

## 2025-02-26 NOTE — SUBJECTIVE & OBJECTIVE
Interval History:  See HPI    Past Medical History:   Diagnosis Date    Bradyarrhythmia     CVA (cerebral infarction) 2006    Dementia     Depression     Hyperlipidemia     Hypertension     renal htn    Pulmonary embolism 2002    Seizure disorder        Past Surgical History:   Procedure Laterality Date    left foot  with crushed injry         Review of patient's allergies indicates:   Allergen Reactions    Ciprofloxacin (bulk) Swelling       Medications:  Continuous Infusions:  Scheduled Meds:   0.9% NaCl   Intravenous Once    amoxicillin-clavulanate 500-125mg  1 tablet Oral Q12H    calcitRIOL  0.25 mcg Oral Daily    citalopram  10 mg Oral Daily    diazePAM  2 mg Oral Once    enoxparin  30 mg Subcutaneous Daily    levETIRAcetam  250 mg Oral BID    lisinopriL  20 mg Oral Daily    magnesium citrate  296 mL Rectal Once    tamsulosin  0.4 mg Oral Daily    vitamin E (dl, acetate)  400 Units Oral Daily     PRN Meds:  Current Facility-Administered Medications:     acetaminophen, 650 mg, Oral, Q4H PRN    aluminum-magnesium hydroxide-simethicone, 30 mL, Oral, QID PRN    dextrose 50%, 12.5 g, Intravenous, PRN    dextrose 50%, 25 g, Intravenous, PRN    glucagon (human recombinant), 1 mg, Intramuscular, PRN    glucose, 16 g, Oral, PRN    glucose, 24 g, Oral, PRN    hydrALAZINE, 25 mg, Oral, Q8H PRN    insulin aspart U-100, 0-10 Units, Subcutaneous, QID (AC + HS) PRN    magnesium oxide, 800 mg, Oral, PRN    magnesium oxide, 800 mg, Oral, PRN    melatonin, 6 mg, Oral, Nightly PRN    naloxone, 0.02 mg, Intravenous, PRN    ondansetron, 4 mg, Intravenous, Q6H PRN    potassium bicarbonate, 35 mEq, Oral, PRN    potassium bicarbonate, 50 mEq, Oral, PRN    potassium bicarbonate, 60 mEq, Oral, PRN    potassium, sodium phosphates, 2 packet, Oral, PRN    potassium, sodium phosphates, 2 packet, Oral, PRN    potassium, sodium phosphates, 2 packet, Oral, PRN    senna-docusate 8.6-50 mg, 1 tablet, Oral, BID PRN    sodium chloride 0.9%, 10 mL,  Intravenous, Q12H PRN    Family History       Problem Relation (Age of Onset)    Diabetes Mother          Tobacco Use    Smoking status: Never    Smokeless tobacco: Never   Substance and Sexual Activity    Alcohol use: No    Drug use: No    Sexual activity: Not on file       Review of Systems  Objective:     Vital Signs (Most Recent):  Temp: 98.3 °F (36.8 °C) (02/26/25 1157)  Pulse: (!) 50 (02/26/25 1157)  Resp: 18 (02/26/25 0404)  BP: (!) 141/52 (02/26/25 1157)  SpO2: 97 % (02/26/25 1157) Vital Signs (24h Range):  Temp:  [98.2 °F (36.8 °C)-99.4 °F (37.4 °C)] 98.3 °F (36.8 °C)  Pulse:  [45-55] 50  Resp:  [18] 18  SpO2:  [96 %-98 %] 97 %  BP: (138-153)/(49-69) 141/52     Weight: 67.9 kg (149 lb 11.1 oz)  Body mass index is 22.76 kg/m².       Physical Exam  Constitutional:       General: He is not in acute distress.     Appearance: He is ill-appearing.   HENT:      Head: Normocephalic and atraumatic.      Right Ear: External ear normal.      Left Ear: External ear normal.      Nose: Nose normal. No congestion.      Mouth/Throat:      Mouth: Mucous membranes are moist.      Pharynx: No oropharyngeal exudate.   Eyes:      General:         Right eye: No discharge.         Left eye: No discharge.      Extraocular Movements: Extraocular movements intact.   Cardiovascular:      Rate and Rhythm: Normal rate.   Pulmonary:      Effort: Pulmonary effort is normal. No respiratory distress.   Abdominal:      General: There is no distension.      Palpations: Abdomen is soft.      Tenderness: There is no abdominal tenderness.   Neurological:      General: No focal deficit present.      Mental Status: He is alert. He is disoriented.      Motor: Weakness present.   Psychiatric:         Mood and Affect: Mood normal.         Behavior: Behavior normal.      Comments: Not able to exhibit appropriate thought content and judgment            Review of Symptoms      Symptom Assessment (ESAS 0-10 Scale)  Unable to complete assessment due to  Dementia         Pain Assessment in Advanced Demential Scale (PAINAD)   Breathing - Independent of vocalization:  0  Negative vocalization:  0  Facial expression:  0  Body language:  0  Consolability:  0  Total:  0    Performance Status:  40    Psychosocial/Cultural:   See Palliative Psychosocial Note: No  **Primary  to Follow**  Palliative Care  Consult: No        Advance Care Planning   Advance Directives:   Medical Power of : Yes      Decision Making:  Family answered questions and Patient unable to communicate due to disease severity/cognitive impairment  Goals of Care: What is most important right now is to focus on spending time at home, avoiding the hospital, comfort and QOL . Accordingly, we have decided that the best plan to meet the patient's goals includes enrolling in hospice care.         Significant Labs: BMP:   Recent Labs   Lab 02/26/25  0612   GLU 88      K 4.6      CO2 23   BUN 38*   CREATININE 2.2*   CALCIUM 8.4*   MG 1.7     CBC:   Recent Labs   Lab 02/25/25  0426 02/25/25  0746 02/26/25  0612   WBC 4.85  --  4.82   HGB 7.7* 8.1* 8.0*   HCT 23.8* 24.5* 25.2*   *  --  137*     CBC:   Recent Labs   Lab 02/26/25  0612   WBC 4.82   HGB 8.0*   HCT 25.2*   MCV 91   *     BMP:  Recent Labs   Lab 02/26/25  0612   GLU 88      K 4.6      CO2 23   BUN 38*   CREATININE 2.2*   CALCIUM 8.4*   MG 1.7     LFT:  Lab Results   Component Value Date    AST 9 (L) 02/26/2025    ALKPHOS 28 (L) 02/26/2025    BILITOT 0.3 02/26/2025     Albumin:   Albumin   Date Value Ref Range Status   02/26/2025 2.7 (L) 3.5 - 5.2 g/dL Final     Protein:   Total Protein   Date Value Ref Range Status   02/26/2025 5.0 (L) 6.0 - 8.4 g/dL Final     Lactic acid:   Lab Results   Component Value Date    LACTATE 0.9 12/23/2024    LACTATE 1.4 12/22/2024       Significant Imaging: I have reviewed all pertinent imaging results/findings within the past 24 hours.

## 2025-02-26 NOTE — PROGRESS NOTES
"Nephrology Consult Note        Patient Name: Lavon Brandon  MRN: [id number]    Patient Class: IP- Inpatient   Admission Date: 2/23/2025  Length of Stay: 1 days  Date of Service: 2/26/2025    Attending Physician: Emma Mojica MD  Primary Care Provider: Aristides Haynes MD    Reason for Consult: syncope/orthostatic hypotension    SUBJECTIVE:     HPI: 89M patient whom I see in office regularly, presented after syncopal episode at home. Per his daughter this is his second syncopal episode and had one presyncopal episode w/I past 6 months since living with her. At the time of presentation the patient's BP had recovered. He had carotid US that showed no evidence of hemodynamically significant carotid arterial stenosis. CT head showed no CT evidence of acute intracranial abnormality. Cardiology was consulted who recommends conservative treatment. CT head showing, "trace opacification of the right maxillary sinus, and trace opacification of the left mastoid air cells". Patient started on Augmentin with plan for outpatient referral to ENT. Lastly, KUB showed stool burden suggesting constipation.      Patient noted to be orthostatic. EEG performed. ECHO pending. Bowel evacuation and IV fluids prescribed. UA is bland.    2/25 VSS. No new complains.  2/26  UOP 2L, bradycardic.  ECHO w/EF 40-50%.  OOR.    ASSESSMENT/PLAN:     Orthostatic hypotension  CKD stage 4, eGFR < 30 ml/min  BPH  No NSAIDs, ACEI/ARB, IV contrast or other nephrotoxins.  Keep MAP > 60, SBP > 100.  Dose meds for GFR < 30 ml/min.  Renal diet - low K, low phos.  He probably needs BPH meds but can stop all BP meds...  Agree with Cards eval and plan.    Anemia of CKD  Hgb and HCT are acceptable. Monitor for now.  Will provide JASPREET and/or IV iron as needed to keep Hgb 8-10 range.    MBD / Secondary HPT  Ca, Phos, PTH and vitamin D levels are acceptable.   Phos binders, vitamin D and analogues, calcimimetics will be given as needed.    HTN  Tolerate asymptomatic " HTN up to -160.  HOLD home BP meds.    Thank you for allowing us to participate in the care of your patient!   We will follow the patient and provide recommendations as needed.         Laboratory:  Recent Labs   Lab 02/24/25  0421 02/25/25  0426 02/26/25  0612    140 137   K 5.0 4.8 4.6    112* 110   CO2 25 25 23   BUN 46* 43* 38*   CREATININE 2.2* 2.2* 2.2*   GLU 94 90 88       Recent Labs   Lab 02/24/25  0421 02/25/25  0426 02/26/25  0612   CALCIUM 9.3 8.5* 8.4*   ALBUMIN 3.0* 2.7* 2.7*   PHOS 3.5 3.6 3.1   MG 2.0 1.7 1.7       Recent Labs   Lab 06/23/23  1635 01/02/25  1415   PTH, Intact 118.8 H 51.8       Recent Labs   Lab 02/23/25  1434 02/23/25  1956 02/24/25  0721 02/24/25  1109 02/24/25  1658 02/24/25  1951 02/25/25  0714 02/25/25  1111 02/25/25  1628 02/25/25  2019   POCTGLUCOSE 113* 159* 86 137* 108 147* 85 111* 108 105       Recent Labs   Lab 11/08/23  0824 06/23/24  0458 02/24/25  0421   Hemoglobin A1C 5.2 5.1 5.3       Recent Labs   Lab 02/24/25  0421 02/25/25  0426 02/25/25  0746 02/26/25  0612   WBC 5.53 4.85  --  4.82   HGB 9.4* 7.7* 8.1* 8.0*   HCT 28.3* 23.8* 24.5* 25.2*   * 135*  --  137*   MCV 91 93  --  91   MCHC 33.2 32.4  --  31.7*   MONO 9.6  0.5 8.0  0.4  --  7.9  0.4   EOSINOPHIL 1.8 1.4  --  2.5       Recent Labs   Lab 02/24/25  0421 02/25/25  0426 02/26/25  0612   BILITOT 0.3 0.3 0.3   PROT 5.6* 5.0* 5.0*   ALBUMIN 3.0* 2.7* 2.7*   ALKPHOS 34* 29* 28*   ALT 5* 4* 4*   AST 12 10 9*       Recent Labs   Lab 10/14/22  0949 04/11/23  1428 04/13/23  2220 11/30/23  0319 02/16/24  0117 06/23/24  0545 09/06/24  1025 09/13/24  1437 11/19/24  1139 12/22/24  1353 02/23/25  1702   Color, UA Yellow Yellow   < > Colorless A   < > Yellow Yellow Yellow Yellow Yellow Yellow   Appearance, UA Clear Clear   < > Clear   < > Clear Clear Clear Clear Clear Clear   pH, UA 6.0 7.0   < > 6.0   < > 6.0 6.0 6.0 7.0 7.0 6.0   Specific [place], UA 1.025 1.015   < > 1.010   < > 1.015 1.020  1.010 1.015 1.010 1.015   Protein, UA 1+ A 1+ A   < > Negative   < > Trace A Negative Trace A Negative Negative Negative   Glucose, UA Negative Negative   < > Negative   < > Negative Negative Negative Negative Negative Negative   Ketones, UA 1+ A 1+ A   < > Negative   < > Negative Negative Negative Negative Negative Negative   Urobilinogen, UA 1.0 Negative   < > Negative   < > Negative  --  Negative  --  Negative Negative   Bilirubin (UA) Negative Negative   < > Negative   < > Negative Negative Negative Negative Negative Negative   Occult Blood UA 3+ A 1+ A   < > 1+ A   < > Negative Negative Negative Negative Negative Negative   Nitrite, UA Negative Negative   < > Negative   < > Negative Negative Negative Negative Negative Negative   RBC, UA 3 1  --  13 H  --  2 1  --   --   --   --    WBC, UA 1 0  --  0  --  14 H 46 H  --   --   --   --    Bacteria Rare Negative  --  Negative  --  Moderate A Many A  --   --   --   --    Hyaline Casts, UA 0 1  --  0  --   --   --   --   --   --   --     < > = values in this interval not displayed.       Recent Labs   Lab 06/22/24  2227 09/13/24  1112 12/22/24  1311   Sample VENOUS VENOUS VENOUS       Microbiology Results (last 7 days)       ** No results found for the last 168 hours. **            Review of patient's allergies indicates:   Allergen Reactions    Ciprofloxacin (bulk) Swelling       Outpatient meds:  No current facility-administered medications on file prior to encounter.     Current Outpatient Medications on File Prior to Encounter   Medication Sig Dispense Refill    calcitRIOL (ROCALTROL) 0.25 MCG Cap TAKE 1 CAPSULE(0.25 MCG) BY MOUTH EVERY DAY 90 capsule 4    citalopram (CELEXA) 20 MG tablet Take 0.5 tablets (10 mg total) by mouth once daily. 90 tablet 2    cyanocobalamin (VITAMIN B-12) 100 MCG tablet Take 100 mcg by mouth once daily.      levETIRAcetam (KEPPRA) 500 MG Tab Take 0.5 tablets (250 mg total) by mouth 2 (two) times daily. 90 tablet 3    lisinopriL  (PRINIVIL,ZESTRIL) 20 MG tablet Take 1 tablet (20 mg total) by mouth once daily. 90 tablet 3    tamsulosin (FLOMAX) 0.4 mg Cap Take 1 capsule (0.4 mg total) by mouth once daily. 90 capsule 3    vitamin E 400 UNIT capsule Take 400 Units by mouth once daily.      chlorpheniramine/dextromethorp (CORICIDIN HBP COUGH AND COLD ORAL) Take 30 mLs by mouth nightly as needed (Cough).      hydrALAZINE (APRESOLINE) 50 MG tablet Take 1 tablet (50 mg total) by mouth every 12 (twelve) hours.      hydroCHLOROthiazide (HYDRODIURIL) 12.5 MG Tab Take 1 tablet (12.5 mg total) by mouth once daily. for 3 days 3 tablet 0       Scheduled meds:   0.9% NaCl   Intravenous Once    amoxicillin-clavulanate 500-125mg  1 tablet Oral Q12H    calcitRIOL  0.25 mcg Oral Daily    citalopram  10 mg Oral Daily    diazePAM  2 mg Oral Once    enoxparin  30 mg Subcutaneous Daily    levETIRAcetam  250 mg Oral BID    lisinopriL  20 mg Oral Daily    magnesium citrate  296 mL Rectal Once    tamsulosin  0.4 mg Oral Daily    vitamin E (dl, acetate)  400 Units Oral Daily       Infusions:        PRN meds:    Current Facility-Administered Medications:     acetaminophen, 650 mg, Oral, Q4H PRN    aluminum-magnesium hydroxide-simethicone, 30 mL, Oral, QID PRN    dextrose 50%, 12.5 g, Intravenous, PRN    dextrose 50%, 25 g, Intravenous, PRN    glucagon (human recombinant), 1 mg, Intramuscular, PRN    glucose, 16 g, Oral, PRN    glucose, 24 g, Oral, PRN    hydrALAZINE, 25 mg, Oral, Q8H PRN    insulin aspart U-100, 0-10 Units, Subcutaneous, QID (AC + HS) PRN    magnesium oxide, 800 mg, Oral, PRN    magnesium oxide, 800 mg, Oral, PRN    melatonin, 6 mg, Oral, Nightly PRN    naloxone, 0.02 mg, Intravenous, PRN    ondansetron, 4 mg, Intravenous, Q6H PRN    potassium bicarbonate, 35 mEq, Oral, PRN    potassium bicarbonate, 50 mEq, Oral, PRN    potassium bicarbonate, 60 mEq, Oral, PRN    potassium, sodium phosphates, 2 packet, Oral, PRN    potassium, sodium phosphates, 2  packet, Oral, PRN    potassium, sodium phosphates, 2 packet, Oral, PRN    senna-docusate 8.6-50 mg, 1 tablet, Oral, BID PRN    sodium chloride 0.9%, 10 mL, Intravenous, Q12H PRN    Past Medical History:   Diagnosis Date    Bradyarrhythmia     CVA (cerebral infarction) 2006    Dementia     Depression     Hyperlipidemia     Hypertension     renal htn    Pulmonary embolism 2002    Seizure disorder      Past Surgical History:   Procedure Laterality Date    left foot  with crushed injry       Family History   Problem Relation Name Age of Onset    Diabetes Mother      Cancer Neg Hx       Social History     Tobacco Use    Smoking status: Never    Smokeless tobacco: Never   Substance Use Topics    Alcohol use: No    Drug use: No       OBJECTIVE:     Vital Signs and IO:  Temp:  [98.2 °F (36.8 °C)-99.4 °F (37.4 °C)]   Pulse:  [45-55]   Resp:  [18]   BP: (129-153)/(49-69)   SpO2:  [96 %-98 %]   I/O last 3 completed shifts:  In: 720 [P.O.:720]  Out: 2610 [Urine:2610]  Wt Readings from Last 5 Encounters:   02/23/25 67.9 kg (149 lb 11.1 oz)   02/24/25 67.6 kg (149 lb)   01/14/25 73 kg (160 lb 15 oz)   12/27/24 66.2 kg (146 lb)   12/22/24 74.9 kg (165 lb 2 oz)     Body mass index is 22.76 kg/m².    Physical Exam  Constitutional:       General: Patient is not in acute distress.     Appearance: Patient is well-developed. She is not diaphoretic.   HENT:      Head: Normocephalic and atraumatic.      Mouth/Throat: Mucous membranes are moist.   Eyes:      General: No scleral icterus.     Pupils: Pupils are equal, round, and reactive to light.   Cardiovascular:      Rate and Rhythm: Normal rate and regular rhythm.   Pulmonary:      Effort: Pulmonary effort is normal. No respiratory distress.      Breath sounds: No stridor.   Abdominal:      General: There is no distension.      Palpations: Abdomen is soft.   Musculoskeletal:         General: No deformity. Normal range of motion.      Cervical back: Neck supple.   Skin:     General: Skin  is warm and dry.      Findings: No rash present. No erythema.   Neurological:      Mental Status: Patient is alert and oriented to person, place, and time.      Cranial Nerves: No cranial nerve deficit.   Psychiatric:         Behavior: Behavior normal.          Patient care time was spent personally by me on the following activities:     Obtaining a history.  Examination of patient.  Providing medical care at the patients bedside.  Developing a treatment plan with patient or surrogate and bedside caregivers.  Ordering and reviewing laboratory studies, radiographic studies, pulse oximetry.  Ordering and performing treatments and interventions.  Evaluation of patient's response to treatment.  Discussions with consultants while on the unit and immediately available to the patient.  Re-evaluation of the patient's condition.  Documentation in the medical record.     Ricarda Ogden NP      [facility]  [address] [place] [address]  [place] [address]    [phone] - tel  [phone] - fax    2/26/2025

## 2025-02-26 NOTE — ASSESSMENT & PLAN NOTE
Patient's blood pressure range in the last 24 hours was: BP  Min: 129/63  Max: 153/66.The patient's inpatient anti-hypertensive regimen is listed below:  Current Antihypertensives  lisinopriL tablet 20 mg, Daily, Oral  hydrALAZINE tablet 25 mg, Every 8 hours PRN, Oral    Conservative IV fluids  Recheck in am

## 2025-02-26 NOTE — PLAN OF CARE
Problem: Skin Injury Risk Increased  Goal: Skin Health and Integrity  Outcome: Progressing     Problem: Adult Inpatient Plan of Care  Goal: Plan of Care Review  Outcome: Progressing  Goal: Patient-Specific Goal (Individualized)  Outcome: Progressing  Goal: Absence of Hospital-Acquired Illness or Injury  Outcome: Progressing  Goal: Optimal Comfort and Wellbeing  Outcome: Progressing  Goal: Readiness for Transition of Care  Outcome: Progressing     Problem: Diabetes Comorbidity  Goal: Blood Glucose Level Within Targeted Range  Outcome: Progressing     Problem: Sepsis/Septic Shock  Goal: Optimal Coping  Outcome: Progressing  Goal: Absence of Bleeding  Outcome: Progressing  Goal: Blood Glucose Level Within Targeted Range  Outcome: Progressing  Goal: Absence of Infection Signs and Symptoms  Outcome: Progressing  Goal: Optimal Nutrition Intake  Outcome: Progressing     Problem: Acute Kidney Injury/Impairment  Goal: Fluid and Electrolyte Balance  Outcome: Progressing  Goal: Improved Oral Intake  Outcome: Progressing  Goal: Effective Renal Function  Outcome: Progressing     Problem: Wound  Goal: Optimal Coping  Outcome: Progressing  Goal: Optimal Functional Ability  Outcome: Progressing  Goal: Absence of Infection Signs and Symptoms  Outcome: Progressing  Goal: Improved Oral Intake  Outcome: Progressing  Goal: Optimal Pain Control and Function  Outcome: Progressing  Goal: Skin Health and Integrity  Outcome: Progressing  Goal: Optimal Wound Healing  Outcome: Progressing     Problem: Fall Injury Risk  Goal: Absence of Fall and Fall-Related Injury  Outcome: Progressing     Problem: Coping Ineffective  Goal: Effective Coping  Outcome: Progressing

## 2025-02-26 NOTE — PT/OT/SLP PROGRESS
Physical Therapy      Patient Name:  Lavon Brandon   MRN:  [id number]    Patient not seen today secondary to pt unavailable . Will follow-up next scheduled visit.

## 2025-02-26 NOTE — SUBJECTIVE & OBJECTIVE
INTERVAL HISTORY:  Patient seen and examined at bedside during morning rounds, sitting up eating, family voices no new complaints overnight.  Awaiting home with hospice acceptance.  Kidney function remains at baseline.  Patient continues bradycardia.        Past Medical History:   Diagnosis Date    Bradyarrhythmia     CVA (cerebral infarction) 2006    Dementia     Depression     Hyperlipidemia     Hypertension     renal htn    Pulmonary embolism 2002    Seizure disorder        Past Surgical History:   Procedure Laterality Date    left foot  with crushed injry         Review of patient's allergies indicates:   Allergen Reactions    Ciprofloxacin (bulk) Swelling       No current facility-administered medications on file prior to encounter.     Current Outpatient Medications on File Prior to Encounter   Medication Sig    calcitRIOL (ROCALTROL) 0.25 MCG Cap TAKE 1 CAPSULE(0.25 MCG) BY MOUTH EVERY DAY    citalopram (CELEXA) 20 MG tablet Take 0.5 tablets (10 mg total) by mouth once daily.    cyanocobalamin (VITAMIN B-12) 100 MCG tablet Take 100 mcg by mouth once daily.    levETIRAcetam (KEPPRA) 500 MG Tab Take 0.5 tablets (250 mg total) by mouth 2 (two) times daily.    lisinopriL (PRINIVIL,ZESTRIL) 20 MG tablet Take 1 tablet (20 mg total) by mouth once daily.    tamsulosin (FLOMAX) 0.4 mg Cap Take 1 capsule (0.4 mg total) by mouth once daily.    vitamin E 400 UNIT capsule Take 400 Units by mouth once daily.    chlorpheniramine/dextromethorp (CORICIDIN HBP COUGH AND COLD ORAL) Take 30 mLs by mouth nightly as needed (Cough).    hydrALAZINE (APRESOLINE) 50 MG tablet Take 1 tablet (50 mg total) by mouth every 12 (twelve) hours.    hydroCHLOROthiazide (HYDRODIURIL) 12.5 MG Tab Take 1 tablet (12.5 mg total) by mouth once daily. for 3 days     Family History       Problem Relation (Age of Onset)    Diabetes Mother          Tobacco Use    Smoking status: Never    Smokeless tobacco: Never   Substance and Sexual Activity    Alcohol  use: No    Drug use: No    Sexual activity: Not on file     Review of Systems   Unable to perform ROS: Dementia   All other systems reviewed and are negative.    Objective:     Vital Signs (Most Recent):  Temp: 99.4 °F (37.4 °C) (02/26/25 0755)  Pulse: (!) 45 (02/26/25 0755)  Resp: 18 (02/26/25 0404)  BP: (!) 146/62 (02/26/25 0755)  SpO2: 96 % (02/26/25 0755) Vital Signs (24h Range):  Temp:  [98.2 °F (36.8 °C)-99.4 °F (37.4 °C)] 99.4 °F (37.4 °C)  Pulse:  [45-55] 45  Resp:  [18] 18  SpO2:  [96 %-98 %] 96 %  BP: (129-153)/(49-69) 146/62     Weight: 67.9 kg (149 lb 11.1 oz)  Body mass index is 22.76 kg/m².     Physical Exam  Vitals reviewed.   Constitutional:       General: He is awake. He is not in acute distress.     Appearance: Normal appearance. He is ill-appearing (chronically). He is not diaphoretic.      Comments: Elderly male, very little verbal communication.   Cardiovascular:      Rate and Rhythm: Bradycardia present.      Heart sounds: Normal heart sounds. No murmur heard.     No friction rub. No gallop.   Pulmonary:      Effort: Pulmonary effort is normal. No accessory muscle usage or respiratory distress.      Breath sounds: Normal breath sounds. No wheezing, rhonchi or rales.   Abdominal:      General: Abdomen is flat. Bowel sounds are normal.      Palpations: Abdomen is soft.      Tenderness: There is no abdominal tenderness. There is no guarding or rebound.   Musculoskeletal:      Right lower leg: No edema.      Left lower leg: No edema.   Skin:     General: Skin is warm and dry.   Neurological:      Mental Status: He is alert. Mental status is at baseline. He is disoriented.      Comments: A&O x1 to self.  Disoriented to place and time.   Psychiatric:         Behavior: Behavior is cooperative.                Significant Labs: All pertinent labs within the past 24 hours have been reviewed.  CBC:   Recent Labs   Lab 02/25/25  0426 02/25/25  0746 02/26/25  0612   WBC 4.85  --  4.82   HGB 7.7* 8.1* 8.0*  "  HCT 23.8* 24.5* 25.2*   *  --  137*     CMP:   Recent Labs   Lab 02/25/25  0426 02/26/25  0612    137   K 4.8 4.6   * 110   CO2 25 23   GLU 90 88   BUN 43* 38*   CREATININE 2.2* 2.2*   CALCIUM 8.5* 8.4*   PROT 5.0* 5.0*   ALBUMIN 2.7* 2.7*   BILITOT 0.3 0.3   ALKPHOS 29* 28*   AST 10 9*   ALT 4* 4*   ANIONGAP 3* 4*     Cardiac Markers:   No results for input(s): "CKMB", "MYOGLOBIN", "BNP", "TROPISTAT" in the last 48 hours.    Magnesium:   Recent Labs   Lab 02/25/25  0426 02/26/25  0612   MG 1.7 1.7     POCT Glucose:   Recent Labs   Lab 02/25/25  1628 02/25/25 2019 02/26/25  0818   POCTGLUCOSE 108 105 91     Troponin:   No results for input(s): "TROPONINI", "TROPONINIHS" in the last 48 hours.    TSH:   Recent Labs   Lab 02/23/25  1642   TSH 2.091     Urine Studies:   No results for input(s): "COLORU", "APPEARANCEUA", "PHUR", "SPECGRAV", "PROTEINUA", "GLUCUA", "KETONESU", "BILIRUBINUA", "OCCULTUA", "NITRITE", "UROBILINOGEN", "LEUKOCYTESUR", "RBCUA", "WBCUA", "BACTERIA", "SQUAMEPITHEL", "HYALINECASTS" in the last 48 hours.    Invalid input(s): "WRIGHTSUR"      Significant Imaging:   Imaging Results              CT Head Without Contrast (Final result)  Result time 02/23/25 20:29:29      Final result by Baldemar Morelos MD (02/23/25 20:29:29)                   Impression:      No CT evidence of acute intracranial abnormality. Clinical correlation and further evaluation as warranted.    Chronic senescent and microvascular ischemic changes.      Electronically signed by: Baldemar Morelos MD  Date:    02/23/2025  Time:    20:29               Narrative:    EXAMINATION:  CT HEAD WITHOUT CONTRAST    CLINICAL HISTORY:  Syncope, recurrent;    TECHNIQUE:  Low dose axial images were obtained through the head.  Coronal and sagittal reformations were also performed. Contrast was not administered.    COMPARISON:  CT head 01/16/2025    FINDINGS:  There is generalized cerebral volume loss with compensatory " sulcal widening and ventricular enlargement.  There is patchy and confluent periventricular and supratentorial white matter hypoattenuation suggesting sequelae of chronic microvascular ischemic change.  There are stable remote lacunar type infarcts of the left basal ganglia and cedric.  No CT evidence of acute intracranial hemorrhage.  The basal cisterns are patent. There is near complete opacification of the right maxillary sinus, similar to prior examination.  There is trace opacification of the left mastoid air cells.  No acute displaced calvarial fracture identified.  The visualized bones of the calvarium demonstrate no acute osseous abnormality.                                       X-Ray KUB (Final result)  Result time 02/23/25 19:31:13      Final result by Baldemar Morelos MD (02/23/25 19:31:13)                   Impression:      As above.      Electronically signed by: Baldemar Morelos MD  Date:    02/23/2025  Time:    19:31               Narrative:    EXAMINATION:  XR KUB    CLINICAL HISTORY:  dilated colonic loops on CXR;    TECHNIQUE:  Single AP supine view of the abdomen (KUB) was performed    COMPARISON:  Abdominal radiograph series 09/16/2024    FINDINGS:  There is mild gaseous distention of the colon which contains hyperdense material, possibly enteric contrast from prior barium fluoroscopic examination.  No central small bowel air-fluid levels appreciated to suggest high-grade obstruction.  Scattered retained stool projects over the colon suggesting constipation.  No large volume of free intraperitoneal air appreciated allowing for supine technique.  Osseous structures demonstrate degenerative changes.                                       US Carotid Bilateral (Final result)  Result time 02/24/25 06:57:49      Final result by Arnulfo Lopez IV, MD (02/24/25 06:57:49)                   Impression:      Scattered bilateral atheromatous changes without evidence of hemodynamically significant carotid  arterial stenosis.      Electronically signed by: Arnulfo Lopez  Date:    02/24/2025  Time:    06:57               Narrative:    EXAMINATION:  US CAROTID BILATERAL    CLINICAL HISTORY:  syncope;    TECHNIQUE:  CMS MANDATED QUALITY DATA - CAROTID - 195    All measurements and percent stenosis described below were determined using NASCET criteria or criteria similar to NASCET, as defined by the Society of Radiologists in Ultrasound Consensus Conference, Radiology, 2003    FINDINGS:  Real-time, duplex and color Doppler interrogation are performed.  Scattered atheromatous changes are present within the carotid arteries.  There are no findings to suggest hemodynamically significant carotid arterial stenosis on either side.  Antegrade flow is observed within the vertebral arteries bilaterally.                                       X-Ray Chest AP Portable (Final result)  Result time 02/23/25 15:22:10      Final result by Chuy Murphy Jr., MD (02/23/25 15:22:10)                   Impression:      Low inspiratory volume exacerbating the cardiomediastinal silhouette and causing crowding of vessels.  No evidence of acute cardiopulmonary findings.  No detrimental change upon comparison.      Electronically signed by: Chuy Murphy MD  Date:    02/23/2025  Time:    15:22               Narrative:    EXAMINATION:  XR CHEST AP PORTABLE    CLINICAL HISTORY:  Chest Pain;    TECHNIQUE:  Single frontal view of the chest was performed.    COMPARISON:  01/14/2025    FINDINGS:  Epicardial pacing pad projects over the patient's midline sternum.  Heart is upper normal in size exacerbated by the decreased inspiratory volume.  Small effusion obscures the left costophrenic sulcus.  Chronic eventration of right hemidiaphragm.  Crowding of vessels is attributed towards the poor inspiratory volume.  Dilated colonic loops projects below the elevated right hemidiaphragm.

## 2025-02-27 LAB
ALBUMIN SERPL BCP-MCNC: 2.7 G/DL (ref 3.5–5.2)
ALP SERPL-CCNC: 30 U/L (ref 55–135)
ALT SERPL W/O P-5'-P-CCNC: 4 U/L (ref 10–44)
ANION GAP SERPL CALC-SCNC: 3 MMOL/L (ref 8–16)
AST SERPL-CCNC: 8 U/L (ref 10–40)
BASOPHILS # BLD AUTO: 0.02 K/UL (ref 0–0.2)
BASOPHILS NFR BLD: 0.4 % (ref 0–1.9)
BILIRUB SERPL-MCNC: 0.2 MG/DL (ref 0.1–1)
BUN SERPL-MCNC: 42 MG/DL (ref 10–30)
CALCIUM SERPL-MCNC: 8.7 MG/DL (ref 8.7–10.5)
CHLORIDE SERPL-SCNC: 110 MMOL/L (ref 95–110)
CO2 SERPL-SCNC: 25 MMOL/L (ref 23–29)
CREAT SERPL-MCNC: 2.3 MG/DL (ref 0.5–1.4)
DIFFERENTIAL METHOD BLD: ABNORMAL
EOSINOPHIL # BLD AUTO: 0.2 K/UL (ref 0–0.5)
EOSINOPHIL NFR BLD: 3.1 % (ref 0–8)
ERYTHROCYTE [DISTWIDTH] IN BLOOD BY AUTOMATED COUNT: 13.7 % (ref 11.5–14.5)
EST. GFR  (NO RACE VARIABLE): 26 ML/MIN/1.73 M^2
GLUCOSE SERPL-MCNC: 111 MG/DL (ref 70–110)
HCT VFR BLD AUTO: 25.5 % (ref 40–54)
HGB BLD-MCNC: 8.2 G/DL (ref 14–18)
IMM GRANULOCYTES # BLD AUTO: 0.01 K/UL (ref 0–0.04)
IMM GRANULOCYTES NFR BLD AUTO: 0.2 % (ref 0–0.5)
LYMPHOCYTES # BLD AUTO: 2.6 K/UL (ref 1–4.8)
LYMPHOCYTES NFR BLD: 49.6 % (ref 18–48)
MAGNESIUM SERPL-MCNC: 1.7 MG/DL (ref 1.6–2.6)
MCH RBC QN AUTO: 29.4 PG (ref 27–31)
MCHC RBC AUTO-ENTMCNC: 32.2 G/DL (ref 32–36)
MCV RBC AUTO: 91 FL (ref 82–98)
MONOCYTES # BLD AUTO: 0.5 K/UL (ref 0.3–1)
MONOCYTES NFR BLD: 9.3 % (ref 4–15)
NEUTROPHILS # BLD AUTO: 1.9 K/UL (ref 1.8–7.7)
NEUTROPHILS NFR BLD: 37.4 % (ref 38–73)
NRBC BLD-RTO: 0 /100 WBC
PHOSPHATE SERPL-MCNC: 3.3 MG/DL (ref 2.7–4.5)
PLATELET # BLD AUTO: 142 K/UL (ref 150–450)
PMV BLD AUTO: 11.3 FL (ref 9.2–12.9)
POCT GLUCOSE: 107 MG/DL (ref 70–110)
POCT GLUCOSE: 108 MG/DL (ref 70–110)
POCT GLUCOSE: 89 MG/DL (ref 70–110)
POCT GLUCOSE: 98 MG/DL (ref 70–110)
POCT GLUCOSE: 98 MG/DL (ref 70–110)
POTASSIUM SERPL-SCNC: 4.8 MMOL/L (ref 3.5–5.1)
PROT SERPL-MCNC: 4.9 G/DL (ref 6–8.4)
RBC # BLD AUTO: 2.79 M/UL (ref 4.6–6.2)
SODIUM SERPL-SCNC: 138 MMOL/L (ref 136–145)
WBC # BLD AUTO: 5.14 K/UL (ref 3.9–12.7)

## 2025-02-27 PROCEDURE — 25000003 PHARM REV CODE 250: Performed by: NURSE PRACTITIONER

## 2025-02-27 PROCEDURE — 25000003 PHARM REV CODE 250

## 2025-02-27 PROCEDURE — 21400001 HC TELEMETRY ROOM

## 2025-02-27 PROCEDURE — 97530 THERAPEUTIC ACTIVITIES: CPT

## 2025-02-27 PROCEDURE — 36415 COLL VENOUS BLD VENIPUNCTURE: CPT

## 2025-02-27 PROCEDURE — 85025 COMPLETE CBC W/AUTO DIFF WBC: CPT

## 2025-02-27 PROCEDURE — 83735 ASSAY OF MAGNESIUM: CPT

## 2025-02-27 PROCEDURE — 97530 THERAPEUTIC ACTIVITIES: CPT | Mod: CQ

## 2025-02-27 PROCEDURE — 80053 COMPREHEN METABOLIC PANEL: CPT

## 2025-02-27 PROCEDURE — 63600175 PHARM REV CODE 636 W HCPCS

## 2025-02-27 PROCEDURE — 84100 ASSAY OF PHOSPHORUS: CPT

## 2025-02-27 RX ADMIN — AMOXICILLIN AND CLAVULANATE POTASSIUM 500 MG: 500; 125 TABLET, FILM COATED ORAL at 08:02

## 2025-02-27 RX ADMIN — TAMSULOSIN HYDROCHLORIDE 0.4 MG: 0.4 CAPSULE ORAL at 09:02

## 2025-02-27 RX ADMIN — LEVETIRACETAM 250 MG: 250 TABLET, FILM COATED ORAL at 08:02

## 2025-02-27 RX ADMIN — ENOXAPARIN SODIUM 30 MG: 40 INJECTION SUBCUTANEOUS at 05:02

## 2025-02-27 RX ADMIN — LEVETIRACETAM 250 MG: 250 TABLET, FILM COATED ORAL at 09:02

## 2025-02-27 RX ADMIN — Medication 400 UNITS: at 09:02

## 2025-02-27 RX ADMIN — ACETAMINOPHEN 650 MG: 325 TABLET ORAL at 08:02

## 2025-02-27 RX ADMIN — CITALOPRAM HYDROBROMIDE 10 MG: 10 TABLET ORAL at 09:02

## 2025-02-27 RX ADMIN — CALCITRIOL CAPSULES 0.25 MCG 0.25 MCG: 0.25 CAPSULE ORAL at 09:02

## 2025-02-27 RX ADMIN — LISINOPRIL 20 MG: 20 TABLET ORAL at 09:02

## 2025-02-27 RX ADMIN — AMOXICILLIN AND CLAVULANATE POTASSIUM 500 MG: 500; 125 TABLET, FILM COATED ORAL at 09:02

## 2025-02-27 NOTE — PT/OT/SLP PROGRESS
Physical Therapy Treatment    Patient Name:  Lavon Brandon   MRN:  [id number]    Recommendations:     Discharge Recommendations: Low Intensity Therapy  Discharge Equipment Recommendations: none  Barriers to discharge: None    Assessment:     Lavon Brandon is a 89 y.o. male admitted with a medical diagnosis of Bradycardia.  He presents with the following impairments/functional limitations: weakness, impaired endurance, impaired self care skills, impaired functional mobility, gait instability, impaired balance, impaired cognition, decreased upper extremity function, decreased lower extremity function, decreased safety awareness, impaired cardiopulmonary response to activity .    Rehab Prognosis: Fair; patient would benefit from acute skilled PT services to address these deficits and reach maximum level of function.    Recent Surgery: * No surgery found *      Plan:     During this hospitalization, patient to be seen 5 x/week to address the identified rehab impairments via gait training, therapeutic activities, therapeutic exercises and progress toward the following goals:    Plan of Care Expires:  03/24/25    Subjective     Chief Complaint: Tired  Patient/Family Comments/goals: Requesting to return to bed.  Pain/Comfort:  Pain Rating 1: 0/10      Objective:     Communicated with RN prior to session.  Patient found up in chair with telemetry, peripheral IV upon PT entry to room.     General Precautions: Standard, fall  Orthopedic Precautions:    Braces:    Respiratory Status: Room air     Functional Mobility:  Bed Mobility:     Sit to Supine: moderate assistance  Transfers:     Chair to mat: moderate assistance with  rolling walker  using  Stand Pivot      AM-PAC 6 CLICK MOBILITY          Treatment & Education:  Pt was educated on the following: call light use, importance of OOB activity and functional mobility to negate the negative effects of prolonged bed rest during this hospitalization, safe  transfers/ambulation and discharge planning recommendations/options.     Patient left HOB elevated with all lines intact, call button in reach, bed alarm on, and daughter present..    GOALS:   Multidisciplinary Problems       Physical Therapy Goals          Problem: Physical Therapy    Goal Priority Disciplines Outcome Interventions   Physical Therapy Goal     PT, PT/OT Progressing    Description:       Patient will increase functional independence with mobility by performin. Supine to sit with Contact Guard Assistance  2. Sit to stand transfer with Contact Guard Assistance  3. Gait  x 100  feet with Contact Guard Assistance using Rolling Walker.                              Time Tracking:     PT Received On: 25  PT Start Time: 1136     PT Stop Time: 1144  PT Total Time (min): 8 min     Billable Minutes: Therapeutic Activity 8    Treatment Type: Treatment  PT/PTA: PTA     Number of PTA visits since last PT visit: 2025

## 2025-02-27 NOTE — SUBJECTIVE & OBJECTIVE
INTERVAL HISTORY:  Patient seen and examined at bedside during morning rounds, family voices no new complaints overnight.  [facility]  Kidney function remains at baseline.  Family and patient met with palliative Care yesterday.  Patient continues bradycardia.  Plan of care explained to daughter at bedside during morning rounds        Past Medical History:   Diagnosis Date    Bradyarrhythmia     CVA (cerebral infarction) 2006    Dementia     Depression     Hyperlipidemia     Hypertension     renal htn    Pulmonary embolism 2002    Seizure disorder        Past Surgical History:   Procedure Laterality Date    left foot  with crushed injry         Review of patient's allergies indicates:   Allergen Reactions    Ciprofloxacin (bulk) Swelling       No current facility-administered medications on file prior to encounter.     Current Outpatient Medications on File Prior to Encounter   Medication Sig    calcitRIOL (ROCALTROL) 0.25 MCG Cap TAKE 1 CAPSULE(0.25 MCG) BY MOUTH EVERY DAY    citalopram (CELEXA) 20 MG tablet Take 0.5 tablets (10 mg total) by mouth once daily.    cyanocobalamin (VITAMIN B-12) 100 MCG tablet Take 100 mcg by mouth once daily.    levETIRAcetam (KEPPRA) 500 MG Tab Take 0.5 tablets (250 mg total) by mouth 2 (two) times daily.    lisinopriL (PRINIVIL,ZESTRIL) 20 MG tablet Take 1 tablet (20 mg total) by mouth once daily.    tamsulosin (FLOMAX) 0.4 mg Cap Take 1 capsule (0.4 mg total) by mouth once daily.    vitamin E 400 UNIT capsule Take 400 Units by mouth once daily.    chlorpheniramine/dextromethorp (CORICIDIN HBP COUGH AND COLD ORAL) Take 30 mLs by mouth nightly as needed (Cough).    hydrALAZINE (APRESOLINE) 50 MG tablet Take 1 tablet (50 mg total) by mouth every 12 (twelve) hours.    hydroCHLOROthiazide (HYDRODIURIL) 12.5 MG Tab Take 1 tablet (12.5 mg total) by mouth once daily. for 3 days     Family History       Problem Relation (Age of Onset)    Diabetes  Mother          Tobacco Use    Smoking status: Never    Smokeless tobacco: Never   Substance and Sexual Activity    Alcohol use: No    Drug use: No    Sexual activity: Not on file     Review of Systems   Unable to perform ROS: Dementia   All other systems reviewed and are negative.    Objective:     Vital Signs (Most Recent):  Temp: 97.9 °F (36.6 °C) (02/27/25 0715)  Pulse: (!) 54 (02/27/25 1002)  Resp: 17 (02/27/25 0715)  BP: (!) 122/52 (02/27/25 1002)  SpO2: 96 % (02/27/25 0715) Vital Signs (24h Range):  Temp:  [97.6 °F (36.4 °C)-98.3 °F (36.8 °C)] 97.9 °F (36.6 °C)  Pulse:  [41-54] 54  Resp:  [17-18] 17  SpO2:  [96 %-99 %] 96 %  BP: (107-161)/(41-61) 122/52     Weight: 67.9 kg (149 lb 11.1 oz)  Body mass index is 22.76 kg/m².     Physical Exam  Vitals reviewed.   Constitutional:       General: He is awake. He is not in acute distress.     Appearance: Normal appearance. He is ill-appearing (chronically). He is not diaphoretic.      Comments: Elderly male, very little verbal communication.   Cardiovascular:      Rate and Rhythm: Bradycardia present.      Heart sounds: Normal heart sounds. No murmur heard.     No friction rub. No gallop.   Pulmonary:      Effort: Pulmonary effort is normal. No accessory muscle usage or respiratory distress.      Breath sounds: Normal breath sounds. No wheezing, rhonchi or rales.   Abdominal:      General: Abdomen is flat. Bowel sounds are normal.      Palpations: Abdomen is soft.      Tenderness: There is no abdominal tenderness. There is no guarding or rebound.   Musculoskeletal:      Right lower leg: No edema.      Left lower leg: No edema.   Skin:     General: Skin is warm and dry.   Neurological:      Mental Status: He is alert. Mental status is at baseline. He is disoriented.      Comments: A&O x1 to self.  Disoriented to place and time.   Psychiatric:         Behavior: Behavior is cooperative.                Significant Labs: All pertinent labs within the past 24 hours have  "been reviewed.  CBC:   Recent Labs   Lab 02/26/25  0612 02/27/25  0326   WBC 4.82 5.14   HGB 8.0* 8.2*   HCT 25.2* 25.5*   * 142*     CMP:   Recent Labs   Lab 02/26/25  0612 02/27/25  0326    138   K 4.6 4.8    110   CO2 23 25   GLU 88 111*   BUN 38* 42*   CREATININE 2.2* 2.3*   CALCIUM 8.4* 8.7   PROT 5.0* 4.9*   ALBUMIN 2.7* 2.7*   BILITOT 0.3 0.2   ALKPHOS 28* 30*   AST 9* 8*   ALT 4* 4*   ANIONGAP 4* 3*     Cardiac Markers:   No results for input(s): "CKMB", "MYOGLOBIN", "BNP", "TROPISTAT" in the last 48 hours.    Magnesium:   Recent Labs   Lab 02/26/25  0612 02/27/25  0326   MG 1.7 1.7     POCT Glucose:   Recent Labs   Lab 02/26/25  1933 02/27/25  0756 02/27/25  0926   POCTGLUCOSE 133* 89 98     Troponin:   No results for input(s): "TROPONINI", "TROPONINIHS" in the last 48 hours.    TSH:   Recent Labs   Lab 02/23/25  1642   TSH 2.091     Urine Studies:   No results for input(s): "COLORU", "APPEARANCEUA", "PHUR", "SPECGRAV", "PROTEINUA", "GLUCUA", "KETONESU", "BILIRUBINUA", "OCCULTUA", "NITRITE", "UROBILINOGEN", "LEUKOCYTESUR", "RBCUA", "WBCUA", "BACTERIA", "SQUAMEPITHEL", "HYALINECASTS" in the last 48 hours.    Invalid input(s): "WRIGHTSUR"      Significant Imaging:   Imaging Results              CT Head Without Contrast (Final result)  Result time 02/23/25 20:29:29      Final result by Baldemar Morelos MD (02/23/25 20:29:29)                   Impression:      No CT evidence of acute intracranial abnormality. Clinical correlation and further evaluation as warranted.    Chronic senescent and microvascular ischemic changes.      Electronically signed by: Baldemar Morelos MD  Date:    02/23/2025  Time:    20:29               Narrative:    EXAMINATION:  CT HEAD WITHOUT CONTRAST    CLINICAL HISTORY:  Syncope, recurrent;    TECHNIQUE:  Low dose axial images were obtained through the head.  Coronal and sagittal reformations were also performed. Contrast was not administered.    COMPARISON:  CT " head 01/16/2025    FINDINGS:  There is generalized cerebral volume loss with compensatory sulcal widening and ventricular enlargement.  There is patchy and confluent periventricular and supratentorial white matter hypoattenuation suggesting sequelae of chronic microvascular ischemic change.  There are stable remote lacunar type infarcts of the left basal ganglia and cedric.  No CT evidence of acute intracranial hemorrhage.  The basal cisterns are patent. There is near complete opacification of the right maxillary sinus, similar to prior examination.  There is trace opacification of the left mastoid air cells.  No acute displaced calvarial fracture identified.  The visualized bones of the calvarium demonstrate no acute osseous abnormality.                                       X-Ray KUB (Final result)  Result time 02/23/25 19:31:13      Final result by Baldemar Morelos MD (02/23/25 19:31:13)                   Impression:      As above.      Electronically signed by: Baldemar Morelos MD  Date:    02/23/2025  Time:    19:31               Narrative:    EXAMINATION:  XR KUB    CLINICAL HISTORY:  dilated colonic loops on CXR;    TECHNIQUE:  Single AP supine view of the abdomen (KUB) was performed    COMPARISON:  Abdominal radiograph series 09/16/2024    FINDINGS:  There is mild gaseous distention of the colon which contains hyperdense material, possibly enteric contrast from prior barium fluoroscopic examination.  No central small bowel air-fluid levels appreciated to suggest high-grade obstruction.  Scattered retained stool projects over the colon suggesting constipation.  No large volume of free intraperitoneal air appreciated allowing for supine technique.  Osseous structures demonstrate degenerative changes.                                       US Carotid Bilateral (Final result)  Result time 02/24/25 06:57:49      Final result by Arnulfo Lopez IV, MD (02/24/25 06:57:49)                   Impression:      Scattered  bilateral atheromatous changes without evidence of hemodynamically significant carotid arterial stenosis.      Electronically signed by: Arnulfo Lopez  Date:    02/24/2025  Time:    06:57               Narrative:    EXAMINATION:  US CAROTID BILATERAL    CLINICAL HISTORY:  syncope;    TECHNIQUE:  CMS MANDATED QUALITY DATA - CAROTID - 195    All measurements and percent stenosis described below were determined using NASCET criteria or criteria similar to NASCET, as defined by the Society of Radiologists in Ultrasound Consensus Conference, Radiology, 2003    FINDINGS:  Real-time, duplex and color Doppler interrogation are performed.  Scattered atheromatous changes are present within the carotid arteries.  There are no findings to suggest hemodynamically significant carotid arterial stenosis on either side.  Antegrade flow is observed within the vertebral arteries bilaterally.                                       X-Ray Chest AP Portable (Final result)  Result time 02/23/25 15:22:10      Final result by Chuy Murphy Jr., MD (02/23/25 15:22:10)                   Impression:      Low inspiratory volume exacerbating the cardiomediastinal silhouette and causing crowding of vessels.  No evidence of acute cardiopulmonary findings.  No detrimental change upon comparison.      Electronically signed by: Chuy Murphy MD  Date:    02/23/2025  Time:    15:22               Narrative:    EXAMINATION:  XR CHEST AP PORTABLE    CLINICAL HISTORY:  Chest Pain;    TECHNIQUE:  Single frontal view of the chest was performed.    COMPARISON:  01/14/2025    FINDINGS:  Epicardial pacing pad projects over the patient's midline sternum.  Heart is upper normal in size exacerbated by the decreased inspiratory volume.  Small effusion obscures the left costophrenic sulcus.  Chronic eventration of right hemidiaphragm.  Crowding of vessels is attributed towards the poor inspiratory volume.  Dilated colonic loops projects below the elevated right  hemidiaphragm.

## 2025-02-27 NOTE — PLAN OF CARE
"DOM spoke with Simona at [facility] ([phone]). Per Simona she is meeting with pt's daughter today at 10 AM to sign paperwork. Simona is going to have all equipment delivered to the house today. DOM filled out d/c packet and [facility] with [facility] information on it. DOM called [facility]y with the [facility] ([phone]) and LM on VM to r/c. DOM also sent Yesy an email. DOM will cont to follow.      02/27/25 0838   Post-Acute Status   Post-Acute Authorization Hospice   Hospice Status Pending Payor Medical Review   Discharge Plan   Discharge Plan A Hospice/home   Discharge Plan B Home with family       1117- DOM met with the patient, the patient's daughter, and Simona, the hospice liaison, in the patient's room. The patient's daughter expressed concern about not being able to take the patient home on Friday due to prior commitments. DOM dedicated significant time discussing various options, but the daughter was either not agreeable or encountered other obstacles. DOM informed the daughter about the patient's right to appeal the discharge. The patient's daughter confirmed her intention to appeal the discharge and planned to  the patient on Saturday morning. She still intends for pt to d/c home with hospice care in place Saturday 03/01/2025. DOM then spoke with [facility], who confirmed [facility] under the [facility]. It was clarified that the  would need to call [facility], and the patient's ambulance transport would be scheduled under the patient's name as "will call."  "

## 2025-02-27 NOTE — PROGRESS NOTES
"Nephrology Consult Note        Patient Name: Lavon Brandon  MRN: [id number]    Patient Class: IP- Inpatient   Admission Date: 2/23/2025  Length of Stay: 2 days  Date of Service: 2/27/2025    Attending Physician: Emma Mojica MD  Primary Care Provider: Aristides Haynes MD    Reason for Consult: syncope/orthostatic hypotension    SUBJECTIVE:     HPI: 89M patient whom I see in office regularly, presented after syncopal episode at home. Per his daughter this is his second syncopal episode and had one presyncopal episode w/I past 6 months since living with her. At the time of presentation the patient's BP had recovered. He had carotid US that showed no evidence of hemodynamically significant carotid arterial stenosis. CT head showed no CT evidence of acute intracranial abnormality. Cardiology was consulted who recommends conservative treatment. CT head showing, "trace opacification of the right maxillary sinus, and trace opacification of the left mastoid air cells". Patient started on Augmentin with plan for outpatient referral to ENT. Lastly, KUB showed stool burden suggesting constipation.      Patient noted to be orthostatic. EEG performed. ECHO pending. Bowel evacuation and IV fluids prescribed. UA is bland.    2/25 VSS. No new complains.  2/26  UOP 2L, bradycardic.  ECHO w/EF 40-50%.  OOR.  2/27 VSS. Feels well, awaiting dispo on hospice.    ASSESSMENT/PLAN:     Orthostatic hypotension  CKD stage 4, eGFR < 30 ml/min  BPH  No NSAIDs, ACEI/ARB, IV contrast or other nephrotoxins.  Keep MAP > 60, SBP > 100.  Dose meds for GFR < 30 ml/min.  Renal diet - low K, low phos.  He probably needs BPH meds but can stop all BP meds...  Agree with Cards eval and plan.    Anemia of CKD  Hgb and HCT are acceptable. Monitor for now.  Will provide JASPREET and/or IV iron as needed to keep Hgb 8-10 range.    MBD / Secondary HPT  Ca, Phos, PTH and vitamin D levels are acceptable.   Phos binders, vitamin D and analogues, calcimimetics will " be given as needed.    HTN  Tolerate asymptomatic HTN up to -160.  HOLD home BP meds.    Thank you for allowing us to participate in the care of your patient!   We will follow the patient and provide recommendations as needed.         Laboratory:  Recent Labs   Lab 02/25/25  0426 02/26/25  0612 02/27/25  0326    137 138   K 4.8 4.6 4.8   * 110 110   CO2 25 23 25   BUN 43* 38* 42*   CREATININE 2.2* 2.2* 2.3*   GLU 90 88 111*       Recent Labs   Lab 02/25/25  0426 02/26/25  0612 02/27/25  0326   CALCIUM 8.5* 8.4* 8.7   ALBUMIN 2.7* 2.7* 2.7*   PHOS 3.6 3.1 3.3   MG 1.7 1.7 1.7       Recent Labs   Lab 06/23/23  1635 01/02/25  1415   PTH, Intact 118.8 H 51.8       Recent Labs   Lab 02/25/25  0714 02/25/25  1111 02/25/25  1628 02/25/25  2019 02/26/25  0818 02/26/25  1159 02/26/25  1713 02/26/25  1933 02/27/25  0756 02/27/25  0926   POCTGLUCOSE 85 111* 108 105 91 109 105 133* 89 98       Recent Labs   Lab 11/08/23  0824 06/23/24  0458 02/24/25  0421   Hemoglobin A1C 5.2 5.1 5.3       Recent Labs   Lab 02/25/25  0426 02/25/25  0746 02/26/25  0612 02/27/25  0326   WBC 4.85  --  4.82 5.14   HGB 7.7* 8.1* 8.0* 8.2*   HCT 23.8* 24.5* 25.2* 25.5*   *  --  137* 142*   MCV 93  --  91 91   MCHC 32.4  --  31.7* 32.2   MONO 8.0  0.4  --  7.9  0.4 9.3  0.5   EOSINOPHIL 1.4  --  2.5 3.1       Recent Labs   Lab 02/25/25  0426 02/26/25  0612 02/27/25  0326   BILITOT 0.3 0.3 0.2   PROT 5.0* 5.0* 4.9*   ALBUMIN 2.7* 2.7* 2.7*   ALKPHOS 29* 28* 30*   ALT 4* 4* 4*   AST 10 9* 8*       Recent Labs   Lab 10/14/22  0949 04/11/23  1428 04/13/23  2220 11/30/23  0319 02/16/24  0117 06/23/24  0545 09/06/24  1025 09/13/24  1437 11/19/24  1139 12/22/24  1353 02/23/25  1702   Color, UA Yellow Yellow   < > Colorless A   < > Yellow Yellow Yellow Yellow Yellow Yellow   Appearance, UA Clear Clear   < > Clear   < > Clear Clear Clear Clear Clear Clear   pH, UA 6.0 7.0   < > 6.0   < > 6.0 6.0 6.0 7.0 7.0 6.0   Specific Gravity,  UA 1.025 1.015   < > 1.010   < > 1.015 1.020 1.010 1.015 1.010 1.015   Protein, UA 1+ A 1+ A   < > Negative   < > Trace A Negative Trace A Negative Negative Negative   Glucose, UA Negative Negative   < > Negative   < > Negative Negative Negative Negative Negative Negative   Ketones, UA 1+ A 1+ A   < > Negative   < > Negative Negative Negative Negative Negative Negative   Urobilinogen, UA 1.0 Negative   < > Negative   < > Negative  --  Negative  --  Negative Negative   Bilirubin (UA) Negative Negative   < > Negative   < > Negative Negative Negative Negative Negative Negative   Occult Blood UA 3+ A 1+ A   < > 1+ A   < > Negative Negative Negative Negative Negative Negative   Nitrite, UA Negative Negative   < > Negative   < > Negative Negative Negative Negative Negative Negative   RBC, UA 3 1  --  13 H  --  2 1  --   --   --   --    WBC, UA 1 0  --  0  --  14 H 46 H  --   --   --   --    Bacteria Rare Negative  --  Negative  --  Moderate A Many A  --   --   --   --    Hyaline Casts, UA 0 1  --  0  --   --   --   --   --   --   --     < > = values in this interval not displayed.       Recent Labs   Lab 06/22/24  2227 09/13/24  1112 12/22/24  1311   Sample VENOUS VENOUS VENOUS       Microbiology Results (last 7 days)       ** No results found for the last 168 hours. **            Review of patient's allergies indicates:   Allergen Reactions    Ciprofloxacin (bulk) Swelling       Outpatient meds:  No current facility-administered medications on file prior to encounter.     Current Outpatient Medications on File Prior to Encounter   Medication Sig Dispense Refill    calcitRIOL (ROCALTROL) 0.25 MCG Cap TAKE 1 CAPSULE(0.25 MCG) BY MOUTH EVERY DAY 90 capsule 4    citalopram (CELEXA) 20 MG tablet Take 0.5 tablets (10 mg total) by mouth once daily. 90 tablet 2    cyanocobalamin (VITAMIN B-12) 100 MCG tablet Take 100 mcg by mouth once daily.      levETIRAcetam (KEPPRA) 500 MG Tab Take 0.5 tablets (250 mg total) by mouth 2 (two)  times daily. 90 tablet 3    lisinopriL (PRINIVIL,ZESTRIL) 20 MG tablet Take 1 tablet (20 mg total) by mouth once daily. 90 tablet 3    tamsulosin (FLOMAX) 0.4 mg Cap Take 1 capsule (0.4 mg total) by mouth once daily. 90 capsule 3    vitamin E 400 UNIT capsule Take 400 Units by mouth once daily.      chlorpheniramine/dextromethorp (CORICIDIN HBP COUGH AND COLD ORAL) Take 30 mLs by mouth nightly as needed (Cough).      hydrALAZINE (APRESOLINE) 50 MG tablet Take 1 tablet (50 mg total) by mouth every 12 (twelve) hours.      hydroCHLOROthiazide (HYDRODIURIL) 12.5 MG Tab Take 1 tablet (12.5 mg total) by mouth once daily. for 3 days 3 tablet 0       Scheduled meds:   0.9% NaCl   Intravenous Once    amoxicillin-clavulanate 500-125mg  1 tablet Oral Q12H    calcitRIOL  0.25 mcg Oral Daily    citalopram  10 mg Oral Daily    diazePAM  2 mg Oral Once    enoxparin  30 mg Subcutaneous Daily    levETIRAcetam  250 mg Oral BID    lisinopriL  20 mg Oral Daily    magnesium citrate  296 mL Rectal Once    tamsulosin  0.4 mg Oral Daily    vitamin E (dl, acetate)  400 Units Oral Daily       Infusions:        PRN meds:    Current Facility-Administered Medications:     acetaminophen, 650 mg, Oral, Q4H PRN    aluminum-magnesium hydroxide-simethicone, 30 mL, Oral, QID PRN    dextrose 50%, 12.5 g, Intravenous, PRN    dextrose 50%, 25 g, Intravenous, PRN    glucagon (human recombinant), 1 mg, Intramuscular, PRN    glucose, 16 g, Oral, PRN    glucose, 24 g, Oral, PRN    hydrALAZINE, 25 mg, Oral, Q8H PRN    insulin aspart U-100, 0-10 Units, Subcutaneous, QID (AC + HS) PRN    magnesium oxide, 800 mg, Oral, PRN    magnesium oxide, 800 mg, Oral, PRN    melatonin, 6 mg, Oral, Nightly PRN    naloxone, 0.02 mg, Intravenous, PRN    ondansetron, 4 mg, Intravenous, Q6H PRN    potassium bicarbonate, 35 mEq, Oral, PRN    potassium bicarbonate, 50 mEq, Oral, PRN    potassium bicarbonate, 60 mEq, Oral, PRN    potassium, sodium phosphates, 2 packet, Oral, PRN     potassium, sodium phosphates, 2 packet, Oral, PRN    potassium, sodium phosphates, 2 packet, Oral, PRN    senna-docusate 8.6-50 mg, 1 tablet, Oral, BID PRN    sodium chloride 0.9%, 10 mL, Intravenous, Q12H PRN    Past Medical History:   Diagnosis Date    Bradyarrhythmia     CVA (cerebral infarction) 2006    Dementia     Depression     Hyperlipidemia     Hypertension     renal htn    Pulmonary embolism 2002    Seizure disorder      Past Surgical History:   Procedure Laterality Date    left foot  with crushed injry       Family History   Problem Relation Name Age of Onset    Diabetes Mother      Cancer Neg Hx       Social History     Tobacco Use    Smoking status: Never    Smokeless tobacco: Never   Substance Use Topics    Alcohol use: No    Drug use: No       OBJECTIVE:     Vital Signs and IO:  Temp:  [97.6 °F (36.4 °C)-98.1 °F (36.7 °C)]   Pulse:  [41-54]   Resp:  [17-18]   BP: (107-161)/(41-61)   SpO2:  [96 %-99 %]   I/O last 3 completed shifts:  In: 380 [P.O.:380]  Out: 2450 [Urine:2450]  Wt Readings from Last 5 Encounters:   02/23/25 67.9 kg (149 lb 11.1 oz)   02/24/25 67.6 kg (149 lb)   01/14/25 73 kg (160 lb 15 oz)   12/27/24 66.2 kg (146 lb)   12/22/24 74.9 kg (165 lb 2 oz)     Body mass index is 22.76 kg/m².    Physical Exam  Constitutional:       General: Patient is not in acute distress.     Appearance: Patient is well-developed. She is not diaphoretic.   HENT:      Head: Normocephalic and atraumatic.      Mouth/Throat: Mucous membranes are moist.   Eyes:      General: No scleral icterus.     Pupils: Pupils are equal, round, and reactive to light.   Cardiovascular:      Rate and Rhythm: Normal rate and regular rhythm.   Pulmonary:      Effort: Pulmonary effort is normal. No respiratory distress.      Breath sounds: No stridor.   Abdominal:      General: There is no distension.      Palpations: Abdomen is soft.   Musculoskeletal:         General: No deformity. Normal range of motion.      Cervical back:  Neck supple.   Skin:     General: Skin is warm and dry.      Findings: No rash present. No erythema.   Neurological:      Mental Status: Patient is alert and oriented to person, place, and time.      Cranial Nerves: No cranial nerve deficit.   Psychiatric:         Behavior: Behavior normal.          Patient care time was spent personally by me on the following activities:     Obtaining a history.  Examination of patient.  Providing medical care at the patients bedside.  Developing a treatment plan with patient or surrogate and bedside caregivers.  Ordering and reviewing laboratory studies, radiographic studies, pulse oximetry.  Ordering and performing treatments and interventions.  Evaluation of patient's response to treatment.  Discussions with consultants while on the unit and immediately available to the patient.  Re-evaluation of the patient's condition.  Documentation in the medical record.     London Chase MD      [facility]  [address] [place] [address]  [place] [address]    [phone] - tel  [phone] - fax    2/27/2025

## 2025-02-27 NOTE — PT/OT/SLP PROGRESS
Occupational Therapy   Treatment    Name: Lavon Brandon  MRN: [id number]  Admitting Diagnosis:  Bradycardia       Recommendations:     Discharge Recommendations: Low Intensity Therapy  Discharge Equipment Recommendations:  none  Barriers to discharge:  None    Assessment:     Lavon Brandon is a 89 y.o. male with a medical diagnosis of Bradycardia.  He presents agreeable for OOB activity. Performance deficits affecting function are weakness, impaired endurance, impaired self care skills, impaired functional mobility, gait instability, impaired balance, impaired cognition, decreased coordination, decreased lower extremity function, decreased upper extremity function, decreased safety awareness, impaired cardiopulmonary response to activity.     Rehab Prognosis:  Fair; patient would benefit from acute skilled OT services to address these deficits and reach maximum level of function.       Plan:     Patient to be seen 5 x/week to address the above listed problems via self-care/home management, therapeutic activities, therapeutic exercises  Plan of Care Expires: 03/24/25  Plan of Care Reviewed with: patient, daughter    Subjective     Chief Complaint: none reported  Patient/Family Comments/goals: daughter agreeable for OOB activity  Pain/Comfort:   None reported    Objective:     Communicated with: nurse prior to session.  Patient found supine with telemetry, peripheral IV upon OT entry to room.    General Precautions: Standard, fall    Orthopedic Precautions:N/A  Braces: N/A  Respiratory Status: Room air     Occupational Performance:     Bed Mobility:    Patient completed Rolling/Turning to Left with  minimum assistance  Patient completed Rolling/Turning to Right with minimum assistance  Patient completed Scooting/Bridging with moderate assistance  Patient completed Supine to Sit with moderate assistance     Functional Mobility/Transfers:  Patient completed Sit <> Stand Transfer with minimum assistance  with  rolling  walker   Patient completed Bed <> Chair Transfer using Stand Pivot technique with moderate assistance with rolling walker  Functional Mobility: pt benefits from higher bed to facilitated sit>stand transfer    Activities of Daily Living:  Grooming: supervision for face hygiene and verbal cues      [facility] [address] 6 Click ADL: 14    Treatment & Education:  Pt educated on role of OT/POC, importance of OOB/EOB activity, use of call bell, and safety during ADLs, transfers, and functional mobility.     Patient left up in chair with all lines intact, call button in reach, chair alarm on, and daughter and hospital staff present    GOALS:   Multidisciplinary Problems       Occupational Therapy Goals          Problem: Occupational Therapy    Goal Priority Disciplines Outcome Interventions   Occupational Therapy Goal     OT, PT/OT     Description: Goals to be met by: 3/24/2025     Patient will increase functional independence with ADLs by performing:    UE Dressing with Minimal Assistance.  Grooming while seated with Supervision.  Upper extremity exercise program x10 reps per handout, with supervision.                         DME Justifications:  No DME recommended requiring DME justifications    Time Tracking:     OT Date of Treatment: 02/27/25  OT Start Time: 1017  OT Stop Time: 1035  OT Total Time (min): 18 min    Billable Minutes:Therapeutic Activity 18    OT/KIMMY: OT          2/27/2025

## 2025-02-27 NOTE — PROGRESS NOTES
[facility] Medicine  Progress Note    Patient Name: Lavon Brandon  MRN: [id number]  Patient Class: IP- Inpatient   Admission Date: 2/23/2025  Length of Stay: 2 days  Attending Physician: Emma Mojica MD  Primary Care Provider: Aristides Haynes MD        Subjective     Principal Problem:Bradycardia        HPI:  Mr. Brandon is a 89 yr old male with a hx of HTN, dementia, MARLIN, CKD stage 4, epilepsy, debility, DM type 2, depression, HLD, colonic polyps, TBI, CVA, secondary hyperparathyroidism, thrombocytopenia, anemia, arthritis, B12 deficiency, vitamin-D deficiency, pressure injury to sacral region, PE, bradycardia who presented to the ED via EMS with a chief complaint of syncope. Patient's daughter reports that the patient was sitting in his wheelchair, had finished eating and was watching TV when she noticed him lean forward and slumped over to the left.  Denies head trauma.  She endorsed that his mouth was open and he was drooling with his eyes open looking to the left and not focused. She tried to wake him and found him unconscious and called EMS to bring him here for further eval. Pt is currently A&O x 1 to self. He is disoriented to place and time.  Daughter states that this is his baseline.  She denies any bowel or bladder incontinence during episode of syncope.  She does endorse that his hydralazine was recently stopped last week.  She also states that he is normally bradycardic with heart rate in the 50s.  She states that he does not smoke tobacco, drink alcohol, or do recreational drugs.  Patient denies any pain at this time.  Remainder of history is limited due to dementia.    Lengthy discussion about code status with daughter at bedside as well as other daughter over the phone who are both healthcare POAs.  I discussed with them that the patient had been marked DNR in the past and wanted to confirm if they wanted to continue DNR status.  Discussed the pros and cons of CPR and  "patient's daughters would like to make patient full code at this time.  Patient's chart has been updated to reflect this code status change.    Upon arrival to ED, patient afebrile, HR of 42, RR of 16, BP of 151/70, satting 98% on RA.  Workup in the ED revealed RBC of 3.47, H/H of 10.4/31.8, BUN of 49, creatinine of 2.3, GFR of 26, albumin of 3.4, BNP of 197, troponin of 15.3, POCT glucose of 113.  CXR shows low inspiratory volume exacerbating cardiomediastinal silhouette and causing crowding of vessels.  No evidence of acute cardiopulmonary findings.  Dilated colonic loops projects below the elevated right hemidiaphragm.  No medications administered while in the ED. case discussed with ED provider and patient will be placed under observation for further management.    Overview/Hospital Course:  89 y.o. BM elderly male patient who presented after syncopal episode at home. Per his daughter this is his second syncopal episode and had one presyncopal episode w/I past 6 months since living with her. At the time of presentation the patient's BP had recovered. He had Carotid US that showed no evidence of hemodynamically significant carotid arterial stenosis. CT head showed no CT evidence of acute intracranial abnormality. Cardiology was consulted who recommends conservative treatment. He is noted that after discussion with the family regarding further management, would, "consider a pacemaker but they would like to avoid so for now. Continue with close observation for now and repeat a monitor as an outpatient." If patient has recurrent syncopal events, can be considered for pacemaker at that point. Patient had CT head also showing, "trace opacification of the right maxillary sinus, and trace opacification of the left mastoid air cells". Exam of BL ears showed OM. Patient started on Augmentin with plan for outpatient referral to ENT. Lastly, KUB showed stool burden suggesting constipation.     Patient noted to be " orthostatic. BL OM and CT with mastoid sinus opacification. Started on ABX with referral to ENT outpatient. EEG performed - EEG abnormal, evaluated by neurology - continue current BID keppra. Patient with bradycardia overnight - cardiology aware. After speaking to family they have opted to defer pacemaker. Amenable to hospice.  Case management was consulted for palliative care consult.    INTERVAL HISTORY:  Patient seen and examined at bedside during morning rounds, family voices no new complaints overnight.  [facility]  Kidney function remains at baseline.  Family and patient met with palliative Care yesterday.  Patient continues bradycardia.  Plan of care explained to daughter at bedside during morning rounds        Past Medical History:   Diagnosis Date    Bradyarrhythmia     CVA (cerebral infarction) 2006    Dementia     Depression     Hyperlipidemia     Hypertension     renal htn    Pulmonary embolism 2002    Seizure disorder        Past Surgical History:   Procedure Laterality Date    left foot  with crushed injry         Review of patient's allergies indicates:   Allergen Reactions    Ciprofloxacin (bulk) Swelling       No current facility-administered medications on file prior to encounter.     Current Outpatient Medications on File Prior to Encounter   Medication Sig    calcitRIOL (ROCALTROL) 0.25 MCG Cap TAKE 1 CAPSULE(0.25 MCG) BY MOUTH EVERY DAY    citalopram (CELEXA) 20 MG tablet Take 0.5 tablets (10 mg total) by mouth once daily.    cyanocobalamin (VITAMIN B-12) 100 MCG tablet Take 100 mcg by mouth once daily.    levETIRAcetam (KEPPRA) 500 MG Tab Take 0.5 tablets (250 mg total) by mouth 2 (two) times daily.    lisinopriL (PRINIVIL,ZESTRIL) 20 MG tablet Take 1 tablet (20 mg total) by mouth once daily.    tamsulosin (FLOMAX) 0.4 mg Cap Take 1 capsule (0.4 mg total) by mouth once daily.    vitamin E 400 UNIT capsule Take 400 Units by mouth once daily.     chlorpheniramine/dextromethorp (CORICIDIN HBP COUGH AND COLD ORAL) Take 30 mLs by mouth nightly as needed (Cough).    hydrALAZINE (APRESOLINE) 50 MG tablet Take 1 tablet (50 mg total) by mouth every 12 (twelve) hours.    hydroCHLOROthiazide (HYDRODIURIL) 12.5 MG Tab Take 1 tablet (12.5 mg total) by mouth once daily. for 3 days     Family History       Problem Relation (Age of Onset)    Diabetes Mother          Tobacco Use    Smoking status: Never    Smokeless tobacco: Never   Substance and Sexual Activity    Alcohol use: No    Drug use: No    Sexual activity: Not on file     Review of Systems   Unable to perform ROS: Dementia   All other systems reviewed and are negative.    Objective:     Vital Signs (Most Recent):  Temp: 97.9 °F (36.6 °C) (02/27/25 0715)  Pulse: (!) 54 (02/27/25 1002)  Resp: 17 (02/27/25 0715)  BP: (!) 122/52 (02/27/25 1002)  SpO2: 96 % (02/27/25 0715) Vital Signs (24h Range):  Temp:  [97.6 °F (36.4 °C)-98.3 °F (36.8 °C)] 97.9 °F (36.6 °C)  Pulse:  [41-54] 54  Resp:  [17-18] 17  SpO2:  [96 %-99 %] 96 %  BP: (107-161)/(41-61) 122/52     Weight: 67.9 kg (149 lb 11.1 oz)  Body mass index is 22.76 kg/m².     Physical Exam  Vitals reviewed.   Constitutional:       General: He is awake. He is not in acute distress.     Appearance: Normal appearance. He is ill-appearing (chronically). He is not diaphoretic.      Comments: Elderly male, very little verbal communication.   Cardiovascular:      Rate and Rhythm: Bradycardia present.      Heart sounds: Normal heart sounds. No murmur heard.     No friction rub. No gallop.   Pulmonary:      Effort: Pulmonary effort is normal. No accessory muscle usage or respiratory distress.      Breath sounds: Normal breath sounds. No wheezing, rhonchi or rales.   Abdominal:      General: Abdomen is flat. Bowel sounds are normal.      Palpations: Abdomen is soft.      Tenderness: There is no abdominal tenderness. There is no guarding or rebound.   Musculoskeletal:      Right  "lower leg: No edema.      Left lower leg: No edema.   Skin:     General: Skin is warm and dry.   Neurological:      Mental Status: He is alert. Mental status is at baseline. He is disoriented.      Comments: A&O x1 to self.  Disoriented to place and time.   Psychiatric:         Behavior: Behavior is cooperative.                Significant Labs: All pertinent labs within the past 24 hours have been reviewed.  CBC:   Recent Labs   Lab 02/26/25  0612 02/27/25  0326   WBC 4.82 5.14   HGB 8.0* 8.2*   HCT 25.2* 25.5*   * 142*     CMP:   Recent Labs   Lab 02/26/25  0612 02/27/25  0326    138   K 4.6 4.8    110   CO2 23 25   GLU 88 111*   BUN 38* 42*   CREATININE 2.2* 2.3*   CALCIUM 8.4* 8.7   PROT 5.0* 4.9*   ALBUMIN 2.7* 2.7*   BILITOT 0.3 0.2   ALKPHOS 28* 30*   AST 9* 8*   ALT 4* 4*   ANIONGAP 4* 3*     Cardiac Markers:   No results for input(s): "CKMB", "MYOGLOBIN", "BNP", "TROPISTAT" in the last 48 hours.    Magnesium:   Recent Labs   Lab 02/26/25  0612 02/27/25  0326   MG 1.7 1.7     POCT Glucose:   Recent Labs   Lab 02/26/25  1933 02/27/25  0756 02/27/25  0926   POCTGLUCOSE 133* 89 98     Troponin:   No results for input(s): "TROPONINI", "TROPONINIHS" in the last 48 hours.    TSH:   Recent Labs   Lab 02/23/25  1642   TSH 2.091     Urine Studies:   No results for input(s): "COLORU", "APPEARANCEUA", "PHUR", "SPECGRAV", "PROTEINUA", "GLUCUA", "KETONESU", "BILIRUBINUA", "OCCULTUA", "NITRITE", "UROBILINOGEN", "LEUKOCYTESUR", "RBCUA", "WBCUA", "BACTERIA", "SQUAMEPITHEL", "HYALINECASTS" in the last 48 hours.    Invalid input(s): "WRIGHTSUR"      Significant Imaging:   Imaging Results              CT Head Without Contrast (Final result)  Result time 02/23/25 20:29:29      Final result by Baldemar Morelos MD (02/23/25 20:29:29)                   Impression:      No CT evidence of acute intracranial abnormality. Clinical correlation and further evaluation as warranted.    Chronic senescent and " microvascular ischemic changes.      Electronically signed by: Baldemar Morelos MD  Date:    02/23/2025  Time:    20:29               Narrative:    EXAMINATION:  CT HEAD WITHOUT CONTRAST    CLINICAL HISTORY:  Syncope, recurrent;    TECHNIQUE:  Low dose axial images were obtained through the head.  Coronal and sagittal reformations were also performed. Contrast was not administered.    COMPARISON:  CT head 01/16/2025    FINDINGS:  There is generalized cerebral volume loss with compensatory sulcal widening and ventricular enlargement.  There is patchy and confluent periventricular and supratentorial white matter hypoattenuation suggesting sequelae of chronic microvascular ischemic change.  There are stable remote lacunar type infarcts of the left basal ganglia and cedric.  No CT evidence of acute intracranial hemorrhage.  The basal cisterns are patent. There is near complete opacification of the right maxillary sinus, similar to prior examination.  There is trace opacification of the left mastoid air cells.  No acute displaced calvarial fracture identified.  The visualized bones of the calvarium demonstrate no acute osseous abnormality.                                       X-Ray KUB (Final result)  Result time 02/23/25 19:31:13      Final result by Baldemar Morelos MD (02/23/25 19:31:13)                   Impression:      As above.      Electronically signed by: Baldemar Morelos MD  Date:    02/23/2025  Time:    19:31               Narrative:    EXAMINATION:  XR KUB    CLINICAL HISTORY:  dilated colonic loops on CXR;    TECHNIQUE:  Single AP supine view of the abdomen (KUB) was performed    COMPARISON:  Abdominal radiograph series 09/16/2024    FINDINGS:  There is mild gaseous distention of the colon which contains hyperdense material, possibly enteric contrast from prior barium fluoroscopic examination.  No central small bowel air-fluid levels appreciated to suggest high-grade obstruction.  Scattered retained stool  projects over the colon suggesting constipation.  No large volume of free intraperitoneal air appreciated allowing for supine technique.  Osseous structures demonstrate degenerative changes.                                       US Carotid Bilateral (Final result)  Result time 02/24/25 06:57:49      Final result by Arnulfo Lopez IV, MD (02/24/25 06:57:49)                   Impression:      Scattered bilateral atheromatous changes without evidence of hemodynamically significant carotid arterial stenosis.      Electronically signed by: Arnulfo Lopez  Date:    02/24/2025  Time:    06:57               Narrative:    EXAMINATION:  US CAROTID BILATERAL    CLINICAL HISTORY:  syncope;    TECHNIQUE:  CMS MANDATED QUALITY DATA - CAROTID - 195    All measurements and percent stenosis described below were determined using NASCET criteria or criteria similar to NASCET, as defined by the Society of Radiologists in Ultrasound Consensus Conference, Radiology, 2003    FINDINGS:  Real-time, duplex and color Doppler interrogation are performed.  Scattered atheromatous changes are present within the carotid arteries.  There are no findings to suggest hemodynamically significant carotid arterial stenosis on either side.  Antegrade flow is observed within the vertebral arteries bilaterally.                                       X-Ray Chest AP Portable (Final result)  Result time 02/23/25 15:22:10      Final result by Chuy Murphy Jr., MD (02/23/25 15:22:10)                   Impression:      Low inspiratory volume exacerbating the cardiomediastinal silhouette and causing crowding of vessels.  No evidence of acute cardiopulmonary findings.  No detrimental change upon comparison.      Electronically signed by: Chuy Murphy MD  Date:    02/23/2025  Time:    15:22               Narrative:    EXAMINATION:  XR CHEST AP PORTABLE    CLINICAL HISTORY:  Chest Pain;    TECHNIQUE:  Single frontal view of the chest was  "performed.    COMPARISON:  01/14/2025    FINDINGS:  Epicardial pacing pad projects over the patient's midline sternum.  Heart is upper normal in size exacerbated by the decreased inspiratory volume.  Small effusion obscures the left costophrenic sulcus.  Chronic eventration of right hemidiaphragm.  Crowding of vessels is attributed towards the poor inspiratory volume.  Dilated colonic loops projects below the elevated right hemidiaphragm.                                       Assessment and Plan     * Bradycardia  Family has opted to defer pacemaker  Consult to hospice      Orthostatic hypertension  Patient's blood pressure range in the last 24 hours was: BP  Min: 129/63  Max: 153/66.The patient's inpatient anti-hypertensive regimen is listed below:  Current Antihypertensives  lisinopriL tablet 20 mg, Daily, Oral  hydrALAZINE tablet 25 mg, Every 8 hours PRN, Oral    Conservative IV fluids  Recheck in am    Rhinosinusitis  Per ct head, " There is near complete opacification of the right maxillary sinus, similar to prior examination.  There is trace opacification of the left mastoid air cells.  "  Start augmentin  Outpatient ENT    Acute otitis media, bilateral  Abx po  Refer to ENT outpatient      Syncope  - CXR and UA negative for infection  - COVID/Flu pending  - UDS pending  - CT head pending  - Not hypoglycemic on CMP, or hypotensive on arrival  - Orthostatic BP pending  - TSH WNL  - Lipid and A1c pending  - Hx of bradycardia  - Cardiology consulted, appreciate recs  - Bilateral carotid US pending  - TTE pending  - Continuous tele monitoring  - Fall, seizure, and delirium precautions  - Conservative cardiac treatment - may need pacemaker in near future  - Orthostatic - IV fluids and recheck in am  - Awaiting EEG  - Treat sinuitis and OM with abx  - Outpatient referral to ENT    DM (diabetes mellitus), type 2 with neurological complications  Last A1c reviewed-   Lab Results   Component Value Date    HGBA1C 5.3 " 02/24/2025     Most recent fingerstick glucose reviewed-   Recent Labs   Lab 02/25/25  1111 02/25/25  1628 02/25/25  2019 02/26/25  0818   POCTGLUCOSE 111* 108 105 91       Current correctional scale  Medium  Maintain anti-hyperglycemic dose as follows-   Antihyperglycemics (From admission, onward)      Start     Stop Route Frequency Ordered    02/23/25 1838  insulin aspart U-100 pen 0-10 Units         -- SubQ Before meals & nightly PRN 02/23/25 1822          Hold Oral hypoglycemics while patient is in the hospital.    ACP (advance care planning)  Advance Care Planning Code Status  In light of the patients advanced illness, we reviewed what the patients preferences for care would be at the very end of life. Lengthy discussion about code status with daughter at bedside as well as other daughter over the phone who are both healthcare POAs.  I discussed with them that the patient had been marked DNR in the past and wanted to confirm if they wanted to continue DNR status. Patient's daughters would like to make patient full code at this time. I communicated to the patient that a FULL CODE order would be placed in his medical record to reflect this preference. I spent a total of 18 minutes engaging the patient in this advance care planning discussion.         Debility  - PT/OT/CM consulted, appreciate recs  - Fall precautions  - Skin assessment Qshift  - Turn patient Q2H    Centrencephalic epilepsy  - Hx noted  - Continue home keppra  - Keppra level pending  - Seizure precautions      CKD (chronic kidney disease) stage 4, GFR 15-29 ml/min  Creatine stable for now. BMP reviewed- noted Estimated Creatinine Clearance: 20.6 mL/min (A) (based on SCr of 2.2 mg/dL (H)). according to latest data. Based on current GFR, CKD stage is stage 4 - GFR 15-29.  Monitor UOP and serial BMP and adjust therapy as needed. Renally dose meds. Avoid nephrotoxic medications and procedures.  Conservative IV fluids  Follow Chemistry  Nephrology  consult    MARLIN (obstructive sleep apnea), 2008, not on Rx  - Recently had sleep study on 2/19/25  - Continue OP F/U      Dementia with behavioral disturbance  - Hx noted  - Delirium precautions    Primary hypertension  Chronic, Latest blood pressure and vitals reviewed-     Temp:  [98.2 °F (36.8 °C)-99.4 °F (37.4 °C)]   Pulse:  [45-55]   Resp:  [18]   BP: (129-153)/(49-69)   SpO2:  [96 %-98 %] .   Home meds for hypertension were reviewed and noted below-  Hypertension Medications              lisinopriL (PRINIVIL,ZESTRIL) 20 MG tablet Take 1 tablet (20 mg total) by mouth once daily.    hydrALAZINE (APRESOLINE) 50 MG tablet Take 1 tablet (50 mg total) by mouth every 12 (twelve) hours.    hydroCHLOROthiazide (HYDRODIURIL) 12.5 MG Tab Take 1 tablet (12.5 mg total) by mouth once daily. for 3 days            While in the hospital, will manage blood pressure as follows; Adjust home antihypertensive regimen as follows- patient no longer taking hydrochlorothiazide.  Also hydralazine was recently discontinued last week.  Continue home lisinopril with parameters.    Will utilize p.r.n. blood pressure medication only if patient's blood pressure greater than 180/110 and he develops symptoms such as worsening chest pain or shortness of breath.        VTE Risk Mitigation (From admission, onward)           Ordered     enoxaparin injection 30 mg  Daily         02/23/25 1822     IP VTE HIGH RISK PATIENT  Once         02/23/25 1822     Place sequential compression device  Until discontinued         02/23/25 1822                    Discharge Planning   JOANNE: 2/28/2025     Code Status: Full Code   Medical Readiness for Discharge Date:   Discharge Plan A: Hospice/home   Discharge Delays: Access/Lines            Please place Justification for DME        Butch Wilkes NP  Department of Hospital Medicine   Critical access hospital

## 2025-02-28 PROBLEM — R55 SYNCOPE: Status: RESOLVED | Noted: 2025-02-23 | Resolved: 2025-02-28

## 2025-02-28 PROBLEM — H66.93 ACUTE OTITIS MEDIA, BILATERAL: Status: RESOLVED | Noted: 2025-02-24 | Resolved: 2025-02-28

## 2025-02-28 LAB
ALBUMIN SERPL BCP-MCNC: 2.8 G/DL (ref 3.5–5.2)
ALP SERPL-CCNC: 27 U/L (ref 55–135)
ALT SERPL W/O P-5'-P-CCNC: 4 U/L (ref 10–44)
ANION GAP SERPL CALC-SCNC: 3 MMOL/L (ref 8–16)
AST SERPL-CCNC: 8 U/L (ref 10–40)
BASOPHILS # BLD AUTO: 0.02 K/UL (ref 0–0.2)
BASOPHILS NFR BLD: 0.4 % (ref 0–1.9)
BILIRUB SERPL-MCNC: 0.2 MG/DL (ref 0.1–1)
BUN SERPL-MCNC: 43 MG/DL (ref 10–30)
CALCIUM SERPL-MCNC: 9 MG/DL (ref 8.7–10.5)
CHLORIDE SERPL-SCNC: 110 MMOL/L (ref 95–110)
CO2 SERPL-SCNC: 26 MMOL/L (ref 23–29)
CREAT SERPL-MCNC: 2.1 MG/DL (ref 0.5–1.4)
DIFFERENTIAL METHOD BLD: ABNORMAL
EOSINOPHIL # BLD AUTO: 0.1 K/UL (ref 0–0.5)
EOSINOPHIL NFR BLD: 2.6 % (ref 0–8)
ERYTHROCYTE [DISTWIDTH] IN BLOOD BY AUTOMATED COUNT: 13.9 % (ref 11.5–14.5)
EST. GFR  (NO RACE VARIABLE): 29 ML/MIN/1.73 M^2
GLUCOSE SERPL-MCNC: 90 MG/DL (ref 70–110)
HCT VFR BLD AUTO: 26.1 % (ref 40–54)
HGB BLD-MCNC: 8.4 G/DL (ref 14–18)
IMM GRANULOCYTES # BLD AUTO: 0.01 K/UL (ref 0–0.04)
IMM GRANULOCYTES NFR BLD AUTO: 0.2 % (ref 0–0.5)
LYMPHOCYTES # BLD AUTO: 2.8 K/UL (ref 1–4.8)
LYMPHOCYTES NFR BLD: 50.6 % (ref 18–48)
MAGNESIUM SERPL-MCNC: 1.7 MG/DL (ref 1.6–2.6)
MCH RBC QN AUTO: 29.7 PG (ref 27–31)
MCHC RBC AUTO-ENTMCNC: 32.2 G/DL (ref 32–36)
MCV RBC AUTO: 92 FL (ref 82–98)
MONOCYTES # BLD AUTO: 0.4 K/UL (ref 0.3–1)
MONOCYTES NFR BLD: 7.9 % (ref 4–15)
NEUTROPHILS # BLD AUTO: 2.1 K/UL (ref 1.8–7.7)
NEUTROPHILS NFR BLD: 38.3 % (ref 38–73)
NRBC BLD-RTO: 0 /100 WBC
PHOSPHATE SERPL-MCNC: 3.6 MG/DL (ref 2.7–4.5)
PLATELET # BLD AUTO: 142 K/UL (ref 150–450)
PMV BLD AUTO: 10.8 FL (ref 9.2–12.9)
POCT GLUCOSE: 109 MG/DL (ref 70–110)
POCT GLUCOSE: 113 MG/DL (ref 70–110)
POCT GLUCOSE: 85 MG/DL (ref 70–110)
POTASSIUM SERPL-SCNC: 4.8 MMOL/L (ref 3.5–5.1)
PROT SERPL-MCNC: 5.1 G/DL (ref 6–8.4)
RBC # BLD AUTO: 2.83 M/UL (ref 4.6–6.2)
SODIUM SERPL-SCNC: 139 MMOL/L (ref 136–145)
WBC # BLD AUTO: 5.43 K/UL (ref 3.9–12.7)

## 2025-02-28 PROCEDURE — 84100 ASSAY OF PHOSPHORUS: CPT

## 2025-02-28 PROCEDURE — 25000003 PHARM REV CODE 250: Performed by: NURSE PRACTITIONER

## 2025-02-28 PROCEDURE — 21400001 HC TELEMETRY ROOM

## 2025-02-28 PROCEDURE — 36415 COLL VENOUS BLD VENIPUNCTURE: CPT

## 2025-02-28 PROCEDURE — 85025 COMPLETE CBC W/AUTO DIFF WBC: CPT

## 2025-02-28 PROCEDURE — 83735 ASSAY OF MAGNESIUM: CPT

## 2025-02-28 PROCEDURE — 80053 COMPREHEN METABOLIC PANEL: CPT

## 2025-02-28 PROCEDURE — 25000003 PHARM REV CODE 250

## 2025-02-28 PROCEDURE — 63600175 PHARM REV CODE 636 W HCPCS

## 2025-02-28 RX ORDER — AMOXICILLIN AND CLAVULANATE POTASSIUM 500; 125 MG/1; MG/1
1 TABLET, FILM COATED ORAL EVERY 12 HOURS
Qty: 6 TABLET | Refills: 0 | Status: SHIPPED | OUTPATIENT
Start: 2025-02-28 | End: 2025-03-01

## 2025-02-28 RX ADMIN — LEVETIRACETAM 250 MG: 250 TABLET, FILM COATED ORAL at 09:02

## 2025-02-28 RX ADMIN — Medication 400 UNITS: at 09:02

## 2025-02-28 RX ADMIN — AMOXICILLIN AND CLAVULANATE POTASSIUM 500 MG: 500; 125 TABLET, FILM COATED ORAL at 08:02

## 2025-02-28 RX ADMIN — AMOXICILLIN AND CLAVULANATE POTASSIUM 500 MG: 500; 125 TABLET, FILM COATED ORAL at 09:02

## 2025-02-28 RX ADMIN — ENOXAPARIN SODIUM 30 MG: 40 INJECTION SUBCUTANEOUS at 05:02

## 2025-02-28 RX ADMIN — LISINOPRIL 20 MG: 20 TABLET ORAL at 09:02

## 2025-02-28 RX ADMIN — TAMSULOSIN HYDROCHLORIDE 0.4 MG: 0.4 CAPSULE ORAL at 09:02

## 2025-02-28 RX ADMIN — CITALOPRAM HYDROBROMIDE 10 MG: 10 TABLET ORAL at 09:02

## 2025-02-28 RX ADMIN — CALCITRIOL CAPSULES 0.25 MCG 0.25 MCG: 0.25 CAPSULE ORAL at 09:02

## 2025-02-28 RX ADMIN — LEVETIRACETAM 250 MG: 250 TABLET, FILM COATED ORAL at 08:02

## 2025-02-28 NOTE — DISCHARGE SUMMARY
[facility]  Discharge Summary      Patient Name: Lavon Brandon  MRN: [id number]  MIMI: [id number]  Patient Class: IP- Inpatient  Admission Date: 2/23/2025  Hospital Length of Stay: 3 days  Discharge Date and Time:  02/28/2025 11:03 AM  Attending Physician: Tatyana Neal MD   Discharging Provider: Butch Wilkes NP  Primary Care Provider: Aristides Haynes MD    Primary Care Team: Networked reference to record PCT     HPI:   Mr. Brandon is a 89 yr old male with a hx of HTN, dementia, MARLIN, CKD stage 4, epilepsy, debility, DM type 2, depression, HLD, colonic polyps, TBI, CVA, secondary hyperparathyroidism, thrombocytopenia, anemia, arthritis, B12 deficiency, vitamin-D deficiency, pressure injury to sacral region, PE, bradycardia who presented to the ED via EMS with a chief complaint of syncope. Patient's daughter reports that the patient was sitting in his wheelchair, had finished eating and was watching TV when she noticed him lean forward and slumped over to the left.  Denies head trauma.  She endorsed that his mouth was open and he was drooling with his eyes open looking to the left and not focused. She tried to wake him and found him unconscious and called EMS to bring him here for further eval. Pt is currently A&O x 1 to self. He is disoriented to place and time.  Daughter states that this is his baseline.  She denies any bowel or bladder incontinence during episode of syncope.  She does endorse that his hydralazine was recently stopped last week.  She also states that he is normally bradycardic with heart rate in the 50s.  She states that he does not smoke tobacco, drink alcohol, or do recreational drugs.  Patient denies any pain at this time.  Remainder of history is limited due to dementia.    Lengthy discussion about code status with daughter at bedside as well as other daughter over the phone who are both healthcare POAs.  I discussed with them that the patient had been  "marked DNR in the past and wanted to confirm if they wanted to continue DNR status.  Discussed the pros and cons of CPR and patient's daughters would like to make patient full code at this time.  Patient's chart has been updated to reflect this code status change.    Upon arrival to ED, patient afebrile, HR of 42, RR of 16, BP of 151/70, satting 98% on RA.  Workup in the ED revealed RBC of 3.47, H/H of 10.4/31.8, BUN of 49, creatinine of 2.3, GFR of 26, albumin of 3.4, BNP of 197, troponin of 15.3, POCT glucose of 113.  CXR shows low inspiratory volume exacerbating cardiomediastinal silhouette and causing crowding of vessels.  No evidence of acute cardiopulmonary findings.  Dilated colonic loops projects below the elevated right hemidiaphragm.  No medications administered while in the ED. case discussed with ED provider and patient will be placed under observation for further management.    * No surgery found *      Hospital Course:   89 y.o. BM elderly male patient who presented after syncopal episode at home. Per his daughter this is his second syncopal episode and had one presyncopal episode w/I past 6 months since living with her. At the time of presentation the patient's BP had recovered. He had Carotid US that showed no evidence of hemodynamically significant carotid arterial stenosis. CT head showed no CT evidence of acute intracranial abnormality. Cardiology was consulted who recommends conservative treatment. He is noted that after discussion with the family regarding further management, would, "consider a pacemaker but they would like to avoid so for now. Continue with close observation for now and repeat a monitor as an outpatient." If patient has recurrent syncopal events, can be considered for pacemaker at that point. Patient had CT head also showing, "trace opacification of the right maxillary sinus, and trace opacification of the left mastoid air cells". Exam of BL ears showed OM. Patient started on " Augmentin with plan for outpatient referral to ENT. Lastly, KUB showed stool burden suggesting constipation.     Patient noted to be orthostatic. BL OM and CT with mastoid sinus opacification. Started on ABX with referral to ENT outpatient. EEG performed - EEG abnormal, evaluated by neurology - continue current BID keppra. Patient with bradycardia overnight - cardiology aware. After speaking to family they have opted to defer pacemaker. Amenable to hospice.  Case management was consulted for palliative care consult.  He was accepted for discharge with hospice.  He was seen and examined on the day of discharge and is medically stable for discharge     Goals of Care Treatment Preferences:  Code Status: Full Code          What is most important right now is to focus on comfort and QOL .  Accordingly, we have decided that the best plan to meet the patient's goals includes enrolling in hospice care.      SDOH Screening:  The patient was screened for utility difficulties, food insecurity, transport difficulties, housing insecurity, and interpersonal safety and there were no concerns identified this admission.     Consults:   Consults (From admission, onward)          Status Ordering Provider     Inpatient consult to   Once        Provider:  (Not yet assigned)    Acknowledged ALISE REYNAGA     Inpatient consult to Palliative Care  Once        Provider:  Chris Nesbitt MD    Completed INDIRA MILLER     Inpatient Consult to Neurology Services (General Neurology)  Once        Provider:  Brian Valdovinos MD    Completed INDIRA MILLER     Inpatient consult to Nephrology  Once        Provider:  London Chase MD    Completed INDIRA MILLER     Case Management/  Once        Provider:  (Not yet assigned)    Completed JESUS ALBERTO CASTRO     Inpatient consult to Cardiology  Once        Provider:  Shanna Pacheco MD    Completed JESUS ALBERTO CASTRO            * Bradycardia  Family  "has opted to defer pacemaker  Consult to hospice      Orthostatic hypertension  Patient's blood pressure range in the last 24 hours was: BP  Min: 129/63  Max: 153/66.The patient's inpatient anti-hypertensive regimen is listed below:  Current Antihypertensives  lisinopriL tablet 20 mg, Daily, Oral  hydrALAZINE tablet 25 mg, Every 8 hours PRN, Oral    Conservative IV fluids  Recheck in am    Rhinosinusitis  Per ct head, " There is near complete opacification of the right maxillary sinus, similar to prior examination.  There is trace opacification of the left mastoid air cells.  "  Start augmentin  Outpatient ENT    DM (diabetes mellitus), type 2 with neurological complications  Last A1c reviewed-   Lab Results   Component Value Date    HGBA1C 5.3 02/24/2025     Most recent fingerstick glucose reviewed-   Recent Labs   Lab 02/25/25  1111 02/25/25  1628 02/25/25  2019 02/26/25  0818   POCTGLUCOSE 111* 108 105 91       Current correctional scale  Medium  Maintain anti-hyperglycemic dose as follows-   Antihyperglycemics (From admission, onward)      Start     Stop Route Frequency Ordered    02/23/25 1838  insulin aspart U-100 pen 0-10 Units         -- SubQ Before meals & nightly PRN 02/23/25 1822          Hold Oral hypoglycemics while patient is in the hospital.    ACP (advance care planning)  Advance Care Planning Code Status  In light of the patients advanced illness, we reviewed what the patients preferences for care would be at the very end of life. Lengthy discussion about code status with daughter at bedside as well as other daughter over the phone who are both healthcare POAs.  I discussed with them that the patient had been marked DNR in the past and wanted to confirm if they wanted to continue DNR status. Patient's daughters would like to make patient full code at this time. I communicated to the patient that a FULL CODE order would be placed in his medical record to reflect this preference. I spent a total of " 18 minutes engaging the patient in this advance care planning discussion.         Debility  - PT/OT/CM consulted, appreciate recs  - Fall precautions  - Skin assessment Qshift  - Turn patient Q2H    Centrencephalic epilepsy  - Hx noted  - Continue home keppra  - Keppra level pending  - Seizure precautions      CKD (chronic kidney disease) stage 4, GFR 15-29 ml/min  Creatine stable for now. BMP reviewed- noted Estimated Creatinine Clearance: 20.6 mL/min (A) (based on SCr of 2.2 mg/dL (H)). according to latest data. Based on current GFR, CKD stage is stage 4 - GFR 15-29.  Monitor UOP and serial BMP and adjust therapy as needed. Renally dose meds. Avoid nephrotoxic medications and procedures.  Conservative IV fluids  Follow Chemistry  Nephrology consult    MARLIN (obstructive sleep apnea), 2008, not on Rx  - Recently had sleep study on 2/19/25  - Continue OP F/U      Dementia with behavioral disturbance  - Hx noted  - Delirium precautions    Primary hypertension  Chronic, Latest blood pressure and vitals reviewed-     Temp:  [98.2 °F (36.8 °C)-99.4 °F (37.4 °C)]   Pulse:  [45-55]   Resp:  [18]   BP: (129-153)/(49-69)   SpO2:  [96 %-98 %] .   Home meds for hypertension were reviewed and noted below-  Hypertension Medications              lisinopriL (PRINIVIL,ZESTRIL) 20 MG tablet Take 1 tablet (20 mg total) by mouth once daily.    hydrALAZINE (APRESOLINE) 50 MG tablet Take 1 tablet (50 mg total) by mouth every 12 (twelve) hours.    hydroCHLOROthiazide (HYDRODIURIL) 12.5 MG Tab Take 1 tablet (12.5 mg total) by mouth once daily. for 3 days            While in the hospital, will manage blood pressure as follows; Adjust home antihypertensive regimen as follows- patient no longer taking hydrochlorothiazide.  Also hydralazine was recently discontinued last week.  Continue home lisinopril with parameters.    Will utilize p.r.n. blood pressure medication only if patient's blood pressure greater than 180/110 and he develops  symptoms such as worsening chest pain or shortness of breath.        Final Active Diagnoses:    Diagnosis Date Noted POA    PRINCIPAL PROBLEM:  Bradycardia [R00.1] 04/12/2023 Yes    Rhinosinusitis [J32.9] 02/24/2025 Yes    Orthostatic hypertension [I10] 02/24/2025 Yes    DM (diabetes mellitus), type 2 with neurological complications [E11.49] 01/23/2025 Yes    Goals of care, counseling/discussion [Z71.89] 06/25/2024 Not Applicable    ACP (advance care planning) [Z71.89] 06/23/2024 Not Applicable    Debility [R53.81] 10/14/2022 Yes    Centrencephalic epilepsy [G40.409] 05/06/2019 Yes     Chronic    CKD (chronic kidney disease) stage 4, GFR 15-29 ml/min [N18.4] 05/06/2019 Yes    MARLIN (obstructive sleep apnea), 2008, not on Rx [G47.33] 05/28/2013 Yes    Dementia with behavioral disturbance [F03.918]  Yes     Chronic    Primary hypertension [I10]  Yes      Problems Resolved During this Admission:    Diagnosis Date Noted Date Resolved POA    Acute otitis media, bilateral [H66.93] 02/24/2025 02/28/2025 Yes    Syncope [R55] 02/23/2025 02/28/2025 Yes       Discharged Condition: stable    Disposition: Hospice/Home    Follow Up:    Patient Instructions:      Ambulatory referral/consult to Home Health   Standing Status: Future   Referral Priority: Routine Referral Type: Home Health   Referral Reason: Specialty Services Required   Requested Specialty: Home Health Services   Number of Visits Requested: 1       Significant Diagnostic Studies: N/A    Pending Diagnostic Studies:       None           Medications:  Reconciled Home Medications:      Medication List        START taking these medications      amoxicillin-clavulanate 500-125mg 500-125 mg Tab  Commonly known as: AUGMENTIN  Take 1 tablet (500 mg total) by mouth every 12 (twelve) hours. for 3 days            CONTINUE taking these medications      calcitRIOL 0.25 MCG Cap  Commonly known as: ROCALTROL  TAKE 1 CAPSULE(0.25 MCG) BY MOUTH EVERY DAY     citalopram 20 MG  tablet  Commonly known as: CeleXA  Take 0.5 tablets (10 mg total) by mouth once daily.     hydrALAZINE 50 MG tablet  Commonly known as: APRESOLINE  Take 1 tablet (50 mg total) by mouth every 12 (twelve) hours.     levETIRAcetam 500 MG Tab  Commonly known as: KEPPRA  Take 0.5 tablets (250 mg total) by mouth 2 (two) times daily.     lisinopriL 20 MG tablet  Commonly known as: PRINIVIL,ZESTRIL  Take 1 tablet (20 mg total) by mouth once daily.     tamsulosin 0.4 mg Cap  Commonly known as: FLOMAX  Take 1 capsule (0.4 mg total) by mouth once daily.     vitamin E 400 UNIT capsule  Take 400 Units by mouth once daily.            STOP taking these medications      CORICIDIN HBP COUGH AND COLD ORAL     hydroCHLOROthiazide 12.5 MG Tab     VITAMIN B-12 100 MCG tablet  Generic drug: cyanocobalamin              Indwelling Lines/Drains at time of discharge:   Lines/Drains/Airways       None                   Time spent on the discharge of patient: 33 minutes         Butch Wilkes NP  Department of Hospital Medicine  [facility]

## 2025-02-28 NOTE — PLAN OF CARE
Problem: Adult Inpatient Plan of Care  Goal: Plan of Care Review  Outcome: Progressing  Goal: Patient-Specific Goal (Individualized)  Outcome: Progressing  Goal: Absence of Hospital-Acquired Illness or Injury  Outcome: Progressing  Goal: Optimal Comfort and Wellbeing  Outcome: Progressing  Goal: Readiness for Transition of Care  Outcome: Progressing     Problem: Diabetes Comorbidity  Goal: Blood Glucose Level Within Targeted Range  Outcome: Progressing     Problem: Skin Injury Risk Increased  Goal: Skin Health and Integrity  Outcome: Progressing     Problem: Sepsis/Septic Shock  Goal: Optimal Coping  Outcome: Progressing  Goal: Absence of Bleeding  Outcome: Progressing  Goal: Blood Glucose Level Within Targeted Range  Outcome: Progressing  Goal: Absence of Infection Signs and Symptoms  Outcome: Progressing  Goal: Optimal Nutrition Intake  Outcome: Progressing     Problem: Acute Kidney Injury/Impairment  Goal: Fluid and Electrolyte Balance  Outcome: Progressing  Goal: Improved Oral Intake  Outcome: Progressing  Goal: Effective Renal Function  Outcome: Progressing     Problem: Wound  Goal: Optimal Coping  Outcome: Progressing  Goal: Optimal Functional Ability  Outcome: Progressing  Goal: Absence of Infection Signs and Symptoms  Outcome: Progressing  Goal: Improved Oral Intake  Outcome: Progressing  Goal: Optimal Pain Control and Function  Outcome: Progressing  Goal: Skin Health and Integrity  Outcome: Progressing  Goal: Optimal Wound Healing  Outcome: Progressing     Problem: Fall Injury Risk  Goal: Absence of Fall and Fall-Related Injury  Outcome: Progressing     Problem: Coping Ineffective  Goal: Effective Coping  Outcome: Progressing

## 2025-02-28 NOTE — PT/OT/SLP PROGRESS
Physical Therapy      Patient Name:  Lavon Brandon   MRN:  [id number]    Patient not seen today secondary to d/c home with hospice  .

## 2025-02-28 NOTE — PLAN OF CARE
Discharge orders and chart reviewed. No other discharge needs noted at this time. Pt is clear for discharge from case management. Pt is discharging to home with [facility].    Per Simona, consents signed. Per chart review, EMS in will call. ADT 30 to be placed for transport home.     Daughter signing appeal - [place] [facility] [place]     02/28/25 111   Final Note   Assessment Type Final Discharge Note   Anticipated Discharge Disposition HospiceHome   What phone number can be called within the next 1-3 days to see how you are doing after discharge? [phone]   Hospital Resources/Appts/Education Provided Post-Acute resouces added to AVS   Post-Acute Status   Post-Acute Authorization Hospice   Hospice Status Set-up Complete/Auth obtained   Discharge Delays (!) PFC Arranged Transportation

## 2025-02-28 NOTE — PROGRESS NOTES
"Nephrology Consult Note        Patient Name: Lavon Brandon  MRN: [id number]    Patient Class: IP- Inpatient   Admission Date: 2/23/2025  Length of Stay: 3 days  Date of Service: 2/28/2025    Attending Physician: Tatyana Neal MD  Primary Care Provider: Aristides Haynes MD    Reason for Consult: syncope/orthostatic hypotension    SUBJECTIVE:     HPI: 89M patient whom I see in office regularly, presented after syncopal episode at home. Per his daughter this is his second syncopal episode and had one presyncopal episode w/I past 6 months since living with her. At the time of presentation the patient's BP had recovered. He had carotid US that showed no evidence of hemodynamically significant carotid arterial stenosis. CT head showed no CT evidence of acute intracranial abnormality. Cardiology was consulted who recommends conservative treatment. CT head showing, "trace opacification of the right maxillary sinus, and trace opacification of the left mastoid air cells". Patient started on Augmentin with plan for outpatient referral to ENT. Lastly, KUB showed stool burden suggesting constipation.      Patient noted to be orthostatic. EEG performed. ECHO pending. Bowel evacuation and IV fluids prescribed. UA is bland.    2/25 VSS. No new complains.  2/26  UOP 2L, bradycardic.  ECHO w/EF 40-50%.  OOR.  2/27 VSS. Feels well, awaiting dispo on hospice.  2/28 VSS. Hospice discussions ongoing.    ASSESSMENT/PLAN:     Orthostatic hypotension  CKD stage 4, eGFR < 30 ml/min  BPH  No NSAIDs, ACEI/ARB, IV contrast or other nephrotoxins.  Keep MAP > 60, SBP > 100.  Dose meds for GFR < 30 ml/min.  Renal diet - low K, low phos.  He probably needs BPH meds but can stop all BP meds...  Agree with Cards eval and plan.    Anemia of CKD  Hgb and HCT are acceptable. Monitor for now.  Will provide JASPREET and/or IV iron as needed to keep Hgb 8-10 range.    MBD / Secondary HPT  Ca, Phos, PTH and vitamin D levels are acceptable.   Phos binders, vitamin " D and analogues, calcimimetics will be given as needed.    HTN  Tolerate asymptomatic HTN up to -160.  HOLD home BP meds.    Thank you for allowing us to participate in the care of your patient!   We will follow the patient and provide recommendations as needed.         Laboratory:  Recent Labs   Lab 02/26/25  0612 02/27/25  0326 02/28/25  0304    138 139   K 4.6 4.8 4.8    110 110   CO2 23 25 26   BUN 38* 42* 43*   CREATININE 2.2* 2.3* 2.1*   GLU 88 111* 90       Recent Labs   Lab 02/26/25  0612 02/27/25  0326 02/28/25  0304   CALCIUM 8.4* 8.7 9.0   ALBUMIN 2.7* 2.7* 2.8*   PHOS 3.1 3.3 3.6   MG 1.7 1.7 1.7       Recent Labs   Lab 06/23/23  1635 01/02/25  1415   PTH, Intact 118.8 H 51.8       Recent Labs   Lab 02/26/25  0818 02/26/25  1159 02/26/25  1713 02/26/25  1933 02/27/25  0756 02/27/25  0926 02/27/25  1229 02/27/25  1545 02/27/25  1954 02/28/25  0728   POCTGLUCOSE 91 109 105 133* 89 98 98 107 108 85       Recent Labs   Lab 11/08/23  0824 06/23/24  0458 02/24/25  0421   Hemoglobin A1C 5.2 5.1 5.3       Recent Labs   Lab 02/26/25  0612 02/27/25  0326 02/28/25  0304   WBC 4.82 5.14 5.43   HGB 8.0* 8.2* 8.4*   HCT 25.2* 25.5* 26.1*   * 142* 142*   MCV 91 91 92   MCHC 31.7* 32.2 32.2   MONO 7.9  0.4 9.3  0.5 7.9  0.4   EOSINOPHIL 2.5 3.1 2.6       Recent Labs   Lab 02/26/25  0612 02/27/25  0326 02/28/25  0304   BILITOT 0.3 0.2 0.2   PROT 5.0* 4.9* 5.1*   ALBUMIN 2.7* 2.7* 2.8*   ALKPHOS 28* 30* 27*   ALT 4* 4* 4*   AST 9* 8* 8*       Recent Labs   Lab 10/14/22  0949 04/11/23  1428 04/13/23  2220 11/30/23  0319 02/16/24  0117 06/23/24  0545 09/06/24  1025 09/13/24  1437 11/19/24  1139 12/22/24  1353 02/23/25  1702   Color, UA Yellow Yellow   < > Colorless A   < > Yellow Yellow Yellow Yellow Yellow Yellow   Appearance, UA Clear Clear   < > Clear   < > Clear Clear Clear Clear Clear Clear   pH, UA 6.0 7.0   < > 6.0   < > 6.0 6.0 6.0 7.0 7.0 6.0   Specific [place], UA 1.025 1.015   < >  1.010   < > 1.015 1.020 1.010 1.015 1.010 1.015   Protein, UA 1+ A 1+ A   < > Negative   < > Trace A Negative Trace A Negative Negative Negative   Glucose, UA Negative Negative   < > Negative   < > Negative Negative Negative Negative Negative Negative   Ketones, UA 1+ A 1+ A   < > Negative   < > Negative Negative Negative Negative Negative Negative   Urobilinogen, UA 1.0 Negative   < > Negative   < > Negative  --  Negative  --  Negative Negative   Bilirubin (UA) Negative Negative   < > Negative   < > Negative Negative Negative Negative Negative Negative   Occult Blood UA 3+ A 1+ A   < > 1+ A   < > Negative Negative Negative Negative Negative Negative   Nitrite, UA Negative Negative   < > Negative   < > Negative Negative Negative Negative Negative Negative   RBC, UA 3 1  --  13 H  --  2 1  --   --   --   --    WBC, UA 1 0  --  0  --  14 H 46 H  --   --   --   --    Bacteria Rare Negative  --  Negative  --  Moderate A Many A  --   --   --   --    Hyaline Casts, UA 0 1  --  0  --   --   --   --   --   --   --     < > = values in this interval not displayed.       Recent Labs   Lab 06/22/24  2227 09/13/24  1112 12/22/24  1311   Sample VENOUS VENOUS VENOUS       Microbiology Results (last 7 days)       ** No results found for the last 168 hours. **            Review of patient's allergies indicates:   Allergen Reactions    Ciprofloxacin (bulk) Swelling       Outpatient meds:  No current facility-administered medications on file prior to encounter.     Current Outpatient Medications on File Prior to Encounter   Medication Sig Dispense Refill    calcitRIOL (ROCALTROL) 0.25 MCG Cap TAKE 1 CAPSULE(0.25 MCG) BY MOUTH EVERY DAY 90 capsule 4    citalopram (CELEXA) 20 MG tablet Take 0.5 tablets (10 mg total) by mouth once daily. 90 tablet 2    cyanocobalamin (VITAMIN B-12) 100 MCG tablet Take 100 mcg by mouth once daily.      levETIRAcetam (KEPPRA) 500 MG Tab Take 0.5 tablets (250 mg total) by mouth 2 (two) times daily. 90  tablet 3    lisinopriL (PRINIVIL,ZESTRIL) 20 MG tablet Take 1 tablet (20 mg total) by mouth once daily. 90 tablet 3    tamsulosin (FLOMAX) 0.4 mg Cap Take 1 capsule (0.4 mg total) by mouth once daily. 90 capsule 3    vitamin E 400 UNIT capsule Take 400 Units by mouth once daily.      chlorpheniramine/dextromethorp (CORICIDIN HBP COUGH AND COLD ORAL) Take 30 mLs by mouth nightly as needed (Cough).      hydrALAZINE (APRESOLINE) 50 MG tablet Take 1 tablet (50 mg total) by mouth every 12 (twelve) hours.      hydroCHLOROthiazide (HYDRODIURIL) 12.5 MG Tab Take 1 tablet (12.5 mg total) by mouth once daily. for 3 days 3 tablet 0       Scheduled meds:   0.9% NaCl   Intravenous Once    amoxicillin-clavulanate 500-125mg  1 tablet Oral Q12H    calcitRIOL  0.25 mcg Oral Daily    citalopram  10 mg Oral Daily    diazePAM  2 mg Oral Once    enoxparin  30 mg Subcutaneous Daily    levETIRAcetam  250 mg Oral BID    lisinopriL  20 mg Oral Daily    magnesium citrate  296 mL Rectal Once    tamsulosin  0.4 mg Oral Daily    vitamin E (dl, acetate)  400 Units Oral Daily       Infusions:        PRN meds:    Current Facility-Administered Medications:     acetaminophen, 650 mg, Oral, Q4H PRN    aluminum-magnesium hydroxide-simethicone, 30 mL, Oral, QID PRN    dextrose 50%, 12.5 g, Intravenous, PRN    dextrose 50%, 25 g, Intravenous, PRN    glucagon (human recombinant), 1 mg, Intramuscular, PRN    glucose, 16 g, Oral, PRN    glucose, 24 g, Oral, PRN    hydrALAZINE, 25 mg, Oral, Q8H PRN    insulin aspart U-100, 0-10 Units, Subcutaneous, QID (AC + HS) PRN    magnesium oxide, 800 mg, Oral, PRN    magnesium oxide, 800 mg, Oral, PRN    melatonin, 6 mg, Oral, Nightly PRN    naloxone, 0.02 mg, Intravenous, PRN    ondansetron, 4 mg, Intravenous, Q6H PRN    potassium bicarbonate, 35 mEq, Oral, PRN    potassium bicarbonate, 50 mEq, Oral, PRN    potassium bicarbonate, 60 mEq, Oral, PRN    potassium, sodium phosphates, 2 packet, Oral, PRN    potassium,  sodium phosphates, 2 packet, Oral, PRN    potassium, sodium phosphates, 2 packet, Oral, PRN    senna-docusate 8.6-50 mg, 1 tablet, Oral, BID PRN    sodium chloride 0.9%, 10 mL, Intravenous, Q12H PRN    Past Medical History:   Diagnosis Date    Bradyarrhythmia     CVA (cerebral infarction) 2006    Dementia     Depression     Hyperlipidemia     Hypertension     renal htn    Pulmonary embolism 2002    Seizure disorder      Past Surgical History:   Procedure Laterality Date    left foot  with crushed injry       Family History   Problem Relation Name Age of Onset    Diabetes Mother      Cancer Neg Hx       Social History     Tobacco Use    Smoking status: Never    Smokeless tobacco: Never   Substance Use Topics    Alcohol use: No    Drug use: No       OBJECTIVE:     Vital Signs and IO:  Temp:  [97.8 °F (36.6 °C)-98.4 °F (36.9 °C)]   Pulse:  [39-54]   Resp:  [17-18]   BP: (107-165)/(48-67)   SpO2:  [96 %-98 %]   I/O last 3 completed shifts:  In: 360 [P.O.:360]  Out: 1950 [Urine:1950]  Wt Readings from Last 5 Encounters:   02/23/25 67.9 kg (149 lb 11.1 oz)   02/24/25 67.6 kg (149 lb)   01/14/25 73 kg (160 lb 15 oz)   12/27/24 66.2 kg (146 lb)   12/22/24 74.9 kg (165 lb 2 oz)     Body mass index is 22.76 kg/m².    Physical Exam  Constitutional:       General: Patient is not in acute distress.     Appearance: Patient is well-developed. She is not diaphoretic.   HENT:      Head: Normocephalic and atraumatic.      Mouth/Throat: Mucous membranes are moist.   Eyes:      General: No scleral icterus.     Pupils: Pupils are equal, round, and reactive to light.   Cardiovascular:      Rate and Rhythm: Normal rate and regular rhythm.   Pulmonary:      Effort: Pulmonary effort is normal. No respiratory distress.      Breath sounds: No stridor.   Abdominal:      General: There is no distension.      Palpations: Abdomen is soft.   Musculoskeletal:         General: No deformity. Normal range of motion.      Cervical back: Neck supple.    Skin:     General: Skin is warm and dry.      Findings: No rash present. No erythema.   Neurological:      Mental Status: Patient is alert and oriented to person, place, and time.      Cranial Nerves: No cranial nerve deficit.   Psychiatric:         Behavior: Behavior normal.          Patient care time was spent personally by me on the following activities:     Obtaining a history.  Examination of patient.  Providing medical care at the patients bedside.  Developing a treatment plan with patient or surrogate and bedside caregivers.  Ordering and reviewing laboratory studies, radiographic studies, pulse oximetry.  Ordering and performing treatments and interventions.  Evaluation of patient's response to treatment.  Discussions with consultants while on the unit and immediately available to the patient.  Re-evaluation of the patient's condition.  Documentation in the medical record.     London Chase MD      [facility]  [address] [place] [id number]    [phone] - tel  [phone] - fax    2/28/2025

## 2025-02-28 NOTE — PT/OT/SLP DISCHARGE
Physical Therapy Discharge Summary    Name: Lavon Brandon  MRN: [id number]   Principal Problem: Bradycardia     Patient Discharged from acute Physical Therapy on 25.  Please refer to prior PT noted date on 25 for functional status.     Assessment:     Pt to discharge home with hospice tomorrow and is no longer appropriate for skilled PT services.    Objective:     GOALS:   Multidisciplinary Problems       Physical Therapy Goals          Problem: Physical Therapy    Goal Priority Disciplines Outcome Interventions   Physical Therapy Goal     PT, PT/OT Progressing    Description:       Patient will increase functional independence with mobility by performin. Supine to sit with Contact Guard Assistance  2. Sit to stand transfer with Contact Guard Assistance  3. Gait  x 100  feet with Contact Guard Assistance using Rolling Walker.                          Reasons for Discontinuation of Therapy Services  Pt is discharging with home on hospice       Plan:     Patient Discharged to: Palliative Care/Hospice.      2025

## 2025-02-28 NOTE — PT/OT/SLP DISCHARGE
Occupational Therapy Discharge Summary    Lavon Brandon  MRN: [id number]   Principal Problem: Bradycardia      Patient Discharged from acute Occupational Therapy on 2/28/2025.  Please refer to prior OT note dated 2/27/2025 for functional status.    Assessment:     Pt to discharge home with hospice tomorrow and is no longer appropriate for skilled OT services.     Objective:     GOALS:   Multidisciplinary Problems       Occupational Therapy Goals          Problem: Occupational Therapy    Goal Priority Disciplines Outcome Interventions   Occupational Therapy Goal     OT, PT/OT     Description: Goals to be met by: 3/24/2025     Patient will increase functional independence with ADLs by performing:    UE Dressing with Minimal Assistance.  Grooming while seated with Supervision.  Upper extremity exercise program x10 reps per handout, with supervision.                         Reasons for Discontinuation of Therapy Services  PT is discharging home with hospice        Plan:     Patient Discharged to: Palliative Care/Hospice    2/28/2025

## 2025-02-28 NOTE — PLAN OF CARE
Spoke with Gabriela Leyva Manish(Daughter)- [phone], regarding discharge IMM, Understands statement and IMM will be scanned to chart.

## 2025-03-01 VITALS
HEIGHT: 68 IN | BODY MASS INDEX: 22.69 KG/M2 | TEMPERATURE: 97 F | WEIGHT: 149.69 LBS | HEART RATE: 62 BPM | RESPIRATION RATE: 18 BRPM | OXYGEN SATURATION: 96 % | DIASTOLIC BLOOD PRESSURE: 66 MMHG | SYSTOLIC BLOOD PRESSURE: 144 MMHG

## 2025-03-01 LAB
ALBUMIN SERPL BCP-MCNC: 2.9 G/DL (ref 3.5–5.2)
ALP SERPL-CCNC: 31 U/L (ref 55–135)
ALT SERPL W/O P-5'-P-CCNC: 4 U/L (ref 10–44)
ANION GAP SERPL CALC-SCNC: 4 MMOL/L (ref 8–16)
AST SERPL-CCNC: 9 U/L (ref 10–40)
BASOPHILS # BLD AUTO: 0.03 K/UL (ref 0–0.2)
BASOPHILS NFR BLD: 0.5 % (ref 0–1.9)
BILIRUB SERPL-MCNC: 0.2 MG/DL (ref 0.1–1)
BUN SERPL-MCNC: 50 MG/DL (ref 10–30)
CALCIUM SERPL-MCNC: 9 MG/DL (ref 8.7–10.5)
CHLORIDE SERPL-SCNC: 110 MMOL/L (ref 95–110)
CO2 SERPL-SCNC: 25 MMOL/L (ref 23–29)
CREAT SERPL-MCNC: 2.1 MG/DL (ref 0.5–1.4)
DIFFERENTIAL METHOD BLD: ABNORMAL
EOSINOPHIL # BLD AUTO: 0.1 K/UL (ref 0–0.5)
EOSINOPHIL NFR BLD: 2.2 % (ref 0–8)
ERYTHROCYTE [DISTWIDTH] IN BLOOD BY AUTOMATED COUNT: 14 % (ref 11.5–14.5)
EST. GFR  (NO RACE VARIABLE): 29 ML/MIN/1.73 M^2
GLUCOSE SERPL-MCNC: 98 MG/DL (ref 70–110)
HCT VFR BLD AUTO: 27.2 % (ref 40–54)
HGB BLD-MCNC: 8.7 G/DL (ref 14–18)
IMM GRANULOCYTES # BLD AUTO: 0.02 K/UL (ref 0–0.04)
IMM GRANULOCYTES NFR BLD AUTO: 0.4 % (ref 0–0.5)
LYMPHOCYTES # BLD AUTO: 2.2 K/UL (ref 1–4.8)
LYMPHOCYTES NFR BLD: 39.2 % (ref 18–48)
MAGNESIUM SERPL-MCNC: 1.7 MG/DL (ref 1.6–2.6)
MCH RBC QN AUTO: 29 PG (ref 27–31)
MCHC RBC AUTO-ENTMCNC: 32 G/DL (ref 32–36)
MCV RBC AUTO: 91 FL (ref 82–98)
MONOCYTES # BLD AUTO: 0.4 K/UL (ref 0.3–1)
MONOCYTES NFR BLD: 7.4 % (ref 4–15)
NEUTROPHILS # BLD AUTO: 2.8 K/UL (ref 1.8–7.7)
NEUTROPHILS NFR BLD: 50.3 % (ref 38–73)
NRBC BLD-RTO: 0 /100 WBC
PHOSPHATE SERPL-MCNC: 3.6 MG/DL (ref 2.7–4.5)
PLATELET # BLD AUTO: 160 K/UL (ref 150–450)
PMV BLD AUTO: 10.5 FL (ref 9.2–12.9)
POCT GLUCOSE: 104 MG/DL (ref 70–110)
POCT GLUCOSE: 125 MG/DL (ref 70–110)
POCT GLUCOSE: 99 MG/DL (ref 70–110)
POTASSIUM SERPL-SCNC: 4.9 MMOL/L (ref 3.5–5.1)
PROT SERPL-MCNC: 5.4 G/DL (ref 6–8.4)
RBC # BLD AUTO: 3 M/UL (ref 4.6–6.2)
SODIUM SERPL-SCNC: 139 MMOL/L (ref 136–145)
WBC # BLD AUTO: 5.54 K/UL (ref 3.9–12.7)

## 2025-03-01 PROCEDURE — 25000003 PHARM REV CODE 250: Performed by: NURSE PRACTITIONER

## 2025-03-01 PROCEDURE — 84100 ASSAY OF PHOSPHORUS: CPT

## 2025-03-01 PROCEDURE — 80053 COMPREHEN METABOLIC PANEL: CPT

## 2025-03-01 PROCEDURE — 25000003 PHARM REV CODE 250

## 2025-03-01 PROCEDURE — 36415 COLL VENOUS BLD VENIPUNCTURE: CPT

## 2025-03-01 PROCEDURE — 83735 ASSAY OF MAGNESIUM: CPT

## 2025-03-01 PROCEDURE — 85025 COMPLETE CBC W/AUTO DIFF WBC: CPT

## 2025-03-01 RX ORDER — AMOXICILLIN AND CLAVULANATE POTASSIUM 500; 125 MG/1; MG/1
1 TABLET, FILM COATED ORAL EVERY 12 HOURS
Qty: 6 TABLET | Refills: 0 | Status: SHIPPED | OUTPATIENT
Start: 2025-03-01 | End: 2025-03-04

## 2025-03-01 RX ADMIN — CALCITRIOL CAPSULES 0.25 MCG 0.25 MCG: 0.25 CAPSULE ORAL at 09:03

## 2025-03-01 RX ADMIN — SENNOSIDES AND DOCUSATE SODIUM 1 TABLET: 50; 8.6 TABLET ORAL at 05:03

## 2025-03-01 RX ADMIN — TAMSULOSIN HYDROCHLORIDE 0.4 MG: 0.4 CAPSULE ORAL at 09:03

## 2025-03-01 RX ADMIN — LEVETIRACETAM 250 MG: 250 TABLET, FILM COATED ORAL at 09:03

## 2025-03-01 RX ADMIN — AMOXICILLIN AND CLAVULANATE POTASSIUM 500 MG: 500; 125 TABLET, FILM COATED ORAL at 09:03

## 2025-03-01 RX ADMIN — LISINOPRIL 20 MG: 20 TABLET ORAL at 09:03

## 2025-03-01 RX ADMIN — CITALOPRAM HYDROBROMIDE 10 MG: 10 TABLET ORAL at 09:03

## 2025-03-01 RX ADMIN — Medication 400 UNITS: at 09:03

## 2025-03-01 NOTE — DISCHARGE SUMMARY
[facility] Medicine  Discharge Summary      Patient Name: Lavon Brandon  MRN: [id number]  MIMI: [id number]  Patient Class: IP- Inpatient  Admission Date: 2/23/2025  Hospital Length of Stay: 4 days  Discharge Date and Time:  03/01/2025 11:03 AM  Attending Physician: Mike Pedersen DO   Discharging Provider: Butch Wilkes NP  Primary Care Provider: Aristides Haynes MD    Primary Care Team: Networked reference to record PCT     HPI:   Mr. Brandon is a 89 yr old male with a hx of HTN, dementia, MARLIN, CKD stage 4, epilepsy, debility, DM type 2, depression, HLD, colonic polyps, TBI, CVA, secondary hyperparathyroidism, thrombocytopenia, anemia, arthritis, B12 deficiency, vitamin-D deficiency, pressure injury to sacral region, PE, bradycardia who presented to the ED via EMS with a chief complaint of syncope. Patient's daughter reports that the patient was sitting in his wheelchair, had finished eating and was watching TV when she noticed him lean forward and slumped over to the left.  Denies head trauma.  She endorsed that his mouth was open and he was drooling with his eyes open looking to the left and not focused. She tried to wake him and found him unconscious and called EMS to bring him here for further eval. Pt is currently A&O x 1 to self. He is disoriented to place and time.  Daughter states that this is his baseline.  She denies any bowel or bladder incontinence during episode of syncope.  She does endorse that his hydralazine was recently stopped last week.  She also states that he is normally bradycardic with heart rate in the 50s.  She states that he does not smoke tobacco, drink alcohol, or do recreational drugs.  Patient denies any pain at this time.  Remainder of history is limited due to dementia.    Lengthy discussion about code status with daughter at bedside as well as other daughter over the phone who are both healthcare POAs.  I discussed with them that the patient had been  "marked DNR in the past and wanted to confirm if they wanted to continue DNR status.  Discussed the pros and cons of CPR and patient's daughters would like to make patient full code at this time.  Patient's chart has been updated to reflect this code status change.    Upon arrival to ED, patient afebrile, HR of 42, RR of 16, BP of 151/70, satting 98% on RA.  Workup in the ED revealed RBC of 3.47, H/H of 10.4/31.8, BUN of 49, creatinine of 2.3, GFR of 26, albumin of 3.4, BNP of 197, troponin of 15.3, POCT glucose of 113.  CXR shows low inspiratory volume exacerbating cardiomediastinal silhouette and causing crowding of vessels.  No evidence of acute cardiopulmonary findings.  Dilated colonic loops projects below the elevated right hemidiaphragm.  No medications administered while in the ED. case discussed with ED provider and patient will be placed under observation for further management.    * No surgery found *      Hospital Course:   89 y.o. BM elderly male patient who presented after syncopal episode at home. Per his daughter this is his second syncopal episode and had one presyncopal episode w/I past 6 months since living with her. At the time of presentation the patient's BP had recovered. He had Carotid US that showed no evidence of hemodynamically significant carotid arterial stenosis. CT head showed no CT evidence of acute intracranial abnormality. Cardiology was consulted who recommends conservative treatment. He is noted that after discussion with the family regarding further management, would, "consider a pacemaker but they would like to avoid so for now. Continue with close observation for now and repeat a monitor as an outpatient." If patient has recurrent syncopal events, can be considered for pacemaker at that point. Patient had CT head also showing, "trace opacification of the right maxillary sinus, and trace opacification of the left mastoid air cells". Exam of BL ears showed OM. Patient started on " Augmentin with plan for outpatient referral to ENT. Lastly, KUB showed stool burden suggesting constipation.     Patient noted to be orthostatic. BL OM and CT with mastoid sinus opacification. Started on ABX with referral to ENT outpatient. EEG performed - EEG abnormal, evaluated by neurology - continue current BID keppra. Patient with bradycardia overnight - cardiology aware. After speaking to family they have opted to defer pacemaker. Amenable to hospice.  Case management was consulted for palliative care consult.  He was accepted for discharge with hospice.  He was seen and examined on the day of discharge and is medically stable for discharge     Goals of Care Treatment Preferences:  Code Status: Full Code          What is most important right now is to focus on comfort and QOL .  Accordingly, we have decided that the best plan to meet the patient's goals includes enrolling in hospice care.      SDOH Screening:  The patient was screened for utility difficulties, food insecurity, transport difficulties, housing insecurity, and interpersonal safety and there were no concerns identified this admission.     Consults:   Consults (From admission, onward)          Status Ordering Provider     Inpatient consult to   Once        Provider:  (Not yet assigned)    Acknowledged ALISE REYNAGA     Inpatient consult to Palliative Care  Once        Provider:  Chris Nesbitt MD    Completed INDIRA MILLER     Inpatient Consult to Neurology Services (General Neurology)  Once        Provider:  Brian Valdovinos MD    Completed INDIRA MILLER     Inpatient consult to Nephrology  Once        Provider:  London Chase MD    Completed INDIRA MILLER     Case Management/  Once        Provider:  (Not yet assigned)    Completed JESUS ALBERTO CASTRO     Inpatient consult to Cardiology  Once        Provider:  Shanna Pacheco MD    Completed JESUS ALBERTO CASTRO            * Bradycardia  Family  "has opted to defer pacemaker  Consult to hospice      Orthostatic hypertension  Patient's blood pressure range in the last 24 hours was: BP  Min: 129/63  Max: 153/66.The patient's inpatient anti-hypertensive regimen is listed below:  Current Antihypertensives  lisinopriL tablet 20 mg, Daily, Oral  hydrALAZINE tablet 25 mg, Every 8 hours PRN, Oral    Conservative IV fluids  Recheck in am    Rhinosinusitis  Per ct head, " There is near complete opacification of the right maxillary sinus, similar to prior examination.  There is trace opacification of the left mastoid air cells.  "  Start augmentin  Outpatient ENT    DM (diabetes mellitus), type 2 with neurological complications  Last A1c reviewed-   Lab Results   Component Value Date    HGBA1C 5.3 02/24/2025     Most recent fingerstick glucose reviewed-   Recent Labs   Lab 02/25/25  1111 02/25/25  1628 02/25/25  2019 02/26/25  0818   POCTGLUCOSE 111* 108 105 91       Current correctional scale  Medium  Maintain anti-hyperglycemic dose as follows-   Antihyperglycemics (From admission, onward)      Start     Stop Route Frequency Ordered    02/23/25 1838  insulin aspart U-100 pen 0-10 Units         -- SubQ Before meals & nightly PRN 02/23/25 1822          Hold Oral hypoglycemics while patient is in the hospital.    ACP (advance care planning)  Advance Care Planning Code Status  In light of the patients advanced illness, we reviewed what the patients preferences for care would be at the very end of life. Lengthy discussion about code status with daughter at bedside as well as other daughter over the phone who are both healthcare POAs.  I discussed with them that the patient had been marked DNR in the past and wanted to confirm if they wanted to continue DNR status. Patient's daughters would like to make patient full code at this time. I communicated to the patient that a FULL CODE order would be placed in his medical record to reflect this preference. I spent a total of " 18 minutes engaging the patient in this advance care planning discussion.         Debility  - PT/OT/CM consulted, appreciate recs  - Fall precautions  - Skin assessment Qshift  - Turn patient Q2H    Centrencephalic epilepsy  - Hx noted  - Continue home keppra  - Keppra level pending  - Seizure precautions      CKD (chronic kidney disease) stage 4, GFR 15-29 ml/min  Creatine stable for now. BMP reviewed- noted Estimated Creatinine Clearance: 20.6 mL/min (A) (based on SCr of 2.2 mg/dL (H)). according to latest data. Based on current GFR, CKD stage is stage 4 - GFR 15-29.  Monitor UOP and serial BMP and adjust therapy as needed. Renally dose meds. Avoid nephrotoxic medications and procedures.  Conservative IV fluids  Follow Chemistry  Nephrology consult    MARLIN (obstructive sleep apnea), 2008, not on Rx  - Recently had sleep study on 2/19/25  - Continue OP F/U      Dementia with behavioral disturbance  - Hx noted  - Delirium precautions    Primary hypertension  Chronic, Latest blood pressure and vitals reviewed-     Temp:  [98.2 °F (36.8 °C)-99.4 °F (37.4 °C)]   Pulse:  [45-55]   Resp:  [18]   BP: (129-153)/(49-69)   SpO2:  [96 %-98 %] .   Home meds for hypertension were reviewed and noted below-  Hypertension Medications              lisinopriL (PRINIVIL,ZESTRIL) 20 MG tablet Take 1 tablet (20 mg total) by mouth once daily.    hydrALAZINE (APRESOLINE) 50 MG tablet Take 1 tablet (50 mg total) by mouth every 12 (twelve) hours.    hydroCHLOROthiazide (HYDRODIURIL) 12.5 MG Tab Take 1 tablet (12.5 mg total) by mouth once daily. for 3 days            While in the hospital, will manage blood pressure as follows; Adjust home antihypertensive regimen as follows- patient no longer taking hydrochlorothiazide.  Also hydralazine was recently discontinued last week.  Continue home lisinopril with parameters.    Will utilize p.r.n. blood pressure medication only if patient's blood pressure greater than 180/110 and he develops  symptoms such as worsening chest pain or shortness of breath.        Final Active Diagnoses:    Diagnosis Date Noted POA    PRINCIPAL PROBLEM:  Bradycardia [R00.1] 04/12/2023 Yes    Rhinosinusitis [J32.9] 02/24/2025 Yes    Orthostatic hypertension [I10] 02/24/2025 Yes    DM (diabetes mellitus), type 2 with neurological complications [E11.49] 01/23/2025 Yes    Goals of care, counseling/discussion [Z71.89] 06/25/2024 Not Applicable    ACP (advance care planning) [Z71.89] 06/23/2024 Not Applicable    Debility [R53.81] 10/14/2022 Yes    Centrencephalic epilepsy [G40.409] 05/06/2019 Yes     Chronic    CKD (chronic kidney disease) stage 4, GFR 15-29 ml/min [N18.4] 05/06/2019 Yes    MARLIN (obstructive sleep apnea), 2008, not on Rx [G47.33] 05/28/2013 Yes    Dementia with behavioral disturbance [F03.918]  Yes     Chronic    Primary hypertension [I10]  Yes      Problems Resolved During this Admission:    Diagnosis Date Noted Date Resolved POA    Acute otitis media, bilateral [H66.93] 02/24/2025 02/28/2025 Yes    Syncope [R55] 02/23/2025 02/28/2025 Yes       Discharged Condition: stable    Disposition: Hospice/Home    Follow Up:    Patient Instructions:      Ambulatory referral/consult to Home Health   Standing Status: Future   Referral Priority: Routine Referral Type: Home Health   Referral Reason: Specialty Services Required   Requested Specialty: Home Health Services   Number of Visits Requested: 1       Significant Diagnostic Studies: N/A    Pending Diagnostic Studies:       None           Medications:  Reconciled Home Medications:      Medication List        START taking these medications      amoxicillin-clavulanate 500-125mg 500-125 mg Tab  Commonly known as: AUGMENTIN  Take 1 tablet (500 mg total) by mouth every 12 (twelve) hours. for 3 days            CONTINUE taking these medications      calcitRIOL 0.25 MCG Cap  Commonly known as: ROCALTROL  TAKE 1 CAPSULE(0.25 MCG) BY MOUTH EVERY DAY     citalopram 20 MG  tablet  Commonly known as: CeleXA  Take 0.5 tablets (10 mg total) by mouth once daily.     hydrALAZINE 50 MG tablet  Commonly known as: APRESOLINE  Take 1 tablet (50 mg total) by mouth every 12 (twelve) hours.     levETIRAcetam 500 MG Tab  Commonly known as: KEPPRA  Take 0.5 tablets (250 mg total) by mouth 2 (two) times daily.     lisinopriL 20 MG tablet  Commonly known as: PRINIVIL,ZESTRIL  Take 1 tablet (20 mg total) by mouth once daily.     tamsulosin 0.4 mg Cap  Commonly known as: FLOMAX  Take 1 capsule (0.4 mg total) by mouth once daily.     vitamin E 400 UNIT capsule  Take 400 Units by mouth once daily.            STOP taking these medications      CORICIDIN HBP COUGH AND COLD ORAL     hydroCHLOROthiazide 12.5 MG Tab     VITAMIN B-12 100 MCG tablet  Generic drug: cyanocobalamin              Indwelling Lines/Drains at time of discharge:   Lines/Drains/Airways       None                   Time spent on the discharge of patient: 33 minutes         Butch Wilkes NP  Department of Hospital Medicine  [facility]

## 2025-03-01 NOTE — PLAN OF CARE
03/01/25 1414   Final Note   Assessment Type Final Discharge Note   Anticipated Discharge Disposition HospiceHome   What phone number can be called within the next 1-3 days to see how you are doing after discharge? [phone]   Hospital Resources/Appts/Education Provided Appointment suggestion unavailable  (Patient going home with hospice.)   Post-Acute Status   Hospice Status Set-up Complete/Auth obtained   Discharge Delays (!) Ambulance Transport/Facility Transport       Patient to DC home with [facility],spoke with Simona at ([phone]), they will be out to see patient once he's home. [facility] called as noted in previous CM notes for transportation to [id number] [place] [id number].Per Butch Wilkes NP, patient is to follow up with his PCP and ENT. PCP appointment already schedule and staff message sent for ENT scheduling. No further DC needs at this time.

## 2025-03-01 NOTE — PROGRESS NOTES
"Nephrology Consult Note        Patient Name: Lavon Brandon  MRN: [id number]    Patient Class: IP- Inpatient   Admission Date: 2/23/2025  Length of Stay: 4 days  Date of Service: 3/1/2025    Attending Physician: Mike ePdersen DO  Primary Care Provider: Aristides Haynes MD    Reason for Consult: syncope/orthostatic hypotension    SUBJECTIVE:     HPI: 89M patient whom I see in office regularly, presented after syncopal episode at home. Per his daughter this is his second syncopal episode and had one presyncopal episode w/I past 6 months since living with her. At the time of presentation the patient's BP had recovered. He had carotid US that showed no evidence of hemodynamically significant carotid arterial stenosis. CT head showed no CT evidence of acute intracranial abnormality. Cardiology was consulted who recommends conservative treatment. CT head showing, "trace opacification of the right maxillary sinus, and trace opacification of the left mastoid air cells". Patient started on Augmentin with plan for outpatient referral to ENT. Lastly, KUB showed stool burden suggesting constipation.      Patient noted to be orthostatic. EEG performed. ECHO pending. Bowel evacuation and IV fluids prescribed. UA is bland.    2/25 VSS. No new complains.  2/26  UOP 2L, bradycardic.  ECHO w/EF 40-50%.  OOR.  2/27 VSS. Feels well, awaiting dispo on hospice.  2/28 VSS. Hospice discussions ongoing.  3/1  AFVSS,  800cc uop plus unmeasured occurrences.  Going home [facility].  NAD    ASSESSMENT/PLAN:     Orthostatic hypotension  CKD stage 4, eGFR < 30 ml/min  BPH  No NSAIDs, ACEI/ARB, IV contrast or other nephrotoxins.  Not a candidate for RRT  Keep MAP > 60, SBP > 100.  Dose meds for GFR < 30 ml/min.  Renal diet - low K, low phos.  He probably needs BPH meds but can stop all BP meds...  Agree with Cards eval and plan.    Anemia of CKD  Hgb and HCT are acceptable. Monitor for now.  Will provide JASPREET and/or IV iron as needed to " keep Hgb 8-10 range.    MBD / Secondary HPT  Ca, Phos, PTH and vitamin D levels are acceptable.   Phos binders, vitamin D and analogues, calcimimetics will be given as needed.    HTN  Tolerate asymptomatic HTN up to -160.  HOLD home BP meds.    Thank you for allowing us to participate in the care of your patient!   We will follow the patient and provide recommendations as needed.         Laboratory:  Recent Labs   Lab 02/27/25 0326 02/28/25  0304 03/01/25  0516    139 139   K 4.8 4.8 4.9    110 110   CO2 25 26 25   BUN 42* 43* 50*   CREATININE 2.3* 2.1* 2.1*   * 90 98       Recent Labs   Lab 02/27/25 0326 02/28/25  0304 03/01/25  0516   CALCIUM 8.7 9.0 9.0   ALBUMIN 2.7* 2.8* 2.9*   PHOS 3.3 3.6 3.6   MG 1.7 1.7 1.7       Recent Labs   Lab 06/23/23  1635 01/02/25  1415   PTH, Intact 118.8 H 51.8       Recent Labs   Lab 02/27/25  0756 02/27/25  0926 02/27/25  1229 02/27/25  1545 02/27/25  1954 02/28/25  0728 02/28/25  1225 02/28/25  1626 02/28/25  2032 03/01/25  0929   POCTGLUCOSE 89 98 98 107 108 85 109 125* 113* 104       Recent Labs   Lab 11/08/23  0824 06/23/24  0458 02/24/25  0421   Hemoglobin A1C 5.2 5.1 5.3       Recent Labs   Lab 02/27/25  0326 02/28/25  0304 03/01/25  0516   WBC 5.14 5.43 5.54   HGB 8.2* 8.4* 8.7*   HCT 25.5* 26.1* 27.2*   * 142* 160   MCV 91 92 91   MCHC 32.2 32.2 32.0   MONO 9.3  0.5 7.9  0.4 7.4  0.4   EOSINOPHIL 3.1 2.6 2.2       Recent Labs   Lab 02/27/25  0326 02/28/25  0304 03/01/25  0516   BILITOT 0.2 0.2 0.2   PROT 4.9* 5.1* 5.4*   ALBUMIN 2.7* 2.8* 2.9*   ALKPHOS 30* 27* 31*   ALT 4* 4* 4*   AST 8* 8* 9*       Recent Labs   Lab 10/14/22  0949 04/11/23  1428 04/13/23  2220 11/30/23  0319 02/16/24  0117 06/23/24  0545 09/06/24  1025 09/13/24  1437 11/19/24  1139 12/22/24  1353 02/23/25  1702   Color, UA Yellow Yellow   < > Colorless A   < > Yellow Yellow Yellow Yellow Yellow Yellow   Appearance, UA Clear Clear   < > Clear   < > Clear Clear Clear  Clear Clear Clear   pH, UA 6.0 7.0   < > 6.0   < > 6.0 6.0 6.0 7.0 7.0 6.0   Specific [place], UA 1.025 1.015   < > 1.010   < > 1.015 1.020 1.010 1.015 1.010 1.015   Protein, UA 1+ A 1+ A   < > Negative   < > Trace A Negative Trace A Negative Negative Negative   Glucose, UA Negative Negative   < > Negative   < > Negative Negative Negative Negative Negative Negative   Ketones, UA 1+ A 1+ A   < > Negative   < > Negative Negative Negative Negative Negative Negative   Urobilinogen, UA 1.0 Negative   < > Negative   < > Negative  --  Negative  --  Negative Negative   Bilirubin (UA) Negative Negative   < > Negative   < > Negative Negative Negative Negative Negative Negative   Occult Blood UA 3+ A 1+ A   < > 1+ A   < > Negative Negative Negative Negative Negative Negative   Nitrite, UA Negative Negative   < > Negative   < > Negative Negative Negative Negative Negative Negative   RBC, UA 3 1  --  13 H  --  2 1  --   --   --   --    WBC, UA 1 0  --  0  --  14 H 46 H  --   --   --   --    Bacteria Rare Negative  --  Negative  --  Moderate A Many A  --   --   --   --    Hyaline Casts, UA 0 1  --  0  --   --   --   --   --   --   --     < > = values in this interval not displayed.       Recent Labs   Lab 06/22/24  2227 09/13/24  1112 12/22/24  1311   Sample VENOUS VENOUS VENOUS       Microbiology Results (last 7 days)       ** No results found for the last 168 hours. **            Review of patient's allergies indicates:   Allergen Reactions    Ciprofloxacin (bulk) Swelling       Outpatient meds:  No current facility-administered medications on file prior to encounter.     Current Outpatient Medications on File Prior to Encounter   Medication Sig Dispense Refill    calcitRIOL (ROCALTROL) 0.25 MCG Cap TAKE 1 CAPSULE(0.25 MCG) BY MOUTH EVERY DAY 90 capsule 4    citalopram (CELEXA) 20 MG tablet Take 0.5 tablets (10 mg total) by mouth once daily. 90 tablet 2    levETIRAcetam (KEPPRA) 500 MG Tab Take 0.5 tablets (250 mg total) by  mouth 2 (two) times daily. 90 tablet 3    lisinopriL (PRINIVIL,ZESTRIL) 20 MG tablet Take 1 tablet (20 mg total) by mouth once daily. 90 tablet 3    tamsulosin (FLOMAX) 0.4 mg Cap Take 1 capsule (0.4 mg total) by mouth once daily. 90 capsule 3    vitamin E 400 UNIT capsule Take 400 Units by mouth once daily.      hydrALAZINE (APRESOLINE) 50 MG tablet Take 1 tablet (50 mg total) by mouth every 12 (twelve) hours.         Scheduled meds:   0.9% NaCl   Intravenous Once    amoxicillin-clavulanate 500-125mg  1 tablet Oral Q12H    calcitRIOL  0.25 mcg Oral Daily    citalopram  10 mg Oral Daily    diazePAM  2 mg Oral Once    enoxparin  30 mg Subcutaneous Daily    levETIRAcetam  250 mg Oral BID    lisinopriL  20 mg Oral Daily    magnesium citrate  296 mL Rectal Once    tamsulosin  0.4 mg Oral Daily    vitamin E (dl, acetate)  400 Units Oral Daily       Infusions:        PRN meds:    Current Facility-Administered Medications:     acetaminophen, 650 mg, Oral, Q4H PRN    aluminum-magnesium hydroxide-simethicone, 30 mL, Oral, QID PRN    dextrose 50%, 12.5 g, Intravenous, PRN    dextrose 50%, 25 g, Intravenous, PRN    glucagon (human recombinant), 1 mg, Intramuscular, PRN    glucose, 16 g, Oral, PRN    glucose, 24 g, Oral, PRN    hydrALAZINE, 25 mg, Oral, Q8H PRN    insulin aspart U-100, 0-10 Units, Subcutaneous, QID (AC + HS) PRN    magnesium oxide, 800 mg, Oral, PRN    magnesium oxide, 800 mg, Oral, PRN    melatonin, 6 mg, Oral, Nightly PRN    naloxone, 0.02 mg, Intravenous, PRN    ondansetron, 4 mg, Intravenous, Q6H PRN    potassium bicarbonate, 35 mEq, Oral, PRN    potassium bicarbonate, 50 mEq, Oral, PRN    potassium bicarbonate, 60 mEq, Oral, PRN    potassium, sodium phosphates, 2 packet, Oral, PRN    potassium, sodium phosphates, 2 packet, Oral, PRN    potassium, sodium phosphates, 2 packet, Oral, PRN    senna-docusate 8.6-50 mg, 1 tablet, Oral, BID PRN    sodium chloride 0.9%, 10 mL, Intravenous, Q12H PRN    Past Medical  History:   Diagnosis Date    Bradyarrhythmia     CVA (cerebral infarction) 2006    Dementia     Depression     Hyperlipidemia     Hypertension     renal htn    Pulmonary embolism 2002    Seizure disorder      Past Surgical History:   Procedure Laterality Date    left foot  with crushed injry       Family History   Problem Relation Name Age of Onset    Diabetes Mother      Cancer Neg Hx       Social History     Tobacco Use    Smoking status: Never    Smokeless tobacco: Never   Substance Use Topics    Alcohol use: No    Drug use: No       OBJECTIVE:     Vital Signs and IO:  Temp:  [97.5 °F (36.4 °C)-98.3 °F (36.8 °C)]   Pulse:  [40-78]   Resp:  [18]   BP: (123-167)/(47-64)   SpO2:  [96 %-98 %]   I/O last 3 completed shifts:  In: 500 [P.O.:500]  Out: 2000 [Urine:2000]  Wt Readings from Last 5 Encounters:   02/23/25 67.9 kg (149 lb 11.1 oz)   02/24/25 67.6 kg (149 lb)   01/14/25 73 kg (160 lb 15 oz)   12/27/24 66.2 kg (146 lb)   12/22/24 74.9 kg (165 lb 2 oz)     Body mass index is 22.76 kg/m².    Physical Exam  Constitutional:       General: Patient is not in acute distress.     Appearance: Patient is well-developed. She is not diaphoretic.   HENT:      Head: Normocephalic and atraumatic.      Mouth/Throat: Mucous membranes are moist.   Eyes:      General: No scleral icterus.     Pupils: Pupils are equal, round, and reactive to light.   Cardiovascular:      Rate and Rhythm: Normal rate and regular rhythm.   Pulmonary:      Effort: Pulmonary effort is normal. No respiratory distress.      Breath sounds: No stridor.   Abdominal:      General: There is no distension.      Palpations: Abdomen is soft.   Musculoskeletal:         General: No deformity. Normal range of motion.      Cervical back: Neck supple.   Skin:     General: Skin is warm and dry.      Findings: No rash present. No erythema.   Neurological:      Mental Status: Patient is alert and oriented to person, place, and time.      Cranial Nerves: No cranial nerve  deficit.   Psychiatric:         Behavior: Behavior normal.          Patient care time was spent personally by me on the following activities:     Obtaining a history.  Examination of patient.  Providing medical care at the patients bedside.  Developing a treatment plan with patient or surrogate and bedside caregivers.  Ordering and reviewing laboratory studies, radiographic studies, pulse oximetry.  Ordering and performing treatments and interventions.  Evaluation of patient's response to treatment.  Discussions with consultants while on the unit and immediately available to the patient.  Re-evaluation of the patient's condition.  Documentation in the medical record.     Flakito Hu MD      [facility]  [address] [place] [id number]    [phone] - tel  [phone] - fax    3/1/2025

## 2025-03-02 NOTE — NURSING
Discharge instructions reviewed with pt and daughter. Pt and daughter voice understanding. IV removed and tele monitor off. Tele monitor returned to monitor room.     EMS at facility to transport pt home. All paperwork given to EMS. Daughter at bedside. Pt discharged home via EMS.

## 2025-03-02 NOTE — PLAN OF CARE
Problem: Skin Injury Risk Increased  Goal: Skin Health and Integrity  3/1/2025 1836 by Jesus Oleary LPN  Outcome: Met  3/1/2025 1749 by Jesus Oleary LPN  Outcome: Progressing     Problem: Adult Inpatient Plan of Care  Goal: Plan of Care Review  3/1/2025 1836 by Jesus Oleary LPN  Outcome: Met  3/1/2025 1749 by Jesus Oleary LPN  Outcome: Progressing  Goal: Patient-Specific Goal (Individualized)  3/1/2025 1836 by Jesus Oleary LPN  Outcome: Met  3/1/2025 1749 by Jesus Oleary LPN  Outcome: Progressing  Goal: Absence of Hospital-Acquired Illness or Injury  3/1/2025 1836 by Jesus Oleary LPN  Outcome: Met  3/1/2025 1749 by Jesus Oleary LPN  Outcome: Progressing  Goal: Optimal Comfort and Wellbeing  3/1/2025 1836 by Jesus Oleary LPN  Outcome: Met  3/1/2025 1749 by Jesus Oleary LPN  Outcome: Progressing  Goal: Readiness for Transition of Care  3/1/2025 1836 by Jesus Oleary LPN  Outcome: Met  3/1/2025 1749 by Jesus Oleary LPN  Outcome: Progressing     Problem: Diabetes Comorbidity  Goal: Blood Glucose Level Within Targeted Range  3/1/2025 1836 by Jesus Oleary LPN  Outcome: Met  3/1/2025 1749 by Jesus Oleary LPN  Outcome: Progressing     Problem: Sepsis/Septic Shock  Goal: Optimal Coping  3/1/2025 1836 by Jesus Oleary LPN  Outcome: Met  3/1/2025 1749 by Jesus Oleary LPN  Outcome: Progressing  Goal: Absence of Bleeding  3/1/2025 1836 by Jesus Oleary LPN  Outcome: Met  3/1/2025 1749 by Jesus Oleary LPN  Outcome: Progressing  Goal: Blood Glucose Level Within Targeted Range  3/1/2025 1836 by Jesus Oleary LPN  Outcome: Met  3/1/2025 1749 by Jesus Oleary LPN  Outcome: Progressing  Goal: Absence of Infection Signs and Symptoms  3/1/2025 1836 by Jesus Oleary LPN  Outcome: Met  3/1/2025 1749 by Jesus Oleary LPN  Outcome: Progressing  Goal: Optimal Nutrition Intake  3/1/2025 1836 by  Jesus Oleayr LPN  Outcome: Met  3/1/2025 1749 by Jesus Oleary LPN  Outcome: Progressing     Problem: Acute Kidney Injury/Impairment  Goal: Fluid and Electrolyte Balance  3/1/2025 1836 by Jesus Oleary LPN  Outcome: Met  3/1/2025 1749 by Jesus Oleary LPN  Outcome: Progressing  Goal: Improved Oral Intake  3/1/2025 1836 by Jesus Oleary LPN  Outcome: Met  3/1/2025 1749 by Jesus Oleary LPN  Outcome: Progressing  Goal: Effective Renal Function  3/1/2025 1836 by Jesus Oleary LPN  Outcome: Met  3/1/2025 1749 by Jesus Oleary LPN  Outcome: Progressing     Problem: Wound  Goal: Optimal Coping  3/1/2025 1836 by Jesus Oleary LPN  Outcome: Met  3/1/2025 1749 by Jesus Oleary LPN  Outcome: Progressing  Goal: Optimal Functional Ability  3/1/2025 1836 by Jesus Oleary LPN  Outcome: Met  3/1/2025 1749 by Jesus Oleary LPN  Outcome: Progressing  Goal: Absence of Infection Signs and Symptoms  3/1/2025 1836 by Jesus Oleary LPN  Outcome: Met  3/1/2025 1749 by Jesus Oleary LPN  Outcome: Progressing  Goal: Improved Oral Intake  3/1/2025 1836 by Jesus Oleary LPN  Outcome: Met  3/1/2025 1749 by Jesus Oleary LPN  Outcome: Progressing  Goal: Optimal Pain Control and Function  3/1/2025 1836 by Jesus Oleary LPN  Outcome: Met  3/1/2025 1749 by Jesus Oleary LPN  Outcome: Progressing  Goal: Skin Health and Integrity  3/1/2025 1836 by Jesus Oleary LPN  Outcome: Met  3/1/2025 1749 by Jesus Oleary LPN  Outcome: Progressing  Goal: Optimal Wound Healing  3/1/2025 1836 by Jesus Oleary LPN  Outcome: Met  3/1/2025 1749 by Jesus Oleary LPN  Outcome: Progressing     Problem: Fall Injury Risk  Goal: Absence of Fall and Fall-Related Injury  3/1/2025 1836 by Jesus Oleary LPN  Outcome: Met  3/1/2025 1749 by Jesus Oleary LPN  Outcome: Progressing     Problem: Coping Ineffective  Goal: Effective  Coping  3/1/2025 1836 by Jesus Oleary LPN  Outcome: Met  3/1/2025 1749 by Jesus Oleary LPN  Outcome: Progressing

## 2025-03-07 ENCOUNTER — TELEPHONE (OUTPATIENT)
Dept: NEUROLOGY | Facility: CLINIC | Age: OVER 89
End: 2025-03-07
Payer: OTHER GOVERNMENT

## 2025-03-21 ENCOUNTER — DOCUMENT SCAN (OUTPATIENT)
Dept: HOME HEALTH SERVICES | Facility: HOSPITAL | Age: OVER 89
End: 2025-03-21
Payer: OTHER GOVERNMENT

## 2025-03-26 ENCOUNTER — TELEPHONE (OUTPATIENT)
Dept: PRIMARY CARE CLINIC | Facility: CLINIC | Age: OVER 89
End: 2025-03-26
Payer: OTHER GOVERNMENT

## 2025-03-26 NOTE — TELEPHONE ENCOUNTER
Spoke with pts daughter regarding up coming office visit. Pts daughter stated that pt is now on Hospice with Heart rate at 40-50 and has not gotten better. Pts daughter has some concerns of why he was sent home an why hospice.

## 2025-03-26 NOTE — TELEPHONE ENCOUNTER
"This should have been discuss with her and the patient at the 2/23/25 til 3/1/25 hospital stay-this is note is from that visit "Patient with bradycardia overnight - cardiology aware. After speaking to family they have opted to defer pacemaker. Amenable to hospice. Case management was consulted for palliative care consult. He was accepted for discharge with hospice."       "

## 2025-03-27 ENCOUNTER — PATIENT MESSAGE (OUTPATIENT)
Dept: PRIMARY CARE CLINIC | Facility: CLINIC | Age: OVER 89
End: 2025-03-27
Payer: OTHER GOVERNMENT

## 2025-04-14 ENCOUNTER — EXTERNAL HOME HEALTH (OUTPATIENT)
Dept: HOME HEALTH SERVICES | Facility: HOSPITAL | Age: OVER 89
End: 2025-04-14
Payer: OTHER GOVERNMENT

## 2025-05-06 ENCOUNTER — EXTERNAL HOME HEALTH (OUTPATIENT)
Dept: HOME HEALTH SERVICES | Facility: HOSPITAL | Age: OVER 89
End: 2025-05-06
Payer: OTHER GOVERNMENT

## 2025-05-07 ENCOUNTER — DOCUMENT SCAN (OUTPATIENT)
Dept: HOME HEALTH SERVICES | Facility: HOSPITAL | Age: OVER 89
End: 2025-05-07
Payer: OTHER GOVERNMENT

## 2025-05-19 ENCOUNTER — PATIENT MESSAGE (OUTPATIENT)
Dept: FAMILY MEDICINE | Facility: CLINIC | Age: OVER 89
End: 2025-05-19
Payer: OTHER GOVERNMENT

## 2025-05-29 ENCOUNTER — EXTERNAL HOME HEALTH (OUTPATIENT)
Dept: HOME HEALTH SERVICES | Facility: HOSPITAL | Age: OVER 89
End: 2025-05-29
Payer: OTHER GOVERNMENT
